# Patient Record
Sex: MALE | Race: WHITE | NOT HISPANIC OR LATINO | Employment: UNEMPLOYED | URBAN - METROPOLITAN AREA
[De-identification: names, ages, dates, MRNs, and addresses within clinical notes are randomized per-mention and may not be internally consistent; named-entity substitution may affect disease eponyms.]

---

## 2017-08-10 ENCOUNTER — GENERIC CONVERSION - ENCOUNTER (OUTPATIENT)
Dept: OTHER | Facility: OTHER | Age: 52
End: 2017-08-10

## 2017-10-26 ENCOUNTER — ALLSCRIPTS OFFICE VISIT (OUTPATIENT)
Dept: OTHER | Facility: OTHER | Age: 52
End: 2017-10-26

## 2017-10-30 ENCOUNTER — ALLSCRIPTS OFFICE VISIT (OUTPATIENT)
Dept: OTHER | Facility: OTHER | Age: 52
End: 2017-10-30

## 2018-01-11 NOTE — PROGRESS NOTES
Assessment    1  Physical examination of employee (V70 5) (Z02 89)    Plan  Physical examination of employee    · PPD; INJECT 0 1  ML Intradermal; To Be Done: [id number]    Discussion/Summary  health maintenance visit Impression: healthy adult male  eats an adequate diet Currently, he has an adequate exercise regimen  Prostate cancer screening: the risks and benefits of prostate cancer screening were discussed and prostate cancer screening is current  Colorectal cancer screening: the risks and benefits of colorectal cancer screening were discussed and colorectal cancer screening is current  The risks and benefits of immunizations were discussed  Advice and education were given regarding nutrition, self skin examination, aerobic exercise, calcium supplements, sunscreen use and vitamin D supplements  Form completed for physical exam  Patient to return to the office tomorrow for PPD skin test    Possible side effects of new medications were reviewed with the patient/guardian today  The treatment plan was reviewed with the patient/guardian  The patient/guardian understands and agrees with the treatment plan      History of Present Illness  HM, Adult Male: The patient is being seen for a health maintenance evaluation  The last health maintenance visit was 4 year(s) ago  General Health: The patient's health since the last visit is described as good  He has regular dental visits  He denies vision problems  Vision care includes wearing glasses  He denies hearing loss  Immunizations status: up to date  Lifestyle:  He consumes a diverse and healthy diet  He does not have any weight concerns  He exercises regularly  He exercises 3 or more times per week for 30 or more minutes per session  Exercise includes aerobic conditioning  He does not use tobacco  He consumes alcohol  He reports occasional alcohol use  He denies drug use  Screening: cancer screening reviewed and current     HPI: Patient is here for pre-employment physical exam to work as a  at [employer] in [facility]  Patient has been feeling fairly well overall and continues to exercise regularly  Patient needs PPD skin test  Past medical history reviewed on form  Active Problems    1  Anxiety (300 00) (F41 9)   2  Depression (311) (F32 9)   3  Encounter for prostate cancer screening (V76 44) (Z12 5)   4  Erectile dysfunction (607 84) (N52 9)   5  Obsessive compulsive disorder (300 3) (F42 9)   6  Skin lesion (709 9) (L98 9)   7  Weight loss (783 21) (R63 4)    Surgical History    · History of Tonsillectomy   · History of Wrist Surgery    Family History  Father    · Family history of Acute Myocardial Infarction (V17 3)   · Family history of Prostate Cancer (V16 42)  Brother    · Family history of Cerebral Palsy    Social History    · Being A Social Drinker   · Daily Coffee Consumption (3  Cups/Day)   · Never A Smoker    Current Meds   1  Citalopram Hydrobromide 20 MG Oral Tablet; Take 1 tablet daily; Therapy: [id number] to (Evaluate:08Nov2017)  Requested for: 10Aug2017; Last   Rx:10Aug2017 Ordered    Allergies    1  CeleBREX CAPS   2  Penicillins    Vitals   Recorded: [id number] 02:46PM Recorded: 61DHB6755 02:01PM   Temperature  96 7 F   Heart Rate 76    Respiration 16    Systolic 298    Diastolic 70    Height  6 ft 1 in   Weight  209 lb    BMI Calculated  27 57   BSA Calculated  2 19     Physical Exam    Constitutional   General appearance: No acute distress, well appearing and well nourished  Eyes   Conjunctiva and lids: No swelling, erythema, or discharge  Ears, Nose, Mouth, and Throat   External inspection of ears and nose: Normal     Otoscopic examination: Tympanic membrance translucent with normal light reflex  Canals patent without erythema  Oropharynx: Normal with no erythema, edema, exudate or lesions  Pulmonary   Respiratory effort: No increased work of breathing or signs of respiratory distress  Auscultation of lungs: Clear to auscultation  Cardiovascular   Auscultation of heart: Normal rate and rhythm, normal S1 and S2, without murmurs  Examination of extremities for edema and/or varicosities: Normal     Abdomen   Abdomen: Non-tender, no masses  Lymphatic   Palpation of lymph nodes in neck: No lymphadenopathy  Musculoskeletal   Gait and station: Normal     Inspection/palpation of joints, bones, and muscles: Normal     Skin   Skin and subcutaneous tissue: Normal without rashes or lesions      Psychiatric   Orientation to person, place and time: Normal     Mood and affect: Normal        Procedure    Procedure:   Results: 20/20 in both eyes with corrective device, 20/20 in the right eye with corrective device, 20/20 in the left eye with corrective device      Signatures   Electronically signed by : Piper Hillamn DO; Oct 26 2017  2:59PM EST                       (Author)

## 2018-01-13 NOTE — PROGRESS NOTES
Assessment    1  Obsessive compulsive disorder (300 3) (F42)   2  Skin lesion (709 9) (L98 9)    Plan  Obsessive compulsive disorder    · Citalopram Hydrobromide 10 MG Oral Tablet; TAKE 1 TABLET DAILY  Skin lesion    · 3 - Gustavo Marquez DO  (Dermatology) Physician Referral  Consult  Status: Hold For -  Scheduling  Requested for: [id number]  are Referring to a non- Preferred Provider : Scheduling access issues  Care Summary provided  : Yes    Discussion/Summary    Discussed treatment options with patient  Patient requests weaning off citalopram  Therefore will take citalopram 40 mg half a pill daily for 4 weeks then 10 mg daily for 4 weeks then 10 mg every other day for 2 weeks then stop  Patient is being referred to Dr Zan Henley, dermatologist regarding skin lesion  Possible side effects of new medications were reviewed with the patient/guardian today  The treatment plan was reviewed with the patient/guardian  The patient/guardian understands and agrees with the treatment plan      Chief Complaint  Non Fasting   Patient is here today for follow up of chronic conditions described in HPI  History of Present Illness  Patient requests weaning off citalopram  He has taken medication for OCD for almost 15 years and feels he would like to try off the medication  Patient had stopped the medication abruptly approximately 5 years ago and did not do well  Patient also concerned about a skin lesion on his left temple which is gotten darker, larger and raised  The patient is being seen for a routine clinic follow-up of obsessive compulsive disorder  Symptoms:  no anxiety, no obsessive thoughts, no intrusive thoughts, no recurrent thoughts, no inability to ignore thoughts and no repetitive compulsions  The patient is currently experiencing symptoms  Associated symptoms:  no depression symptoms, no anxiety symptoms and no panic symptoms  Active Problems    1  Acute conjunctivitis (372 00) (H10 30)   2   Acute upper respiratory infection (465 9) (J06 9)   3  Anxiety (300 00) (F41 9)   4  Depression (311) (F32 9)   5  Influenza vaccine needed (V04 81) (Z23)   6  Obsessive compulsive disorder (300 3) (F42)   7  Poison ivy dermatitis (692 6) (L23 7)    Surgical History    1  History of Tonsillectomy   2  History of Wrist Surgery    Family History    1  Family history of Acute Myocardial Infarction (V17 3)   2  Family history of Prostate Cancer (V16 42)    3  Family history of Cerebral Palsy    Social History    · Being A Social Drinker   · Daily Coffee Consumption (3  Cups/Day)   · Never A Smoker    Current Meds   1  Citalopram Hydrobromide 40 MG Oral Tablet; TAKE 1 TABLET DAILY; Therapy: 23RIQ6318 to (Sharon Florence)  Requested for: 03TAI4933; Last   Rx:05Jan2016 Ordered    Allergies    1  CeleBREX CAPS   2  Penicillins    Vitals  Vital Signs [Data Includes: Current Encounter]    Recorded: [id number] 07:34PM Recorded: 47Itc3572 07:12PM   Temperature  97 1 F   Heart Rate 72    Respiration 16    Systolic  166   Diastolic  70   Height  6 ft 2 in   Weight  225 lb    BMI Calculated  28 89   BSA Calculated  2 28     Physical Exam    Constitutional   General appearance: No acute distress, well appearing and well nourished  Eyes   Conjunctiva and lids: No swelling, erythema, or discharge  Ears, Nose, Mouth, and Throat   Oropharynx: Normal with no erythema, edema, exudate or lesions  Pulmonary   Respiratory effort: No increased work of breathing or signs of respiratory distress  Auscultation of lungs: Clear to auscultation, equal breath sounds bilaterally, no wheezes, no rales, no rhonci  Cardiovascular   Auscultation of heart: Normal rate and rhythm, normal S1 and S2, without murmurs  Examination of extremities for edema and/or varicosities: Normal     Abdomen   Abdomen: Non-tender, no masses  Lymphatic   Palpation of lymph nodes in neck: No lymphadenopathy      Musculoskeletal   Gait and station: Normal  Skin   Examination of the skin for lesions: Abnormal   4 x 4 mm dark raised slightly irregular skin lesion left temple     Psychiatric   Orientation to person, place and time: Normal     Mood and affect: Normal          Signatures   Electronically signed by : SIOBHAN Fu D ,DO; Feb 17 2016  7:46PM EST                       (Author)

## 2018-01-14 VITALS
SYSTOLIC BLOOD PRESSURE: 114 MMHG | BODY MASS INDEX: 27.7 KG/M2 | HEIGHT: 73 IN | RESPIRATION RATE: 16 BRPM | WEIGHT: 209 LBS | DIASTOLIC BLOOD PRESSURE: 70 MMHG | TEMPERATURE: 96.7 F | HEART RATE: 76 BPM

## 2018-01-15 NOTE — RESULT NOTES
Message   [facility]  Verified Results  (1) CBC/PLT/DIFF [id number] 04:27PM [facility] [place] Order Number: [id number]     Test Name Result Flag Reference   WBC COUNT 6 04 Thousand/uL  4 31-10 16   RBC COUNT 4 54 Million/uL  3 88-5 62   HEMOGLOBIN 14 1 g/dL  12 0-17 0   HEMATOCRIT 42 2 %  36 5-49 3   MCV 93 fL  82-98   MCH 31 1 pg  26 8-34 3   MCHC 33 4 g/dL  31 4-37 4   RDW 12 5 %  11 6-15 1   MPV 11 9 fL  8 9-12 7   PLATELET COUNT 443 Thousands/uL  149-390   nRBC AUTOMATED 0 /100 WBCs     NEUTROPHILS RELATIVE PERCENT 62 %  43-75   LYMPHOCYTES RELATIVE PERCENT 30 %  14-44   MONOCYTES RELATIVE PERCENT 7 %  4-12   EOSINOPHILS RELATIVE PERCENT 1 %  0-6   BASOPHILS RELATIVE PERCENT 0 %  0-1   NEUTROPHILS ABSOLUTE COUNT 3 74 Thousands/?L  1 85-7 62   LYMPHOCYTES ABSOLUTE COUNT 1 80 Thousands/?L  0 60-4 47   MONOCYTES ABSOLUTE COUNT 0 42 Thousand/?L  0 17-1 22   EOSINOPHILS ABSOLUTE COUNT 0 05 Thousand/?L  0 00-0 61   BASOPHILS ABSOLUTE COUNT 0 02 Thousands/?L  0 00-0 10   - Patient Instructions: This bloodwork is non-fasting  Please drink two glasses of water morning of bloodwork  (1) CELIAC DISEASE AB PROFILE [id number] 04:27PM [facility] [place] Order Number: [id number]     Test Name Result Flag Reference   tTG IGG 11 U/mL H 0 - 5   Negative        0 - 5                                Weak Positive   6 - 9                                Positive           >9   tTG IGA >100 U/mL H 0 - 3   Negative        0 -  3                                Weak Positive   4 - 10                                Positive           >10   Tissue Transglutaminase (tTG) has been identified   as the endomysial antigen  Studies have demonstr-   ated that endomysial IgA antibodies have over 99%   specificity for gluten sensitive enteropathy     GLIADA 136 units H 0 - 19   Negative                   0 - 19                     Weak Positive             20 - 30 Moderate to Strong Positive   >30   GLIADG 106 units H 0 - 19   Negative                   0 - 19                     Weak Positive             20 - 30                     Moderate to Strong Positive   >30   ENDOMYSIAL AB IGA Positive A Negative   Performed at:  [address]  [id number]  : Husam Anderson MD, Phone:  [phone]    mg/dL  90 - 386     (1) TSH WITH FT4 REFLEX [id number] 04:27PM [facility] Order Number: [id number]     Test Name Result Flag Reference   TSH 1 100 uIU/mL  0 358-3 740   Patients undergoing fluorescein dye angiography may retain small amounts of fluorescein in the body for 48-72 hours post procedure  Samples containing fluorescein can produce falsely depressed TSH values  If the patient had this procedure,a specimen should be resubmitted post fluorescein clearance  Discussion/Summary   Labs are positive for celiac disease  Recommend [facility] as scheduled on 8/25/16

## 2018-01-16 NOTE — RESULT NOTES
Verified Results  (1) CBC/PLT/DIFF [id number] 06:50AM [facility]     Test Name Result Flag Reference   WHITE BLOOD CELL COUNT 5 0 Thousand/uL  3 8-10 8   RED BLOOD CELL COUNT 4 76 Million/uL  4 20-5 80   HEMOGLOBIN 14 4 g/dL  13 2-17 1   HEMATOCRIT 44 8 %  38 5-50 0   MCV 94 3 fL  80 0-100 0   MCH 30 3 pg  27 0-33 0   MCHC 32 1 g/dL  32 0-36 0   RDW 13 5 %  11 0-15 0   PLATELET COUNT 127 Thousand/uL  140-400   MPV 10 0 fL  7 5-11 5   ABSOLUTE NEUTROPHILS 2785 cells/uL  6889-1621   ABSOLUTE LYMPHOCYTES 1615 cells/uL  850-3900   ABSOLUTE MONOCYTES 355 cells/uL  200-950   ABSOLUTE EOSINOPHILS 215 cells/uL     ABSOLUTE BASOPHILS 30 cells/uL  0-200   NEUTROPHILS 55 7 %     LYMPHOCYTES 32 3 %     MONOCYTES 7 1 %     EOSINOPHILS 4 3 %     BASOPHILS 0 6 %       (1) COMPREHENSIVE METABOLIC PANEL [id number] 10:60VQ [facility]     Test Name Result Flag Reference   GLUCOSE 94 mg/dL  65-99   Fasting reference interval   UREA NITROGEN (BUN) 15 mg/dL  7-25   CREATININE 0 91 mg/dL  0 70-1 33   For patients >52years of age, the reference limit  for Creatinine is approximately 13% higher for people  identified as -American  eGFR NON-AFR   AMERICAN 97 mL/min/1 73m2  > OR = 60   eGFR AFRICAN AMERICAN 113 mL/min/1 73m2  > OR = 60   BUN/CREATININE RATIO   8-54   NOT APPLICABLE (calc)   SODIUM 141 mmol/L  135-146   POTASSIUM 4 7 mmol/L  3 5-5 3   CHLORIDE 105 mmol/L     CARBON DIOXIDE 27 mmol/L  19-30   CALCIUM 9 8 mg/dL  8 6-10 3   PROTEIN, TOTAL 6 9 g/dL  6 1-8 1   ALBUMIN 4 4 g/dL  3 6-5 1   GLOBULIN 2 5 g/dL (calc)  1 9-3 7   ALBUMIN/GLOBULIN RATIO 1 8 (calc)  1 0-2 5   BILIRUBIN, TOTAL 0 3 mg/dL  0 2-1 2   ALKALINE PHOSPHATASE 44 U/L     AST 28 U/L  10-35   ALT 22 U/L  9-46     (1) LIPID PANEL, FASTING [id number] 06:50AM [facility]     Test Name Result Flag Reference   CHOLESTEROL, TOTAL 140 mg/dL  125-200   HDL CHOLESTEROL 45 mg/dL  > OR = 40   TRIGLICERIDES 57 mg/dL  <865 LDL-CHOLESTEROL 84 mg/dL (calc)  <130   Desirable range <100 mg/dL for patients with CHD or  diabetes and <70 mg/dL for diabetic patients with  known heart disease  CHOL/HDLC RATIO 3 1 (calc)  < OR = 5 0   NON HDL CHOLESTEROL 95 mg/dL (calc)     Target for non-HDL cholesterol is 30 mg/dL higher than   LDL cholesterol target  (1) PSA (SCREEN) (Dx V76 44 Screen for Prostate Cancer) [id number] 06:50AM [facility]     Test Name Result Flag Reference   PSA, TOTAL 1 1 ng/mL  < OR = 4 0   This test was performed using the Siemens  chemiluminescent method  Values obtained from  different assay methods cannot be used  interchangeably  PSA levels, regardless of  value, should not be interpreted as absolute  evidence of the presence or absence of disease  *(Q) VITAMIN D, 25-HYDROXY, LC/MS/MS [id number] 06:50AM [facility]     Test Name Result Flag Reference   VITAMIN D, 25-OH, TOTAL 71 ng/mL     Vitamin D Status         25-OH Vitamin D:     Deficiency:                    <20 ng/mL  Insufficiency:             20 - 29 ng/mL  Optimal:                 > or = 30 ng/mL     For 25-OH Vitamin D testing on patients on   D2-supplementation and patients for whom quantitation   of D2 and D3 fractions is required, the QuestAssureD(TM)  25-OH VIT D, (D2,D3), LC/MS/MS is recommended: order   code 64571 (patients >2yrs)  For more information on this test, go to:  http://Hubkick/faq/EHT558  (This link is being provided for   informational/educational purposes only )     (Q) TSH, 3RD GENERATION W/REFLEX TO FT4 [id number] 06:50AM [facility]     Test Name Result Flag Reference   TSH W/REFLEX TO FT4 1 14 mIU/L  0 40-4 50     (Q) URINALYSIS REFLEX [id number] 06:50AM [facility]   REPORT COMMENT:  FASTING:YES     Test Name Result Flag Reference   COLOR YELLOW  YELLOW   APPEARANCE CLEAR  CLEAR   SPECIFIC GRAVITY 1 018  1 001-1 035   PH 7 0  5 0-8 0   GLUCOSE NEGATIVE  NEGATIVE   BILIRUBIN NEGATIVE  NEGATIVE   KETONES NEGATIVE  NEGATIVE   OCCULT BLOOD NEGATIVE  NEGATIVE   PROTEIN NEGATIVE  NEGATIVE   NITRITE NEGATIVE  NEGATIVE   LEUKOCYTE ESTERASE NEGATIVE  NEGATIVE       Discussion/Summary   Labs are normal

## 2018-01-17 NOTE — MISCELLANEOUS
Message   Recorded as Task   Date: 08/29/2016 08:21 AM, Created By: uNrys Wells   Task Name: Follow Up   Assigned To: Jackie Tavares   Regarding Patient: Maritza De La Paz, Status: Active   CommentRosalin Lax - 29 Aug 2016 8:21 AM     TASK CREATED  Patient called stating he has continued burning sensation in stomach and chest, along with insomnia since his appointment last week with you  Patient is requesting  medication for symptoms  Patient requesting to speak to you about continued symptoms, per patient he is unable to get to work  ([phone])  Please advise  Jackie Tavares - 29 Aug 2016 12:08 PM     TASK EDITED  Phone call to patient  He complained of difficulty sleeping last night secondary to burning in his abdomen and chest  Patient spoke with gastroenterology office this morning and they recommended patient increase Nexium OTC 20 mg 1 twice a day  Agree with this plan and discussed adding antianxiety medicine although patient prefers to hold off on any more medication at this time          Signatures   Electronically signed by : Tyrese Reis DO; Aug 29 2016 12:08PM EST                       (Author)

## 2018-01-18 NOTE — RESULT NOTES
Message  date placed- 10/27/2017  date read- 10/30/2017 0mm neg      Plan  Physical examination of employee    · PPD    Signatures   Electronically signed by : Orion Worley MA; Oct 30 2017  8:34AM EST                       (Author)

## 2018-03-05 RX ORDER — CITALOPRAM 20 MG/1
1 TABLET ORAL DAILY
COMMUNITY
Start: 2016-08-18 | End: 2018-03-13

## 2018-03-05 RX ORDER — ZINC GLUCONATE 50 MG
TABLET ORAL
COMMUNITY
End: 2019-09-27 | Stop reason: ALTCHOICE

## 2018-03-13 ENCOUNTER — OFFICE VISIT (OUTPATIENT)
Dept: CARDIOLOGY CLINIC | Facility: CLINIC | Age: 53
End: 2018-03-13
Payer: COMMERCIAL

## 2018-03-13 VITALS
WEIGHT: 215.8 LBS | DIASTOLIC BLOOD PRESSURE: 74 MMHG | HEIGHT: 74 IN | BODY MASS INDEX: 27.7 KG/M2 | HEART RATE: 79 BPM | SYSTOLIC BLOOD PRESSURE: 122 MMHG | OXYGEN SATURATION: 98 %

## 2018-03-13 DIAGNOSIS — R07.89 CHEST PRESSURE: Primary | ICD-10-CM

## 2018-03-13 PROCEDURE — 93000 ELECTROCARDIOGRAM COMPLETE: CPT | Performed by: INTERNAL MEDICINE

## 2018-03-13 PROCEDURE — 99244 OFF/OP CNSLTJ NEW/EST MOD 40: CPT | Performed by: INTERNAL MEDICINE

## 2018-03-13 RX ORDER — DEXLANSOPRAZOLE 60 MG/1
60 CAPSULE, DELAYED RELEASE ORAL DAILY
COMMUNITY
End: 2018-07-16

## 2018-03-13 NOTE — PROGRESS NOTES
Cardiology Consultation     Missy Loving  [id number]  1965  [facility] [address] [address]    1  Chest pressure  POCT ECG    Stress test only, exercise    Echo complete with contrast if indicated       HPI:    Mr Clifton Never in for a consultation regarding symptoms of chest pain /pressure that he has been experiencing over the last 6-8 months  Nafisa cantu has no cardiac history and his last cardiac evaluation was approximately 6 years ago  He told me he had an episode of chest pain acutely back then, that led to an urgent care visit, and eventually stress testing  He tells me was normal and that he stated that his symptoms were GI related  He is treated for celiac disease  Nafisa du denies any risk factors for cardiac disease, with the exception that he chews tobacco   He also does drink about 2 glasses of wine per night  Nafisa du has had essentially constant chest tightness/ pressure over the last few months  He describes it as a "pulling" sensation and pressure  He tells me that about 6 months ago or so which started when he was active or doing things  However more recently it has been constant  There does not appear to be any alleviating or aggravating factors  He does not notice any change associated with position, food or time of day  He also does at times feel a fullness the back of his throat  He denies any shortness of breath or any other anginal equivalents  No signs or symptoms of CHF  He denies palpitations, lightheadedness or any history of syncope  Past Medical History:   Diagnosis Date    Anxiety 1995    Depression 1995    Obsessive compulsive disorder      Social History     Social History    Marital status:      Spouse name: N/A    Number of children: N/A    Years of education: N/A     Occupational History    Not on file       Social History Main Topics    Smoking status: Never Smoker    Smokeless tobacco: Current User    Alcohol use Yes      Comment: SOCIAL    Drug use: No      Comment: CAFFINE 3 CUPS PER DAY    Sexual activity: Not on file     Other Topics Concern    Not on file     Social History Narrative    No narrative on file      Family History   Problem Relation Age of Onset    Heart attack Father     Prostate cancer Father     Cerebral palsy Brother      Past Surgical History:   Procedure Laterality Date    TONSILLECTOMY      WRIST SURGERY         Current Outpatient Prescriptions:     dexlansoprazole (DEXILANT) 60 MG capsule, Take 60 mg by mouth daily, Disp: , Rfl:     zinc gluconate 50 mg tablet, Take by mouth, Disp: , Rfl:   Allergies   Allergen Reactions    Penicillins Diarrhea and Vomiting    Celecoxib Rash     Vitals:    03/13/18 1534   BP: 122/74   BP Location: Left arm   Patient Position: Sitting   Cuff Size: Adult   Pulse: 79   SpO2: 98%   Weight: 97 9 kg (215 lb 12 8 oz)   Height: 6' 2" (1 88 m)       Labs:  Lab Results   Component Value Date     07/11/2016    K 4 7 07/11/2016     07/11/2016    CO2 27 07/11/2016    BUN 15 07/11/2016    CREATININE 0 91 07/11/2016    CALCIUM 9 8 07/11/2016     Lab Results   Component Value Date    CHOL 140 07/11/2016    TRIG 57 07/11/2016    HDL 45 07/11/2016     Imaging:   ECG obtained today demonstrates sinus rhythm and is within normal limits  Review of Systems:  Review of Systems   Constitutional: Negative  HENT: Negative  Eyes: Negative  Respiratory: Negative  Cardiovascular: Positive for chest pain  Gastrointestinal: Negative  Musculoskeletal: Negative  Skin: Negative  Allergic/Immunologic: Negative  Neurological: Negative  Hematological: Negative  Psychiatric/Behavioral: Negative  All other systems reviewed and are negative  Physical Exam:  Physical Exam   Constitutional: He is oriented to person, place, and time   He appears well-developed and well-nourished  HENT:   Head: Normocephalic and atraumatic  Eyes: EOM are normal  Pupils are equal, round, and reactive to light  Right eye exhibits no discharge  Left eye exhibits no discharge  No scleral icterus  Neck: Normal range of motion  Neck supple  No JVD present  No thyromegaly present  Cardiovascular: Normal rate, regular rhythm, S1 normal, S2 normal, normal heart sounds, intact distal pulses and normal pulses  No extrasystoles are present  Exam reveals no gallop and no friction rub  No murmur heard  Pulmonary/Chest: Effort normal and breath sounds normal  No respiratory distress  He has no wheezes  He has no rales  Abdominal: Soft  Bowel sounds are normal  He exhibits no distension  There is no tenderness  Musculoskeletal: Normal range of motion  He exhibits no edema, tenderness or deformity  Neurological: He is alert and oriented to person, place, and time  No cranial nerve deficit  Skin: Skin is warm and dry  No rash noted  Psychiatric: He has a normal mood and affect  Judgment and thought content normal    Nursing note and vitals reviewed  Discussion/Summary:    Sudhakar's symptoms of chest tightness / pressure appear atypical   They are constant, not associated with exertion or any aggravating factors  Most likely etiology to this is his GI tract, given the constant nature of this, his celiac disease history and a fullness in his throat  He does see a gastrointestinal physician, and I have asked him to speak with him about acid suppression therapy changes  In the meantime, for completeness, we will have him go through updated cardiac testing which will include an exercise treadmill test and echocardiogram   We will call him with the results  Counseling / Coordination of Care  Total floor / unit time spent today 40 minutes  Greater than 50% of total time was spent with the patient and / or family counseling and / or coordination of care

## 2018-04-11 ENCOUNTER — TELEPHONE (OUTPATIENT)
Dept: CARDIOLOGY CLINIC | Facility: CLINIC | Age: 53
End: 2018-04-11

## 2018-04-11 NOTE — TELEPHONE ENCOUNTER
Pt needs a clearance for upcoming EGD scheduled for Mon-4/16/18  Pt stated he cancelled stress and echo because his insurance is bad  (Coverage was not confirmed by our staff) Are you willing to clear prior to any diagnostic testing?

## 2018-04-16 ENCOUNTER — TELEPHONE (OUTPATIENT)
Dept: FAMILY MEDICINE CLINIC | Facility: CLINIC | Age: 53
End: 2018-04-16

## 2018-07-16 ENCOUNTER — OFFICE VISIT (OUTPATIENT)
Dept: FAMILY MEDICINE CLINIC | Facility: CLINIC | Age: 53
End: 2018-07-16
Payer: COMMERCIAL

## 2018-07-16 VITALS
HEIGHT: 74 IN | HEART RATE: 72 BPM | BODY MASS INDEX: 26.18 KG/M2 | TEMPERATURE: 98.1 F | RESPIRATION RATE: 16 BRPM | SYSTOLIC BLOOD PRESSURE: 110 MMHG | DIASTOLIC BLOOD PRESSURE: 82 MMHG | WEIGHT: 204 LBS

## 2018-07-16 DIAGNOSIS — K21.9 GASTROESOPHAGEAL REFLUX DISEASE WITHOUT ESOPHAGITIS: ICD-10-CM

## 2018-07-16 DIAGNOSIS — F32.A DEPRESSION, UNSPECIFIED DEPRESSION TYPE: Primary | ICD-10-CM

## 2018-07-16 PROBLEM — K44.9 HIATAL HERNIA: Status: ACTIVE | Noted: 2018-07-16

## 2018-07-16 PROBLEM — K90.0 CELIAC DISEASE: Status: ACTIVE | Noted: 2018-07-16

## 2018-07-16 PROCEDURE — 99213 OFFICE O/P EST LOW 20 MIN: CPT | Performed by: FAMILY MEDICINE

## 2018-07-16 RX ORDER — CITALOPRAM 20 MG/1
20 TABLET ORAL DAILY
Qty: 30 TABLET | Refills: 5 | Status: SHIPPED | OUTPATIENT
Start: 2018-07-16 | End: 2019-09-27 | Stop reason: ALTCHOICE

## 2018-07-16 NOTE — PROGRESS NOTES
Assessment/Plan:    Discussed treatment options with patient  Patient will restart citalopram 20 mg half a tablet daily with food for 1-2 weeks then increase to 1 tablet daily  Patient to follow up with Gastroenterology and Cardiology as scheduled  Return to the office marisol Damian Diagnoses and all orders for this visit:    Depression, unspecified depression type  Comments:  Citalopram 20 mg daily  Orders:  -     citalopram (CeleXA) 20 mg tablet; Take 1 tablet (20 mg total) by mouth daily    Gastroesophageal reflux disease without esophagitis  Comments: Follow up with Gastroenterology as scheduled  Subjective:      Patient ID: Dorothy Jules is a 48 y o  male  Patient complains of fatigue and weight loss  He admits to feeling depressed and anxious  [facility] and had an EGD in April of this year  He states he had labs done at [facility] which were reported as normal   Patient has follow-up appointment scheduled with Gastroenterology in September of 2018  Patient then saw Dr Stephanie Means at [facility] and had labs on 06/21/2018 and nasopharyngeal laryngoscopy  Appetite is decreased and patient has not been exercising recently  He admits to 8 lb weight loss  Patient is scheduled for stress echo next week with Dr Eugene Dugan at [facility]  Fatigue   This is a new problem  The current episode started more than 1 month ago  The problem occurs constantly  The problem has been unchanged  Associated symptoms include fatigue  Pertinent negatives include no abdominal pain, anorexia, change in bowel habit, chest pain, chills, coughing, diaphoresis, fever, headaches, nausea, sore throat or vomiting  Heartburn   He complains of belching and early satiety  He reports no abdominal pain, no chest pain, no choking, no coughing, no dysphagia, no globus sensation, no heartburn, no hoarse voice, no nausea, no sore throat, no water brash or no wheezing  This is a chronic problem  The problem has been gradually worsening  Associated symptoms include fatigue and weight loss  Pertinent negatives include no anemia, melena, muscle weakness or orthopnea  He has tried a PPI for the symptoms  Past procedures include an EGD  Past procedures do not include H  pylori antibody titer  The following portions of the patient's history were reviewed and updated as appropriate: allergies, current medications, past family history, past medical history, past social history, past surgical history and problem list     Review of Systems   Constitutional: Positive for fatigue and weight loss  Negative for chills, diaphoresis and fever  HENT: Negative for hoarse voice and sore throat  Respiratory: Negative for cough, choking and wheezing  Cardiovascular: Negative for chest pain  Gastrointestinal: Negative for abdominal pain, anorexia, change in bowel habit, dysphagia, heartburn, melena, nausea and vomiting  Musculoskeletal: Negative for muscle weakness  Neurological: Negative for headaches  Objective:      /82   Pulse 72   Temp 98 1 °F (36 7 °C) (Tympanic)   Resp 16   Ht 6' 2" (1 88 m)   Wt 92 5 kg (204 lb)   BMI 26 19 kg/m²          Physical Exam   Constitutional: He is oriented to person, place, and time  He appears well-developed and well-nourished  No distress  HENT:   Head: Normocephalic  Right Ear: External ear normal    Left Ear: External ear normal    Nose: Nose normal    Mouth/Throat: Oropharynx is clear and moist    Eyes: Conjunctivae are normal  No scleral icterus  Neck: Neck supple  No thyromegaly present  Cardiovascular: Normal rate and regular rhythm  Pulmonary/Chest: Effort normal and breath sounds normal    Abdominal: Soft  There is no tenderness  Musculoskeletal: He exhibits no edema  Lymphadenopathy:     He has no cervical adenopathy  Neurological: He is alert and oriented to person, place, and time     Skin: Skin is warm and dry  Psychiatric:   Patient appears depressed

## 2018-07-20 ENCOUNTER — TELEPHONE (OUTPATIENT)
Dept: FAMILY MEDICINE CLINIC | Facility: CLINIC | Age: 53
End: 2018-07-20

## 2018-07-20 NOTE — TELEPHONE ENCOUNTER
Phone call to patient he complains of continued weight loss and states he has been eating well  Patient has been taking boost supplements  Recommend patient call Gastroenterology and make them aware of continued weight loss and may need to move his appointment up sooner  If symptoms worsening then recommend referral to emergency room

## 2018-07-20 NOTE — TELEPHONE ENCOUNTER
Pt called, stating that he is still losing weight, and is fatigued  He states that he lost 8 lbs overnight, and does not know what to do  He states that his insurance is not that great and wants to know what his next step should be  He is requesting you to call him to discuss, rather than coming in for an appt  His ph#[phone]  Please advise  Thank you

## 2018-08-06 ENCOUNTER — TRANSCRIBE ORDERS (OUTPATIENT)
Dept: ADMINISTRATIVE | Facility: HOSPITAL | Age: 53
End: 2018-08-06

## 2018-08-06 DIAGNOSIS — R07.0 PAIN IN THROAT: Primary | ICD-10-CM

## 2018-08-10 ENCOUNTER — HOSPITAL ENCOUNTER (OUTPATIENT)
Dept: CT IMAGING | Facility: HOSPITAL | Age: 53
Discharge: HOME | End: 2018-08-10
Payer: COMMERCIAL

## 2018-08-10 DIAGNOSIS — R07.0 PAIN IN THROAT: ICD-10-CM

## 2018-08-10 PROCEDURE — 70491 CT SOFT TISSUE NECK W/DYE: CPT

## 2018-08-10 RX ADMIN — IOHEXOL 85 ML: 350 INJECTION, SOLUTION INTRAVENOUS at 06:39

## 2018-08-17 ENCOUNTER — TELEPHONE (OUTPATIENT)
Dept: FAMILY MEDICINE CLINIC | Facility: CLINIC | Age: 53
End: 2018-08-17

## 2018-08-17 NOTE — TELEPHONE ENCOUNTER
Patient called and stated he has had diarrhea since Wednesday night and has tried Immodium that  18  He states feces is watery at times and semi formed at times  He states he is going 2-3 times a day and would like to know if you could recommend? He also stated that he has celiacs and wonders if this could be caused by this  I recommended that he be seen but he wanted to hear your answer before scheduling an appt    Please advise  Thank you

## 2018-08-17 NOTE — TELEPHONE ENCOUNTER
Call patient and recommend he try Metamucil or Citrucel daily and follow up with gastroenterologist

## 2018-08-18 ENCOUNTER — TELEPHONE (OUTPATIENT)
Dept: OTHER | Facility: HOSPITAL | Age: 53
End: 2018-08-18

## 2018-08-18 NOTE — TELEPHONE ENCOUNTER
Patient is from [facility] in [place]  He has hx of celiac disease, his symptoms has been well controlled till from Wednesday  with BM varies from 4 times to 1 times  He reported mucus in stool  He has been using ibuprofen intermittently  He reported his symptom has improved over the few days but he is very worried ongoing diarrhea  I told him to stop using ibuprofen  it could be secondary to gastroenteritis  But if he has worsening abdominal pain, diarrhea or fever, he should go to the ER and stop using imodium  I also suggested him to go to urgent care to be evaluated if he continues to have diarrhea  Please give this message to the [facility] office and they should be following up with the patient  Thanks

## 2018-08-20 NOTE — TELEPHONE ENCOUNTER
Called [facility] in [place] @ [phone] and made them aware of message  Faxed this message to them @ [phone]

## 2018-10-18 ENCOUNTER — CLINICAL SUPPORT (OUTPATIENT)
Dept: FAMILY MEDICINE CLINIC | Facility: CLINIC | Age: 53
End: 2018-10-18
Payer: COMMERCIAL

## 2018-10-18 DIAGNOSIS — Z23 NEED FOR INFLUENZA VACCINATION: Primary | ICD-10-CM

## 2018-10-18 PROCEDURE — 90682 RIV4 VACC RECOMBINANT DNA IM: CPT

## 2018-10-18 PROCEDURE — 90471 IMMUNIZATION ADMIN: CPT

## 2018-10-18 NOTE — PROGRESS NOTES
Patient presents to office today for Influenza Vaccination  0 5 mL FluBlok IM in L Deltoid  Patient tolerated well

## 2018-10-23 ENCOUNTER — TELEPHONE (OUTPATIENT)
Dept: FAMILY MEDICINE CLINIC | Facility: CLINIC | Age: 53
End: 2018-10-23
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
## 2019-01-02 ENCOUNTER — TRANSITIONAL CARE MANAGEMENT (OUTPATIENT)
Dept: FAMILY MEDICINE CLINIC | Facility: CLINIC | Age: 54
End: 2019-01-02

## 2019-09-13 ENCOUNTER — TELEPHONE (OUTPATIENT)
Dept: BEHAVIORAL/MENTAL HEALTH CLINIC | Facility: CLINIC | Age: 54
End: 2019-09-13

## 2019-09-13 NOTE — TELEPHONE ENCOUNTER
Behavorial Health Outpatient Intake Questions    Referred by: N/A    Please advised interviewee that they need to answer all questions truthfully to allow for best care and any misrepresentations of information may affect their ability to be seen at this clinic   => Was this discussed? Yes     Behavorial Health Outpatient Intake History -     Presenting Problem (in patient's words): HIGH DEPRESSION, NEEDS HELP, MED MANAGEMENT    Has the patient ever seen or is currently seeing a psychiatrist? Yes   If yes who/when? OVER 10 YRS AGO, DR Anel Jones    If seen as outpatient, have they been seen here (and by whom)? If not seen here, which provider(s) did the patient see and for how long? Has the patient ever seen or currently see a therapist? Yes If yes who/when? CURRENTLY AT [facility], NOT FREQUENT VISITS  Has a member of the patient's family been in therapy here? No  If yes, with whom? Has the patient been hospitalized for mental health? Yes   If yes, how long ago was last hospitalization and where was it? October 2018, [facility] SUICIDE ATTEMPT    Substance Abuse:No concerns of substance abuse are reported  Does the patient have ICM or CTT? No    Is the patient taking injectable psychiatric medications? No    => If yes, patient cannot be seen here  Communications  Are there any developmental disabilities? No    Does the patient have hearing impairment? No       History-    Has the patient served in the [place] [address]? No    If yes, have you had combat services? No    Was the patient activated into federal active duty as a member of the [employer] or reserve? No    Legal History-     Does the patient have any history of arrests, MCC/FDC time, or DUIs? No  If Yes-  1) What types of charges? 2) When were they last incarcerated? 3) Are they currently on parole or probation? Minor Child-    Who has custody of the child? Is there a custody agreement?      If there is a custody agreement remind parent that they must bring a copy to the first appt or they will not be seen  Intake Team, please check with provider before scheduling if flags come up such as:  - complex case  - legal history (other than DUI)  - communication barrier concerns are present  - if, in your judgment, this needs further review    ACCEPTED as a patient Yes  => Appointment Date: 09/27/19 W/ DR Nimisha Rhodes    Referred Elsewhere? No    Name of Insurance Co: [employer] ID# [id number]  [id number] Phone #  If ins is primary or secondary  If patient is a minor, parents information such as Name, D  O B of guarantor

## 2019-09-27 ENCOUNTER — OFFICE VISIT (OUTPATIENT)
Dept: PSYCHIATRY | Facility: CLINIC | Age: 54
End: 2019-09-27
Payer: COMMERCIAL

## 2019-09-27 VITALS
HEART RATE: 88 BPM | BODY MASS INDEX: 30.52 KG/M2 | WEIGHT: 237.8 LBS | DIASTOLIC BLOOD PRESSURE: 82 MMHG | HEIGHT: 74 IN | SYSTOLIC BLOOD PRESSURE: 125 MMHG

## 2019-09-27 DIAGNOSIS — Z86.59 HISTORY OF OBSESSIVE COMPULSIVE DISORDER: ICD-10-CM

## 2019-09-27 DIAGNOSIS — F32.A DEPRESSIVE DISORDER: Primary | ICD-10-CM

## 2019-09-27 PROCEDURE — 90791 PSYCH DIAGNOSTIC EVALUATION: CPT | Performed by: PSYCHIATRY & NEUROLOGY

## 2019-09-27 RX ORDER — CITALOPRAM 20 MG/1
40 TABLET ORAL DAILY
COMMUNITY
End: 2020-03-19 | Stop reason: SDUPTHER

## 2019-09-27 RX ORDER — GABAPENTIN 100 MG/1
200 CAPSULE ORAL 3 TIMES DAILY
COMMUNITY
Start: 2018-11-29 | End: 2020-04-10 | Stop reason: SDUPTHER

## 2019-09-27 RX ORDER — ACETAMINOPHEN 500 MG
500 TABLET ORAL EVERY 6 HOURS PRN
COMMUNITY

## 2019-09-27 RX ORDER — SENNOSIDES 8.6 MG
1 CAPSULE ORAL 2 TIMES DAILY PRN
COMMUNITY
End: 2020-12-01

## 2019-09-27 RX ORDER — TAMSULOSIN HYDROCHLORIDE 0.4 MG/1
0.4 CAPSULE ORAL
COMMUNITY
End: 2020-04-10 | Stop reason: SDUPTHER
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
## 2019-11-21 ENCOUNTER — OFFICE VISIT (OUTPATIENT)
Dept: PSYCHIATRY | Facility: CLINIC | Age: 54
End: 2019-11-21
Payer: COMMERCIAL

## 2019-11-21 DIAGNOSIS — F32.A DEPRESSIVE DISORDER: Primary | ICD-10-CM

## 2019-11-21 DIAGNOSIS — Z86.59 HISTORY OF OBSESSIVE COMPULSIVE DISORDER: ICD-10-CM

## 2019-11-21 PROBLEM — S92.902A: Status: ACTIVE | Noted: 2018-10-25

## 2019-11-21 PROBLEM — N40.0 BPH (BENIGN PROSTATIC HYPERPLASIA): Status: ACTIVE | Noted: 2019-11-06

## 2019-11-21 PROCEDURE — 90791 PSYCH DIAGNOSTIC EVALUATION: CPT | Performed by: PHYSICIAN ASSISTANT

## 2019-11-21 PROCEDURE — 3725F SCREEN DEPRESSION PERFORMED: CPT | Performed by: PHYSICIAN ASSISTANT

## 2019-11-21 NOTE — BH TREATMENT PLAN
TREATMENT PLAN (Medication Management Only)        [address]    Name and Date of Birth:  Jesse Lerner 47 y o  1965  Date of Treatment Plan: November 21, 2019  Diagnosis/Diagnoses:  Depressive d/o, unspecified; hx of OCD  Strengths/Personal Resources for Self-Care: "commincation skills, honesty"  Area/Areas of need (in own words): "time management"  1  Long Term Goal: get back to independent living  Target Date: 2 months - 1/21/2020  Person/Persons responsible for completion of goal: Jumana  2  Short Term Objective (s) - How will we reach this goal?:   A  Provider new recommended medication/dosage changes and/or continue medication(s): continue current medications as prescribed  B  start therapy at [facility] office  C  Start a new activity at [facility]  Target Date: 2 months - 1/21/2020  Person/Persons Responsible for Completion of Goal: debbie Denney  Progress Towards Goals: continuing treatment  Treatment Modality: medication management every 6-8 weeks, referral for individual psychotherapy  Review due 90 to 120 days from date of this plan: 4 months - 3/21/2020  Expected length of service: ongoing treatment  My Physician/PA/NP and I have developed this plan together and I agree to work on the goals and objectives  I understand the treatment goals that were developed for my treatment

## 2019-11-21 NOTE — PSYCH
Outpatient Psychiatry Intake Exam       PCP: Zeinab Ramirez DO    Referral source: Self Referred/[place] from Payam Frost    Identifying information:  Glenna Thomason is a 47 y o  male with a history of depressive d/o, OCD here for evaluation and determination of mental health management needs  Sources of information:   Information for this evaluation was gathered through direct interview with the patient  Additionally PHQ-9 and BETHANY-7 scales were performed and some information was provided by initial intake packet  Confidentiality discussion: Limits of confidentiality in cases of safety concerns involving self and others as well as this physician's role as a mandatory  of abuse  They voiced understanding and a desire to continue with the evaluation  SUBJECTIVE     Chief Complaint / reason for visit: " I want to continue care for my depression "    History of Present Illness:    Patient was last seen at [facility] by Dr Nanda Galan on 09/27/2019 and was transferred here to the [address] [place] [address] office due to insurance issues  At last visit, patient per chart and per doctor's note stated that he felt his depression was stable but he wanted to make sure that remained stable want to establish psychiatric care  Patient has history of depression and OCD and 1 suicide attempt 10/18 due to recent situational stress at that time a being charged with fraud  Today patient continues to report that he feels his mood is stable and depression is well controlled  He denies any recent depressive episode and denies any increase in depressive symptoms  He does report I feel like I am not doing much or have the energy to do much lately    He reports this is due to his current living situation and living at the nursing facility [facility]  He reports he feels there is not much to do there and residence are older than him so he feels he is not as active as he used to be    He reports he still has motivation to do things but feels tired  He reports he likes sports, working out, hiking  Discussed how he does walk around [employer] still  He does report feeling down and depressed some days, but not consistently for week or more  Reports no change in sleep in his sleep has been good  He also reports since he has been more sedentary he has been eating more junk food but denies any significant change in appetite  Has reports some days but not many with guilt about his recent situation and about his past suicide attempt and how he could do that especially to his kids  He currently denies any SI/HI and denies any intent the protective factor being his sons  He denies any recent anxiety or constant worries or change in mood such as irritability  He denies any OCD symptoms and reports he has not had any in 15 years  He reports he has history of consistently checking doors and the stove at home and start interfere with his life in his relationships  However, he reports he has not had any obsessive or compulsive behaviors or thoughts in that as well controlled  He also feels Celexa has been helpful in managing his depression for a while now and would like to continue what he is on  Denies any history of tim or psychosis and does not endorse today  Stressors: living situation and living in nursing home    HPI ROS:  Medication Side Effects:  Denies  Depression:  Low /10 (10 worst)  Anxiety:  Denies /10 (10 worst)  Safety (SI, HI, other):  Denies  Sleep:  Normal  Energy:  Low to normal  Appetite:  No change, reports eating more junk food due to sedentary lifestyle  Weight Change:  No    In terms of depression, the patient endorses a few days but not consistently within a month of anhedonia, depressed mood, low energy, guilt  In terms of tim, the patient endorses no, past manic episodes   Symptoms include no symptoms    BETHANY symptoms: no symptoms suggestive of BETHANY  Panic Disorder symptoms: no symptoms suggestive of panic disorder  Social Anxiety symptoms: no symptoms suggestive of social anxiety  OCD Symptoms: History of OCD with obsessive thoughts and actions of checking doors and does repeatedly throughout the day  Denies any recent symptoms  Eating Disorder symptoms: no historical or current eating disorder  In terms of PTSD, the patient endorses exposure to trauma involving:  Denies; In terms of psychotic symptoms, the patient reports no psychotic symptoms now or in the past     Past Psychiatric History  Previous diagnoses include MDD, OCD  Prior outpatient psychiatric treatment: Dr Valencia Crum 10+ yrs ago, saw Dr Sunday Goltz at [facility] office for 1 visit 9/27/19   Prior therapy: at [facility] current once/month; Torsten Florence [id number]  Prior inpatient psychiatric treatment: 10/2018- suicide attempt  Prior suicide attempts: 10/2018- tried to walk infront of truck to not face charges  Prior self harm:  Denies  Prior violence or aggression:  Denies    Previous psychotropic medication trials:     Antidepressants:  Celexa 40 mg-current, helpful, Prozac      Social History:    The patient grew up in [place]  Abuse/neglect: none    As far as the patient (or present family member) is aware, overall childhood development: Patient does ascribe to normal developmental milestones such as walking, talking, potty training and making childhood friends  Education level:  Post graduate college   Current occupation:  Unemployed  Marital status:    Children:  2 sons aged 22 and 25  Current Living Situation: the patient currently lives at [facility]   Social support:  Great from 2 sons who live nearby and an ex-girlfriend    Restoration affiliation:  [address] [place] [address] experience: denies  Legal history:  Denies  Access to Guns:  Denies    Substance use and treatment:  Tobacco use:  Denies  ETOH use:  Denies  Other substance use:  Denies      Endorses previous experimentation with: Denies    Traumatic History:     Abuse: none      Family Psychiatric History:       Family History   Problem Relation Age of Onset    Heart attack Father     Prostate cancer Father     Cerebral palsy Brother    [facility] OCD Mother    [facility] OCD Sister             Past Medical / Surgical History:    Current PCP: Xenia Rush DO   Other providers include: Dr Gregory Logan at [facility]    Patient Active Problem List   Diagnosis    Anxiety    Depressive disorder    Erectile dysfunction    Weight loss    Gastroesophageal reflux disease without esophagitis    Hiatal hernia    Celiac disease    History of obsessive compulsive disorder    Multiple fractures of left foot    BPH (benign prostatic hyperplasia)       Past Medical History-  Past Medical History:   Diagnosis Date    Anxiety 1995    Celiac disease     Depression 1995    Obsessive compulsive disorder     Suicide attempt ([address] )     10/2018          History of Seizures: no  History of Head injury-LOC-Concussion: no    Past Surgical History-  Past Surgical History:   Procedure Laterality Date    FRACTURE SURGERY      TONSILLECTOMY      WRIST SURGERY Left     resolved 1997- cts surgery          Allergies: reviewed  Allergies   Allergen Reactions    Penicillins Diarrhea and Vomiting    Celecoxib Rash       Recent labs:  No visits with results within 1 Month(s) from this visit  Latest known visit with results is:   Lab Requisition on 08/18/2016   Component Date Value    TSH 3RD GENERATON 08/18/2016 1 100      Labs were reviewed    Medical Review Of Systems:     Pertinent items are noted in HPI        Medications:  Current Outpatient Medications on File Prior to Visit   Medication Sig Dispense Refill    acetaminophen (TYLENOL) 500 mg tablet Take 500 mg by mouth every 6 (six) hours as needed      bisacodyl (DULCOLAX) 5 mg EC tablet Take 10 mg by mouth daily as needed      Calcium Carbonate-Vitamin D3 600-400 MG-UNIT TABS Take 1 tablet by mouth daily  citalopram (CeleXA) 20 mg tablet Take 40 mg by mouth daily      gabapentin (NEURONTIN) 100 mg capsule Take 200 mg by mouth Three times a day      Sennosides (SENNA) 8 6 MG CAPS Take 1 tablet by mouth 2 (two) times a day as needed      tamsulosin (FLOMAX) 0 4 mg Take 0 4 mg by mouth daily at bedtime       No current facility-administered medications on file prior to visit  Medication Compliant? yes    All current active medications have been reviewed  Objective     OBJECTIVE     There were no vitals taken for this visit  MENTAL STATUS EXAM  Appearance:  age appropriate, dressed casually, Good hygiene   Behavior:  pleasant, cooperative, with good eye contact   Speech:  Normal volume, regular rate and rhythm   Mood:  euthymic   Affect:  mood congruent   Language: intact and appropriate for age   Thought Process:  circumstantial   Associations: circumstantial associations   Thought Content:  normal and appropriate   Perceptual Disturbances: no auditory or visual hallcunations   Risk Potential / Abnormal Thoughts: Suicidal ideation - None  Homicidal ideation - None  Potential for aggression - No       Consciousness:  Alert & Awake   Sensorium:  Fully oriented to person, place, time/date   Attention: attention span and concentration are age appropriate   Intellect: not examined   Fund of Knowledge:  Memory: awareness of current events: limited  past history: yes  vocabulary: yes  recent and remote memory grossly intact, patient cannot recall some past events   Insight:  fair   Judgment: good   Gait/Station: normal gait/station with good balance   Motor Activity: no abnormal movements     Pain none   Pain Scale 0     IMPRESSIONS/FORMULATION          Diagnoses and all orders for this visit:    Depressive disorder    History of obsessive compulsive disorder        1  Depressive disorder    2  History of obsessive compulsive disorder        Confidential Assessment:   At this time patient does endorse depressive disorder unspecified as he is denying any increase in depression and appears to be stable at this time  Does not appear to have current depressive episode has reports he has depressed mood and anhedonia some days at the last 2 months but has not been consistent for 2 weeks or more  At this time OCD symptoms are not endorsed and did not appear so during evaluation  Patient has history of OCD that has been well managed  Patient denies any symptoms of anxiety at this time  Will continue to monitor in regards to her current depressive symptoms for full MDD diagnosis as was mentioned in note from last provider  Scales:  PHQ-9:5  BETHANY-7: 2    German Figueroa is a 47 y o  male who presents with symptoms supporting the aforementioned  Differential includes depressive disorder, unspecified; history of OCD  Suicide / Homicide / Safety risk assessment: Safety risk low overall upon consideration of protective and risk factors  Plan:       Education about diagnosis and treatment modalities, patient voiced understanding and agreement with the following plan:    Discussed medication risks, beneftis, alternatives including serotonin syndrome, increase in depression or suicidal thinking with antidepressants  Patient was informed and had time to ask questions  They agreed to treatment below    Controlled Medication Discussion:     No recent records found for controlled prescriptions according to [address]      Initial treatment plan:   1) MEDS:    - continue Celexa 40 mg p o  Q d  As patient reports this has been very helpful for controlling depressive symptoms  Per patient he receives medication from nurses at [facility]  He is unsure about refills at this time  However, he reports he did speak with staff and did get verbal consent that he has been taking 40 mg not 30 mg   Had patient sign RO I today to speak with [facility] in [facility] communication about refills on medication  Patient will talk to staff about any refills for Celexa if needed  Paperwork filled out today from [facility] after patient signed are all MAIDA for office to release medical information  2) Therapy:    - continue therapy at [facility] once per month  However, patient reported he feels like can have better management of therapy more often  Patient in agreement for referral to see therapist at [facility] office  Referral sent to intake today    3) Medical:    - Pt will f/u with other providers as needed    4) Other: Support as needed   - discussed with patient at length today about finding new activities and programs at [facility] to become more engaged to help with keeping depression symptoms stable  Patient reports he will look into programs at [facility] and also discussed weather permitting about going for walks on the property at [facility]     5) Follow up:   - Follow up in 6-8 wks    - Patient will call if issues or concerns     6) Treatment Plan:    - Enacted today    Discussed self monitoring of symptoms, and symptom monitoring tools  Patient has been informed of 24 hours and weekend coverage for urgent situations accessed by calling the main clinic phone number or calling 911 or getting to ED for immediate medical attention or suicidality

## 2019-12-16 ENCOUNTER — TELEPHONE (OUTPATIENT)
Dept: PSYCHIATRY | Facility: CLINIC | Age: 54
End: 2019-12-16

## 2020-01-15 ENCOUNTER — DOCUMENTATION (OUTPATIENT)
Dept: PSYCHIATRY | Facility: CLINIC | Age: 55
End: 2020-01-15

## 2020-01-15 NOTE — PROGRESS NOTES
Treatment Plan not completed within required time limits due to: Rito Harding  cancelled appointment  on 1/16/2020 (no transportaion)

## 2020-01-28 ENCOUNTER — TELEPHONE (OUTPATIENT)
Dept: PSYCHIATRY | Facility: CLINIC | Age: 55
End: 2020-01-28

## 2020-01-28 NOTE — TELEPHONE ENCOUNTER
Radha Murray called @ [phone]  Kyle Hickey would like a call back he has a few things to talk to you about

## 2020-01-29 ENCOUNTER — SOCIAL WORK (OUTPATIENT)
Dept: BEHAVIORAL/MENTAL HEALTH CLINIC | Facility: CLINIC | Age: 55
End: 2020-01-29
Payer: COMMERCIAL

## 2020-01-29 DIAGNOSIS — F41.9 ANXIETY: Primary | ICD-10-CM

## 2020-01-29 DIAGNOSIS — Z86.59 HISTORY OF OBSESSIVE COMPULSIVE DISORDER: ICD-10-CM

## 2020-01-29 DIAGNOSIS — F32.A DEPRESSIVE DISORDER: ICD-10-CM

## 2020-01-29 PROCEDURE — 90791 PSYCH DIAGNOSTIC EVALUATION: CPT | Performed by: SOCIAL WORKER
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
## 2020-01-29 NOTE — PSYCH
Treatment Plan Tracking    # 1Treatment Plan not completed within required time limits due to: Interview ran overtime  Will be completed at the next session

## 2020-01-29 NOTE — TELEPHONE ENCOUNTER
I am looking in his chart, and unfortunately I do not see an MAIDA to speak with his son  In initial intake packet he never checked 'yes' when he put his son's name down and he never signed the page either for authorization to speak with others  Pt will need to sign an MAIDA first before I can speak to son

## 2020-01-31 ENCOUNTER — TELEPHONE (OUTPATIENT)
Dept: PSYCHIATRY | Facility: CLINIC | Age: 55
End: 2020-01-31

## 2020-01-31 NOTE — TELEPHONE ENCOUNTER
Georgina Birmingham said @ [facility] they wanted him to take [id number]   5mg 4 times a day  He said they wanted him to go on because his blood pressure was high  He wanted to talk to you  He also wanted to know if possible to get an earlier appointment      [phone]

## 2020-02-06 ENCOUNTER — TELEPHONE (OUTPATIENT)
Dept: PSYCHIATRY | Facility: CLINIC | Age: 55
End: 2020-02-06

## 2020-02-06 NOTE — TELEPHONE ENCOUNTER
Nicole from [facility] called and said she wanted to discuss how Arcadio Mcrae is acting out being very paranoid, talking to himself, laughing to himself  She wanted to follow up with you so your aware of these things when he comes to his apt scheduled for next week  She said she is going to fax over notes of his behavior, they will be put in your mailbox

## 2020-02-10 ENCOUNTER — TELEPHONE (OUTPATIENT)
Dept: PSYCHIATRY | Facility: CLINIC | Age: 55
End: 2020-02-10

## 2020-02-14 NOTE — TELEPHONE ENCOUNTER
Please call pt to confirm that MAIDA can be faxed to him at his residence at [facility] for him to sign and fax back to us for us to release medical records to [employer] [place] of Disability determination  Thank you

## 2020-03-09 ENCOUNTER — SOCIAL WORK (OUTPATIENT)
Dept: BEHAVIORAL/MENTAL HEALTH CLINIC | Facility: CLINIC | Age: 55
End: 2020-03-09
Payer: COMMERCIAL

## 2020-03-09 DIAGNOSIS — F41.9 ANXIETY: Primary | ICD-10-CM

## 2020-03-09 DIAGNOSIS — F32.A DEPRESSIVE DISORDER: ICD-10-CM

## 2020-03-09 PROCEDURE — 90834 PSYTX W PT 45 MINUTES: CPT | Performed by: SOCIAL WORKER

## 2020-03-10 NOTE — PSYCH
Treatment Plan Tracking    # 1Treatment Plan not completed within required time limits due to: Treatment plan not completed due to technical difficulties

## 2020-03-10 NOTE — PSYCH
Psychotherapy Provided: Individual Psychotherapy 45 minutes     Length of time in session: 45 minutes, follow up in 2 week    Goals addressed in session: Goal 1     Pain:      moderate to severe    3    Current suicide risk : Low     D- Naomi Lipoma stated that things have been difficult for him  He stated that he dislikes living at [facility] due to their treatment of him  He shared that they have been disagreeing with him about his medication  He also worries that they are giving him the wrong dosage  He stated that his ex-girlfriend has been upset with him because he has been spending time at a female friend's house on weekends  He stated that his ex-girlfriend has stated that she wants to resume their relationship but then changes her mind abruptly  He stated that he believes that this is due to her family's opinion of him due to his suicide attempt  He stated that he feels that she is afraid that he will make another attempt  He stated that he has no desire to do this and stated that his girlfriend wants the clinician to write a letter stating that he is "okay"  The clinician suggested that he may invite her to a session so that we can discuss her concerns as a group  He stated that he may want to do that  Discussing ways for him to be able to move forward with his life and be able to put his past behind him rather than perseverate on it  Giving supportive therapy  A- progress- Continuing to engage in the therapeutic process  P-Continue treatment    [address]: Diagnosis and Treatment Plan explained to Oscar Rosas relates understanding diagnosis and is agreeable to Treatment Plan   Yes

## 2020-03-19 ENCOUNTER — OFFICE VISIT (OUTPATIENT)
Dept: PSYCHIATRY | Facility: CLINIC | Age: 55
End: 2020-03-19
Payer: COMMERCIAL

## 2020-03-19 ENCOUNTER — TELEPHONE (OUTPATIENT)
Dept: FAMILY MEDICINE CLINIC | Facility: CLINIC | Age: 55
End: 2020-03-19

## 2020-03-19 VITALS
WEIGHT: 248 LBS | BODY MASS INDEX: 31.84 KG/M2 | SYSTOLIC BLOOD PRESSURE: 128 MMHG | DIASTOLIC BLOOD PRESSURE: 85 MMHG | HEART RATE: 84 BPM

## 2020-03-19 DIAGNOSIS — Z86.59 HISTORY OF OBSESSIVE COMPULSIVE DISORDER: ICD-10-CM

## 2020-03-19 DIAGNOSIS — F32.5 MAJOR DEPRESSIVE DISORDER IN FULL REMISSION, UNSPECIFIED WHETHER RECURRENT (HCC): Primary | ICD-10-CM

## 2020-03-19 PROBLEM — I10 HYPERTENSION: Status: ACTIVE | Noted: 2020-03-19

## 2020-03-19 PROCEDURE — 99213 OFFICE O/P EST LOW 20 MIN: CPT | Performed by: PHYSICIAN ASSISTANT

## 2020-03-19 RX ORDER — LANOLIN ALCOHOL/MO/W.PET/CERES
3 CREAM (GRAM) TOPICAL DAILY PRN
COMMUNITY
End: 2020-12-01

## 2020-03-19 RX ORDER — OMEPRAZOLE 40 MG/1
CAPSULE, DELAYED RELEASE ORAL
COMMUNITY
Start: 2020-01-28 | End: 2020-04-10 | Stop reason: SDUPTHER

## 2020-03-19 RX ORDER — FAMOTIDINE 20 MG/1
20 TABLET, FILM COATED ORAL 2 TIMES DAILY PRN
COMMUNITY
End: 2020-04-10 | Stop reason: SDUPTHER

## 2020-03-19 RX ORDER — CITALOPRAM 40 MG/1
TABLET ORAL
COMMUNITY
Start: 2020-02-07 | End: 2020-04-08 | Stop reason: SDUPTHER

## 2020-03-19 RX ORDER — HYDROCHLOROTHIAZIDE 12.5 MG/1
25 TABLET ORAL
COMMUNITY
Start: 2020-02-04 | End: 2020-04-10 | Stop reason: SDUPTHER

## 2020-03-19 RX ORDER — FENOPROFEN CALCIUM 200 MG
CAPSULE ORAL 2 TIMES DAILY
COMMUNITY

## 2020-03-19 NOTE — PSYCH
MEDICATION MANAGEMENT NOTE        [facility]      Name and Date of Birth:  Carmel Cherry 47 y o  1965 MRN: [id number]    Date of Visit: March 19, 2020    SUBJECTIVE:    Jose Huitron is seen today for a follow up for depression and OCD  Today reports he feels since his last visit back in November due to cancelling appointment and then upon need to be canceled by provider he still feels like his mood is controlled  He admits depression is still well controlled in Celexa is helping and denies any depressed mood currently  Admits energy level is normal, he admits he has started wanting again like he used to in the past reports that has been helpful  He also reports he is trying to watch his diet and what he is eating and discussed as last time he thought he was eating a worst I and reports he is trying to watch that now and lose weight intentionally  Reports sleep is good and denies any SI/HI  He does admit today that he has been having issues with his current living situation at [facility]  He does seem to perseverate and ruminate about interactions with the medical doctor he was seeing there and the staff member there  However, could be redirected during conversation focus on the present and the fact that he now has a new medical doctor that he sees at [facility], Dr Estrella Pressley, and he reports he has seen him a couple times and he likes him  He also reports that he is concerned of the fact that due to conflicts of living at [facility] he will no longer be living there soon and they are giving him enough finances to stand hotel for 30 days  He reports he still has his sons for great support and they are going to go with him tomorrow to [facility] to discuss future living situations    At this time patient reports he is not sure after 30 days of the hotel where he will be going but reports he is determined to go to a place where he will feel comfortable  Did ask that he seems to be worried about the stressors and he does admit to worry but reports I do not feel anxious or on edge and feeling handling it well    He denies any trouble relaxing, feeling restless, irritability  Even though he has had issues with staff at [facility] he denies any outbursts  He admits that the physician he was seeing at [facility] did order him Ativan for anxiety and to help with lowering his blood pressure as he has developed hypertension  However, he reports he never took it as he did not want to take it as he felt his anxiety still controlled with Celexa  Denies any OCD symptoms and denies any obsessive thoughts or counting  Denies any AH/VH or paranoia  He does admit to problems with the past doctor he was seeing but reports he has no issues with the current doctor he seeing is not appear to have issues with other staff at [facility]        HPI ROS:             ('was' notes: recent => remote)  Medication Side Effects:  denies  denied   Depression (10 worst): 0/10 (Was low)   Anxiety (10 worst): minimal (Was denied)   Safety concerns (SI, HI, etc): denies (Was denied)   Sleep: normal (Was normal)   Energy: normal (Was low-normal)   Appetite: Better, trying to improve it (Was no change, eating more junk food due to sedentary lifestyle)   Weight Change: Some intentional wght loss no       Review Of Systems:      Constitutional negative   Cardiovascular negative   Respiratory negative   Gastrointestinal negative   Musculoskeletal negative   Neurological negative   Endocrine negative       The following portions of the patient's history were reviewed and updated as appropriate: past family history, past medical history, past social history, past surgical history and problem list     Past Psychiatric History:     Past Psychiatric Medication Trials: Prozac and Celexa      Past Medical History:    Past Medical History:   Diagnosis Date    Anxiety 1995    Celiac disease  Depression 1995    Obsessive compulsive disorder     Suicide attempt ([facility] [place] [facility] )     10/2018     No past medical history pertinent negatives    Past Surgical History:   Procedure Laterality Date    FRACTURE SURGERY      TONSILLECTOMY      WRIST SURGERY Left     resolved 1997- cts surgery     Allergies   Allergen Reactions    Penicillins Diarrhea and Vomiting    Celecoxib Rash       Substance Abuse History:    Social History     Substance and Sexual Activity   Alcohol Use Not Currently    Comment: SOCIAL     Social History     Substance and Sexual Activity   Drug Use No    Comment: CAFFINE 3 CUPS PER DAY       Social History:    Social History     Socioeconomic History    Marital status:      Spouse name: Not on file    Number of children: Not on file    Years of education: Not on file    Highest education level: Not on file   Occupational History    Not on file   Social Needs    Financial resource strain: Not on file    Food insecurity:     Worry: Not on file     Inability: Not on file    Transportation needs:     Medical: Not on file     Non-medical: Not on file   Tobacco Use    Smoking status: Never Smoker    Smokeless tobacco: Current User   Substance and Sexual Activity    Alcohol use: Not Currently     Comment: SOCIAL    Drug use: No     Comment: CAFFINE 3 CUPS PER DAY    Sexual activity: Not on file   Lifestyle    Physical activity:     Days per week: Not on file     Minutes per session: Not on file    Stress: Not on file   Relationships    Social connections:     Talks on phone: Not on file     Gets together: Not on file     Attends Voodoo service: Not on file     Active member of club or organization: Not on file     Attends meetings of clubs or organizations: Not on file     Relationship status: Not on file    Intimate partner violence:     Fear of current or ex partner: Not on file     Emotionally abused: Not on file     Physically abused: Not on file     Forced sexual activity: Not on file   Other Topics Concern    Not on file   Social History Narrative    Caffeine - 2 cups coffee per day    Full Time -        Family Psychiatric History:     Family History   Problem Relation Age of Onset    Heart attack Father     Prostate cancer Father     Cerebral palsy Brother    [employer] OCD Mother     OCD Sister                 OBJECTIVE:     Vital signs in last 24 hours:    Vitals:    03/19/20 1600   BP: 128/85   BP Location: Left arm   Patient Position: Sitting   Cuff Size: Standard   Pulse: 84   Weight: 112 kg (248 lb)       Mental Status Evaluation:    Appearance age appropriate, casually dressed   Behavior pleasant, cooperative, calm, good eye contact   Speech normal rate, normal volume, normal pitch   Mood normal   Affect normal range and intensity, appropriate, at times has very slightly irritable edge talking about issues with current living situation, but overall has normal affect   Thought Processes coherent, goal directed, perseverative at times   Associations intact associations   Thought Content ruminations, no overt delusions    Perceptual Disturbances: no auditory hallucinations, no visual hallucinations, does not appear responding to internal stimuli   Abnormal Thoughts  Risk Potential Suicidal ideation - None  Homicidal ideation - None  Potential for aggression - No   Orientation oriented to person, place, time/date and situation   Memory recent and remote memory grossly intact   Consciousness alert and awake   Attention Span Concentration Span normal concentration  decreased attention span   Intellect appears to be of average intelligence   Insight intact   Judgement fair   Muscle Strength and  Gait normal muscle strength and normal muscle tone, normal gait and normal balance   Motor activity no abnormal movements   Language no difficulty naming common objects, no difficulty repeating a phrase, no difficulty writing a sentence   Fund of Knowledge adequate knowledge of current events  adequate fund of knowledge regarding past history  adequate fund of knowledge regarding vocabulary    Pain none   Pain Scale 0       Laboratory Results:   Recent Labs (last 6 months):   No visits with results within 6 Month(s) from this visit  Latest known visit with results is:   Lab Requisition on 08/18/2016   Component Date Value    TSH 3RD [place]ON 08/18/2016 1 100      I have personally reviewed all pertinent laboratory/tests results  No results found for this or any previous visit  Confidential Assessment: At this time patient continues to endorse remission of depression  He denies any anxiety but does appear to have some worry in regards to situational stressors  Appears to be related to situational anxiety at this time, and at this time I do not believe patient endorses generalized anxiety disorder but will continue to monitor  Patient continues to deny any OCD symptoms  Denies any psychosis  He was perseverative and ruminate about past troubles with a couple staff members at [facility], but denies he feels like anyone is out to get him or following him and denies any issues with current physician he sees at [facility] or other staff members  I believe at this time patient does not endorse psychosis and may be more related to situational stress of living situation  Did discuss that he is being discharged from his living situation and will be looking for other places with his sons' help as well as a coordinator at [facility]  Records sent over from [facility] in chart reviewed, and per those notes from 2/2020 pt appeared anxious, hyperactive, and did not agree with treatment plan during encounter  But after reading notes,  did not see any endorsement of psychosis or paranoia outburts, so based on reading notes it appeared to be a disagreement of treatment plan and wanting to switch providers      Assessment/Plan:       Diagnoses and all orders for this visit:    Major depressive disorder in full remission, unspecified whether recurrent ([address] )    History of obsessive compulsive disorder            Treatment Recommendations/Precautions/Plan:    Patient has been educated about their diagnosis and treatment modalities  They voiced understanding and agreement with the following plan:    Continue Celexa 40 mg p o  q d  for continue to help her patient of depression and anxiety symptoms  The per patient he declines needing refill today as he receives refill from physician at [facility] and has been taking consistently  Told patient to call office if any refills of Celexa needed or if refills need to be sent to pharmacy if he will no longer be living at [facility] and he reports he will call  Did discuss if patient feels due to stressors if anxiety is higher and he want to try something different for anxiety but he declines at this time and feels it is well controlled with Celexa but discussed in the future if he feels like it ever does increase can consider BuSpar for anxiety  Medication management every 3 months  Continue psychotherapy with [facility] therapist Efrain Penaloza  Aware of need to follow up with family physician for medical issues including new diagnosis of hypertension    -Discussed self monitoring of symptoms, and symptom monitoring tools  - Form filled out for continuation of medication and dx for [facility] and MAIDA is already on file      -Patient has been informed to call the office with any questions or concerns prior to next OV  -Completed and signed during the session: Not applicable - Treatment Plan to be completed by [address] [facility] therapist    Risks/Benefits      Risks, Benefits And Possible Side Effects Of Medications:    Risks, benefits, and possible side effects of medications explained to Bruno Curran including risk of suicidality and serotonin syndrome related to treatment with antidepressants   He verbalizes understanding and agreement for treatment  Controlled Medication Discussion:     No recent records found for controlled prescriptions according to [place] Prescription Drug Monitoring Program Rx for 3 day supply of Ativan 0 5 mg QID noted per PDMP  However, patient denies ever starting the medication  Psychotherapy Provided:     Individual psychotherapy provided: Medication education provided to Bryce Pablo  Importance of medication and treatment compliance reviewed with Bryce Aguilera PA-C 03/19/20

## 2020-03-19 NOTE — TELEPHONE ENCOUNTER
phone call from charo at [facility] [phone] ext [phone] to schedule earlene appt for pt who is being discharged from [facility] who has resided there since 2018

## 2020-03-23 ENCOUNTER — TELEMEDICINE (OUTPATIENT)
Dept: BEHAVIORAL/MENTAL HEALTH CLINIC | Facility: CLINIC | Age: 55
End: 2020-03-23
Payer: COMMERCIAL

## 2020-03-23 ENCOUNTER — TELEPHONE (OUTPATIENT)
Dept: PSYCHIATRY | Facility: CLINIC | Age: 55
End: 2020-03-23

## 2020-03-23 DIAGNOSIS — F41.9 ANXIETY: Primary | ICD-10-CM

## 2020-03-23 PROCEDURE — 90834 PSYTX W PT 45 MINUTES: CPT | Performed by: SOCIAL WORKER

## 2020-03-23 NOTE — PSYCH
Virtual Regular Visit    Problem List Items Addressed This Visit     None          [unfilled]    Reason for visit is Phone call to replace session due to virus    Encounter provider Jason Beckman    Provider located at [facility]      [unfilled]     After connecting through telephone, the patient was identified by name and date of birth  Carmel Cherry was informed that this is a telemedicine visit and that the visit is being conducted through telephone which may not be secure and therefore, might not be HIPAA-compliant  My office door was closed  No one else was in the room  He acknowledged consent and understanding of privacy and security of the video platform  The patient has agreed to participate and understands they can discontinue the visit at any time  Subjective  Carmel Cherry is a 47 y o  male  SARAH- Jose Huitron stated that he has been struggling because he feels that his girlfriend continues to change her mind about being with him  He brought her onto the phone call to discuss their relationship  She stated that she remains uncertain due to his suicide attempt and her fear that this could reoccur  She stated that she is seeking to find out if he is stable and also what his plan is to move forward  Jose Huitron discussed not wanting to be dependent upon anyone and being able to work again as well as have a place of his own  Facilitating communication regarding their relationship and the expectations that they have of one another  Discussing ways for them to communicate effectively  Giving supportive therapy  A- Progress- Continuing to work towards completing his treatment goals  P-Continue treatment        Past Medical History:   Diagnosis Date    Anxiety 1995    Celiac disease     Depression 1995    Obsessive compulsive disorder     [facility] attempt [address] [address]     10/2018       Past Surgical History:   Procedure Laterality Date    FRACTURE SURGERY      TONSILLECTOMY      WRIST SURGERY Left resolved 1997- cts surgery       Current Outpatient Medications   Medication Sig Dispense Refill    acetaminophen (TYLENOL) 500 mg tablet Take 500 mg by mouth every 6 (six) hours as needed      bisacodyl (DULCOLAX) 5 mg EC tablet Take 10 mg by mouth daily as needed      Calcium Carbonate-Vitamin D3 600-400 MG-UNIT TABS Take 1 tablet by mouth 2 (two) times a day       citalopram (CeleXA) 40 mg tablet       famotidine (PEPCID) 20 mg tablet Take 20 mg by mouth 2 (two) times a day as needed      gabapentin (NEURONTIN) 100 mg capsule Take 200 mg by mouth Three times a day      hydrochlorothiazide (HYDRODIURIL) 12 5 mg tablet 25 mg      hydrocortisone 1 % lotion Apply topically 2 (two) times a day      melatonin 3 mg Take 3 mg by mouth daily as needed      omeprazole (PriLOSEC) 40 MG capsule       Propylene Glycol (Systane Complete) 0 6 % SOLN Apply to eye      Sennosides (SENNA) 8 6 MG CAPS Take 1 tablet by mouth 2 (two) times a day as needed      tamsulosin (FLOMAX) 0 4 mg Take 0 4 mg by mouth daily at bedtime       No current facility-administered medications for this visit  Allergies   Allergen Reactions    Penicillins Diarrhea and Vomiting    Celecoxib Rash       Review of Systems      I spent 40 minutes with the patient during this visit

## 2020-03-24 ENCOUNTER — TELEPHONE (OUTPATIENT)
Dept: PSYCHIATRY | Facility: CLINIC | Age: 55
End: 2020-03-24

## 2020-03-24 NOTE — TELEPHONE ENCOUNTER
Elida decker as pt has MAIDA to speak with both sons Dami Carola and Daryl Beltre in regards to treatment plan and medical information  Both sons expressed that they wanted to let provider now their father's current situation and make sure provider new the whole story  Discussed with sons that at last visit patient did discuss that he was having issues with staff at [facility] [place] and was looking to leave [facility] or was possibly being kicked out  He also stated he stopped seeing the 1 physician and started seeing another physician who he did like better but reports that he is having difficulty dealing with staff at [facility] [place] and is looking to leave and at that time he denied increase in anxiety  Also let sons know that patient did declines starting any other medications at this time for higher anxiety and is ultimately up to the patient as he does have the capacity to make decisions at this time if he takes a medication for higher anxiety at this time  The sons' report as of right now he is not kicked out of [facility] [place] but they are currently looking for a new place for him to stay as he most likely will be kicked out of [facility] [place]  They report [facility] [place] is not the best place to be and they can be difficult to work with   However, they report they feel that their father victimizes himself at times and does not handle situations well there  They report he called the  on staff today  They report he is able to hold conversations and remain calm but feel at times in private, especially with increased anxiety he can become a compulsive liar and does not handle situations well  He also feels over the last 2 months he has had more anxiety and OCD symptoms have been noticeable as well  They report they wanted to be aware of his current medication regimen    Discussed that he is currently taking Celexa 40 mg q d  for depression, anxiety, and OCD symptoms as was prescribed prior to him seeing me and it was being refilled actually not by me but at the physician at [facility] [place]  Also discussed there are more possibilities for anxiety medications as well that can be discussed with the physician at [facility] [place] but would not recommend benzodiazepines as sometimes they are not indicated if there is any potential possibility of memory loss  Also discussed that it would ultimately have to be up to the patient to decide if he wants to take them or not and discussed that this was reviewed with patient at last visit and he declined  They deny that he has been a danger to himself or others and discussed with them that at [address] last week patient was not showing  signs of psychosis and did not appear to be threat to himself or others to require inpatient hospitalization  They did inquire today about housing options and discussed with them that that is not my expertise and I do not know much about housing but do feel at this time it would be best to find different housing options as this seems to be a trigger for his higher anxiety at this time  Discussed with them about contacting [facility] or [place] [employer] to see if they could possibly point them in the right direction for assistance in finding new housing for him based on no source of income and mental health  They were not sure if hospitalization due to his history is an option but discussed that that is not an option if he does not meet criteria which is being immediate danger to himself or others or being suicidal or homicidal which he was not last week and they report he is not now  Also discussed with them about talking to case management at [facility] [place] to see about help for housing situation  Also discussed that once they do find a place to contact the new housing in regards to if they provide psychiatric care there or if he should continue outpatient care    They are aware he does have a follow-up appointment in June and does see a therapist here as well regularly with a different provider here at the office as this provider's leaving the practice soon and they understand at this time

## 2020-03-24 NOTE — TELEPHONE ENCOUNTER
Tracy Tafoya son of Eriberto Tenzin would like a call back he said it is an emergency situation   He would not give details [phone]

## 2020-04-06 ENCOUNTER — TELEMEDICINE (OUTPATIENT)
Dept: BEHAVIORAL/MENTAL HEALTH CLINIC | Facility: CLINIC | Age: 55
End: 2020-04-06
Payer: COMMERCIAL

## 2020-04-06 DIAGNOSIS — F33.9 EPISODE OF RECURRENT MAJOR DEPRESSIVE DISORDER, UNSPECIFIED DEPRESSION EPISODE SEVERITY (HCC): ICD-10-CM

## 2020-04-06 DIAGNOSIS — F41.9 ANXIETY: Primary | ICD-10-CM

## 2020-04-06 PROCEDURE — 90834 PSYTX W PT 45 MINUTES: CPT | Performed by: SOCIAL WORKER

## 2020-04-08 ENCOUNTER — TELEPHONE (OUTPATIENT)
Dept: FAMILY MEDICINE CLINIC | Facility: CLINIC | Age: 55
End: 2020-04-08

## 2020-04-08 DIAGNOSIS — F33.9 EPISODE OF RECURRENT MAJOR DEPRESSIVE DISORDER, UNSPECIFIED DEPRESSION EPISODE SEVERITY (HCC): Primary | ICD-10-CM

## 2020-04-08 RX ORDER — CITALOPRAM 40 MG/1
40 TABLET ORAL DAILY
Qty: 30 TABLET | Refills: 0 | Status: SHIPPED | OUTPATIENT
Start: 2020-04-08 | End: 2020-04-10 | Stop reason: SDUPTHER

## 2020-04-09 DIAGNOSIS — F33.9 EPISODE OF RECURRENT MAJOR DEPRESSIVE DISORDER, UNSPECIFIED DEPRESSION EPISODE SEVERITY (HCC): ICD-10-CM

## 2020-04-09 RX ORDER — CITALOPRAM 40 MG/1
40 TABLET ORAL DAILY
Qty: 30 TABLET | Refills: 0 | Status: CANCELLED | OUTPATIENT
Start: 2020-04-09

## 2020-04-10 ENCOUNTER — TELEMEDICINE (OUTPATIENT)
Dept: FAMILY MEDICINE CLINIC | Facility: CLINIC | Age: 55
End: 2020-04-10
Payer: COMMERCIAL

## 2020-04-10 DIAGNOSIS — N40.0 BENIGN PROSTATIC HYPERPLASIA, UNSPECIFIED WHETHER LOWER URINARY TRACT SYMPTOMS PRESENT: ICD-10-CM

## 2020-04-10 DIAGNOSIS — M25.512 BILATERAL SHOULDER PAIN, UNSPECIFIED CHRONICITY: ICD-10-CM

## 2020-04-10 DIAGNOSIS — M25.511 BILATERAL SHOULDER PAIN, UNSPECIFIED CHRONICITY: ICD-10-CM

## 2020-04-10 DIAGNOSIS — F33.9 EPISODE OF RECURRENT MAJOR DEPRESSIVE DISORDER, UNSPECIFIED DEPRESSION EPISODE SEVERITY (HCC): Primary | ICD-10-CM

## 2020-04-10 DIAGNOSIS — K21.9 GASTROESOPHAGEAL REFLUX DISEASE WITHOUT ESOPHAGITIS: ICD-10-CM

## 2020-04-10 DIAGNOSIS — I10 ESSENTIAL HYPERTENSION: ICD-10-CM

## 2020-04-10 DIAGNOSIS — S14.3XXS BRACHIAL PLEXUS INJURY, RIGHT, SEQUELA: ICD-10-CM

## 2020-04-10 PROBLEM — Z78.9: Status: ACTIVE | Noted: 2020-01-21

## 2020-04-10 PROBLEM — S92.015A CLOSED NONDISPLACED FRACTURE OF BODY OF LEFT CALCANEUS: Status: ACTIVE | Noted: 2018-12-18

## 2020-04-10 PROBLEM — S52.609M: Status: ACTIVE | Noted: 2019-01-08

## 2020-04-10 PROBLEM — M25.321 INSTABILITY OF RIGHT ELBOW JOINT: Status: ACTIVE | Noted: 2019-01-08

## 2020-04-10 PROBLEM — Z86.19 HISTORY OF HELICOBACTER PYLORI INFECTION: Status: ACTIVE | Noted: 2020-01-23

## 2020-04-10 PROBLEM — K43.9 VENTRAL HERNIA: Status: ACTIVE | Noted: 2020-01-28

## 2020-04-10 PROBLEM — S52.509M: Status: ACTIVE | Noted: 2019-01-08

## 2020-04-10 PROBLEM — F33.41 RECURRENT MAJOR DEPRESSIVE DISORDER, IN PARTIAL REMISSION (HCC): Status: ACTIVE | Noted: 2018-10-31

## 2020-04-10 PROBLEM — G47.09 OTHER INSOMNIA: Status: ACTIVE | Noted: 2019-01-16

## 2020-04-10 PROBLEM — T14.91XA SUICIDE ATTEMPT (HCC): Status: ACTIVE | Noted: 2018-10-23

## 2020-04-10 PROCEDURE — 99214 OFFICE O/P EST MOD 30 MIN: CPT | Performed by: FAMILY MEDICINE

## 2020-04-10 RX ORDER — OMEPRAZOLE 40 MG/1
40 CAPSULE, DELAYED RELEASE ORAL DAILY
Qty: 30 CAPSULE | Refills: 5 | Status: SHIPPED | OUTPATIENT
Start: 2020-04-10 | End: 2020-09-04

## 2020-04-10 RX ORDER — TAMSULOSIN HYDROCHLORIDE 0.4 MG/1
0.4 CAPSULE ORAL
Qty: 30 CAPSULE | Refills: 5 | Status: SHIPPED | OUTPATIENT
Start: 2020-04-10 | End: 2020-09-04

## 2020-04-10 RX ORDER — GABAPENTIN 100 MG/1
200 CAPSULE ORAL 2 TIMES DAILY
Qty: 120 CAPSULE | Refills: 5 | Status: SHIPPED | OUTPATIENT
Start: 2020-04-10 | End: 2020-06-18

## 2020-04-10 RX ORDER — CITALOPRAM 40 MG/1
40 TABLET ORAL DAILY
Qty: 30 TABLET | Refills: 5 | Status: SHIPPED | OUTPATIENT
Start: 2020-04-10 | End: 2020-10-06

## 2020-04-10 RX ORDER — HYDROCHLOROTHIAZIDE 25 MG/1
25 TABLET ORAL DAILY
Qty: 30 TABLET | Refills: 5 | Status: SHIPPED | OUTPATIENT
Start: 2020-04-10 | End: 2020-09-04

## 2020-04-10 RX ORDER — FAMOTIDINE 20 MG/1
20 TABLET, FILM COATED ORAL DAILY
Qty: 30 TABLET | Refills: 5 | Status: SHIPPED | OUTPATIENT
Start: 2020-04-10 | End: 2020-12-01

## 2020-04-16 ENCOUNTER — TELEPHONE (OUTPATIENT)
Dept: FAMILY MEDICINE CLINIC | Facility: CLINIC | Age: 55
End: 2020-04-16

## 2020-04-16 DIAGNOSIS — F45.0 ANXIETY WITH SOMATIZATION: Primary | ICD-10-CM

## 2020-04-16 DIAGNOSIS — F41.9 ANXIETY WITH SOMATIZATION: Primary | ICD-10-CM

## 2020-04-16 RX ORDER — LORAZEPAM 0.5 MG/1
0.5 TABLET ORAL EVERY 8 HOURS PRN
Qty: 15 TABLET | Refills: 1 | Status: SHIPPED | OUTPATIENT
Start: 2020-04-16 | End: 2020-06-12 | Stop reason: SDUPTHER

## 2020-04-23 ENCOUNTER — EVALUATION (OUTPATIENT)
Dept: PHYSICAL THERAPY | Facility: REHABILITATION | Age: 55
End: 2020-04-23
Payer: COMMERCIAL

## 2020-04-23 DIAGNOSIS — M25.512 BILATERAL SHOULDER PAIN, UNSPECIFIED CHRONICITY: Primary | ICD-10-CM

## 2020-04-23 DIAGNOSIS — S14.3XXS BRACHIAL PLEXUS INJURY, RIGHT, SEQUELA: ICD-10-CM

## 2020-04-23 DIAGNOSIS — M25.511 BILATERAL SHOULDER PAIN, UNSPECIFIED CHRONICITY: Primary | ICD-10-CM

## 2020-04-23 PROCEDURE — 97110 THERAPEUTIC EXERCISES: CPT | Performed by: PHYSICAL THERAPIST

## 2020-04-23 PROCEDURE — 97163 PT EVAL HIGH COMPLEX 45 MIN: CPT | Performed by: PHYSICAL THERAPIST

## 2020-04-25 ENCOUNTER — OFFICE VISIT (OUTPATIENT)
Dept: URGENT CARE | Age: 55
End: 2020-04-25
Payer: COMMERCIAL

## 2020-04-25 VITALS — OXYGEN SATURATION: 96 % | HEART RATE: 104 BPM | RESPIRATION RATE: 20 BRPM | TEMPERATURE: 99.6 F

## 2020-04-25 DIAGNOSIS — U07.1 COVID-19: Primary | ICD-10-CM

## 2020-04-25 PROCEDURE — 99213 OFFICE O/P EST LOW 20 MIN: CPT | Performed by: PHYSICIAN ASSISTANT

## 2020-04-25 PROCEDURE — U0003 INFECTIOUS AGENT DETECTION BY NUCLEIC ACID (DNA OR RNA); SEVERE ACUTE RESPIRATORY SYNDROME CORONAVIRUS 2 (SARS-COV-2) (CORONAVIRUS DISEASE [COVID-19]), AMPLIFIED PROBE TECHNIQUE, MAKING USE OF HIGH THROUGHPUT TECHNOLOGIES AS DESCRIBED BY CMS-2020-01-R: HCPCS | Performed by: PHYSICIAN ASSISTANT

## 2020-04-25 RX ORDER — CHLORAL HYDRATE 500 MG
1 CAPSULE ORAL DAILY
COMMUNITY
End: 2022-03-28

## 2020-04-27 ENCOUNTER — TELEPHONE (OUTPATIENT)
Dept: URGENT CARE | Facility: CLINIC | Age: 55
End: 2020-04-27

## 2020-04-27 LAB — SARS-COV-2 RNA SPEC QL NAA+PROBE: DETECTED

## 2020-04-28 ENCOUNTER — TELEMEDICINE (OUTPATIENT)
Dept: FAMILY MEDICINE CLINIC | Facility: CLINIC | Age: 55
End: 2020-04-28
Payer: COMMERCIAL

## 2020-04-28 ENCOUNTER — APPOINTMENT (OUTPATIENT)
Dept: PHYSICAL THERAPY | Facility: REHABILITATION | Age: 55
End: 2020-04-28
Payer: COMMERCIAL

## 2020-04-28 DIAGNOSIS — B97.21 SARS-ASSOCIATED CORONAVIRUS INFECTION: Primary | ICD-10-CM

## 2020-04-28 PROCEDURE — 99213 OFFICE O/P EST LOW 20 MIN: CPT | Performed by: FAMILY MEDICINE

## 2020-04-30 ENCOUNTER — TELEMEDICINE (OUTPATIENT)
Dept: FAMILY MEDICINE CLINIC | Facility: CLINIC | Age: 55
End: 2020-04-30
Payer: COMMERCIAL

## 2020-04-30 ENCOUNTER — APPOINTMENT (OUTPATIENT)
Dept: PHYSICAL THERAPY | Facility: REHABILITATION | Age: 55
End: 2020-04-30
Payer: COMMERCIAL

## 2020-04-30 DIAGNOSIS — U07.1 COVID-19 VIRUS INFECTION: Primary | ICD-10-CM

## 2020-04-30 PROCEDURE — 99213 OFFICE O/P EST LOW 20 MIN: CPT | Performed by: FAMILY MEDICINE

## 2020-05-04 ENCOUNTER — TELEMEDICINE (OUTPATIENT)
Dept: BEHAVIORAL/MENTAL HEALTH CLINIC | Facility: CLINIC | Age: 55
End: 2020-05-04
Payer: COMMERCIAL

## 2020-05-04 DIAGNOSIS — F33.9 EPISODE OF RECURRENT MAJOR DEPRESSIVE DISORDER, UNSPECIFIED DEPRESSION EPISODE SEVERITY (HCC): Primary | ICD-10-CM

## 2020-05-04 DIAGNOSIS — F60.7 DEPENDENT PERSONALITY DISORDER (HCC): ICD-10-CM

## 2020-05-04 PROCEDURE — 90834 PSYTX W PT 45 MINUTES: CPT | Performed by: SOCIAL WORKER

## 2020-05-05 ENCOUNTER — TELEMEDICINE (OUTPATIENT)
Dept: FAMILY MEDICINE CLINIC | Facility: CLINIC | Age: 55
End: 2020-05-05
Payer: COMMERCIAL

## 2020-05-05 DIAGNOSIS — U07.1 COVID-19 VIRUS INFECTION: Primary | ICD-10-CM

## 2020-05-05 PROCEDURE — 99213 OFFICE O/P EST LOW 20 MIN: CPT | Performed by: FAMILY MEDICINE

## 2020-05-18 ENCOUNTER — TELEPHONE (OUTPATIENT)
Dept: BEHAVIORAL/MENTAL HEALTH CLINIC | Facility: CLINIC | Age: 55
End: 2020-05-18

## 2020-05-19 ENCOUNTER — TELEPHONE (OUTPATIENT)
Dept: PSYCHIATRY | Facility: CLINIC | Age: 55
End: 2020-05-19

## 2020-05-21 ENCOUNTER — TELEPHONE (OUTPATIENT)
Dept: BEHAVIORAL/MENTAL HEALTH CLINIC | Facility: CLINIC | Age: 55
End: 2020-05-21

## 2020-05-26 ENCOUNTER — DOCUMENTATION (OUTPATIENT)
Dept: PSYCHIATRY | Facility: CLINIC | Age: 55
End: 2020-05-26

## 2020-05-28 ENCOUNTER — TELEPHONE (OUTPATIENT)
Dept: FAMILY MEDICINE CLINIC | Facility: CLINIC | Age: 55
End: 2020-05-28

## 2020-05-28 ENCOUNTER — TELEPHONE (OUTPATIENT)
Dept: OTHER | Facility: OTHER | Age: 55
End: 2020-05-28

## 2020-05-29 ENCOUNTER — TELEPHONE (OUTPATIENT)
Dept: FAMILY MEDICINE CLINIC | Facility: CLINIC | Age: 55
End: 2020-05-29

## 2020-06-01 ENCOUNTER — TELEPHONE (OUTPATIENT)
Dept: FAMILY MEDICINE CLINIC | Facility: CLINIC | Age: 55
End: 2020-06-01

## 2020-06-09 ENCOUNTER — PATIENT OUTREACH (OUTPATIENT)
Dept: FAMILY MEDICINE CLINIC | Facility: CLINIC | Age: 55
End: 2020-06-09

## 2020-06-09 DIAGNOSIS — F41.9 ANXIETY WITH SOMATIZATION: ICD-10-CM

## 2020-06-09 DIAGNOSIS — F45.0 ANXIETY WITH SOMATIZATION: ICD-10-CM

## 2020-06-09 RX ORDER — LORAZEPAM 0.5 MG/1
0.5 TABLET ORAL EVERY 8 HOURS PRN
Qty: 15 TABLET | Refills: 1 | OUTPATIENT
Start: 2020-06-09

## 2020-06-09 NOTE — PROGRESS NOTES
COVID 19 Plasma Donation Project:    Attempt number 3 to outreach patient for potential plasma donation      Patient tested positive on 4/25/20    Patient has been asymptomatic for unsure    Patient requires subsequent PCR COVID 19 Testing? no    Patient has had subsequent PCR COVID 19 testing and the results were? n/a

## 2020-06-12 DIAGNOSIS — F45.0 ANXIETY WITH SOMATIZATION: ICD-10-CM

## 2020-06-12 DIAGNOSIS — F41.9 ANXIETY WITH SOMATIZATION: ICD-10-CM

## 2020-06-12 RX ORDER — LORAZEPAM 0.5 MG/1
0.5 TABLET ORAL EVERY 8 HOURS PRN
Qty: 30 TABLET | Refills: 1 | Status: SHIPPED | OUTPATIENT
Start: 2020-06-12 | End: 2020-09-03

## 2020-06-17 ENCOUNTER — TELEPHONE (OUTPATIENT)
Dept: FAMILY MEDICINE CLINIC | Facility: CLINIC | Age: 55
End: 2020-06-17

## 2020-06-17 DIAGNOSIS — S14.3XXS BRACHIAL PLEXUS INJURY, RIGHT, SEQUELA: Primary | ICD-10-CM

## 2020-06-17 DIAGNOSIS — M25.512 BILATERAL SHOULDER PAIN, UNSPECIFIED CHRONICITY: ICD-10-CM

## 2020-06-17 DIAGNOSIS — M25.511 BILATERAL SHOULDER PAIN, UNSPECIFIED CHRONICITY: ICD-10-CM

## 2020-06-18 DIAGNOSIS — S14.3XXS BRACHIAL PLEXUS INJURY, RIGHT, SEQUELA: ICD-10-CM

## 2020-06-18 RX ORDER — GABAPENTIN 100 MG/1
200 CAPSULE ORAL 2 TIMES DAILY
Qty: 360 CAPSULE | Refills: 2 | Status: SHIPPED | OUTPATIENT
Start: 2020-06-18 | End: 2022-03-28

## 2020-06-23 ENCOUNTER — APPOINTMENT (OUTPATIENT)
Dept: PHYSICAL THERAPY | Facility: REHABILITATION | Age: 55
End: 2020-06-23
Payer: COMMERCIAL

## 2020-06-29 ENCOUNTER — APPOINTMENT (OUTPATIENT)
Dept: PHYSICAL THERAPY | Facility: REHABILITATION | Age: 55
End: 2020-06-29
Payer: COMMERCIAL

## 2020-06-30 ENCOUNTER — TELEMEDICINE (OUTPATIENT)
Dept: BEHAVIORAL/MENTAL HEALTH CLINIC | Facility: CLINIC | Age: 55
End: 2020-06-30
Payer: COMMERCIAL

## 2020-06-30 DIAGNOSIS — F45.0 ANXIETY WITH SOMATIZATION: Primary | ICD-10-CM

## 2020-06-30 DIAGNOSIS — F33.41 RECURRENT MAJOR DEPRESSIVE DISORDER, IN PARTIAL REMISSION (HCC): ICD-10-CM

## 2020-06-30 DIAGNOSIS — F33.9 EPISODE OF RECURRENT MAJOR DEPRESSIVE DISORDER, UNSPECIFIED DEPRESSION EPISODE SEVERITY (HCC): ICD-10-CM

## 2020-06-30 DIAGNOSIS — F41.9 ANXIETY WITH SOMATIZATION: Primary | ICD-10-CM

## 2020-06-30 DIAGNOSIS — F60.7 DEPENDENT PERSONALITY DISORDER (HCC): ICD-10-CM

## 2020-06-30 PROCEDURE — 90834 PSYTX W PT 45 MINUTES: CPT | Performed by: SOCIAL WORKER

## 2020-07-01 ENCOUNTER — EVALUATION (OUTPATIENT)
Dept: PHYSICAL THERAPY | Facility: REHABILITATION | Age: 55
End: 2020-07-01
Payer: COMMERCIAL

## 2020-07-01 DIAGNOSIS — M25.512 BILATERAL SHOULDER PAIN, UNSPECIFIED CHRONICITY: ICD-10-CM

## 2020-07-01 DIAGNOSIS — S14.3XXS BRACHIAL PLEXUS INJURY, RIGHT, SEQUELA: ICD-10-CM

## 2020-07-01 DIAGNOSIS — M25.511 BILATERAL SHOULDER PAIN, UNSPECIFIED CHRONICITY: ICD-10-CM

## 2020-07-01 DIAGNOSIS — G89.29 CHRONIC PAIN OF BOTH SHOULDERS: Primary | ICD-10-CM

## 2020-07-01 DIAGNOSIS — M25.512 CHRONIC PAIN OF BOTH SHOULDERS: Primary | ICD-10-CM

## 2020-07-01 DIAGNOSIS — M25.511 CHRONIC PAIN OF BOTH SHOULDERS: Primary | ICD-10-CM

## 2020-07-01 PROCEDURE — 97140 MANUAL THERAPY 1/> REGIONS: CPT | Performed by: PHYSICAL THERAPIST

## 2020-07-01 PROCEDURE — 97110 THERAPEUTIC EXERCISES: CPT | Performed by: PHYSICAL THERAPIST

## 2020-07-01 NOTE — PROGRESS NOTES
Daily Note     Today's date: 2020  Patient name: Haim Virk  : 1965  MRN: [id number]  Referring provider: Robert Ortiz DO  Dx:   Encounter Diagnosis     ICD-10-CM    1  Chronic pain of both shoulders M25 511     G89 29     M25 512                   Subjective: "My shoulders feel sore"       Objective: See treatment diary below      Assessment: Tolerated treatment well  Patient would benefit from continued PT, patient arrived late for therapy today, focused on manual stretching  Continues to demonstrated decreased range of motion, mostly into abduction and ER  Plan: Continue per plan of care  Plan details: Physical therapy with focus on there ex and manual therapy to improve ability to complete tasks around the house and complete functional activities, use of modalities as needed     Patient would benefit from: skilled physical therapy  Referral necessary: No  Planned modality interventions: cryotherapy, TENS and thermotherapy: hydrocollator packs  Planned therapy interventions: ADL training, balance, balance/weight bearing training, gait training, manual therapy, joint mobilization, neuromuscular re-education, strengthening, stretching, therapeutic activities and therapeutic exercise  Frequency: 1x week  Duration in weeks: 12  Plan of Care beginning date: 2020   Plan of Care expiration date: 2020   Treatment plan discussed with: patient         Precautions: HTN, R brachial plexus injury, BPH, anxiety, depression      Manuals              B/L shoulder ROM CW 15'             B/L shoulder JM (post, inf) gr II-III                                        Neuro Re-Ed                                                                                                        Ther Ex             Pulleys  5'             Finger ladder flex/ext             Standing cane flexion/abduction             Wall slides flexion             Supine cane flexion/ER  10x10" Ther Activity                                       Gait Training                                       Modalities             PRN

## 2020-07-08 ENCOUNTER — OFFICE VISIT (OUTPATIENT)
Dept: PHYSICAL THERAPY | Facility: REHABILITATION | Age: 55
End: 2020-07-08
Payer: COMMERCIAL

## 2020-07-08 DIAGNOSIS — G89.29 CHRONIC PAIN OF BOTH SHOULDERS: Primary | ICD-10-CM

## 2020-07-08 DIAGNOSIS — M25.511 CHRONIC PAIN OF BOTH SHOULDERS: Primary | ICD-10-CM

## 2020-07-08 DIAGNOSIS — M25.512 CHRONIC PAIN OF BOTH SHOULDERS: Primary | ICD-10-CM

## 2020-07-08 PROCEDURE — 97140 MANUAL THERAPY 1/> REGIONS: CPT | Performed by: PHYSICAL THERAPIST

## 2020-07-08 PROCEDURE — 97110 THERAPEUTIC EXERCISES: CPT | Performed by: PHYSICAL THERAPIST

## 2020-07-08 NOTE — PROGRESS NOTES
Daily Note     Today's date: 2020  Patient name: Haim Virk  : 1965  MRN: [id number]  Referring provider: Robert Ortiz DO  Dx:   Encounter Diagnosis     ICD-10-CM    1  Chronic pain of both shoulders M25 511     G89 29     M25 512                   Subjective: Reports feeling some increased L shoulder pain  Objective: See treatment diary below      Assessment: Tolerated treatment well  Patient would benefit from continued PT, able to introduce cane ER stretch today with no complaints throughout, just needed cueing to perform properly  Plan: Continue per plan of care        Precautions: HTN, R brachial plexus injury, BPH, anxiety, depression      Manuals             B/L shoulder ROM CW 15'  CW 20'            B/L shoulder JM (post, inf) gr II-III                                        Neuro Re-Ed                                                                                                        Ther Ex             Pulleys  5'  5'            Finger ladder flex/ext             Standing cane flexion/abduction             Wall slides flexion             Supine cane flexion/ER  10x10"  10x10" ea                                                   Ther Activity                                       Gait Training                                       Modalities             PRN

## 2020-07-22 ENCOUNTER — APPOINTMENT (OUTPATIENT)
Dept: PHYSICAL THERAPY | Facility: REHABILITATION | Age: 55
End: 2020-07-22
Payer: COMMERCIAL

## 2020-08-12 NOTE — PROGRESS NOTES
Patient will be D/C due to noncompliance to therapy, no formal re-evaluation was completed and goals were not assessed

## 2020-08-27 ENCOUNTER — TELEPHONE (OUTPATIENT)
Dept: PSYCHIATRY | Facility: CLINIC | Age: 55
End: 2020-08-27

## 2020-08-27 NOTE — TELEPHONE ENCOUNTER
Returned call to PT  PT not available and voice message stated that his mailbox is full  Clinician had not received any messages from the patient until today

## 2020-09-01 ENCOUNTER — SOCIAL WORK (OUTPATIENT)
Dept: BEHAVIORAL/MENTAL HEALTH CLINIC | Facility: CLINIC | Age: 55
End: 2020-09-01
Payer: COMMERCIAL

## 2020-09-01 DIAGNOSIS — F60.7 DEPENDENT PERSONALITY DISORDER (HCC): ICD-10-CM

## 2020-09-01 DIAGNOSIS — F33.9 EPISODE OF RECURRENT MAJOR DEPRESSIVE DISORDER, UNSPECIFIED DEPRESSION EPISODE SEVERITY (HCC): Primary | ICD-10-CM

## 2020-09-01 PROCEDURE — 90834 PSYTX W PT 45 MINUTES: CPT | Performed by: SOCIAL WORKER
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
## 2020-09-03 DIAGNOSIS — M25.511 BILATERAL SHOULDER PAIN, UNSPECIFIED CHRONICITY: Primary | ICD-10-CM

## 2020-09-03 DIAGNOSIS — F45.0 ANXIETY WITH SOMATIZATION: ICD-10-CM

## 2020-09-03 DIAGNOSIS — M25.512 BILATERAL SHOULDER PAIN, UNSPECIFIED CHRONICITY: Primary | ICD-10-CM

## 2020-09-03 DIAGNOSIS — F41.9 ANXIETY WITH SOMATIZATION: ICD-10-CM

## 2020-09-03 RX ORDER — LORAZEPAM 0.5 MG/1
0.5 TABLET ORAL EVERY 8 HOURS PRN
Qty: 30 TABLET | Refills: 0 | Status: SHIPPED | OUTPATIENT
Start: 2020-09-03 | End: 2020-10-21 | Stop reason: SDUPTHER

## 2020-09-03 NOTE — TELEPHONE ENCOUNTER
Patient is questioning if he should be on Hydrochlorothiazide for blood pressure or something else  Also patient would like ref PT shoulder pain

## 2020-09-04 DIAGNOSIS — K21.9 GASTROESOPHAGEAL REFLUX DISEASE WITHOUT ESOPHAGITIS: ICD-10-CM

## 2020-09-04 DIAGNOSIS — N40.0 BENIGN PROSTATIC HYPERPLASIA, UNSPECIFIED WHETHER LOWER URINARY TRACT SYMPTOMS PRESENT: ICD-10-CM

## 2020-09-04 DIAGNOSIS — I10 ESSENTIAL HYPERTENSION: ICD-10-CM

## 2020-09-04 RX ORDER — HYDROCHLOROTHIAZIDE 25 MG/1
TABLET ORAL
Qty: 90 TABLET | Refills: 1 | Status: SHIPPED | OUTPATIENT
Start: 2020-09-04 | End: 2021-02-10

## 2020-09-04 RX ORDER — TAMSULOSIN HYDROCHLORIDE 0.4 MG/1
CAPSULE ORAL
Qty: 90 CAPSULE | Refills: 1 | Status: SHIPPED | OUTPATIENT
Start: 2020-09-04 | End: 2021-03-12

## 2020-09-04 RX ORDER — OMEPRAZOLE 40 MG/1
CAPSULE, DELAYED RELEASE ORAL
Qty: 90 CAPSULE | Refills: 1 | Status: SHIPPED | OUTPATIENT
Start: 2020-09-04 | End: 2020-12-01 | Stop reason: SDUPTHER

## 2020-09-16 ENCOUNTER — SOCIAL WORK (OUTPATIENT)
Dept: BEHAVIORAL/MENTAL HEALTH CLINIC | Facility: CLINIC | Age: 55
End: 2020-09-16
Payer: COMMERCIAL

## 2020-09-16 DIAGNOSIS — F33.9 EPISODE OF RECURRENT MAJOR DEPRESSIVE DISORDER, UNSPECIFIED DEPRESSION EPISODE SEVERITY (HCC): Primary | ICD-10-CM

## 2020-09-16 DIAGNOSIS — F60.7 DEPENDENT PERSONALITY DISORDER (HCC): ICD-10-CM

## 2020-09-16 DIAGNOSIS — Z86.59 HISTORY OF OBSESSIVE COMPULSIVE DISORDER: ICD-10-CM

## 2020-09-16 DIAGNOSIS — F45.0 ANXIETY WITH SOMATIZATION: ICD-10-CM

## 2020-09-16 DIAGNOSIS — F41.9 ANXIETY WITH SOMATIZATION: ICD-10-CM

## 2020-09-16 PROCEDURE — 90834 PSYTX W PT 45 MINUTES: CPT | Performed by: SOCIAL WORKER

## 2020-09-16 NOTE — BH TREATMENT PLAN
Haim Virk  1965       Date of Initial Treatment Plan: 9/16/20   Date of Current Treatment Plan: 09/16/20    Treatment Plan Number 1     Strengths/Personal Resources for Self Care: Intelligent    Diagnosis:   No diagnosis found  Area of Needs:   Health  Future      Long Term Goal 1: Be able to move forward with my life    Target Date: 3/16/21  Completion Date: TBD         Short Term Objectives for Goal 1:        Focus on getting well physically       Continue with physical therapy       Maintain good relationships with my sons       Find housing       Maintain healthy relationships in my life           GOAL 1: Modality: Individual 1-2x per month   Completion Date TBD and The person(s) responsible for carrying out the plan is  [employer]: Diagnosis and Treatment Plan explained to Sophai Coley relates understanding diagnosis and is agreeable to Treatment Plan         Client Comments : Please share your thoughts, feelings, need and/or experiences regarding your treatment plan: None
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
## 2020-09-16 NOTE — PSYCH
Treatment Plan Tracking    # 1Treatment Plan not completed within required time limits due to: Treatmetn plan late due to emotional issues within sessions

## 2020-10-06 DIAGNOSIS — F33.9 EPISODE OF RECURRENT MAJOR DEPRESSIVE DISORDER, UNSPECIFIED DEPRESSION EPISODE SEVERITY (HCC): ICD-10-CM

## 2020-10-06 RX ORDER — CITALOPRAM 40 MG/1
TABLET ORAL
Qty: 30 TABLET | Refills: 0 | Status: SHIPPED | OUTPATIENT
Start: 2020-10-06 | End: 2020-10-20

## 2020-10-07 ENCOUNTER — TELEMEDICINE (OUTPATIENT)
Dept: BEHAVIORAL/MENTAL HEALTH CLINIC | Facility: CLINIC | Age: 55
End: 2020-10-07
Payer: COMMERCIAL

## 2020-10-07 DIAGNOSIS — F60.7 DEPENDENT PERSONALITY DISORDER (HCC): ICD-10-CM

## 2020-10-07 DIAGNOSIS — F41.9 ANXIETY WITH SOMATIZATION: Primary | ICD-10-CM

## 2020-10-07 DIAGNOSIS — F45.0 ANXIETY WITH SOMATIZATION: Primary | ICD-10-CM

## 2020-10-07 DIAGNOSIS — F33.9 EPISODE OF RECURRENT MAJOR DEPRESSIVE DISORDER, UNSPECIFIED DEPRESSION EPISODE SEVERITY (HCC): ICD-10-CM

## 2020-10-07 PROCEDURE — 90834 PSYTX W PT 45 MINUTES: CPT | Performed by: SOCIAL WORKER

## 2020-10-09 ENCOUNTER — TELEPHONE (OUTPATIENT)
Dept: FAMILY MEDICINE CLINIC | Facility: CLINIC | Age: 55
End: 2020-10-09

## 2020-10-15 ENCOUNTER — EVALUATION (OUTPATIENT)
Dept: PHYSICAL THERAPY | Facility: OTHER | Age: 55
End: 2020-10-15
Payer: COMMERCIAL

## 2020-10-15 DIAGNOSIS — M25.511 BILATERAL SHOULDER PAIN, UNSPECIFIED CHRONICITY: Primary | ICD-10-CM

## 2020-10-15 DIAGNOSIS — M25.512 BILATERAL SHOULDER PAIN, UNSPECIFIED CHRONICITY: Primary | ICD-10-CM

## 2020-10-15 PROCEDURE — 97161 PT EVAL LOW COMPLEX 20 MIN: CPT | Performed by: PHYSICAL THERAPIST

## 2020-10-15 PROCEDURE — 97110 THERAPEUTIC EXERCISES: CPT | Performed by: PHYSICAL THERAPIST

## 2020-10-19 ENCOUNTER — APPOINTMENT (OUTPATIENT)
Dept: PHYSICAL THERAPY | Facility: OTHER | Age: 55
End: 2020-10-19
Payer: COMMERCIAL

## 2020-10-19 DIAGNOSIS — F33.9 EPISODE OF RECURRENT MAJOR DEPRESSIVE DISORDER, UNSPECIFIED DEPRESSION EPISODE SEVERITY (HCC): ICD-10-CM

## 2020-10-20 ENCOUNTER — TELEPHONE (OUTPATIENT)
Dept: FAMILY MEDICINE CLINIC | Facility: CLINIC | Age: 55
End: 2020-10-20

## 2020-10-20 RX ORDER — CITALOPRAM 40 MG/1
TABLET ORAL
Qty: 90 TABLET | Refills: 1 | Status: SHIPPED | OUTPATIENT
Start: 2020-10-20 | End: 2021-01-14

## 2020-10-21 ENCOUNTER — TELEMEDICINE (OUTPATIENT)
Dept: FAMILY MEDICINE CLINIC | Facility: CLINIC | Age: 55
End: 2020-10-21
Payer: COMMERCIAL

## 2020-10-21 DIAGNOSIS — S12.301D CLOSED NONDISPLACED FRACTURE OF FOURTH CERVICAL VERTEBRA WITH ROUTINE HEALING, UNSPECIFIED FRACTURE MORPHOLOGY, SUBSEQUENT ENCOUNTER: Primary | ICD-10-CM

## 2020-10-21 DIAGNOSIS — F45.0 ANXIETY WITH SOMATIZATION: ICD-10-CM

## 2020-10-21 DIAGNOSIS — F41.9 ANXIETY WITH SOMATIZATION: ICD-10-CM

## 2020-10-21 PROCEDURE — 99213 OFFICE O/P EST LOW 20 MIN: CPT | Performed by: FAMILY MEDICINE

## 2020-10-21 RX ORDER — LORAZEPAM 0.5 MG/1
0.5 TABLET ORAL EVERY 8 HOURS PRN
Qty: 30 TABLET | Refills: 0 | Status: SHIPPED | OUTPATIENT
Start: 2020-10-21 | End: 2020-12-01 | Stop reason: SDUPTHER

## 2020-10-22 ENCOUNTER — OFFICE VISIT (OUTPATIENT)
Dept: PHYSICAL THERAPY | Facility: OTHER | Age: 55
End: 2020-10-22
Payer: COMMERCIAL

## 2020-10-22 DIAGNOSIS — M25.511 BILATERAL SHOULDER PAIN, UNSPECIFIED CHRONICITY: Primary | ICD-10-CM

## 2020-10-22 DIAGNOSIS — M25.512 BILATERAL SHOULDER PAIN, UNSPECIFIED CHRONICITY: Primary | ICD-10-CM

## 2020-10-22 PROCEDURE — 97110 THERAPEUTIC EXERCISES: CPT | Performed by: PHYSICAL THERAPIST

## 2020-10-22 PROCEDURE — 97140 MANUAL THERAPY 1/> REGIONS: CPT | Performed by: PHYSICAL THERAPIST

## 2020-10-23 ENCOUNTER — TRANSCRIBE ORDERS (OUTPATIENT)
Dept: PHYSICAL THERAPY | Facility: OTHER | Age: 55
End: 2020-10-23

## 2020-10-23 DIAGNOSIS — M25.512 BILATERAL SHOULDER PAIN, UNSPECIFIED CHRONICITY: Primary | ICD-10-CM

## 2020-10-23 DIAGNOSIS — M25.511 BILATERAL SHOULDER PAIN, UNSPECIFIED CHRONICITY: Primary | ICD-10-CM

## 2020-10-28 ENCOUNTER — APPOINTMENT (OUTPATIENT)
Dept: PHYSICAL THERAPY | Facility: OTHER | Age: 55
End: 2020-10-28
Payer: COMMERCIAL

## 2020-10-29 ENCOUNTER — OFFICE VISIT (OUTPATIENT)
Dept: PHYSICAL THERAPY | Facility: OTHER | Age: 55
End: 2020-10-29
Payer: COMMERCIAL

## 2020-10-29 DIAGNOSIS — M25.511 BILATERAL SHOULDER PAIN, UNSPECIFIED CHRONICITY: Primary | ICD-10-CM

## 2020-10-29 DIAGNOSIS — M25.512 BILATERAL SHOULDER PAIN, UNSPECIFIED CHRONICITY: Primary | ICD-10-CM

## 2020-10-29 PROCEDURE — 97140 MANUAL THERAPY 1/> REGIONS: CPT | Performed by: PHYSICAL THERAPIST

## 2020-11-04 ENCOUNTER — OFFICE VISIT (OUTPATIENT)
Dept: PHYSICAL THERAPY | Facility: OTHER | Age: 55
End: 2020-11-04
Payer: COMMERCIAL

## 2020-11-04 DIAGNOSIS — M25.512 BILATERAL SHOULDER PAIN, UNSPECIFIED CHRONICITY: Primary | ICD-10-CM

## 2020-11-04 DIAGNOSIS — M25.511 BILATERAL SHOULDER PAIN, UNSPECIFIED CHRONICITY: Primary | ICD-10-CM

## 2020-11-04 PROCEDURE — 97140 MANUAL THERAPY 1/> REGIONS: CPT | Performed by: PHYSICAL THERAPIST

## 2020-11-05 ENCOUNTER — APPOINTMENT (OUTPATIENT)
Dept: PHYSICAL THERAPY | Facility: OTHER | Age: 55
End: 2020-11-05
Payer: COMMERCIAL

## 2020-11-11 ENCOUNTER — OFFICE VISIT (OUTPATIENT)
Dept: PHYSICAL THERAPY | Facility: OTHER | Age: 55
End: 2020-11-11
Payer: COMMERCIAL

## 2020-11-11 DIAGNOSIS — M25.511 BILATERAL SHOULDER PAIN, UNSPECIFIED CHRONICITY: Primary | ICD-10-CM

## 2020-11-11 DIAGNOSIS — M25.512 BILATERAL SHOULDER PAIN, UNSPECIFIED CHRONICITY: Primary | ICD-10-CM

## 2020-11-11 PROCEDURE — 97140 MANUAL THERAPY 1/> REGIONS: CPT | Performed by: PHYSICAL THERAPIST

## 2020-11-12 ENCOUNTER — APPOINTMENT (OUTPATIENT)
Dept: PHYSICAL THERAPY | Facility: OTHER | Age: 55
End: 2020-11-12
Payer: COMMERCIAL

## 2020-11-18 ENCOUNTER — TELEPHONE (OUTPATIENT)
Dept: NEUROSURGERY | Facility: CLINIC | Age: 55
End: 2020-11-18

## 2020-11-18 ENCOUNTER — OFFICE VISIT (OUTPATIENT)
Dept: PHYSICAL THERAPY | Facility: OTHER | Age: 55
End: 2020-11-18
Payer: COMMERCIAL

## 2020-11-18 ENCOUNTER — TELEMEDICINE (OUTPATIENT)
Dept: BEHAVIORAL/MENTAL HEALTH CLINIC | Facility: CLINIC | Age: 55
End: 2020-11-18
Payer: COMMERCIAL

## 2020-11-18 DIAGNOSIS — M25.512 BILATERAL SHOULDER PAIN, UNSPECIFIED CHRONICITY: Primary | ICD-10-CM

## 2020-11-18 DIAGNOSIS — F60.7 DEPENDENT PERSONALITY DISORDER (HCC): ICD-10-CM

## 2020-11-18 DIAGNOSIS — M25.511 BILATERAL SHOULDER PAIN, UNSPECIFIED CHRONICITY: Primary | ICD-10-CM

## 2020-11-18 DIAGNOSIS — F45.0 ANXIETY WITH SOMATIZATION: ICD-10-CM

## 2020-11-18 DIAGNOSIS — F33.9 EPISODE OF RECURRENT MAJOR DEPRESSIVE DISORDER, UNSPECIFIED DEPRESSION EPISODE SEVERITY (HCC): Primary | ICD-10-CM

## 2020-11-18 DIAGNOSIS — F41.9 ANXIETY WITH SOMATIZATION: ICD-10-CM

## 2020-11-18 PROCEDURE — 97140 MANUAL THERAPY 1/> REGIONS: CPT | Performed by: PHYSICAL THERAPIST

## 2020-11-18 PROCEDURE — 90834 PSYTX W PT 45 MINUTES: CPT | Performed by: SOCIAL WORKER

## 2020-11-19 ENCOUNTER — OFFICE VISIT (OUTPATIENT)
Dept: PHYSICAL THERAPY | Facility: OTHER | Age: 55
End: 2020-11-19
Payer: COMMERCIAL

## 2020-11-19 ENCOUNTER — CONSULT (OUTPATIENT)
Dept: NEUROSURGERY | Facility: CLINIC | Age: 55
End: 2020-11-19
Payer: COMMERCIAL

## 2020-11-19 VITALS
WEIGHT: 242 LBS | DIASTOLIC BLOOD PRESSURE: 72 MMHG | HEART RATE: 84 BPM | RESPIRATION RATE: 16 BRPM | HEIGHT: 74 IN | BODY MASS INDEX: 31.06 KG/M2 | TEMPERATURE: 98.2 F | SYSTOLIC BLOOD PRESSURE: 112 MMHG

## 2020-11-19 DIAGNOSIS — S14.3XXS BRACHIAL PLEXUS INJURY, RIGHT, SEQUELA: ICD-10-CM

## 2020-11-19 DIAGNOSIS — M25.511 BILATERAL SHOULDER PAIN, UNSPECIFIED CHRONICITY: Primary | ICD-10-CM

## 2020-11-19 DIAGNOSIS — M47.812 SPONDYLOSIS OF CERVICAL REGION WITHOUT MYELOPATHY OR RADICULOPATHY: Primary | ICD-10-CM

## 2020-11-19 DIAGNOSIS — M25.512 BILATERAL SHOULDER PAIN, UNSPECIFIED CHRONICITY: Primary | ICD-10-CM

## 2020-11-19 DIAGNOSIS — S12.301D CLOSED NONDISPLACED FRACTURE OF FOURTH CERVICAL VERTEBRA WITH ROUTINE HEALING, UNSPECIFIED FRACTURE MORPHOLOGY, SUBSEQUENT ENCOUNTER: ICD-10-CM

## 2020-11-19 PROCEDURE — 97110 THERAPEUTIC EXERCISES: CPT | Performed by: PHYSICAL THERAPIST

## 2020-11-19 PROCEDURE — 97112 NEUROMUSCULAR REEDUCATION: CPT | Performed by: PHYSICAL THERAPIST

## 2020-11-19 PROCEDURE — 97140 MANUAL THERAPY 1/> REGIONS: CPT | Performed by: PHYSICAL THERAPIST

## 2020-11-19 PROCEDURE — 99203 OFFICE O/P NEW LOW 30 MIN: CPT | Performed by: PHYSICIAN ASSISTANT

## 2020-11-25 ENCOUNTER — OFFICE VISIT (OUTPATIENT)
Dept: PHYSICAL THERAPY | Facility: OTHER | Age: 55
End: 2020-11-25
Payer: COMMERCIAL

## 2020-11-25 DIAGNOSIS — M25.511 BILATERAL SHOULDER PAIN, UNSPECIFIED CHRONICITY: Primary | ICD-10-CM

## 2020-11-25 DIAGNOSIS — M25.512 BILATERAL SHOULDER PAIN, UNSPECIFIED CHRONICITY: Primary | ICD-10-CM

## 2020-11-25 PROCEDURE — 97112 NEUROMUSCULAR REEDUCATION: CPT | Performed by: PHYSICAL THERAPIST

## 2020-11-25 PROCEDURE — 97140 MANUAL THERAPY 1/> REGIONS: CPT | Performed by: PHYSICAL THERAPIST

## 2020-11-30 ENCOUNTER — APPOINTMENT (OUTPATIENT)
Dept: PHYSICAL THERAPY | Facility: OTHER | Age: 55
End: 2020-11-30
Payer: COMMERCIAL

## 2020-12-01 ENCOUNTER — OFFICE VISIT (OUTPATIENT)
Dept: FAMILY MEDICINE CLINIC | Facility: CLINIC | Age: 55
End: 2020-12-01
Payer: COMMERCIAL

## 2020-12-01 VITALS
DIASTOLIC BLOOD PRESSURE: 90 MMHG | RESPIRATION RATE: 16 BRPM | WEIGHT: 240 LBS | TEMPERATURE: 97 F | OXYGEN SATURATION: 97 % | SYSTOLIC BLOOD PRESSURE: 134 MMHG | HEART RATE: 76 BPM | HEIGHT: 74 IN | BODY MASS INDEX: 30.8 KG/M2

## 2020-12-01 DIAGNOSIS — F41.9 ANXIETY WITH SOMATIZATION: ICD-10-CM

## 2020-12-01 DIAGNOSIS — K21.9 GASTROESOPHAGEAL REFLUX DISEASE WITHOUT ESOPHAGITIS: ICD-10-CM

## 2020-12-01 DIAGNOSIS — F45.0 ANXIETY WITH SOMATIZATION: ICD-10-CM

## 2020-12-01 DIAGNOSIS — I10 ESSENTIAL HYPERTENSION: Primary | ICD-10-CM

## 2020-12-01 PROCEDURE — 99214 OFFICE O/P EST MOD 30 MIN: CPT | Performed by: FAMILY MEDICINE

## 2020-12-01 RX ORDER — LORAZEPAM 0.5 MG/1
0.5 TABLET ORAL EVERY 8 HOURS PRN
Qty: 30 TABLET | Refills: 0 | Status: SHIPPED | OUTPATIENT
Start: 2020-12-01 | End: 2020-12-28 | Stop reason: SDUPTHER

## 2020-12-01 RX ORDER — OMEPRAZOLE 40 MG/1
40 CAPSULE, DELAYED RELEASE ORAL DAILY
Qty: 90 CAPSULE | Refills: 1 | Status: SHIPPED | OUTPATIENT
Start: 2020-12-01 | End: 2021-06-09

## 2020-12-01 RX ORDER — LISINOPRIL 10 MG/1
10 TABLET ORAL DAILY
Qty: 90 TABLET | Refills: 1 | Status: SHIPPED | OUTPATIENT
Start: 2020-12-01 | End: 2022-03-28

## 2020-12-01 RX ORDER — CLINDAMYCIN HYDROCHLORIDE 300 MG/1
CAPSULE ORAL
COMMUNITY
Start: 2020-11-22 | End: 2021-01-04

## 2020-12-02 ENCOUNTER — OFFICE VISIT (OUTPATIENT)
Dept: PHYSICAL THERAPY | Facility: OTHER | Age: 55
End: 2020-12-02
Payer: COMMERCIAL

## 2020-12-02 DIAGNOSIS — M25.511 BILATERAL SHOULDER PAIN, UNSPECIFIED CHRONICITY: Primary | ICD-10-CM

## 2020-12-02 DIAGNOSIS — M25.512 BILATERAL SHOULDER PAIN, UNSPECIFIED CHRONICITY: Primary | ICD-10-CM

## 2020-12-02 PROCEDURE — 97140 MANUAL THERAPY 1/> REGIONS: CPT | Performed by: PHYSICAL THERAPIST

## 2020-12-02 PROCEDURE — 97110 THERAPEUTIC EXERCISES: CPT | Performed by: PHYSICAL THERAPIST

## 2020-12-03 ENCOUNTER — APPOINTMENT (OUTPATIENT)
Dept: PHYSICAL THERAPY | Facility: OTHER | Age: 55
End: 2020-12-03
Payer: COMMERCIAL

## 2020-12-07 ENCOUNTER — APPOINTMENT (OUTPATIENT)
Dept: PHYSICAL THERAPY | Facility: OTHER | Age: 55
End: 2020-12-07
Payer: COMMERCIAL

## 2020-12-10 ENCOUNTER — OFFICE VISIT (OUTPATIENT)
Dept: PHYSICAL THERAPY | Facility: OTHER | Age: 55
End: 2020-12-10
Payer: COMMERCIAL

## 2020-12-10 DIAGNOSIS — M25.512 BILATERAL SHOULDER PAIN, UNSPECIFIED CHRONICITY: Primary | ICD-10-CM

## 2020-12-10 DIAGNOSIS — M25.511 BILATERAL SHOULDER PAIN, UNSPECIFIED CHRONICITY: Primary | ICD-10-CM

## 2020-12-10 PROCEDURE — 97112 NEUROMUSCULAR REEDUCATION: CPT | Performed by: PHYSICAL THERAPIST

## 2020-12-10 PROCEDURE — 97110 THERAPEUTIC EXERCISES: CPT | Performed by: PHYSICAL THERAPIST

## 2020-12-10 PROCEDURE — 97140 MANUAL THERAPY 1/> REGIONS: CPT | Performed by: PHYSICAL THERAPIST

## 2020-12-11 ENCOUNTER — TELEPHONE (OUTPATIENT)
Dept: PSYCHIATRY | Facility: CLINIC | Age: 55
End: 2020-12-11

## 2020-12-16 ENCOUNTER — APPOINTMENT (OUTPATIENT)
Dept: PHYSICAL THERAPY | Facility: OTHER | Age: 55
End: 2020-12-16
Payer: COMMERCIAL

## 2020-12-28 DIAGNOSIS — F41.9 ANXIETY WITH SOMATIZATION: ICD-10-CM

## 2020-12-28 DIAGNOSIS — F45.0 ANXIETY WITH SOMATIZATION: ICD-10-CM

## 2020-12-28 RX ORDER — LORAZEPAM 0.5 MG/1
0.5 TABLET ORAL EVERY 8 HOURS PRN
Qty: 30 TABLET | Refills: 0 | Status: SHIPPED | OUTPATIENT
Start: 2020-12-28 | End: 2021-02-10 | Stop reason: SDUPTHER

## 2021-01-04 ENCOUNTER — OFFICE VISIT (OUTPATIENT)
Dept: FAMILY MEDICINE CLINIC | Facility: CLINIC | Age: 56
End: 2021-01-04
Payer: COMMERCIAL

## 2021-01-04 VITALS
TEMPERATURE: 96.6 F | DIASTOLIC BLOOD PRESSURE: 78 MMHG | SYSTOLIC BLOOD PRESSURE: 112 MMHG | OXYGEN SATURATION: 100 % | BODY MASS INDEX: 29.63 KG/M2 | RESPIRATION RATE: 16 BRPM | HEART RATE: 78 BPM | WEIGHT: 230.8 LBS

## 2021-01-04 DIAGNOSIS — N52.9 ERECTILE DYSFUNCTION, UNSPECIFIED ERECTILE DYSFUNCTION TYPE: Primary | ICD-10-CM

## 2021-01-04 PROCEDURE — 99213 OFFICE O/P EST LOW 20 MIN: CPT | Performed by: FAMILY MEDICINE

## 2021-01-04 RX ORDER — SENNA PLUS 8.6 MG/1
1 TABLET ORAL 2 TIMES DAILY PRN
COMMUNITY
End: 2022-03-28

## 2021-01-04 RX ORDER — SILDENAFIL 100 MG/1
100 TABLET, FILM COATED ORAL DAILY PRN
Qty: 6 TABLET | Refills: 2 | Status: SHIPPED | OUTPATIENT
Start: 2021-01-04 | End: 2021-04-13

## 2021-01-04 RX ORDER — NALOXONE HYDROCHLORIDE 4 MG/.1ML
SPRAY NASAL
COMMUNITY
Start: 2020-12-16 | End: 2022-07-06 | Stop reason: SDUPTHER

## 2021-01-04 NOTE — PROGRESS NOTES
Assessment/Plan:  Discussed diagnostic and treatment options with patient and significant other  Patient will try Viagra  Caution regarding side effects  Patient to follow up with [facility] on 01/11/2021 as scheduled  Return to the office as scheduled  Diagnoses and all orders for this visit:    Erectile dysfunction, unspecified erectile dysfunction type  -     sildenafil (VIAGRA) 100 mg tablet; Take 1 tablet (100 mg total) by mouth daily as needed for erectile dysfunction    Other orders  -     Narcan 4 MG/0 1ML nasal spray; CALL 911  ADMINISTER A SINGLE SPRAY INTRANASALLY INTO ONE NOSTRIL UPON SIGNS OF OPIOID OVERDOSE  MAY REPEAT AFTER 3 MINUTES IF NO RESPONSE  -     senna (Senokot) 8 6 MG tablet; Take 1 tablet by mouth 2 (two) times a day as needed          Subjective:      Patient ID: Niesha Broussard is a 54 y o  male  Patient complains of problems with rectal dysfunction over the past several months  He admits to difficulty obtaining an erection and sustaining an erection  He has also had problems with ejaculation  Patient schedule appoint with [facility] on 01/11/2021  Erectile Dysfunction  This is a new problem  The current episode started more than 1 month ago  The problem is unchanged  The nature of his difficulty is achieving erection and maintaining erection  Non-physiologic factors contributing to erectile dysfunction are anxiety and a decreased libido  Irritative symptoms do not include frequency, nocturia or urgency  Obstructive symptoms do not include dribbling, an intermittent stream, a slower stream or a weak stream  Pertinent negatives include no dysuria, genital pain, hematuria or hesitancy  Nothing aggravates the symptoms  Past treatments include nothing         The following portions of the patient's history were reviewed and updated as appropriate: allergies, current medications, past family history, past medical history, past social history, past surgical history and problem list     Review of Systems   Genitourinary: Positive for decreased libido  Negative for dysuria, frequency, hematuria, hesitancy, nocturia and urgency  Psychiatric/Behavioral: The patient is nervous/anxious  Objective:      /78 (BP Location: Left arm, Patient Position: Sitting, Cuff Size: Large)   Pulse 78   Temp (!) 96 6 °F (35 9 °C) (Temporal)   Resp 16   Wt 105 kg (230 lb 12 8 oz)   SpO2 100%   BMI 29 63 kg/m²          Physical Exam  Constitutional:       General: He is not in acute distress  Appearance: Normal appearance  HENT:      Head: Normocephalic  Mouth/Throat:      Mouth: Mucous membranes are moist    Eyes:      General: No scleral icterus  Conjunctiva/sclera: Conjunctivae normal    Neck:      Musculoskeletal: Neck supple  Cardiovascular:      Rate and Rhythm: Normal rate and regular rhythm  Pulmonary:      Effort: Pulmonary effort is normal       Breath sounds: Normal breath sounds  Abdominal:      Palpations: Abdomen is soft  Tenderness: There is no abdominal tenderness  There is no right CVA tenderness or left CVA tenderness  Musculoskeletal:      Right lower leg: No edema  Left lower leg: No edema  Lymphadenopathy:      Cervical: No cervical adenopathy  Skin:     General: Skin is warm and dry  Neurological:      Mental Status: He is alert and oriented to person, place, and time     Psychiatric:         Mood and Affect: Mood normal

## 2021-01-11 ENCOUNTER — TELEPHONE (OUTPATIENT)
Dept: FAMILY MEDICINE CLINIC | Facility: CLINIC | Age: 56
End: 2021-01-11

## 2021-01-11 DIAGNOSIS — I10 ESSENTIAL HYPERTENSION: Primary | ICD-10-CM

## 2021-01-11 DIAGNOSIS — R53.83 FATIGUE, UNSPECIFIED TYPE: ICD-10-CM

## 2021-01-11 DIAGNOSIS — N40.0 BENIGN PROSTATIC HYPERPLASIA, UNSPECIFIED WHETHER LOWER URINARY TRACT SYMPTOMS PRESENT: ICD-10-CM

## 2021-01-11 DIAGNOSIS — N52.9 ERECTILE DYSFUNCTION, UNSPECIFIED ERECTILE DYSFUNCTION TYPE: ICD-10-CM

## 2021-01-11 NOTE — TELEPHONE ENCOUNTER
Pt called requesting a script for complete BW, including testosterone level  He was last in on 1/4/21  He states that he has been fatigued, and would like to have this done  Please advise  Thank you

## 2021-01-14 DIAGNOSIS — F33.9 EPISODE OF RECURRENT MAJOR DEPRESSIVE DISORDER, UNSPECIFIED DEPRESSION EPISODE SEVERITY (HCC): ICD-10-CM

## 2021-01-14 RX ORDER — CITALOPRAM 40 MG/1
TABLET ORAL
Qty: 90 TABLET | Refills: 1 | Status: SHIPPED | OUTPATIENT
Start: 2021-01-14 | End: 2021-05-14 | Stop reason: HOSPADM

## 2021-01-18 ENCOUNTER — TRANSCRIBE ORDERS (OUTPATIENT)
Dept: ADMINISTRATIVE | Age: 56
End: 2021-01-18

## 2021-01-18 ENCOUNTER — LAB (OUTPATIENT)
Dept: LAB | Age: 56
End: 2021-01-18
Payer: COMMERCIAL

## 2021-01-18 DIAGNOSIS — N52.9 ERECTILE DYSFUNCTION, UNSPECIFIED ERECTILE DYSFUNCTION TYPE: ICD-10-CM

## 2021-01-18 DIAGNOSIS — I10 ESSENTIAL HYPERTENSION: ICD-10-CM

## 2021-01-18 DIAGNOSIS — R53.83 FATIGUE, UNSPECIFIED TYPE: ICD-10-CM

## 2021-01-18 LAB
25(OH)D3 SERPL-MCNC: 35.1 NG/ML (ref 30–100)
ALBUMIN SERPL BCP-MCNC: 4.2 G/DL (ref 3.5–5)
ALP SERPL-CCNC: 66 U/L (ref 46–116)
ALT SERPL W P-5'-P-CCNC: 28 U/L (ref 12–78)
ANION GAP SERPL CALCULATED.3IONS-SCNC: 3 MMOL/L (ref 4–13)
AST SERPL W P-5'-P-CCNC: 19 U/L (ref 5–45)
BILIRUB SERPL-MCNC: 0.36 MG/DL (ref 0.2–1)
BUN SERPL-MCNC: 14 MG/DL (ref 5–25)
CALCIUM SERPL-MCNC: 10.1 MG/DL (ref 8.3–10.1)
CHLORIDE SERPL-SCNC: 105 MMOL/L (ref 100–108)
CHOLEST SERPL-MCNC: 168 MG/DL (ref 50–200)
CO2 SERPL-SCNC: 30 MMOL/L (ref 21–32)
CREAT SERPL-MCNC: 0.97 MG/DL (ref 0.6–1.3)
ERYTHROCYTE [DISTWIDTH] IN BLOOD BY AUTOMATED COUNT: 11.6 % (ref 11.6–15.1)
GFR SERPL CREATININE-BSD FRML MDRD: 88 ML/MIN/1.73SQ M
GLUCOSE P FAST SERPL-MCNC: 95 MG/DL (ref 65–99)
HCT VFR BLD AUTO: 48.4 % (ref 36.5–49.3)
HDLC SERPL-MCNC: 41 MG/DL
HGB BLD-MCNC: 16.1 G/DL (ref 12–17)
LDLC SERPL CALC-MCNC: 108 MG/DL (ref 0–100)
MCH RBC QN AUTO: 31.6 PG (ref 26.8–34.3)
MCHC RBC AUTO-ENTMCNC: 33.3 G/DL (ref 31.4–37.4)
MCV RBC AUTO: 95 FL (ref 82–98)
NONHDLC SERPL-MCNC: 127 MG/DL
PLATELET # BLD AUTO: 239 THOUSANDS/UL (ref 149–390)
PMV BLD AUTO: 11 FL (ref 8.9–12.7)
POTASSIUM SERPL-SCNC: 4.3 MMOL/L (ref 3.5–5.3)
PROT SERPL-MCNC: 7.8 G/DL (ref 6.4–8.2)
PSA SERPL-MCNC: 2.1 NG/ML (ref 0–4)
RBC # BLD AUTO: 5.1 MILLION/UL (ref 3.88–5.62)
SODIUM SERPL-SCNC: 138 MMOL/L (ref 136–145)
TESTOST SERPL-MCNC: 571 NG/DL (ref 95–948)
TRIGL SERPL-MCNC: 93 MG/DL
TSH SERPL DL<=0.05 MIU/L-ACNC: 1.4 UIU/ML (ref 0.36–3.74)
WBC # BLD AUTO: 5.53 THOUSAND/UL (ref 4.31–10.16)

## 2021-01-18 PROCEDURE — 36415 COLL VENOUS BLD VENIPUNCTURE: CPT | Performed by: FAMILY MEDICINE

## 2021-01-18 PROCEDURE — 82306 VITAMIN D 25 HYDROXY: CPT

## 2021-01-18 PROCEDURE — 85027 COMPLETE CBC AUTOMATED: CPT

## 2021-01-18 PROCEDURE — 80053 COMPREHEN METABOLIC PANEL: CPT

## 2021-01-18 PROCEDURE — 84443 ASSAY THYROID STIM HORMONE: CPT

## 2021-01-18 PROCEDURE — 80061 LIPID PANEL: CPT

## 2021-01-18 PROCEDURE — G0103 PSA SCREENING: HCPCS | Performed by: FAMILY MEDICINE

## 2021-01-18 PROCEDURE — 84403 ASSAY OF TOTAL TESTOSTERONE: CPT

## 2021-01-23 ENCOUNTER — OFFICE VISIT (OUTPATIENT)
Dept: URGENT CARE | Age: 56
End: 2021-01-23
Payer: COMMERCIAL

## 2021-01-23 ENCOUNTER — APPOINTMENT (OUTPATIENT)
Dept: RADIOLOGY | Age: 56
End: 2021-01-23
Attending: NURSE PRACTITIONER
Payer: COMMERCIAL

## 2021-01-23 ENCOUNTER — APPOINTMENT (EMERGENCY)
Dept: RADIOLOGY | Facility: HOSPITAL | Age: 56
End: 2021-01-23
Payer: COMMERCIAL

## 2021-01-23 ENCOUNTER — HOSPITAL ENCOUNTER (EMERGENCY)
Facility: HOSPITAL | Age: 56
Discharge: HOME | End: 2021-01-23
Attending: EMERGENCY MEDICINE | Admitting: EMERGENCY MEDICINE
Payer: COMMERCIAL

## 2021-01-23 VITALS
WEIGHT: 228 LBS | TEMPERATURE: 98.1 F | BODY MASS INDEX: 29.26 KG/M2 | OXYGEN SATURATION: 96 % | RESPIRATION RATE: 18 BRPM | HEIGHT: 74 IN | DIASTOLIC BLOOD PRESSURE: 75 MMHG | HEART RATE: 71 BPM | SYSTOLIC BLOOD PRESSURE: 118 MMHG

## 2021-01-23 VITALS
OXYGEN SATURATION: 97 % | DIASTOLIC BLOOD PRESSURE: 65 MMHG | SYSTOLIC BLOOD PRESSURE: 120 MMHG | TEMPERATURE: 97.7 F | WEIGHT: 228 LBS | BODY MASS INDEX: 29.26 KG/M2 | HEART RATE: 76 BPM | HEIGHT: 74 IN | RESPIRATION RATE: 18 BRPM

## 2021-01-23 DIAGNOSIS — K59.00 CONSTIPATION: ICD-10-CM

## 2021-01-23 DIAGNOSIS — K59.00 CONSTIPATION, UNSPECIFIED CONSTIPATION TYPE: Primary | ICD-10-CM

## 2021-01-23 DIAGNOSIS — R10.9 ABDOMINAL PAIN: Primary | ICD-10-CM

## 2021-01-23 DIAGNOSIS — K90.0 CELIAC DISEASE: ICD-10-CM

## 2021-01-23 DIAGNOSIS — K59.00 CONSTIPATION, UNSPECIFIED CONSTIPATION TYPE: ICD-10-CM

## 2021-01-23 LAB
ALBUMIN SERPL BCP-MCNC: 4.2 G/DL (ref 3.5–5)
ALP SERPL-CCNC: 70 U/L (ref 46–116)
ALT SERPL W P-5'-P-CCNC: 31 U/L (ref 12–78)
ANION GAP SERPL CALCULATED.3IONS-SCNC: 5 MMOL/L (ref 4–13)
AST SERPL W P-5'-P-CCNC: 23 U/L (ref 5–45)
BASOPHILS # BLD AUTO: 0.04 THOUSANDS/ΜL (ref 0–0.1)
BASOPHILS NFR BLD AUTO: 1 % (ref 0–1)
BILIRUB SERPL-MCNC: 0.54 MG/DL (ref 0.2–1)
BILIRUB UR QL STRIP: NEGATIVE
BUN SERPL-MCNC: 12 MG/DL (ref 5–25)
CALCIUM SERPL-MCNC: 9.8 MG/DL (ref 8.3–10.1)
CHLORIDE SERPL-SCNC: 105 MMOL/L (ref 100–108)
CLARITY UR: CLEAR
CO2 SERPL-SCNC: 30 MMOL/L (ref 21–32)
COLOR UR: YELLOW
CREAT SERPL-MCNC: 0.95 MG/DL (ref 0.6–1.3)
EOSINOPHIL # BLD AUTO: 0.18 THOUSAND/ΜL (ref 0–0.61)
EOSINOPHIL NFR BLD AUTO: 3 % (ref 0–6)
ERYTHROCYTE [DISTWIDTH] IN BLOOD BY AUTOMATED COUNT: 11.6 % (ref 11.6–15.1)
GFR SERPL CREATININE-BSD FRML MDRD: 90 ML/MIN/1.73SQ M
GLUCOSE SERPL-MCNC: 95 MG/DL (ref 65–140)
GLUCOSE UR STRIP-MCNC: NEGATIVE MG/DL
HCT VFR BLD AUTO: 46 % (ref 36.5–49.3)
HGB BLD-MCNC: 15.3 G/DL (ref 12–17)
HGB UR QL STRIP.AUTO: NEGATIVE
IMM GRANULOCYTES # BLD AUTO: 0 THOUSAND/UL (ref 0–0.2)
IMM GRANULOCYTES NFR BLD AUTO: 0 % (ref 0–2)
KETONES UR STRIP-MCNC: NEGATIVE MG/DL
LEUKOCYTE ESTERASE UR QL STRIP: NEGATIVE
LIPASE SERPL-CCNC: 103 U/L (ref 73–393)
LYMPHOCYTES # BLD AUTO: 2.51 THOUSANDS/ΜL (ref 0.6–4.47)
LYMPHOCYTES NFR BLD AUTO: 34 % (ref 14–44)
MCH RBC QN AUTO: 31.5 PG (ref 26.8–34.3)
MCHC RBC AUTO-ENTMCNC: 33.3 G/DL (ref 31.4–37.4)
MCV RBC AUTO: 95 FL (ref 82–98)
MONOCYTES # BLD AUTO: 0.68 THOUSAND/ΜL (ref 0.17–1.22)
MONOCYTES NFR BLD AUTO: 9 % (ref 4–12)
NEUTROPHILS # BLD AUTO: 3.88 THOUSANDS/ΜL (ref 1.85–7.62)
NEUTS SEG NFR BLD AUTO: 53 % (ref 43–75)
NITRITE UR QL STRIP: NEGATIVE
NRBC BLD AUTO-RTO: 0 /100 WBCS
PH UR STRIP.AUTO: 7.5 [PH] (ref 4.5–8)
PLATELET # BLD AUTO: 227 THOUSANDS/UL (ref 149–390)
PMV BLD AUTO: 11.4 FL (ref 8.9–12.7)
POTASSIUM SERPL-SCNC: 3.2 MMOL/L (ref 3.5–5.3)
PROT SERPL-MCNC: 7.8 G/DL (ref 6.4–8.2)
PROT UR STRIP-MCNC: NEGATIVE MG/DL
RBC # BLD AUTO: 4.86 MILLION/UL (ref 3.88–5.62)
SODIUM SERPL-SCNC: 140 MMOL/L (ref 136–145)
SP GR UR STRIP.AUTO: 1.02 (ref 1–1.03)
UROBILINOGEN UR QL STRIP.AUTO: 0.2 E.U./DL
WBC # BLD AUTO: 7.29 THOUSAND/UL (ref 4.31–10.16)

## 2021-01-23 PROCEDURE — 99284 EMERGENCY DEPT VISIT MOD MDM: CPT | Performed by: EMERGENCY MEDICINE

## 2021-01-23 PROCEDURE — 99284 EMERGENCY DEPT VISIT MOD MDM: CPT

## 2021-01-23 PROCEDURE — 74177 CT ABD & PELVIS W/CONTRAST: CPT

## 2021-01-23 PROCEDURE — 74022 RADEX COMPL AQT ABD SERIES: CPT

## 2021-01-23 PROCEDURE — 81003 URINALYSIS AUTO W/O SCOPE: CPT

## 2021-01-23 PROCEDURE — G0384 LEV 5 HOSP TYPE B ED VISIT: HCPCS | Performed by: NURSE PRACTITIONER

## 2021-01-23 PROCEDURE — 36415 COLL VENOUS BLD VENIPUNCTURE: CPT | Performed by: EMERGENCY MEDICINE

## 2021-01-23 PROCEDURE — G1004 CDSM NDSC: HCPCS

## 2021-01-23 PROCEDURE — 83690 ASSAY OF LIPASE: CPT | Performed by: EMERGENCY MEDICINE

## 2021-01-23 PROCEDURE — 80053 COMPREHEN METABOLIC PANEL: CPT | Performed by: EMERGENCY MEDICINE

## 2021-01-23 PROCEDURE — 99285 EMERGENCY DEPT VISIT HI MDM: CPT | Performed by: NURSE PRACTITIONER

## 2021-01-23 PROCEDURE — 85025 COMPLETE CBC W/AUTO DIFF WBC: CPT | Performed by: EMERGENCY MEDICINE

## 2021-01-23 RX ORDER — POTASSIUM CHLORIDE 20 MEQ/1
40 TABLET, EXTENDED RELEASE ORAL ONCE
Status: COMPLETED | OUTPATIENT
Start: 2021-01-23 | End: 2021-01-23

## 2021-01-23 RX ORDER — POLYETHYLENE GLYCOL 3350 17 G/17G
17 POWDER, FOR SOLUTION ORAL DAILY
Qty: 10 EACH | Refills: 0 | Status: SHIPPED | OUTPATIENT
Start: 2021-01-23

## 2021-01-23 RX ADMIN — IOHEXOL 100 ML: 350 INJECTION, SOLUTION INTRAVENOUS at 21:19

## 2021-01-23 RX ADMIN — POTASSIUM CHLORIDE 40 MEQ: 1500 TABLET, EXTENDED RELEASE ORAL at 22:44

## 2021-01-23 NOTE — PROGRESS NOTES
[address]        NAME: Jesse Lerner is a 54 y o  male  : 1965    MRN: [id number]  DATE: 2021  TIME: 2:32 PM    Assessment and Plan   Constipation, unspecified constipation type [K59 00]  1  Constipation, unspecified constipation type  XR abdomen obstruction series    Transfer to other facility   2  Celiac disease           Patient Instructions       Follow up with PCP in 3-5 days  Proceed to  ER if symptoms worsen  Chief Complaint     Chief Complaint   Patient presents with    Constipation     x 1 week          History of Present Illness       Pt is a 53 y/o male here with c/o constipation x 1 week  His girlfriend is with him, and states he has tried Senna, colace, miralax, and enemas with no relief  Pt has only passed a very small amount of hard stool, but is passing flatus  He reports 1 episode of N/V but thinks it was due to a new medication (Welbutrin)  Denies abd pain or issues with urination  Denies hx of bowel surgery or bowel obstruction  Pt's girlfriend is concerned that he may have paralytic ileus, or SBO r/t celiac disease  He denies any abdominal pain but states feels "full"  Denies blood in stools  States he hasn't been eating much  Constipation  Pertinent negatives include no abdominal pain, nausea or vomiting  Review of Systems   Review of Systems   Gastrointestinal: Positive for constipation  Negative for abdominal pain, blood in stool, nausea and vomiting  All other systems reviewed and are negative          Current Medications       Current Outpatient Medications:     acetaminophen (TYLENOL) 500 mg tablet, Take 500 mg by mouth every 6 (six) hours as needed, Disp: , Rfl:     Calcium Carbonate-Vitamin D3 600-400 MG-UNIT TABS, Take 1 tablet by mouth 2 (two) times a day , Disp: , Rfl:     citalopram (CeleXA) 40 mg tablet, TAKE 1 TABLET BY MOUTH EVERY DAY, Disp: 90 tablet, Rfl: 1    gabapentin (NEURONTIN) 100 mg capsule, TAKE 2 CAPSULES (200 MG TOTAL) BY MOUTH 2 (TWO) TIMES A DAY, Disp: 360 capsule, Rfl: 2    hydrochlorothiazide (HYDRODIURIL) 25 mg tablet, TAKE 1 TABLET BY MOUTH EVERY DAY, Disp: 90 tablet, Rfl: 1    hydrocortisone 1 % lotion, Apply topically 2 (two) times a day, Disp: , Rfl:     lisinopril (ZESTRIL) 10 mg tablet, Take 1 tablet (10 mg total) by mouth daily, Disp: 90 tablet, Rfl: 1    LORazepam (ATIVAN) 0 5 mg tablet, Take 1 tablet (0 5 mg total) by mouth every 8 (eight) hours as needed for anxiety, Disp: 30 tablet, Rfl: 0    Narcan 4 MG/0 1ML nasal spray, CALL 911   ADMINISTER A SINGLE SPRAY INTRANASALLY INTO ONE NOSTRIL UPON SIGNS OF OPIOID OVERDOSE  MAY REPEAT AFTER 3 MINUTES IF NO RESPONSE , Disp: , Rfl:     Omega-3 1000 MG CAPS, Take 1 g by mouth daily, Disp: , Rfl:     omeprazole (PriLOSEC) 40 MG capsule, Take 1 capsule (40 mg total) by mouth daily, Disp: 90 capsule, Rfl: 1    Propylene Glycol (Systane Complete) 0 6 % SOLN, Apply to eye, Disp: , Rfl:     senna (Senokot) 8 6 MG tablet, Take 1 tablet by mouth 2 (two) times a day as needed, Disp: , Rfl:     sildenafil (VIAGRA) 100 mg tablet, Take 1 tablet (100 mg total) by mouth daily as needed for erectile dysfunction, Disp: 6 tablet, Rfl: 2    tamsulosin (FLOMAX) 0 4 mg, TAKE 1 CAPSULE BY MOUTH AT BEDTIME, Disp: 90 capsule, Rfl: 1    Current Allergies     Allergies as of 01/23/2021 - Reviewed 01/23/2021   Allergen Reaction Noted    Gluten meal  01/23/2020    Penicillins Diarrhea and Vomiting 05/14/2012    Celecoxib Rash 05/14/2012            The following portions of the patient's history were reviewed and updated as appropriate: allergies, current medications, past family history, past medical history, past social history, past surgical history and problem list      Past Medical History:   Diagnosis Date    Anxiety 1995    Celiac disease     Depression 1995    Obsessive compulsive disorder     Suicide attempt ([facility] [address] )     10/2018       Past Surgical History: Procedure Laterality Date    FRACTURE SURGERY      TONSILLECTOMY      WRIST SURGERY Left     resolved 1997- cts surgery       Family History   Problem Relation Age of Onset    Heart attack Father     Prostate cancer Father     Cerebral palsy Brother    Chris Guerra OCD Mother     OCD Sister          Medications have been verified  Objective   /65   Pulse 76   Temp 97 7 °F (36 5 °C)   Resp 18   Ht 6' 2" (1 88 m)   Wt 103 kg (228 lb)   SpO2 97%   BMI 29 27 kg/m²   No LMP for male patient  Physical Exam     Physical Exam  Vitals signs and nursing note reviewed  Constitutional:       General: He is not in acute distress  Appearance: Normal appearance  He is normal weight  He is not ill-appearing, toxic-appearing or diaphoretic  HENT:      Head: Normocephalic  Nose: Nose normal       Mouth/Throat:      Mouth: Mucous membranes are moist    Eyes:      Pupils: Pupils are equal, round, and reactive to light  Neck:      Musculoskeletal: Normal range of motion  Cardiovascular:      Rate and Rhythm: Normal rate  Pulses: Normal pulses  Heart sounds: Normal heart sounds  Pulmonary:      Effort: Pulmonary effort is normal       Breath sounds: Normal breath sounds  Abdominal:      General: Bowel sounds are normal  There is no distension  Palpations: There is no mass  Tenderness: There is no abdominal tenderness  Musculoskeletal: Normal range of motion  Skin:     General: Skin is warm and dry  Neurological:      General: No focal deficit present  Mental Status: He is alert and oriented to person, place, and time  Psychiatric:         Mood and Affect: Mood normal          Behavior: Behavior normal          Preliminary reading obstruction series  ? Impaction in rectum  Waiting on rad read    Will sent to ED as pt has had multiple OTC products w/o relief    Pt may need invasive enema, vs disimpaction, vs surgical removal   May need CT to rule out other pathology for constipation     Discussed xray with pt and girlfriend     Informed of rectal stool and need for ED eval   Will send to [facility] ED for eval

## 2021-01-23 NOTE — ED PROVIDER NOTES
History  Chief Complaint   Patient presents with    Abdominal Pain     Pt states "About a week ago i started to notice that my stools were thinner then normal and smaller then normal  This went on for a good portion of this week and I only went acouple times " Pt denies any belly pain  Pt was seen at [facility] and told to come to the Ed to r/o abdominal pain     80-year-old male history of celiac disease presents with change in bowel movements  States that he last had a normal BM about a week ago  Since then has noticed that his stool seems smaller and more narrow in caliber  Denies overt pain, just mild intermittent discomfort  Has been taking senna and Dulcolax for his symptoms without significant relief  Denies any diarrhea or blood in the stool  States that yesterday he vomited 1 time and that it was clear, nonbloody nonbilious  Endorses some generalized fatigue over the past week in addition to decreased p o  intake  He went to clinic today and was sent to the ED to rule out obstruction  Denies chest pain, for shortness of breath, cough, fever, chills, urinary symptoms, myalgias, recent illness, known sick contacts  Per chart patient has history of major depressive disorder, anxiety with somatization, insomnia, suicide attempt, and personality disorder  States that he is on disability for brachial plexus injury  Prior to Admission Medications   Prescriptions Last Dose Informant Patient Reported? Taking? Calcium Carbonate-Vitamin D3 600-400 MG-UNIT TABS   Yes Yes   Sig: Take 1 tablet by mouth 2 (two) times a day    LORazepam (ATIVAN) 0 5 mg tablet   No No   Sig: Take 1 tablet (0 5 mg total) by mouth every 8 (eight) hours as needed for anxiety   Narcan 4 MG/0 1ML nasal spray   Yes No   Sig: CALL 911  ADMINISTER A SINGLE SPRAY INTRANASALLY INTO ONE NOSTRIL UPON SIGNS OF OPIOID OVERDOSE  MAY REPEAT AFTER 3 MINUTES IF NO RESPONSE     Omega-3 1000 MG CAPS   Yes Yes   Sig: Take 1 g by mouth daily   Propylene Glycol (Systane Complete) 0 6 % SOLN   Yes No   Sig: Apply to eye   acetaminophen (TYLENOL) 500 mg tablet   Yes Yes   Sig: Take 500 mg by mouth every 6 (six) hours as needed   citalopram (CeleXA) 40 mg tablet   No Yes   Sig: TAKE 1 TABLET BY MOUTH EVERY DAY   gabapentin (NEURONTIN) 100 mg capsule   No No   Sig: TAKE 2 CAPSULES (200 MG TOTAL) BY MOUTH 2 (TWO) TIMES A DAY   hydrochlorothiazide (HYDRODIURIL) 25 mg tablet   No Yes   Sig: TAKE 1 TABLET BY MOUTH EVERY DAY   hydrocortisone 1 % lotion   Yes No   Sig: Apply topically 2 (two) times a day   lisinopril (ZESTRIL) 10 mg tablet   No Yes   Sig: Take 1 tablet (10 mg total) by mouth daily   omeprazole (PriLOSEC) 40 MG capsule   No Yes   Sig: Take 1 capsule (40 mg total) by mouth daily   senna (Senokot) 8 6 MG tablet   Yes Yes   Sig: Take 1 tablet by mouth 2 (two) times a day as needed   sildenafil (VIAGRA) 100 mg tablet   No No   Sig: Take 1 tablet (100 mg total) by mouth daily as needed for erectile dysfunction   tamsulosin (FLOMAX) 0 4 mg   No Yes   Sig: TAKE 1 CAPSULE BY MOUTH AT BEDTIME      Facility-Administered Medications: None       Past Medical History:   Diagnosis Date    Anxiety 1995    Celiac disease     Depression 1995    Hypertension     Obsessive compulsive disorder     Suicide attempt ([facility] [address] )     10/2018       Past Surgical History:   Procedure Laterality Date    FRACTURE SURGERY      TONSILLECTOMY      WRIST SURGERY Left     resolved 1997- cts surgery       Family History   Problem Relation Age of Onset    Heart attack Father     Prostate cancer Father     Cerebral palsy Brother     OCD Mother     OCD Sister      I have reviewed and agree with the history as documented      E-Cigarette/Vaping    E-Cigarette Use Never User      E-Cigarette/Vaping Substances    Nicotine No     THC No     CBD No     Flavoring No     Other No     Unknown No      Social History     Tobacco Use    Smoking status: Never Smoker    Smokeless tobacco: Current User   Substance Use Topics    Alcohol use: Not Currently     Comment: SOCIAL    Drug use: No     Comment: CAFFINE 3 CUPS PER DAY        Review of Systems   Constitutional: Negative for chills and fever  HENT: Negative for ear pain, sinus pain and sore throat  Eyes: Negative for pain  Respiratory: Negative for shortness of breath  Cardiovascular: Negative for chest pain  Gastrointestinal: Positive for constipation, nausea and vomiting  Negative for abdominal pain, blood in stool and diarrhea  Genitourinary: Negative for difficulty urinating, dysuria and flank pain  Musculoskeletal: Negative for back pain and neck pain  Neurological: Negative for headaches  All other systems reviewed and are negative  Physical Exam  ED Triage Vitals [01/23/21 1559]   Temperature Pulse Respirations Blood Pressure SpO2   98 1 °F (36 7 °C) 85 18 121/86 98 %      Temp Source Heart Rate Source Patient Position - Orthostatic VS BP Location FiO2 (%)   Oral Monitor Sitting Left arm --      Pain Score       --             Orthostatic Vital Signs  Vitals:    01/23/21 1559 01/23/21 2251   BP: 121/86 118/75   Pulse: 85 71   Patient Position - Orthostatic VS: Sitting        Physical Exam  Vitals signs and nursing note reviewed  Constitutional:       General: He is not in acute distress  Appearance: He is well-developed  He is not ill-appearing, toxic-appearing or diaphoretic  HENT:      Head: Normocephalic  Nose: Nose normal       Mouth/Throat:      Mouth: Mucous membranes are moist    Eyes:      General: No scleral icterus  Right eye: No discharge  Left eye: No discharge  Extraocular Movements: Extraocular movements intact  Conjunctiva/sclera: Conjunctivae normal    Neck:      Musculoskeletal: Normal range of motion  Cardiovascular:      Rate and Rhythm: Normal rate  Heart sounds: Normal heart sounds     Pulmonary:      Effort: Pulmonary effort is normal  No respiratory distress  Breath sounds: No stridor  Abdominal:      General: There is no distension  Palpations: Abdomen is soft  Tenderness: There is no abdominal tenderness  There is no guarding or rebound  Skin:     General: Skin is warm and dry  Capillary Refill: Capillary refill takes less than 2 seconds  Neurological:      Mental Status: He is alert and oriented to person, place, and time  Cranial Nerves: No cranial nerve deficit     Psychiatric:         Mood and Affect: Mood normal          Behavior: Behavior normal          ED Medications  Medications   iohexol (OMNIPAQUE) 350 MG/ML injection (MULTI-DOSE) 100 mL (100 mL Intravenous Given 1/23/21 2119)   potassium chloride (K-DUR,KLOR-CON) CR tablet 40 mEq (40 mEq Oral Given 1/23/21 2244)       Diagnostic Studies  Results Reviewed     Procedure Component Value Units Date/Time    CMP [[id number]]  (Abnormal) Collected: 01/23/21 1802    Lab Status: Final result Specimen: Blood from Arm, Left Updated: 01/23/21 1830     Sodium 140 mmol/L      Potassium 3 2 mmol/L      Chloride 105 mmol/L      CO2 30 mmol/L      ANION GAP 5 mmol/L      BUN 12 mg/dL      Creatinine 0 95 mg/dL      Glucose 95 mg/dL      Calcium 9 8 mg/dL      AST 23 U/L      ALT 31 U/L      Alkaline Phosphatase 70 U/L      Total Protein 7 8 g/dL      Albumin 4 2 g/dL      Total Bilirubin 0 54 mg/dL      eGFR 90 ml/min/1 73sq m     Narrative:      [place] guidelines for Chronic Kidney Disease (CKD):     Stage 1 with normal or high GFR (GFR > 90 mL/min/1 73 square meters)    Stage 2 Mild CKD (GFR = 60-89 mL/min/1 73 square meters)    Stage 3A Moderate CKD (GFR = 45-59 mL/min/1 73 square meters)    Stage 3B Moderate CKD (GFR = 30-44 mL/min/1 73 square meters)    Stage 4 Severe CKD (GFR = 15-29 mL/min/1 73 square meters)    Stage 5 End Stage CKD (GFR <15 mL/min/1 73 square meters)  Note: GFR calculation is accurate only with a steady state creatinine    Lipase [[id number]]  (Normal) Collected: 01/23/21 1802    Lab Status: Final result Specimen: Blood from Arm, Left Updated: 01/23/21 1830     Lipase 103 u/L     CBC and differential [[id number]] Collected: 01/23/21 1801    Lab Status: Final result Specimen: Blood from Arm, Left Updated: 01/23/21 1810     WBC 7 29 Thousand/uL      RBC 4 86 Million/uL      Hemoglobin 15 3 g/dL      Hematocrit 46 0 %      MCV 95 fL      MCH 31 5 pg      MCHC 33 3 g/dL      RDW 11 6 %      MPV 11 4 fL      Platelets 702 Thousands/uL      nRBC 0 /100 WBCs      Neutrophils Relative 53 %      Immat GRANS % 0 %      Lymphocytes Relative 34 %      Monocytes Relative 9 %      Eosinophils Relative 3 %      Basophils Relative 1 %      Neutrophils Absolute 3 88 Thousands/µL      Immature Grans Absolute 0 00 Thousand/uL      Lymphocytes Absolute 2 51 Thousands/µL      Monocytes Absolute 0 68 Thousand/µL      Eosinophils Absolute 0 18 Thousand/µL      Basophils Absolute 0 04 Thousands/µL     Urine Macroscopic, POC [[id number]] Collected: 01/23/21 1807    Lab Status: Final result Specimen: Urine Updated: 01/23/21 1808     Color, UA Yellow     Clarity, UA Clear     pH, UA 7 5     Leukocytes, UA Negative     Nitrite, UA Negative     Protein, UA Negative mg/dl      Glucose, UA Negative mg/dl      Ketones, UA Negative mg/dl      Urobilinogen, UA 0 2 E U /dl      Bilirubin, UA Negative     Blood, UA Negative     Specific Gravity, UA 1 020    Narrative:      CLINITEK RESULT                 CT Abdomen pelvis with contrast   Final Result by Rachel De La Torre MD (01/23 2224)      No evidence of bowel obstruction  There is a moderate amount stool in the colon suggesting a degree of constipation  There is mild colonic diverticulosis without evidence of acute diverticulitis  Normal appendix  The prostate is enlarged, measuring approximately 6 cm transverse dimension  Clinical and laboratory correlation is recommended        Mild bibasilar atelectasis  Left renal cysts  No hydronephrosis bilaterally  Other nonemergent findings, as described above  Please see discussion  Workstation performed: [id number]               Procedures  Procedures      ED Course                                       MDM  Number of Diagnoses or Management Options  Abdominal pain:   Constipation:   Diagnosis management comments: Patient well-appearing with unremarkable exam   Labs and scan  PCP follow-up, return precautions discussed  Patient appreciative and in agreement with plan  Disposition  Final diagnoses:   Abdominal pain   Constipation     Time reflects when diagnosis was documented in both MDM as applicable and the Disposition within this note     Time User Action Codes Description Comment    1/23/2021 10:36 PM Alejandra BRADFORD Add [R10 9] Abdominal pain     1/23/2021 10:37 PM Yoon Stewart Add [K59 00] Constipation       ED Disposition     ED Disposition Condition Date/Time Comment    Discharge Stable Sat Jan 23, 2021 10:33 PM Kirsten Choe discharge to home/self care              Follow-up Information     Follow up With Specialties Details Why Contact Info Additional Denilson 36, DO Family Medicine Schedule an appointment as soon as possible for a visit  For follow up regarding your symptoms Shirin [phone]       [address] [place] [address] Emergency Department Emergency Medicine Go to  If symptoms worsen [address]  [address] [facility] 64 [facility] Emergency Department, [address], [id number] [phone]          Discharge Medication List as of 1/23/2021 10:40 PM      START taking these medications    Details   polyethylene glycol (MIRALAX) 17 g packet Take 17 g by mouth daily, Starting Sat 1/23/2021, Print         CONTINUE these medications which have NOT CHANGED    Details   acetaminophen (TYLENOL) 500 mg tablet Take 500 mg by mouth every 6 (six) hours as needed, Historical Med      Calcium Carbonate-Vitamin D3 600-400 MG-UNIT TABS Take 1 tablet by mouth 2 (two) times a day , Historical Med      citalopram (CeleXA) 40 mg tablet TAKE 1 TABLET BY MOUTH EVERY DAY, Normal      hydrochlorothiazide (HYDRODIURIL) 25 mg tablet TAKE 1 TABLET BY MOUTH EVERY DAY, Normal      lisinopril (ZESTRIL) 10 mg tablet Take 1 tablet (10 mg total) by mouth daily, Starting Tue 12/1/2020, Normal      Omega-3 1000 MG CAPS Take 1 g by mouth daily, Historical Med      omeprazole (PriLOSEC) 40 MG capsule Take 1 capsule (40 mg total) by mouth daily, Starting Tue 12/1/2020, Normal      senna (Senokot) 8 6 MG tablet Take 1 tablet by mouth 2 (two) times a day as needed, Historical Med      tamsulosin (FLOMAX) 0 4 mg TAKE 1 CAPSULE BY MOUTH AT BEDTIME, Normal      gabapentin (NEURONTIN) 100 mg capsule TAKE 2 CAPSULES (200 MG TOTAL) BY MOUTH 2 (TWO) TIMES A DAY, Starting Thu 6/18/2020, Normal      hydrocortisone 1 % lotion Apply topically 2 (two) times a day, Historical Med      LORazepam (ATIVAN) 0 5 mg tablet Take 1 tablet (0 5 mg total) by mouth every 8 (eight) hours as needed for anxiety, Starting Mon 12/28/2020, Normal      Narcan 4 MG/0 1ML nasal spray CALL 911  ADMINISTER A SINGLE SPRAY INTRANASALLY INTO ONE NOSTRIL UPON SIGNS OF OPIOID OVERDOSE  MAY REPEAT AFTER 3 MINUTES IF NO RESPONSE , Historical Med      Propylene Glycol (Systane Complete) 0 6 % SOLN Apply to eye, Historical Med      sildenafil (VIAGRA) 100 mg tablet Take 1 tablet (100 mg total) by mouth daily as needed for erectile dysfunction, Starting Mon 1/4/2021, Normal           No discharge procedures on file  PDMP Review       Value Time User    PDMP Reviewed  Yes 12/28/2020  6:27 PM Shira Mcintosh DO           ED Provider  Attending physically available and evaluated Bernadine Reardon I managed the patient along with the ED Attending      Electronically Signed by         Gisela Bonner MD  01/24/21 2005

## 2021-01-23 NOTE — PATIENT INSTRUCTIONS
You are to go to the ED for evaluation of constipation  You may need a CT to rule out obstruction versus pathology   You may need an invasive enema or disimpaction  You are to follow up with your PCP  You are to go to the [facility] ED for evaluation

## 2021-01-24 NOTE — DISCHARGE INSTRUCTIONS
Please return to the Emergency Department if your symptoms worsen or if you develop any new or concerning symptoms  Please continue with your current regimen for constipation but please include MiraLax

## 2021-01-24 NOTE — ED ATTENDING ATTESTATION
1/23/2021  ILázaro MD, saw and evaluated the patient  I have discussed the patient with the resident/non-physician practitioner and agree with the resident's/non-physician practitioner's findings, Plan of Care, and MDM as documented in the resident's/non-physician practitioner's note, except where noted  All available labs and Radiology studies were reviewed  I was present for key portions of any procedure(s) performed by the resident/non-physician practitioner and I was immediately available to provide assistance  At this point I agree with the current assessment done in the Emergency Department  I have conducted an independent evaluation of this patient a history and physical is as follows:    49-year-old man presenting S with chief complaint of constipation after he was sent in by Urgent Care  Patient states he has been having decreased bowel movements over the past 3 weeks, last bowel movement was this morning but was smaller than usual   No blood or mucus in stool  Patient denies any abdominal or rectal pain  Has had some nausea but no vomiting  No fever or chills  No prior history of constipation  Patient awake and alert in no acute distress  Heart regular rate and rhythm, no murmurs rubs or gallops  Lungs clear to auscultation bilaterally  Abdomen soft, nontender, nondistended  CT does not show evidence of bowel obstruction which the patient stated he had been sent for evaluation for  He is still feeling well, will discharge with instructions for symptomatic care and return precautions      ED Course         Critical Care Time  Procedures

## 2021-01-25 ENCOUNTER — TELEMEDICINE (OUTPATIENT)
Dept: BEHAVIORAL/MENTAL HEALTH CLINIC | Facility: CLINIC | Age: 56
End: 2021-01-25
Payer: COMMERCIAL

## 2021-01-25 DIAGNOSIS — F60.7 DEPENDENT PERSONALITY DISORDER (HCC): ICD-10-CM

## 2021-01-25 DIAGNOSIS — F41.9 ANXIETY WITH SOMATIZATION: ICD-10-CM

## 2021-01-25 DIAGNOSIS — Z86.59 HISTORY OF OBSESSIVE COMPULSIVE DISORDER: ICD-10-CM

## 2021-01-25 DIAGNOSIS — F33.9 EPISODE OF RECURRENT MAJOR DEPRESSIVE DISORDER, UNSPECIFIED DEPRESSION EPISODE SEVERITY (HCC): Primary | ICD-10-CM

## 2021-01-25 DIAGNOSIS — F45.0 ANXIETY WITH SOMATIZATION: ICD-10-CM

## 2021-01-25 PROCEDURE — 90832 PSYTX W PT 30 MINUTES: CPT | Performed by: SOCIAL WORKER

## 2021-01-25 NOTE — PSYCH
Virtual Brief Visit    Assessment/Plan:    Problem List Items Addressed This Visit     None                Reason for visit is No chief complaint on file  Encounter provider Virginia Reilly    Provider [address] at [address]  [address]  [facility] [id number]    Recent Visits  No visits were found meeting these conditions  Showing recent visits within past 7 days and meeting all other requirements     Future Appointments  No visits were found meeting these conditions  Showing future appointments within next 150 days and meeting all other requirements        After connecting through telephone, the patient was identified by name and date of birth  Tom Hansen was informed that this is a telemedicine visit and that the visit is being conducted through telephone  My office door was closed  No one else was in the room  He acknowledged consent and understanding of privacy and security of the platform  The patient has agreed to participate and understands he can discontinue the visit at any time  Patient is aware this is a billable service  Subjective    Tom Hansen is a 54 y o  male D- Tera Bonilla stated that he has been having medical issues  HE stated that he is being treated for an obstructed bowel  He stated that otherwise he is doing well,=  He stated that he is sad that he rarely speaks to his sons  HE stated that he is also looking to volunteer or work part time to stay busy and focused  HE discussed his relationship with his girlfriend and feels that this is a very positive thing in his life  Processing his emotions and discussing ways for him to remain positive and move forward with his life  Giving supportive therapy  A- Progress- Continuing to work towards completing his treatment goals  P-Continue treatment     HPI     Past Medical History:   Diagnosis Date    Anxiety 1995    Celiac disease     Depression 1995    Hypertension     Obsessive compulsive disorder     Suicide attempt ([facility] [address] )     10/2018       Past Surgical History:   Procedure Laterality Date    FRACTURE SURGERY      TONSILLECTOMY      WRIST SURGERY Left     resolved 1997- cts surgery       Current Outpatient Medications   Medication Sig Dispense Refill    acetaminophen (TYLENOL) 500 mg tablet Take 500 mg by mouth every 6 (six) hours as needed      Calcium Carbonate-Vitamin D3 600-400 MG-UNIT TABS Take 1 tablet by mouth 2 (two) times a day       citalopram (CeleXA) 40 mg tablet TAKE 1 TABLET BY MOUTH EVERY DAY 90 tablet 1    gabapentin (NEURONTIN) 100 mg capsule TAKE 2 CAPSULES (200 MG TOTAL) BY MOUTH 2 (TWO) TIMES A  capsule 2    hydrochlorothiazide (HYDRODIURIL) 25 mg tablet TAKE 1 TABLET BY MOUTH EVERY DAY 90 tablet 1    hydrocortisone 1 % lotion Apply topically 2 (two) times a day      lisinopril (ZESTRIL) 10 mg tablet Take 1 tablet (10 mg total) by mouth daily 90 tablet 1    LORazepam (ATIVAN) 0 5 mg tablet Take 1 tablet (0 5 mg total) by mouth every 8 (eight) hours as needed for anxiety 30 tablet 0    Narcan 4 MG/0 1ML nasal spray CALL 911  ADMINISTER A SINGLE SPRAY INTRANASALLY INTO ONE NOSTRIL UPON SIGNS OF OPIOID OVERDOSE  MAY REPEAT AFTER 3 MINUTES IF NO RESPONSE   Omega-3 1000 MG CAPS Take 1 g by mouth daily      omeprazole (PriLOSEC) 40 MG capsule Take 1 capsule (40 mg total) by mouth daily 90 capsule 1    polyethylene glycol (MIRALAX) 17 g packet Take 17 g by mouth daily 10 each 0    Propylene Glycol (Systane Complete) 0 6 % SOLN Apply to eye      senna (Senokot) 8 6 MG tablet Take 1 tablet by mouth 2 (two) times a day as needed      sildenafil (VIAGRA) 100 mg tablet Take 1 tablet (100 mg total) by mouth daily as needed for erectile dysfunction 6 tablet 2    tamsulosin (FLOMAX) 0 4 mg TAKE 1 CAPSULE BY MOUTH AT BEDTIME 90 capsule 1     No current facility-administered medications for this visit           Allergies Allergen Reactions    Gluten Meal      Pt is diagnosed with celiac disease with the biopsy/pathology and the tissue transglutaminase antibody by the GI     Penicillins Diarrhea and Vomiting    Celecoxib Rash       Review of Systems    There were no vitals filed for this visit  I spent 30 minutes directly with the patient during this visit    VIRTUAL VISIT DISCLAIMER    Rito Harding acknowledges that he has consented to an online visit or consultation  He understands that the online visit is based solely on information provided by him, and that, in the absence of a face-to-face physical evaluation by the physician, the diagnosis he receives is both limited and provisional in terms of accuracy and completeness  This is not intended to replace a full medical face-to-face evaluation by the physician  Rito Harding understands and accepts these terms

## 2021-02-08 ENCOUNTER — TRANSCRIBE ORDERS (OUTPATIENT)
Dept: ADMINISTRATIVE | Facility: HOSPITAL | Age: 56
End: 2021-02-08

## 2021-02-08 ENCOUNTER — APPOINTMENT (OUTPATIENT)
Dept: LAB | Facility: IMAGING CENTER | Age: 56
End: 2021-02-08
Payer: COMMERCIAL

## 2021-02-08 DIAGNOSIS — K90.0 CELIAC SPRUE: ICD-10-CM

## 2021-02-08 DIAGNOSIS — E87.6 LOW POTASSIUM SYNDROME: ICD-10-CM

## 2021-02-08 DIAGNOSIS — K90.0 CELIAC SPRUE: Primary | ICD-10-CM

## 2021-02-08 LAB
ANION GAP SERPL CALCULATED.3IONS-SCNC: 4 MMOL/L (ref 4–13)
BUN SERPL-MCNC: 13 MG/DL (ref 5–25)
CALCIUM SERPL-MCNC: 10.7 MG/DL (ref 8.3–10.1)
CHLORIDE SERPL-SCNC: 109 MMOL/L (ref 100–108)
CO2 SERPL-SCNC: 28 MMOL/L (ref 21–32)
CREAT SERPL-MCNC: 0.97 MG/DL (ref 0.6–1.3)
ERYTHROCYTE [DISTWIDTH] IN BLOOD BY AUTOMATED COUNT: 12 % (ref 11.6–15.1)
GFR SERPL CREATININE-BSD FRML MDRD: 88 ML/MIN/1.73SQ M
GLUCOSE P FAST SERPL-MCNC: 94 MG/DL (ref 65–99)
HCT VFR BLD AUTO: 47.3 % (ref 36.5–49.3)
HGB BLD-MCNC: 15.4 G/DL (ref 12–17)
MCH RBC QN AUTO: 31.4 PG (ref 26.8–34.3)
MCHC RBC AUTO-ENTMCNC: 32.6 G/DL (ref 31.4–37.4)
MCV RBC AUTO: 97 FL (ref 82–98)
PLATELET # BLD AUTO: 251 THOUSANDS/UL (ref 149–390)
PMV BLD AUTO: 11.5 FL (ref 8.9–12.7)
POTASSIUM SERPL-SCNC: 4.5 MMOL/L (ref 3.5–5.3)
RBC # BLD AUTO: 4.9 MILLION/UL (ref 3.88–5.62)
SODIUM SERPL-SCNC: 141 MMOL/L (ref 136–145)
WBC # BLD AUTO: 6.23 THOUSAND/UL (ref 4.31–10.16)

## 2021-02-08 PROCEDURE — 83516 IMMUNOASSAY NONANTIBODY: CPT

## 2021-02-08 PROCEDURE — 85027 COMPLETE CBC AUTOMATED: CPT

## 2021-02-08 PROCEDURE — 36415 COLL VENOUS BLD VENIPUNCTURE: CPT

## 2021-02-08 PROCEDURE — 80048 BASIC METABOLIC PNL TOTAL CA: CPT

## 2021-02-09 LAB
TTG IGA SER-ACNC: >100 U/ML (ref 0–3)
TTG IGG SER-ACNC: 12 U/ML (ref 0–5)

## 2021-02-10 ENCOUNTER — OFFICE VISIT (OUTPATIENT)
Dept: FAMILY MEDICINE CLINIC | Facility: CLINIC | Age: 56
End: 2021-02-10
Payer: COMMERCIAL

## 2021-02-10 VITALS
RESPIRATION RATE: 16 BRPM | SYSTOLIC BLOOD PRESSURE: 130 MMHG | BODY MASS INDEX: 29.39 KG/M2 | DIASTOLIC BLOOD PRESSURE: 84 MMHG | HEIGHT: 74 IN | WEIGHT: 229 LBS | TEMPERATURE: 98.5 F | HEART RATE: 84 BPM

## 2021-02-10 DIAGNOSIS — K59.00 CONSTIPATION, UNSPECIFIED CONSTIPATION TYPE: Primary | ICD-10-CM

## 2021-02-10 DIAGNOSIS — N40.0 BENIGN PROSTATIC HYPERPLASIA, UNSPECIFIED WHETHER LOWER URINARY TRACT SYMPTOMS PRESENT: ICD-10-CM

## 2021-02-10 DIAGNOSIS — F41.9 ANXIETY WITH SOMATIZATION: ICD-10-CM

## 2021-02-10 DIAGNOSIS — F45.0 ANXIETY WITH SOMATIZATION: ICD-10-CM

## 2021-02-10 DIAGNOSIS — N52.9 ERECTILE DYSFUNCTION, UNSPECIFIED ERECTILE DYSFUNCTION TYPE: ICD-10-CM
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
## 2021-02-10 PROBLEM — F10.929 ALCOHOL INTOXICATION (HCC): Status: ACTIVE | Noted: 2020-10-03

## 2021-02-10 PROBLEM — R93.7 ABNORMAL CT SCAN, CERVICAL SPINE: Status: ACTIVE | Noted: 2020-10-03

## 2021-02-10 PROBLEM — S02.2XXA CLOSED FRACTURE OF NASAL BONE: Status: ACTIVE | Noted: 2020-10-04

## 2021-02-10 PROCEDURE — 99213 OFFICE O/P EST LOW 20 MIN: CPT | Performed by: FAMILY MEDICINE

## 2021-02-10 RX ORDER — LORAZEPAM 0.5 MG/1
0.5 TABLET ORAL EVERY 8 HOURS PRN
Qty: 30 TABLET | Refills: 0 | Status: SHIPPED | OUTPATIENT
Start: 2021-02-10 | End: 2021-03-11 | Stop reason: SDUPTHER

## 2021-02-10 NOTE — PROGRESS NOTES
Assessment/Plan:   patient to continue present treatment and recommend increase fiber in his diet and increased water intake  Follow-up with Dr Jason Toussaint, gastroenterologist next month as scheduled  Patient is being referred to [address] [place] [address] Urology for further evaluation and treatment  Return the office marisol Chance Diagnoses and all orders for this visit:    Constipation, unspecified constipation type    Erectile dysfunction, unspecified erectile dysfunction type  -     Ambulatory referral to Urology; Future    Benign prostatic hyperplasia, unspecified whether lower urinary tract symptoms present  -     Ambulatory referral to Urology; Future    Anxiety with somatization  -     LORazepam (ATIVAN) 0 5 mg tablet; Take 1 tablet (0 5 mg total) by mouth every 8 (eight) hours as needed for anxiety          Subjective:      Patient ID: Dorothy Pennington is a 54 y o  male  Patient is being seen in follow-up from recent emergency room visit at [facility] on 01/23/2021 for constipation and rule out bowel obstruction  Patient then saw Dr Jason Toussaint, gastroenterologist on 01/27/2021 and has a follow-up appointment next month  Patient follows with psychologist through [address] [place] [address] psychiatry  Patient complains of ongoing constipation which is somewhat improved  Patient complains of continued erectile dysfunction not improved with generic Viagra  CT of abdomen and pelvis in the emergency room revealed enlarged prostate  Constipation  This is a chronic problem  The current episode started more than 1 year ago  The problem has been gradually worsening since onset  His stool frequency is 2 to 3 times per week  The stool is described as firm  The patient is not on a high fiber diet  He does not exercise regularly  There has been adequate water intake   Pertinent negatives include no abdominal pain, anorexia, bloating, diarrhea, difficulty urinating, fecal incontinence, fever, flatus, hematochezia, hemorrhoids, melena, nausea, rectal pain, vomiting or weight loss  Risk factors include dietary change  He has tried laxatives and stool softeners for the symptoms  The treatment provided mild relief  The following portions of the patient's history were reviewed and updated as appropriate: allergies, current medications, past family history, past medical history, past social history, past surgical history and problem list     Review of Systems   Constitutional: Negative for fever and weight loss  Gastrointestinal: Positive for constipation  Negative for abdominal pain, anorexia, bloating, diarrhea, flatus, hematochezia, hemorrhoids, melena, nausea, rectal pain and vomiting  Genitourinary: Negative for difficulty urinating  Objective:      /84   Pulse 84   Temp 98 5 °F (36 9 °C) (Tympanic)   Resp 16   Ht 6' 2" (1 88 m)   Wt 104 kg (229 lb)   BMI 29 40 kg/m²          Physical Exam  Constitutional:       General: He is not in acute distress  Appearance: Normal appearance  He is not ill-appearing, toxic-appearing or diaphoretic  HENT:      Head: Normocephalic  Mouth/Throat:      Mouth: Mucous membranes are moist    Eyes:      General: No scleral icterus  Conjunctiva/sclera: Conjunctivae normal    Neck:      Musculoskeletal: Neck supple  Cardiovascular:      Rate and Rhythm: Normal rate and regular rhythm  Pulmonary:      Effort: Pulmonary effort is normal       Breath sounds: Normal breath sounds  Abdominal:      General: Bowel sounds are normal       Palpations: Abdomen is soft  Tenderness: There is no abdominal tenderness  Musculoskeletal:      Right lower leg: No edema  Left lower leg: No edema  Lymphadenopathy:      Cervical: No cervical adenopathy  Skin:     General: Skin is warm and dry  Neurological:      Mental Status: He is alert and oriented to person, place, and time     Psychiatric:         Mood and Affect: Mood normal

## 2021-02-15 ENCOUNTER — TELEPHONE (OUTPATIENT)
Dept: UROLOGY | Facility: CLINIC | Age: 56
End: 2021-02-15

## 2021-02-15 NOTE — TELEPHONE ENCOUNTER
What is the reason for the patients appointment?       BPH  Do we accept the patient's insurance or is the patient Self-Pay? [place]       Has the patient had any previous urologist(s)? Yes       Have patient records been requested? no       Has the patient had any outside testing done? yes      What is the patients location preference for an office visit? [place] pt is willing to travel        Does the patient have a personal history of cancer? No    Pt was recommenced to use for PBH    Pt is having some hesitation pt gets up once a night to urinate no other symptoms      Please advise on follow

## 2021-03-11 DIAGNOSIS — F45.0 ANXIETY WITH SOMATIZATION: ICD-10-CM

## 2021-03-11 DIAGNOSIS — F41.9 ANXIETY WITH SOMATIZATION: ICD-10-CM

## 2021-03-11 RX ORDER — LORAZEPAM 0.5 MG/1
0.5 TABLET ORAL EVERY 8 HOURS PRN
Qty: 30 TABLET | Refills: 0 | Status: SHIPPED | OUTPATIENT
Start: 2021-03-11 | End: 2021-04-08 | Stop reason: SDUPTHER

## 2021-03-12 DIAGNOSIS — N40.0 BENIGN PROSTATIC HYPERPLASIA, UNSPECIFIED WHETHER LOWER URINARY TRACT SYMPTOMS PRESENT: ICD-10-CM

## 2021-03-12 DIAGNOSIS — I10 ESSENTIAL HYPERTENSION: Primary | ICD-10-CM

## 2021-03-12 RX ORDER — HYDROCHLOROTHIAZIDE 25 MG/1
TABLET ORAL
Qty: 90 TABLET | Refills: 3 | Status: SHIPPED | OUTPATIENT
Start: 2021-03-12 | End: 2021-04-13

## 2021-03-12 RX ORDER — TAMSULOSIN HYDROCHLORIDE 0.4 MG/1
CAPSULE ORAL
Qty: 90 CAPSULE | Refills: 3 | Status: SHIPPED | OUTPATIENT
Start: 2021-03-12 | End: 2022-03-28

## 2021-03-16 ENCOUNTER — TELEMEDICINE (OUTPATIENT)
Dept: BEHAVIORAL/MENTAL HEALTH CLINIC | Facility: CLINIC | Age: 56
End: 2021-03-16
Payer: COMMERCIAL

## 2021-03-16 DIAGNOSIS — F41.9 ANXIETY WITH SOMATIZATION: Primary | ICD-10-CM

## 2021-03-16 DIAGNOSIS — F33.41 RECURRENT MAJOR DEPRESSIVE DISORDER, IN PARTIAL REMISSION (HCC): ICD-10-CM

## 2021-03-16 DIAGNOSIS — F33.9 EPISODE OF RECURRENT MAJOR DEPRESSIVE DISORDER, UNSPECIFIED DEPRESSION EPISODE SEVERITY (HCC): ICD-10-CM

## 2021-03-16 DIAGNOSIS — F60.7 DEPENDENT PERSONALITY DISORDER (HCC): ICD-10-CM

## 2021-03-16 DIAGNOSIS — F45.0 ANXIETY WITH SOMATIZATION: Primary | ICD-10-CM

## 2021-03-16 PROCEDURE — 90834 PSYTX W PT 45 MINUTES: CPT | Performed by: SOCIAL WORKER

## 2021-03-16 NOTE — BH TREATMENT PLAN
Asad Ewing  1965       Date of Initial Treatment Plan: 9/16/20   Date of Current Treatment Plan: 03/16/21    Treatment Plan Number 2    Strengths/Personal Resources for Self Care: Intelligent  Coaching, caring    Diagnosis:   1  Anxiety with somatization     2  Episode of recurrent major depressive disorder, unspecified depression episode severity ([facility] [address] )     3  Dependent personality disorder ([facility] [address] )     4  Recurrent major depressive disorder, in partial remission ([facility] )       Area of Needs:   Health  Future        Long Term Goal 1: Be able to move forward with my life     Target Date: 9/16/21  Completion Date: TBD         Short Term Objectives for Goal 1:        Focus on getting well physically       Continue with physical therapy       Maintain good relationships with my sons       Find housing       Maintain healthy relationships in my life             GOAL 1: Modality: Individual 1-2x per month   Completion Date TBD and The person(s) responsible for carrying out the plan is  Karlene: Diagnosis and Treatment Plan explained to Dorota Estes relates understanding diagnosis and is agreeable to Treatment Plan         Client Comments : Please share your thoughts, feelings, need and/or experiences regarding your treatment plan: None

## 2021-03-16 NOTE — PSYCH
Treatment Plan Tracking    # 2Treatment Plan not completed within required time limits due to: Treatment Plan done but not signed at time of office visit due to:  Plan reviewed by phone or in person  and verbal consent given due to [address] distancing

## 2021-03-16 NOTE — PSYCH
Virtual Brief Visit    Assessment/Plan:    Problem List Items Addressed This Visit     None                Reason for visit is No chief complaint on file  Encounter provider Farrah Fitzpatrick    Provider located at [address] [place] [id number] [phone]    Recent Visits  No visits were found meeting these conditions  Showing recent visits within past 7 days and meeting all other requirements     Future Appointments  No visits were found meeting these conditions  Showing future appointments within next 150 days and meeting all other requirements        After connecting through telephone, the patient was identified by name and date of birth  Samara Flannery was informed that this is a telemedicine visit and that the visit is being conducted through telephone  My office door was closed  No one else was in the room  He acknowledged consent and understanding of privacy and security of the platform  The patient has agreed to participate and understands he can discontinue the visit at any time  Patient is aware this is a billable service  Subjective    Smaara Flannery is a 54 y o  male SARAH- Piper Osborn stated that he is doing well overall  He stated that he continues to reside with his girlfriend and is very happy  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  He stated that he has been working around the house and trying to remain busy  He discussed being upset that he has very little contact with his sons  Discussing non-invasive ways to reach out to them and allow them to see the effort that he is making to put his life back together  Discussing not allowing the trauma that has occurred to define him  Reviewing and renewing his treatment plan   Giving supportive therapy  A- Progress- Continuing to work towards completing his treatment goals  P-Continue treatment    HPI     Past Medical History:   Diagnosis Date    Anxiety 1995    Celiac disease     Depression 1995    Hypertension     Obsessive compulsive disorder     Suicide attempt ([facility] [address] )     10/2018       Past Surgical History:   Procedure Laterality Date    FRACTURE SURGERY      TONSILLECTOMY      WRIST SURGERY Left     resolved 1997- cts surgery       Current Outpatient Medications   Medication Sig Dispense Refill    acetaminophen (TYLENOL) 500 mg tablet Take 500 mg by mouth every 6 (six) hours as needed      Calcium Carbonate-Vitamin D3 600-400 MG-UNIT TABS Take 1 tablet by mouth 2 (two) times a day       citalopram (CeleXA) 40 mg tablet TAKE 1 TABLET BY MOUTH EVERY DAY 90 tablet 1    gabapentin (NEURONTIN) 100 mg capsule TAKE 2 CAPSULES (200 MG TOTAL) BY MOUTH 2 (TWO) TIMES A  capsule 2    hydrochlorothiazide (HYDRODIURIL) 25 mg tablet TAKE 1 TABLET BY MOUTH EVERY DAY 90 tablet 3    hydrocortisone 1 % lotion Apply topically 2 (two) times a day      lisinopril (ZESTRIL) 10 mg tablet Take 1 tablet (10 mg total) by mouth daily 90 tablet 1    LORazepam (ATIVAN) 0 5 mg tablet Take 1 tablet (0 5 mg total) by mouth every 8 (eight) hours as needed for anxiety 30 tablet 0    Narcan 4 MG/0 1ML nasal spray CALL 911  ADMINISTER A SINGLE SPRAY INTRANASALLY INTO ONE NOSTRIL UPON SIGNS OF OPIOID OVERDOSE  MAY REPEAT AFTER 3 MINUTES IF NO RESPONSE        Omega-3 1000 MG CAPS Take 1 g by mouth daily      omeprazole (PriLOSEC) 40 MG capsule Take 1 capsule (40 mg total) by mouth daily 90 capsule 1    polyethylene glycol (MIRALAX) 17 g packet Take 17 g by mouth daily 10 each 0    Propylene Glycol (Systane Complete) 0 6 % SOLN Apply to eye      senna (Senokot) 8 6 MG tablet Take 1 tablet by mouth 2 (two) times a day as needed      sildenafil (VIAGRA) 100 mg tablet Take 1 tablet (100 mg total) by mouth daily as needed for erectile dysfunction (Patient not taking: Reported on 2/10/2021) 6 tablet 2    tamsulosin (FLOMAX) 0 4 mg TAKE 1 CAPSULE BY MOUTH AT BEDTIME 90 capsule 3     No current facility-administered medications for this visit  Allergies   Allergen Reactions    Gluten Meal      Pt is diagnosed with celiac disease with the biopsy/pathology and the tissue transglutaminase antibody by the GI     Penicillins Diarrhea and Vomiting    Celecoxib Rash       Review of Systems    There were no vitals filed for this visit  I spent 40 minutes directly with the patient during this visit    VIRTUAL VISIT DISCLAIMER    Jerod Rodriguez acknowledges that he has consented to an online visit or consultation  He understands that the online visit is based solely on information provided by him, and that, in the absence of a face-to-face physical evaluation by the physician, the diagnosis he receives is both limited and provisional in terms of accuracy and completeness  This is not intended to replace a full medical face-to-face evaluation by the physician  Jerod Rodriguez understands and accepts these terms

## 2021-03-26 ENCOUNTER — OFFICE VISIT (OUTPATIENT)
Dept: FAMILY MEDICINE CLINIC | Facility: CLINIC | Age: 56
End: 2021-03-26
Payer: COMMERCIAL

## 2021-03-26 VITALS
TEMPERATURE: 98.3 F | HEART RATE: 92 BPM | RESPIRATION RATE: 16 BRPM | OXYGEN SATURATION: 95 % | BODY MASS INDEX: 31.2 KG/M2 | DIASTOLIC BLOOD PRESSURE: 82 MMHG | SYSTOLIC BLOOD PRESSURE: 124 MMHG | HEIGHT: 73 IN | WEIGHT: 235.4 LBS

## 2021-03-26 DIAGNOSIS — Z71.1 CONCERN ABOUT STD IN MALE WITHOUT DIAGNOSIS: Primary | ICD-10-CM

## 2021-03-26 PROCEDURE — 99213 OFFICE O/P EST LOW 20 MIN: CPT | Performed by: FAMILY MEDICINE

## 2021-03-26 PROCEDURE — 87389 HIV-1 AG W/HIV-1&-2 AB AG IA: CPT | Performed by: FAMILY MEDICINE

## 2021-03-26 PROCEDURE — 87591 N.GONORRHOEAE DNA AMP PROB: CPT | Performed by: FAMILY MEDICINE

## 2021-03-26 PROCEDURE — 86695 HERPES SIMPLEX TYPE 1 TEST: CPT | Performed by: FAMILY MEDICINE

## 2021-03-26 PROCEDURE — 86696 HERPES SIMPLEX TYPE 2 TEST: CPT | Performed by: FAMILY MEDICINE

## 2021-03-26 PROCEDURE — 87491 CHLMYD TRACH DNA AMP PROBE: CPT | Performed by: FAMILY MEDICINE

## 2021-03-26 PROCEDURE — 36415 COLL VENOUS BLD VENIPUNCTURE: CPT | Performed by: FAMILY MEDICINE

## 2021-03-26 PROCEDURE — 86592 SYPHILIS TEST NON-TREP QUAL: CPT | Performed by: FAMILY MEDICINE

## 2021-03-26 NOTE — PROGRESS NOTES
Assessment/Plan:   labs obtained as below  Will heed results  Recommend condoms to prevent STD  Diagnoses and all orders for this visit:    Concern about STD in male without diagnosis  -     Chlamydia/GC amplified DNA by PCR  -     HIV 1/2 Antigen/Antibody (4th Generation) w Reflex [facility]  -     RPR  -     HPV High Risk  -     Herpes I/II IgG ODILON w Reflex to HSV-2          Subjective:      Patient ID: Niesha Broussard is a 54 y o  male  Patient requests STD testing  Denies any symptoms although admits to recent infidelity and his girlfriend wants him tested for "everything "  Patient is especially concerned about HPV and herpes  The following portions of the patient's history were reviewed and updated as appropriate: allergies, current medications, past family history, past medical history, past social history, past surgical history and problem list     Review of Systems      Objective:      /82 (BP Location: Left arm, Patient Position: Sitting, Cuff Size: Adult)   Pulse 92   Temp 98 3 °F (36 8 °C) (Temporal)   Resp 16   Ht 6' 1" (1 854 m)   Wt 107 kg (235 lb 6 4 oz)   SpO2 95%   BMI 31 06 kg/m²          Physical Exam  Constitutional:       General: He is not in acute distress  Appearance: Normal appearance  HENT:      Head: Normocephalic  Mouth/Throat:      Mouth: Mucous membranes are moist    Eyes:      General: No scleral icterus  Conjunctiva/sclera: Conjunctivae normal    Neck:      Musculoskeletal: Neck supple  Cardiovascular:      Rate and Rhythm: Normal rate and regular rhythm  Pulmonary:      Effort: Pulmonary effort is normal       Breath sounds: Normal breath sounds  Abdominal:      Palpations: Abdomen is soft  Tenderness: There is no abdominal tenderness  There is no right CVA tenderness or left CVA tenderness  Musculoskeletal:      Right lower leg: No edema  Left lower leg: No edema     Lymphadenopathy:      Cervical: No cervical adenopathy  Skin:     General: Skin is warm and dry  Neurological:      Mental Status: He is alert and oriented to person, place, and time     Psychiatric:         Mood and Affect: Mood normal

## 2021-03-27 LAB
C TRACH DNA SPEC QL NAA+PROBE: NEGATIVE
HSV1 IGG SER IA-ACNC: 51.2 INDEX (ref 0–0.9)
HSV2 IGG SER IA-ACNC: <0.91 INDEX (ref 0–0.9)
N GONORRHOEA DNA SPEC QL NAA+PROBE: NEGATIVE

## 2021-03-29 LAB
HIV 1+2 AB+HIV1 P24 AG SERPL QL IA: NORMAL
RPR SER QL: NORMAL

## 2021-03-30 ENCOUNTER — TELEPHONE (OUTPATIENT)
Dept: FAMILY MEDICINE CLINIC | Facility: CLINIC | Age: 56
End: 2021-03-30

## 2021-03-30 NOTE — TELEPHONE ENCOUNTER
Pt called asking about the correlation between ED and Herpes  He did not want an appt to discuss this, just asking if Herpes can cause this  Please advise  Thank you

## 2021-04-08 DIAGNOSIS — F41.9 ANXIETY WITH SOMATIZATION: ICD-10-CM

## 2021-04-08 DIAGNOSIS — F45.0 ANXIETY WITH SOMATIZATION: ICD-10-CM

## 2021-04-08 RX ORDER — LORAZEPAM 0.5 MG/1
0.5 TABLET ORAL EVERY 8 HOURS PRN
Qty: 30 TABLET | Refills: 0 | Status: SHIPPED | OUTPATIENT
Start: 2021-04-08 | End: 2021-05-14 | Stop reason: HOSPADM

## 2021-04-12 ENCOUNTER — TELEPHONE (OUTPATIENT)
Dept: PSYCHIATRY | Facility: CLINIC | Age: 56
End: 2021-04-12

## 2021-04-12 NOTE — TELEPHONE ENCOUNTER
----- Message from Papo Alvarez sent at 4/12/2021  9:55 AM EDT -----  Regarding: Cancellation  Patient contacted office to cancel appointment WITHOUT sufficient notice for therapy with Silva Copeland on 4/12//2021  Patient informed of office's 24-Hour Notice Cancellation policy and $37 89 fee    Rescheduled

## 2021-04-13 ENCOUNTER — OFFICE VISIT (OUTPATIENT)
Dept: FAMILY MEDICINE CLINIC | Facility: CLINIC | Age: 56
End: 2021-04-13
Payer: COMMERCIAL

## 2021-04-13 VITALS
OXYGEN SATURATION: 97 % | RESPIRATION RATE: 16 BRPM | TEMPERATURE: 97.5 F | BODY MASS INDEX: 31.19 KG/M2 | WEIGHT: 236.4 LBS | DIASTOLIC BLOOD PRESSURE: 80 MMHG | HEART RATE: 92 BPM | SYSTOLIC BLOOD PRESSURE: 124 MMHG

## 2021-04-13 DIAGNOSIS — N48.89 PENIS PAIN: ICD-10-CM

## 2021-04-13 DIAGNOSIS — N52.9 ERECTILE DYSFUNCTION, UNSPECIFIED ERECTILE DYSFUNCTION TYPE: Primary | ICD-10-CM

## 2021-04-13 DIAGNOSIS — F45.0 ANXIETY WITH SOMATIZATION: ICD-10-CM

## 2021-04-13 DIAGNOSIS — F33.9 EPISODE OF RECURRENT MAJOR DEPRESSIVE DISORDER, UNSPECIFIED DEPRESSION EPISODE SEVERITY (HCC): ICD-10-CM

## 2021-04-13 DIAGNOSIS — F41.9 ANXIETY WITH SOMATIZATION: ICD-10-CM

## 2021-04-13 PROCEDURE — 99214 OFFICE O/P EST MOD 30 MIN: CPT | Performed by: FAMILY MEDICINE

## 2021-04-13 NOTE — PROGRESS NOTES
Assessment/Plan:    Patient instructed to discontinue Viagra and Cialis as per Urology  Patient to continue present treatment  Patient is being referred to [facility] psychiatry for further evaluation and treatment  Return to the office marisol Barrios Diagnoses and all orders for this visit:    Erectile dysfunction, unspecified erectile dysfunction type    Penis pain    Episode of recurrent major depressive disorder, unspecified depression episode severity ([address] )  -     Ambulatory referral to Psychiatry; Future    Anxiety with somatization  -     Ambulatory referral to Psychiatry; Future          Subjective:      Patient ID: Jose David Rider is a 64 y o  male  Patient was sent here from [facility] [place] [facility] office earlier this morning secondary to tachycardia with a heart rate of 107  Patient states on Saturday 04/09/2021 patient took Viagra 150 mg and then on Juan Jose 04/10/2021 he took Cialis 100 mg for treatment of erectile dysfunction  The medication was not effective for rectal dysfunction and caused side effects of heart palpitations with heart rate up to 140 as per patient's significant other who works as a nurse at a nursing home  Patient denied any chest pain or shortness of breath  Patient then developed pain in his penis for which he was seeing Urology earlier today  He then developed nausea and vomiting this morning which is now resolved  Patient is feeling better overall although admits to ongoing anxiety and feeling depressed  Patient previously saw Dr Sindi Ivory, psychiatrist but not recently  The following portions of the patient's history were reviewed and updated as appropriate: allergies, current medications, past family history, past medical history, past social history, past surgical history and problem list     Review of Systems   Constitutional: Positive for appetite change and fatigue  Negative for activity change, chills, diaphoresis, fever and unexpected weight change     HENT: Negative  Eyes: Negative  Respiratory: Negative for cough, chest tightness, shortness of breath and wheezing  Cardiovascular: Negative for chest pain, palpitations and leg swelling  Gastrointestinal: Positive for nausea and vomiting  Negative for abdominal pain, blood in stool, constipation and diarrhea  Endocrine: Negative for cold intolerance and heat intolerance  Genitourinary: Positive for penile pain  Negative for difficulty urinating, discharge, dysuria, flank pain, frequency, hematuria, penile swelling, scrotal swelling and testicular pain  Musculoskeletal: Positive for back pain  Negative for arthralgias, gait problem, joint swelling, myalgias, neck pain and neck stiffness  Skin: Negative  Neurological: Positive for light-headedness  Negative for dizziness, syncope, weakness and headaches  Hematological: Negative for adenopathy  Does not bruise/bleed easily  Psychiatric/Behavioral: Positive for dysphoric mood  Negative for sleep disturbance and suicidal ideas  The patient is nervous/anxious  Objective:      /80 (BP Location: Left arm, Patient Position: Sitting, Cuff Size: Large)   Pulse 92   Temp 97 5 °F (36 4 °C) (Temporal)   Resp 16   Wt 107 kg (236 lb 6 4 oz)   SpO2 97%   BMI 31 19 kg/m²          Physical Exam  Constitutional:       General: He is not in acute distress  Appearance: Normal appearance  He is not ill-appearing, toxic-appearing or diaphoretic  HENT:      Head: Normocephalic  Mouth/Throat:      Mouth: Mucous membranes are moist    Eyes:      General: No scleral icterus  Conjunctiva/sclera: Conjunctivae normal    Neck:      Musculoskeletal: Neck supple  Cardiovascular:      Rate and Rhythm: Normal rate and regular rhythm  Pulmonary:      Effort: Pulmonary effort is normal       Breath sounds: Normal breath sounds  Abdominal:      Palpations: Abdomen is soft  Tenderness: There is no abdominal tenderness   There is no right CVA tenderness or left CVA tenderness  Musculoskeletal:      Right lower leg: No edema  Left lower leg: No edema  Lymphadenopathy:      Cervical: No cervical adenopathy  Skin:     General: Skin is warm and dry  Neurological:      Mental Status: He is alert and oriented to person, place, and time  Psychiatric:      Comments: Patient appears somewhat anxious and depressed

## 2021-04-29 ENCOUNTER — HOSPITAL ENCOUNTER (EMERGENCY)
Facility: HOSPITAL | Age: 56
End: 2021-04-29
Attending: EMERGENCY MEDICINE | Admitting: EMERGENCY MEDICINE
Payer: COMMERCIAL

## 2021-04-29 ENCOUNTER — SOCIAL WORK (OUTPATIENT)
Dept: BEHAVIORAL/MENTAL HEALTH CLINIC | Facility: CLINIC | Age: 56
End: 2021-04-29
Payer: COMMERCIAL

## 2021-04-29 ENCOUNTER — HOSPITAL ENCOUNTER (INPATIENT)
Facility: HOSPITAL | Age: 56
LOS: 15 days | Discharge: HOME | DRG: 751 | End: 2021-05-14
Attending: PSYCHIATRY & NEUROLOGY | Admitting: PSYCHIATRY & NEUROLOGY
Payer: COMMERCIAL

## 2021-04-29 VITALS
WEIGHT: 235.23 LBS | BODY MASS INDEX: 31.03 KG/M2 | DIASTOLIC BLOOD PRESSURE: 73 MMHG | TEMPERATURE: 97.6 F | SYSTOLIC BLOOD PRESSURE: 111 MMHG | RESPIRATION RATE: 18 BRPM | HEART RATE: 92 BPM | OXYGEN SATURATION: 94 %

## 2021-04-29 DIAGNOSIS — R44.3 HALLUCINATIONS: ICD-10-CM

## 2021-04-29 DIAGNOSIS — F41.9 ANXIETY DISORDER, UNSPECIFIED: ICD-10-CM

## 2021-04-29 DIAGNOSIS — Z86.59 HISTORY OF OBSESSIVE COMPULSIVE DISORDER: ICD-10-CM

## 2021-04-29 DIAGNOSIS — N40.0 BPH (BENIGN PROSTATIC HYPERPLASIA): ICD-10-CM

## 2021-04-29 DIAGNOSIS — F41.9 ANXIETY: ICD-10-CM

## 2021-04-29 DIAGNOSIS — E53.8 VITAMIN B12 DEFICIENCY: ICD-10-CM

## 2021-04-29 DIAGNOSIS — F45.0 ANXIETY WITH SOMATIZATION: ICD-10-CM

## 2021-04-29 DIAGNOSIS — G47.09 OTHER INSOMNIA: ICD-10-CM

## 2021-04-29 DIAGNOSIS — F32.A DEPRESSION: Primary | ICD-10-CM

## 2021-04-29 DIAGNOSIS — F33.9 EPISODE OF RECURRENT MAJOR DEPRESSIVE DISORDER, UNSPECIFIED DEPRESSION EPISODE SEVERITY (HCC): Primary | ICD-10-CM

## 2021-04-29 DIAGNOSIS — K44.9 HIATAL HERNIA: ICD-10-CM

## 2021-04-29 DIAGNOSIS — K90.0 CELIAC DISEASE: ICD-10-CM

## 2021-04-29 DIAGNOSIS — R45.851 SUICIDAL IDEATIONS: ICD-10-CM

## 2021-04-29 DIAGNOSIS — F41.9 ANXIETY WITH SOMATIZATION: ICD-10-CM

## 2021-04-29 DIAGNOSIS — I10 ESSENTIAL HYPERTENSION: ICD-10-CM

## 2021-04-29 DIAGNOSIS — E55.9 VITAMIN D DEFICIENCY: ICD-10-CM

## 2021-04-29 DIAGNOSIS — F60.7 DEPENDENT PERSONALITY DISORDER (HCC): ICD-10-CM

## 2021-04-29 DIAGNOSIS — F33.3 SEVERE EPISODE OF RECURRENT MAJOR DEPRESSIVE DISORDER, WITH PSYCHOTIC FEATURES (HCC): Primary | ICD-10-CM

## 2021-04-29 LAB
ALBUMIN SERPL BCP-MCNC: 4.5 G/DL (ref 3–5.2)
ALP SERPL-CCNC: 72 U/L (ref 43–122)
ALT SERPL W P-5'-P-CCNC: 21 U/L
AMPHETAMINES SERPL QL SCN: NEGATIVE
ANION GAP SERPL CALCULATED.3IONS-SCNC: 7 MMOL/L (ref 5–14)
AST SERPL W P-5'-P-CCNC: 32 U/L (ref 17–59)
ATRIAL RATE: 89 BPM
BARBITURATES UR QL: NEGATIVE
BASOPHILS # BLD AUTO: 0.1 THOUSANDS/ΜL (ref 0–0.1)
BASOPHILS NFR BLD AUTO: 1 % (ref 0–1)
BENZODIAZ UR QL: NEGATIVE
BILIRUB SERPL-MCNC: 0.4 MG/DL
BILIRUB UR QL STRIP: NEGATIVE
BUN SERPL-MCNC: 8 MG/DL (ref 5–25)
CALCIUM SERPL-MCNC: 9.4 MG/DL (ref 8.4–10.2)
CHLORIDE SERPL-SCNC: 101 MMOL/L (ref 97–108)
CLARITY UR: CLEAR
CO2 SERPL-SCNC: 27 MMOL/L (ref 22–30)
COCAINE UR QL: NEGATIVE
COLOR UR: NORMAL
CREAT SERPL-MCNC: 0.71 MG/DL (ref 0.7–1.5)
EOSINOPHIL # BLD AUTO: 0.1 THOUSAND/ΜL (ref 0–0.4)
EOSINOPHIL NFR BLD AUTO: 2 % (ref 0–6)
ERYTHROCYTE [DISTWIDTH] IN BLOOD BY AUTOMATED COUNT: 12.9 %
ETHANOL SERPL-MCNC: <10 MG/DL (ref 0–10)
GFR SERPL CREATININE-BSD FRML MDRD: 105 ML/MIN/1.73SQ M
GLUCOSE SERPL-MCNC: 95 MG/DL (ref 70–99)
GLUCOSE UR STRIP-MCNC: NEGATIVE MG/DL
HCT VFR BLD AUTO: 46.1 % (ref 41–53)
HGB BLD-MCNC: 15.4 G/DL (ref 13.5–17.5)
HGB UR QL STRIP.AUTO: NEGATIVE
KETONES UR STRIP-MCNC: NEGATIVE MG/DL
LEUKOCYTE ESTERASE UR QL STRIP: NEGATIVE
LYMPHOCYTES # BLD AUTO: 1.6 THOUSANDS/ΜL (ref 0.5–4)
LYMPHOCYTES NFR BLD AUTO: 21 % (ref 25–45)
MCH RBC QN AUTO: 32 PG (ref 26–34)
MCHC RBC AUTO-ENTMCNC: 33.5 G/DL (ref 31–36)
MCV RBC AUTO: 96 FL (ref 80–100)
METHADONE UR QL: NEGATIVE
MONOCYTES # BLD AUTO: 0.6 THOUSAND/ΜL (ref 0.2–0.9)
MONOCYTES NFR BLD AUTO: 8 % (ref 1–10)
NEUTROPHILS # BLD AUTO: 5.4 THOUSANDS/ΜL (ref 1.8–7.8)
NEUTS SEG NFR BLD AUTO: 69 % (ref 45–65)
NITRITE UR QL STRIP: NEGATIVE
OPIATES UR QL SCN: NEGATIVE
OXYCODONE+OXYMORPHONE UR QL SCN: NEGATIVE
P AXIS: 13 DEGREES
PCP UR QL: NEGATIVE
PH UR STRIP.AUTO: 7 [PH]
PLATELET # BLD AUTO: 245 THOUSANDS/UL (ref 150–450)
PMV BLD AUTO: 10 FL (ref 8.9–12.7)
POTASSIUM SERPL-SCNC: 4.1 MMOL/L (ref 3.6–5)
PR INTERVAL: 180 MS
PROT SERPL-MCNC: 7.8 G/DL (ref 5.9–8.4)
PROT UR STRIP-MCNC: NEGATIVE MG/DL
QRS AXIS: 25 DEGREES
QRSD INTERVAL: 78 MS
QT INTERVAL: 346 MS
QTC INTERVAL: 420 MS
RBC # BLD AUTO: 4.82 MILLION/UL (ref 4.5–5.9)
SARS-COV-2 RNA RESP QL NAA+PROBE: NEGATIVE
SODIUM SERPL-SCNC: 135 MMOL/L (ref 137–147)
SP GR UR STRIP.AUTO: 1 (ref 1–1.04)
T WAVE AXIS: 32 DEGREES
THC UR QL: NEGATIVE
TSH SERPL DL<=0.05 MIU/L-ACNC: 2 UIU/ML (ref 0.47–4.68)
UROBILINOGEN UA: NEGATIVE MG/DL
VENTRICULAR RATE: 89 BPM
WBC # BLD AUTO: 7.9 THOUSAND/UL (ref 4.5–11)

## 2021-04-29 PROCEDURE — U0005 INFEC AGEN DETEC AMPLI PROBE: HCPCS | Performed by: EMERGENCY MEDICINE

## 2021-04-29 PROCEDURE — 85025 COMPLETE CBC W/AUTO DIFF WBC: CPT | Performed by: EMERGENCY MEDICINE

## 2021-04-29 PROCEDURE — U0003 INFECTIOUS AGENT DETECTION BY NUCLEIC ACID (DNA OR RNA); SEVERE ACUTE RESPIRATORY SYNDROME CORONAVIRUS 2 (SARS-COV-2) (CORONAVIRUS DISEASE [COVID-19]), AMPLIFIED PROBE TECHNIQUE, MAKING USE OF HIGH THROUGHPUT TECHNOLOGIES AS DESCRIBED BY CMS-2020-01-R: HCPCS | Performed by: EMERGENCY MEDICINE

## 2021-04-29 PROCEDURE — 93010 ELECTROCARDIOGRAM REPORT: CPT | Performed by: INTERNAL MEDICINE

## 2021-04-29 PROCEDURE — 90834 PSYTX W PT 45 MINUTES: CPT | Performed by: SOCIAL WORKER

## 2021-04-29 PROCEDURE — 99285 EMERGENCY DEPT VISIT HI MDM: CPT

## 2021-04-29 PROCEDURE — 93005 ELECTROCARDIOGRAM TRACING: CPT

## 2021-04-29 PROCEDURE — 84443 ASSAY THYROID STIM HORMONE: CPT | Performed by: EMERGENCY MEDICINE

## 2021-04-29 PROCEDURE — 80307 DRUG TEST PRSMV CHEM ANLYZR: CPT | Performed by: EMERGENCY MEDICINE

## 2021-04-29 PROCEDURE — 99285 EMERGENCY DEPT VISIT HI MDM: CPT | Performed by: EMERGENCY MEDICINE

## 2021-04-29 PROCEDURE — 82077 ASSAY SPEC XCP UR&BREATH IA: CPT | Performed by: EMERGENCY MEDICINE

## 2021-04-29 PROCEDURE — 80053 COMPREHEN METABOLIC PANEL: CPT | Performed by: EMERGENCY MEDICINE

## 2021-04-29 PROCEDURE — 99244 OFF/OP CNSLTJ NEW/EST MOD 40: CPT | Performed by: PSYCHIATRY & NEUROLOGY

## 2021-04-29 PROCEDURE — 36415 COLL VENOUS BLD VENIPUNCTURE: CPT | Performed by: EMERGENCY MEDICINE

## 2021-04-29 RX ORDER — LORAZEPAM 1 MG/1
1 TABLET ORAL
Status: DISCONTINUED | OUTPATIENT
Start: 2021-04-29 | End: 2021-05-14 | Stop reason: HOSPADM

## 2021-04-29 RX ORDER — AMOXICILLIN 250 MG
1 CAPSULE ORAL DAILY PRN
Status: DISCONTINUED | OUTPATIENT
Start: 2021-04-29 | End: 2021-05-14 | Stop reason: HOSPADM

## 2021-04-29 RX ORDER — ACETAMINOPHEN 325 MG/1
650 TABLET ORAL EVERY 4 HOURS PRN
Status: DISCONTINUED | OUTPATIENT
Start: 2021-04-29 | End: 2021-05-14 | Stop reason: HOSPADM

## 2021-04-29 RX ORDER — ACETAMINOPHEN 325 MG/1
975 TABLET ORAL EVERY 6 HOURS PRN
Status: CANCELLED | OUTPATIENT
Start: 2021-04-29

## 2021-04-29 RX ORDER — HYDROXYZINE HYDROCHLORIDE 25 MG/1
25 TABLET, FILM COATED ORAL
Status: DISCONTINUED | OUTPATIENT
Start: 2021-04-29 | End: 2021-05-14 | Stop reason: HOSPADM

## 2021-04-29 RX ORDER — CITALOPRAM 20 MG/1
20 TABLET ORAL DAILY
Status: DISCONTINUED | OUTPATIENT
Start: 2021-04-30 | End: 2021-04-30

## 2021-04-29 RX ORDER — ACETAMINOPHEN 325 MG/1
650 TABLET ORAL EVERY 4 HOURS PRN
Status: CANCELLED | OUTPATIENT
Start: 2021-04-29

## 2021-04-29 RX ORDER — MAGNESIUM HYDROXIDE/ALUMINUM HYDROXICE/SIMETHICONE 120; 1200; 1200 MG/30ML; MG/30ML; MG/30ML
30 SUSPENSION ORAL EVERY 4 HOURS PRN
Status: CANCELLED | OUTPATIENT
Start: 2021-04-29

## 2021-04-29 RX ORDER — MAGNESIUM HYDROXIDE/ALUMINUM HYDROXICE/SIMETHICONE 120; 1200; 1200 MG/30ML; MG/30ML; MG/30ML
30 SUSPENSION ORAL EVERY 4 HOURS PRN
Status: DISCONTINUED | OUTPATIENT
Start: 2021-04-29 | End: 2021-05-14 | Stop reason: HOSPADM

## 2021-04-29 RX ORDER — CITALOPRAM 20 MG/1
20 TABLET ORAL DAILY
Status: CANCELLED | OUTPATIENT
Start: 2021-04-29

## 2021-04-29 RX ORDER — OLANZAPINE 5 MG/1
5 TABLET ORAL
Status: DISCONTINUED | OUTPATIENT
Start: 2021-04-29 | End: 2021-05-14 | Stop reason: HOSPADM

## 2021-04-29 RX ORDER — LORAZEPAM 1 MG/1
1 TABLET ORAL ONCE
Status: COMPLETED | OUTPATIENT
Start: 2021-04-29 | End: 2021-04-29

## 2021-04-29 RX ORDER — OLANZAPINE 10 MG/1
5 INJECTION, POWDER, LYOPHILIZED, FOR SOLUTION INTRAMUSCULAR
Status: CANCELLED | OUTPATIENT
Start: 2021-04-29

## 2021-04-29 RX ORDER — TRAZODONE HYDROCHLORIDE 100 MG/1
50 TABLET ORAL
Status: CANCELLED | OUTPATIENT
Start: 2021-04-29

## 2021-04-29 RX ORDER — LORAZEPAM 2 MG/ML
1 INJECTION INTRAMUSCULAR
Status: CANCELLED | OUTPATIENT
Start: 2021-04-29

## 2021-04-29 RX ORDER — LORAZEPAM 0.5 MG/1
0.5 TABLET ORAL
Status: DISCONTINUED | OUTPATIENT
Start: 2021-04-29 | End: 2021-05-14 | Stop reason: HOSPADM

## 2021-04-29 RX ORDER — RISPERIDONE 0.5 MG/1
0.5 TABLET, ORALLY DISINTEGRATING ORAL
Status: CANCELLED | OUTPATIENT
Start: 2021-04-29

## 2021-04-29 RX ORDER — OLANZAPINE 5 MG/1
5 TABLET ORAL
Status: CANCELLED | OUTPATIENT
Start: 2021-04-29

## 2021-04-29 RX ORDER — LORAZEPAM 2 MG/ML
1 INJECTION INTRAMUSCULAR
Status: DISCONTINUED | OUTPATIENT
Start: 2021-04-29 | End: 2021-05-14 | Stop reason: HOSPADM

## 2021-04-29 RX ORDER — OLANZAPINE 10 MG/1
5 INJECTION, POWDER, LYOPHILIZED, FOR SOLUTION INTRAMUSCULAR
Status: DISCONTINUED | OUTPATIENT
Start: 2021-04-29 | End: 2021-05-14 | Stop reason: HOSPADM

## 2021-04-29 RX ORDER — HYDROXYZINE HYDROCHLORIDE 25 MG/1
25 TABLET, FILM COATED ORAL
Status: CANCELLED | OUTPATIENT
Start: 2021-04-29

## 2021-04-29 RX ORDER — LORAZEPAM 1 MG/1
1 TABLET ORAL
Status: CANCELLED | OUTPATIENT
Start: 2021-04-29

## 2021-04-29 RX ORDER — POLYETHYLENE GLYCOL 3350 17 G/17G
17 POWDER, FOR SOLUTION ORAL DAILY PRN
Status: CANCELLED | OUTPATIENT
Start: 2021-04-29

## 2021-04-29 RX ORDER — ACETAMINOPHEN 325 MG/1
975 TABLET ORAL EVERY 6 HOURS PRN
Status: DISCONTINUED | OUTPATIENT
Start: 2021-04-29 | End: 2021-05-14 | Stop reason: HOSPADM

## 2021-04-29 RX ORDER — RISPERIDONE 0.5 MG/1
0.5 TABLET, ORALLY DISINTEGRATING ORAL
Status: DISCONTINUED | OUTPATIENT
Start: 2021-04-29 | End: 2021-05-14 | Stop reason: HOSPADM

## 2021-04-29 RX ORDER — LORAZEPAM 0.5 MG/1
0.5 TABLET ORAL
Status: CANCELLED | OUTPATIENT
Start: 2021-04-29

## 2021-04-29 RX ORDER — GABAPENTIN 100 MG/1
100 CAPSULE ORAL 3 TIMES DAILY
Status: DISCONTINUED | OUTPATIENT
Start: 2021-04-29 | End: 2021-05-11

## 2021-04-29 RX ORDER — GABAPENTIN 100 MG/1
100 CAPSULE ORAL 3 TIMES DAILY
Status: CANCELLED | OUTPATIENT
Start: 2021-04-29

## 2021-04-29 RX ORDER — TRAZODONE HYDROCHLORIDE 50 MG/1
50 TABLET ORAL
Status: DISCONTINUED | OUTPATIENT
Start: 2021-04-29 | End: 2021-05-14 | Stop reason: HOSPADM

## 2021-04-29 RX ORDER — POLYETHYLENE GLYCOL 3350 17 G/17G
17 POWDER, FOR SOLUTION ORAL DAILY PRN
Status: DISCONTINUED | OUTPATIENT
Start: 2021-04-29 | End: 2021-05-14 | Stop reason: HOSPADM

## 2021-04-29 RX ORDER — AMOXICILLIN 250 MG
1 CAPSULE ORAL DAILY PRN
Status: CANCELLED | OUTPATIENT
Start: 2021-04-29

## 2021-04-29 RX ADMIN — LORAZEPAM 1 MG: 1 TABLET ORAL at 12:00

## 2021-04-29 NOTE — ED NOTES
Patient was referred to the ED by his outpatient therapist, Cosme Mario, [phone]  He has a history of major depression, anxiety, OCD traits and dependent personality  During his appointment, the patient expressed that he has been increasingly depressed for the past 2 months  He reported he has been experiencing suicidal thoughts and has not yet formulated a plan  There is a history of an attempt 10/2018 by walking into traffic when he believed he was going to [facility] for "a mistake with (benefits)  "The patient stated he has started to experience AH  In the ED, he stated he hears people chattering and doing things around the house  Patient stated the trigger is relationship problems  He stated he believes his girlfriend of one year is going to break up with him  The patient is a good historian, with interactions that seem awkward and slightly emotionally regressed  He exhibits incongruent smiling and appears bashful with broken eye contact  He also endorses decreased sleep, decreased concentration, ruminative thought processes, decreased energy, decreased motivation and anhedonia  He has not been engaging in work outs, cycling, hiking or reading  He reports decreased bathing and grooming, not changing clothing at times and sitting watching TV all day  Initial onset was age 45 when he was experiencing marital issues and eventual divorce  He denies substance use and uses alcohol socially  Per history, he has periodic episodes of intoxication in which he became agitated, belligerent, verbally abusive and was throwing things and punching a car window  Patient is  and has 2 children, one residing in [place] and one residing with patient's ex wife  Patient stated he has not been communicating with them much recently

## 2021-04-29 NOTE — ED NOTES
PA PROMISe indicates: Active  Recipient #[id number]  Behavioral health managed by [facility]  Phone number: [phone]
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
## 2021-04-29 NOTE — ED NOTES
Insurance Authorization for admission:   Phone call placed to [facility]  Phone number: [phone]  Spoke to [facility] Service  4 days approved, LCD 4/2  Level of care: IP  Review on 4/3     Authorization # To be issued upon arrival
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
## 2021-04-29 NOTE — ED NOTES
Patient is accepted at [facility]  Patient is accepted by Colt Hoffman MD     Transportation is arranged with [facility]  Transportation is scheduled for 2130  Patient may go to the floor at 2200  Nurse report is to be called to [phone] prior to patient transfer

## 2021-04-29 NOTE — ED PROVIDER NOTES
History  Chief Complaint   Patient presents with    Psychiatric Evaluation     pt states that he has been increasingly depressed for a few weeks  states he is suicidal without a plan  states he is hearing voices and seeing things as well  denies HI     15-year-old gentleman presents for psychiatric evaluation  He states over the past several days to weeks he has had increasing depression and anxiety  He is having auditory and visual hallucinations and is having nonspecific thoughts of self-harm and suicide  He does have a past attempt at which she walked into traffic  He denies any recent illnesses and states he is taking all his medications as prescribed  Denies any use of illicit drugs or alcohol  Psychiatric Evaluation  Presenting symptoms: depression, hallucinations and suicidal thoughts    Degree of incapacity (severity):  Severe  Onset quality:  Gradual  Timing:  Constant  Progression:  Worsening  Chronicity:  Recurrent  Relieved by:  Nothing  Worsened by:  Nothing  Associated symptoms: anxiety, appetite change, feelings of worthlessness and irritability    Associated symptoms: no abdominal pain, no chest pain and no fatigue        Prior to Admission Medications   Prescriptions Last Dose Informant Patient Reported? Taking? Calcium Carbonate-Vitamin D3 600-400 MG-UNIT TABS 4/29/2021 at Unknown time Self Yes Yes   Sig: Take 1 tablet by mouth 2 (two) times a day    LORazepam (ATIVAN) 0 5 mg tablet 4/29/2021 at Unknown time Self No Yes   Sig: Take 1 tablet (0 5 mg total) by mouth every 8 (eight) hours as needed for anxiety   Narcan 4 MG/0 1ML nasal spray More than a month at Unknown time Self Yes No   Sig: CALL 911  ADMINISTER A SINGLE SPRAY INTRANASALLY INTO ONE NOSTRIL UPON SIGNS OF OPIOID OVERDOSE  MAY REPEAT AFTER 3 MINUTES IF NO RESPONSE     [facility]-3 1000 MG CAPS 4/29/2021 at Unknown time Self Yes Yes   Sig: Take 1 g by mouth daily   Propylene Glycol (Systane Complete) 0 6 % SOLN 4/29/2021 at Unknown time Self Yes Yes   Sig: Apply to eye   acetaminophen (TYLENOL) 500 mg tablet More than a month at Unknown time Self Yes No   Sig: Take 500 mg by mouth every 6 (six) hours as needed   citalopram (CeleXA) 40 mg tablet 4/29/2021 at Unknown time Self No Yes   Sig: TAKE 1 TABLET BY MOUTH EVERY DAY   gabapentin (NEURONTIN) 100 mg capsule 4/29/2021 at Unknown time Self No Yes   Sig: TAKE 2 CAPSULES (200 MG TOTAL) BY MOUTH 2 (TWO) TIMES A DAY   hydrocortisone 1 % lotion More than a month at Unknown time Self Yes No   Sig: Apply topically 2 (two) times a day   lisinopril (ZESTRIL) 10 mg tablet 4/29/2021 at Unknown time Self No Yes   Sig: Take 1 tablet (10 mg total) by mouth daily   omeprazole (PriLOSEC) 40 MG capsule 4/29/2021 at Unknown time Self No Yes   Sig: Take 1 capsule (40 mg total) by mouth daily   polyethylene glycol (MIRALAX) 17 g packet More than a month at Unknown time Self No No   Sig: Take 17 g by mouth daily   senna (Senokot) 8 6 MG tablet Past Month at Unknown time Self Yes Yes   Sig: Take 1 tablet by mouth 2 (two) times a day as needed   tamsulosin (FLOMAX) 0 4 mg 4/29/2021 at Unknown time Self No Yes   Sig: TAKE 1 CAPSULE BY MOUTH AT BEDTIME      Facility-Administered Medications: None       Past Medical History:   Diagnosis Date    Anxiety 1995    Celiac disease     Depression 1995    Hypertension     Obsessive compulsive disorder     [facility]     [facility]       Past Surgical History:   Procedure Laterality Date    FRACTURE SURGERY      TONSILLECTOMY      WRIST SURGERY Left     resolved 1997- cts surgery       Family History   Problem Relation Age of Onset    Heart attack Father     Prostate cancer Father     Cerebral palsy Brother    Sofia Pedroza OCD Mother     OCD Sister      I have reviewed and agree with the history as documented      E-Cigarette/Vaping    E-Cigarette Use Never User      E-Cigarette/Vaping Substances    Nicotine No     THC No     CBD No     Flavoring No     Other No     Unknown No      Social History     Tobacco Use    Smoking status: Never Smoker    Smokeless tobacco: Current User   Substance Use Topics    Alcohol use: Not Currently     Comment: SOCIAL    Drug use: No     Comment: CAFFINE 3 CUPS PER DAY       Review of Systems   Constitutional: Positive for activity change, appetite change and irritability  Negative for chills, fatigue and fever  HENT: Negative  Negative for congestion, postnasal drip, rhinorrhea, sinus pain, sore throat and trouble swallowing  Eyes: Negative  Respiratory: Negative  Cardiovascular: Negative for chest pain  Gastrointestinal: Negative for abdominal pain, constipation, diarrhea, nausea and vomiting  Endocrine: Negative  Genitourinary: Negative  Musculoskeletal: Negative  Negative for arthralgias, back pain and myalgias  Skin: Negative  Allergic/Immunologic: Negative  Neurological: Negative  Hematological: Negative  Psychiatric/Behavioral: Positive for hallucinations, sleep disturbance and suicidal ideas  The patient is nervous/anxious  All other systems reviewed and are negative  Physical Exam  Physical Exam  Vitals signs and nursing note reviewed  Constitutional:       General: He is not in acute distress  Appearance: Normal appearance  He is well-developed  He is not ill-appearing, toxic-appearing or diaphoretic  HENT:      Head: Normocephalic and atraumatic  Right Ear: External ear normal       Left Ear: External ear normal       Nose: Nose normal       Mouth/Throat:      Mouth: Mucous membranes are moist       Pharynx: Oropharynx is clear  Eyes:      Conjunctiva/sclera: Conjunctivae normal       Pupils: Pupils are equal, round, and reactive to light  Neck:      Musculoskeletal: Neck supple  No neck rigidity  Cardiovascular:      Rate and Rhythm: Normal rate and regular rhythm  Heart sounds: Normal heart sounds     Pulmonary:      Effort: Pulmonary effort is normal  No respiratory distress  Breath sounds: Normal breath sounds  Abdominal:      General: Bowel sounds are normal  There is no distension  Palpations: Abdomen is soft  Tenderness: There is no abdominal tenderness  There is no guarding  Musculoskeletal: Normal range of motion  Right lower leg: No edema  Left lower leg: No edema  Skin:     General: Skin is warm and dry  Capillary Refill: Capillary refill takes less than 2 seconds  Neurological:      General: No focal deficit present  Mental Status: He is alert and oriented to person, place, and time  Psychiatric:         Mood and Affect: Mood is anxious and depressed  Speech: Speech normal          Behavior: Behavior normal  Behavior is cooperative  Thought Content: Thought content includes suicidal ideation  Cognition and Memory: Cognition and memory normal          Judgment: Judgment normal          Vital Signs  ED Triage Vitals [04/29/21 1132]   Temperature Pulse Respirations Blood Pressure SpO2   97 5 °F (36 4 °C) 95 18 154/92 99 %      Temp Source Heart Rate Source Patient Position - Orthostatic VS BP Location FiO2 (%)   Tympanic Monitor Sitting Left arm --      Pain Score       --           Vitals:    04/29/21 1132   BP: 154/92   Pulse: 95   Patient Position - Orthostatic VS: Sitting         Visual Acuity      ED Medications  Medications   LORazepam (ATIVAN) tablet 1 mg (1 mg Oral Given 4/29/21 1200)       Diagnostic Studies  Results Reviewed     Procedure Component Value Units Date/Time    Rapid drug screen, urine [[id number]] Collected: 04/29/21 1357    Lab Status: In process Specimen: Urine, Clean Catch Updated: 04/29/21 1405    UA (URINE) with reflex to Scope [[id number]] Collected: 04/29/21 1357    Lab Status:  In process Specimen: Urine, Clean Catch Updated: 04/29/21 1405    Novel Coronavirus (Covid-19),PCR [facility] [[id number]]  (Normal) Collected: 04/29/21 1200    Lab Status: Final result Specimen: Nares from Nose Updated: 04/29/21 1321     SARS-CoV-2 Negative    Narrative: The specimen collection materials, transport medium, and/or testing methodology utilized in the production of these test results have been proven to be reliable in a limited validation with an abbreviated program under the Emergency Utilization Authorization provided by the FDA  Testing reported as "Presumptive positive" will be confirmed with secondary testing to ensure result accuracy  Clinical caution and judgement should be used with the interpretation of these results with consideration of the clinical impression and other laboratory testing  Testing reported as "Positive" or "Negative" has been proven to be accurate according to standard laboratory validation requirements  All testing is performed with control materials showing appropriate reactivity at standard intervals  TSH [[id number]]  (Normal) Collected: 04/29/21 1154    Lab Status: Final result Specimen: Blood from Arm, Left Updated: 04/29/21 1250     TSH 3RD GENERATON 2 000 uIU/mL     Narrative:      Patients undergoing fluorescein dye angiography may retain small amounts of fluorescein in the body for 48-72 hours post procedure  Samples containing fluorescein can produce falsely depressed TSH values  If the patient had this procedure,a specimen should be resubmitted post fluorescein clearance        Comprehensive metabolic panel [[id number]]  (Abnormal) Collected: 04/29/21 1154    Lab Status: Final result Specimen: Blood from Arm, Left Updated: 04/29/21 1217     Sodium 135 mmol/L      Potassium 4 1 mmol/L      Chloride 101 mmol/L      CO2 27 mmol/L      ANION GAP 7 mmol/L      BUN 8 mg/dL      Creatinine 0 71 mg/dL      Glucose 95 mg/dL      Calcium 9 4 mg/dL      AST 32 U/L      ALT 21 U/L      Alkaline Phosphatase 72 U/L      Total Protein 7 8 g/dL      Albumin 4 5 g/dL      Total Bilirubin 0 40 mg/dL      eGFR 105 ml/min/1 73sq m     Narrative: National Kidney Disease Foundation guidelines for Chronic Kidney Disease (CKD):     Stage 1 with normal or high GFR (GFR > 90 mL/min/1 73 square meters)    Stage 2 Mild CKD (GFR = 60-89 mL/min/1 73 square meters)    Stage 3A Moderate CKD (GFR = 45-59 mL/min/1 73 square meters)    Stage 3B Moderate CKD (GFR = 30-44 mL/min/1 73 square meters)    Stage 4 Severe CKD (GFR = 15-29 mL/min/1 73 square meters)    Stage 5 End Stage CKD (GFR <15 mL/min/1 73 square meters)  Note: GFR calculation is accurate only with a steady state creatinine    CBC and differential [[id number]]  (Abnormal) Collected: 04/29/21 1154    Lab Status: Final result Specimen: Blood from Arm, Left Updated: 04/29/21 1217     WBC 7 90 Thousand/uL      RBC 4 82 Million/uL      Hemoglobin 15 4 g/dL      Hematocrit 46 1 %      MCV 96 fL      MCH 32 0 pg      MCHC 33 5 g/dL      RDW 12 9 %      MPV 10 0 fL      Platelets 564 Thousands/uL      Neutrophils Relative 69 %      Lymphocytes Relative 21 %      Monocytes Relative 8 %      Eosinophils Relative 2 %      Basophils Relative 1 %      Neutrophils Absolute 5 40 Thousands/µL      Lymphocytes Absolute 1 60 Thousands/µL      Monocytes Absolute 0 60 Thousand/µL      Eosinophils Absolute 0 10 Thousand/µL      Basophils Absolute 0 10 Thousands/µL     Ethanol [[id number]]  (Normal) Collected: 04/29/21 1154    Lab Status: Final result Specimen: Blood from Arm, Left Updated: 04/29/21 1216     Ethanol Lvl <10 mg/dL                  No orders to display              Procedures  ECG 12 Lead Documentation Only    Date/Time: 4/29/2021 12:10 PM  Performed by: Theodore Glass DO  Authorized by:  Theodore Glass DO     ECG reviewed by me, the ED Provider: yes    Patient location:  ED  Rate:     ECG rate:  89    ECG rate assessment: normal    Rhythm:     Rhythm: sinus rhythm    Ectopy:     Ectopy: none    QRS:     QRS axis:  Normal  Conduction:     Conduction: normal    ST segments:     ST segments:  Normal  T waves: T waves: normal               ED Course                             SBIRT 22yo+      Most Recent Value   SBIRT (24 yo +)   In order to provide better care to our patients, we are screening all of our patients for alcohol and drug use  Would it be okay to ask you these screening questions? Yes Filed at: 04/29/2021 1147   Initial Alcohol Screen: US AUDIT-C    1  How often do you have a drink containing alcohol?  0 Filed at: 04/29/2021 1147   2  How many drinks containing alcohol do you have on a typical day you are drinking? 0 Filed at: 04/29/2021 1147   3a  Male UNDER 65: How often do you have five or more drinks on one occasion? 0 Filed at: 04/29/2021 1147   3b  FEMALE Any Age, or MALE 65+: How often do you have 4 or more drinks on one occassion? 0 Filed at: 04/29/2021 1147   Audit-C Score  0 Filed at: 04/29/2021 1147   ZULEIMA: How many times in the past year have you    Used an illegal drug or used a prescription medication for non-medical reasons? Never Filed at: 04/29/2021 1147                    MDM    Disposition  Final diagnoses:   Depression   Anxiety   Suicidal ideations   Hallucinations     Time reflects when diagnosis was documented in both MDM as applicable and the Disposition within this note     Time User Action Codes Description Comment    4/29/2021 12:36 PM Rosario Simpson Add [F32 9] Depression     4/29/2021 12:36 PM Lemond Pipe [F41 9] Anxiety     4/29/2021 12:36 PM Rosario Simpson Add [D73 468] Suicidal ideations     4/29/2021 12:36 PM Rosario Simpson Add [R44 3] Hallucinations       ED Disposition     ED Disposition Condition Date/Time Comment    Transfer to [facility] Apr 29, 2021 12:36 PM José Thapa should be transferred out and has been medically cleared  Follow-up Information    None         Patient's Medications   Discharge Prescriptions    No medications on file     No discharge procedures on file      PDMP Review       Value Time User    PDMP Reviewed  Yes 4/8/2021  3:33 PM Tiburcio Armstrong DO          ED Provider  Electronically Signed by           Shanell Barton DO  04/29/21 1405

## 2021-04-29 NOTE — PSYCH
Psychotherapy Provided: Individual Psychotherapy 15 minutes     Length of time in session: 15 minutes, follow up in 2 week    No diagnosis found  Goals addressed in session: Goal 1     Pain:      moderate to severe    4    Current suicide risk : Low     D- Jim Arechiga presented for treatment with his girlfriend Jesse  They were 30 minutes late due to confusion with the appointment time  Jim Arechiga presented as paranoid and somewhat disheveled  He stated that he has not been doing well for the past several weeks  His girlfriend validated this and stated that he had called 911 last night because he thought that she was not breathing  She also shared that he was afraid that someone was sawing the house down  She stated that he has not been on his medications since January  Jim Arechiga stated that he has been "hearing things"  The clinician asked if he would be willing to be evaluated for hospitalization  He stated that he would and began to ask his girlfriend, "can we go now"  The girlfriend asked if he could be treated on an outpatient basis  The clinician stated that due to the psychosis it would be best to have him evaluated  The clinician then contacted the crisis worker at the [facility] to further the admission  Jim Arechiga and his girlfriend stated that they were immediately heading to the ED  Jim Arechiga denies suicidal ideation, but is very upset by the thoughts that he is having  Giving supportive therapy  A- Progress- Increased psychosis and paranoia  P- Referral for inpatient evaluation    Behavioral Health Treatment Plan  Luke: Diagnosis and Treatment Plan explained to Latonia Ibrahim relates understanding diagnosis and is agreeable to Treatment Plan   Yes

## 2021-04-29 NOTE — LETTER
[facility]  [address] [facility] [address]  [phone]  Dept: [address] [facility] TRANSFER CONSENT    NAME Carey Singh                                         1965                              MRN [id number]    I have been informed of my rights regarding examination, treatment, and transfer   by John Chanel DO  Benefits: Continuity of care    Risks: Potential for delay in receiving treatment  Transfer Request   I acknowledge that my medical condition has been evaluated and explained to me by the emergency department physician or other qualified medical person and/or my attending physician who has recommended and offered to me further medical examination and treatment  I understand the Hospital's obligation with respect to the treatment and stabilization of my emergency medical condition  I nevertheless request to be transferred  I release the Hospital, the doctor, and any other persons caring for me from all responsibility or liability for any injury or ill effects that may result from my transfer and agree to accept all responsibility for the consequences of my choice to transfer, rather than receive stabilizing treatment at the Hospital  I understand that because the transfer is my request, my insurance may not provide reimbursement for the services  The Hospital will assist and direct me and my family in how to make arrangements for transfer, but the hospital is not liable for any fees charged by the transport service  In spite of this understanding, I refuse to consent to further medical examination and treatment which has been offered to me, and request transfer to  Erik Banda Name, [address] : Yon Mari Do [facility] [address], [address] [facility] [address]  I authorize the performance of emergency medical procedures and treatments upon me in both transit and upon arrival at the receiving facility    Additionally, I authorize the release of any and all medical records to the receiving facility and request they be transported with me, if possible  I authorize the performance of emergency medical procedures and treatments upon me in both transit and upon arrival at the receiving facility  Additionally, I authorize the release of any and all medical records to the receiving facility and request they be transported with me, if possible  I understand that the safest mode of transportation during a medical emergency is an ambulance and that the Hospital advocates the use of this mode of transport  Risks of traveling to the receiving facility by car, including absence of medical control, life sustaining equipment, such as oxygen, and medical personnel has been explained to me and I fully understand them  (FRANCISCA CORRECT BOX BELOW)  [ x ]  I consent to the stated transfer and to be transported by ambulance/helicopter  [  ]  I consent to the stated transfer, but refuse transportation by ambulance and accept full responsibility for my transportation by car  I understand the risks of non-ambulance transfers and I exonerate the Hospital and its staff from any deterioration in my condition that results from this refusal     X___________________________________________    DATE  21  TIME_1800_______  Signature of patient or legally responsible individual signing on patient behalf           RELATIONSHIP TO PATIENT___self______________________          Provider Certification    NAME Rodrigo Jiang                                        [facility] 1965                              MRN [id number]    A medical screening exam was performed on the above named patient  Based on the examination:    Condition Necessitating Transfer Suicidal Ideation;  Auditory Hallucinations    Patient Condition: The patient has been stabilized such that within reasonable medical probability, no material deterioration of the patient condition or the condition of the unborn child(gutierrez) is likely to result from the transfer    Reason for Transfer: No bed available at level of patient's needs    Transfer Requirements: [address] [facility], [facility], [address] [facility]   · Space available and qualified personnel available for treatment as acknowledged by Chuy Rob [phone]  · Agreed to accept transfer and to provide appropriate medical treatment as acknowledged by       Leopold Pomfret, MD  · Appropriate medical records of the examination and treatment of the patient are provided at the time of transfer   [address] [facility], [address] _cmk______  · Transfer will be performed by qualified personnel from [address] [place] [address] per CHARU (Priyanka)/[phone]  and appropriate transfer equipment as required, including the use of necessary and appropriate life support measures  Provider Certification: I have examined the patient and explained the following risks and benefits of being transferred/refusing transfer to the patient/family:  General risk, such as traffic hazards, adverse weather conditions, rough terrain or turbulence, possible failure of equipment (including vehicle or aircraft), or consequences of actions of persons outside the control of the transport personnel      Based on these reasonable risks and benefits to the patient and/or the unborn child(gutierrez), and based upon the information available at the time of the patients examination, I certify that the medical benefits reasonably to be expected from the provision of appropriate medical treatments at another medical facility outweigh the increasing risks, if any, to the individuals medical condition, and in the case of labor to the unborn child, from effecting the transfer      X____________________________________________ DATE 04/29/21        TIME_1800______      ORIGINAL - SEND TO MEDICAL RECORDS   COPY - SEND WITH PATIENT DURING TRANSFER

## 2021-04-30 ENCOUNTER — TELEPHONE (OUTPATIENT)
Dept: PSYCHIATRY | Facility: CLINIC | Age: 56
End: 2021-04-30

## 2021-04-30 ENCOUNTER — TELEPHONE (OUTPATIENT)
Dept: FAMILY MEDICINE CLINIC | Facility: CLINIC | Age: 56
End: 2021-04-30

## 2021-04-30 PROCEDURE — 99253 IP/OBS CNSLTJ NEW/EST LOW 45: CPT | Performed by: HOSPITALIST

## 2021-04-30 RX ORDER — B-COMPLEX WITH VITAMIN C
1 TABLET ORAL 2 TIMES DAILY WITH MEALS
Status: DISCONTINUED | OUTPATIENT
Start: 2021-04-30 | End: 2021-05-14 | Stop reason: HOSPADM

## 2021-04-30 RX ORDER — VENLAFAXINE HYDROCHLORIDE 37.5 MG/1
37.5 CAPSULE, EXTENDED RELEASE ORAL DAILY
Status: DISCONTINUED | OUTPATIENT
Start: 2021-05-01 | End: 2021-05-03

## 2021-04-30 RX ORDER — LISINOPRIL 10 MG/1
10 TABLET ORAL DAILY
Status: DISCONTINUED | OUTPATIENT
Start: 2021-04-30 | End: 2021-05-14 | Stop reason: HOSPADM

## 2021-04-30 RX ORDER — CITALOPRAM 20 MG/1
40 TABLET ORAL DAILY
Status: DISCONTINUED | OUTPATIENT
Start: 2021-05-01 | End: 2021-05-10

## 2021-04-30 RX ORDER — CHLORAL HYDRATE 500 MG
1000 CAPSULE ORAL DAILY
Status: DISCONTINUED | OUTPATIENT
Start: 2021-04-30 | End: 2021-05-14 | Stop reason: HOSPADM

## 2021-04-30 RX ORDER — PANTOPRAZOLE SODIUM 40 MG/1
40 TABLET, DELAYED RELEASE ORAL
Status: DISCONTINUED | OUTPATIENT
Start: 2021-04-30 | End: 2021-05-14 | Stop reason: HOSPADM

## 2021-04-30 RX ORDER — TAMSULOSIN HYDROCHLORIDE 0.4 MG/1
0.4 CAPSULE ORAL
Status: DISCONTINUED | OUTPATIENT
Start: 2021-04-30 | End: 2021-05-14 | Stop reason: HOSPADM

## 2021-04-30 RX ORDER — DIAPER,BRIEF,INFANT-TODD,DISP
EACH MISCELLANEOUS 4 TIMES DAILY PRN
Status: DISCONTINUED | OUTPATIENT
Start: 2021-04-30 | End: 2021-05-14 | Stop reason: HOSPADM

## 2021-04-30 RX ORDER — POLYETHYLENE GLYCOL 3350 17 G/17G
17 POWDER, FOR SOLUTION ORAL DAILY
Status: DISCONTINUED | OUTPATIENT
Start: 2021-04-30 | End: 2021-05-14 | Stop reason: HOSPADM

## 2021-04-30 RX ORDER — RISPERIDONE 1 MG/1
1 TABLET, FILM COATED ORAL
Status: DISCONTINUED | OUTPATIENT
Start: 2021-04-30 | End: 2021-05-04

## 2021-04-30 RX ORDER — CALCIUM CARBONATE/VITAMIN D3 250-3.125
2 TABLET ORAL 2 TIMES DAILY WITH MEALS
Status: DISCONTINUED | OUTPATIENT
Start: 2021-04-30 | End: 2021-04-30

## 2021-04-30 RX ADMIN — TAMSULOSIN HYDROCHLORIDE 0.4 MG: 0.4 CAPSULE ORAL at 17:48

## 2021-04-30 RX ADMIN — OMEGA-3 FATTY ACIDS CAP 1000 MG 1000 MG: 1000 CAP at 10:09

## 2021-04-30 RX ADMIN — LISINOPRIL 10 MG: 10 TABLET ORAL at 10:10

## 2021-04-30 RX ADMIN — GABAPENTIN 100 MG: 100 CAPSULE ORAL at 17:48

## 2021-04-30 RX ADMIN — Medication 1 TABLET: at 10:10

## 2021-04-30 RX ADMIN — Medication 1 TABLET: at 17:48

## 2021-04-30 RX ADMIN — GABAPENTIN 100 MG: 100 CAPSULE ORAL at 08:39

## 2021-04-30 RX ADMIN — GABAPENTIN 100 MG: 100 CAPSULE ORAL at 21:32

## 2021-04-30 RX ADMIN — RISPERIDONE 1 MG: 1 TABLET ORAL at 21:32

## 2021-04-30 RX ADMIN — CITALOPRAM HYDROBROMIDE 20 MG: 20 TABLET ORAL at 08:39

## 2021-04-30 RX ADMIN — PANTOPRAZOLE SODIUM 40 MG: 40 TABLET, DELAYED RELEASE ORAL at 10:10

## 2021-04-30 NOTE — TREATMENT PLAN
TREATMENT PLAN REVIEW - Deejay Velasquez 64 y o  1965 male MRN: [id number]    [address]  Room / Bed: Stephanie Perea [facility] Encounter: [id number]          Admit Date/Time:  4/29/2021 10:41 PM    Treatment Team: Attending Provider: Marian Santana MD; Consulting Physician: Natalia Walton MD; Recreational Therapist: Karen Hudson; Certified Nursing Assistant: Mick Wright; Care Manager: Rhina Bates, ALIYAH; Registered Nurse: Ike Bustos RN; : BRANT Gaffney    Diagnosis: Principal Problem:    Severe episode of recurrent major depressive disorder, with psychotic features [facility]  Active Problems:    Anxiety disorder, unspecified      Patient Strengths/Assets: cooperative, negotiates basic needs, patient is on a voluntary commitment    Patient Barriers/Limitations: limited family ties, limited motivation, limited support system, poor insight    Short Term Goals: decrease in depressive symptoms, decrease in psychotic symptoms, ability to stay safe on the unit    Long Term Goals: resolution of depressive symptoms, resolution of psychotic symptoms, stable living arrangements upon discharge    Progress Towards Goals: starting psychiatric medications as prescribed    Recommended Treatment: medication management, patient medication education, group therapy, milieu therapy, continued Behavioral Health psychiatric evaluation/assessment process    Treatment Frequency: daily medication monitoring, group and milieu therapy daily, monitoring through interdisciplinary rounds, monitoring through weekly patient care conferences    Expected Discharge Date:  5/10/2021    Discharge Plan: referrals as indicated, return to previous living arrangement    Treatment Plan Created/Updated By: Devika Farrell MD

## 2021-04-30 NOTE — PROGRESS NOTES
Patient compliant with medications and meals  Pleasant and cooperative  Patient reported high depression and anxiety  Patient denies any SI/HI at the moment but stated he was suicidal before admission  Patient positive for groups  Keeps to himself  No behaviors noted this shift  Q 7 mins checks in place for safety  Will continue to monitor for behaviors and changes

## 2021-04-30 NOTE — PLAN OF CARE
Problem: Alteration in Thoughts and Perception  Goal: Treatment Goal: Gain control of psychotic behaviors/thinking, reduce/eliminate presenting symptoms and demonstrate improved reality functioning upon discharge  Outcome: Progressing  Goal: Refrain from acting on delusional thinking/internal stimuli  Description: Interventions:  - Monitor patient closely, per order   - Utilize least restrictive measures   - Set reasonable limits, give positive feedback for acceptable   - Administer medications as ordered and monitor of potential side effects  Outcome: Progressing  Goal: Agree to be compliant with medication regime, as prescribed and report medication side effects  Description: Interventions:  - Offer appropriate PRN medication and supervise ingestion; conduct AIMS, as needed   Outcome: Progressing  Goal: Recognize dysfunctional thoughts, communicate reality-based thoughts at the time of discharge  Description: Interventions:  - Provide medication and psycho-education to assist patient in compliance and developing insight into his/her illness   Outcome: Progressing     Problem: Risk for Self Injury/Neglect  Goal: Treatment Goal: Remain safe during length of stay, learn and adopt new coping skills, and be free of self-injurious ideation, impulses and acts at the time of discharge  Outcome: Progressing  Goal: Refrain from harming self  Description: Interventions:  - Monitor patient closely, per order  - Develop a trusting relationship  - Supervise medication ingestion, monitor effects and side effects   Outcome: Progressing  Goal: Recognize maladaptive responses and adopt new coping mechanisms  Outcome: Progressing     Problem: Depression  Goal: Treatment Goal: Demonstrate behavioral control of depressive symptoms, verbalize feelings of improved mood/affect, and adopt new coping skills prior to discharge  Outcome: Progressing  Goal: Verbalize thoughts and feelings  Description: Interventions:  - Assess and re-assess patient's level of risk   - Engage patient in 1:1 interactions, daily, for a minimum of 15 minutes   - Encourage patient to express feelings, fears, frustrations, hopes   Outcome: Progressing  Goal: Refrain from isolation  Description: Interventions:  - Develop a trusting relationship   - Encourage socialization   Outcome: Progressing  Goal: Attend and participate in unit activities, including therapeutic, recreational, and educational groups  Description: Interventions:  - Provide therapeutic and educational activities daily, encourage attendance and participation, and document same in the medical record   Outcome: Progressing     Problem: Anxiety  Goal: Anxiety is at manageable level  Description: Interventions:  - Assess and monitor patient's anxiety level  - Monitor for signs and symptoms (heart palpitations, chest pain, shortness of breath, headaches, nausea, feeling jumpy, restlessness, irritable, apprehensive)  - Collaborate with interdisciplinary team and initiate plan and interventions as ordered    - Wayne patient to unit/surroundings  - Explain treatment plan  - Encourage participation in care  - Encourage verbalization of concerns/fears  - Identify coping mechanisms  - Assist in developing anxiety-reducing skills  - Administer/offer alternative therapies  - Limit or eliminate stimulants  Outcome: Progressing

## 2021-04-30 NOTE — PROGRESS NOTES
SW met with patient in small group room; pt appears flat, anxious, restless body movements; pt denies current SI/HI/AH/VH, dep 8, anx 8; pt states he was hearing AH at home - more than one and had SI with no specific plan; pt has hx SA via [place] traffic [place] busy [place]; pt AOX4 and able to provide information without assistance     ROIs: pcp, therapist, psych - no family/sig other ROIs       04/30/21 1012   Patient Intake   Living Arrangement House;Lives with someone   Can patient return home? 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   Work History Disabled   School Grade/Year 12th  (completed H/S - [address] [facility])   Admission Status   Status of Admission [address] [id number]   Patient History   Treatment History hx AIP (2018 s/p SA) [facility]; current o/p therapist; pcp prescribing psych meds   Currently in Treatment Yes   Current Psychiatrist/Therapist PCP   Legal Issues denies current and hx legal   Substance Abuse No   Crisis Info   Release of Information Signed Yes  (pcp, therapist, psych - no family/sig other ROIs)     Behavioral Health Psychosocial    Stressors/Triggers: pt did not identify current stressors  Strengths: supportive significant other, stable housing, o/p MH supports, insurance, cooperative, able to identify needs  Limitations: limited coping skills, limited social supports, no family ties  Coping Skills: reading, walking  Hx Mental Illness: hx MDD, BETHANY   Past Hospitalizations? Dates? [facility] 2018 s/p SA via walking into traffic  Hx Psych Meds: admits - PCP prescribes  Current Med Compliance: admits  Hx Non Compliance: admits "sometimes I forget"  Hx SA or SI in last 12 mons  : hx x 1 - 2018 - walking in traffic  Access to Firearms: denies  Hx Violence to self or others past 12 mons  Careynusrat Davis denies  Hx HI past 12 mons  : denies  Hx abuse: denies  Current psychological trauma: denies  Family hx mental illness: denies  Family hx suicide/homicide: denies  Family hx substance abuse: denies  Family hx dementia: mother, father   Current Substance abuse: denies etoh & drug use   Name of substance   how much how often last use     age of use      clean  Cigars - approx 10 per month   Smoking Cessation ? declined  Other Addictions? Past or Present? denies  Hx Arrests, Probation or [place]? denies  Current Legal Problems? denies  Marital Status/Relationship Hx? ; current relationship: Gema Stephen - supportive  Sexual Preference? Heterosexual - no concerns   Children? Ages? Relationship? Lowell Orlando (25) & Calvin Sacks (27); no grandchildren   Are you currently responsible for any children? no  Pets? none  Parents? Relationships? Chica Cook (d  10/2020); Carrol (age 80 - in SNF due to dementia)  Are you a caregiver? no  Siblings? Relationships? Twin - Hilario River and Tonny Nails - no relationships   Any other family supports? denies  Current living situation? Able to return? Home with significant other   Caregivers for pt - any stressors? Education Hx? 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 none  Assistive Devices? Compliance? none  Physical barriers in the home? (Steps, bathroom/bedroom location?) none  Latter-day preferences? Worship - not active  Would you like Spiritism notified? no  Cultural needs? denies  How do you get to your appointments?drives self  Financial Resources:    SSI/SSDI       Ability to pay for meds: denies as stressor  Monthly Income: $1700/SDI  Medical Insurance: [place]   Power of ?  no  If no, Info? Accepted information   POA for Mental Health?  no  If no, Info? Accepted information   Advanced directives?   none  If no, Info?  Accepted information   Guardian? no  Does someone provide financial assistance to you? no  Current providers:   PCP? Dr Cline Cousin [phone]  Psych? None currently - agreeable to referral in [facility]   Therapist? [facility] (ACT/ICM/CM) none  Current Pharmacy [facility]  Family notification? declined  Family meeting? declined  Aftercare needs? Psych   Discharge disposition?  Home   Partial Referral? "maybe" - SW will provide additional information

## 2021-04-30 NOTE — NURSING NOTE
Patient appears to be sleeping without difficulty throughout the night  No behavioral issues  No PRNs given  Will continue to monitor safety and behaviors every 7 minutes

## 2021-04-30 NOTE — H&P
[address]  UUK:[id number] Raul Galindo  LWK:[id number]    NUO:[id number]  Adm Date: 4/29/2021 2241  10:41 PM   ATT PHY: Cielo Reynoso, [address]         Chief Complaint:  Worsening depression with suicidal ideation    History of Presenting Illness: Rodrick Cleary is a(n) 64 y o  male presenting under 12 voluntary commitment status for worsening depression and suicidal ideation  Patient examined at bedside  Patient reports no acute concerns      Allergies   Allergen Reactions    Gluten Meal - Food Allergy      Pt is diagnosed with celiac disease with the biopsy/pathology and the tissue transglutaminase antibody by the GI     Penicillins Diarrhea and Vomiting    Celecoxib Rash       Current Facility-Administered Medications on File Prior to Encounter   Medication Dose Route Frequency Provider Last Rate Last Admin    [COMPLETED] LORazepam (ATIVAN) tablet 1 mg  1 mg Oral Once Hernandez Gather, DO   1 mg at 04/29/21 1200     Current Outpatient Medications on File Prior to Encounter   Medication Sig Dispense Refill    acetaminophen (TYLENOL) 500 mg tablet Take 500 mg by mouth every 6 (six) hours as needed      Calcium Carbonate-Vitamin D3 600-400 MG-UNIT TABS Take 1 tablet by mouth 2 (two) times a day       citalopram (CeleXA) 40 mg tablet TAKE 1 TABLET BY MOUTH EVERY DAY 90 tablet 1    gabapentin (NEURONTIN) 100 mg capsule TAKE 2 CAPSULES (200 MG TOTAL) BY MOUTH 2 (TWO) TIMES A  capsule 2    hydrocortisone 1 % lotion Apply topically 2 (two) times a day      lisinopril (ZESTRIL) 10 mg tablet Take 1 tablet (10 mg total) by mouth daily 90 tablet 1    LORazepam (ATIVAN) 0 5 mg tablet Take 1 tablet (0 5 mg total) by mouth every 8 (eight) hours as needed for anxiety 30 tablet 0    Omega-3 1000 MG CAPS Take 1 g by mouth daily      omeprazole (PriLOSEC) 40 MG capsule Take 1 capsule (40 mg total) by mouth daily 90 capsule 1    polyethylene glycol (MIRALAX) 17 g packet Take 17 g by mouth daily 10 each 0    Propylene Glycol (Systane Complete) 0 6 % SOLN Apply to eye      senna (Senokot) 8 6 MG tablet Take 1 tablet by mouth 2 (two) times a day as needed      tamsulosin (FLOMAX) 0 4 mg TAKE 1 CAPSULE BY MOUTH AT BEDTIME 90 capsule 3    Narcan 4 MG/0 1ML nasal spray CALL 911  ADMINISTER A SINGLE SPRAY INTRANASALLY INTO ONE NOSTRIL UPON SIGNS OF OPIOID OVERDOSE  MAY REPEAT AFTER 3 MINUTES IF NO RESPONSE           Active Ambulatory Problems     Diagnosis Date Noted    Anxiety disorder, unspecified 05/10/2012    Severe episode of recurrent major depressive disorder, with psychotic features ([address] ) 05/10/2012    Erectile dysfunction 06/16/2016    Weight loss 08/18/2016    Gastroesophageal reflux disease without esophagitis 07/16/2018    Hiatal hernia 07/16/2018    Celiac disease 07/16/2018    History of obsessive compulsive disorder 09/27/2019    Multiple fractures of left foot 10/25/2018    BPH (benign prostatic hyperplasia) 11/06/2019    Essential hypertension 02/04/2020    Brachial plexus injury, right, sequela 10/31/2018    Closed nondisplaced fracture of body of left calcaneus 83/67/6384    Conflicted attitude towards dietary regime 01/21/2020    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    History of Helicobacter pylori infection 01/23/2020    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    Other insomnia 01/16/2019    Recurrent major depressive disorder, in partial remission ([address] ) 10/31/2018    Suicide attempt ([facility] [address] ) 10/23/2018    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 01/08/2019    Ventral hernia 01/28/2020    Spondylosis of cervical region without myelopathy or radiculopathy 11/19/2020    Abnormal CT scan, cervical spine 10/03/2020    Closed fracture of nasal bone 10/04/2020    Fall 10/03/2020    Alcohol intoxication ([address] ) 10/03/2020    Penis pain 04/13/2021     Resolved Ambulatory Problems Diagnosis Date Noted    No Resolved Ambulatory Problems     Past Medical History:   Diagnosis Date    Anxiety 1995    Depression 1995    Hypertension     Obsessive compulsive disorder        Past Surgical History:   Procedure Laterality Date    FRACTURE SURGERY      TONSILLECTOMY      WRIST SURGERY Left     resolved 1997- cts surgery       Social History:   Social History     Socioeconomic History    Marital status:      Spouse name: None    Number of children: None    Years of education: None    Highest education level: None   Occupational History    None   Social Needs    Financial resource strain: None    Food insecurity     Worry: None     Inability: None    Transportation needs     Medical: None     Non-medical: None   Tobacco Use    Smoking status: Light Tobacco Smoker     Types: Cigars    Smokeless tobacco: Former User   Substance and Sexual Activity    Alcohol use: Not Currently     Frequency: Never     Drinks per session: 1 or 2     Binge frequency: Never     Comment: SOCIAL    Drug use: No     Comment: CAFFINE 3 CUPS PER DAY    Sexual activity: Not Currently   Lifestyle    Physical activity     Days per week: None     Minutes per session: None    Stress: None   Relationships    Social connections     Talks on phone: None     Gets together: None     Attends Adventism service: None     Active member of club or organization: None     Attends meetings of clubs or organizations: None     Relationship status: None    Intimate partner violence     Fear of current or ex partner: None     Emotionally abused: None     Physically abused: None     Forced sexual activity: None   Other Topics Concern    None   Social History Narrative    Caffeine - 2 cups coffee per day    Full Time -        Family History:   Family History   Problem Relation Age of Onset    Heart attack Father     Prostate cancer Father     Cerebral palsy Brother    Cesar Kerr OCD Mother     OCD Sister Review of Systems   Constitutional: Positive for appetite change  Musculoskeletal: Positive for arthralgias and back pain  Psychiatric/Behavioral: Positive for sleep disturbance  All other systems reviewed and are negative  Physical Exam   Constitutional: Awake and Alert  Well-developed and well-nourished  No distress  HENT: PERR,EOMI, conjunctiva normal  Head: Normocephalic and atraumatic  Mouth/Throat: Oropharynx is clear and moist     Eyes: Conjunctivae and EOM are normal  Pupils are equal, round, and reactive to light  Right and left eye exhibits no discharge  Neck: Neck supple  No tracheal deviation present  No thyromegaly present  Cardiovascular: Normal rate, regular rhythm and normal heart sounds  Exam reveals no friction rub  No murmur heard  Pulmonary/Chest: Effort normal and breath sounds normal  No respiratory distress  No wheezes  Abdominal: Soft  Bowel sounds are normal  No distension  No tenderness  No rebound tenderness and no guarding  Neurological: Cranial Nerves grossly intact  No sensory deficit  Coordination normal    Musculoskeletal:   Nontender spine  Skin: Skin is warm and dry  No rash noted  No diaphoresis  No erythema  No edema  No cyanosis  Assessment     Vic Starks is a(n) 64 y o  male with MDD  1  Hypertension  Continue lisinopril 10 mg daily  2  GERD  Continue pantoprazole 40 mg daily  3  Constipation  Head continue MiraLax daily  4  BPH  Continue Flomax daily with dinner  5  DJD  Patient may receive Tylenol as needed  6  Dermatitis  Patient may use hydrocortisone cream as needed  7  Dry eyes  Patient may use artificial tears as needed  8  Celiac disease  Continue gluten free diet  9  MDD  Patient is being managed by psych  Prognosis: Fair  Discharge Plan: In progress  Advanced Directives: I have discussed in detail with the patient the advanced directives  The patient  does not have a POA or living will    Emergency contact is his girlfriend, Silvestre Nova ([phone])  When discussing cardiac and pulmonary resuscitation efforts with the patient, the patient wishes to be full code  I have spent more than 50 minutes gathering data, doing physical examination, and discussing the advanced directives, which was witnessed by caring staff  The patient was discussed with Dr Carlos Manuel Scott and he is in agreement with the above note

## 2021-04-30 NOTE — PROGRESS NOTES
Patient belongings as follows:    Room:   Black socks  Karol Skiff [place] t shirt    Room contraband:  Talisha bello    Safe:bag # [phone]  [place] Drivers license x2  Wine card  Gift card visa  [employer] debit card  [employer] card  [employer] [place] wallet  $[id number]    RN contraband:  samsung cell phone no

## 2021-04-30 NOTE — PROGRESS NOTES
04/30/21 [address]   Patient Information   Mental Status Alert   Primary Caregiver Self   Support System Friends   Latter day/Cultural Requests Holiness - Not active   Legal Information   Tx Plan Signed Yes   Current Status: 676   Advance Directives   (none)   Advance Directives Status Discussed - Information Provided   Activities of Daily Living Prior to Admission   Functional Status Independent   Assistive Device No device needed   Living Arrangement House;Lives with someone   Ambulation Independent   Access to Firearms   Access to Firearms No   Income Information   Income Source SSI/SSD  (1700/mo)   Means of Transportation   [employer] of Transport to [facility]: Drive Self

## 2021-04-30 NOTE — H&P
Psychiatric Evaluation - Behavioral Health     Identification Data:Sudhakar Choe 64 y o  male MRN: [id number]  Unit/Bed#: [facility] 203-01 Encounter: [id number]    Chief Complaint: suicidal ideation    History of Present Illness     Cassidy You is a 64 y o  male with a history of Major Depressive Disorder, Generalized Anxiety Disorder and OCD who was admitted to the inpatient adult psychiatric unit on a voluntary 201 commitment basis due to depression, auditory hallucinations and suicidal ideation without plan  Patient presented to the ED on April 29th stating that he was increasingly depressed over the last several weeks with onset of auditory hallucinations with thoughts of suicide without a plan  He was evaluated in the ED and signed a 201  On evaluation in the inpatient psychiatric unit Ariane Thompson endorses feelings of sadness, lack of energy and motivation, difficulty with sleep, adequate appetite  He states that he is not engaging in activities as he did in the past which were enjoyable to him  He admits to feeling hopeless  He reports he was having thoughts of ending his life but did not have a plan  He reports that he does feel safe while on the inpatient unit  Patient also endorses onset of auditory hallucinations for the last several weeks  He reports he has never heard voices prior to this  He reports that the voices come and go in there multiple voices  Patient reports he hears the voices telling him to do certain things  He finds that this is denied giving command this to hurt himself  Patient did have store patient has no significant history of trauma or symptoms of PTSD  Patient does have history of anxiety and has ongoing worry about daily stressors  He ruminates and over thinks things  He reports he did have a history of OCD with checking rituals such as the door the stove several times a day  He reports that his checking rituals are currently under control  Dahlia Manjarrez Review Of Systems:    Sleep changes: decreased  Appetite changes:no change  Weight changes: no change  Energy: decreased  Interest/pleasure/: decreased  Anhedonia: yes  Anxiety: yes  Kathy: no  Guilt:  yes  Hopeless:  yes  Self injurious behavior/risky behavior: no  Suicidal ideation: yes, passive death wish  Homicidal ideation: no  Auditory hallucinations: yes, auditory hallucinations  Visual hallucinations: no  Delusional thinking: ideas of reference  Eating disorder history: no  Obsessive/compulsive symptoms: no    Historical Information     Past Psychiatric History:     Past Inpatient Psychiatric Treatment:   No history of past inpatient psychiatric admissions  Past Outpatient Psychiatric Treatment:    Was in outpatient psychiatric treatment in the past with a psychiatrist  Past Suicide Attempts: yes, once reports he ran into traffic as a reaction to a stressor  Past Violent Behavior:  Denies  Past Psychiatric Medication Trials: multiple psychiatric medication trials     Substance Abuse History:    Social History     Tobacco History     Smoking Status  Light Tobacco Smoker Smoking Tobacco Type  Cigars    Smokeless Tobacco Use  Former User          Alcohol History     Alcohol Use Status  Not Currently Comment  SOCIAL          Drug Use     Drug Use Status  No Comment  CAFFINE 3 CUPS PER DAY          Sexual Activity     Sexually Active  Not Currently          Activities of Daily Living    Not Asked                 I have assessed this patient for substance use within the past 12 months    Alcohol use: denies current use  Recreational drug use:  Denies    Family Psychiatric History:   Patient reports he is unaware of any psychiatric history in his family members    Social History:    Education: college graduate  Marital History:   Children: 2 adult child  Living Arrangement: lives in home with girlfriend  Occupational History: unemployed  Functioning Relationships: limited support system  Legal History: none   History: None    Traumatic History:   Felt bullied in middle school    Past Medical History:      Past Medical History:   Diagnosis Date    Anxiety 1995    Celiac disease     Depression 1995    Hypertension     Obsessive compulsive disorder     Suicide attempt ([address] )     10/2018     Past Surgical History:   Procedure Laterality Date    FRACTURE SURGERY      TONSILLECTOMY      WRIST SURGERY Left     resolved 1997- cts surgery       Medical Review Of Systems:    Pertinent items are noted in HPI  Allergies: Allergies   Allergen Reactions    Gluten Meal - Food Allergy      Pt is diagnosed with celiac disease with the biopsy/pathology and the tissue transglutaminase antibody by the GI     Penicillins Diarrhea and Vomiting    Celecoxib Rash       Medications: All current active medications have been reviewed      OBJECTIVE:    Vital signs in last 24 hours:    Temp:  [97 5 °F (36 4 °C)-98 6 °F (37 °C)] 98 °F (36 7 °C)  HR:  [54-95] 54  Resp:  [16-20] 16  BP: ()/(54-92) 118/77    No intake or output data in the 24 hours ending 04/30/21 1125     Mental Status Evaluation:    Appearance:  disheveled, wearing hospital clothes, poor hygiene   Behavior:  cooperative, psychomotor retardation   Speech:  slow, soft   Mood:  anxious   Affect:  Blunted, constricted, sluggish   Language: naming objects   Thought Process:  perseverative, decreased rate of thoughts   Associations: concrete associations   Thought Content:  some paranoia   Perceptual Disturbances: auditory hallucinations   Risk Potential: Suicidal ideation - None  Homicidal ideation - None  Potential for aggression - No   Sensorium:  oriented to person, place and time/date   Memory:  recent and remote memory grossly intact   Consciousness:  alert and awake   Attention: attention span and concentration are age appropriate   Intellect: within normal limits   Fund of Knowledge: awareness of current events: yes   Insight:  fair Judgment: limited   Muscle Strength Muscle Tone: normal  normal   Gait/Station: normal gait/station   Motor Activity: no abnormal movements       Laboratory Results:   I have personally reviewed all pertinent laboratory/tests results  Imaging Studies:   No results found  Code Status: Level 1 - Full Code  Advance Directive and Living Will: <no information>    Assessment/Plan   Principal Problem:    Severe episode of recurrent major depressive disorder, with psychotic features ([facility] [address] )  Active Problems:    Anxiety disorder, unspecified      Patient Strengths: cooperative, good physical health, negotiates basic needs, patient is on a voluntary commitment     Patient Barriers: lack of social/family support, limited motivation, relationship issues    Treatment Plan:     Planned Treatment and Medication Changes:  Patient was maintained on Celexa 40 mg daily as an outpatient  We will continue this medication at this time  We will add Effexor XR 37 5 mg for ongoing depression despite being on SSRI  We will add Risperdal 1 mg at bedtime for his auditory hallucinations  All current active medications have been reviewed  Encourage group therapy, milieu therapy and occupational therapy  Behavioral Health checks every 7 minutes      Risks / Benefits of Treatment:    Risks, benefits, and possible side effects of medications explained to patient and patient verbalizes understanding and agreement for treatment  Counseling / Coordination of Care: Total floor / unit time spent today 60 minutes  Greater than 50% of total time was spent with the patient and / or family counseling and / or coordination of care  A description of the counseling / coordination of care:   Patient's presentation on admission and proposed treatment plan discussed with treatment team   Diagnosis, medication changes and treatment plan reviewed with patient      Inpatient Psychiatric Certification:    Estimated length of stay: 10 Kristen Machado MD 04/30/21

## 2021-04-30 NOTE — PLAN OF CARE
Problem: Alteration in Thoughts and Perception  Goal: Treatment Goal: Gain control of psychotic behaviors/thinking, reduce/eliminate presenting symptoms and demonstrate improved reality functioning upon discharge  Outcome: Progressing  Goal: Refrain from acting on delusional thinking/internal stimuli  Description: Interventions:  - Monitor patient closely, per order   - Utilize least restrictive measures   - Set reasonable limits, give positive feedback for acceptable   - Administer medications as ordered and monitor of potential side effects  Outcome: Progressing  Goal: Agree to be compliant with medication regime, as prescribed and report medication side effects  Description: Interventions:  - Offer appropriate PRN medication and supervise ingestion; conduct AIMS, as needed   Outcome: Progressing     Problem: Risk for Self Injury/Neglect  Goal: Treatment Goal: Remain safe during length of stay, learn and adopt new coping skills, and be free of self-injurious ideation, impulses and acts at the time of discharge  Outcome: Progressing  Goal: Refrain from harming self  Description: Interventions:  - Monitor patient closely, per order  - Develop a trusting relationship  - Supervise medication ingestion, monitor effects and side effects   Outcome: Progressing  Goal: Verbalize thoughts and feelings  Description: Interventions:  - Assess and re-assess patient's level of risk   - Engage patient in 1:1 interactions, daily, for a minimum of 15 minutes   - Encourage patient to express feelings, fears, frustrations, hopes   Interventions:  - Assess and re-assess patient's lethality and potential for self-injury  - Engage patient in 1:1 interactions, daily, for a minimum of 15 minutes  - Encourage patient to express feelings, fears, frustrations, hopes  - Establish rapport/trust with patient   Outcome: Progressing     Problem: Depression  Goal: Verbalize thoughts and feelings  Description: Interventions:  - Assess and re-assess patient's level of risk   - Engage patient in 1:1 interactions, daily, for a minimum of 15 minutes   - Encourage patient to express feelings, fears, frustrations, hopes   Interventions:  - Assess and re-assess patient's lethality and potential for self-injury  - Engage patient in 1:1 interactions, daily, for a minimum of 15 minutes  - Encourage patient to express feelings, fears, frustrations, hopes  - Establish rapport/trust with patient   Outcome: Progressing  Goal: Refrain from self-neglect  Outcome: Progressing  Goal: Complete daily ADLs, including personal hygiene independently, as able  Description: Interventions:  - Observe, teach, and assist patient with ADLS  -  Monitor and promote a balance of rest/activity, with adequate nutrition and elimination   Outcome: Progressing

## 2021-04-30 NOTE — PLAN OF CARE
Problem: DISCHARGE PLANNING - CARE MANAGEMENT  Goal: Discharge to post-acute care or home with appropriate resources  Description: INTERVENTIONS:  - Conduct assessment to determine patient/family and health care team treatment goals, and need for post-acute services based on payer coverage, community resources, and patient preferences, and barriers to discharge  - Address psychosocial, clinical, and financial barriers to discharge as identified in assessment in conjunction with the patient/family and health care team  - Arrange appropriate level of post-acute services according to patients   needs and preference and payer coverage in collaboration with the physician and health care team  - Communicate with and update the patient/family, physician, and health care team regarding progress on the discharge plan  - Arrange appropriate transportation to post-acute venues  Outcome: Progressing     New to unit; 201;  Initial goal added

## 2021-04-30 NOTE — NURSING NOTE
Admission note:  Received 64year old male patient on a 12 with a dx of MDD  Patient arrived from [facility] heart ED via wheelchair escorted by EMT  Patient was calm and cooperative with admission process  Patient signed all of the paperwork and Jennifer Serrano of Rights were given  Patient was having fleeting SI with no plan in the ED but denies on the unit  Patient stated he will come to staff if he feels unsafe and starts have SI thoughts  Patient stated, " I cam to the ED because I was having [address], feeling like something bad was going to happen " Some stressors patient is feeling are financial and relationship issues  Patient has Celiac Disease and is on a Gluten free diet  The diet order has been modified  Patient denies any IP stays  Patient had a SA back in 10/2018 by walking into traffic  Patient has a right brachial plexus due to the attempt  Patient stated he also broke his left tibia and ankle from that attempt  Skin is intact  No open wounds  Denies pain/HI/SI  Rates anxiety and depression 7/10  Patient refused HS Gabapentin and no PRNs were given  Patient stated he was tired and wanted to got to sleep  Snack and fluids were provided  Will continue to monitor safety and behaviors every 7 minutes

## 2021-04-30 NOTE — SOCIAL WORK
JAXON sent inbox message to intake department for possible referral to [facility] as patient sees therapist in that office; awaiting response

## 2021-04-30 NOTE — TELEPHONE ENCOUNTER
Noel Rivera, patients  from [facility] unit in [address] [facility] called to notify Luis Krishnan was admitted and will keep next scheduled appointment as she believes he will be discharged before then   If there are any questions you can call her at [phone]

## 2021-04-30 NOTE — ED NOTES
Pt resting on stretcher with no signs of distress or discomfort  Resp even and unlabored  Pt is being observed 1:1 to maintain pt safety  This nurse to continue monitoring        Alejandro Guaman RN  04/29/21 2051

## 2021-04-30 NOTE — ED NOTES
Pt resting on stretcher with no signs of distress or discomfort  Resp even and unlabored  Pt is being observed 1:1 to maintain pt safety  This nurse to continue monitoring        Elmer Thompson RN  04/29/21 0425

## 2021-04-30 NOTE — PROGRESS NOTES
04/30/21 1300   Team Meeting   Meeting Type Tx Team Meeting   Team Members Present   Team Members Present Physician;Nurse;   Physician Team Member Dr Dora Haney MD   Nursing Team Member Gaurav Davis, ALIYAH   Care Management Team Member Scar Peña MS, [facility]   Patient/Family Present   Patient Present Yes   Patient's Family Present No   Reviewed with TX plan, goals, strengths, all in agreement and signed

## 2021-04-30 NOTE — PROGRESS NOTES
04/30/21    Team Meeting   Meeting Type Daily Rounds   Team Members Present   Team Members Present Physician;Nurse;;Occupational Therapist   Physician Team Member Dr David Burgos MD; BETO Valdovinos   Nursing Team Member Willard Linda, ALIYAH   Care Management Team Member MS Danny, Jaya Antonio   OT Team Member Silver Seip, [place]   Patient/Family Present   Patient Present No   Patient's Family Present No   New admission, 201,from [address] [place] [address] ED, increased depression and paranoia, VH/AH  HX of SA in 2018 walked into traffic  Readmit score is 20  Pleasant on unit, quiet, somewhat delayed in response  From home

## 2021-04-30 NOTE — TELEPHONE ENCOUNTER
Valencia Lundberg from [facility] called to inform you that patient was admitted to [facility] Unit on 4/29/21 for increased depression, anxiety, & suicidal ideation with out a plan  This is an FYI

## 2021-04-30 NOTE — PROGRESS NOTES
04/30/21 1148   [facility] Admission Notification   Notification of Admission Provided to:  Therapist;PCP   PCP Notified via: Phone call  (Dr Zakiya See, DO - [phone])   Therapist Notified via: Phone call  Judith Barlow - [phone])     JAXON placed phone call to pt PCP to advise of admission; spoke to Alice Keane placed phone call to pt therapist - left detailed message on voicemail advising of admission

## 2021-05-01 PROCEDURE — 99232 SBSQ HOSP IP/OBS MODERATE 35: CPT | Performed by: PHYSICIAN ASSISTANT

## 2021-05-01 RX ADMIN — TAMSULOSIN HYDROCHLORIDE 0.4 MG: 0.4 CAPSULE ORAL at 16:44

## 2021-05-01 RX ADMIN — PANTOPRAZOLE SODIUM 40 MG: 40 TABLET, DELAYED RELEASE ORAL at 05:57

## 2021-05-01 RX ADMIN — OMEGA-3 FATTY ACIDS CAP 1000 MG 1000 MG: 1000 CAP at 08:39

## 2021-05-01 RX ADMIN — VENLAFAXINE HYDROCHLORIDE 37.5 MG: 37.5 CAPSULE, EXTENDED RELEASE ORAL at 08:39

## 2021-05-01 RX ADMIN — Medication 1 TABLET: at 08:39

## 2021-05-01 RX ADMIN — GABAPENTIN 100 MG: 100 CAPSULE ORAL at 21:41

## 2021-05-01 RX ADMIN — Medication 1 TABLET: at 16:44

## 2021-05-01 RX ADMIN — GABAPENTIN 100 MG: 100 CAPSULE ORAL at 08:39

## 2021-05-01 RX ADMIN — GABAPENTIN 100 MG: 100 CAPSULE ORAL at 16:44

## 2021-05-01 RX ADMIN — CITALOPRAM HYDROBROMIDE 40 MG: 20 TABLET ORAL at 08:39

## 2021-05-01 RX ADMIN — RISPERIDONE 1 MG: 1 TABLET ORAL at 21:40

## 2021-05-01 RX ADMIN — LISINOPRIL 10 MG: 10 TABLET ORAL at 08:40

## 2021-05-01 NOTE — PLAN OF CARE
Problem: Alteration in Thoughts and Perception  Goal: Treatment Goal: Gain control of psychotic behaviors/thinking, reduce/eliminate presenting symptoms and demonstrate improved reality functioning upon discharge  Outcome: Progressing  Goal: Refrain from acting on delusional thinking/internal stimuli  Description: Interventions:  - Monitor patient closely, per order   - Utilize least restrictive measures   - Set reasonable limits, give positive feedback for acceptable   - Administer medications as ordered and monitor of potential side effects  Outcome: Progressing  Goal: Agree to be compliant with medication regime, as prescribed and report medication side effects  Description: Interventions:  - Offer appropriate PRN medication and supervise ingestion; conduct AIMS, as needed   Outcome: Progressing     Problem: Risk for Self Injury/Neglect  Goal: Treatment Goal: Remain safe during length of stay, learn and adopt new coping skills, and be free of self-injurious ideation, impulses and acts at the time of discharge  Outcome: Progressing  Goal: Refrain from harming self  Description: Interventions:  - Monitor patient closely, per order  - Develop a trusting relationship  - Supervise medication ingestion, monitor effects and side effects   Outcome: Progressing  Goal: Verbalize thoughts and feelings  Description: Interventions:  - Assess and re-assess patient's level of risk   - Engage patient in 1:1 interactions, daily, for a minimum of 15 minutes   - Encourage patient to express feelings, fears, frustrations, hopes   Interventions:  - Assess and re-assess patient's lethality and potential for self-injury  - Engage patient in 1:1 interactions, daily, for a minimum of 15 minutes  - Encourage patient to express feelings, fears, frustrations, hopes  - Establish rapport/trust with patient   Outcome: Progressing     Problem: Depression  Goal: Verbalize thoughts and feelings  Description: Interventions:  - Assess and re-assess patient's level of risk   - Engage patient in 1:1 interactions, daily, for a minimum of 15 minutes   - Encourage patient to express feelings, fears, frustrations, hopes   Interventions:  - Assess and re-assess patient's lethality and potential for self-injury  - Engage patient in 1:1 interactions, daily, for a minimum of 15 minutes  - Encourage patient to express feelings, fears, frustrations, hopes  - Establish rapport/trust with patient   Outcome: Progressing  Goal: Treatment Goal: Demonstrate behavioral control of depressive symptoms, verbalize feelings of improved mood/affect, and adopt new coping skills prior to discharge  Outcome: Progressing  Goal: Refrain from isolation  Description: Interventions:  - Develop a trusting relationship   - Encourage socialization   Outcome: Progressing  Goal: Refrain from self-neglect  Outcome: Progressing  Goal: Complete daily ADLs, including personal hygiene independently, as able  Description: Interventions:  - Observe, teach, and assist patient with ADLS  -  Monitor and promote a balance of rest/activity, with adequate nutrition and elimination   Outcome: Progressing     Problem: Anxiety  Goal: Anxiety is at manageable level  Description: Interventions:  - Assess and monitor patient's anxiety level  - Monitor for signs and symptoms (heart palpitations, chest pain, shortness of breath, headaches, nausea, feeling jumpy, restlessness, irritable, apprehensive)  - Collaborate with interdisciplinary team and initiate plan and interventions as ordered    - [place] patient to unit/surroundings  - Explain treatment plan  - Encourage participation in care  - Encourage verbalization of concerns/fears  - Identify coping mechanisms  - Assist in developing anxiety-reducing skills  - Administer/offer alternative therapies  - Limit or eliminate stimulants  Outcome: Progressing     Problem: Ineffective Coping  Goal: Participates in unit activities  Description: Interventions:  - Provide therapeutic environment   - Provide required programming   - Redirect inappropriate behaviors   Outcome: Progressing  Goal: Cooperates with admission process  Description: Interventions:   - Complete admission process  Outcome: Progressing  Goal: Identifies healthy coping skills  Outcome: Progressing  Goal: Patient/Family participate in treatment and DC plans  Description: Interventions:  - Provide therapeutic environment  Outcome: Progressing  Goal: Patient/Family verbalizes awareness of resources  Outcome: Progressing

## 2021-05-01 NOTE — PROGRESS NOTES
Progress Note - Chancey Primrose 64 y o  male MRN: [id number]    Unit/Bed#[facility] 203-01 Encounter: [id number]        Subjective:   Patient seen and examined at bedside after reviewing the chart and discussing the case with the caring staff  Patient examined at bedside  Patient has no acute issues  Patient's vitamin-D and B12 levels are still pending  Physical Exam   Vitals: Blood pressure 129/76, pulse 98, temperature 98 6 °F (37 °C), temperature source Temporal, resp  rate 18, height 6' 2" (1 88 m), weight 103 kg (226 lb 11 2 oz), SpO2 97 %  ,Body mass index is 29 11 kg/m²  Constitutional: He appears well-developed  HEENT: PERR, EOMI, MMM  Cardiovascular: Normal rate and regular rhythm  Pulmonary/Chest: Effort normal and breath sounds normal    Abdomen: Soft, + BS, NT    Assessment/Plan:  Chancey Primrose is a(n) 64 y o  male with MDD      1  Hypertension  Continue lisinopril 10 mg daily  2  GERD  Continue pantoprazole 40 mg daily  3  Constipation  Head continue MiraLax daily  4  BPH  Continue Flomax daily with dinner  5  DJD  Patient may receive Tylenol as needed  6  Dermatitis  Patient may use hydrocortisone cream as needed  7  Dry eyes  Patient may use artificial tears as needed  8  Celiac disease  Continue gluten free diet

## 2021-05-01 NOTE — NURSING NOTE
Patient appeared to be sleeping without difficulty throughout the night  No behavioral issues  Will continue to monitor safety and behaviors every 7 minutes  Unable to get AM blood work   Patient prefers to wait until day shift after he "consumes some water "

## 2021-05-01 NOTE — NURSING NOTE
Patient out in the milieu withdrawn to himself  Ate HS snack  Upon approach patient's mood and affect is flat and depressed  Rates anxiety and depression high  Patient is slow to respond  Denies SI/HI/pain/AHVH  Took medication without difficulty  Will continue to monitor safety and behaviors every 7 minutes

## 2021-05-01 NOTE — PROGRESS NOTES
Progress Note - Behavioral Health     Lachelle Horner 64 y o  male MRN: [id number]   Unit/Bed#: [facility] 203-01 Encounter: [id number]    Behavior over the last 24 hours: unchanged  Jennifer Burgos is a 68-year-old male with a history of MDD with psychosis who presents for psychiatric follow-up  Patient is reported as being rather bizarre but medication compliant  His Celexa 40 mg daily was continued and he was taking this for several years an outpatient  Just started on Effexor 37 5 XR mg yesterday to address ongoing depression and anxiety  Was also started at Risperdal 1 mg at bedtime for auditory hallucinations  He reports continued auditory hallucinations that are ego dystonic in nature and without commands, usually consisting of statements such as follow me, or come with me    He states these started about 3 months ago but cannot identify any specific cause  He endorses continued depressive symptoms of poor sleep, anhedonia, decreased energy, poor concentration, decreased appetite, and psychomotor retardation  He denies any active SI and offers good insight into his prior suicide attempt in 2018  Also feels anxious and feels his heart racing at times  His EKG demonstrated normal sinus rhythm with a QTc of 420  No visual hallucinations      Sleep: decreased  Appetite: decreased  Medication side effects: No   ROS: all other systems are negative    Mental Status Evaluation:    Appearance:  disheveled, wearing hospital clothes, looks stated age   Behavior:  cooperative, calm, bizarre   Speech:  normal rate and volume, fluent, clear   Mood:  depressed, anxious   Affect:  blunted   Thought Process:  organized, goal directed, linear, decreased rate of thoughts   Associations: concrete associations   Thought Content:  no overt delusions   Perceptual Disturbances: no visual hallucinations, auditory hallucinations of ego dystonic voices saying "come with me," no commands   Risk Potential: Suicidal ideation - None at present, contracts for safety on the unit, would talk to staff if not feeling safe on the unit  Homicidal ideation - None at present  Potential for aggression - No   Sensorium:  oriented to person, place and time/date   Memory:  recent and remote memory grossly intact   Consciousness:  alert and awake   Attention/Concentration: attention span and concentration are age appropriate   Insight:  fair   Judgment: limited   Gait/Station: normal gait/station, normal balance   Motor Activity: no abnormal movements     Vital signs in last 24 hours:    Temp:  [97 5 °F (36 4 °C)-97 8 °F (36 6 °C)] 97 5 °F (36 4 °C)  HR:  [66-96] 96  Resp:  [16-20] 20  BP: (114-142)/(61-72) 119/61    Laboratory results: I have personally reviewed all pertinent laboratory/tests results    Most Recent Labs:   Lab Results   Component Value Date    WBC 7 90 04/29/2021    RBC 4 82 04/29/2021    HGB 15 4 04/29/2021    HCT 46 1 04/29/2021     04/29/2021    RDW 12 9 04/29/2021    NEUTROABS 5 40 04/29/2021    SODIUM 135 (L) 04/29/2021    K 4 1 04/29/2021     04/29/2021    CO2 27 04/29/2021    BUN 8 04/29/2021    CREATININE 0 71 04/29/2021    GLUC 95 04/29/2021    CALCIUM 9 4 04/29/2021    AST 32 04/29/2021    ALT 21 04/29/2021    ALKPHOS 72 04/29/2021    TP 7 8 04/29/2021    ALB 4 5 04/29/2021    TBILI 0 40 04/29/2021    CHOLESTEROL 168 01/18/2021    HDL 41 01/18/2021    TRIG 93 01/18/2021    LDLCALC 108 (H) 01/18/2021    [facility] 127 01/18/2021    ERC8ONGNCUPV 2 000 04/29/2021    RPR Non-Reactive 03/26/2021    HGBA1C 5 6 01/06/2020     01/06/2020       Progress Toward Goals: progress ongoing, remains very depressed and anxious    Assessment/Plan   Principal Problem:    Severe episode of recurrent major depressive disorder, with psychotic features ([address] )  Active Problems:    Anxiety disorder, unspecified      Recommended Treatment:     Planned medication and treatment changes:     All current active medications have been reviewed  Encourage group therapy, milieu therapy and occupational therapy  Behavioral Health checks every 7 minutes    Will continue patient on current regimen  Has only been 1 day so far with the adjunctive Effexor XR 37 5 mg daily and Risperdal 1 mg at bedtime  Likely needs more time to see full benefit of these psychotropic medications  Could consider titration of Risperdal to 1 mg b i d  If auditory hallucinations worsen or persist after the weekend      Continue current medicines:    Current Facility-Administered Medications   Medication Dose Route Frequency Provider Last Rate    acetaminophen  650 mg Oral Q4H PRN Oneil Poca, CRNP      acetaminophen  650 mg Oral Q4H PRN Oneil Poca, CRNP      acetaminophen  975 mg Oral Q6H PRN Oneil Poca, CRNP      aluminum-magnesium hydroxide-simethicone  30 mL Oral Q4H PRN Oneil Poca, CRNP      calcium carbonate-vitamin D  1 tablet Oral BID With Meals Malaika Thorpe PA-C      citalopram  40 mg Oral Daily Yuri Acevedo MD      fish oil  1,000 mg Oral Daily [place], [place]      gabapentin  100 mg Oral TID Oneil Poca, CRNP      glycerin-hypromellose-  1 drop Both Eyes Q3H PRN Malaika Thorpe PA-C      hydrocortisone   Topical 4x Daily PRN Malaika Thorpe PA-C      hydrOXYzine HCL  25 mg Oral Q6H PRN Max 4/day Oneil Poca, CRNP      lisinopril  10 mg Oral Daily [place], [place]      LORazepam  1 mg Intramuscular Q6H PRN Max 3/day Oneil Poca, CRNP      LORazepam  0 5 mg Oral Q6H PRN Max 4/day Oneil Poca, CRNP      LORazepam  1 mg Oral Q6H PRN Max 3/day Oneil Poca, CRNP      OLANZapine  5 mg Intramuscular Q6H PRN Max 3/day Oneil Poca, CRNP      OLANZapine  5 mg Oral Q6H PRN Max 3/day Oneil Poca, CRNP      OLANZapine  5 mg Oral Q6H PRN Max 3/day Oneil Poca, CRNP      pantoprazole  40 mg Oral Early Morning Malaika Thorpe PA-C      polyethylene glycol  17 g Oral Daily PRN Oneil Poca, CRNP  polyethylene glycol  17 g Oral Daily Merced Rand PA-C      risperiDONE  0 5 mg Oral Q6H PRN Max 3/day BETO Orellana      risperiDONE  1 mg Oral HS Jean Paul Ernst MD      senna-docusate sodium  1 tablet Oral Daily PRN BETO Orellana      tamsulosin  0 4 mg Oral Daily With CLEMENT Guerrero      traZODone  50 mg Oral HS PRN BETO Orellana      venlafaxine  37 5 mg Oral Daily Jean Paul Ernst MD       Risks / Benefits of Treatment:    Risks, benefits, and possible side effects of medications explained to patient and patient verbalizes understanding and agreement for treatment  Counseling / Coordination of Care:    Patient's progress discussed with staff in treatment team meeting  Medications, treatment progress and treatment plan reviewed with patient      Iris Bennett PA-C 05/01/21

## 2021-05-01 NOTE — PROGRESS NOTES
Patient compliant with medications and meals  Pleasant and cooperative  Patient reported high depression and anxiety  Patient denies any SI/HI at the moment  Patient positive for groups  Keeps to himself  No behaviors noted this shift  Q 7 mins checks in place for safety   Will continue to monitor for behaviors and changes

## 2021-05-02 LAB
25(OH)D3 SERPL-MCNC: 25 NG/ML (ref 30–100)
VIT B12 SERPL-MCNC: 420 PG/ML (ref 100–900)

## 2021-05-02 PROCEDURE — 82607 VITAMIN B-12: CPT | Performed by: PHYSICIAN ASSISTANT

## 2021-05-02 PROCEDURE — 82306 VITAMIN D 25 HYDROXY: CPT | Performed by: PHYSICIAN ASSISTANT

## 2021-05-02 PROCEDURE — 99232 SBSQ HOSP IP/OBS MODERATE 35: CPT | Performed by: PHYSICIAN ASSISTANT

## 2021-05-02 RX ORDER — MELATONIN
1000 DAILY
Status: DISCONTINUED | OUTPATIENT
Start: 2021-06-20 | End: 2021-05-14 | Stop reason: HOSPADM

## 2021-05-02 RX ORDER — MELATONIN
1000 DAILY
Status: DISCONTINUED | OUTPATIENT
Start: 2021-05-02 | End: 2021-05-02

## 2021-05-02 RX ORDER — ERGOCALCIFEROL 1.25 MG/1
50000 CAPSULE ORAL WEEKLY
Status: DISCONTINUED | OUTPATIENT
Start: 2021-05-02 | End: 2021-05-14 | Stop reason: HOSPADM

## 2021-05-02 RX ADMIN — Medication 1 TABLET: at 08:31

## 2021-05-02 RX ADMIN — OMEGA-3 FATTY ACIDS CAP 1000 MG 1000 MG: 1000 CAP at 08:32

## 2021-05-02 RX ADMIN — ERGOCALCIFEROL 50000 UNITS: 1.25 CAPSULE, LIQUID FILLED ORAL at 17:09

## 2021-05-02 RX ADMIN — PANTOPRAZOLE SODIUM 40 MG: 40 TABLET, DELAYED RELEASE ORAL at 06:01

## 2021-05-02 RX ADMIN — GABAPENTIN 100 MG: 100 CAPSULE ORAL at 08:32

## 2021-05-02 RX ADMIN — LORAZEPAM 0.5 MG: 0.5 TABLET ORAL at 21:44

## 2021-05-02 RX ADMIN — Medication 1 TABLET: at 16:13

## 2021-05-02 RX ADMIN — RISPERIDONE 0.5 MG: 0.5 TABLET, ORALLY DISINTEGRATING ORAL at 00:52

## 2021-05-02 RX ADMIN — VENLAFAXINE HYDROCHLORIDE 37.5 MG: 37.5 CAPSULE, EXTENDED RELEASE ORAL at 08:32

## 2021-05-02 RX ADMIN — GABAPENTIN 100 MG: 100 CAPSULE ORAL at 16:13

## 2021-05-02 RX ADMIN — TAMSULOSIN HYDROCHLORIDE 0.4 MG: 0.4 CAPSULE ORAL at 16:13

## 2021-05-02 RX ADMIN — GABAPENTIN 100 MG: 100 CAPSULE ORAL at 21:31

## 2021-05-02 RX ADMIN — RISPERIDONE 1 MG: 1 TABLET ORAL at 21:31

## 2021-05-02 RX ADMIN — Medication 1000 UNITS: at 16:13

## 2021-05-02 RX ADMIN — CITALOPRAM HYDROBROMIDE 40 MG: 20 TABLET ORAL at 08:32

## 2021-05-02 NOTE — PROGRESS NOTES
Patients wife dropped of the following:    3 prs of socks  2 pr black underwear  1 lt blue long s;eeve shirt ([employer])  2 black t-shirts  1 pr grey sweat pants  1 pr blue sweat pants      Placed in contrsband    1 pack of fit right wipes  1 pack of writing paper        All items have been signed for

## 2021-05-02 NOTE — PROGRESS NOTES
Pt up ad trae & gait is steady  Pt c/o depression 7/10 & anxiety 7/10  Q 7 min checks maintained to monitor pt's behavior & safety  Pt denies any hallucinations, suicidal or homicidal ideations  Pt is cooperative & compliant with medications  Pt is withdrawn & does not socialize with other patients

## 2021-05-02 NOTE — PROGRESS NOTES
Pt had a difficult time falling asleep and reported restlessness and racing thoughts  Pt given PRN RisperdalContinuous visual safety checks performed throughout the night  No sign of distress or labored breathing  Safety precautions maintained  Will continue to monitor  M-tab  Pt was able to relax and fell asleep afterwards

## 2021-05-02 NOTE — PROGRESS NOTES
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 and has been unemployed ever since about 5 years ago  Pt reports command auditory hallucinations for several months stating they are telling him to "follow me  Come outside  Come with me " The voices haven't told him to hurt himself or others yet but they are present at times  Pt heard the voices earlier today but not at time of assessment  Pt stated he was  but  in 36 and has 2 sons who both have good, well paying jobs to which he's very proud of  Pt is ashamed of his admission and prefers to discuss things in private and states he's profoundly depressed  Pt had a phone call from a woman claiming to be his girlfriend  Pt was made aware but no MAIDA was signed and pt requested that nobody be able to get any information on him  GF informed  Pt thanked RN  Pt had trouble getting to sleep and was awake at time of note  Pt denies si, hi  Continuous visual safety checks performed throughout the shift  Safety precautions maintained  Will continue to monitor

## 2021-05-02 NOTE — PROGRESS NOTES
Progress Note - Behavioral Health     Maxi Mosley 64 y o  male MRN: [id number]   Unit/Bed#: [facility] 203-01 Encounter: [id number]    Behavior over the last 24 hours: chucky Goldman is a 65 y/o male with history of MDD who presents for psychiatric follow up  Patient is calm and cooperative upon approach, still with blunted affect and depressed and anxious mood  Reports some difficulty falling asleep and staying asleep last night  Still with anhedonia, anergia, poor appetite and decreased motivation  Med compliant  Continues to endorse AH of voices saying "come with me" and "follow me " No acute concerns      Sleep: slept off and on, restless sleep  Appetite: decreased  Medication side effects: No   ROS: all other systems are negative    Mental Status Evaluation:    Appearance:  age appropriate, casually dressed, adequate grooming   Behavior:  cooperative, calm, bizarre   Speech:  normal rate and volume, mostly superficial responses   Mood:  depressed, anxious   Affect:  blunted   Thought Process:  organized, linear, decreased rate of thoughts, concrete   Associations: concrete associations   Thought Content:  no overt delusions, ruminating thoughts   Perceptual Disturbances: auditory hallucinations of voices saying "come with me" otherwise no commands, no visual hallucinations   Risk Potential: Suicidal ideation - None at present, contracts for safety on the unit  Homicidal ideation - None at present  Potential for aggression - No   Sensorium:  oriented to person, place and time/date   Memory:  recent and remote memory grossly intact   Consciousness:  alert and awake   Attention/Concentration: attention span and concentration are age appropriate   Insight:  fair   Judgment: limited   Gait/Station: normal gait/station, normal balance   Motor Activity: no abnormal movements     Vital signs in last 24 hours:    Temp:  [97 6 °F (36 4 °C)-98 6 °F (37 °C)] 98 3 °F (36 8 °C)  HR:  [63-98] 85  Resp:  [16-18] 18  BP: ()/(23-44) 99/56    Laboratory results: I have personally reviewed all pertinent laboratory/tests results    Most Recent Labs:   Lab Results   Component Value Date    WBC 7 90 04/29/2021    RBC 4 82 04/29/2021    HGB 15 4 04/29/2021    HCT 46 1 04/29/2021     04/29/2021    RDW 12 9 04/29/2021    NEUTROABS 5 40 04/29/2021    SODIUM 135 (L) 04/29/2021    K 4 1 04/29/2021     04/29/2021    CO2 27 04/29/2021    BUN 8 04/29/2021    CREATININE 0 71 04/29/2021    GLUC 95 04/29/2021    CALCIUM 9 4 04/29/2021    AST 32 04/29/2021    ALT 21 04/29/2021    ALKPHOS 72 04/29/2021    TP 7 8 04/29/2021    ALB 4 5 04/29/2021    TBILI 0 40 04/29/2021    CHOLESTEROL 168 01/18/2021    HDL 41 01/18/2021    TRIG 93 01/18/2021    LDLCALC 108 (H) 01/18/2021    [place] 127 01/18/2021    [id number] 2 000 04/29/2021    RPR Non-Reactive 03/26/2021    HGBA1C 5 6 01/06/2020     01/06/2020       Progress Toward Goals: progress ongoing, still very depressed, still with AH no commands    Assessment/Plan   Principal Problem:    Severe episode of recurrent major depressive disorder, with psychotic features ([facility] [address] )  Active Problems:    Anxiety disorder, unspecified      Recommended Treatment:     Planned medication and treatment changes: All current active medications have been reviewed  Encourage group therapy, milieu therapy and occupational therapy  Behavioral Health checks every 7 minutes    Continue current medicines  As mentioned in yesterday's note, patient likely needs more time on the current psychotropic regimen to appreciate therapeutic benefit  That being said, we could titrate his Effexor XR to 75mg in a few days for depressive symptoms; could also titrate the Risperdal to 2mg qHS in a few days to target continued AH and poor sleep      Current Facility-Administered Medications   Medication Dose Route Frequency Provider Last Rate    acetaminophen  650 mg Oral Q4H PRN BETO Andrade      acetaminophen  650 mg Oral Q4H PRN Teetee Pole, CRNP      acetaminophen  975 mg Oral Q6H PRN Teetee Pole, CRNP      aluminum-magnesium hydroxide-simethicone  30 mL Oral Q4H PRN Teetee Pole, CRNP      calcium carbonate-vitamin D  1 tablet Oral BID With Meals Dolly Oats, CLEMENT      citalopram  40 mg Oral Daily Gabriela Trevino MD      fish oil  1,000 mg Oral Daily [place]      gabapentin  100 mg Oral TID Teetee Pole, CRNP      glycerin-hypromellose-  1 drop Both Eyes Q3H PRN Dolly Oats, CLEMENT      hydrocortisone   Topical 4x Daily PRN Dolly Oats, CLEMENT      hydrOXYzine HCL  25 mg Oral Q6H PRN Max 4/day Teetee Pole, CRNP      lisinopril  10 mg Oral Daily [place]      LORazepam  1 mg Intramuscular Q6H PRN Max 3/day Teetee Pole, CRNP      LORazepam  0 5 mg Oral Q6H PRN Max 4/day Teetee Pole, CRNP      LORazepam  1 mg Oral Q6H PRN Max 3/day Teetee Pole, CRNP      OLANZapine  5 mg Intramuscular Q6H PRN Max 3/day Teetee Pole, CRNP      OLANZapine  5 mg Oral Q6H PRN Max 3/day Teetee Pole, CRNP      OLANZapine  5 mg Oral Q6H PRN Max 3/day Teetee Pole, CRNP      pantoprazole  40 mg Oral Early Morning Dolly Oats, CLEMENT      polyethylene glycol  17 g Oral Daily PRN Teetee Pole, CRNP      polyethylene glycol  17 g Oral Daily Dolly Oats, CLEMENT      risperiDONE  0 5 mg Oral Q6H PRN Max 3/day Teetee Pole, CRNP      risperiDONE  1 mg Oral HS Gabriela Trevino MD      senna-docusate sodium  1 tablet Oral Daily PRN Teetee Pole, CRNP      tamsulosin  0 4 mg Oral Daily With [employer], CLEMENT      traZODone  50 mg Oral HS PRN Teetee Pole, CRNP      venlafaxine  37 5 mg Oral Daily Gabriela Trevino MD       Risks / Benefits of Treatment:    Risks, benefits, and possible side effects of medications explained to patient and patient verbalizes understanding and agreement for treatment      Counseling / Coordination of Care:    Patient's progress discussed with staff in treatment team meeting  Medications, treatment progress and treatment plan reviewed with patient      Alyse Lamb PA-C 05/02/21

## 2021-05-02 NOTE — PROGRESS NOTES
Progress Note - Shruthi Miles 64 y o  male MRN: [id number]    Unit/Bed#[facility] 203-01 Encounter: [id number]        Subjective:   Patient seen and examined at bedside after reviewing the chart and discussing the case with the caring staff  Patient examined at bedside  Patient has no acute issues  Patient's vitamin-D levels were found to be low 25 0 and vitamin B12 levels are still pending  Physical Exam   Vitals: Blood pressure 99/56, pulse 95, temperature 97 5 °F (36 4 °C), temperature source Temporal, resp  rate 18, height 6' 2" (1 88 m), weight 103 kg (226 lb 11 2 oz), SpO2 96 %  ,Body mass index is 29 11 kg/m²  Constitutional: He appears well-developed  HEENT: PERR, EOMI, MMM  Cardiovascular: Normal rate and regular rhythm  Pulmonary/Chest: Effort normal and breath sounds normal    Abdomen: Soft, + BS, NT    Assessment/Plan:  Shruthi Miles is a(n) 64 y o  male with MDD      1  Hypertension  Continue lisinopril 10 mg daily  2  GERD  Continue pantoprazole 40 mg daily  3  Constipation  Head continue MiraLax daily  4  BPH  Continue Flomax daily with dinner  5  DJD  Patient may receive Tylenol as needed  6  Dermatitis  Patient may use hydrocortisone cream as needed  7  Dry eyes  Patient may use artificial tears as needed  8  Celiac disease  Continue gluten free diet  9  New vitamin-D deficiency  I will put the patient on vitamin-D bolus doses for 8 weeks followed by vitamin D3 1000 units daily

## 2021-05-03 PROBLEM — M19.90 DJD (DEGENERATIVE JOINT DISEASE): Status: ACTIVE | Noted: 2021-05-03

## 2021-05-03 PROCEDURE — 99232 SBSQ HOSP IP/OBS MODERATE 35: CPT | Performed by: PSYCHIATRY & NEUROLOGY

## 2021-05-03 RX ORDER — VENLAFAXINE HYDROCHLORIDE 75 MG/1
75 CAPSULE, EXTENDED RELEASE ORAL DAILY
Status: DISCONTINUED | OUTPATIENT
Start: 2021-05-04 | End: 2021-05-05

## 2021-05-03 RX ORDER — TRAZODONE HYDROCHLORIDE 50 MG/1
50 TABLET ORAL
Status: DISCONTINUED | OUTPATIENT
Start: 2021-05-03 | End: 2021-05-04

## 2021-05-03 RX ORDER — CYANOCOBALAMIN 1000 UG/ML
1000 INJECTION INTRAMUSCULAR; SUBCUTANEOUS
Status: DISCONTINUED | OUTPATIENT
Start: 2021-05-03 | End: 2021-05-14 | Stop reason: HOSPADM

## 2021-05-03 RX ADMIN — GABAPENTIN 100 MG: 100 CAPSULE ORAL at 16:48

## 2021-05-03 RX ADMIN — TAMSULOSIN HYDROCHLORIDE 0.4 MG: 0.4 CAPSULE ORAL at 16:48

## 2021-05-03 RX ADMIN — Medication 1 TABLET: at 08:31

## 2021-05-03 RX ADMIN — LISINOPRIL 10 MG: 10 TABLET ORAL at 08:32

## 2021-05-03 RX ADMIN — VENLAFAXINE HYDROCHLORIDE 37.5 MG: 37.5 CAPSULE, EXTENDED RELEASE ORAL at 08:32

## 2021-05-03 RX ADMIN — GABAPENTIN 100 MG: 100 CAPSULE ORAL at 22:06

## 2021-05-03 RX ADMIN — TRAZODONE HYDROCHLORIDE 50 MG: 50 TABLET ORAL at 22:06

## 2021-05-03 RX ADMIN — PANTOPRAZOLE SODIUM 40 MG: 40 TABLET, DELAYED RELEASE ORAL at 06:17

## 2021-05-03 RX ADMIN — Medication 1 TABLET: at 16:48

## 2021-05-03 RX ADMIN — OMEGA-3 FATTY ACIDS CAP 1000 MG 1000 MG: 1000 CAP at 08:32

## 2021-05-03 RX ADMIN — RISPERIDONE 1 MG: 1 TABLET ORAL at 22:06

## 2021-05-03 RX ADMIN — GABAPENTIN 100 MG: 100 CAPSULE ORAL at 08:32

## 2021-05-03 RX ADMIN — CITALOPRAM HYDROBROMIDE 40 MG: 20 TABLET ORAL at 08:32

## 2021-05-03 NOTE — DISCHARGE INSTR - APPOINTMENTS
The treatment team recommends ongoing medication management upon discharge  A referral has been made on your behalf to [address] [place] [address], [address] [facility], Javi Cotton [address] Phone: [phone]  Your intake appointment is scheduled for Tuesday, July 13th at 4pm with Dr Lakesha Alas MD via [facility]  Please plan to answer your phone approximately 5 minutes prior to your scheduled appointment  This call may appear as CALLER ID BLOCKED or CALLED ID UNKNOWN - PLEASE ANSWER  Your discharge will be faxed to this provider for continuity of care  The treatment team recommends ongoing therapy services with [facility] provider[facility], [address] [facility], Javi Cotton [address] Phone: [phone]  Your next appointment is scheduled for Thursday, May 13th at [facility] with Kvng Ordaz via [facility]  Please plan to answer your phone approximately 5 minutes prior to your scheduled appointment  This call may appear as CALLER ID BLOCKED or CALLED ID UNKNOWN - PLEASE ANSWER  Your discharge will be faxed to this provider for continuity of care  You have identified Dr Ferny Francisco DO, Phone: [phone] as your primary care provider  A follow up for medication management, to bridge you to your scheduled psychiatric appointment, has been scheduled on Weds, May 19th at 215pm with Dr Alvin Morrell IN-PERSON  Please plan to arrive 15 minutes prior to your scheduled appointment, bring your photo ID, insurance cards and you must wear a mask  Your discharge will be faxed to this provider for continuity of care  Santos De La Fuente RN, our [employer], will be calling you after your discharge, on the phone number that you provided  She will be available as an additional support, if needed  If you wish to speak with her, you may contact Marisa Pardo at [phone]

## 2021-05-03 NOTE — SOCIAL WORK
SW met with patient in pt room; pt roommate not present; pt denies SI/HI/AH/VH, dep 7, anx 7; pt states "I think I'm ready to go home" - SW inquired if his current levels of dep and anx are manageable at home alone - pt replied "probably not yet" - pt and SW discussed this further and pt agreeable to remaining on unit until levels are lower and manageable; pt states that he is not getting much sleep here - pt will follow up with providers to discuss; pt had no additional questions or concerns for SW at this time; SW will continue to meet with patient as needed for tx and dc planning

## 2021-05-03 NOTE — PROGRESS NOTES
Pt present in his room at time of assessment  Pt stated that he was feeling anxious and requested a PRN to help him relax  Pt stated he had a conversation with his gf who is a RN and that went well earlier in the shift  Pt remains flat, depressed and displays a bizarre affect at times  Pt is alert and oriented X4, cooperative and polite with RN during assessment and compliant with medications  Pt given PRN ativan 0 5mg and went to sleep  Pt appears to be resting comfortably at this time  Pt denies hallucinations today  Pt reports no hi, si  Continuous visual safety checks performed throughout the shift  Safety precautions maintained  Will continue to monitor

## 2021-05-03 NOTE — DISCHARGE INSTR - OTHER ORDERS
You are being discharged to your home located at [address] [facility], [address] [facility], Phone: [phone]    Triggers you have identified during your hospitalization that led to your admission with suicidal ideation and distressed mood include relationship stressors and a necessary medication adjustment  Coping skills you have identified during your hospitalization include walking, watching tv, and socializing with select peers  If you are unable to deal with your distressed mood alone please talk to your significant other, Isa Cardenas, or one of your psychiatric providers  If that is not effective and you continue to have suicidal ideation and/or distressed mood, please contact [address] [place] [address] at [phone], dial [phone] or go to the nearest emergency center  *National Suicide Prevention Lifeline:  4-862.799.8388  *[facility] ([place]) HELPLINE: 313.917.7566/Website: www harjit org  *Substance Abuse and Mental Health Services Administration([facility]) [employer], which is a confidential, free, 24-hour-a-day, 365-day-a-year, information service for individuals and family members facing mental health and/or substance use disorders  This service provides referrals to local treatment facilities, support groups, and community-based organizations  Callers can also order free publications and other information  Call 5-334.531.9484/Website: www [facility] gov  *[facility] 2-1-1: This is a toll free, confidential, 24-hour-a-day service which connects you to a community  in your area who can help you find services and resources that are available to you locally and provide critical services that can improve and save lives    Call: 211  /Website: https://zhaneSha-Shaerna talbot/

## 2021-05-03 NOTE — PROGRESS NOTES
Pt reported auditory hallucinations prior to the 1900 shift last night  Pt hasn't had hallucinations today  Pt wants to discuss medication adjustments with Dr pertaining to hearing the voices  Will continue to monitor

## 2021-05-03 NOTE — PROGRESS NOTES
Patient standing in his room when nurse walked by  Patient stated he was not feeling well and lightheaded  Encouraged patient to sit down or lay down  Vital signs stable  680/37-42-65 temp 98  Encouraged to rest but patient got up and started walking around  Continue to monitor

## 2021-05-03 NOTE — PROGRESS NOTES
05/03/21   Team Meeting   Meeting Type Daily Rounds   Team Members Present   Team Members Present Physician;Nurse;Occupational Therapist;   Physician Team Member Dr Colt Hoffman MD; BETO Hanna   Nursing Team Member Erlinda Keen RN   Social Work Team Member Rosemary Weston [facility]   OT Team Member Jolie Brown [place]   Patient/Family Present   Patient Present No   Patient's Family Present No     No DC date - will return home upon stabilization; poor sleep; denies pain; anx/dep 7/10; denies SI/AH/VH; prn ativan 2144p anxiety - effective

## 2021-05-03 NOTE — PROGRESS NOTES
Patient visible on the unit  He stated he did not sleep well last night  Rated anxiety and depression both at #7  Denied SI,HI, or hallucinations at this time  He appears flat and depressed  Patient declined vitamin B12 injection  Pleasant and cooperative  Withdrawn and does not socialize with peers  Q 7 minute safety checks maintained

## 2021-05-03 NOTE — SOCIAL WORK
SW received the following appt via Epic InBasket:     Patient is scheduled to see Dr Tom Redd 07/13/2021 at 4:00 pm    AVS updated

## 2021-05-03 NOTE — PLAN OF CARE
Problem: Risk for Self Injury/Neglect  Goal: Treatment Goal: Remain safe during length of stay, learn and adopt new coping skills, and be free of self-injurious ideation, impulses and acts at the time of discharge  Outcome: Progressing  Goal: Refrain from harming self  Description: Interventions:  - Monitor patient closely, per order  - Develop a trusting relationship  - Supervise medication ingestion, monitor effects and side effects   Outcome: Progressing  Goal: Verbalize thoughts and feelings  Description: Interventions:  - Assess and re-assess patient's level of risk   - Engage patient in 1:1 interactions, daily, for a minimum of 15 minutes   - Encourage patient to express feelings, fears, frustrations, hopes   Interventions:  - Assess and re-assess patient's lethality and potential for self-injury  - Engage patient in 1:1 interactions, daily, for a minimum of 15 minutes  - Encourage patient to express feelings, fears, frustrations, hopes  - Establish rapport/trust with patient   Outcome: Progressing     Problem: Depression  Goal: Verbalize thoughts and feelings  Description: Interventions:  - Assess and re-assess patient's level of risk   - Engage patient in 1:1 interactions, daily, for a minimum of 15 minutes   - Encourage patient to express feelings, fears, frustrations, hopes   Interventions:  - Assess and re-assess patient's lethality and potential for self-injury  - Engage patient in 1:1 interactions, daily, for a minimum of 15 minutes  - Encourage patient to express feelings, fears, frustrations, hopes  - Establish rapport/trust with patient   Outcome: Progressing

## 2021-05-03 NOTE — PROGRESS NOTES
Progress Note - Behavioral Health   Maxi Mosley 64 y o  male MRN: [id number]  Unit/Bed#: [facility] 203-01 Encounter: [id number]    Assessment/Plan   Principal Problem:    Severe episode of recurrent major depressive disorder, with psychotic features ([address] )  Active Problems:    Anxiety disorder, unspecified    Gastroesophageal reflux disease without esophagitis    Celiac disease    History of obsessive compulsive disorder    BPH (benign prostatic hyperplasia)    Essential hypertension    DJD (degenerative joint disease)      Behavior over the last 24 hours:  unchanged  Sleep: insomnia  Appetite: decreased  Medication side effects: No  ROS: Poor sleep, all other systems are negative for acute changes    Mental Status Evaluation:  Appearance:  Causally dressed, tall   Behavior:  restless and fidgety and cooperative   Speech:  Normal rate, rhythm, volume   Mood:  "I don't feel too good  I feel very anxious and depressed "   Affect:  constricted   Thought Process:  Linear and goal directed  Thought Content:  focus on his daily activities, girlfriend, cleaning around house   Perceptual Disturbances: intermittent auditory hallucinations    Risk Potential: Suicidal Ideations none  Homicidal Ideations none  Potential for Aggression No   Sensorium:  person, place and situation   Memory:  recent and remote memory grossly intact   Consciousness:  alert, awake     Attention: attention span and concentration were age appropriate   Insight:  limited   Judgment: limited   Gait/Station: normal gait/station   Motor Activity: no abnormal movements     Progress Toward Goals: Patient says that his anxiety and depression has been constant for several weeks  He says that these are general symptoms and are not provoked by any thoughts or events  He admits hearing occasionally male voices that command him to "come here" but he never has the urge to act on these commands   His stressors include his girlfriend yelling at him when he does not clean around house  He feels tired with poor appetite and spends most of his day trying to rest  Patient has been compliant with medication and denies any current side effects  Recommended Treatment: Continue with group therapy, milieu therapy and occupational therapy  Risks, benefits and possible side effects of Medications:   Risks, benefits, and possible side effects of medications explained to patient and patient verbalizes understanding  Medications: all current active meds have been reviewed  Increase Effexor to 75 mg daily  Labs: I have personally reviewed all pertinent laboratory/tests results  Most Recent Labs:   Lab Results   Component Value Date    WBC 7 90 04/29/2021    RBC 4 82 04/29/2021    HGB 15 4 04/29/2021    HCT 46 1 04/29/2021     04/29/2021    RDW 12 9 04/29/2021    NEUTROABS 5 40 04/29/2021    SODIUM 135 (L) 04/29/2021    K 4 1 04/29/2021     04/29/2021    CO2 27 04/29/2021    BUN 8 04/29/2021    CREATININE 0 71 04/29/2021    GLUC 95 04/29/2021    GLUF 94 02/08/2021    CALCIUM 9 4 04/29/2021    AST 32 04/29/2021    ALT 21 04/29/2021    ALKPHOS 72 04/29/2021    TP 7 8 04/29/2021    ALB 4 5 04/29/2021    TBILI 0 40 04/29/2021    CHOLESTEROL 168 01/18/2021    HDL 41 01/18/2021    TRIG 93 01/18/2021    LDLCALC 108 (H) 01/18/2021    NONHDLC 127 01/18/2021    [id number] 2 000 04/29/2021    RPR Non-Reactive 03/26/2021    HGBA1C 5 6 01/06/2020     01/06/2020       Counseling / Coordination of Care  Total floor / unit time spent today 20 minutes  Greater than 50% of total time was spent with the patient and / or family counseling and / or coordination of care

## 2021-05-03 NOTE — PROGRESS NOTES
Progress Note - Tamara Wall 64 y o  male MRN: [id number]    Unit/Bed#[facility] 203-01 Encounter: [id number]        Subjective:   Patient seen and examined at bedside after reviewing the chart and discussing the case with the caring staff  Patient examined at bedside  Patient has no acute issues  Patient's vitamin-D levels were found to be low 25 0 and vitamin B12 levels was also found to be low 420  Physical Exam   Vitals: Blood pressure 116/66, pulse 83, temperature 97 7 °F (36 5 °C), temperature source Temporal, resp  rate 18, height 6' 2" (1 88 m), weight 103 kg (226 lb 11 2 oz), SpO2 97 %  ,Body mass index is 29 11 kg/m²  Constitutional: He appears well-developed  HEENT: PERR, EOMI, MMM  Cardiovascular: Normal rate and regular rhythm  Pulmonary/Chest: Effort normal and breath sounds normal    Abdomen: Soft, + BS, NT    Assessment/Plan:  Tamara Wall is a(n) 64 y o  male with MDD      1  Hypertension  Continue lisinopril 10 mg daily  2  GERD  Continue pantoprazole 40 mg daily  3  Constipation  Head continue MiraLax daily  4  BPH  Continue Flomax daily with dinner  5  DJD  Patient may receive Tylenol as needed  6  Dermatitis  Patient may use hydrocortisone cream as needed  7  Dry eyes  Patient may use artificial tears as needed  8  Celiac disease  Continue gluten free diet  9  New vitamin-D deficiency  I will put the patient on vitamin-D bolus doses for 8 weeks followed by vitamin D3 1000 units daily  10  New vitamin B12 deficiency  I will put the patient on monthly B12 injections

## 2021-05-03 NOTE — PLAN OF CARE
Problem: DISCHARGE PLANNING - CARE MANAGEMENT  Goal: Discharge to post-acute care or home with appropriate resources  Description: INTERVENTIONS:  - Conduct assessment to determine patient/family and health care team treatment goals, and need for post-acute services based on payer coverage, community resources, and patient preferences, and barriers to discharge  - Address psychosocial, clinical, and financial barriers to discharge as identified in assessment in conjunction with the patient/family and health care team  - Arrange appropriate level of post-acute services according to patients   needs and preference and payer coverage in collaboration with the physician and health care team  - Communicate with and update the patient/family, physician, and health care team regarding progress on the discharge plan  - Arrange appropriate transportation to post-acute venues  Outcome: Progressing     Pt progressing;  No DC date - will return home upon stabilization

## 2021-05-04 PROCEDURE — 99233 SBSQ HOSP IP/OBS HIGH 50: CPT | Performed by: PSYCHIATRY & NEUROLOGY

## 2021-05-04 RX ORDER — TRAZODONE HYDROCHLORIDE 50 MG/1
100 TABLET ORAL
Status: DISCONTINUED | OUTPATIENT
Start: 2021-05-04 | End: 2021-05-14 | Stop reason: HOSPADM

## 2021-05-04 RX ORDER — RISPERIDONE 2 MG/1
2 TABLET, FILM COATED ORAL
Status: DISCONTINUED | OUTPATIENT
Start: 2021-05-04 | End: 2021-05-06

## 2021-05-04 RX ADMIN — TAMSULOSIN HYDROCHLORIDE 0.4 MG: 0.4 CAPSULE ORAL at 16:33

## 2021-05-04 RX ADMIN — VENLAFAXINE HYDROCHLORIDE 75 MG: 75 CAPSULE, EXTENDED RELEASE ORAL at 08:09

## 2021-05-04 RX ADMIN — OMEGA-3 FATTY ACIDS CAP 1000 MG 1000 MG: 1000 CAP at 08:09

## 2021-05-04 RX ADMIN — GABAPENTIN 100 MG: 100 CAPSULE ORAL at 21:04

## 2021-05-04 RX ADMIN — CITALOPRAM HYDROBROMIDE 40 MG: 20 TABLET ORAL at 08:09

## 2021-05-04 RX ADMIN — PANTOPRAZOLE SODIUM 40 MG: 40 TABLET, DELAYED RELEASE ORAL at 06:07

## 2021-05-04 RX ADMIN — Medication 1 TABLET: at 08:09

## 2021-05-04 RX ADMIN — GABAPENTIN 100 MG: 100 CAPSULE ORAL at 16:33

## 2021-05-04 RX ADMIN — Medication 1 TABLET: at 16:33

## 2021-05-04 RX ADMIN — GABAPENTIN 100 MG: 100 CAPSULE ORAL at 08:09

## 2021-05-04 RX ADMIN — LORAZEPAM 0.5 MG: 0.5 TABLET ORAL at 13:39

## 2021-05-04 RX ADMIN — RISPERIDONE 2 MG: 2 TABLET ORAL at 21:04

## 2021-05-04 NOTE — PROGRESS NOTES
Patient has been visible on the unit  Pleasant and cooperative  Medication compliant  Guarded  Does not socialize with other patients  Rated anxiety and depression both at #6  Denies SI or HI  Admits to hearing voices but can't make out what they are saying  Lisinopril held this morning due to b/p below parameters  Q 7 minute safety checks maintained

## 2021-05-04 NOTE — PROGRESS NOTES
Patient visible in the milieu for the entirety of the evening but withdrawn and non-interactive with his peers  Bizarre affect and guarded but polite during interaction  This evening patient is adamant that he would not like any information about him given to his girlfriend Gunnar Mazariegos  Patient reassured that no information could be given without permission  Patient thankful and grateful  He rates depression and anxiety at a 7/10  He denies current hallucinations  He remains medication compliant and denies further needs at this time  Contracts for safety  Will remain on safety precautions and continual monitoring

## 2021-05-04 NOTE — PROGRESS NOTES
Progress Note - Cassidy You 64 y o  male MRN: [id number]    Unit/Bed#[facility] 203-01 Encounter: [id number]        Subjective:   Patient seen and examined at bedside after reviewing the chart and discussing the case with the caring staff  Patient examined at bedside  Patient has no acute issues  Physical Exam   Vitals: Blood pressure 93/57, pulse 101, temperature (!) 97 °F (36 1 °C), temperature source Temporal, resp  rate 18, height 6' 2" (1 88 m), weight 103 kg (226 lb 11 2 oz), SpO2 99 %  ,Body mass index is 29 11 kg/m²  Constitutional: He appears well-developed  HEENT: PERR, EOMI, MMM  Cardiovascular: Normal rate and regular rhythm  Pulmonary/Chest: Effort normal and breath sounds normal    Abdomen: Soft, + BS, NT    Assessment/Plan:  Cassidy You is a(n) 64 y o  male with MDD      1  Hypertension  Continue lisinopril 10 mg daily  2  GERD  Continue pantoprazole 40 mg daily  3  Constipation  Head continue MiraLax daily  4  BPH  Continue Flomax daily with dinner  5  DJD  Patient may receive Tylenol as needed  6  Dermatitis  Patient may use hydrocortisone cream as needed  7  Dry eyes  Patient may use artificial tears as needed  8  Celiac disease  Continue gluten free diet  9  New vitamin-D deficiency  I will put the patient on vitamin-D bolus doses for 8 weeks followed by vitamin D3 1000 units daily  10  New vitamin B12 deficiency  I will put the patient on monthly B12 injections

## 2021-05-04 NOTE — PROGRESS NOTES
05/04/21    Team Meeting   Meeting Type Daily Rounds   Team Members Present   Team Members Present Physician;Nurse;;Occupational Therapist   Physician Team Member Dr Clyde Hughes MD; Juany Hooker, [address] [place] [address]   Nursing Team Member Hiram Ochoa, RN   Care Management Team Member MS Danny, [facility]   OT Team Member Alycia Mesa, [place]   Patient/Family Present   Patient Present No   Patient's Family Present No   Guarded, paranoid about SO, but limited in verbalizing thoughts and feelings  Eating candy  PT declined to speak with SO yesterday  PT reports hearing voices  PT reports depression and anxiety are a 6  BP meds held this am  PT complained of being light headed yesterday checked ok, medical following

## 2021-05-04 NOTE — PROGRESS NOTES
Progress Note - Behavioral Health   Vivi Mead 64 y o  male MRN: [id number]  Unit/Bed#: [facility] 203-01 Encounter: [id number]    Assessment/Plan   Principal Problem:    Severe episode of recurrent major depressive disorder, with psychotic features ([address] )  Active Problems:    Anxiety disorder, unspecified    Gastroesophageal reflux disease without esophagitis    Celiac disease    History of obsessive compulsive disorder    BPH (benign prostatic hyperplasia)    Essential hypertension    DJD (degenerative joint disease)      Behavior over the last 24 hours:  unchanged  Sleep: sleep on and off  Appetite: normal  Medication side effects: No  ROS: no complaints, all other systems are negative for acute changes    Mental Status Evaluation:  Appearance:  age appropriate   Behavior:  cooperative   Speech:  normal volume   Mood:  constricted   Affect:  mood-congruent   Thought Process:  goal directed   Thought Content:  mild paranoia   Perceptual Disturbances: Auditory hallucinations without commands   Risk Potential: Suicidal Ideations none  Homicidal Ideations none  Potential for Aggression No   Sensorium:  person, place and time/date   Memory:  recent and remote memory grossly intact   Consciousness:  alert and awake    Attention: attention span and concentration were age appropriate   Insight:  limited   Judgment: fair   Gait/Station: normal gait/station   Motor Activity: no abnormal movements     Progress Toward Goals: Patient reports depressed mood, anxiety and ongoing auditory hallucination without command at night  Patient admits long history of depression, anxiety and OCD symptoms as well as struggling with auditory hallucination for the past several week prior to this admission  Patient agreed to increase dosage of Risperdal and Trazodone at night  Recommended Treatment: Continue with group therapy, milieu therapy and occupational therapy        Risks, benefits and possible side effects of Medications: Risks, benefits, and possible side effects of medications explained to patient and patient verbalizes understanding  Medications: all current active meds have been reviewed  Increase Risperidal to 2 mg po HS  Labs: I have personally reviewed all pertinent laboratory/tests results  Most Recent Labs:   Lab Results   Component Value Date    WBC 7 90 04/29/2021    RBC 4 82 04/29/2021    HGB 15 4 04/29/2021    HCT 46 1 04/29/2021     04/29/2021    RDW 12 9 04/29/2021    NEUTROABS 5 40 04/29/2021    SODIUM 135 (L) 04/29/2021    K 4 1 04/29/2021     04/29/2021    CO2 27 04/29/2021    BUN 8 04/29/2021    CREATININE 0 71 04/29/2021    GLUC 95 04/29/2021    GLUF 94 02/08/2021    CALCIUM 9 4 04/29/2021    AST 32 04/29/2021    ALT 21 04/29/2021    ALKPHOS 72 04/29/2021    TP 7 8 04/29/2021    ALB 4 5 04/29/2021    TBILI 0 40 04/29/2021    CHOLESTEROL 168 01/18/2021    HDL 41 01/18/2021    TRIG 93 01/18/2021    LDLCALC 108 (H) 01/18/2021    NONHDLC 127 01/18/2021    [id number] 2 000 04/29/2021    RPR Non-Reactive 03/26/2021    HGBA1C 5 6 01/06/2020     01/06/2020       Counseling / Coordination of Care  Total floor / unit time spent today 20 minutes  Greater than 50% of total time was spent with the patient and / or family counseling and / or coordination of care

## 2021-05-04 NOTE — PROGRESS NOTES
Patient observed asleep throughout majority of the night  No signs/symptoms of distress displayed, non-labored breathing noted  No behaviors observed  Will remain on safety precautions and continual monitoring

## 2021-05-05 PROCEDURE — 99233 SBSQ HOSP IP/OBS HIGH 50: CPT | Performed by: PSYCHIATRY & NEUROLOGY

## 2021-05-05 RX ORDER — VENLAFAXINE HYDROCHLORIDE 150 MG/1
150 CAPSULE, EXTENDED RELEASE ORAL DAILY
Status: DISCONTINUED | OUTPATIENT
Start: 2021-05-06 | End: 2021-05-14 | Stop reason: HOSPADM

## 2021-05-05 RX ADMIN — LORAZEPAM 0.5 MG: 0.5 TABLET ORAL at 12:52

## 2021-05-05 RX ADMIN — GABAPENTIN 100 MG: 100 CAPSULE ORAL at 08:50

## 2021-05-05 RX ADMIN — VENLAFAXINE HYDROCHLORIDE 75 MG: 75 CAPSULE, EXTENDED RELEASE ORAL at 08:50

## 2021-05-05 RX ADMIN — RISPERIDONE 2 MG: 2 TABLET ORAL at 20:51

## 2021-05-05 RX ADMIN — OMEGA-3 FATTY ACIDS CAP 1000 MG 1000 MG: 1000 CAP at 08:49

## 2021-05-05 RX ADMIN — TRAZODONE HYDROCHLORIDE 100 MG: 50 TABLET ORAL at 20:51

## 2021-05-05 RX ADMIN — TAMSULOSIN HYDROCHLORIDE 0.4 MG: 0.4 CAPSULE ORAL at 16:21

## 2021-05-05 RX ADMIN — Medication 1 TABLET: at 16:21

## 2021-05-05 RX ADMIN — GABAPENTIN 100 MG: 100 CAPSULE ORAL at 20:51

## 2021-05-05 RX ADMIN — GABAPENTIN 100 MG: 100 CAPSULE ORAL at 16:20

## 2021-05-05 RX ADMIN — Medication 1 TABLET: at 08:49

## 2021-05-05 RX ADMIN — PANTOPRAZOLE SODIUM 40 MG: 40 TABLET, DELAYED RELEASE ORAL at 06:02

## 2021-05-05 RX ADMIN — CITALOPRAM HYDROBROMIDE 40 MG: 20 TABLET ORAL at 08:49

## 2021-05-05 NOTE — PROGRESS NOTES
Progress Note - Behavioral Health   Neil Ochoa 64 y o  male MRN: [id number]  Unit/Bed#: [facility] 203-01 Encounter: [id number]    Assessment/Plan   Principal Problem:    Severe episode of recurrent major depressive disorder, with psychotic features ([address] )  Active Problems:    Anxiety disorder, unspecified    Gastroesophageal reflux disease without esophagitis    Celiac disease    History of obsessive compulsive disorder    BPH (benign prostatic hyperplasia)    Essential hypertension    DJD (degenerative joint disease)      Behavior over the last 24 hours:  unchanged  Sleep:  Normal  Appetite: normal  Medication side effects: No  ROS: no complaints and All other systems negative for acute change     Trevor Menchaca was seen today for follow-up and case discussed with treatment team this morning  Patient was guarded during encounter and would repeatedly apologize  He states that he continues to feel anxious and depressed and rates both as 8/10  He voiced that he is hearing auditory hallucinations of voices in the hallway that he is unable to make out  Voices are non commanding in nature and patient stated it's been going on for about 3-4 months    He denies any visual hallucinations as well as SI/HI  Patient frequently asks staff for candy and he states his appetite is not good    However, no completions have been documented as 100%  Patient has been sleeping well throughout the night  He is often seen pacing on the unit and he keeps to himself      Mental Status Evaluation:  Appearance:  age appropriate and casually dressed   Behavior:  guarded and Apologetic   Speech:  normal pitch, normal volume and Minimal   Mood:  anxious and depressed   Affect:  blunted   Thought Process:  circumstantial   Thought Content:  Suspicious, paranoid   Perceptual Disturbances: Visual hallucinations   Risk Potential: Suicidal Ideations none  Homicidal Ideations none  Potential for Aggression No   Sensorium:  person, place and time/date   Memory:  recent and remote memory grossly intact   Consciousness:  alert and awake    Attention: attention span and concentration were age appropriate   Insight:  Limited   Judgment: fair   Gait/Station: normal gait/station and normal balance   Motor Activity: no abnormal movements     Progress Toward Goals: Will increase Effexor to 150 mg daily for anxiety and depression  Medication adjustments made to Risperdal and trazodone 05/04/2021  Plan is to discharge home upon stabilization; no discharge date yet  Recommended Treatment: Continue with group therapy, milieu therapy and occupational therapy  Risks, benefits and possible side effects of Medications:   Risks, benefits, and possible side effects of medications explained to patient and patient verbalizes understanding  Medications: all current active meds have been reviewed and planned medication changes: Increase Effexor 150mg PO QD  Labs: I have personally reviewed all pertinent laboratory/tests results  Counseling / Coordination of Care  Total floor / unit time spent today 25 minutes  Greater than 50% of total time was spent with the patient and / or family counseling and / or coordination of care

## 2021-05-05 NOTE — PROGRESS NOTES
Patient visible on the unit  Guarded  Pleasant and cooperative  Rated anxiety and depression both at 7-8 with morning medication administration  Denied need for PRN he knows to ask if needed  Denied pain,SI, or HI  Admits to auditory hallucinations but none currently  He last heard the voices earlier this morning   He can't make out what the voices are saying  Lisinopril held due to b/p 93/50  Patient fingernail beds have a  Blue tinge to them  Patient stated they were like this for a few days  Oxygen saturation -99%  Patient denied shortness of breath  Providers made aware  Q 7 minute safety checks maintained

## 2021-05-05 NOTE — PROGRESS NOTES
Patient visible on the unit  Pleasant and cooperative with staff  Smiling and appropriate  Denies SI, HI, and hallucinations  Anxiety and depression 5/10  Complaint with medications and meals  Observed socializing with peers  Q 7 minute checks maintained

## 2021-05-05 NOTE — PLAN OF CARE
Problem: DISCHARGE PLANNING - CARE MANAGEMENT  Goal: Discharge to post-acute care or home with appropriate resources  Description: INTERVENTIONS:  - Conduct assessment to determine patient/family and health care team treatment goals, and need for post-acute services based on payer coverage, community resources, and patient preferences, and barriers to discharge  - Address psychosocial, clinical, and financial barriers to discharge as identified in assessment in conjunction with the patient/family and health care team  - Arrange appropriate level of post-acute services according to patients   needs and preference and payer coverage in collaboration with the physician and health care team  - Communicate with and update the patient/family, physician, and health care team regarding progress on the discharge plan  - Arrange appropriate transportation to post-acute venues  Outcome: Progressing     Pt progressing slowly; no DC date - will return home upon stabilization

## 2021-05-05 NOTE — PROGRESS NOTES
05/05/21   Team Meeting   Meeting Type Daily Rounds   Team Members Present   Team Members Present Physician;Nurse;Occupational Therapist;   Physician Team Member Dr Missy Walls MD; Parul Villarreal, [address] [place] [address]   Nursing Team Member Cassidy Lanier RN   Social Work Team Member Vanessa Lewis [facility]   OT Team Member Angella Beaulieu [place]   Patient/Family Present   Patient Present No   Patient's Family Present No     No DC Date - will dc home upon stabilization; slept; denies pain; anx 7-8/10, dep same; admits AH "voices"; feels meds are not helping; guarded; prn ativan 1339pm - effective; refused trazodone

## 2021-05-05 NOTE — NURSING NOTE
Assumed care of this patient from prior shift nurse at [phone]  Patient is currently in bed, appears to be sleeping  No acute behaviors observed  Will CTM via q7 minute safety checks

## 2021-05-06 PROCEDURE — 99232 SBSQ HOSP IP/OBS MODERATE 35: CPT | Performed by: PSYCHIATRY & NEUROLOGY

## 2021-05-06 RX ADMIN — TAMSULOSIN HYDROCHLORIDE 0.4 MG: 0.4 CAPSULE ORAL at 16:32

## 2021-05-06 RX ADMIN — CITALOPRAM HYDROBROMIDE 40 MG: 20 TABLET ORAL at 08:58

## 2021-05-06 RX ADMIN — OMEGA-3 FATTY ACIDS CAP 1000 MG 1000 MG: 1000 CAP at 08:58

## 2021-05-06 RX ADMIN — Medication 1 TABLET: at 08:58

## 2021-05-06 RX ADMIN — VENLAFAXINE HYDROCHLORIDE 150 MG: 150 CAPSULE, EXTENDED RELEASE ORAL at 08:59

## 2021-05-06 RX ADMIN — Medication 1 TABLET: at 16:32

## 2021-05-06 RX ADMIN — PANTOPRAZOLE SODIUM 40 MG: 40 TABLET, DELAYED RELEASE ORAL at 05:06

## 2021-05-06 RX ADMIN — GABAPENTIN 100 MG: 100 CAPSULE ORAL at 16:32

## 2021-05-06 RX ADMIN — RISPERIDONE 3 MG: 2 TABLET ORAL at 21:53

## 2021-05-06 RX ADMIN — GABAPENTIN 100 MG: 100 CAPSULE ORAL at 08:59

## 2021-05-06 RX ADMIN — GABAPENTIN 100 MG: 100 CAPSULE ORAL at 21:53

## 2021-05-06 RX ADMIN — TRAZODONE HYDROCHLORIDE 100 MG: 50 TABLET ORAL at 21:53

## 2021-05-06 NOTE — PROGRESS NOTES
Patient visible on the unit  Social with select peers  Rated anxiety and depression both at 7-8/10  Admits to auditory hallucinations but none currently  Stated he would notify staff if he needed any PRN medication  Q 7 minute safety checks maintained

## 2021-05-06 NOTE — PROGRESS NOTES
Patient visible in milieu, pleasant and cooperative in interaction  Social with staff and select peers  Patient rated anxiety/depression at 7-8/10, denies SI/HI however endorses auditory hallucinations that he "can't make out"  Remains medication compliant and on 7" checks for safety and behaviors

## 2021-05-06 NOTE — PROGRESS NOTES
Progress Note - Capri Sherman 64 y o  male MRN: [id number]    Unit/Bed#[facility] 203-01 Encounter: [id number]        Subjective:   Patient seen and examined at bedside after reviewing the chart and discussing the case with the caring staff  Patient examined at bedside  Patient reports no acute concerns  Fingernails continue to be blue, with no other abnormalities found on physical exam   SpO2 95% and has been between 95 % and 100% over the past 2 days  Patient denies shortness of breath, chest pain, palpitations, dizziness  Physical Exam   Vitals: Blood pressure 105/60, pulse 80, temperature (!) 97 3 °F (36 3 °C), temperature source Temporal, resp  rate 18, height 6' 2" (1 88 m), weight 104 kg (230 lb 6 1 oz), SpO2 95 %  ,Body mass index is 29 58 kg/m²  Constitutional: He appears well-developed  HEENT: PERR, EOMI, MMM  Cardiovascular: Normal rate and regular rhythm  Pulmonary/Chest: Effort normal and breath sounds normal    Abdomen: Soft, + BS, NT    Assessment/Plan:  Capri Sherman is a(n) 64 y o  male with MDD      1  Hypertension  Continue lisinopril 10 mg daily  2  GERD  Continue pantoprazole 40 mg daily  3  Constipation  Head continue MiraLax daily  4  BPH  Continue Flomax daily with dinner  5  DJD  Patient may receive Tylenol as needed  6  Dermatitis  Patient may use hydrocortisone cream as needed  7  Dry eyes  Patient may use artificial tears as needed  8  Celiac disease  Continue gluten free diet  9  Vitamin-D deficiency  Continue vitamin-D bolus doses for 8 weeks followed by vitamin D3 1000 units daily  10  Vitamin B12 deficiency  Continue monthly B12 injections  11  Discoloration of fingernails  Patient's fingernails are blue with capillary refill less than 3 seconds, no discoloration of hands or fingers, warmth of the hands and fingers, and review of vital signs show stable vitals with pulse ox 95%    Patient denies shortness of breath, chest pain, difficulty breathing  He does not have a history of cardiovascular or pulmonary disease  Examination of bilateral feet and toenails is within normal limits  Discoloration may be due to his blue jeans  Will continue to monitor  The patient was discussed with Dr Oziel Saucedo and he is in agreement with the above note

## 2021-05-06 NOTE — PROGRESS NOTES
Progress Note - Behavioral Health   Amos Mai 64 y o  male MRN: [id number]  Unit/Bed#: [facility] 203-01 Encounter: [id number]    Assessment/Plan   Principal Problem:    Severe episode of recurrent major depressive disorder, with psychotic features ([address] )  Active Problems:    Anxiety disorder, unspecified    Gastroesophageal reflux disease without esophagitis    Celiac disease    History of obsessive compulsive disorder    BPH (benign prostatic hyperplasia)    Essential hypertension    DJD (degenerative joint disease)      Behavior over the last 24 hours:  unchanged  Sleep: normal  Appetite: normal  Medication side effects: No  ROS: no complaints, all other systems are negative for acute change    Mental Status Evaluation:  Appearance:  casually dressed and groomed   Behavior:  calm and cooperative   Speech:  Normal rate, ryhthm, volume   Mood:  "Not too good "   Affect:  constricted   Thought Process:  Linear   Thought Content:  some paranoia   Perceptual Disturbances: Auditory hallucinations without commands   Risk Potential: Suicidal Ideations none  Homicidal Ideations none  Potential for Aggression No   Sensorium:  person and place   Memory:  recent and remote memory grossly intact   Consciousness:  alert    Attention: attention span and concentration were age appropriate   Insight:  poor   Judgment: limited   Gait/Station: normal gait/station   Motor Activity: no abnormal movements     Progress Toward Goals: Patient reports ongoing paranoia and intermittent hallucinations and feels that he is not improving  States that his anxiety and depression remain at a 7/10 and he is unsure if he will improve with medications  Insight into his chronic mental condition remains very poor  Recommended Treatment: Continue with group therapy, milieu therapy and occupational therapy        Risks, benefits and possible side effects of Medications:   Risks, benefits, and possible side effects of medications explained to patient and patient verbalizes understanding  Medications: all current active meds have been reviewed  Increase Risperidal to 3 mg po HS  Labs: I have personally reviewed all pertinent laboratory/tests results  Most Recent Labs:   Lab Results   Component Value Date    WBC 7 90 04/29/2021    RBC 4 82 04/29/2021    HGB 15 4 04/29/2021    HCT 46 1 04/29/2021     04/29/2021    RDW 12 9 04/29/2021    NEUTROABS 5 40 04/29/2021    SODIUM 135 (L) 04/29/2021    K 4 1 04/29/2021     04/29/2021    CO2 27 04/29/2021    BUN 8 04/29/2021    CREATININE 0 71 04/29/2021    GLUC 95 04/29/2021    GLUF 94 02/08/2021    CALCIUM 9 4 04/29/2021    AST 32 04/29/2021    ALT 21 04/29/2021    ALKPHOS 72 04/29/2021    TP 7 8 04/29/2021    ALB 4 5 04/29/2021    TBILI 0 40 04/29/2021    CHOLESTEROL 168 01/18/2021    HDL 41 01/18/2021    TRIG 93 01/18/2021    LDLCALC 108 (H) 01/18/2021    NONHDLC 127 01/18/2021    [id number] 2 000 04/29/2021    RPR Non-Reactive 03/26/2021    HGBA1C 5 6 01/06/2020     01/06/2020       Counseling / Coordination of Care  Total floor / unit time spent today 20 minutes  Greater than 50% of total time was spent with the patient and / or family counseling and / or coordination of care

## 2021-05-06 NOTE — PROGRESS NOTES
Patient visible on the unit  Social with select peers  Guarded with staff  Appetite good  Denies SI, HI, A/V/H  Anxiety and depression low  Complaint with medications and meals  Q 7 minute checks maintained

## 2021-05-06 NOTE — PLAN OF CARE
Problem: DISCHARGE PLANNING - CARE MANAGEMENT  Goal: Discharge to post-acute care or home with appropriate resources  Description: INTERVENTIONS:  - Conduct assessment to determine patient/family and health care team treatment goals, and need for post-acute services based on payer coverage, community resources, and patient preferences, and barriers to discharge  - Address psychosocial, clinical, and financial barriers to discharge as identified in assessment in conjunction with the patient/family and health care team  - Arrange appropriate level of post-acute services according to patients   needs and preference and payer coverage in collaboration with the physician and health care team  - Communicate with and update the patient/family, physician, and health care team regarding progress on the discharge plan  - Arrange appropriate transportation to post-acute venues  Outcome: Progressing     Pt progressing slowly; continues to endorse AH, guarded in presentation; DC to home upon stabilization

## 2021-05-06 NOTE — PROGRESS NOTES
05/06/21    Team Meeting   Meeting Type Daily Rounds   Team Members Present   Team Members Present Physician;Nurse;;Occupational Therapist   Physician Team Member Dr Lindy Milton MD; BETO Andrade   Nursing Team Member Gail Victor RN   Care Management Team Member MS Danny, Paco, [address] [facility]   OT Team Member Sadie Joy [place]   Patient/Family Present   Patient Present No   Patient's Family Present No   Did not sleep well yesterday  Rates anxiety and depression 7/10, reports hearing voices in the am  Denies SI  Flat  Started on double portions  Very apologetic  Often seeking food  Medical following nail beds  Continues to decline family contact

## 2021-05-07 PROCEDURE — 99232 SBSQ HOSP IP/OBS MODERATE 35: CPT | Performed by: NURSE PRACTITIONER

## 2021-05-07 RX ORDER — RISPERIDONE 1 MG/1
1 TABLET, FILM COATED ORAL DAILY
Status: DISCONTINUED | OUTPATIENT
Start: 2021-05-08 | End: 2021-05-09

## 2021-05-07 RX ADMIN — Medication 1 TABLET: at 16:07

## 2021-05-07 RX ADMIN — GABAPENTIN 100 MG: 100 CAPSULE ORAL at 16:07

## 2021-05-07 RX ADMIN — TAMSULOSIN HYDROCHLORIDE 0.4 MG: 0.4 CAPSULE ORAL at 16:07

## 2021-05-07 RX ADMIN — RISPERIDONE 3 MG: 2 TABLET ORAL at 21:04

## 2021-05-07 RX ADMIN — GABAPENTIN 100 MG: 100 CAPSULE ORAL at 08:26

## 2021-05-07 RX ADMIN — PANTOPRAZOLE SODIUM 40 MG: 40 TABLET, DELAYED RELEASE ORAL at 05:49

## 2021-05-07 RX ADMIN — Medication 1 TABLET: at 08:26

## 2021-05-07 RX ADMIN — VENLAFAXINE HYDROCHLORIDE 150 MG: 150 CAPSULE, EXTENDED RELEASE ORAL at 08:26

## 2021-05-07 RX ADMIN — TRAZODONE HYDROCHLORIDE 50 MG: 50 TABLET ORAL at 21:08

## 2021-05-07 RX ADMIN — CITALOPRAM HYDROBROMIDE 40 MG: 20 TABLET ORAL at 08:26

## 2021-05-07 RX ADMIN — LISINOPRIL 10 MG: 10 TABLET ORAL at 08:26

## 2021-05-07 RX ADMIN — OMEGA-3 FATTY ACIDS CAP 1000 MG 1000 MG: 1000 CAP at 08:26

## 2021-05-07 RX ADMIN — GABAPENTIN 100 MG: 100 CAPSULE ORAL at 21:04

## 2021-05-07 NOTE — PLAN OF CARE
Problem: DISCHARGE PLANNING - CARE MANAGEMENT  Goal: Discharge to post-acute care or home with appropriate resources  Description: INTERVENTIONS:  - Conduct assessment to determine patient/family and health care team treatment goals, and need for post-acute services based on payer coverage, community resources, and patient preferences, and barriers to discharge  - Address psychosocial, clinical, and financial barriers to discharge as identified in assessment in conjunction with the patient/family and health care team  - Arrange appropriate level of post-acute services according to patients   needs and preference and payer coverage in collaboration with the physician and health care team  - Communicate with and update the patient/family, physician, and health care team regarding progress on the discharge plan  - Arrange appropriate transportation to post-acute venues  Outcome: Progressing     Pt progressing slowly; MAIDA signed for girlfriend;  No DC date - will return home upon stabilization

## 2021-05-07 NOTE — PROGRESS NOTES
Patient observed sleeping during most Q 7 minute safety checks  Safety maintained via clutter-free environment, ID band, and given non-skid socks  No suicidal ideations, acting out or homicidal behaviors  No change in medical condition or complaints voiced  Fluids maintained at bedside to promote hydration

## 2021-05-07 NOTE — PLAN OF CARE
Problem: Risk for Self Injury/Neglect  Goal: Treatment Goal: Remain safe during length of stay, learn and adopt new coping skills, and be free of self-injurious ideation, impulses and acts at the time of discharge  Outcome: Progressing  Goal: Refrain from harming self  Description: Interventions:  - Monitor patient closely, per order  - Develop a trusting relationship  - Supervise medication ingestion, monitor effects and side effects   Outcome: Progressing  Goal: Verbalize thoughts and feelings  Description: Interventions:  - Assess and re-assess patient's level of risk   - Engage patient in 1:1 interactions, daily, for a minimum of 15 minutes   - Encourage patient to express feelings, fears, frustrations, hopes   Interventions:  - Assess and re-assess patient's lethality and potential for self-injury  - Engage patient in 1:1 interactions, daily, for a minimum of 15 minutes  - Encourage patient to express feelings, fears, frustrations, hopes  - Establish rapport/trust with patient   Outcome: Progressing

## 2021-05-07 NOTE — PROGRESS NOTES
Progress Note - Etienne Hui 64 y o  male MRN: [id number]    Unit/Bed#[facility]-02 Encounter: [id number]        Subjective:   Patient seen and examined at bedside after reviewing the chart and discussing the case with the caring staff  Patient examined at bedside  Patient reports no acute concerns  Fingernails continue to be blue, with no other abnormalities found on physical exam   SpO2 95% and has been between 95 % and 99% over the past 2 days  Patient denies shortness of breath, chest pain, palpitations, dizziness  Physical Exam   Vitals: Blood pressure 119/59, pulse 81, temperature 98 3 °F (36 8 °C), temperature source Temporal, resp  rate 18, height 6' 2" (1 88 m), weight 104 kg (230 lb 6 1 oz), SpO2 95 %  ,Body mass index is 29 58 kg/m²  Constitutional: He appears well-developed  HEENT: PERR, EOMI, MMM  Cardiovascular: Normal rate and regular rhythm  Pulmonary/Chest: Effort normal and breath sounds normal    Abdomen: Soft, + BS, NT    Assessment/Plan:  Etienne Hui is a(n) 64 y o  male with MDD      1  Hypertension  Continue lisinopril 10 mg daily  2  GERD  Continue pantoprazole 40 mg daily  3  Constipation  Head continue MiraLax daily  4  BPH  Continue Flomax daily with dinner  5  DJD  Patient may receive Tylenol as needed  6  Dermatitis  Patient may use hydrocortisone cream as needed  7  Dry eyes  Patient may use artificial tears as needed  8  Celiac disease  Continue gluten free diet  9  Vitamin-D deficiency  Continue vitamin-D bolus doses for 8 weeks followed by vitamin D3 1000 units daily  10  Vitamin B12 deficiency  Continue monthly B12 injections  11  Discoloration of fingernails  Patient's fingernails are blue with capillary refill less than 3 seconds, no discoloration of hands or fingers, warmth of the hands and fingers, and review of vital signs show stable vitals with pulse ox 95%    Patient denies shortness of breath, chest pain, difficulty breathing  He does not have a history of cardiovascular or pulmonary disease  Examination of bilateral feet and toenails is within normal limits  Discoloration may be due to his blue jeans  Will continue to monitor  The patient was discussed with Dr Lisa Garcia and he is in agreement with the above note

## 2021-05-07 NOTE — PROGRESS NOTES
Progress Note - Behavioral Health   Chancey Primrose 64 y o  male MRN: [id number]  Unit/Bed#: [facility] 203-02 Encounter: [id number]    Assessment/Plan   Principal Problem:    Severe episode of recurrent major depressive disorder, with psychotic features ([address] )  Active Problems:    Anxiety disorder, unspecified    Gastroesophageal reflux disease without esophagitis    Celiac disease    History of obsessive compulsive disorder    BPH (benign prostatic hyperplasia)    Essential hypertension    DJD (degenerative joint disease)      Behavior over the last 24 hours:  unchanged  Sleep: poor  Appetite: normal  Medication side effects: No  ROS: no complaints and All other systems negative for acute change     Suresh Sánchez was seen today for follow-up and case discussed with treatment team this morning  Patient was guarded but pleasant upon encounter  He reports he did not get any sleep last night to [facility] voices being too loud    He states he is hearing multiple voices of people telling me to come with them    Patient denies any VH/SI/HI  He reports he is able to contract for safety should he feel he is a danger to himself, but at this time he states the voices aren't telling me to hurt myself    He reports his anxiety and depression are high because of this  His appetite remains adequate with 100% meal completions  Suresh Sánchez is also compliant with medication regimen  He is often visible on unit and frequently asks for candy  He is usually withdrawn to self and does not socialize with peers  Mental Status Evaluation:  Appearance:  disheveled   Behavior:  Guarded, cooperative, apologetic   Speech:  soft and Minimal   Mood:  anxious and depressed   Affect:  blunted   Thought Process:  circumstantial   Thought Content:  Some paranoia   Perceptual Disturbances: Auditory hallucinations with commands Catrachita Muir are telling me to come with them     Risk Potential: Suicidal Ideations none  Homicidal Ideations none  Potential for Aggression No   Sensorium:  person, place and time/date   Memory:  recent and remote memory grossly intact   Consciousness:  alert and awake    Attention: attention span and concentration were age appropriate   Insight:  limited   Judgment: limited   Gait/Station: normal gait/station and normal balance   Motor Activity: no abnormal movements     Progress Toward Goals: Will add 1 mg of Risperdal to medication regimen for psychosis  Will consider switching to Zyprexa should patient still experience auditory hallucinations  Otherwise, continue current psychotropic regimen at this time  No discharge date at this time  Recommended Treatment: Continue with group therapy, milieu therapy and occupational therapy  Risks, benefits and possible side effects of Medications:   Risks, benefits, and possible side effects of medications explained to patient and patient verbalizes understanding  Medications: all current active meds have been reviewed and planned medication changes: will add risperdal 1mg PO QD (0900)  Labs: I have personally reviewed all pertinent laboratory/tests results  Counseling / Coordination of Care  Total floor / unit time spent today 25 minutes  Greater than 50% of total time was spent with the patient and / or family counseling and / or coordination of care

## 2021-05-07 NOTE — SOCIAL WORK
SW met with pt in pt room; pt has no roommate at this time; pt appears flat, depressed, disheveled;  Pt endorses AH command in type - able to report safety on unit; pt denies SI/HI/VH; dep 7, anx 9; pt asked about DC date - SW inquired if patient felt he was ready to discharge - pt replied "not at all"; SW inquired if patient has spoke to sig other -pt replied "I did last night" - pt reports phone call went well and sig other is supportive; pt insuired if SW has contacted SO - SW advised no and inquired if pt wanted SW to contact her - pt replied "not yet"; pt states that he feels safe on the unit but expressed concernd re: "nobody likes me" - SW provided support as needed during conversation; pt asked if SO could bring candy for patient - SW advised SO can bring candy as long as it is individually wrapped and not chocolate - pt expressed understanding; no additional questions or concerns for SW

## 2021-05-07 NOTE — PROGRESS NOTES
05/07/21   Team Meeting   Meeting Type Daily Rounds   Team Members Present   Team Members Present Physician;Nurse;; Other (Discipline and Name); Occupational Therapist   Physician Team Member Dr Jagruti Espinoza MD; Ghislaine Castillo, [address] [place] [address]   Nursing Team Member Dario Contreras, ALIYAH   Social Work Team Member Wanda Porter [facility]   OT Team Member Silver Seip, [facility]   Other (Discipline and Name) Simone Gonzales RN, [facility]   Patient/Family Present   Patient Present No   Patient's Family Present No     No DC date - will return home upon stabilization; anx 7/10; endorses AH - command; dep "on higher end"; denies SI; pleasant, disheveled; slept

## 2021-05-07 NOTE — NURSING NOTE
E [id number]     Pt withdrawn to self  Affect bright on approach  Mood depressed and anxious  + CAH; expressed understanding to come to staff if he feels unsafe or overwhelmed  Denies SI or HI  No acute behavioral issues noted  Q7 min checks ongoing

## 2021-05-07 NOTE — NURSING NOTE
Pt present in milieu for groups and meals  Pt is quiet and keeps to self  Pt is med compliant  Pt rates anxiety and depression 7/10  Pt endorses auditory hallucinations that are command to hurt self but pt CFS and understand they are hallucinations and will come to staff if feeling unsafe  Pt denies SI/HI  Denies pain or discomfort  No acute behaviors noted  Q 7 min safety checks maintained

## 2021-05-08 PROCEDURE — 99232 SBSQ HOSP IP/OBS MODERATE 35: CPT | Performed by: NURSE PRACTITIONER

## 2021-05-08 RX ADMIN — TAMSULOSIN HYDROCHLORIDE 0.4 MG: 0.4 CAPSULE ORAL at 16:35

## 2021-05-08 RX ADMIN — VENLAFAXINE HYDROCHLORIDE 150 MG: 150 CAPSULE, EXTENDED RELEASE ORAL at 08:23

## 2021-05-08 RX ADMIN — TRAZODONE HYDROCHLORIDE 100 MG: 50 TABLET ORAL at 21:15

## 2021-05-08 RX ADMIN — OMEGA-3 FATTY ACIDS CAP 1000 MG 1000 MG: 1000 CAP at 08:24

## 2021-05-08 RX ADMIN — CITALOPRAM HYDROBROMIDE 40 MG: 20 TABLET ORAL at 08:24

## 2021-05-08 RX ADMIN — PANTOPRAZOLE SODIUM 40 MG: 40 TABLET, DELAYED RELEASE ORAL at 05:58

## 2021-05-08 RX ADMIN — Medication 1 TABLET: at 16:35

## 2021-05-08 RX ADMIN — GABAPENTIN 100 MG: 100 CAPSULE ORAL at 16:35

## 2021-05-08 RX ADMIN — RISPERIDONE 1 MG: 1 TABLET ORAL at 08:24

## 2021-05-08 RX ADMIN — RISPERIDONE 3 MG: 2 TABLET ORAL at 21:14

## 2021-05-08 RX ADMIN — Medication 1 TABLET: at 08:24

## 2021-05-08 RX ADMIN — LISINOPRIL 10 MG: 10 TABLET ORAL at 08:24

## 2021-05-08 RX ADMIN — GABAPENTIN 100 MG: 100 CAPSULE ORAL at 08:24

## 2021-05-08 RX ADMIN — GABAPENTIN 100 MG: 100 CAPSULE ORAL at 21:14

## 2021-05-08 NOTE — PLAN OF CARE
Problem: Alteration in Thoughts and Perception  Goal: Treatment Goal: Gain control of psychotic behaviors/thinking, reduce/eliminate presenting symptoms and demonstrate improved reality functioning upon discharge  Outcome: Progressing  Goal: Refrain from acting on delusional thinking/internal stimuli  Description: Interventions:  - Monitor patient closely, per order   - Utilize least restrictive measures   - Set reasonable limits, give positive feedback for acceptable   - Administer medications as ordered and monitor of potential side effects  Outcome: Progressing  Goal: Agree to be compliant with medication regime, as prescribed and report medication side effects  Description: Interventions:  - Offer appropriate PRN medication and supervise ingestion; conduct AIMS, as needed   Outcome: Progressing     Problem: Risk for Self Injury/Neglect  Goal: Treatment Goal: Remain safe during length of stay, learn and adopt new coping skills, and be free of self-injurious ideation, impulses and acts at the time of discharge  Outcome: Progressing  Goal: Refrain from harming self  Description: Interventions:  - Monitor patient closely, per order  - Develop a trusting relationship  - Supervise medication ingestion, monitor effects and side effects   Outcome: Progressing  Goal: Verbalize thoughts and feelings  Description: Interventions:  - Assess and re-assess patient's level of risk   - Engage patient in 1:1 interactions, daily, for a minimum of 15 minutes   - Encourage patient to express feelings, fears, frustrations, hopes   Interventions:  - Assess and re-assess patient's lethality and potential for self-injury  - Engage patient in 1:1 interactions, daily, for a minimum of 15 minutes  - Encourage patient to express feelings, fears, frustrations, hopes  - Establish rapport/trust with patient   Outcome: Progressing     Problem: Depression  Goal: Verbalize thoughts and feelings  Description: Interventions:  - Assess and re-assess patient's level of risk   - Engage patient in 1:1 interactions, daily, for a minimum of 15 minutes   - Encourage patient to express feelings, fears, frustrations, hopes   Interventions:  - Assess and re-assess patient's lethality and potential for self-injury  - Engage patient in 1:1 interactions, daily, for a minimum of 15 minutes  - Encourage patient to express feelings, fears, frustrations, hopes  - Establish rapport/trust with patient   Outcome: Progressing  Goal: Treatment Goal: Demonstrate behavioral control of depressive symptoms, verbalize feelings of improved mood/affect, and adopt new coping skills prior to discharge  Outcome: Progressing  Goal: Refrain from isolation  Description: Interventions:  - Develop a trusting relationship   - Encourage socialization   Outcome: Progressing  Goal: Refrain from self-neglect  Outcome: Progressing  Goal: Complete daily ADLs, including personal hygiene independently, as able  Description: Interventions:  - Observe, teach, and assist patient with ADLS  -  Monitor and promote a balance of rest/activity, with adequate nutrition and elimination   Outcome: Progressing     Problem: Anxiety  Goal: Anxiety is at manageable level  Description: Interventions:  - Assess and monitor patient's anxiety level  - Monitor for signs and symptoms (heart palpitations, chest pain, shortness of breath, headaches, nausea, feeling jumpy, restlessness, irritable, apprehensive)  - Collaborate with interdisciplinary team and initiate plan and interventions as ordered    - [place] patient to unit/surroundings  - Explain treatment plan  - Encourage participation in care  - Encourage verbalization of concerns/fears  - Identify coping mechanisms  - Assist in developing anxiety-reducing skills  - Administer/offer alternative therapies  - Limit or eliminate stimulants  Outcome: Progressing     Problem: Ineffective Coping  Goal: Participates in unit activities  Description: Interventions:  - Provide therapeutic environment   - Provide required programming   - Redirect inappropriate behaviors   Outcome: Progressing  Goal: Cooperates with admission process  Description: Interventions:   - Complete admission process  Outcome: Progressing  Goal: Identifies healthy coping skills  Outcome: Progressing  Goal: Patient/Family participate in treatment and DC plans  Description: Interventions:  - Provide therapeutic environment  Outcome: Progressing  Goal: Patient/Family verbalizes awareness of resources  Outcome: Progressing

## 2021-05-08 NOTE — PROGRESS NOTES
Progress Note - Chancey Primrose 64 y o  male MRN: [id number]    Unit/Bed#: [facility] 203-02 Encounter: [id number]      Assessment:  1  Hypertension   Adequately controlled on lisinopril 10 mg  2  GERD  Asymptomatic on pantoprazole 40 mg   3  Constipation   MiraLax  4  BPH   Flomax  5  DJD   P r n  Tylenol  6  Dermatitis   Patient may use hydrocortisone cream as needed  7  Dry eyes   Treat symptomatically with artificial tears  8  Celiac disease  Gluten free diet  9  Vitamin-D deficiency  supplemented  10  Vitamin B12 deficiency  supplemented  11  Discoloration of fingernails  I was asked to follow this in covering the medical team for the weekend  Will continue to follow    Plan:  See above    Subjective:   None    Objective:     Vitals: Blood pressure 100/53, pulse 60, temperature 97 5 °F (36 4 °C), temperature source Temporal, resp  rate 16, height 6' 2" (1 88 m), weight 104 kg (230 lb), SpO2 93 %  ,Body mass index is 29 53 kg/m²        Intake/Output Summary (Last 24 hours) at 5/8/2021 1806  Last data filed at 5/8/2021 1722  Gross per 24 hour   Intake 600 ml   Output --   Net 600 ml       Physical Exam: /53 (BP Location: Left arm)   Pulse 60   Temp 97 5 °F (36 4 °C) (Temporal)   Resp 16   Ht 6' 2" (1 88 m)   Wt 104 kg (230 lb)   SpO2 93%   BMI 29 53 kg/m²     General Appearance:    Alert, cooperative, no distress, appears stated age   Head:    Normocephalic, without obvious abnormality, atraumatic                   Neck:   Supple, symmetrical, trachea midline, no adenopathy;        thyroid:  No enlargement/tenderness/nodules; no carotid    bruit or JVD   Back:     Symmetric, no curvature, ROM normal, no CVA tenderness   Lungs:     Clear to auscultation bilaterally, respirations unlabored   Chest wall:    No tenderness or deformity   Heart:    Regular rate and rhythm, S1 and S2 normal, no murmur, rub   or gallop   Abdomen:     Soft, non-tender, bowel sounds active all four quadrants,     no masses, no organomegaly           Extremities:   Extremities normal, atraumatic, no cyanosis or edema   Pulses:   2+ and symmetric all extremities   Skin:   Skin color, texture, turgor normal, no rashes or lesions   Lymph nodes:   Cervical, supraclavicular, and axillary nodes normal   Neurologic:           Invasive Devices     None                 Lab, Imaging and other studies: I have personally reviewed pertinent reports

## 2021-05-08 NOTE — PROGRESS NOTES
Progress Note - Behavioral Health   José Thapa 64 y o  male MRN: [id number]  Unit/Bed#: [facility] 203-02 Encounter: [id number]    Assessment/Plan   Principal Problem:    Severe episode of recurrent major depressive disorder, with psychotic features ([address] )  Active Problems:    Anxiety disorder, unspecified    Gastroesophageal reflux disease without esophagitis    Celiac disease    History of obsessive compulsive disorder    BPH (benign prostatic hyperplasia)    Essential hypertension    DJD (degenerative joint disease)      Behavior over the last 24 hours:  unchanged  Sleep: improved  Appetite: normal  Medication side effects: No  ROS: no complaints and All other systems negative for acute change     Kushal Alonzo was seen today for follow-up and case discussed with charge nurse this morning  Upon encounter, Kushal Alonzo states he is not well    However, he did report that his sleep was better last night any is hearing less voices  He is still unable to describe the voices but he said they are no longer commanding in nature  Patient denies any VH/SI/HI  He is able to contract for safety on unit should commanding auditory hallucinations return  His appetite remains adequate any is compliant with medication regimen  Kushal Alonzo rates his anxiety and depression as 7/10  He is often visible in the milieu but keeps to himself  He continues to frequently ask staff for candy and is apologetic with encounters  Mental Status Evaluation:  Appearance:  age appropriate and casually dressed   Behavior:  Somewhat guarded, suspicious, apologetic   Speech:  soft and Minimal   Mood:  anxious and Not well   Affect:  blunted   Thought Process:  circumstantial   Thought Content:  Some paranoia   Perceptual Disturbances:  Auditory hallucinations without commands   Risk Potential: Suicidal Ideations none  Homicidal Ideations none  Potential for Aggression no   Sensorium:  No   Memory:  recent and remote memory grossly intact   Consciousness: alert and awake    Attention: Attention span and concentration were age appropriate   Insight:  limited   Judgment: limited   Gait/Station: normal gait/station and normal balance   Motor Activity: no abnormal movements     Progress Toward Goals:  Slight improvement  Risperdal 1 mg added to daily regimen in addition to 3 mg at HS  Will continue with current psychotropic regimen  Patient to return home upon stabilization  No discharge date at this time  Recommended Treatment: Continue with group therapy, milieu therapy and occupational therapy  Risks, benefits and possible side effects of Medications:   Risks, benefits, and possible side effects of medications explained to patient and patient verbalizes understanding  Medications: all current active meds have been reviewed and continue current psychiatric medications  Labs: I have personally reviewed all pertinent laboratory/tests results  Counseling / Coordination of Care  Total floor / unit time spent today 25 minutes  Greater than 50% of total time was spent with the patient and / or family counseling and / or coordination of care

## 2021-05-08 NOTE — NURSING NOTE
[id number]  Pt found to be resting quietly on most of authors rounds  No acute behavioral issues noted  Q 7 min checks maintained  Fall protocol in place

## 2021-05-08 NOTE — PROGRESS NOTES
Patient compliant with medications and meals  Pleasant and cooperative  Patient reports increased anxiety and depression  Denies any SI/HI  Patient came to nurses station and c/o of feeling lightheaded, vital signs obtain  /66 and 86 HR  Patient was reminded to take his time when getting up and to come to nurses station if it happen again  No other complains this shift  Will continue to monitor   Q 7 mins checks in place

## 2021-05-09 PROCEDURE — 99232 SBSQ HOSP IP/OBS MODERATE 35: CPT | Performed by: NURSE PRACTITIONER

## 2021-05-09 RX ORDER — RISPERIDONE 2 MG/1
2 TABLET, FILM COATED ORAL DAILY
Status: DISCONTINUED | OUTPATIENT
Start: 2021-05-10 | End: 2021-05-14 | Stop reason: HOSPADM

## 2021-05-09 RX ADMIN — OMEGA-3 FATTY ACIDS CAP 1000 MG 1000 MG: 1000 CAP at 08:00

## 2021-05-09 RX ADMIN — Medication 1 TABLET: at 07:59

## 2021-05-09 RX ADMIN — TRAZODONE HYDROCHLORIDE 100 MG: 50 TABLET ORAL at 20:59

## 2021-05-09 RX ADMIN — Medication 1 TABLET: at 15:50

## 2021-05-09 RX ADMIN — RISPERIDONE 1 MG: 1 TABLET ORAL at 08:00

## 2021-05-09 RX ADMIN — CITALOPRAM HYDROBROMIDE 40 MG: 20 TABLET ORAL at 08:00

## 2021-05-09 RX ADMIN — TAMSULOSIN HYDROCHLORIDE 0.4 MG: 0.4 CAPSULE ORAL at 15:50

## 2021-05-09 RX ADMIN — GABAPENTIN 100 MG: 100 CAPSULE ORAL at 08:00

## 2021-05-09 RX ADMIN — ERGOCALCIFEROL 50000 UNITS: 1.25 CAPSULE, LIQUID FILLED ORAL at 08:00

## 2021-05-09 RX ADMIN — PANTOPRAZOLE SODIUM 40 MG: 40 TABLET, DELAYED RELEASE ORAL at 06:16

## 2021-05-09 RX ADMIN — VENLAFAXINE HYDROCHLORIDE 150 MG: 150 CAPSULE, EXTENDED RELEASE ORAL at 08:00

## 2021-05-09 RX ADMIN — GABAPENTIN 100 MG: 100 CAPSULE ORAL at 21:00

## 2021-05-09 RX ADMIN — GABAPENTIN 100 MG: 100 CAPSULE ORAL at 15:50

## 2021-05-09 RX ADMIN — RISPERIDONE 3 MG: 2 TABLET ORAL at 21:00

## 2021-05-09 NOTE — PLAN OF CARE
Problem: Alteration in Thoughts and Perception  Goal: Treatment Goal: Gain control of psychotic behaviors/thinking, reduce/eliminate presenting symptoms and demonstrate improved reality functioning upon discharge  Outcome: Progressing  Goal: Refrain from acting on delusional thinking/internal stimuli  Description: Interventions:  - Monitor patient closely, per order   - Utilize least restrictive measures   - Set reasonable limits, give positive feedback for acceptable   - Administer medications as ordered and monitor of potential side effects  Outcome: Progressing  Goal: Agree to be compliant with medication regime, as prescribed and report medication side effects  Description: Interventions:  - Offer appropriate PRN medication and supervise ingestion; conduct AIMS, as needed   Outcome: Progressing     Problem: Risk for Self Injury/Neglect  Goal: Treatment Goal: Remain safe during length of stay, learn and adopt new coping skills, and be free of self-injurious ideation, impulses and acts at the time of discharge  Outcome: Progressing  Goal: Refrain from harming self  Description: Interventions:  - Monitor patient closely, per order  - Develop a trusting relationship  - Supervise medication ingestion, monitor effects and side effects   Outcome: Progressing  Goal: Verbalize thoughts and feelings  Description: Interventions:  - Assess and re-assess patient's level of risk   - Engage patient in 1:1 interactions, daily, for a minimum of 15 minutes   - Encourage patient to express feelings, fears, frustrations, hopes   Interventions:  - Assess and re-assess patient's lethality and potential for self-injury  - Engage patient in 1:1 interactions, daily, for a minimum of 15 minutes  - Encourage patient to express feelings, fears, frustrations, hopes  - Establish rapport/trust with patient   Outcome: Progressing     Problem: Depression  Goal: Verbalize thoughts and feelings  Description: Interventions:  - Assess and re-assess patient's level of risk   - Engage patient in 1:1 interactions, daily, for a minimum of 15 minutes   - Encourage patient to express feelings, fears, frustrations, hopes   Interventions:  - Assess and re-assess patient's lethality and potential for self-injury  - Engage patient in 1:1 interactions, daily, for a minimum of 15 minutes  - Encourage patient to express feelings, fears, frustrations, hopes  - Establish rapport/trust with patient   Outcome: Progressing

## 2021-05-09 NOTE — PROGRESS NOTES
Progress Note - Behavioral Health   Snaps 64 y o  male MRN: [id number]  Unit/Bed#: [facility] 203-02 Encounter: [id number]    Assessment/Plan   Principal Problem:    Severe episode of recurrent major depressive disorder, with psychotic features ([address] )  Active Problems:    Anxiety disorder, unspecified    Gastroesophageal reflux disease without esophagitis    Celiac disease    History of obsessive compulsive disorder    BPH (benign prostatic hyperplasia)    Essential hypertension    DJD (degenerative joint disease)      Behavior over the last 24 hours:  unchanged  Sleep: normal  Appetite: normal  Medication side effects: No  ROS: no complaints and All other systems negative for acute change     Ange Beaver was seen today for psychiatric follow-up  He continues to endorse auditory hallucinations that are non commanding in nature  Patient states I cannot really understand what they are saying    He denies any visual hallucinations as well as SI/HI  He states his anxiety and depression are "high" and rates both as 7/10  Despite increased anxiety and depression, patient was able to sleep last night and his appetite remains adequate  He is often at the nursing station asking for candy  Ange Beaver does appear internally preoccupied when visible in the milieu and he is often apologetic when he encounters staff  He is compliant with medication regimen and denies any side effects  Mental Status Evaluation:  Appearance:  disheveled   Behavior:  Withdrawn to self, cooperative, apologetic   Speech:  soft and Minimal   Mood:  anxious and depressed   Affect:  flat   Thought Process:  circumstantial   Thought Content:  Some paranoia   Perceptual Disturbances:  Auditory hallucinations without commands   Risk Potential: Suicidal Ideations none  Homicidal Ideations none  Potential for Aggression No   Sensorium:  person, place and time/date   Memory:  recent and remote memory grossly intact   Consciousness:  alert and awake Attention: attention span and concentration were age appropriate   Insight:  limited   Judgment: limited   Gait/Station: normal gait/station and normal balance   Motor Activity: no abnormal movements     Progress Toward Goals: Will increase a m  dose of Risperdal to 2 mg; receiving total of 5 mg of Risperdal daily  Will consider tapering from Risperdal and initiating Zyprexa should there be no improvement in auditory hallucinations and paranoia  Patient is to return home upon stabilization  No discharge date at this time  Recommended Treatment: Continue with group therapy, milieu therapy and occupational therapy  Risks, benefits and possible side effects of Medications:   Risks, benefits, and possible side effects of medications explained to patient and patient verbalizes understanding  Medications: all current active meds have been reviewed and planned medication changes: Increase Risperdal 2mg PO QD (0900)  Labs: I have personally reviewed all pertinent laboratory/tests results  Counseling / Coordination of Care  Total floor / unit time spent today 25 minutes  Greater than 50% of total time was spent with the patient and / or family counseling and / or coordination of care

## 2021-05-09 NOTE — PROGRESS NOTES
Progress Note - Tamara Wall 64 y o  male MRN: [id number]    Unit/Bed#: [facility] 210-02 Encounter: [id number]      Assessment:  Vital signs stable  1  Hypertension   Adequately controlled on lisinopril 10 mg  2  GERD  Asymptomatic on pantoprazole 40 mg   3  Constipation   MiraLax  4  BPH   Flomax  5  DJD   P r n  Tylenol  6  Dermatitis   Patient may use hydrocortisone cream as needed  7  Dry eyes   Treat symptomatically with artificial tears  8  Celiac disease  Gluten free diet  9  Vitamin-D deficiency    supplemented  10  Vitamin B12 deficiency    supplemented       Plan:  As above    Subjective:   No subjective complaints    Objective:     Vitals: Blood pressure 104/63, pulse 59, temperature 98 °F (36 7 °C), temperature source Temporal, resp  rate 16, height 6' 2" (1 88 m), weight 104 kg (230 lb), SpO2 98 %  ,Body mass index is 29 53 kg/m²        Intake/Output Summary (Last 24 hours) at 5/9/2021 1602  Last data filed at 5/8/2021 1800  Gross per 24 hour   Intake 720 ml   Output --   Net 720 ml       Physical Exam: /63 (BP Location: Left arm)   Pulse 59   Temp 98 °F (36 7 °C) (Temporal)   Resp 16   Ht 6' 2" (1 88 m)   Wt 104 kg (230 lb)   SpO2 98%   BMI 29 53 kg/m²     General Appearance:    Alert, cooperative, no distress, appears stated age   Head:    Normocephalic, without obvious abnormality, atraumatic                   Neck:   Supple, symmetrical, trachea midline, no adenopathy;        thyroid:  No enlargement/tenderness/nodules; no carotid    bruit or JVD   Back:     Symmetric, no curvature, ROM normal, no CVA tenderness   Lungs:     Clear to auscultation bilaterally, respirations unlabored   Chest wall:    No tenderness or deformity   Heart:    Regular rate and rhythm, S1 and S2 normal, no murmur, rub   or gallop   Abdomen:     Soft, non-tender, bowel sounds active all four quadrants,     no masses, no organomegaly           Extremities:   Extremities normal, atraumatic, no cyanosis or edema Pulses:   2+ and symmetric all extremities   Skin:   Skin color, texture, turgor normal, no rashes or lesions   Lymph nodes:   Cervical, supraclavicular, and axillary nodes normal            Invasive Devices     None                 Lab, Imaging and other studies: I have personally reviewed pertinent reports

## 2021-05-09 NOTE — NURSING NOTE
[id number]  Pt found to be resting quietly on most of authors rounds  No acute behavioral issues noted  Q 7 min checks maintained  Fall protocol in place

## 2021-05-09 NOTE — NURSING NOTE
E [id number]   Pt withdrawn to self  Affect bland  poliete and cooperative  Continues to report no change in mood/ abnormal internal stimuli  Reports feeling safe on unit  No acute behavioral issue noted  Q 7 min checks ongoing

## 2021-05-09 NOTE — PROGRESS NOTES
Patient compliant with medications and meals  Patient pleasant and cooperative  Patient stated he continues to hear voices but they do not tell him anything specific  Patient denies any SI/HI  Reported high depression and anxiety  Positive for groups  No other concerns or problems reported this shift  Q 7 mins checks in place for safety  Will continue to monitor for behaviors and changes

## 2021-05-10 PROCEDURE — 99232 SBSQ HOSP IP/OBS MODERATE 35: CPT | Performed by: PSYCHIATRY & NEUROLOGY

## 2021-05-10 RX ORDER — CITALOPRAM 20 MG/1
20 TABLET ORAL DAILY
Status: DISCONTINUED | OUTPATIENT
Start: 2021-05-11 | End: 2021-05-14 | Stop reason: HOSPADM

## 2021-05-10 RX ADMIN — Medication 1 TABLET: at 08:17

## 2021-05-10 RX ADMIN — VENLAFAXINE HYDROCHLORIDE 150 MG: 150 CAPSULE, EXTENDED RELEASE ORAL at 08:17

## 2021-05-10 RX ADMIN — RISPERIDONE 3 MG: 2 TABLET ORAL at 21:31

## 2021-05-10 RX ADMIN — PANTOPRAZOLE SODIUM 40 MG: 40 TABLET, DELAYED RELEASE ORAL at 06:25

## 2021-05-10 RX ADMIN — TAMSULOSIN HYDROCHLORIDE 0.4 MG: 0.4 CAPSULE ORAL at 16:22

## 2021-05-10 RX ADMIN — CITALOPRAM HYDROBROMIDE 40 MG: 20 TABLET ORAL at 08:17

## 2021-05-10 RX ADMIN — RISPERIDONE 2 MG: 2 TABLET ORAL at 08:17

## 2021-05-10 RX ADMIN — GABAPENTIN 100 MG: 100 CAPSULE ORAL at 16:22

## 2021-05-10 RX ADMIN — TRAZODONE HYDROCHLORIDE 100 MG: 50 TABLET ORAL at 21:31

## 2021-05-10 RX ADMIN — GABAPENTIN 100 MG: 100 CAPSULE ORAL at 21:31

## 2021-05-10 RX ADMIN — GABAPENTIN 100 MG: 100 CAPSULE ORAL at 08:17

## 2021-05-10 RX ADMIN — OMEGA-3 FATTY ACIDS CAP 1000 MG 1000 MG: 1000 CAP at 08:16

## 2021-05-10 RX ADMIN — LISINOPRIL 10 MG: 10 TABLET ORAL at 08:17

## 2021-05-10 RX ADMIN — Medication 1 TABLET: at 16:22

## 2021-05-10 NOTE — NURSING NOTE
[id number]     Pt present in the milieu; affect bright blunt mood depressed and anxious  Continues to report AH; denies thought or intent to harm self  NO acute behavioral issues noted  Q 7 min checks ongoing

## 2021-05-10 NOTE — PROGRESS NOTES
05/10/21    Team Meeting   Meeting Type Daily Rounds   Team Members Present   Team Members Present Physician;Nurse;;Occupational Therapist   Physician Team Member Dr Lexx Zuniga MD   Nursing Team Member Dale Hammans, ALIYAH   Care Management Team Member MS Danny, [facility]   OT Team Member Lisabeth Habermann, [place]   Patient/Family Present   Patient Present No   Patient's Family Present No   PT will return home upon stabilization  No D/C date, will follow up with outpatient psych services  Was endorsing AH on Friday  Slept  Reports anxiety and depression are high, report SH

## 2021-05-10 NOTE — PROGRESS NOTES
Progress Note - Franchesca Evangelista 64 y o  male MRN: [id number]    Unit/Bed#[facility]02 Encounter: [id number]        Subjective:   Patient seen and examined at bedside after reviewing the chart and discussing the case with the caring staff  Patient examined at bedside  Patient reports no acute concerns  Physical Exam   Vitals: Blood pressure 113/67, pulse 99, temperature 98 °F (36 7 °C), temperature source Temporal, resp  rate 18, height 6' 2" (1 88 m), weight 104 kg (230 lb), SpO2 97 %  ,Body mass index is 29 53 kg/m²  Constitutional: He appears well-developed  HEENT: PERR, EOMI, MMM  Cardiovascular: Normal rate and regular rhythm  Pulmonary/Chest: Effort normal and breath sounds normal    Abdomen: Soft, + BS, NT    Assessment/Plan:  Franchesca Evangelista is a(n) 64 y o  male with MDD      1  Hypertension  Continue lisinopril 10 mg daily  2  GERD  Continue pantoprazole 40 mg daily  3  Constipation  Head continue MiraLax daily  4  BPH  Continue Flomax daily with dinner  5  DJD  Patient may receive Tylenol as needed  6  Dermatitis  Patient may use hydrocortisone cream as needed  7  Dry eyes  Patient may use artificial tears as needed  8  Celiac disease  Continue gluten free diet  9  Vitamin-D deficiency  Continue vitamin-D bolus doses for 8 weeks followed by vitamin D3 1000 units daily  10  Vitamin B12 deficiency  Continue monthly B12 injections  11  Discoloration of fingernails  Patient's fingernails are blue with capillary refill less than 3 seconds, no discoloration of hands or fingers, warmth of the hands and fingers, and review of vital signs show stable vitals with pulse ox 95%  Patient denies shortness of breath, chest pain, difficulty breathing  He does not have a history of cardiovascular or pulmonary disease  Examination of bilateral feet and toenails is within normal limits  Discoloration may be due to his blue jeans  Will continue to monitor

## 2021-05-10 NOTE — NURSING NOTE
[id number]  Pt found to be resting quietly on most of authors rounds  No acute behavioral issues noted  Q 7 min checks maintained  Fall protocol in place

## 2021-05-10 NOTE — PROGRESS NOTES
Patient compliant with medications and meals  Pleasant and cooperative  Patient denies any SI/HI  Patient rated his anxiety and depression as "high" continues to hear voices but does not know what they are saying  Denies any pain or any other problems  Q 7 mins checks in place for safety  Will continue to monitor for behaviors and changes

## 2021-05-10 NOTE — SOCIAL WORK
SW met with patient in [facility]; pt denies SI/HI/VH, dep "high", anxiety "up there"; endorses AH -- non command - voices "but I can't understand what they are saying to me"; pt and SW discussed DC planning - pt states "I want to go home but I don't think I'm ready to go home"; pt had no questions or concerns for SW; SW will continue to meet with patient as needed for tx and dc planning

## 2021-05-10 NOTE — PROGRESS NOTES
Progress Note - Behavioral Health   Cassidy You 64 y o  male MRN: [id number]  Unit/Bed#: [facility] 210-02 Encounter: [id number]    Assessment/Plan   Principal Problem:    Severe episode of recurrent major depressive disorder, with psychotic features ([address] )  Active Problems:    Anxiety disorder, unspecified    Gastroesophageal reflux disease without esophagitis    Celiac disease    History of obsessive compulsive disorder    BPH (benign prostatic hyperplasia)    Essential hypertension    DJD (degenerative joint disease)      Behavior over the last 24 hours:  unchanged  Sleep: improving  Appetite: normal  Medication side effects: No  ROS: no complaints, all other systems are negative for acute changes    Mental Status Evaluation:  Appearance:  casually dressed   Behavior:  Calm and cooperative   Speech:  Normal rate, rhythm, volume  Mood:  "I don't feel too good today "   Affect:  constricted   Thought Process:  Linear   Thought Content:  [place] apologetic, concern that he isnt feeling better   Perceptual Disturbances: Auditory hallucinations with commands "Come here"   Risk Potential: Suicidal Ideations none  Homicidal Ideations none  Potential for Aggression No   Sensorium:  person, place and situation   Memory:  recent and remote memory grossly intact   Consciousness:  alert, awake    Attention: attention span and concentration were age appropriate   Insight:  poor   Judgment: limited   Gait/Station: normal gait/station   Motor Activity: no abnormal movements     Progress Toward Goals: Patient reports feeling anxious, paranoid at times and that he is not making much progress  He states that he does not sleep well and has minimal appetite  He also claims his depression is still " bad" rates it a 7/10  He talks to his girlfriend daily  He attends group therapy and feels that it is helping  Patient has been compliant with medication and denies any current side effects       Recommended Treatment: Continue with group therapy, milieu therapy and occupational therapy  Risks, benefits and possible side effects of Medications:   Risks, benefits, and possible side effects of medications explained to patient and patient verbalizes understanding  Medications: all current active meds have been reviewed  Labs: I have personally reviewed all pertinent laboratory/tests results  Most Recent Labs:   Lab Results   Component Value Date    WBC 7 90 04/29/2021    RBC 4 82 04/29/2021    HGB 15 4 04/29/2021    HCT 46 1 04/29/2021     04/29/2021    RDW 12 9 04/29/2021    NEUTROABS 5 40 04/29/2021    SODIUM 135 (L) 04/29/2021    K 4 1 04/29/2021     04/29/2021    CO2 27 04/29/2021    BUN 8 04/29/2021    CREATININE 0 71 04/29/2021    GLUC 95 04/29/2021    GLUF 94 02/08/2021    CALCIUM 9 4 04/29/2021    AST 32 04/29/2021    ALT 21 04/29/2021    ALKPHOS 72 04/29/2021    TP 7 8 04/29/2021    ALB 4 5 04/29/2021    TBILI 0 40 04/29/2021    CHOLESTEROL 168 01/18/2021    HDL 41 01/18/2021    TRIG 93 01/18/2021    LDLCALC 108 (H) 01/18/2021    NONHDLC 127 01/18/2021    [id number] 2 000 04/29/2021    RPR Non-Reactive 03/26/2021    HGBA1C 5 6 01/06/2020     01/06/2020       Counseling / Coordination of Care  Total floor / unit time spent today 20 minutes  Greater than 50% of total time was spent with the patient and / or family counseling and / or coordination of care

## 2021-05-11 PROCEDURE — 99232 SBSQ HOSP IP/OBS MODERATE 35: CPT | Performed by: PSYCHIATRY & NEUROLOGY

## 2021-05-11 RX ORDER — RISPERIDONE 2 MG/1
4 TABLET, FILM COATED ORAL
Status: DISCONTINUED | OUTPATIENT
Start: 2021-05-11 | End: 2021-05-13

## 2021-05-11 RX ADMIN — Medication 1 TABLET: at 08:39

## 2021-05-11 RX ADMIN — VENLAFAXINE HYDROCHLORIDE 150 MG: 150 CAPSULE, EXTENDED RELEASE ORAL at 08:38

## 2021-05-11 RX ADMIN — CITALOPRAM HYDROBROMIDE 20 MG: 20 TABLET ORAL at 08:38

## 2021-05-11 RX ADMIN — TAMSULOSIN HYDROCHLORIDE 0.4 MG: 0.4 CAPSULE ORAL at 16:09

## 2021-05-11 RX ADMIN — RISPERIDONE 2 MG: 2 TABLET ORAL at 08:38

## 2021-05-11 RX ADMIN — RISPERIDONE 4 MG: 2 TABLET ORAL at 21:16

## 2021-05-11 RX ADMIN — Medication 1 TABLET: at 16:09

## 2021-05-11 RX ADMIN — GABAPENTIN 100 MG: 100 CAPSULE ORAL at 08:38

## 2021-05-11 RX ADMIN — ALUMINUM HYDROXIDE, MAGNESIUM HYDROXIDE, AND SIMETHICONE 30 ML: 200; 200; 20 SUSPENSION ORAL at 09:52

## 2021-05-11 RX ADMIN — TRAZODONE HYDROCHLORIDE 100 MG: 50 TABLET ORAL at 21:16

## 2021-05-11 RX ADMIN — OMEGA-3 FATTY ACIDS CAP 1000 MG 1000 MG: 1000 CAP at 08:39

## 2021-05-11 RX ADMIN — PANTOPRAZOLE SODIUM 40 MG: 40 TABLET, DELAYED RELEASE ORAL at 06:01

## 2021-05-11 NOTE — PROGRESS NOTES
Pt present in his room at time of assessment however he came out and went to get snack before retreating back into his room  Pt gf called for a status update and was given one as per MAIDA  Pt continues to report auditory hallucinations and states that they have improved from hearing voices giving him commands, to now all he hears is noise and can't make it out  Pt gf suggested to pt that he should ask the Dr  forContinuous visual safety checks performed throughout the shift  Safety precautions maintained  Will continue to monitor  Zyprexa, believing the Risperdal isn't going to work  Pt educated about the difference of the medications and explained that all medications work differently for different people  Pt urged to discuss his hallucinations with the doctor and see if med changes are necessary  Pt stated that he, "Loves Dr Yael Vu" and that he's quite happy he has him as his psych Dr  While he's here  Pt denied si, hi, ah, vh at this time  Pt reports no pain, stated his anxiety is around a 7/10 and depression is a 9/10  Pt went to sleep after medication administration

## 2021-05-11 NOTE — NURSING NOTE
Pt present in milieu for group and meal  Pt affect is flat and mood is depressed  Pt rates anxiety and depression high  Denies SI/HI/AH/VH this morning  Pt denied and pain or discomfort  Lisinopril help for sbp 103  Pt felt dizzy upon standing during group, vitals stable sbp 107, educated pt to stand slowly and wait a few second before walking if symptoms worsen then sit back down  Pt encouraged to increase fluid intake, water/juice provided  No other complaints offered  Pt overly apologizes and feels accused  Reassured pt no one is mad at him and we are here to support him  Pt thankful  Q 7 min safety checks maintained

## 2021-05-11 NOTE — PLAN OF CARE
Problem: Alteration in Thoughts and Perception  Goal: Treatment Goal: Gain control of psychotic behaviors/thinking, reduce/eliminate presenting symptoms and demonstrate improved reality functioning upon discharge  Outcome: Progressing  Goal: Refrain from acting on delusional thinking/internal stimuli  Description: Interventions:  - Monitor patient closely, per order   - Utilize least restrictive measures   - Set reasonable limits, give positive feedback for acceptable   - Administer medications as ordered and monitor of potential side effects  Outcome: Progressing  Goal: Agree to be compliant with medication regime, as prescribed and report medication side effects  Description: Interventions:  - Offer appropriate PRN medication and supervise ingestion; conduct AIMS, as needed   Outcome: Progressing

## 2021-05-11 NOTE — PROGRESS NOTES
Progress Note - Kati Casas 64 y o  male MRN: [id number]    Unit/Bed#[facility]  210-02 Encounter: [id number]        Subjective:   Patient seen and examined at bedside after reviewing the chart and discussing the case with the caring staff  Patient examined at bedside  Patient reports no acute concerns  Physical Exam   Vitals: Blood pressure 103/56, pulse 87, temperature 97 7 °F (36 5 °C), temperature source Temporal, resp  rate 18, height 6' 2" (1 88 m), weight 104 kg (230 lb), SpO2 96 %  ,Body mass index is 29 53 kg/m²  Constitutional: He appears well-developed  HEENT: PERR, EOMI, MMM  Cardiovascular: Normal rate and regular rhythm  Pulmonary/Chest: Effort normal and breath sounds normal    Abdomen: Soft, + BS, NT    Assessment/Plan:  Kati Casas is a(n) 64 y o  male with MDD      1  Hypertension  Continue lisinopril 10 mg daily  2  GERD  Continue pantoprazole 40 mg daily  3  Constipation  Head continue MiraLax daily  4  BPH  Continue Flomax daily with dinner  5  DJD  Patient may receive Tylenol as needed  6  Dermatitis  Patient may use hydrocortisone cream as needed  7  Dry eyes  Patient may use artificial tears as needed  8  Celiac disease  Continue gluten free diet  9  Vitamin-D deficiency  Continue vitamin-D bolus doses for 8 weeks followed by vitamin D3 1000 units daily  10  Vitamin B12 deficiency  Continue monthly B12 injections  11  Discoloration of fingernails  Patient's fingernails are blue with capillary refill less than 3 seconds, no discoloration of hands or fingers, warmth of the hands and fingers, and review of vital signs show stable vitals with pulse ox 95%  Patient denies shortness of breath, chest pain, difficulty breathing  He does not have a history of cardiovascular or pulmonary disease  Examination of bilateral feet and toenails is within normal limits  Discoloration may be due to his blue jeans  Will continue to monitor

## 2021-05-11 NOTE — PROGRESS NOTES
Progress Note - Behavioral Health   Shai Miranda 64 y o  male MRN: [id number]  Unit/Bed#: [facility] 210-02 Encounter: [id number]    Assessment/Plan   Principal Problem:    Severe episode of recurrent major depressive disorder, with psychotic features ([facility] [address] )  Active Problems:    Anxiety disorder, unspecified    Gastroesophageal reflux disease without esophagitis    Celiac disease    History of obsessive compulsive disorder    BPH (benign prostatic hyperplasia)    Essential hypertension    DJD (degenerative joint disease)      Behavior over the last 24 hours:  unchanged  Sleep: slept better  Appetite: improved  Medication side effects: No  ROS: no complaints, all other systems are negative for acute changes    Mental Status Evaluation:  Appearance:  disheveled   Behavior:  guarded less   Speech:  normal volume   Mood:  anxious and depressed   Affect:  mood-congruent   Thought Process:  goal directed   Thought Content:  no overt delusions   Perceptual Disturbances: Auditory hallucinations without commands vague   Risk Potential: Suicidal Ideations none  Homicidal Ideations none  Potential for Aggression No   Sensorium:  person and place   Memory:  recent and remote memory grossly intact   Consciousness:  alert and awake    Attention: attention span appeared shorter than expected for age   Insight:  limited   Judgment: limited   Gait/Station: slow   Motor Activity: no abnormal movements     Progress Toward Goals: Patient reports ongoing depressed mood, low energy  And difficulty to motivate himself to participating groups  He continues experiencing occasional auditory ihallucinations without any commands  Denies current SI  He has been compliant medication and denies any current side effects  Recommended Treatment: Continue with group therapy, milieu therapy and occupational therapy        Risks, benefits and possible side effects of Medications:   Risks, benefits, and possible side effects of medications explained to patient and patient verbalizes understanding  Medications: all current active meds have been reviewed  Increase Risperidal to 4 mg po HS  Labs: I have personally reviewed all pertinent laboratory/tests results  Most Recent Labs:   Lab Results   Component Value Date    WBC 7 90 04/29/2021    RBC 4 82 04/29/2021    HGB 15 4 04/29/2021    HCT 46 1 04/29/2021     04/29/2021    RDW 12 9 04/29/2021    NEUTROABS 5 40 04/29/2021    SODIUM 135 (L) 04/29/2021    K 4 1 04/29/2021     04/29/2021    CO2 27 04/29/2021    BUN 8 04/29/2021    CREATININE 0 71 04/29/2021    GLUC 95 04/29/2021    GLUF 94 02/08/2021    CALCIUM 9 4 04/29/2021    AST 32 04/29/2021    ALT 21 04/29/2021    ALKPHOS 72 04/29/2021    TP 7 8 04/29/2021    ALB 4 5 04/29/2021    TBILI 0 40 04/29/2021    CHOLESTEROL 168 01/18/2021    HDL 41 01/18/2021    TRIG 93 01/18/2021    LDLCALC 108 (H) 01/18/2021    NONHDLC 127 01/18/2021    YHV5XWDIZZAN 2 000 04/29/2021    RPR Non-Reactive 03/26/2021    HGBA1C 5 6 01/06/2020     01/06/2020       Counseling / Coordination of Care  Total floor / unit time spent today 20 minutes  Greater than 50% of total time was spent with the patient and / or family counseling and / or coordination of care

## 2021-05-11 NOTE — PROGRESS NOTES
05/11/21   Team Meeting   Meeting Type Daily Rounds   Team Members Present   Team Members Present Physician;Nurse;Occupational Therapist;   Physician Team Member Dr Sobia Ovalles MD   Nursing Team Member Versa Crigler, RN   [facility]   OT Team Member Ace Jones, [place]   Patient/Family Present   Patient Present No   Patient's Family Present No     No DC date - will return home upon stabilization; anx/dep "high"; denies SI/HI/AH/VH; slept, med compliant

## 2021-05-12 PROCEDURE — 99232 SBSQ HOSP IP/OBS MODERATE 35: CPT | Performed by: PSYCHIATRY & NEUROLOGY

## 2021-05-12 RX ADMIN — RISPERIDONE 2 MG: 2 TABLET ORAL at 09:05

## 2021-05-12 RX ADMIN — TRAZODONE HYDROCHLORIDE 100 MG: 50 TABLET ORAL at 21:15

## 2021-05-12 RX ADMIN — PANTOPRAZOLE SODIUM 40 MG: 40 TABLET, DELAYED RELEASE ORAL at 05:54

## 2021-05-12 RX ADMIN — Medication 1 TABLET: at 15:55

## 2021-05-12 RX ADMIN — VENLAFAXINE HYDROCHLORIDE 150 MG: 150 CAPSULE, EXTENDED RELEASE ORAL at 09:05

## 2021-05-12 RX ADMIN — Medication 1 TABLET: at 09:05

## 2021-05-12 RX ADMIN — CITALOPRAM HYDROBROMIDE 20 MG: 20 TABLET ORAL at 09:05

## 2021-05-12 RX ADMIN — OMEGA-3 FATTY ACIDS CAP 1000 MG 1000 MG: 1000 CAP at 09:05

## 2021-05-12 RX ADMIN — TAMSULOSIN HYDROCHLORIDE 0.4 MG: 0.4 CAPSULE ORAL at 15:55

## 2021-05-12 RX ADMIN — LISINOPRIL 10 MG: 10 TABLET ORAL at 09:05

## 2021-05-12 RX ADMIN — RISPERIDONE 4 MG: 2 TABLET ORAL at 21:15

## 2021-05-12 NOTE — SOCIAL WORK
Chelsea met with rishi in pt room; roommate not in room at time of interaction; pt denies SI/HI/AH/VH, dep 6, anx 6; pt states he feels ready to go  "Batsheva Palumbo works until 11 on Friday so she'll be here in the afternoon for me"; SW inquired if patient feels ready to discharge, pt responded "yes, I feel ready";  Sw reviewed referral to [facility] for psych - pt agreeable and thanked SW; SW explained patient would need to follow up with PCP for month of June but would start psych services in July - pt expressed understanding; pt had no questions or concerns for SW     DC Friday to home

## 2021-05-12 NOTE — PLAN OF CARE
Problem: Risk for Self Injury/Neglect  Goal: Verbalize thoughts and feelings  Description: Interventions:  - Assess and re-assess patient's level of risk   - Engage patient in 1:1 interactions, daily, for a minimum of 15 minutes   - Encourage patient to express feelings, fears, frustrations, hopes   Interventions:  - Assess and re-assess patient's lethality and potential for self-injury  - Engage patient in 1:1 interactions, daily, for a minimum of 15 minutes  - Encourage patient to express feelings, fears, frustrations, hopes  - Establish rapport/trust with patient   Outcome: Progressing     Problem: Depression  Goal: Verbalize thoughts and feelings  Description: Interventions:  - Assess and re-assess patient's level of risk   - Engage patient in 1:1 interactions, daily, for a minimum of 15 minutes   - Encourage patient to express feelings, fears, frustrations, hopes   Interventions:  - Assess and re-assess patient's lethality and potential for self-injury  - Engage patient in 1:1 interactions, daily, for a minimum of 15 minutes  - Encourage patient to express feelings, fears, frustrations, hopes  - Establish rapport/trust with patient   Outcome: Progressing

## 2021-05-12 NOTE — PROGRESS NOTES
Progress Note - Behavioral Health   Ryan Wu 64 y o  male MRN: [id number]  Unit/Bed#: [facility] 210-02 Encounter: [id number]    Assessment/Plan   Principal Problem:    Severe episode of recurrent major depressive disorder, with psychotic features ([facility] [address] )  Active Problems:    Anxiety disorder, unspecified    Gastroesophageal reflux disease without esophagitis    Celiac disease    History of obsessive compulsive disorder    BPH (benign prostatic hyperplasia)    Essential hypertension    DJD (degenerative joint disease)      Behavior over the last 24 hours: Slowly improving  Sleep: improving  Appetite: normal  Medication side effects: No  ROS: no complaints, all other systems are negative for acute changes    Mental Status Evaluation:  Appearance:  age appropriate   Behavior:  cooperative   Speech:  normal volume   Mood:  constricted   Affect:  mood-congruent   Thought Process:  goal directed   Thought Content:  no overt delusions   Perceptual Disturbances: None   Risk Potential: Suicidal Ideations none  Homicidal Ideations none  Potential for Aggression No   Sensorium:  person, place and time/date   Memory:  recent and remote memory grossly intact   Consciousness:  alert and awake    Attention: attention span and concentration were age appropriate   Insight:  limited   Judgment: fair   Gait/Station: normal gait/station   Motor Activity: no abnormal movements     Progress Toward Goals: Patient reports doing better with reduced depressed mood, paranoia and hallucinations  He has been more visible in the unit with increased participation in group and milieu therapy  Patient has been compliant with medication and denies any current side effects  Recommended Treatment: Continue with group therapy, milieu therapy and occupational therapy  Risks, benefits and possible side effects of Medications:   Risks, benefits, and possible side effects of medications explained to patient and patient verbalizes understanding  Medications: all current active meds have been reviewed  Labs: I have personally reviewed all pertinent laboratory/tests results  Most Recent Labs:   Lab Results   Component Value Date    WBC 7 90 04/29/2021    RBC 4 82 04/29/2021    HGB 15 4 04/29/2021    HCT 46 1 04/29/2021     04/29/2021    RDW 12 9 04/29/2021    NEUTROABS 5 40 04/29/2021    SODIUM 135 (L) 04/29/2021    K 4 1 04/29/2021     04/29/2021    CO2 27 04/29/2021    BUN 8 04/29/2021    CREATININE 0 71 04/29/2021    GLUC 95 04/29/2021    GLUF 94 02/08/2021    CALCIUM 9 4 04/29/2021    AST 32 04/29/2021    ALT 21 04/29/2021    ALKPHOS 72 04/29/2021    TP 7 8 04/29/2021    ALB 4 5 04/29/2021    TBILI 0 40 04/29/2021    CHOLESTEROL 168 01/18/2021    HDL 41 01/18/2021    TRIG 93 01/18/2021    LDLCALC 108 (H) 01/18/2021    NONHDLC 127 01/18/2021    [id number] 2 000 04/29/2021    RPR Non-Reactive 03/26/2021    HGBA1C 5 6 01/06/2020     01/06/2020       Counseling / Coordination of Care  Total floor / unit time spent today 20 minutes  Greater than 50% of total time was spent with the patient and / or family counseling and / or coordination of care

## 2021-05-12 NOTE — SOCIAL WORK
JAXON placed phone call to pt PCP to schedule follow up for medication management, to bridge patient until scheduled psychiatric appointment - scheduled for  Weds, May 19th at 215pm with Dr Ethel Rebolledo IN-PERSON

## 2021-05-12 NOTE — SOCIAL WORK
Sw received phone call from from patient significant other, Aundrea Hernandez; SW explained that although a MAIDA has been signed, pt has not been agreeable to SW contacting SO as of yet; SW inquires each time SW meets with patient; Pt SO provided background information; pt SO states she used to be patient's RN at [employer]; pt attempted suicide by walking into traffic in 2018 - lived at [facility] Nov 2018 - March 2020; pt left SNF AMA in March 2020 "because he wanted to go to [facility] while we were on lock down"; pt has long hx of ETOH abuse (drinkging 8a-11p daily), hx violence; hx legal charges and is currently paying off fines for public drunkenness; pt quit drinking in October after last arrest - pt SO noticed increase in patient bizarre behaviors including "having full on conversations with the fence in the back ground"; pt significant other requests SW speak with patient re: open conversations with SO and DC planning; Pt SO requesting discharge Friday as she is off and feels patient "is as good as he's going to get"; pt SO expressed patient has a "dependent personality and will milk staying there as long as he possibly can so he doesn't have to worry about taking care of himself"; SW agreed to contact SO upon permission from patient (cell# [phone]); call mutually ended

## 2021-05-12 NOTE — NURSING NOTE
Pt present in milieu for group and meals  Pt affect is flat and mood is depressed  Pt rates anxiety and depression moderate to high  Pt states he feels slight improvement Denies SI/HI/AH/VH this morning  Pt denied and pain or discomfort  No other complaints offered  Pt overly apologizes and feels accused  Reassured pt no one is mad at him and we are here to support him  Pt thankful  Q 7 min safety checks maintained

## 2021-05-12 NOTE — NURSING NOTE
Patient out in the milieu semi social with peers and staff  Ate HS snack and attended group  Upon approach patient's mood and affect is pleasant and depressed  Patient endorses moderate to high anxiety and depression  Patient is guarded with conversation  Denies SI/HI/AHVH/pain  Took HS medications without difficulty  Will continue to monitor safety and behaviors every 7 minutes

## 2021-05-12 NOTE — PROGRESS NOTES
Progress Note - Karthik Armstrong 64 y o  male MRN: [id number]    Unit/Bed#[facility] 210-02 Encounter: [id number]        Subjective:   Patient seen and examined at bedside after reviewing the chart and discussing the case with the caring staff  Patient examined at bedside  Patient reports no acute concerns  Physical Exam   Vitals: Blood pressure 145/89, pulse 75, temperature (!) 97 4 °F (36 3 °C), temperature source Temporal, resp  rate 18, height 6' 2" (1 88 m), weight 104 kg (230 lb), SpO2 98 %  ,Body mass index is 29 53 kg/m²  Constitutional: He appears well-developed  HEENT: PERR, EOMI, MMM  Cardiovascular: Normal rate and regular rhythm  Pulmonary/Chest: Effort normal and breath sounds normal    Abdomen: Soft, + BS, NT    Assessment/Plan:  Karthik Armstrong is a(n) 64 y o  male with MDD      1  Hypertension  Continue lisinopril 10 mg daily  2  GERD  Continue pantoprazole 40 mg daily  3  Constipation  Head continue MiraLax daily  4  BPH  Continue Flomax daily with dinner  5  DJD  Patient may receive Tylenol as needed  6  Dermatitis  Patient may use hydrocortisone cream as needed  7  Dry eyes  Patient may use artificial tears as needed  8  Celiac disease  Continue gluten free diet  9  Vitamin-D deficiency  Continue vitamin-D bolus doses for 8 weeks followed by vitamin D3 1000 units daily  10  Vitamin B12 deficiency  Continue monthly B12 injections  11  Discoloration of fingernails  Patient's fingernails are blue with capillary refill less than 3 seconds, no discoloration of hands or fingers, warmth of the hands and fingers, and review of vital signs show stable vitals with pulse ox 95%  Patient denies shortness of breath, chest pain, difficulty breathing  He does not have a history of cardiovascular or pulmonary disease  Examination of bilateral feet and toenails is within normal limits  Discoloration may be due to his blue jeans  Will continue to monitor

## 2021-05-12 NOTE — PROGRESS NOTES
05/12/21    Team Meeting   Meeting Type Daily Rounds   Team Members Present   Team Members Present Physician;Nurse;;Occupational Therapist   Physician Team Member Dr Wing Nicole MD   Nursing Team Member Medardo Traore RN   Care Management Team Member Alejandro Delgado, [facility]   OT Team Member Daryle Bride, [place]   Patient/Family Present   Patient Present No   Patient's Family Present No   No D/C date at this time, will return home upon stabilization  Will have psych referral made for medication management-[facility]  Denies thoughts, hallucinations, reports anxiety and depression is moderate to high  Pleasant and guarded  Will return to home on Friday  Slept

## 2021-05-13 PROCEDURE — 99232 SBSQ HOSP IP/OBS MODERATE 35: CPT | Performed by: PSYCHIATRY & NEUROLOGY

## 2021-05-13 RX ORDER — TRAZODONE HYDROCHLORIDE 100 MG/1
100 TABLET ORAL
Qty: 30 TABLET | Refills: 0 | Status: SHIPPED | OUTPATIENT
Start: 2021-05-13 | End: 2021-06-07

## 2021-05-13 RX ORDER — RISPERIDONE 2 MG/1
2 TABLET, FILM COATED ORAL DAILY
Qty: 30 TABLET | Refills: 0 | Status: SHIPPED | OUTPATIENT
Start: 2021-05-14 | End: 2021-06-07

## 2021-05-13 RX ORDER — RISPERIDONE 3 MG/1
3 TABLET, FILM COATED ORAL
Qty: 30 TABLET | Refills: 0 | Status: SHIPPED | OUTPATIENT
Start: 2021-05-13 | End: 2021-06-07

## 2021-05-13 RX ORDER — ERGOCALCIFEROL 1.25 MG/1
50000 CAPSULE ORAL WEEKLY
Qty: 4 CAPSULE | Refills: 0 | Status: SHIPPED | OUTPATIENT
Start: 2021-05-16 | End: 2022-03-28

## 2021-05-13 RX ORDER — VENLAFAXINE HYDROCHLORIDE 150 MG/1
150 CAPSULE, EXTENDED RELEASE ORAL DAILY
Qty: 30 CAPSULE | Refills: 0 | Status: SHIPPED | OUTPATIENT
Start: 2021-05-14 | End: 2021-06-07

## 2021-05-13 RX ORDER — CYANOCOBALAMIN 1000 UG/ML
1000 INJECTION INTRAMUSCULAR; SUBCUTANEOUS
Qty: 1 ML | Refills: 0 | Status: SHIPPED | OUTPATIENT
Start: 2021-06-02 | End: 2022-03-28

## 2021-05-13 RX ORDER — CITALOPRAM 20 MG/1
20 TABLET ORAL DAILY
Qty: 30 TABLET | Refills: 0 | Status: SHIPPED | OUTPATIENT
Start: 2021-05-14 | End: 2021-06-07

## 2021-05-13 RX ORDER — MELATONIN
1000 DAILY
Qty: 30 TABLET | Refills: 0 | Status: SHIPPED | OUTPATIENT
Start: 2021-06-20 | End: 2022-03-28

## 2021-05-13 RX ADMIN — OMEGA-3 FATTY ACIDS CAP 1000 MG 1000 MG: 1000 CAP at 08:49

## 2021-05-13 RX ADMIN — Medication 1 TABLET: at 08:49

## 2021-05-13 RX ADMIN — TRAZODONE HYDROCHLORIDE 100 MG: 50 TABLET ORAL at 20:57

## 2021-05-13 RX ADMIN — RISPERIDONE 3 MG: 2 TABLET ORAL at 20:57

## 2021-05-13 RX ADMIN — LISINOPRIL 10 MG: 10 TABLET ORAL at 08:49

## 2021-05-13 RX ADMIN — TAMSULOSIN HYDROCHLORIDE 0.4 MG: 0.4 CAPSULE ORAL at 15:49

## 2021-05-13 RX ADMIN — VENLAFAXINE HYDROCHLORIDE 150 MG: 150 CAPSULE, EXTENDED RELEASE ORAL at 08:49

## 2021-05-13 RX ADMIN — CITALOPRAM HYDROBROMIDE 20 MG: 20 TABLET ORAL at 08:49

## 2021-05-13 RX ADMIN — Medication 1 TABLET: at 15:49

## 2021-05-13 RX ADMIN — RISPERIDONE 2 MG: 2 TABLET ORAL at 08:49

## 2021-05-13 RX ADMIN — PANTOPRAZOLE SODIUM 40 MG: 40 TABLET, DELAYED RELEASE ORAL at 06:07

## 2021-05-13 NOTE — PROGRESS NOTES
05/13/21   Team Meeting   Meeting Type Daily Rounds   Team Members Present   Team Members Present Physician;Nurse;; Occupational Therapist   Physician Team Member Dr Jerome Bennett MD   Nursing Team Member Alysha Castellanos RN   Social Work Team Member Kianna [facility]   OT Team Member Felicia Cox [place]   Patient/Family Present   Patient Present No   Patient's Family Present No     DC Friday to home with sig  other; endorses anx/dep; denies thoughts

## 2021-05-13 NOTE — PROGRESS NOTES
Progress Note - Jose David Rider 64 y o  male MRN: [id number]    Unit/Bed#[facility] 210-02 Encounter: [id number]        Subjective:   Patient seen and examined at bedside after reviewing the chart and discussing the case with the caring staff  Patient examined at bedside  Patient reports no acute concerns  Patient is a possible discharge for tomorrow, 05/14/2021  Patient is requesting his prescriptions  I have reviewed and reconciled the patient's problem list and medications  Physical Exam   Vitals: Blood pressure 110/69, pulse (!) 109, temperature (!) 97 °F (36 1 °C), temperature source Temporal, resp  rate 18, height 6' 2" (1 88 m), weight 107 kg (236 lb 1 8 oz), SpO2 97 %  ,Body mass index is 30 32 kg/m²  Constitutional: He appears well-developed  HEENT: PERR, EOMI, MMM  Cardiovascular: Normal rate and regular rhythm  Pulmonary/Chest: Effort normal and breath sounds normal    Abdomen: Soft, + BS, NT    Assessment/Plan:  Jose David Rider is a(n) 64 y o  male with MDD  Medical clearance:  Patient is medically cleared for discharge  All scripts have been written       1  Hypertension  Continue lisinopril 10 mg daily  2  GERD  Continue pantoprazole 40 mg daily  3  Constipation  Head continue MiraLax daily  4  BPH  Continue Flomax daily with dinner  5  DJD  Patient may receive Tylenol as needed  6  Dermatitis  Patient may use hydrocortisone cream as needed  7  Dry eyes  Patient may use artificial tears as needed  8  Celiac disease  Continue gluten free diet  9  Vitamin-D deficiency  Continue vitamin-D bolus doses for 8 weeks followed by vitamin D3 1000 units daily  10  Vitamin B12 deficiency  Continue monthly B12 injections  11  Discoloration of fingernails  Patient's fingernails are blue with capillary refill less than 3 seconds, no discoloration of hands or fingers, warmth of the hands and fingers, and review of vital signs show stable vitals with pulse ox 95%    Patient denies shortness of breath, chest pain, difficulty breathing  He does not have a history of cardiovascular or pulmonary disease  Examination of bilateral feet and toenails is within normal limits  Discoloration may be due to his blue jeans  Will continue to monitor  The patient was discussed with Dr Candido Whitman and he is in agreement with the above note

## 2021-05-13 NOTE — DISCHARGE INSTRUCTIONS
Cigarette Smoking and Your Health   WHAT YOU NEED TO KNOW:   What are the risks to my health if I smoke tobacco?  Nicotine and other chemicals found in tobacco and e-cigarettes can damage every cell in your body  Even if you are a light smoker, you have an increased risk for cancer, heart disease, and lung disease  If you are pregnant or have diabetes, smoking increases your risk for complications  Nicotine can affect an adolescent's developing brain  This can lead to trouble thinking, learning, or paying attention  What are the benefits to my health if I stop smoking? · You decrease respiratory symptoms such as coughing, wheezing, and shortness of breath  · You reduce your risk for cancers of the lung, mouth, throat, kidney, bladder, pancreas, stomach, and cervix  If you already have cancer, you increase the benefits of chemotherapy  You also reduce your risk for cancer returning or a second cancer from developing  · You reduce your risk for heart disease, blood clots, heart attack, and stroke  · You reduce your risk for lung infections, and diseases such as pneumonia, asthma, chronic bronchitis, and emphysema  · Your circulation improves  More oxygen can be delivered to your body  If you have diabetes, you lower your risk for complications, such as kidney, artery, and eye diseases  You also lower your risk for nerve damage  Nerve damage can lead to amputations, poor vision, and blindness  · You improve your body's ability to heal and to fight infections  · An adolescent can help his or her brain and body develop in a healthy way  Talk to your adolescent about all the health risks of nicotine  If you can, start talking about nicotine when your child is younger than 12 years  This may make it easier for him or her not to start using nicotine as a teenager or adult  Explain to him or her that it is best never to start  It can be hard to try to quit later      What are the health benefits to others if I stop smoking? Tobacco is harmful to nonsmokers who breathe in your secondhand smoke  The following are ways the health of others around you may improve when you stop smoking:  · You lower the risks for lung cancer and heart disease in nonsmoking adults  · If you are pregnant, you lower the risk for miscarriage, early delivery, low birth weight, and stillbirth  You also lower your baby's risk for SIDS, obesity, developmental delay, and neurobehavioral problems, such as ADHD  · If you have children, you lower their risk for ear infections, colds, pneumonia, bronchitis, and asthma  Where can I find more information and support to stop smoking? · [employer]  Phone: 3- 773 - [phone]  Web Address: www [employer]    CARE AGREEMENT:   You have the right to help plan your care  Learn about your health condition and how it may be treated  Discuss treatment options with your healthcare providers to decide what care you want to receive  You always have the right to refuse treatment  The above information is an  only  It is not intended as medical advice for individual conditions or treatments  Talk to your doctor, nurse or pharmacist before following any medical regimen to see if it is safe and effective for you  © Copyright [address] [place] [address] Information is for End User's use only and may not be sold, redistributed or otherwise used for commercial purposes  All illustrations and images included in CareNotes® are the copyrighted property of [employer]  or [facility] Deshawn Everett      How to Stop Smoking   WHAT YOU NEED TO KNOW:   You will improve your health and the health of others around you if you stop smoking  Your risk for heart and lung disease, cancer, stroke, heart attack, and vision problems will also decrease  Your adolescent can help prevent or stop harm to his or her brain or body  This will help him or her become a healthy adult   You can benefit from quitting no matter how long you have smoked  DISCHARGE INSTRUCTIONS:   Prepare to stop smoking:  Nicotine is a highly addictive drug found in cigarettes  Withdrawal symptoms can happen when you stop smoking and make it hard to quit  These include anxiety, depression, irritability, trouble sleeping, and increased appetite  You increase your chances of success if you prepare to quit  · Set a quit date  [place] a date that is within the next 2 weeks  Do not pick a day that you think may be stressful or busy  Write down the day or [place] it on your calender  · Tell friends and family that you plan to quit  Explain that you may have withdrawal symptoms when you try to quit  Ask them to support you  They may be able to encourage you and help reduce your stress to make it easier for you to quit  · Make a list of your reasons for quitting  Put the list somewhere you will see it every day, such as your refrigerator  You can look at the list when you have a craving  · Remove all tobacco and nicotine products from your home, car, and workplace  Also, remove anything else that will tempt you to smoke, such as lighters, matches, or ashtrays  Clean your car, home, and places at work that smell like smoke  The smell of smoke can trigger a craving  · Identify triggers that make you want to smoke  This may include activities, feelings, or people  Also write down 1 way you can deal with each of your triggers  For example, if you want to smoke as soon as you wake up, plan another activity during this time, such as exercise  · Make a plan for how you will quit  Learn about the tools that can help you quit, such as medicine, counseling, or nicotine replacement therapy  Choose at least 2 options to help you quit  · Help your adolescent make a plan to quit  The plan will be more successful if your adolescent makes his or her own decisions  Do not try to pressure him or her to quit immediately or in a certain way   Be supportive and offer help if needed  Tools to help you stop smoking:   · Counseling  from a trained healthcare provider can provide you with support and skills to quit smoking  The provider will also teach you to manage your withdrawal symptoms and cravings  You may receive counseling from one counselor, in group therapy, or through phone therapy called a quit line  · Nicotine replacement therapy (NRT)  such as nicotine patches, gum, or lozenges may help reduce your nicotine cravings  You may get these without a doctor's order  Do not use e-cigarettes or smokeless tobacco in place of cigarettes or to help you quit  They still contain nicotine  · Prescription medicines  such as nasal sprays or nicotine inhalers may help reduce your withdrawal symptoms  Other medicines may also be used to reduce your urge to smoke  Ask your healthcare provider about these medicines  You may need to start certain medicines 2 weeks before your quit date for them to work well  · Hypnosis  is a practice that helps guide you through thoughts and feelings  Hypnosis may help decrease your cravings and make you more willing to quit  · Acupuncture therapy  uses very thin needles to balance energy channels in the body  This is thought to help decrease cravings and symptoms of nicotine withdrawal     · Support groups  let you talk to others who are trying to quit or have already quit  It may be helpful to speak with others about how they quit  Manage your cravings:   · Avoid situations, people, and places that tempt you to smoke  Go to nonsmoking places, such as libraries or restaurants  Understand what tempts you and try to avoid these things  · Keep your hands busy  Hold things such as a stress ball or pen  · Put candy or toothpicks in your mouth  Keep lollipops, sugarless gum, or toothpicks with you at all times  · Do not have alcohol or caffeine  These drinks may tempt you to smoke   Drink healthy liquids such as water or juice instead  · Reward yourself when you resist your cravings  Rewards will motivate you and help you stay positive  · Do an activity that distracts you from your craving  Examples include cleaning, creating art, or gardening  Prevent weight gain after you quit:  You may gain a few pounds after you quit smoking  It is healthier for you to gain a few pounds than to continue to smoke  The following can help you prevent weight gain:  · Eat healthy foods  These include fruits, vegetables, whole-grain breads, low-fat dairy products, beans, lean meats, and fish  Eat healthy snacks, such as low-fat yogurt, if you get hungry between meals  · Drink water before, during, and between meals  This will make your stomach feel full and help prevent you from overeating  Ask your healthcare provider how much liquid to drink each day and which liquids are best for you  · Be physically active  Activity may help reduce your cravings and reduce stress  Take a walk or do some kind of physical activity every day  Ask your healthcare provider which activities are right for you  For support and more information:   · [employer]  Phone: 5- 370 - 742-6042  Web Address: www [employer]  [address] Information is for End User's use only and may not be sold, redistributed or otherwise used for commercial purposes  All illustrations and images included in CareNotes® are the copyrighted property of [employer] Inc  or [facility] Deshawn Lorenz   The above information is an  only  It is not intended as medical advice for individual conditions or treatments  Talk to your doctor, nurse or pharmacist before following any medical regimen to see if it is safe and effective for you  Depression   WHAT YOU NEED TO KNOW:   Depression is a medical condition that causes feelings of sadness or hopelessness that do not go away  Depression may cause you to lose interest in things you used to enjoy  These feelings may interfere with your daily life  DISCHARGE INSTRUCTIONS:   Call your local emergency number (911 in the [address]) if:   · You think about harming yourself or someone else  · You have done something on purpose to hurt yourself  Call your therapist or doctor if:   · Your symptoms do not improve  · You cannot make it to your next appointment  · You have new symptoms  · You have questions or concerns about your condition or care  The following resources are available at any time to help you, if needed:   · [facility]: [phone] (4-728-174-XAUD)     · Suicide Hotline: 9-724.631.5461 (1-629-XTELWJD)     · For a list of international numbers: https://save org/find-help/international-resources/    Medicines:   · Antidepressants  may be given to improve or balance your mood  You may need to take this medicine for several weeks before you begin to feel better  · Take your medicine as directed  Contact your healthcare provider if you think your medicine is not helping or if you have side effects  Tell him of her if you are allergic to any medicine  Keep a list of the medicines, vitamins, and herbs you take  Include the amounts, and when and why you take them  Bring the list or the pill bottles to follow-up visits  Carry your medicine list with you in case of an emergency  Therapy  is often used together with medicine to relieve depression  Therapy is a way for you to talk about your feelings and anything that may be causing depression  Therapy can be done alone or in a group  It may also be done with family members or a significant other  Self-care:   · Get regular physical activity  Try to be active for 30 minutes, 3 to 5 days a week  Physical activity can help relieve depression  Work with your healthcare provider to develop a plan that you enjoy  It may help to ask someone to be active with you  · Create a regular sleep schedule    A routine can help you relax before bed  Listen to music, read, or do yoga  Try to go to bed and wake up at the same time every day  Sleep is important for emotional health  · Eat a variety of healthy foods  Healthy foods include fruits, vegetables, whole-grain breads, low-fat dairy products, lean meats, fish, and cooked beans  A healthy meal plan is low in fat, salt, and added sugar  · Do not drink alcohol or use drugs  Alcohol and drugs can make depression worse  Talk to your therapist or doctor if you need help quitting  Follow up with your healthcare provider as directed: Your healthcare provider will monitor your progress at follow-up visits  He or she will also monitor your medicine if you take antidepressants  Your healthcare provider will ask if the medicine is helping  Tell him or her about any side effects or problems you may have with your medicine  The type or amount of medicine may need to be changed  Write down your questions so you remember to ask them during your visits  © Copyright [facility] [address] Information is for End User's use only and may not be sold, redistributed or otherwise used for commercial purposes  All illustrations and images included in CareNotes® are the copyrighted property of A D A M , Inc  or [facility] [facility]  The above information is an  only  It is not intended as medical advice for individual conditions or treatments  Talk to your doctor, nurse or pharmacist before following any medical regimen to see if it is safe and effective for you  Citalopram (By mouth)   Citalopram ([id number]-LCB-[id number]-pram)  Treats depression  Brand Name(s): CeleXA   There may be other brand names for this medicine  When This Medicine Should Not Be Used: This medicine is not right for everyone  Do not use it if you had an allergic reaction to citalopram   How to Use This Medicine:   Liquid, Tablet  · Take this medicine as directed   You may need to take it for a month or more before you feel better  Your dose may need to be changed to find out what works best for you  · Oral liquid: Measure the oral liquid medicine with a marked measuring spoon, oral syringe, or medicine cup  · This medicine should come with a Medication Guide  Ask your pharmacist for a copy if you do not have one  · Missed dose: Take a dose as soon as you remember  If it is almost time for your next dose, wait until then and take a regular dose  Do not take extra medicine to make up for a missed dose  · Store the medicine in a closed container at room temperature, away from heat, moisture, and direct light  Drugs and Foods to Avoid:   Ask your doctor or pharmacist before using any other medicine, including over-the-counter medicines, vitamins, and herbal products  · Do not use this medicine together with pimozide  Do not use this medicine and an MAO inhibitor (MAOI) within 14 days of each other  · Some medicines can affect how citalopram works  Tell your doctor if you are using the following:   ? Buspirone, carbamazepine, chlorpromazine, cimetidine, fentanyl, gatifloxacin, levomethadyl, lithium, methadone, moxifloxacin, omeprazole, pentamidine, Eduardo's wort, thioridazine, tramadol, or tryptophan supplements  ? Amphetamines  ? Blood thinner (including warfarin)  ? Diuretic (water pill)  ? Medicine for heart rhythm problems (including amiodarone, procainamide, quinidine, sotalol)  ? NSAID pain or arthritis medicine (including aspirin, celecoxib, diclofenac, ibuprofen, naproxen)  ? Triptan medicine to treat migraine headaches (including sumatriptan)  · Do not drink alcohol while you are using this medicine  Warnings While Using This Medicine:   · Tell your doctor if you are pregnant or breastfeeding, or if you have kidney disease, liver disease, bleeding problems, glaucoma, heart problems, or a seizure disorder   Tell your doctor if you or anyone in your family has a bipolar disorder, heart rhythm problem (such as QT prolongation or a slow heartbeat), or a recent heart attack  · This medicine may cause the following problems:   ? Heart rhythm problems  ? Serotonin syndrome (more likely when used with certain other medicines)  ? Increased risk of bleeding problems  ? Low sodium levels  ? Slow growth in children  · This medicine can increase thoughts of suicide  Tell your doctor right away if you start to feel depressed and have thoughts about hurting yourself  · This medicine may make you dizzy or drowsy  Do not drive or do anything that could be dangerous until you know how this medicine affects you  · Do not stop using this medicine suddenly  Your doctor will need to slowly decrease your dose before you stop it completely  · Your doctor will check your progress and the effects of this medicine at regular visits  Keep all appointments  · Keep all medicine out of the reach of children  Never share your medicine with anyone    Possible Side Effects While Using This Medicine:   Call your doctor right away if you notice any of these side effects:  · Allergic reaction: Itching or hives, swelling in your face or hands, swelling or tingling in your mouth or throat, chest tightness, trouble breathing  · Anxiety, restlessness, fever, sweating, muscle spasms, nausea, vomiting, diarrhea, seeing or hearing things that are not there  · Chest pain, trouble breathing  · Confusion, weakness, and muscle twitching  · Fast, pounding, or uneven heartbeat  · Feeling more excited or energetic than usual, trouble sleeping, racing thoughts  · Eye pain, vision changes, seeing halos around lights  · Lightheadedness, dizziness, fainting  · Painful, prolonged erection of your penis  · Thoughts of hurting yourself or others, unusual behavior  · Unusual bleeding or bruising  If you notice these less serious side effects, talk with your doctor:   · Decreased appetite, weight loss (in children)  · Dry mouth  · Sexual problems  · Sleepiness or drowsiness  If you notice other side effects that you think are caused by this medicine, tell your doctor  Call your doctor for medical advice about side effects  You may report side effects to FDA at 5-915-IVF-9886  © Copyright [employer] 2021 Information is for End User's use only and may not be sold, redistributed or otherwise used for commercial purposes  The above information is an  only  It is not intended as medical advice for individual conditions or treatments  Talk to your doctor, nurse or pharmacist before following any medical regimen to see if it is safe and effective for you  Risperidone (By mouth)   Risperidone (ris-PER-i-done)  Treats schizophrenia, bipolar disorder, and irritability caused by autistic disorder  Brand Name(s): RisperDAL, risperiDONE M-Tab   There may be other brand names for this medicine  When This Medicine Should Not Be Used: This medicine is not right for everyone  Do not use it if you had an allergic reaction to risperidone or paliperidone  How to Use This Medicine:   Liquid, Tablet, Dissolving Tablet  · Take your medicine as directed  Your dose may need to be changed several times to find what works best for you  · Measure the oral liquid medicine with a marked measuring spoon, oral syringe, or medicine cup  You may mix your dose with water, coffee, low-fat milk, or orange juice  Do not mix it with cola or tea  · Make sure your hands are dry before you handle the disintegrating tablet  Peel back the foil from the blister pack, then remove the tablet  Do not push the tablet through the foil  Place the tablet in your mouth  After it has melted, swallow or take a drink of water  · Missed dose: Take a dose as soon as you remember  If it is almost time for your next dose, wait until then and take a regular dose  Do not take extra medicine to make up for a missed dose    · Store the medicine in a closed container at room temperature, away from heat, moisture, and direct light  Do not freeze the oral liquid  Drugs and Foods to Avoid:   Ask your doctor or pharmacist before using any other medicine, including over-the-counter medicines, vitamins, and herbal products  · Some medicines can affect how risperidone works  Tell your doctor if you are using carbamazepine, clozapine, fluoxetine, furosemide, levodopa, paroxetine, phenobarbital, phenytoin, quinidine, rifampin, or blood pressure medicine  · Do not drink alcohol while you are using this medicine  · Tell your doctor if you use anything else that makes you sleepy  Some examples are allergy medicine, narcotic pain medicine, and alcohol  Warnings While Using This Medicine:   · Tell your doctor if you are pregnant or breastfeeding, or if you have kidney disease, liver disease, diabetes, high cholesterol, Parkinson disease, trouble swallowing, or a history of breast cancer or seizures  Tell your doctor if you have heart failure, low blood pressure, or a history of a heart attack or stroke  · This medicine may cause the following problems:  ? Increased risk of stroke  ? Neuroleptic malignant syndrome (a nerve disorder that could be life-threatening)  ? Tardive dyskinesia (a muscle disorder that could become permanent)  ? High blood sugar levels or high cholesterol levels  ? Increased levels of prolactin hormone  ? Increased risk of seizures  · This medicine may make you dizzy, drowsy, or have trouble with thinking or controlling body movements, which may lead to falls, fractures, or other injuries  Do not drive or do anything else that could be dangerous until you know how this medicine affects you  · This medicine may change how your body regulates temperature  Avoid activities that could cause you to become very cold, hot, or dehydrated  · Talk with your doctor before using this medicine if you plan to have children  Some women who use this medicine have become infertile (unable to have children)    · This medicine may make you bleed, bruise, or get infections more easily  Take precautions to prevent illness and injury  Wash your hands often  · Risperdal® M-Tab® contains aspartame (phenylalanine)  If you have phenylketonuria (PKU), talk to your doctor before using this medicine  · Your doctor will do lab tests at regular visits to check on the effects of this medicine  Keep all appointments  · Keep all medicine out of the reach of children  Never share your medicine with anyone  Possible Side Effects While Using This Medicine:   Call your doctor right away if you notice any of these side effects:  · Allergic reaction: Itching or hives, swelling in your face or hands, swelling or tingling in your mouth or throat, chest tightness, trouble breathing  · Fast, slow, pounding, or uneven heartbeat  · Fever, sweating, confusion, muscle stiffness  · Increased hunger or thirst, change in how much or how often you urinate  · Jerky muscle movements you cannot control (often in your face, tongue, or jaw)  · Lightheadedness, dizziness, or fainting  · Numbness or weakness on one side of your body, sudden or severe headache, problems with vision, speech, or walking  · Painful, prolonged erection  · Seizures or tremors  · Swelling of the breasts, breast soreness, nipple discharge (in both women and men)  · Trouble swallowing  If you notice these less serious side effects, talk with your doctor:   · Constipation, diarrhea, nausea, upset stomach  · Drooling  · Dry mouth, skin rash, runny or stuffy nose  · Drowsiness, trouble sleeping  · Weight gain  If you notice other side effects that you think are caused by this medicine, tell your doctor  Call your doctor for medical advice about side effects  You may report side effects to FDA at 7-752-FDA-7626  © Copyright [employer] 2021 Information is for End User's use only and may not be sold, redistributed or otherwise used for commercial purposes    The above information is an  only  It is not intended as medical advice for individual conditions or treatments  Talk to your doctor, nurse or pharmacist before following any medical regimen to see if it is safe and effective for you  Trazodone (By mouth)   Trazodone (TRAZ-oh-done)  Treats depression  Brand Name(s):   There may be other brand names for this medicine  When This Medicine Should Not Be Used: This medicine is not right for everyone  Do not use it if you had an allergic reaction to trazodone  How to Use This Medicine:   Tablet, Long Acting Tablet  · Take your medicine as directed  Your dose may need to be changed several times to find what works best for you  · Regular tablet: Take it with or shortly after a meal or light snack  · Extended-release tablet: Take it at the same time each day, preferably at bedtime, without food  · The tablet can be swallowed whole, or you may break the tablet in half along the score line  Do not break the tablet unless your doctor tells you to  Do not crush or chew the tablet  · This medicine should come with a Medication Guide  Ask your pharmacist for a copy if you do not have one  · Missed dose: Take a dose as soon as you remember  If it is almost time for your next dose, wait until then and take a regular dose  Do not take extra medicine to make up for a missed dose  · Store the medicine in a closed container at room temperature, away from heat, moisture, and direct light  Drugs and Foods to Avoid:   Ask your doctor or pharmacist before using any other medicine, including over-the-counter medicines, vitamins, and herbal products  · Do not use trazodone if you currently take an MAO inhibitor (MAOI) or have used an MAOI in the past 14 days  · Tell your doctor if you also use any of the following:  ? Carbamazepine, digoxin, phenytoin, indinavir, ritonavir, buspirone, fentanyl, lithium, tryptophan, Eduardo's wort, tramadol  ?  Medicine to treat a fungal infection (such as itraconazole, ketoconazole), a diuretic (water pill), blood pressure medicine, an NSAID pain or arthritis medicine (such as aspirin, celecoxib, diclofenac, ibuprofen, naproxen), a blood thinner (such as warfarin), other medicine for depression, or triptan medicine to treat migraine headaches  · Do not drink alcohol while you are using this medicine  · Tell your doctor if you use anything else that makes you sleepy  Some examples are allergy medicine, narcotic pain medicine, and alcohol  Warnings While Using This Medicine:   · Tell your doctor if you are pregnant or breastfeeding, or if you have kidney disease, liver disease, bleeding problems, glaucoma, heart disease, heart rhythm problems, or low blood pressure  Tell your doctor if you recently had a heart attack  · For some children, teenagers, and young adults, this medicine may increase mental or emotional problems  This may lead to thoughts of suicide and violence  Talk with your doctor right away if you have any thoughts or behavior changes that concern you  Tell your doctor if you or anyone in your family has a history of bipolar disorder or suicide attempts  · This medicine may cause the following problems:  ? Serotonin syndrome (more likely when used with certain other medicines)  ? Heart rhythm problems (QT prolongation)  ? Low sodium levels  ? Higher risk of bleeding  · Do not stop using this medicine suddenly  Your doctor will need to slowly decrease your dose before you stop it completely  · This medicine may make you dizzy or drowsy  Do not drive or do anything that could be dangerous until you know how this medicine affects you  Stand or sit up slowly if you are dizzy  · Tell any doctor or dentist who treats you that you are using this medicine  You may need to stop using this medicine several days before you have surgery or medical tests  · Your doctor will check your progress and the effects of this medicine at regular visits   Keep all appointments  · Keep all medicine out of the reach of children  Never share your medicine with anyone  Possible Side Effects While Using This Medicine:   Call your doctor right away if you notice any of these side effects:  · Allergic reaction: Itching or hives, swelling in your face or hands, swelling or tingling in your mouth or throat, chest tightness, trouble breathing  · Anxiety, restlessness, fever, sweating, muscle spasms, nausea, vomiting, diarrhea, seeing or hearing things that are not there  · Confusion, weakness, muscle twitching  · Fast, pounding, or uneven heartbeat  · Lightheadedness, dizziness, fainting  · Painful, prolonged erection of your penis  · Sudden increase in energy, feeling irritable, trouble sleeping  · Thoughts of hurting yourself or others, unusual behavior  · Unusual bleeding or bruising  If you notice these less serious side effects, talk with your doctor:   · Constipation, mild nausea  · Dry mouth  · Eye pain, vision changes, seeing halos around lights  · Headache  · Sleepiness or unusual drowsiness  If you notice other side effects that you think are caused by this medicine, tell your doctor  Call your doctor for medical advice about side effects  You may report side effects to FDA at 9-336-FDA-4908  © Copyright [employer] 2021 Information is for End User's use only and may not be sold, redistributed or otherwise used for commercial purposes  The above information is an  only  It is not intended as medical advice for individual conditions or treatments  Talk to your doctor, nurse or pharmacist before following any medical regimen to see if it is safe and effective for you  Venlafaxine (By mouth)   Venlafaxine ([id number])  Treats depression, generalized anxiety disorder, panic disorder, and social anxiety disorder  Brand Name(s): Effexor XR, Venlafaxine HCl AvPak   There may be other brand names for this medicine    When This Medicine Should Not Be Used:   This medicine is not right for everyone  Do not use it if you had an allergic reaction to venlafaxine or desvenlafaxine succinate  How to Use This Medicine:   Long Acting Capsule, Tablet, Long Acting Tablet  · Take your medicine as directed  Your dose may need to be changed several times to find what works best for you  · It is best to take the extended-release capsule at the same time each day (either in the morning or evening)  · It is best to take this medicine with food or milk  · Swallow the extended-release capsule whole  Do not crush, break, or chew it  Do not place the capsule in a liquid  · If you cannot swallow the extended-release capsule, you may open it and pour the medicine into a small amount of soft food such as pudding, yogurt, or applesauce  Stir this mixture well and swallow it without chewing  · This medicine should come with a Medication Guide  Ask your pharmacist for a copy if you do not have one  · Missed dose: Take a dose as soon as you remember  If it is almost time for your next dose, wait until then and take a regular dose  Do not take extra medicine to make up for a missed dose  · Store the medicine in a closed container at room temperature, away from heat, moisture, and direct light  Drugs and Foods to Avoid:   Ask your doctor or pharmacist before using any other medicine, including over-the-counter medicines, vitamins, and herbal products  · Do not use this medicine if you have used an MAO inhibitor within the past 14 days  Do not take an MAO inhibitor for at least 7 days after you stop this medicine  · Some medicines can affect how venlafaxine works  Tell your doctor if you are using any of the following:   ? Buspirone, cimetidine, fentanyl, ketoconazole, lithium, metoprolol, mirtazapine, Eduardo's wort, tramadol, or tryptophan supplements  ? Amphetamines  ? Blood thinner (including warfarin)  ? Diuretic (water pill)  ? Medicine for migraine headaches  ?  Medicine to lose weight (including phentermine)  ? NSAID pain or arthritis medicine (including aspirin, celecoxib, diclofenac, ibuprofen, naproxen)  ? Tricyclic antidepressant  · Do not drink alcohol while you are using this medicine  · Tell your doctor if you use anything else that makes you sleepy  Some examples are allergy medicine, narcotic pain medicine, and alcohol  Warnings While Using This Medicine:   · Tell your doctor if you are pregnant or breastfeeding, or if you have kidney disease, liver disease, glaucoma, heart disease, high blood pressure, or thyroid problems  Tell your doctor if you have a history of tim, seizures, heart attack, or stroke  · This medicine can increase thoughts of suicide  Tell your doctor right away if you start to feel depressed and have thoughts about hurting yourself  · This medicine may cause the following problems:   ? Serotonin syndrome (when used with certain medicines)  ? Increased cholesterol levels  ? Increased blood pressure  ? Increased risk of bleeding problems  ? Low sodium levels  ? Interstitial lung disease and eosinophilic pneumonia  · This medicine may make you dizzy or drowsy  Do not drive or do anything that could be dangerous until you know how this medicine affects you  · Do not stop using this medicine suddenly  Your doctor will need to slowly decrease your dose before you stop it completely  · Tell any doctor or dentist who treats you that you are using this medicine  This medicine may affect certain medical test results  · Your doctor will do lab tests at regular visits to check on the effects of this medicine  Keep all appointments  · Keep all medicine out of the reach of children  Never share your medicine with anyone    Possible Side Effects While Using This Medicine:   Call your doctor right away if you notice any of these side effects:  · Allergic reaction: Itching or hives, swelling in your face or hands, swelling or tingling in your mouth or throat, chest tightness, trouble breathing  · Anxiety, restlessness, fever, sweating, muscle spasms, nausea, vomiting, diarrhea, seeing or hearing things that are not there  · Blistering, peeling, red skin rash  · Chest pain, cough, trouble breathing  · Confusion, weakness, and muscle twitching  · Eye pain, vision changes, seeing halos around lights  · Fast or pounding heartbeat  · Feeling more excited or energetic than usual  · Headache, trouble concentrating, memory problems, unsteadiness  · Seizures  · Unusual behavior, thoughts of hurting yourself or others, trouble sleeping, nervousness, unusual dreams  · Unusual bleeding or bruising  If you notice these less serious side effects, talk with your doctor:   · Dry mouth  · Mild nausea, constipation, vomiting, loss of appetite, weight loss  · Sexual problems  · Sleepiness or unusual drowsiness, dizziness  If you notice other side effects that you think are caused by this medicine, tell your doctor  Call your doctor for medical advice about side effects  You may report side effects to FDA at 9-318-YXI-9194  © Copyright [employer] 2021 Information is for End User's use only and may not be sold, redistributed or otherwise used for commercial purposes  The above information is an  only  It is not intended as medical advice for individual conditions or treatments  Talk to your doctor, nurse or pharmacist before following any medical regimen to see if it is safe and effective for you  Metabolic Syndrome X   WHAT YOU NEED TO KNOW:   Metabolic syndrome is a group of medical conditions that can lead to heart disease, stroke, or type 2 diabetes  The medical conditions include high triglycerides, low HDL cholesterol, high blood pressure, high blood sugar levels, and extra abdominal fat  DISCHARGE INSTRUCTIONS:   Medicines:   · Blood pressure medicine: This is given to lower your blood pressure   A controlled blood pressure helps protect your organs, such as your heart, lungs, brain, and kidneys  Take your blood pressure medicine exactly as directed  · Cholesterol medicine: This type of medicine is given to help decrease (lower) the amount of cholesterol (fat) in your blood  Cholesterol medicine works best if you also exercise and eat a healthy diet that is low in certain kinds of fats  Some cholesterol medicines may cause liver problems  You may need to have blood taken for tests while using this medicine  · Take your medicine as directed  Contact your healthcare provider if you think your medicine is not helping or if you have side effects  Tell him or her if you are allergic to any medicine  Keep a list of the medicines, vitamins, and herbs you take  Include the amounts, and when and why you take them  Bring the list or the pill bottles to follow-up visits  Carry your medicine list with you in case of an emergency  · Hypoglycemic medicine: This medicine may be given to decrease the amount of sugar in your blood  Hypoglycemic medicine helps your body move the sugar to your cells, where it is needed for energy  Follow up with your healthcare provider as directed:  Write down your questions so you remember to ask them during your visits  Manage metabolic syndrome:   · Maintain a healthy weight:  Weight loss helps lower cholesterol, triglycerides, blood pressure, and blood glucose levels  It can also raise HDL (good cholesterol)  Ask your healthcare provider how much you should weigh  Ask him to help you create a weight loss plan if you are overweight  · Eat a variety of healthy foods:  Healthy foods include fruits, vegetables, whole-grain breads, low-fat dairy products, beans, lean meats, and fish  Eat foods that are low in fat and sodium (salt)  A dietitian can help you plan healthy meals  · Exercise:  Ask your healthcare provider to help you create an exercise plan   Exercise can help lower your blood pressure, cholesterol, and blood sugar levels  Exercise can also help raise your HDL level and help you to lose weight  Get at least 30 minutes of exercise, 5 days each week  · Check your blood pressure as directed: You may be asked to keep a record of your blood pressure and bring it with you to follow-up visits  Ask your healthcare provider what your blood pressure should be and how to check it  · Limit alcohol:  Women should limit alcohol to 1 drink a day  Men should limit alcohol to 2 drinks a day  A drink of alcohol is 12 ounces of beer, 5 ounces of wine, or 1½ ounces of liquor  · Do not smoke: If you smoke, it is never too late to quit  Smoking further increases your risk for heart disease and stroke  Ask your healthcare provider for information if you need help quitting  Contact your healthcare provider if:   · You have more thirst or hunger than usual      · You urinate more frequently  · You have blurred vision  · You have questions or concerns about your condition or care  Seek care immediately or call 911 if:   · Your blood pressure is higher than healthcare providers told it should be  · You have chest pain or discomfort that spreads to your arms, jaw, or back  · You have a severe headache or dizziness  · You have trouble thinking, speaking, or understanding others  © Copyright [address] Information is for End User's use only and may not be sold, redistributed or otherwise used for commercial purposes  All illustrations and images included in CareNotes® are the copyrighted property of [employer]  or [facility] Deshawn Lorenz   The above information is an  only  It is not intended as medical advice for individual conditions or treatments  Talk to your doctor, nurse or pharmacist before following any medical regimen to see if it is safe and effective for you

## 2021-05-13 NOTE — SOCIAL WORK
SW placed phone call to pt sig  other Mik John [phone] to advise of discharge date - unable to leave voicemail "the [address] customer you are trying to reach it not available, please call again later"

## 2021-05-13 NOTE — NURSING NOTE
Pt present in milieu for group and meals  Pt affect is flat and mood is depressed  Pt rates anxiety and depression moderate to high  Pt states he feels slight improvement  Denies SI/HI/AH/VH this morning  Pt denied and pain or discomfort  No other complaints offered  Pt states he hopes he is ready for discharge   Q 7 min safety checks maintained

## 2021-05-13 NOTE — PROGRESS NOTES
Progress Note - Behavioral Health   Jaclyn Potts 64 y o  male MRN: [id number]  Unit/Bed#: [facility] 210-02 Encounter: [id number]    Assessment/Plan   Principal Problem:    Severe episode of recurrent major depressive disorder, with psychotic features ([address] )  Active Problems:    Anxiety disorder, unspecified    Gastroesophageal reflux disease without esophagitis    Celiac disease    History of obsessive compulsive disorder    BPH (benign prostatic hyperplasia)    Essential hypertension    DJD (degenerative joint disease)    Behavior over the last 24 hours:  improved  Sleep: improved  Appetite: normal  Medication side effects: No  ROS: no complaints, all other system are negative     Mental Status Evaluation:  Appearance:  casually dressed and laying on bed   Behavior:  Calm and cooperative   Speech:  Normal rate, rhythm, volume   Mood:  "I feel alright "   Affect:  constricted   Thought Process:  goal directed   Thought Content:  focused in discharge home, food menu   Perceptual Disturbances: None   Risk Potential: Suicidal Ideations none  Homicidal Ideations none  Potential for Aggression No   Sensorium:  person, place and time/date   Memory:  recent and remote memory grossly intact   Consciousness:  alert and awake    Attention: attention span and concentration were age appropriate   Insight:  limited   Judgment: limited   Gait/Station: normal gait/station   Motor Activity: no abnormal movements     Progress Toward Goals: Patient states that he feels ready for discharge  He reports reduced anxiety, depression and denies any SI, paranoia and hallucinations  He still talks to his girlfriend on the phone everyday and is excited to see her when he's discharged tomorrow  Medication compliance has been stable  Recommended Treatment: Continue with group therapy, milieu therapy and occupational therapy        Risks, benefits and possible side effects of Medications:   Risks, benefits, and possible side effects of medications explained to patient and patient verbalizes understanding  Medications: all current active meds have been reviewed  Labs: I have personally reviewed all pertinent laboratory/tests results  Most Recent Labs:   Lab Results   Component Value Date    WBC 7 90 04/29/2021    RBC 4 82 04/29/2021    HGB 15 4 04/29/2021    HCT 46 1 04/29/2021     04/29/2021    RDW 12 9 04/29/2021    NEUTROABS 5 40 04/29/2021    SODIUM 135 (L) 04/29/2021    K 4 1 04/29/2021     04/29/2021    CO2 27 04/29/2021    BUN 8 04/29/2021    CREATININE 0 71 04/29/2021    GLUC 95 04/29/2021    GLUF 94 02/08/2021    CALCIUM 9 4 04/29/2021    AST 32 04/29/2021    ALT 21 04/29/2021    ALKPHOS 72 04/29/2021    TP 7 8 04/29/2021    ALB 4 5 04/29/2021    TBILI 0 40 04/29/2021    CHOLESTEROL 168 01/18/2021    HDL 41 01/18/2021    TRIG 93 01/18/2021    LDLCALC 108 (H) 01/18/2021    NONHDLC 127 01/18/2021    LBI2QCTJWLDJ 2 000 04/29/2021    RPR Non-Reactive 03/26/2021    HGBA1C 5 6 01/06/2020     01/06/2020       Counseling / Coordination of Care  Total floor / unit time spent today 20 minutes  Greater than 50% of total time was spent with the patient and / or family counseling and / or coordination of care

## 2021-05-14 VITALS
RESPIRATION RATE: 16 BRPM | HEIGHT: 74 IN | HEART RATE: 97 BPM | DIASTOLIC BLOOD PRESSURE: 64 MMHG | SYSTOLIC BLOOD PRESSURE: 117 MMHG | OXYGEN SATURATION: 98 % | TEMPERATURE: 97.5 F | BODY MASS INDEX: 30.3 KG/M2 | WEIGHT: 236.11 LBS

## 2021-05-14 PROCEDURE — 99238 HOSP IP/OBS DSCHRG MGMT 30/<: CPT | Performed by: PSYCHIATRY & NEUROLOGY

## 2021-05-14 RX ADMIN — PANTOPRAZOLE SODIUM 40 MG: 40 TABLET, DELAYED RELEASE ORAL at 06:09

## 2021-05-14 RX ADMIN — CITALOPRAM HYDROBROMIDE 20 MG: 20 TABLET ORAL at 07:58

## 2021-05-14 RX ADMIN — LISINOPRIL 10 MG: 10 TABLET ORAL at 07:58

## 2021-05-14 RX ADMIN — Medication 1 TABLET: at 07:57

## 2021-05-14 RX ADMIN — RISPERIDONE 2 MG: 2 TABLET ORAL at 07:58

## 2021-05-14 RX ADMIN — VENLAFAXINE HYDROCHLORIDE 150 MG: 150 CAPSULE, EXTENDED RELEASE ORAL at 07:58

## 2021-05-14 RX ADMIN — OMEGA-3 FATTY ACIDS CAP 1000 MG 1000 MG: 1000 CAP at 07:58

## 2021-05-14 NOTE — PROGRESS NOTES
Progress Note - José Thapa 64 y o  male MRN: [id number]    Unit/Bed#[facility]02 Encounter: [id number]        Subjective:   Patient seen and examined at bedside after reviewing the chart and discussing the case with the caring staff  Patient examined at bedside  Patient reports no acute concerns  Patient is being discharged today, 05/14/2021  Patient is requesting his prescriptions  I have reviewed and reconciled the patient's problem list and medications  Physical Exam   Vitals: Blood pressure 117/64, pulse 97, temperature 97 5 °F (36 4 °C), temperature source Temporal, resp  rate 16, height 6' 2" (1 88 m), weight 107 kg (236 lb 1 8 oz), SpO2 98 %  ,Body mass index is 30 32 kg/m²  Constitutional: He appears well-developed  HEENT: PERR, EOMI, MMM  Cardiovascular: Normal rate and regular rhythm  Pulmonary/Chest: Effort normal and breath sounds normal    Abdomen: Soft, + BS, NT    Assessment/Plan:  José Thapa is a(n) 64 y o  male with MDD  Medical clearance:  Patient is medically cleared for discharge  All scripts have been written       1  Hypertension  Continue lisinopril 10 mg daily  2  GERD  Continue pantoprazole 40 mg daily  3  Constipation  Head continue MiraLax daily  4  BPH  Continue Flomax daily with dinner  5  DJD  Patient may receive Tylenol as needed  6  Dermatitis  Patient may use hydrocortisone cream as needed  7  Dry eyes  Patient may use artificial tears as needed  8  Celiac disease  Continue gluten free diet  9  Vitamin-D deficiency  Continue vitamin-D bolus doses for 8 weeks followed by vitamin D3 1000 units daily  10  Vitamin B12 deficiency  Continue monthly B12 injections  11  Discoloration of fingernails  Patient's fingernails are blue with capillary refill less than 3 seconds, no discoloration of hands or fingers, warmth of the hands and fingers, and review of vital signs show stable vitals with pulse ox 95%    Patient denies shortness of breath, chest pain, difficulty breathing  He does not have a history of cardiovascular or pulmonary disease  Examination of bilateral feet and toenails is within normal limits  Discoloration may be due to his blue jeans  Will continue to monitor  The patient was discussed with Dr Mortimer Pluck and he is in agreement with the above note

## 2021-05-14 NOTE — BH TRANSITION RECORD
Contact Information: If you have any questions, concerns, pended studies, tests and/or procedures, or emergencies regarding your inpatient behavioral health visit  Please contact [facility] [phone] and ask to speak to a , nurse or physician  A contact is available 24 hours/ 7 days a week at this number  Summary of Procedures Performed During your Stay:  Below is a list of major procedures performed during your hospital stay and a summary of results:  - No major procedures performed  Pending Studies (From admission, onward)    None        If studies are pending at discharge, follow up with your PCP and/or referring provider

## 2021-05-14 NOTE — DISCHARGE SUMMARY
Discharge Summary - Rodolfo Malloy 64 y o  male MRN: [id number]  Unit/Bed#: [facility] 210-02 Encounter: [id number]     Admission Date: 4/29/2021         Discharge Date: 5/14/2021    Attending Psychiatrist: No att  providers found    Reason for Admission/HPI: Major depressive disorder, single episode, unspecified [F32 9]  Major depressive disorder [F32 9]    According to Dr Silvana Rai:    History of Present Illness      Vivi Mead is a 64 y o  male with a history of Major Depressive Disorder, Generalized Anxiety Disorder and OCD who was admitted to the inpatient adult psychiatric unit on a voluntary 201 commitment basis due to depression, auditory hallucinations and suicidal ideation without plan  Patient presented to the ED on April 29th stating that he was increasingly depressed over the last several weeks with onset of auditory hallucinations with thoughts of suicide without a plan  He was evaluated in the ED and signed a 201  On evaluation in the inpatient psychiatric unit Joaquin Party endorses feelings of sadness, lack of energy and motivation, difficulty with sleep, adequate appetite  He states that he is not engaging in activities as he did in the past which were enjoyable to him  He admits to feeling hopeless  He reports he was having thoughts of ending his life but did not have a plan  He reports that he does feel safe while on the inpatient unit  Patient also endorses onset of auditory hallucinations for the last several weeks  He reports he has never heard voices prior to this  He reports that the voices come and go in there multiple voices  Patient reports he hears the voices telling him to do certain things  He finds that this is denied giving command this to hurt himself  Patient did have store patient has no significant history of trauma or symptoms of PTSD  Patient does have history of anxiety and has ongoing worry about daily stressors   He ruminates and over thinks things  He reports he did have a history of OCD with checking rituals such as the door the stove several times a day  He reports that his checking rituals are currently under control  [facility] Course: The patient was admitted to the inpatient psychiatric unit and started on every 7 minutes precautions  During the hospitalization the patient was attending individual therapy, group therapy, milieu therapy and occupational therapy  Psychiatric medications were titrated over the hospital stay  To address depression, mood instability, psychotic symptoms and insomnia the patient was started on antidepressant Celexa and Effexor XR, antipsychotic medication Risperdal and hypnotic medication Trazodone  Medication doses were titrated during the hospital course  Prior to beginning of treatment medications risks and benefits and possible side effects including risk of parkinsonian symptoms, Tardive Dyskinesia and metabolic syndrome related to treatment with antipsychotic medications, risk of cardiovascular events in elderly related to treatment with antipsychotic medications and risk of suicidality and serotonin syndrome related to treatment with antidepressants were reviewed with the patient  The patient verbalized understanding and agreement for treatment  Patient's symptoms improved gradually over the hospital course  At the end of treatment the patient was doing well  Mood was stable at the time of discharge  The patient denied suicidal ideation, intent or plan at the time of discharge and denied homicidal ideation, intent or plan at the time of discharge  There was no overt psychosis at the time of discharge  Sleep and appetite were improved  The patient was tolerating medications and was not reporting any significant side effects at the time of discharge  Since the patient was doing well at the end of the hospitalization, treatment team felt that the patient could be safely discharged to outpatient care  The outpatient follow up was arranged by the unit  upon discharge      Mental Status at time of Discharge:     Appearance:  age appropriate   Behavior:  cooperative   Speech:  normal volume   Mood:  euthymic   Affect:  mood-congruent   Thought Process:  goal directed   Thought Content:  no overt delusions   Perceptual Disturbances: None   Risk Potential: Suicidal Ideations none, HI none   Sensorium:  person, place and time/date   Cognition:  recent and remote memory grossly intact   Consciousness:  alert and awake    Attention: attention span and concentration were age appropriate   Insight:  improved   Judgment: fair   Gait/Station: normal gait/station   Motor Activity: no abnormal movements     Admission Diagnosis:Major depressive disorder, single episode, unspecified [F32 9]  Major depressive disorder [F32 9]    Discharge Diagnosis:   Principal Problem (Resolved):    Severe episode of recurrent major depressive disorder, with psychotic features ([facility] [address] )  Active Problems:    Gastroesophageal reflux disease without esophagitis    Celiac disease    History of obsessive compulsive disorder    BPH (benign prostatic hyperplasia)    Essential hypertension    DJD (degenerative joint disease)  Resolved Problems:    Anxiety disorder, unspecified      Lab results:  Admission on 04/29/2021, Discharged on 05/14/2021   Component Date Value    Vitamin B-12 05/02/2021 420     Vit D, 25-Hydroxy 05/02/2021 25 0*       Discharge Medications:  Current Discharge Medication List      START taking these medications    Details   cholecalciferol (VITAMIN D3) 1,000 units tablet Take 1 tablet (1,000 Units total) by mouth daily  Qty: 30 tablet, Refills: 0    Associated Diagnoses: Vitamin D deficiency      cyanocobalamin 1,000 mcg/mL Inject 1 mL (1,000 mcg total) into a muscle every 30 (thirty) days  Qty: 1 mL, Refills: 0    Associated Diagnoses: Vitamin B12 deficiency      ergocalciferol (VITAMIN D2) 50,000 units Take 1 capsule (50,000 Units total) by mouth once a week for 6 doses  Qty: 4 capsule, Refills: 0    Associated Diagnoses: Vitamin D deficiency      !! risperiDONE (RisperDAL) 2 mg tablet Take 1 tablet (2 mg total) by mouth daily  Qty: 30 tablet, Refills: 0    Associated Diagnoses: Severe episode of recurrent major depressive disorder, with psychotic features ([facility] )      ! ! risperiDONE (RisperDAL) 3 mg tablet Take 1 tablet (3 mg total) by mouth daily at bedtime  Qty: 30 tablet, Refills: 0    Associated Diagnoses: Severe episode of recurrent major depressive disorder, with psychotic features ([facility])      traZODone (DESYREL) 100 mg tablet Take 1 tablet (100 mg total) by mouth daily at bedtime  Qty: 30 tablet, Refills: 0    Associated Diagnoses: Severe episode of recurrent major depressive disorder, with psychotic features ([facility] ); Other insomnia      venlafaxine (EFFEXOR-XR) 150 mg 24 hr capsule Take 1 capsule (150 mg total) by mouth daily  Qty: 30 capsule, Refills: 0    Associated Diagnoses: Severe episode of recurrent major depressive disorder, with psychotic features ([facility] )       ! ! - Potential duplicate medications found  Please discuss with provider           Current Discharge Medication List      STOP taking these medications       LORazepam (ATIVAN) 0 5 mg tablet Comments:   Reason for Stopping:              Current Discharge Medication List      CONTINUE these medications which have CHANGED    Details   citalopram (CeleXA) 20 mg tablet Take 1 tablet (20 mg total) by mouth daily  Qty: 30 tablet, Refills: 0    Associated Diagnoses: Anxiety disorder, unspecified; Severe episode of recurrent major depressive disorder, with psychotic features ([facility] )            Current Discharge Medication List      CONTINUE these medications which have NOT CHANGED    Details   acetaminophen (TYLENOL) 500 mg tablet Take 500 mg by mouth every 6 (six) hours as needed      Calcium Carbonate-Vitamin D3 600-400 MG-UNIT TABS Take 1 tablet by mouth 2 (two) times a day       gabapentin (NEURONTIN) 100 mg capsule TAKE 2 CAPSULES (200 MG TOTAL) BY MOUTH 2 (TWO) TIMES A DAY  Qty: 360 capsule, Refills: 2    Associated Diagnoses: Brachial plexus injury, right, sequela      hydrocortisone 1 % lotion Apply topically 2 (two) times a day      lisinopril (ZESTRIL) 10 mg tablet Take 1 tablet (10 mg total) by mouth daily  Qty: 90 tablet, Refills: 1    Associated Diagnoses: Essential hypertension      Omega-3 1000 MG CAPS Take 1 g by mouth daily      omeprazole (PriLOSEC) 40 MG capsule Take 1 capsule (40 mg total) by mouth daily  Qty: 90 capsule, Refills: 1    Associated Diagnoses: Gastroesophageal reflux disease without esophagitis      polyethylene glycol (MIRALAX) 17 g packet Take 17 g by mouth daily  Qty: 10 each, Refills: 0    Associated Diagnoses: Constipation      Propylene Glycol (Systane Complete) 0 6 % SOLN Apply to eye      senna (Senokot) 8 6 MG tablet Take 1 tablet by mouth 2 (two) times a day as needed      tamsulosin (FLOMAX) 0 4 mg TAKE 1 CAPSULE BY MOUTH AT BEDTIME  Qty: 90 capsule, Refills: 3    Associated Diagnoses: Benign prostatic hyperplasia, unspecified whether lower urinary tract symptoms present      Narcan 4 MG/0 1ML nasal spray CALL 911  ADMINISTER A SINGLE SPRAY INTRANASALLY INTO ONE NOSTRIL UPON SIGNS OF OPIOID OVERDOSE  MAY REPEAT AFTER 3 MINUTES IF NO RESPONSE  Discharge instructions/Information to patient and family:   See after visit summary for information provided to patient and family  Provisions for Follow-Up Care:  See after visit summary for information related to follow-up care and any pertinent home health orders  Discharge Statement   I spent 30 minutes discharging the patient  This time was spent on the day of discharge  I had direct contact with the patient on the day of discharge  Additional documentation is required if more than 30 minutes were spent on discharge

## 2021-05-14 NOTE — PROGRESS NOTES
05/14/21   Team Meeting   Meeting Type Daily Rounds   Team Members Present   Team Members Present Physician;Nurse;Occupational Therapist;   Physician Team Member Dr Dong Gandhi MD   Nursing Team Member Elena Fuller RN   Social Work Team Member Marques [facility]   OT Team Member Thor Kat [place]   Patient/Family Present   Patient Present No   Patient's Family Present No     DC today to home with outpt services

## 2021-05-14 NOTE — PLAN OF CARE
Problem: Ineffective Coping  Goal: Participates in unit activities  Description: Interventions:  - Provide therapeutic environment   - Provide required programming   - Redirect inappropriate behaviors   Outcome: Completed     All goals adequately met for DC

## 2021-05-14 NOTE — PROGRESS NOTES
Belongings sent home with patient:    4 pairs of socks (2 brown, 2 black), 1 pair of blue jeans, 1 tshirt ([employer]), maroon sneakers, cell phone, 2 pairs of black underwear, 1 light blue long sleeve shirt (nasa), 2 black tshirts, 1 pair of gray sweat pants, 1 pair of blue sweat pants, 1 pair of wipes, 1 pack of writing paper

## 2021-05-14 NOTE — PROGRESS NOTES
Patient out for meals  He is quiet and guarded  Pleasant and polite in conversation  Looking forward to DC  Denies depression, anxiety, SI/HI, hallucinations  No complaints of pain  Will continue monitoring

## 2021-05-14 NOTE — NURSING NOTE
Patient DC from [facility] to home at 1400  Ambulated from the unit and was accompanied by BHT  AVS reviewed with patient  All questions answered

## 2021-05-14 NOTE — PLAN OF CARE
Problem: DISCHARGE PLANNING - CARE MANAGEMENT  Goal: Discharge to post-acute care or home with appropriate resources  Description: INTERVENTIONS:  - Conduct assessment to determine patient/family and health care team treatment goals, and need for post-acute services based on payer coverage, community resources, and patient preferences, and barriers to discharge  - Address psychosocial, clinical, and financial barriers to discharge as identified in assessment in conjunction with the patient/family and health care team  - Arrange appropriate level of post-acute services according to patients   needs and preference and payer coverage in collaboration with the physician and health care team  - Communicate with and update the patient/family, physician, and health care team regarding progress on the discharge plan  - Arrange appropriate transportation to post-acute venues  Outcome: Completed     Pt discharging today; goal met

## 2021-05-14 NOTE — NURSING NOTE
Patient out in the milieu keeps to himself  Ate HS snack and attended group  Upon approach patient's mood and affect is flat and depressed  Patient endorses low anxiety and depression  Denies SI/HI/AHVH/pain  Took HS medication without difficulty  Will continue to monitor safety and behaviors every 7 minutes

## 2021-05-17 ENCOUNTER — TELEPHONE (OUTPATIENT)
Dept: FAMILY MEDICINE CLINIC | Facility: CLINIC | Age: 56
End: 2021-05-17

## 2021-05-17 NOTE — TELEPHONE ENCOUNTER
Pt's gf requesting refill on pt's Ativan  She states that pt is having increased anxiety  He has a f/u appt on wed from in patient psych  She states that pt uses [facility]

## 2021-05-17 NOTE — TELEPHONE ENCOUNTER
Call patient and recommend he discuss this with his psychiatrist as he was recently discharged from psychiatric hospitalization and ativan was discontinued

## 2021-05-18 ENCOUNTER — TELEMEDICINE (OUTPATIENT)
Dept: BEHAVIORAL/MENTAL HEALTH CLINIC | Facility: CLINIC | Age: 56
End: 2021-05-18
Payer: COMMERCIAL

## 2021-05-18 DIAGNOSIS — F33.41 RECURRENT MAJOR DEPRESSIVE DISORDER, IN PARTIAL REMISSION (HCC): ICD-10-CM

## 2021-05-18 DIAGNOSIS — F60.7 DEPENDENT PERSONALITY DISORDER (HCC): Primary | ICD-10-CM

## 2021-05-18 PROCEDURE — 90832 PSYTX W PT 30 MINUTES: CPT | Performed by: SOCIAL WORKER

## 2021-05-18 NOTE — PSYCH
Virtual Brief Visit    Assessment/Plan:    Problem List Items Addressed This Visit     None                Reason for visit is No chief complaint on file  Encounter provider Keavn Johnson    Provider located at [address] [place] [id number] [phone]    Recent Visits  No visits were found meeting these conditions  Showing recent visits within past 7 days and meeting all other requirements     Future Appointments  No visits were found meeting these conditions  Showing future appointments within next 150 days and meeting all other requirements        After connecting through telephone, the patient was identified by name and date of birth  Clif Simmons was informed that this is a telemedicine visit and that the visit is being conducted through telephone  My office door was closed  No one else was in the room  He acknowledged consent and understanding of privacy and security of the platform  The patient has agreed to participate and understands he can discontinue the visit at any time  Patient is aware this is a billable service  Subjective    Clif Simmons is a 64 y o  male SARAH- Ted Self stated that he was discharged form the hospital on Friday  HE stated that he is feeling somewhat better but has still been experiencing fleeting auditory hallucinations  HE stated that he is uncertain as to the triggers  He stated that he has been trying to stay busy with household projects  He stated that he does not have a very good working phone at this time which causes difficulty for his sessions  He also stated that he is sad because he has not spoken to his sons  Processing his emotions and discussing ways for him to continue to cope with the stress in his life as well as his ongoing symptoms  Discussed contacting the office if his symptoms worsen   Giving supportive therapy  A- Progress- Continuing to work towards completing his treatment goals  P-Continue treatment  HPI     Past Medical History:   Diagnosis Date    Anxiety 1995    Celiac disease     Depression 1995    Hypertension     Obsessive compulsive disorder     Suicide attempt ([facility] [address] )     10/2018       Past Surgical History:   Procedure Laterality Date    FRACTURE SURGERY      TONSILLECTOMY      WRIST SURGERY Left     resolved 1997- cts surgery       Current Outpatient Medications   Medication Sig Dispense Refill    acetaminophen (TYLENOL) 500 mg tablet Take 500 mg by mouth every 6 (six) hours as needed      Calcium Carbonate-Vitamin D3 600-400 MG-UNIT TABS Take 1 tablet by mouth 2 (two) times a day       [START ON 6/20/2021] cholecalciferol (VITAMIN D3) 1,000 units tablet Take 1 tablet (1,000 Units total) by mouth daily 30 tablet 0    citalopram (CeleXA) 20 mg tablet Take 1 tablet (20 mg total) by mouth daily 30 tablet 0    [START ON 6/2/2021] cyanocobalamin 1,000 mcg/mL Inject 1 mL (1,000 mcg total) into a muscle every 30 (thirty) days 1 mL 0    ergocalciferol (VITAMIN D2) 50,000 units Take 1 capsule (50,000 Units total) by mouth once a week for 6 doses 4 capsule 0    gabapentin (NEURONTIN) 100 mg capsule TAKE 2 CAPSULES (200 MG TOTAL) BY MOUTH 2 (TWO) TIMES A  capsule 2    hydrocortisone 1 % lotion Apply topically 2 (two) times a day      lisinopril (ZESTRIL) 10 mg tablet Take 1 tablet (10 mg total) by mouth daily 90 tablet 1    Narcan 4 MG/0 1ML nasal spray CALL 911  ADMINISTER A SINGLE SPRAY INTRANASALLY INTO ONE NOSTRIL UPON SIGNS OF OPIOID OVERDOSE  MAY REPEAT AFTER 3 MINUTES IF NO RESPONSE        Omega-3 1000 MG CAPS Take 1 g by mouth daily      omeprazole (PriLOSEC) 40 MG capsule Take 1 capsule (40 mg total) by mouth daily 90 capsule 1    polyethylene glycol (MIRALAX) 17 g packet Take 17 g by mouth daily 10 each 0    Propylene Glycol (Systane Complete) 0 6 % SOLN Apply to eye      risperiDONE (RisperDAL) 2 mg tablet Take 1 tablet (2 mg total) by mouth daily 30 tablet 0    risperiDONE (RisperDAL) 3 mg tablet Take 1 tablet (3 mg total) by mouth daily at bedtime 30 tablet 0    senna (Senokot) 8 6 MG tablet Take 1 tablet by mouth 2 (two) times a day as needed      tamsulosin (FLOMAX) 0 4 mg TAKE 1 CAPSULE BY MOUTH AT BEDTIME 90 capsule 3    traZODone (DESYREL) 100 mg tablet Take 1 tablet (100 mg total) by mouth daily at bedtime 30 tablet 0    venlafaxine (EFFEXOR-XR) 150 mg 24 hr capsule Take 1 capsule (150 mg total) by mouth daily 30 capsule 0     No current facility-administered medications for this visit  Allergies   Allergen Reactions    Gluten Meal - Food Allergy      Pt is diagnosed with celiac disease with the biopsy/pathology and the tissue transglutaminase antibody by the GI     Penicillins Diarrhea and Vomiting    Celecoxib Rash       Review of Systems    There were no vitals filed for this visit  I spent 20 minutes directly with the patient during this visit    VIRTUAL VISIT DISCLAIMER    Michelle Decker acknowledges that he has consented to an online visit or consultation  He understands that the online visit is based solely on information provided by him, and that, in the absence of a face-to-face physical evaluation by the physician, the diagnosis he receives is both limited and provisional in terms of accuracy and completeness  This is not intended to replace a full medical face-to-face evaluation by the physician  Michelle Decker understands and accepts these terms

## 2021-05-20 ENCOUNTER — TRANSITIONAL CARE MANAGEMENT (OUTPATIENT)
Dept: FAMILY MEDICINE CLINIC | Facility: CLINIC | Age: 56
End: 2021-05-20

## 2021-05-20 ENCOUNTER — OFFICE VISIT (OUTPATIENT)
Dept: FAMILY MEDICINE CLINIC | Facility: CLINIC | Age: 56
End: 2021-05-20
Payer: COMMERCIAL

## 2021-05-20 VITALS
DIASTOLIC BLOOD PRESSURE: 82 MMHG | RESPIRATION RATE: 16 BRPM | OXYGEN SATURATION: 96 % | TEMPERATURE: 98.4 F | SYSTOLIC BLOOD PRESSURE: 112 MMHG | HEART RATE: 120 BPM | BODY MASS INDEX: 29.86 KG/M2 | WEIGHT: 232.6 LBS

## 2021-05-20 DIAGNOSIS — F33.41 RECURRENT MAJOR DEPRESSIVE DISORDER, IN PARTIAL REMISSION (HCC): Primary | ICD-10-CM

## 2021-05-20 DIAGNOSIS — F41.9 ANXIETY DISORDER, UNSPECIFIED TYPE: ICD-10-CM

## 2021-05-20 DIAGNOSIS — Z86.59 HISTORY OF OBSESSIVE COMPULSIVE DISORDER: ICD-10-CM

## 2021-05-20 PROCEDURE — 99215 OFFICE O/P EST HI 40 MIN: CPT | Performed by: FAMILY MEDICINE

## 2021-05-20 RX ORDER — LORAZEPAM 0.5 MG/1
TABLET ORAL
COMMUNITY
Start: 2021-05-17 | End: 2021-06-02 | Stop reason: SDUPTHER

## 2021-05-20 NOTE — PROGRESS NOTES
Assessment/Plan:   patient to continue present treatment and utilize lorazepam p r n  as prescribed  Recommend patient call his psychiatrist regarding further medication adjustments  Recommend increase fluids in the form of water and to discontinue caffeine  Recommend rest and cool place  Return the office p r n  or report to the emergency room for worsening symptoms  Diagnoses and all orders for this visit:    Recurrent major depressive disorder, in partial remission ([address] )    Anxiety disorder, unspecified type    History of obsessive compulsive disorder    Other orders  -     LORazepam (ATIVAN) 0 5 mg tablet          Subjective:      Patient ID: Amos Mai is a 64 y o  male  Patient is being seen with his girlfriend in follow-up from recent hospitalization at [facility] [address] [facility] from 04/29 / 21 until 05/14/2021 for major depressive disorder  Patient was under the care Dr Yael Vu, psychiatrist and medications were adjusted  Patient had follow-up appoint with psychologist on 05/18/2021 and has a follow-up appoint with psychiatrist on 07/13/2021  Since discharge from hospital Dr Yael Vu added lorazepam p r n  [place] Patient states he is feeling somewhat better overall with less depression and denies feeling suicidal   He does admit to ongoing anxiousness  Appetite is good and patient is sleeping fairly well overall  He admits to drinking 6 caffeinated sodas per day  Patient denies any other drug use  Anxiety  Presents for follow-up visit  Symptoms include compulsions, decreased concentration, depressed mood, excessive worry, insomnia, irritability, nervous/anxious behavior, obsessions and restlessness  Patient reports no chest pain, confusion, dizziness, dry mouth, hyperventilation, nausea, palpitations, panic, shortness of breath or suicidal ideas  Symptoms occur constantly  The severity of symptoms is interfering with daily activities and causing significant distress   The quality of sleep is fair  Nighttime awakenings: one to two, occasional            The following portions of the patient's history were reviewed and updated as appropriate: allergies, current medications, past family history, past medical history, past social history, past surgical history and problem list     Review of Systems   Constitutional: Positive for irritability  Respiratory: Negative for shortness of breath  Cardiovascular: Negative for chest pain and palpitations  Gastrointestinal: Negative for nausea  Neurological: Negative for dizziness  Psychiatric/Behavioral: Positive for decreased concentration  Negative for confusion and suicidal ideas  The patient is nervous/anxious and has insomnia  Objective:      /82 (BP Location: Left arm, Patient Position: Sitting, Cuff Size: Large)   Pulse (!) 120   Temp 98 4 °F (36 9 °C) (Temporal)   Resp 16   Wt 106 kg (232 lb 9 6 oz)   SpO2 96%   BMI 29 86 kg/m²          Physical Exam  Constitutional:       General: He is not in acute distress  Appearance: Normal appearance  HENT:      Head: Normocephalic  Mouth/Throat:      Mouth: Mucous membranes are moist    Eyes:      General: No scleral icterus  Conjunctiva/sclera: Conjunctivae normal    Neck:      Musculoskeletal: Neck supple  Cardiovascular:      Rate and Rhythm: Regular rhythm  Tachycardia present  Pulmonary:      Effort: Pulmonary effort is normal       Breath sounds: Normal breath sounds  Abdominal:      Palpations: Abdomen is soft  Tenderness: There is no abdominal tenderness  Musculoskeletal:      Right lower leg: No edema  Left lower leg: No edema  Lymphadenopathy:      Cervical: No cervical adenopathy  Skin:     General: Skin is warm and dry  Neurological:      Mental Status: He is alert and oriented to person, place, and time     Psychiatric:      Comments: Patient appears quite anxious

## 2021-05-21 ENCOUNTER — TELEPHONE (OUTPATIENT)
Dept: PSYCHIATRY | Facility: CLINIC | Age: 56
End: 2021-05-21

## 2021-05-21 NOTE — TELEPHONE ENCOUNTER
Patient left a message regarding needing an urgent psychiatric appointment scheduled as a new patient with dr Bill [place]

## 2021-06-01 ENCOUNTER — TELEPHONE (OUTPATIENT)
Dept: PSYCHIATRY | Facility: CLINIC | Age: 56
End: 2021-06-01

## 2021-06-01 RX ORDER — LORAZEPAM 0.5 MG/1
TABLET ORAL
OUTPATIENT
Start: 2021-06-01

## 2021-06-01 NOTE — TELEPHONE ENCOUNTER
Girlfriend called to say they need a sooner appt  I told her I put him on the wait list and if someone opens up they would call  She said he needs to talk to one of our doctors regarding his medication change in the hospital, I said he has not been seen by a Dr in our office for over a year they can't prescribe him medication until he is seen   I told her to call the hospital and speak to a  to see if they can do anything

## 2021-06-01 NOTE — TELEPHONE ENCOUNTER
Pt caregiver called today and is unable to get an appt with Psych until 07/13/2021  Can order Lorazepam until then?     Pharmacy [facility]

## 2021-06-02 DIAGNOSIS — F41.9 ANXIETY DISORDER, UNSPECIFIED TYPE: Primary | ICD-10-CM

## 2021-06-02 RX ORDER — LORAZEPAM 0.5 MG/1
0.5 TABLET ORAL EVERY 8 HOURS PRN
Qty: 30 TABLET | Refills: 0 | Status: SHIPPED | OUTPATIENT
Start: 2021-06-02 | End: 2021-07-13 | Stop reason: SDUPTHER

## 2021-06-02 NOTE — TELEPHONE ENCOUNTER
Pt called back, I spoke to him and his caregiver/girlfriend, Jeanie Gregg and she stated that his appt w/psych is the initial appt, and they will not prescribe it yet as they have not seen him in office and cannot get him in any earlier  Are you able to prescribe this just until his appt on 7/13? Please advise  Thank you

## 2021-06-05 DIAGNOSIS — G47.09 OTHER INSOMNIA: ICD-10-CM

## 2021-06-05 DIAGNOSIS — F33.3 SEVERE EPISODE OF RECURRENT MAJOR DEPRESSIVE DISORDER, WITH PSYCHOTIC FEATURES (HCC): ICD-10-CM

## 2021-06-05 DIAGNOSIS — F41.9 ANXIETY DISORDER, UNSPECIFIED: ICD-10-CM

## 2021-06-07 RX ORDER — VENLAFAXINE HYDROCHLORIDE 150 MG/1
CAPSULE, EXTENDED RELEASE ORAL
Qty: 30 CAPSULE | Refills: 0 | Status: SHIPPED | OUTPATIENT
Start: 2021-06-07 | End: 2021-07-13 | Stop reason: SDDI

## 2021-06-07 RX ORDER — RISPERIDONE 2 MG/1
TABLET, FILM COATED ORAL
Qty: 30 TABLET | Refills: 0 | Status: SHIPPED | OUTPATIENT
Start: 2021-06-07 | End: 2021-07-13 | Stop reason: SDDI

## 2021-06-07 RX ORDER — RISPERIDONE 3 MG/1
3 TABLET, FILM COATED ORAL
Qty: 30 TABLET | Refills: 0 | Status: SHIPPED | OUTPATIENT
Start: 2021-06-07 | End: 2021-07-13 | Stop reason: SDDI

## 2021-06-07 RX ORDER — CITALOPRAM 20 MG/1
TABLET ORAL
Qty: 30 TABLET | Refills: 0 | Status: SHIPPED | OUTPATIENT
Start: 2021-06-07 | End: 2021-07-13 | Stop reason: ALTCHOICE

## 2021-06-07 RX ORDER — TRAZODONE HYDROCHLORIDE 100 MG/1
TABLET ORAL
Qty: 30 TABLET | Refills: 0 | Status: SHIPPED | OUTPATIENT
Start: 2021-06-07 | End: 2021-09-08 | Stop reason: SDUPTHER

## 2021-06-09 DIAGNOSIS — K21.9 GASTROESOPHAGEAL REFLUX DISEASE WITHOUT ESOPHAGITIS: ICD-10-CM

## 2021-06-09 RX ORDER — OMEPRAZOLE 40 MG/1
CAPSULE, DELAYED RELEASE ORAL
Qty: 30 CAPSULE | Refills: 5 | Status: SHIPPED | OUTPATIENT
Start: 2021-06-09 | End: 2022-03-28

## 2021-07-12 NOTE — PSYCH
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 to be tapered off as tolerated  - Continue Trazodone  mg po PRN for insomnia  - Continue individual therapy  - Psychoeducation regarding medication benefits and risks, side effects, indications  and alternatives provided to the patient and the importance of compliance with psychiatric medication reiterated  The pt verbalized understanding and agreed with the plan  - Patient was referred for individual psychotherapy   - RTC in 8 weeks  - The patient was educated about 24 hour and weekend coverage for urgent situations accessed by calling [facility] main practice number  - Patient was educated to call [facility] ([phone] [[phone]]) for behavioral crisis at anytime or 911 for any safety concerns, or go to nearest ER if his symptoms become overwhelming or unmanageable      Medications Risks/Benefits:      Risks, Benefits And Possible Side Effects Of Medications:    Risks, benefits, and possible side effects of medications explained to Juan Carlos Soto and he verbalizes understanding and agreement for treatment  Controlled Medication Discussion:     Juan Carlos Soto has been filling controlled prescriptions on time as prescribed according to [facility]  Discussed with Juan Carlos Soto the risks of sedation, respiratory depression, impairment of ability to drive and potential for abuse and addiction related to treatment with benzodiazepine medications  He understands risk of treatment with benzodiazepine medications, agrees to not drive if feels impaired and agrees to take medications as prescribed  Discussed with Hannah Beltre warning on concurrent use of benzodiazepines and opioid medications including sedation, respiratory depression, coma and death   He understands the risk of treatment with benzodiazepines in addition to opioids and wants to continue taking those medications    Treatment Plan:    Completed and signed during the session: Not applicable - Treatment Plan to be completed by [facility] therapist    Sylvain Morrison MD 07/13/21

## 2021-07-13 ENCOUNTER — OFFICE VISIT (OUTPATIENT)
Dept: PSYCHIATRY | Facility: CLINIC | Age: 56
End: 2021-07-13
Payer: COMMERCIAL

## 2021-07-13 ENCOUNTER — TELEPHONE (OUTPATIENT)
Dept: PSYCHIATRY | Facility: CLINIC | Age: 56
End: 2021-07-13

## 2021-07-13 VITALS — BODY MASS INDEX: 32.07 KG/M2 | HEIGHT: 74 IN | WEIGHT: 249.9 LBS

## 2021-07-13 DIAGNOSIS — F31.5 BIPOLAR I DISORDER, MOST RECENT EPISODE DEPRESSED, SEVERE W PSYCHOSIS (HCC): Primary | ICD-10-CM

## 2021-07-13 DIAGNOSIS — F41.1 GAD (GENERALIZED ANXIETY DISORDER): ICD-10-CM

## 2021-07-13 DIAGNOSIS — F10.21 ALCOHOL USE DISORDER, SEVERE, IN EARLY REMISSION (HCC): ICD-10-CM

## 2021-07-13 DIAGNOSIS — F60.7 DEPENDENT PERSONALITY DISORDER (HCC): ICD-10-CM

## 2021-07-13 DIAGNOSIS — F42.2 MIXED OBSESSIONAL THOUGHTS AND ACTS: ICD-10-CM

## 2021-07-13 PROCEDURE — 99215 OFFICE O/P EST HI 40 MIN: CPT | Performed by: STUDENT IN AN ORGANIZED HEALTH CARE EDUCATION/TRAINING PROGRAM

## 2021-07-13 PROCEDURE — 96127 BRIEF EMOTIONAL/BEHAV ASSMT: CPT | Performed by: STUDENT IN AN ORGANIZED HEALTH CARE EDUCATION/TRAINING PROGRAM

## 2021-07-13 RX ORDER — DULOXETIN HYDROCHLORIDE 60 MG/1
60 CAPSULE, DELAYED RELEASE ORAL DAILY
Qty: 30 CAPSULE | Refills: 1 | Status: SHIPPED | OUTPATIENT
Start: 2021-07-13 | End: 2021-08-14

## 2021-07-13 RX ORDER — LORAZEPAM 0.5 MG/1
0.5 TABLET ORAL
Qty: 30 TABLET | Refills: 1 | Status: SHIPPED | OUTPATIENT
Start: 2021-07-13 | End: 2021-09-08 | Stop reason: SDUPTHER

## 2021-07-13 RX ORDER — QUETIAPINE FUMARATE 100 MG/1
100 TABLET, FILM COATED ORAL
Qty: 30 TABLET | Refills: 1 | Status: SHIPPED | OUTPATIENT
Start: 2021-07-13 | End: 2021-08-14

## 2021-07-13 NOTE — TELEPHONE ENCOUNTER
Patient's girlfriend requested a virtual visit for patient's NP appt  I explained that the first visit needs to be face to face

## 2021-08-02 ENCOUNTER — TELEPHONE (OUTPATIENT)
Dept: BEHAVIORAL/MENTAL HEALTH CLINIC | Facility: CLINIC | Age: 56
End: 2021-08-02

## 2021-08-03 ENCOUNTER — TELEPHONE (OUTPATIENT)
Dept: PSYCHIATRY | Facility: CLINIC | Age: 56
End: 2021-08-03

## 2021-08-03 NOTE — LETTER
21     [facility]   : 1965   [address] [facility]       It is the policy of [facility] to monitor and manage appointments that have been no-showed or cancelled with less than 48-hour notice  This is necessary to ensure that we are able to provide timely access for all patients to our providers  Undue numbers of unutilized appointments delays necessary medical care for all patients  Dear Christine Rouse : We are sorry that you missed your appointment with Paz Eli LCSW on 2021  Your health and follow-up care are important to us  We want to make you aware of your next appointment with Paz Eli LCSW that is scheduled for Monday,  2021 at 10:00 am     Please be aware that our office policy states that if you 'no show' or cancel an appointment without providing 48-hours notice, you may be charged a $53 00 fee  Additionally, if you miss two Therapy appointments in a row without prior notice of cancellation, or three or more in a calendar year, you may be discharged from Therapy treatment  We want to bring this to your attention now to prevent an interruption of your care  If you have any questions about this policy, please call us at the number above  Thank you in advance for your cooperation and assistance         Sincerely,      [address] [facility] [address]

## 2021-08-03 NOTE — LETTER
21     Augustine Jewels Fernandezrobertchristine   : 1965   [address] [facility]       It is the policy of [facility] to monitor and manage appointments that have been no-showed or cancelled with less than 48-hour notice  This is necessary to ensure that we are able to provide timely access for all patients to our providers  Undue numbers of unutilized appointments delays necessary medical care for all patients  Dear Nicole Fishman {Mwparent:[id number]}: We are sorry that you missed your appointment with {Fanny Providers:84184} on ***  {JWLS:78080} since your last appointment  Your health and follow-up care are important to us  {NS Next Appt:38432}    Please be aware that our office policy states that if you 'no show' or cancel an appointment without providing 48-hours notice, you may be charged a $53 00 fee  Additionally, if you miss two {JWTx/Therapy:17428} appointments in a row without prior notice of cancellation, or three or more in a calendar year, you may be discharged from {JWTx/Therapy:10288} treatment  We want to bring this to your attention now to prevent an interruption of your care  If you have any questions about this policy, please call us at the number above  If we do not hear from you within 10 business days to make a follow up appointment, we will assume you are no longer interested in care here  Thank you in advance for your cooperation and assistance         Sincerely,      [address] [facility] [address]

## 2021-08-14 DIAGNOSIS — F31.5 BIPOLAR I DISORDER, MOST RECENT EPISODE DEPRESSED, SEVERE W PSYCHOSIS (HCC): ICD-10-CM

## 2021-08-14 DIAGNOSIS — F42.2 MIXED OBSESSIONAL THOUGHTS AND ACTS: ICD-10-CM

## 2021-08-14 DIAGNOSIS — F41.1 GAD (GENERALIZED ANXIETY DISORDER): ICD-10-CM

## 2021-08-14 RX ORDER — QUETIAPINE FUMARATE 100 MG/1
TABLET, FILM COATED ORAL
Qty: 30 TABLET | Refills: 1 | Status: SHIPPED | OUTPATIENT
Start: 2021-08-14 | End: 2021-09-08 | Stop reason: ALTCHOICE

## 2021-08-14 RX ORDER — DULOXETIN HYDROCHLORIDE 60 MG/1
CAPSULE, DELAYED RELEASE ORAL
Qty: 30 CAPSULE | Refills: 1 | Status: SHIPPED | OUTPATIENT
Start: 2021-08-14 | End: 2021-09-08 | Stop reason: SDUPTHER
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
## 2021-09-08 ENCOUNTER — OFFICE VISIT (OUTPATIENT)
Dept: PSYCHIATRY | Facility: CLINIC | Age: 56
End: 2021-09-08
Payer: COMMERCIAL

## 2021-09-08 VITALS — HEIGHT: 74 IN | WEIGHT: 250 LBS | BODY MASS INDEX: 32.08 KG/M2

## 2021-09-08 DIAGNOSIS — F42.2 MIXED OBSESSIONAL THOUGHTS AND ACTS: ICD-10-CM

## 2021-09-08 DIAGNOSIS — F41.1 GAD (GENERALIZED ANXIETY DISORDER): ICD-10-CM

## 2021-09-08 DIAGNOSIS — F31.5 BIPOLAR I DISORDER, MOST RECENT EPISODE DEPRESSED, SEVERE W PSYCHOSIS (HCC): Primary | ICD-10-CM

## 2021-09-08 DIAGNOSIS — F33.3 SEVERE EPISODE OF RECURRENT MAJOR DEPRESSIVE DISORDER, WITH PSYCHOTIC FEATURES (HCC): ICD-10-CM

## 2021-09-08 DIAGNOSIS — G47.09 OTHER INSOMNIA: ICD-10-CM

## 2021-09-08 PROCEDURE — 99215 OFFICE O/P EST HI 40 MIN: CPT | Performed by: STUDENT IN AN ORGANIZED HEALTH CARE EDUCATION/TRAINING PROGRAM

## 2021-09-08 RX ORDER — DULOXETIN HYDROCHLORIDE 60 MG/1
60 CAPSULE, DELAYED RELEASE ORAL DAILY
Qty: 30 CAPSULE | Refills: 1 | Status: SHIPPED | OUTPATIENT
Start: 2021-09-08 | End: 2022-03-28

## 2021-09-08 RX ORDER — OLANZAPINE 5 MG/1
5 TABLET ORAL
Qty: 30 TABLET | Refills: 1 | Status: SHIPPED | OUTPATIENT
Start: 2021-09-08 | End: 2021-10-14

## 2021-09-08 RX ORDER — LORAZEPAM 0.5 MG/1
0.5 TABLET ORAL
Qty: 30 TABLET | Refills: 1 | Status: SHIPPED | OUTPATIENT
Start: 2021-09-20 | End: 2021-12-01 | Stop reason: SDUPTHER

## 2021-09-08 RX ORDER — TRAZODONE HYDROCHLORIDE 100 MG/1
100 TABLET ORAL
Qty: 30 TABLET | Refills: 1 | Status: SHIPPED | OUTPATIENT
Start: 2021-09-08 | End: 2021-10-14

## 2021-09-09 ENCOUNTER — TELEPHONE (OUTPATIENT)
Dept: PSYCHOLOGY | Facility: CLINIC | Age: 56
End: 2021-09-09

## 2021-09-09 NOTE — TELEPHONE ENCOUNTER
Tried to call patient to offer/schedule PHP but didn't  the call and his message box is full  Can try later      Todd Hurtado

## 2021-09-13 ENCOUNTER — TELEPHONE (OUTPATIENT)
Dept: BEHAVIORAL/MENTAL HEALTH CLINIC | Facility: CLINIC | Age: 56
End: 2021-09-13

## 2021-09-29 ENCOUNTER — OFFICE VISIT (OUTPATIENT)
Dept: FAMILY MEDICINE CLINIC | Facility: CLINIC | Age: 56
End: 2021-09-29
Payer: COMMERCIAL

## 2021-09-29 VITALS
HEART RATE: 118 BPM | SYSTOLIC BLOOD PRESSURE: 124 MMHG | HEIGHT: 74 IN | BODY MASS INDEX: 32.34 KG/M2 | DIASTOLIC BLOOD PRESSURE: 82 MMHG | WEIGHT: 252 LBS | OXYGEN SATURATION: 97 % | TEMPERATURE: 98.2 F

## 2021-09-29 DIAGNOSIS — L02.91 ABSCESS: Primary | ICD-10-CM

## 2021-09-29 PROCEDURE — 99213 OFFICE O/P EST LOW 20 MIN: CPT | Performed by: FAMILY MEDICINE

## 2021-09-29 PROCEDURE — 10060 I&D ABSCESS SIMPLE/SINGLE: CPT | Performed by: FAMILY MEDICINE

## 2021-09-29 RX ORDER — QUETIAPINE FUMARATE 100 MG/1
100 TABLET, FILM COATED ORAL DAILY
COMMUNITY
End: 2022-03-28

## 2021-09-29 RX ORDER — DOXYCYCLINE HYCLATE 100 MG/1
100 CAPSULE ORAL EVERY 12 HOURS SCHEDULED
Qty: 14 CAPSULE | Refills: 0 | Status: SHIPPED | OUTPATIENT
Start: 2021-09-29 | End: 2021-10-06

## 2021-09-29 NOTE — PROGRESS NOTES
Assessment/Plan:    Abscess  - I&D performed with no immediate complications  - Will treat patient with Doxycycline 100 mg BID for 7 days; follow up with patient in 5 days        Diagnoses and all orders for this visit:    Abscess  -     doxycycline hyclate (VIBRAMYCIN) 100 mg capsule; Take 1 capsule (100 mg total) by mouth every 12 (twelve) hours for 7 days    Other orders  -     QUEtiapine (SEROquel) 100 mg tablet; Take 100 mg by mouth daily  -     Incision and Drainage          Subjective:      Patient ID: Liz Pineda is a 64 y o  male  HPI     Liz Pineda is a 64year old male with a past medical history of hypertension and bipolar disorder who presents today accompanied by his wife with a chief complaint of progressively worsening left foot pain  Patient's wife is concerned that he may have an abscess at the sole of his foot  He denies any recent trauma to the foot  Review of Systems   Constitutional: Negative  HENT: Negative  Eyes: Negative  Respiratory: Negative  Cardiovascular: Negative  Gastrointestinal: Negative  Musculoskeletal:        Left foot pain   Skin: Negative  Neurological: Negative  Psychiatric/Behavioral: Negative  Objective:      /82 (BP Location: Left arm, Patient Position: Sitting, Cuff Size: Standard)   Pulse (!) 118   Temp 98 2 °F (36 8 °C) (Tympanic)   Ht 6' 2" (1 88 m)   Wt 114 kg (252 lb)   SpO2 97%   BMI 32 35 kg/m²          Physical Exam  Constitutional:       General: He is not in acute distress  Appearance: Normal appearance  He is not ill-appearing  HENT:      Head: Normocephalic and atraumatic  Eyes:      General:         Right eye: No discharge  Left eye: No discharge  Cardiovascular:      Rate and Rhythm: Normal rate  Pulmonary:      Effort: Pulmonary effort is normal  No respiratory distress  Skin:     Findings: Abscess present        Comments: Area of fluctuance noted on the bottom of the left foot    Neurological:      General: No focal deficit present  Mental Status: He is alert  Psychiatric:         Mood and Affect: Mood is anxious  Incision and Drainage    Date/Time: 9/29/2021 2:00 PM  Performed by: Franchesca Calhoun MD  Authorized by: Franchesca Calhoun MD   Universal Protocol:  Consent: Verbal consent obtained  Consent given by: patient  Patient understanding: patient states understanding of the procedure being performed  Patient identity confirmed: verbally with patient      Patient location:  Clinic  Location:     Type:  Abscess    Location:  Lower extremity    Lower extremity location:  L foot  Anesthesia (see MAR for exact dosages): Anesthesia method:  Local infiltration    Local anesthetic:  Lidocaine 2% w/o epi  Procedure details:     Complexity:  Simple    Incision types:  Stab incision    Scalpel blade:  15    Approach:  Open    Wound management:  Probed and deloculated    Drainage:  Bloody    Drainage amount: Moderate    Wound treatment:  Wound left open    Packing materials:  None  Post-procedure details:     Patient tolerance of procedure:   Tolerated well, no immediate complications

## 2021-09-29 NOTE — ASSESSMENT & PLAN NOTE
- I&D performed with no immediate complications  - Will treat patient with Doxycycline 100 mg BID for 7 days; follow up with patient in 5 days

## 2021-10-04 ENCOUNTER — OFFICE VISIT (OUTPATIENT)
Dept: FAMILY MEDICINE CLINIC | Facility: CLINIC | Age: 56
End: 2021-10-04
Payer: COMMERCIAL

## 2021-10-04 VITALS
OXYGEN SATURATION: 96 % | SYSTOLIC BLOOD PRESSURE: 140 MMHG | HEIGHT: 74 IN | BODY MASS INDEX: 32.06 KG/M2 | HEART RATE: 98 BPM | TEMPERATURE: 98 F | DIASTOLIC BLOOD PRESSURE: 90 MMHG | WEIGHT: 249.8 LBS

## 2021-10-04 DIAGNOSIS — M79.672 LEFT FOOT PAIN: Primary | ICD-10-CM

## 2021-10-04 PROCEDURE — 99213 OFFICE O/P EST LOW 20 MIN: CPT | Performed by: FAMILY MEDICINE

## 2021-11-03 ENCOUNTER — TELEPHONE (OUTPATIENT)
Dept: PSYCHIATRY | Facility: CLINIC | Age: 56
End: 2021-11-03

## 2021-11-08 ENCOUNTER — TELEMEDICINE (OUTPATIENT)
Dept: FAMILY MEDICINE CLINIC | Facility: CLINIC | Age: 56
End: 2021-11-08
Payer: COMMERCIAL

## 2021-11-08 ENCOUNTER — NURSE TRIAGE (OUTPATIENT)
Dept: OTHER | Facility: OTHER | Age: 56
End: 2021-11-08

## 2021-11-08 DIAGNOSIS — R50.9 FEVER, UNSPECIFIED FEVER CAUSE: ICD-10-CM

## 2021-11-08 DIAGNOSIS — R05.9 COUGH: Primary | ICD-10-CM

## 2021-11-08 PROCEDURE — 99213 OFFICE O/P EST LOW 20 MIN: CPT | Performed by: FAMILY MEDICINE

## 2021-11-08 PROCEDURE — U0005 INFEC AGEN DETEC AMPLI PROBE: HCPCS | Performed by: FAMILY MEDICINE

## 2021-11-08 PROCEDURE — U0003 INFECTIOUS AGENT DETECTION BY NUCLEIC ACID (DNA OR RNA); SEVERE ACUTE RESPIRATORY SYNDROME CORONAVIRUS 2 (SARS-COV-2) (CORONAVIRUS DISEASE [COVID-19]), AMPLIFIED PROBE TECHNIQUE, MAKING USE OF HIGH THROUGHPUT TECHNOLOGIES AS DESCRIBED BY CMS-2020-01-R: HCPCS | Performed by: FAMILY MEDICINE

## 2021-11-08 RX ORDER — AZITHROMYCIN 250 MG/1
TABLET, FILM COATED ORAL
Qty: 6 TABLET | Refills: 0 | Status: SHIPPED | OUTPATIENT
Start: 2021-11-08 | End: 2021-11-12

## 2021-11-12 DIAGNOSIS — G47.09 OTHER INSOMNIA: ICD-10-CM

## 2021-11-12 DIAGNOSIS — F33.3 SEVERE EPISODE OF RECURRENT MAJOR DEPRESSIVE DISORDER, WITH PSYCHOTIC FEATURES (HCC): ICD-10-CM

## 2021-11-12 DIAGNOSIS — F31.5 BIPOLAR I DISORDER, MOST RECENT EPISODE DEPRESSED, SEVERE W PSYCHOSIS (HCC): ICD-10-CM

## 2021-11-12 RX ORDER — TRAZODONE HYDROCHLORIDE 100 MG/1
TABLET ORAL
Qty: 30 TABLET | Refills: 0 | OUTPATIENT
Start: 2021-11-12

## 2021-11-12 RX ORDER — OLANZAPINE 5 MG/1
TABLET ORAL
Qty: 30 TABLET | Refills: 0 | OUTPATIENT
Start: 2021-11-12

## 2021-11-16 ENCOUNTER — DOCUMENTATION (OUTPATIENT)
Dept: PSYCHIATRY | Facility: CLINIC | Age: 56
End: 2021-11-16

## 2021-11-16 DIAGNOSIS — F31.5 BIPOLAR I DISORDER, MOST RECENT EPISODE DEPRESSED, SEVERE W PSYCHOSIS (HCC): ICD-10-CM

## 2021-11-16 DIAGNOSIS — F41.1 GAD (GENERALIZED ANXIETY DISORDER): ICD-10-CM

## 2021-11-16 RX ORDER — OLANZAPINE 5 MG/1
TABLET ORAL
Qty: 30 TABLET | Refills: 0 | OUTPATIENT
Start: 2021-11-16

## 2021-11-16 RX ORDER — LORAZEPAM 0.5 MG/1
TABLET ORAL
Qty: 30 TABLET | Refills: 1 | OUTPATIENT
Start: 2021-11-16

## 2021-11-22 DIAGNOSIS — F33.3 SEVERE EPISODE OF RECURRENT MAJOR DEPRESSIVE DISORDER, WITH PSYCHOTIC FEATURES (HCC): ICD-10-CM

## 2021-11-22 DIAGNOSIS — G47.09 OTHER INSOMNIA: ICD-10-CM

## 2021-11-22 DIAGNOSIS — F42.2 MIXED OBSESSIONAL THOUGHTS AND ACTS: ICD-10-CM

## 2021-11-22 DIAGNOSIS — F41.1 GAD (GENERALIZED ANXIETY DISORDER): ICD-10-CM

## 2021-11-22 DIAGNOSIS — F31.5 BIPOLAR I DISORDER, MOST RECENT EPISODE DEPRESSED, SEVERE W PSYCHOSIS (HCC): ICD-10-CM

## 2021-11-22 RX ORDER — OLANZAPINE 5 MG/1
TABLET ORAL
Qty: 30 TABLET | Refills: 0 | OUTPATIENT
Start: 2021-11-22

## 2021-11-22 RX ORDER — TRAZODONE HYDROCHLORIDE 100 MG/1
100 TABLET ORAL
Qty: 30 TABLET | Refills: 0 | OUTPATIENT
Start: 2021-11-22 | End: 2021-12-22

## 2021-11-22 RX ORDER — LORAZEPAM 0.5 MG/1
TABLET ORAL
Qty: 30 TABLET | Refills: 1 | OUTPATIENT
Start: 2021-11-22

## 2021-11-22 RX ORDER — LORAZEPAM 0.5 MG/1
0.5 TABLET ORAL
Qty: 30 TABLET | Refills: 1 | OUTPATIENT
Start: 2021-11-22 | End: 2022-01-21

## 2021-11-22 RX ORDER — DULOXETIN HYDROCHLORIDE 60 MG/1
60 CAPSULE, DELAYED RELEASE ORAL DAILY
Qty: 30 CAPSULE | Refills: 1 | OUTPATIENT
Start: 2021-11-22 | End: 2022-01-21

## 2021-11-22 RX ORDER — TRAZODONE HYDROCHLORIDE 100 MG/1
TABLET ORAL
Qty: 30 TABLET | Refills: 0 | OUTPATIENT
Start: 2021-11-22

## 2021-11-22 RX ORDER — QUETIAPINE FUMARATE 100 MG/1
100 TABLET, FILM COATED ORAL DAILY
OUTPATIENT
Start: 2021-11-22

## 2021-11-26 ENCOUNTER — TELEPHONE (OUTPATIENT)
Dept: PSYCHIATRY | Facility: CLINIC | Age: 56
End: 2021-11-26

## 2021-11-29 ENCOUNTER — TELEPHONE (OUTPATIENT)
Dept: BEHAVIORAL/MENTAL HEALTH CLINIC | Facility: CLINIC | Age: 56
End: 2021-11-29

## 2021-11-29 ENCOUNTER — TELEPHONE (OUTPATIENT)
Dept: OTHER | Facility: OTHER | Age: 56
End: 2021-11-29

## 2021-11-30 ENCOUNTER — TELEPHONE (OUTPATIENT)
Dept: PSYCHIATRY | Facility: CLINIC | Age: 56
End: 2021-11-30

## 2021-11-30 DIAGNOSIS — F41.1 GAD (GENERALIZED ANXIETY DISORDER): ICD-10-CM

## 2021-11-30 DIAGNOSIS — F33.3 SEVERE EPISODE OF RECURRENT MAJOR DEPRESSIVE DISORDER, WITH PSYCHOTIC FEATURES (HCC): ICD-10-CM

## 2021-11-30 DIAGNOSIS — G47.09 OTHER INSOMNIA: ICD-10-CM

## 2021-11-30 DIAGNOSIS — F31.5 BIPOLAR I DISORDER, MOST RECENT EPISODE DEPRESSED, SEVERE W PSYCHOSIS (HCC): ICD-10-CM

## 2021-12-01 ENCOUNTER — TELEMEDICINE (OUTPATIENT)
Dept: BEHAVIORAL/MENTAL HEALTH CLINIC | Facility: CLINIC | Age: 56
End: 2021-12-01
Payer: COMMERCIAL

## 2021-12-01 DIAGNOSIS — F60.7 DEPENDENT PERSONALITY DISORDER (HCC): ICD-10-CM

## 2021-12-01 DIAGNOSIS — F42.2 MIXED OBSESSIONAL THOUGHTS AND ACTS: ICD-10-CM

## 2021-12-01 DIAGNOSIS — F31.5 BIPOLAR I DISORDER, MOST RECENT EPISODE DEPRESSED, SEVERE W PSYCHOSIS (HCC): ICD-10-CM

## 2021-12-01 DIAGNOSIS — F33.41 RECURRENT MAJOR DEPRESSIVE DISORDER, IN PARTIAL REMISSION (HCC): ICD-10-CM

## 2021-12-01 DIAGNOSIS — F10.21 ALCOHOL USE DISORDER, SEVERE, IN EARLY REMISSION (HCC): ICD-10-CM

## 2021-12-01 DIAGNOSIS — F41.1 GAD (GENERALIZED ANXIETY DISORDER): Primary | ICD-10-CM

## 2021-12-01 PROCEDURE — 90834 PSYTX W PT 45 MINUTES: CPT | Performed by: SOCIAL WORKER

## 2021-12-01 RX ORDER — OLANZAPINE 5 MG/1
5 TABLET ORAL
Qty: 45 TABLET | Refills: 0 | Status: SHIPPED | OUTPATIENT
Start: 2021-12-01 | End: 2022-03-28

## 2021-12-01 RX ORDER — TRAZODONE HYDROCHLORIDE 100 MG/1
100 TABLET ORAL
Qty: 45 TABLET | Refills: 0 | Status: SHIPPED | OUTPATIENT
Start: 2021-12-01 | End: 2022-03-28

## 2021-12-01 RX ORDER — LORAZEPAM 0.5 MG/1
0.5 TABLET ORAL
Qty: 30 TABLET | Refills: 1 | Status: SHIPPED | OUTPATIENT
Start: 2021-12-01 | End: 2022-03-28

## 2021-12-08 ENCOUNTER — TELEPHONE (OUTPATIENT)
Dept: PSYCHIATRY | Facility: CLINIC | Age: 56
End: 2021-12-08

## 2021-12-28 ENCOUNTER — HOSPITAL ENCOUNTER (EMERGENCY)
Facility: HOSPITAL | Age: 56
Discharge: HOME | End: 2021-12-29
Attending: EMERGENCY MEDICINE | Admitting: EMERGENCY MEDICINE
Payer: COMMERCIAL

## 2021-12-28 DIAGNOSIS — F32.A DEPRESSION WITH SUICIDAL IDEATION: ICD-10-CM

## 2021-12-28 DIAGNOSIS — R45.851 DEPRESSION WITH SUICIDAL IDEATION: ICD-10-CM

## 2021-12-28 DIAGNOSIS — Z86.59 HISTORY OF OBSESSIVE COMPULSIVE DISORDER: Primary | ICD-10-CM

## 2021-12-28 DIAGNOSIS — F32.A DEPRESSION: ICD-10-CM

## 2021-12-28 DIAGNOSIS — F33.41 RECURRENT MAJOR DEPRESSIVE DISORDER, IN PARTIAL REMISSION (HCC): ICD-10-CM

## 2021-12-28 LAB
ALBUMIN SERPL BCP-MCNC: 3.9 G/DL (ref 3–5.2)
ALP SERPL-CCNC: 75 U/L (ref 43–122)
ALT SERPL W P-5'-P-CCNC: 21 U/L
ANION GAP SERPL CALCULATED.3IONS-SCNC: 5 MMOL/L (ref 5–14)
AST SERPL W P-5'-P-CCNC: 27 U/L (ref 17–59)
BASOPHILS # BLD AUTO: 0.1 THOUSANDS/ΜL (ref 0–0.1)
BASOPHILS NFR BLD AUTO: 1 % (ref 0–1)
BILIRUB SERPL-MCNC: 0.3 MG/DL
BUN SERPL-MCNC: 9 MG/DL (ref 5–25)
CALCIUM SERPL-MCNC: 8.7 MG/DL (ref 8.4–10.2)
CHLORIDE SERPL-SCNC: 107 MMOL/L (ref 97–108)
CO2 SERPL-SCNC: 25 MMOL/L (ref 22–30)
CREAT SERPL-MCNC: 0.71 MG/DL (ref 0.7–1.5)
EOSINOPHIL # BLD AUTO: 0.3 THOUSAND/ΜL (ref 0–0.4)
EOSINOPHIL NFR BLD AUTO: 5 % (ref 0–6)
ERYTHROCYTE [DISTWIDTH] IN BLOOD BY AUTOMATED COUNT: 13.2 %
ETHANOL SERPL-MCNC: <10 MG/DL (ref 0–10)
FLUAV RNA RESP QL NAA+PROBE: NEGATIVE
FLUBV RNA RESP QL NAA+PROBE: NEGATIVE
GFR SERPL CREATININE-BSD FRML MDRD: 104 ML/MIN/1.73SQ M
GLUCOSE SERPL-MCNC: 131 MG/DL (ref 70–99)
HCT VFR BLD AUTO: 43.6 % (ref 41–53)
HGB BLD-MCNC: 14.4 G/DL (ref 13.5–17.5)
LYMPHOCYTES # BLD AUTO: 2.6 THOUSANDS/ΜL (ref 0.5–4)
LYMPHOCYTES NFR BLD AUTO: 36 % (ref 25–45)
MCH RBC QN AUTO: 30.5 PG (ref 26–34)
MCHC RBC AUTO-ENTMCNC: 33.1 G/DL (ref 31–36)
MCV RBC AUTO: 92 FL (ref 80–100)
MONOCYTES # BLD AUTO: 0.6 THOUSAND/ΜL (ref 0.2–0.9)
MONOCYTES NFR BLD AUTO: 8 % (ref 1–10)
NEUTROPHILS # BLD AUTO: 3.7 THOUSANDS/ΜL (ref 1.8–7.8)
NEUTS SEG NFR BLD AUTO: 52 % (ref 45–65)
PLATELET # BLD AUTO: 222 THOUSANDS/UL (ref 150–450)
PMV BLD AUTO: 9.2 FL (ref 8.9–12.7)
POTASSIUM SERPL-SCNC: 3.9 MMOL/L (ref 3.6–5)
PROT SERPL-MCNC: 7.1 G/DL (ref 5.9–8.4)
RBC # BLD AUTO: 4.73 MILLION/UL (ref 4.5–5.9)
RSV RNA RESP QL NAA+PROBE: NEGATIVE
SARS-COV-2 RNA RESP QL NAA+PROBE: NEGATIVE
SODIUM SERPL-SCNC: 137 MMOL/L (ref 137–147)
TSH SERPL DL<=0.05 MIU/L-ACNC: 4.46 UIU/ML (ref 0.47–4.68)
WBC # BLD AUTO: 7.2 THOUSAND/UL (ref 4.5–11)

## 2021-12-28 PROCEDURE — 85025 COMPLETE CBC W/AUTO DIFF WBC: CPT | Performed by: EMERGENCY MEDICINE

## 2021-12-28 PROCEDURE — 80053 COMPREHEN METABOLIC PANEL: CPT | Performed by: EMERGENCY MEDICINE

## 2021-12-28 PROCEDURE — 93005 ELECTROCARDIOGRAM TRACING: CPT

## 2021-12-28 PROCEDURE — 99285 EMERGENCY DEPT VISIT HI MDM: CPT | Performed by: EMERGENCY MEDICINE

## 2021-12-28 PROCEDURE — 84443 ASSAY THYROID STIM HORMONE: CPT | Performed by: EMERGENCY MEDICINE

## 2021-12-28 PROCEDURE — 99284 EMERGENCY DEPT VISIT MOD MDM: CPT

## 2021-12-28 PROCEDURE — 0241U HB NFCT DS VIR RESP RNA 4 TRGT: CPT | Performed by: EMERGENCY MEDICINE

## 2021-12-28 PROCEDURE — 36415 COLL VENOUS BLD VENIPUNCTURE: CPT | Performed by: EMERGENCY MEDICINE

## 2021-12-28 PROCEDURE — 82077 ASSAY SPEC XCP UR&BREATH IA: CPT | Performed by: EMERGENCY MEDICINE

## 2021-12-28 NOTE — ED NOTES
[place] PR[place] indicates: [place]  Recipient #[id number]  [address] [facility] managed by [facility]  Phone number: [phone]

## 2021-12-28 NOTE — ED PROVIDER NOTES
History  Chief Complaint   Patient presents with    Psychiatric Evaluation     states he has been depressed and paranoid for a few weeks  states he is suicidal without a plan  denies HI/AH/VH     65 yo male with a history of anxiety, major depressive disorder, OCD, celiac disease, bipolar disorder, and HTN presents to the ED complaining of progressively worsening depression, increased paranoia, and vague suicidal ideation x several weeks  The patient has been compliant with all of his medications  No suicidal plan  (+) History of prior suicide attempts  He denies HI  No auditory or visual hallucinations  No recent alcohol or drug abuse  No other specific complaints  Prior to Admission Medications   Prescriptions Last Dose Informant Patient Reported? Taking? Calcium Carbonate-Vitamin D3 600-400 MG-UNIT TABS  Self Yes No   Sig: Take 1 tablet by mouth 2 (two) times a day    DULoxetine (CYMBALTA) 60 mg delayed release capsule  Self No No   Sig: Take 1 capsule (60 mg total) by mouth daily   Patient not taking: Reported on 11/8/2021    LORazepam (ATIVAN) 0 5 mg tablet   No No   Sig: Take 1 tablet (0 5 mg total) by mouth daily at bedtime   Narcan 4 MG/0 1ML nasal spray  Self Yes No   Sig: CALL 911  ADMINISTER A SINGLE SPRAY INTRANASALLY INTO ONE NOSTRIL UPON SIGNS OF OPIOID OVERDOSE  MAY REPEAT AFTER 3 MINUTES IF NO RESPONSE     OLANZapine (ZyPREXA) 5 mg tablet   No No   Sig: Take 1 tablet (5 mg total) by mouth daily at bedtime   Omega-3 1000 MG CAPS  Self Yes No   Sig: Take 1 g by mouth daily   Propylene Glycol (Systane Complete) 0 6 % SOLN  Self Yes No   Sig: Apply to eye   QUEtiapine (SEROquel) 100 mg tablet  Self Yes No   Sig: Take 100 mg by mouth daily   acetaminophen (TYLENOL) 500 mg tablet  Self Yes No   Sig: Take 500 mg by mouth every 6 (six) hours as needed   cholecalciferol (VITAMIN D3) 1,000 units tablet  Self No No   Sig: Take 1 tablet (1,000 Units total) by mouth daily   cyanocobalamin 1,000 mcg/mL Self No No   Sig: Inject 1 mL (1,000 mcg total) into a muscle every 30 (thirty) days   Patient not taking: Reported on 5/20/2021   ergocalciferol (VITAMIN D2) 50,000 units  Self No No   Sig: Take 1 capsule (50,000 Units total) by mouth once a week for 6 doses   Patient not taking: Reported on 5/20/2021   gabapentin (NEURONTIN) 100 mg capsule  Self No No   Sig: TAKE 2 CAPSULES (200 MG TOTAL) BY MOUTH 2 (TWO) TIMES A DAY   hydrocortisone 1 % lotion  Self Yes No   Sig: Apply topically 2 (two) times a day   lisinopril (ZESTRIL) 10 mg tablet  Self No No   Sig: Take 1 tablet (10 mg total) by mouth daily   omeprazole (PriLOSEC) 40 MG capsule  Self No No   Sig: TAKE 1 CAPSULE BY MOUTH EVERY DAY   polyethylene glycol (MIRALAX) 17 g packet  Self No No   Sig: Take 17 g by mouth daily   senna (Senokot) 8 6 MG tablet  Self Yes No   Sig: Take 1 tablet by mouth 2 (two) times a day as needed   tamsulosin (FLOMAX) 0 4 mg  Self No No   Sig: TAKE 1 CAPSULE BY MOUTH AT BEDTIME   traZODone (DESYREL) 100 mg tablet   No No   Sig: Take 1 tablet (100 mg total) by mouth daily at bedtime      Facility-Administered Medications: None       Past Medical History:   Diagnosis Date    Anxiety 1995    Celiac disease     Depression 1995    Hypertension     Obsessive compulsive disorder     Suicide attempt ([facility] [address] )     10/2018       Past Surgical History:   Procedure Laterality Date    FRACTURE SURGERY      TONSILLECTOMY      WRIST SURGERY Left     resolved 1997- cts surgery       Family History   Problem Relation Age of Onset    Heart attack Father     Prostate cancer Father     Cerebral palsy Brother     OCD Mother     OCD Sister      I have reviewed and agree with the history as documented      E-Cigarette/Vaping    E-Cigarette Use Never User      E-Cigarette/Vaping Substances    Nicotine No     THC No     CBD No     Flavoring No     Other No     Unknown No      Social History     Tobacco Use    Smoking status: Current Every Day Smoker     Types: Cigars    Smokeless tobacco: Former User   Vaping Use    Vaping Use: Never used   Substance Use Topics    Alcohol use: Not Currently     Comment: SOCIAL    Drug use: No       Review of Systems   Constitutional: Negative for chills and fever  HENT: Negative for sore throat  Respiratory: Negative for cough and shortness of breath  Cardiovascular: Negative for chest pain and palpitations  Gastrointestinal: Negative for abdominal pain, diarrhea, nausea and vomiting  Endocrine: Negative for cold intolerance and heat intolerance  Genitourinary: Negative for dysuria and flank pain  Musculoskeletal: Negative for back pain  Skin: Negative for rash  Allergic/Immunologic: Negative for immunocompromised state  Neurological: Negative for headaches  Hematological: Negative for adenopathy  Psychiatric/Behavioral: Positive for dysphoric mood and suicidal ideas  Negative for hallucinations and sleep disturbance  The patient is not nervous/anxious  Physical Exam  Physical Exam  Constitutional:       General: He is not in acute distress  Appearance: He is well-developed  HENT:      Head: Normocephalic and atraumatic  Eyes:      Pupils: Pupils are equal, round, and reactive to light  Cardiovascular:      Rate and Rhythm: Normal rate and regular rhythm  Pulmonary:      Effort: Pulmonary effort is normal  No respiratory distress  Breath sounds: Normal breath sounds  Abdominal:      General: There is no distension  Palpations: Abdomen is soft  Tenderness: There is no abdominal tenderness  Musculoskeletal:         General: Normal range of motion  Cervical back: Normal range of motion and neck supple  Skin:     General: Skin is warm and dry  Neurological:      Mental Status: He is alert and oriented to person, place, and time  Psychiatric:         Attention and Perception: Attention normal          Mood and Affect: Mood is depressed  Speech: Speech normal          Behavior: Behavior is cooperative  Thought Content: Thought content is paranoid  Thought content includes suicidal ideation  Thought content does not include homicidal ideation  Thought content does not include homicidal or suicidal plan  Cognition and Memory: Cognition and memory normal          Judgment: Judgment normal          Vital Signs  ED Triage Vitals [12/28/21 1718]   Temperature Pulse Respirations Blood Pressure SpO2   (!) 97 2 °F (36 2 °C) (!) 110 16 148/90 97 %      Temp Source Heart Rate Source Patient Position - Orthostatic VS BP Location FiO2 (%)   Tympanic Monitor Sitting Left arm --      Pain Score       --           Vitals:    12/28/21 1718 12/28/21 1720   BP: 148/90    Pulse: (!) 110 93   Patient Position - Orthostatic VS: Sitting          Visual Acuity      ED Medications  Medications - No data to display    Diagnostic Studies  Results Reviewed     Procedure Component Value Units Date/Time    TSH [[id number]]  (Normal) Collected: 12/28/21 1920    Lab Status: Final result Specimen: Blood from Arm, Right Updated: 12/28/21 2019     TSH 3RD GENERATON 4 460 uIU/mL     Narrative:      Patients undergoing fluorescein dye angiography may retain small amounts of fluorescein in the body for 48-72 hours post procedure  Samples containing fluorescein can produce falsely depressed TSH values  If the patient had this procedure,a specimen should be resubmitted post fluorescein clearance  COVID/FLU/RSV - 2 hour TAT [[id number]]  (Normal) Collected: 12/28/21 1907    Lab Status: Final result Specimen: Nasopharyngeal Swab Updated: 12/28/21 2002     SARS-CoV-2 Negative     INFLUENZA A PCR Negative     INFLUENZA B PCR Negative     RSV PCR Negative    Narrative:      FOR PEDIATRIC PATIENTS - copy/paste COVID Guidelines URL to browser: [email or web address]     This test has been authorized by FDA under an EUA (Emergency Use Assay) for use by authorized laboratories  Clinical caution and judgement should be used with the interpretation of these results with consideration of the clinical impression and other laboratory testing  Testing reported as "Positive" or "Negative" has been proven to be accurate according to standard laboratory validation requirements  All testing is performed with control materials showing appropriate reactivity at standard intervals      Ethanol [[id number]]  (Normal) Collected: 12/28/21 1920    Lab Status: Final result Specimen: Blood from Arm, Right Updated: 12/28/21 1950     Ethanol Lvl <10 mg/dL     Comprehensive metabolic panel [[id number]]  (Abnormal) Collected: 12/28/21 1920    Lab Status: Final result Specimen: Blood from Arm, Right Updated: 12/28/21 1949     Sodium 137 mmol/L      Potassium 3 9 mmol/L      Chloride 107 mmol/L      CO2 25 mmol/L      ANION GAP 5 mmol/L      BUN 9 mg/dL      Creatinine 0 71 mg/dL      Glucose 131 mg/dL      Calcium 8 7 mg/dL      AST 27 U/L      ALT 21 U/L      Alkaline Phosphatase 75 U/L      Total Protein 7 1 g/dL      Albumin 3 9 g/dL      Total Bilirubin 0 30 mg/dL      eGFR 104 ml/min/1 73sq m     Narrative:      [place] guidelines for Chronic Kidney Disease (CKD):     Stage 1 with normal or high GFR (GFR > 90 mL/min/1 73 square meters)    Stage 2 Mild CKD (GFR = 60-89 mL/min/1 73 square meters)    Stage 3A Moderate CKD (GFR = 45-59 mL/min/1 73 square meters)    Stage 3B Moderate CKD (GFR = 30-44 mL/min/1 73 square meters)    Stage 4 Severe CKD (GFR = 15-29 mL/min/1 73 square meters)    Stage 5 End Stage CKD (GFR <15 mL/min/1 73 square meters)  Note: GFR calculation is accurate only with a steady state creatinine    CBC and differential [[id number]]  (Normal) Collected: 12/28/21 1920    Lab Status: Final result Specimen: Blood from Arm, Right Updated: 12/28/21 1936     WBC 7 20 Thousand/uL      RBC 4 73 Million/uL Hemoglobin 14 4 g/dL      Hematocrit 43 6 %      MCV 92 fL      MCH 30 5 pg      MCHC 33 1 g/dL      RDW 13 2 %      MPV 9 2 fL      Platelets 882 Thousands/uL      Neutrophils Relative 52 %      Lymphocytes Relative 36 %      Monocytes Relative 8 %      Eosinophils Relative 5 %      Basophils Relative 1 %      Neutrophils Absolute 3 70 Thousands/µL      Lymphocytes Absolute 2 60 Thousands/µL      Monocytes Absolute 0 60 Thousand/µL      Eosinophils Absolute 0 30 Thousand/µL      Basophils Absolute 0 10 Thousands/µL     Rapid drug screen, urine [[id number]]     Lab Status: No result Specimen: Urine                  No orders to display              Procedures  ECG 12 Lead Documentation Only    Date/Time: 12/28/2021 9:05 PM  Performed by: Amari Page MD  Authorized by: Amari Page MD     Indications / Diagnosis:  Psych eval  ECG reviewed by me, the ED Provider: yes    Patient location:  ED  Interpretation:     Interpretation: normal    Rate:     ECG rate:  92 bpm    ECG rate assessment: normal    Rhythm:     Rhythm: sinus rhythm    Ectopy:     Ectopy: none    QRS:     QRS axis:  Normal    QRS intervals:  Normal  Conduction:     Conduction: normal    ST segments:     ST segments:  Normal  T waves:     T waves: normal               ED Course                                             MDM  Number of Diagnoses or Management Options  History of obsessive compulsive disorder  Recurrent major depressive disorder, in partial remission ([facility] [address] )  Diagnosis management comments: The patient is obviously depressed but comfortable appearing with stable vital signs and a benign exam  (+) SI without a specific plan  Case discussed with Crisis --> they will evaluate the patient in the ED after medical clearance complete  Disposition per crisis worker recommendations  Will continue to monitor in the ED     20:13 The patient signed 201 paperwork  Inpatient bed search initiated         Amount and/or Complexity of Data Reviewed  Clinical lab tests: ordered and reviewed  Tests in the medicine section of CPT®: ordered and reviewed    Risk of Complications, Morbidity, and/or Mortality  Presenting problems: high  Diagnostic procedures: high  Management options: high    Patient Progress  Patient progress: stable      Disposition  Final diagnoses:   History of obsessive compulsive disorder   Recurrent major depressive disorder, in partial remission ([facility] )   Depression with suicidal ideation     Time reflects when diagnosis was documented in both MDM as applicable and the Disposition within this note     Time User Action Codes Description Comment    12/28/2021  5:58 PM Juan Francisco Blnak Add [Z86 59] History of obsessive compulsive disorder     12/28/2021  5:58 PM Karoline Arleneautumn JORGE Add [F33 41] Recurrent major depressive disorder, in partial remission ([facility] )     12/28/2021  8:14 PM Amita Hugo [address] Rod[address] BASHIR,  [id number]] Depression with suicidal ideation       ED Disposition     ED Disposition Condition Date/Time Comment    Transfer to [address] [place] [address] Dec 28, 2021  7:18 PM Cassidy You should be transferred out to inpatient BHU and has been medically cleared  MD Documentation      Most Recent Value   Sending MD Chris Conklin MD      Follow-up Information    None         Patient's Medications   Discharge Prescriptions    No medications on file       No discharge procedures on file      PDMP Review       Value Time User    PDMP Reviewed  Yes 12/1/2021  8:10 AM Kennedy Davis MD          ED Provider  Electronically Signed by       Maria L Madden MD  12/28/21 5171

## 2021-12-29 VITALS
RESPIRATION RATE: 15 BRPM | SYSTOLIC BLOOD PRESSURE: 100 MMHG | TEMPERATURE: 97.1 F | DIASTOLIC BLOOD PRESSURE: 57 MMHG | WEIGHT: 248.68 LBS | HEART RATE: 94 BPM | OXYGEN SATURATION: 96 % | BODY MASS INDEX: 31.93 KG/M2

## 2021-12-29 LAB
AMPHETAMINES SERPL QL SCN: NEGATIVE
ATRIAL RATE: 92 BPM
BARBITURATES UR QL: NEGATIVE
BENZODIAZ UR QL: NEGATIVE
COCAINE UR QL: NEGATIVE
METHADONE UR QL: NEGATIVE
OPIATES UR QL SCN: NEGATIVE
OXYCODONE+OXYMORPHONE UR QL SCN: NEGATIVE
P AXIS: 10 DEGREES
PCP UR QL: NEGATIVE
PR INTERVAL: 188 MS
QRS AXIS: 54 DEGREES
QRSD INTERVAL: 78 MS
QT INTERVAL: 344 MS
QTC INTERVAL: 425 MS
T WAVE AXIS: 24 DEGREES
THC UR QL: NEGATIVE
VENTRICULAR RATE: 92 BPM

## 2021-12-29 PROCEDURE — 80307 DRUG TEST PRSMV CHEM ANLYZR: CPT | Performed by: EMERGENCY MEDICINE

## 2021-12-29 PROCEDURE — 93010 ELECTROCARDIOGRAM REPORT: CPT | Performed by: INTERNAL MEDICINE

## 2021-12-29 NOTE — DISCHARGE INSTRUCTIONS
Return to ED if your suicidality is persistent and your having worsening thoughts of suicide  You were offered admission to inpatient psych today but he said that your other try working with her PCP, we were provided psychiatric follow-up information

## 2021-12-29 NOTE — ED NOTES
There are no signs of distress or discomfort  Resp even and unlabored  Pt is being monitored Q7 minutes  This nurse to continue monitoring        Tomy Vanessa RN  12/28/21 3663

## 2021-12-29 NOTE — ED NOTES
Pt in hallway bed that has been prepared for care of [address] pt  There are no signs of distress or discomfort  Resp even and unlabored  Pt is being monitored Q7 minutes  This nurse to continue monitoring        Brandy Coffey RN  12/28/21 4864

## 2021-12-29 NOTE — ED NOTES
Bed Search:  [place]: Per Megan Johnson, no beds  [place]: no answer  [place]: Per Doron Milligan, no beds  [place]: Per Gaurav Espinosa, no beds  [facility]: No answer  [place]: Per Mariajose Evans, no beds  [place] [facility]: Per Shellie Szymanski, no beds  [place]: Per Bela, no beds  [place]: No answer/kept ringing   [facility]:Per Rosemarie, no beds

## 2021-12-29 NOTE — ED NOTES
Patient stated his girlfriend dropped him off at the hospital due to depression, suicidal ideqation and paranoia  He initially stated he cannot identify any changes that may have precipiotated the currrent episode  Denied relationship issues and denied non-compliance  He then stated his girlfriend threw him out and he cannot return there  In addition, he stated his outpatient psychiatrist will no longer see him due to a missed apopoiontment  Patient stated he missed about a week of medication, about 2 weeks ago  He then was able to have the provider call it in  He presented as anxious, contradictory and several times asked for reassurance that he could stay unitl a bed is located  The patient has a history of MDD, Anxiety, OCD traits and dependent personality disorder  He has been experienceing SI and stated he has no plan  There is a history of at least one attempt 10/2018, when he walked into traffic  Initial onset was age 45 following marital issues and divorce  He maintains he has no one that is supportive at all  There is a history of periodic use of alcohol with these episodes characterized by verbal and sometimes physical aggression

## 2021-12-29 NOTE — ED NOTES
Insurance Authorization for admission:   Phone call placed to [facility]  Phone number: [phone]    Spoke to Tye SMITH      5 days approved    Level of care: Inpatient Psych  Review on when placed  Authorization # Upon placement

## 2021-12-29 NOTE — ED NOTES
Pt is sleeping comfortably in bed and appears to be in no distress  He continues to be on a [facility] q7 safety check        Krysta López, ALIYAH  12/29/21 3648

## 2021-12-29 NOTE — ED CARE HANDOFF
Emergency Department Sign Out Note        Sign out and transfer of care from Dr Daniel Garsia  See Separate Emergency Department note  The patient, Lindsey Ba, was evaluated by the previous provider for Depression  Workup Completed:  COVID negative    ED Course / Workup Pending (followup): Patient denies SI at this plan  He denied SI to admitting attending as well  He has had fluctuating suicidal thoughts for past several weeks, working with his PCP as far as medications  He also has a psychiatrist that he stopped following up with due to missed appointments  Patient provided further information for outpatient psychiatric clinics, patient requested discharge after discussion with his girlfriend who is willing to take him back  Girlfriend presented emergency room to pick him up multiple times  Will discharge patient in the care of his girlfriend as well as with outpatient follow-up plan  Procedures  MDM        Disposition  Final diagnoses:   History of obsessive compulsive disorder   Recurrent major depressive disorder, in partial remission ([facility] )   Depression with suicidal ideation   Depression     Time reflects when diagnosis was documented in both MDM as applicable and the Disposition within this note     Time User Action Codes Description Comment    12/28/2021  5:58 PM Chico Serrano Add [Z86 59] History of obsessive compulsive disorder     12/28/2021  5:58 PM Rick Ramey Add [F33 41] Recurrent major depressive disorder, in partial remission ([facility] )     12/28/2021  8:14 PM Baudilio Cornel Add Jax BASHIR,  [id number]] Depression with suicidal ideation     12/29/2021  8:03 AM Omar Couch Add aJx Wang  A] Depression       ED Disposition     ED Disposition Condition Date/Time Comment    Discharge Stable Wed Dec 29, 2021  8:03 AM Lindsey Ba should be transferred out to inpatient BHU and has been medically cleared          MD Documentation      Most Recent Value Sending MD Chris Conklin MD      Follow-up Information     Follow up With Specialties Details Why [address], [address] [facility] [address] [address] [facility]  [phone]          Patient's Medications   Discharge Prescriptions    No medications on file     No discharge procedures on file         ED Provider  Electronically Signed by     Tobi Lucio MD  12/29/21 7198

## 2021-12-29 NOTE — ED NOTES
Patient's girlfriend arrived and repeatedly asked patient to be signed out  Girlfriend was explained several times that is not how the process works  Girlfriend continued to ask if he can leave and then said she has a love note for him  Love note was put in chart

## 2021-12-29 NOTE — ED NOTES
Spoke to patient  He is insisting on leaving    Denying feeling suicidal   Speaking to his girlfriend who will pick him up  Currently, he is  Receiving his meds from his PCP, but is willing to follow through with a psychiatrist   Raciel Olvera information on [facility] in  [place]

## 2021-12-29 NOTE — ED NOTES
There are no signs of distress or discomfort  Resp even and unlabored  Pt is being monitored Q7 minutes  This nurse to continue monitoring         Simon Huitron RN  12/28/21 5416

## 2021-12-29 NOTE — ED NOTES
There are no signs of distress or discomfort  Resp even and unlabored  Pt is being monitored Q7 minutes  This nurse to continue monitoring       Beverly Epps RN  12/28/21 4482

## 2022-01-26 ENCOUNTER — TELEPHONE (OUTPATIENT)
Dept: BEHAVIORAL/MENTAL HEALTH CLINIC | Facility: CLINIC | Age: 57
End: 2022-01-26

## 2022-01-26 ENCOUNTER — TELEPHONE (OUTPATIENT)
Dept: PSYCHIATRY | Facility: CLINIC | Age: 57
End: 2022-01-26

## 2022-01-26 NOTE — TELEPHONE ENCOUNTER
Called regarding PT not signing on to session that was converted to virtual this morning by his girlfriend  Left voicemail message to return the clinician's call

## 2022-01-28 ENCOUNTER — TELEPHONE (OUTPATIENT)
Dept: PSYCHIATRY | Facility: CLINIC | Age: 57
End: 2022-01-28

## 2022-01-28 NOTE — TELEPHONE ENCOUNTER
NO-SHOW LETTER MAILED TO Jose David Rider    ADDRESS: [phone] [facility] [address]

## 2022-01-28 NOTE — LETTER
22     Haim Virk   : 1965   [address] [facility]       It is the policy of [facility] to monitor and manage appointments that have been no-showed or cancelled with less than 48-hour notice  This is necessary to ensure that we are able to provide timely access for all patients to our providers  Undue numbers of unutilized appointments delays necessary medical care for all patients  Dear Haim Virk : We are sorry that you missed your appointment with Judith Barlow LCSW on 2022  Your health and follow-up care are important to us  We want to make you aware of your next appointment with Judith Barlow LCSW that is scheduled for Thursday,  2/10/2022 at 11:00 am     Please be aware that our office policy states that if you 'no show' two Therapy appointments in a row without prior notice of cancellation, or three or more in a calendar year, you may be discharged from Therapy treatment  We want to bring this to your attention now to prevent an interruption of your care  If you have any questions about this policy, please call us at the number above  Thank you in advance for your cooperation and assistance         Sincerely,      [facility] Support Staff

## 2022-01-31 ENCOUNTER — TELEPHONE (OUTPATIENT)
Dept: BEHAVIORAL/MENTAL HEALTH CLINIC | Facility: CLINIC | Age: 57
End: 2022-01-31

## 2022-02-02 ENCOUNTER — TELEPHONE (OUTPATIENT)
Dept: FAMILY MEDICINE CLINIC | Facility: CLINIC | Age: 57
End: 2022-02-02

## 2022-02-02 NOTE — TELEPHONE ENCOUNTER
Pt left a voice message on our machine and is requesting to make an appt  Tried to call pt but no answer and unable to leave message as mailbox is full

## 2022-02-07 NOTE — TELEPHONE ENCOUNTER
3rd attempt - [facility] for pt to call back if he still needs to schedule

## 2022-03-07 ENCOUNTER — TELEPHONE (OUTPATIENT)
Dept: FAMILY MEDICINE CLINIC | Facility: CLINIC | Age: 57
End: 2022-03-07

## 2022-03-07 NOTE — TELEPHONE ENCOUNTER
TCM appt scheduled for Thursday 3/10/2022 at 3:15 PM     Please call pt prior to appt to review TCM questions with him

## 2022-03-08 ENCOUNTER — TRANSITIONAL CARE MANAGEMENT (OUTPATIENT)
Dept: FAMILY MEDICINE CLINIC | Facility: CLINIC | Age: 57
End: 2022-03-08

## 2022-03-21 ENCOUNTER — TELEPHONE (OUTPATIENT)
Dept: PSYCHIATRY | Facility: CLINIC | Age: 57
End: 2022-03-21

## 2022-03-21 NOTE — TELEPHONE ENCOUNTER
Called pt, in error I gave the pt an appt with the dr and he has been discharged in dec,   I called and left a message for him to call back, he will have to be sent back to intake

## 2022-03-28 ENCOUNTER — OFFICE VISIT (OUTPATIENT)
Dept: PSYCHOLOGY | Facility: CLINIC | Age: 57
End: 2022-03-28
Payer: COMMERCIAL

## 2022-03-28 ENCOUNTER — TELEMEDICINE (OUTPATIENT)
Dept: PSYCHIATRY | Facility: CLINIC | Age: 57
End: 2022-03-28
Payer: COMMERCIAL

## 2022-03-28 DIAGNOSIS — F41.1 GAD (GENERALIZED ANXIETY DISORDER): ICD-10-CM

## 2022-03-28 DIAGNOSIS — F33.41 RECURRENT MAJOR DEPRESSIVE DISORDER, IN PARTIAL REMISSION (HCC): Primary | ICD-10-CM

## 2022-03-28 PROCEDURE — 90792 PSYCH DIAG EVAL W/MED SRVCS: CPT | Performed by: PSYCHIATRY & NEUROLOGY

## 2022-03-28 PROCEDURE — 90791 PSYCH DIAGNOSTIC EVALUATION: CPT

## 2022-03-28 RX ORDER — QUETIAPINE FUMARATE 400 MG/1
400 TABLET, FILM COATED ORAL
COMMUNITY
Start: 2022-03-07 | End: 2022-07-06 | Stop reason: SDUPTHER

## 2022-03-28 RX ORDER — QUETIAPINE FUMARATE 25 MG/1
25 TABLET, FILM COATED ORAL 2 TIMES DAILY
COMMUNITY
End: 2022-07-06 | Stop reason: SDUPTHER

## 2022-03-28 RX ORDER — BUSPIRONE HYDROCHLORIDE 15 MG/1
15 TABLET ORAL 3 TIMES DAILY
COMMUNITY
End: 2022-07-06 | Stop reason: SDUPTHER

## 2022-03-28 RX ORDER — CITALOPRAM 40 MG/1
TABLET ORAL
COMMUNITY
Start: 2022-03-07 | End: 2022-07-06 | Stop reason: SDUPTHER

## 2022-03-28 NOTE — PSYCH
Assessment/Plan:      Diagnoses and all orders for this visit:    Recurrent major depressive disorder, in partial remission ([address] )    BETHANY (generalized anxiety disorder)          Subjective:     Patient ID: Missy Loving is a 64 y o  male  HPI:     Pre-morbid level of function and History of Present Illness:   Per Dr Tamela Mullins: Missy Loving is a 64 y o  male with past psychiatric history of MDD with psychosis vs bipolar disorder 1 is admitted to [facility] referred by Godfrey Winslow      Today Missy Loving reports being admitted for [facility] with plan to OD  He has been feeling much better since discharged  He feels happier and denies any suicidal thoughts  Depression symptoms prior to hospitalization - no sleep and appetite issues, lack of interest, low energy/motivation, mood most days was depressed/anxious/apathetic, issues with concentration; +hopeless and helpless feelings; SI with a plan to OD before hospital stay but no longer having any SI/HI; denies past or current passive SI  Anxiety issues - currently a little nervous; prior to hospital stay, excessive worrying, no panic attacks  No current AVH but had some AH last year when depressed, heard sounds lie as is someone were walking on his roof; denies current paranoia but has had in the past; No past/current IOR; no past/current thought broadcasting  Kathy symptoms - denies  OCD - compulsions to check in the past but went away on its own (occurred as an adult); no inciting incident   Psychosocial Stressors include financial stress    Per this writer: Missy Loving referred to [facility] following discharge from [address] [place] [address] where he was admitted due to [facility] following argument with his girlfriend  He reports impulse control issues  He lacks routine and support  Inconsistent with past treatment and reports no current OP providers  He reports he misses working  He was sparse in sharing    He has had issues with motivation, apathy, sleep and appetite issues, and issues with concentration  He reports periods of excessive worry  Per Franchescawillie Gallagherus : "I need to learn to make better decisions"    Strengths: enjoys nature, wants to improve      Reason for evaluation and partial hospitalization as an alternative to inpatient hospitalization   PHP is medically necessary to prevent rehospitalization as Franchesca Evangelista transitions from IP to OP level of care  Milieu therapy to monitor for medication needs, provide wellness tools education and offer opportunity to share and connect to others  Group therapy, case management, psychiatric medication management, family contact and UR as indicated  ELOS 10 treatment days  Previous Psychiatric/psychological treatment/year:   Past Inpatient Psychiatric Treatment:   In Patient - 3 times in the past   Past Outpatient Psychiatric Treatment:    No current psych/therapist  Was seeing Nia Bush I and Dr Zulma Ramirez in the past  Past Suicide Attempts:               Yes - 2 times in the past - -running on to highway;  - OD on pills  Past Violent Behavior:               no  Past Psychiatric Medication Trials:               multiple past meds     Problem Assessment:     SOCIAL/VOCATION:  Family Constellation (include parents, relationship with each and pertinent Psych/Medical History):     Family History   Problem Relation Age of Onset    Heart attack Father     Prostate cancer Father     Cerebral palsy Brother    Reji Abt OCD Mother    Reji Abt OCD Sister        Mother: antoinette - has dementia - in SNF    Spouse:  15 years -    Father:    Children: Hellen Jumper (25); Kirstin Luke (32) reports he has not seen them in a while   Siblin brothers and 1 sister "they disowned me"   Other: Jeannie Meléndez - girlfriend of 3 years    Who is the person you relate to best Rache  he lives with Jeannie Meléndez  he does not live alone       Domestic Violence:   denied  Traumatic History:   Abuse: none  Other Traumatic Events: none    Additional Comments related to family/relationships/peer support:   He reports limited supports and contacts  School or Work History (strengths/limitations/needs): 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 with Bracial Plexis injury    Her highest grade level achieved was BA in [employer]  and had been working on [employer] history includes none    Financial status includes worries about    LEISURE ASSESSMENT (Include past and present hobbies/interests and level of involvement (Ex: Group/Club Affiliations): time outside; organizing in the house  Her primary language is Quantance  Preferred language is Quantance  Ethnic considerations are   Religions affiliations and level of involvement none   FUNCTIONAL STATUS: There has been a recent change in the patient's ability to do the following: decrease in ADLs    Level of Assistance Needed/By Whom?: independent    Heidy Muse learns best by  hands on     SUBSTANCE ABUSE ASSESSMENT: no substance abuse    Do you currently smoke? Yes Offered smoking cessation?  Yes     Substance/Route/Age/Amount/Frequency/Last Use:   NO past/current drug use         Use of Alcohol: denied and none for 1 5 years; conscious decision to quit; drinking excessively before that    Longest clean time: 1 5 years  History of IP/OP rehabilitation program: no  Smoking history: former cigar user - couple weeks ago, wants to quit  Use of Caffeine: denies use       DETOX HISTORY: na    Previous detox/rehab treatment: na    HEALTH ASSESSMENT: injury   Kimo Sykes, [address]      LEGAL: No [facility] Directive or Power of  on file, Information packet given about [address] [place] [address] and no additional legal    Risk Assessment:   The following ratings are based on my observation of this patient over the last intake today    Risk of Harm to Self:   Demographic risk factors include , lowest socioeconomic class, male and  status  Historical Risk Factors include history of impulsive behaviors  Recent Specific Risk Factors include made threats, unable to visualize a realistic positive future, worries about finances or work, chronic pain or health problems, recent rejection/lack of support and diagnosis of depression     Risk of Harm to Others:   Demographic Risk Factors include male  Historical Risk Factors include na  Recent Specific Risk Factors include multiple stressors    Access to Weapons:   Julissa Kurtz has access to the following weapons: denied  The following steps have been taken to ensure weapons are properly secured: na    Based on the above information, the client presents the following risk of harm to self or others:  low    The following interventions are recommended:   no intervention changes    Notes regarding this Risk Assessment: given on call and county information    Review Of Systems:  As in HPI otherwise negative          Mental status:  Appearance calm and cooperative , adequate hygiene and grooming and good eye contact    Mood euthymic   Affect affect was constricted   Speech a normal rate and fluent   Thought Processes coherent/organized and normal thought processes, concrete   Hallucinations no hallucinations present    Thought Content no delusions   Abnormal Thoughts no suicidal thoughts  and no homicidal thoughts    Orientation  oriented to person and place and time   Remote Memory short term memory intact and long term memory intact   Attention Span concentration intact   Intellect Appears to be of Average Intelligence   Fund of Knowledge displays adequate knowledge of current events, adequate fund of knowledge regarding past history and adequate fund of knowledge regarding vocabulary    Insight Insight intact   Judgement judgment was intact   Muscle Strength not assessed   Language no difficulty naming common objects and no difficulty repeating a phrase             DSM:   1  Recurrent major depressive disorder, in partial remission ([facility] )     2  BETHANY (generalized anxiety disorder)         Plan: Admit to PHP  Group therapy, case management, medication management, UR and family contact as indicated    ELOS 10 treatment days  Refer to OP psychiatry and therapy       Anticipated aftercare plan: OP care; [facility] referral completed while at [facility]

## 2022-03-28 NOTE — PSYCH
Subjective:     Patient ID: Irma Jack is a 64 y o  male  [facility] Clinical Progress Notes      Specialized Services Documentation  Therapist must complete separate progress note for each specific clinical activity in which the individual participated during the day  Case Management Note    Claudia Guan [facility]    Current suicide risk : Low        [id number] Met with Irma Jack  via TEAMS  Reviewed program, initial paperwork reviewed: Consent for Treatment, PHP handbook, HIPPA, General Consent, Client Bill of Rights, and Smoking/Drug and Alcohol Policy  Release of Information obtained for emergency contact - Bhupendra Reardon (s/o) and PCP and Health Care Coordination Form  Irma Jack has hard copies of all paperwork and verbally gave consent  Reviewed and given on call number  PCP notified of admission and health care coordination form sent  Completed initial psycho-social evaluation and initial treatment goals discussed  Medications changes/added/denied? No    Treatment session number: evaluation only    Individual Case Management Visit provided today?  Yes     [facility] follow up physician's orders: see Dr Dilma Estes evaluation

## 2022-03-28 NOTE — PSYCH
REQUIRED DOCUMENTATION:     1  This service was provided via Telemedicine  2  Provider located at [facility]  3  TeleMed provider: Pan Flores MD   4  Identify all parties in room with patient during tele consult: NONE  5  Patient was then informed that this was a Telemedicine visit and that the exam was being conducted confidentially over secure lines  My office door was closed  No one else was in the room  Patient acknowledged consent and understanding of privacy and security of the Telemedicine visit, and gave us permission to have the assistant stay in the room in order to assist with the history and to conduct the exam   I informed the patient that I have reviewed their record in Epic and presented the opportunity for them to ask any questions regarding the visit today  The patient agreed to participate  Initial Psychiatric Evaluation- [facility]  Erik [facility] 64 y o  male MRN: [id number]     [facility]     Lindsey Ba is a 64 y o  male with past psychiatric history of MDD with psychosis vs bipolar disorder 1 is admitted to [place] referred by [facility]  Today Lindsey Ba reports being admitted for SI with plan to OD  He has been feeling much better since discharged  He feels happier and denies any suicidal thoughts    Depression symptoms prior to hospitalization - no sleep and appetite issues, lack of interest, low energy/motivation, mood most days was depressed/anxious/apathetic, issues with concentration; +hopeless and helpless feelings; SI with a plan to OD before hospital stay but no longer having any SI/HI; denies past or current passive SI  Anxiety issues - currently a little nervous; prior to hospital stay, excessive worrying, no panic attacks  No current AVH but had some AH last year when depressed, heard sounds lie as is someone were walking on his roof; denies current paranoia but has had in the past; No past/current IOR; no past/current thought broadcasting  Kathy symptoms - denies  OCD - compulsions to check in the past but went away on its own (occurred as an adult); no inciting incident    Psychosocial Stressors include financial stress    Review Of Systems:  As in HPI otherwise negative  Past Psychiatric History:      Past Inpatient Psychiatric Treatment:   In Patient - 3 times in the past   Past Outpatient Psychiatric Treatment:    No current psych/therapist  Was seeing Toro GOMEZ and Dr Vonda Cruz in the past  Past Suicide Attempts:    Yes - 2 times in the past - 2018-running on to highway; 2021 - OD on pills  Past Violent Behavior:    no  Past Psychiatric Medication Trials:    multiple past meds     Past Medical History:   Diagnosis Date    Anxiety 1995    Celiac disease     Depression 1995    Hypertension     Obsessive compulsive disorder     Severe episode of recurrent major depressive disorder, with psychotic features ([facility] ) 5/10/2012    Procedure/Onset: 01/01/1995    Suicide attempt ([facility] )     10/2018   No seizures or head injuries      Medications:     Current Outpatient Medications:     busPIRone (BUSPAR) 15 mg tablet, Take 15 mg by mouth 3 (three) times a day, Disp: , Rfl:     QUEtiapine (SEROquel) 25 mg tablet, Take 25 mg by mouth 2 (two) times a day, Disp: , Rfl:     acetaminophen (TYLENOL) 500 mg tablet, Take 500 mg by mouth every 6 (six) hours as needed, Disp: , Rfl:     citalopram (CeleXA) 40 mg tablet, , Disp: , Rfl:     hydrocortisone 1 % lotion, Apply topically 2 (two) times a day, Disp: , Rfl:     Narcan 4 MG/0 1ML nasal spray, CALL 911   ADMINISTER A SINGLE SPRAY INTRANASALLY INTO ONE NOSTRIL UPON SIGNS OF OPIOID OVERDOSE  MAY REPEAT AFTER 3 MINUTES IF NO RESPONSE , Disp: , Rfl:     polyethylene glycol (MIRALAX) 17 g packet, Take 17 g by mouth daily, Disp: 10 each, Rfl: 0    Propylene Glycol (Systane Complete) 0 6 % SOLN, Apply to eye, Disp: , Rfl:     QUEtiapine (SEROquel) 400 MG tablet, Take 400 mg by mouth daily at bedtime, Disp: , Rfl:     Family Psychiatric History:     Family History   Problem Relation Age of Onset    Heart attack Father     Prostate cancer Father     Cerebral palsy Brother    Nuha Slaughter OCD Mother    Nuha Slaughter OCD Sister    No past psych/drug/alcohol/suicide history in family    Social History:  Education: college graduate  And incomplete Masters  Learning Disabilities: none  Marital history:   Living arrangement, social support: The patient lives in home with girlfriend  Occupational History: on permanent disability  Functioning Relationships: good support system and strained with spouse or significant others    Other Pertinent History: No legal or  history    Social History     Substance and Sexual Activity   Drug Use No       Traumatic History:   Abuse: none  Other Traumatic Events: none    Substance Abuse History:  NO past/current drug use   Use of Alcohol: denied and none for 1 5 years; conscious decision to quit; drinking excessively before that    Longest clean time: 1 5 years  History of IP/OP rehabilitation program: no  Smoking history: former cigar user - couple weeks ago, wants to quit  Use of Caffeine: denies use    Mental status:  Appearance calm and cooperative , adequate hygiene and grooming and good eye contact    Mood euthymic   Affect affect was constricted   Speech a normal rate and fluent   Thought Processes coherent/organized and normal thought processes, concrete   Hallucinations no hallucinations present    Thought Content no delusions   Abnormal Thoughts no suicidal thoughts  and no homicidal thoughts    Orientation  oriented to person and place and time   Remote Memory short term memory intact and long term memory intact   Attention Span concentration intact   Intellect Appears to be of [place] of Knowledge displays adequate knowledge of current events, adequate fund of knowledge regarding past history and adequate fund of knowledge regarding vocabulary    Insight Insight intact   Judgement judgment was intact   Muscle Strength not assessed   Language no difficulty naming common objects and no difficulty repeating a phrase          Laboratory Results: I have personally reviewed all pertinent laboratory/tests results  Assessment:    Diagnoses and all orders for this visit:    Recurrent major depressive disorder, in partial remission ([facility] )    BETHANY (generalized anxiety disorder)    Other orders  -     citalopram (CeleXA) 40 mg tablet  -     QUEtiapine (SEROquel) 400 MG tablet; Take 400 mg by mouth daily at bedtime  -     busPIRone (BUSPAR) 15 mg tablet; Take 15 mg by mouth 3 (three) times a day  -     QUEtiapine (SEROquel) 25 mg tablet; Take 25 mg by mouth 2 (two) times a day         Plan:  Medication changes   1) continue current discharge meds as listed above in med list    Risks, benefits, and possible side effects of medications explained to patient and patient verbalizes understanding  Controlled Medication Discussion: Last filled ativan Dec 2021 - Dr Ligia Russell Physician's Orders     Admit to: Partial Hospitalization, 5 x per week, for 10 days  Vital signs daily for three days and then as needed  Diet Regular  Group Psychotherapy 5 x per week  Wellness Group 5 x per week  Individual Therapy as needed  Family Therapy as needed  Diagnosis:   1  Recurrent major depressive disorder, in partial remission ([facility] )     2  BETHANY (generalized anxiety disorder)           I certify that the continuation of Partial Hospitalization services is medically necessary to improve and/or maintain the patients condition and functional level, and to prevent relapse or hospitalization, and that this could not be done at a less intensive level of care  

## 2022-03-28 NOTE — PSYCH
Virtual Regular Visit    Verification of patient location:    Patient is located in the following state in which I hold an active license [place]      Assessment/Plan:    Problem List Items Addressed This Visit        Other    BETHANY (generalized anxiety disorder)    Recurrent major depressive disorder, in partial remission ([facility] ) - Primary          Goals addressed in session:           Reason for visit is PHP VIRTUAL GROUP DUE TO COVID-19      Encounter provider [facility] PARTIAL [address]    Provider located at [facility]  [address] [facility] [id number]      Recent Visits  No visits were found meeting these conditions  Showing recent visits within past 7 days and meeting all other requirements  Today's Visits  Date Type Provider Dept   03/28/22 Office Visit [facility] PARTIAL PSYCH GROUP THERAPY [facility]   Showing today's visits and meeting all other requirements  Future Appointments  No visits were found meeting these conditions  Showing future appointments within next 150 days and meeting all other requirements       The patient was identified by name and date of birth  Cassidy You was informed that this is a telemedicine visit and that the visit is being conducted throughMicrosoft Teams and patient was informed that this is a secure, HIPAA-compliant platform  He agrees to proceed     My office door was closed  No one else was in the room  He acknowledged consent and understanding of privacy and security of the video platform  The patient has agreed to participate and understands they can discontinue the visit at any time  Patient is aware this is a billable service  Subjective  Cassidy You is a 64 y o  male          HPI     Past Medical History:   Diagnosis Date    Anxiety 1995    Celiac disease     Depression 1995    Hypertension     Obsessive compulsive disorder     Severe episode of recurrent major depressive disorder, with psychotic features ([facility] ) 5/10/2012    Procedure/Onset: 01/01/1995    Suicide attempt ([facility] )     10/2018       Past Surgical History:   Procedure Laterality Date    FRACTURE SURGERY      TONSILLECTOMY      WRIST SURGERY Left     resolved 1997- cts surgery       Current Outpatient Medications   Medication Sig Dispense Refill    acetaminophen (TYLENOL) 500 mg tablet Take 500 mg by mouth every 6 (six) hours as needed      busPIRone (BUSPAR) 15 mg tablet Take 15 mg by mouth 3 (three) times a day      citalopram (CeleXA) 40 mg tablet       hydrocortisone 1 % lotion Apply topically 2 (two) times a day      Narcan 4 MG/0 1ML nasal spray CALL 911  ADMINISTER A SINGLE SPRAY INTRANASALLY INTO ONE NOSTRIL UPON SIGNS OF OPIOID OVERDOSE  MAY REPEAT AFTER 3 MINUTES IF NO RESPONSE   polyethylene glycol (MIRALAX) 17 g packet Take 17 g by mouth daily 10 each 0    Propylene Glycol (Systane Complete) 0 6 % SOLN Apply to eye      QUEtiapine (SEROquel) 25 mg tablet Take 25 mg by mouth 2 (two) times a day      QUEtiapine (SEROquel) 400 MG tablet Take 400 mg by mouth daily at bedtime       No current facility-administered medications for this visit  Allergies   Allergen Reactions    Gluten Meal - Food Allergy      Pt is diagnosed with celiac disease with the biopsy/pathology and the tissue transglutaminase antibody by the GI     Penicillins Diarrhea and Vomiting    Celecoxib Rash       Review of Systems    Video Exam    There were no vitals filed for this visit  Physical Exam     I spent 30 minutes with patient today in which greater than 50% of the time was spent in counseling/coordination of care regarding see notes    VIRTUAL VISIT DISCLAIMER    Jaclyn Potts verbally agrees to participate in [employer]   Pt is aware that Virtual Care Services could be limited without vital signs or the ability to perform a full hands-on physical exam  Sudhakar Choe understands he or the provider may request at any time to terminate the video visit and request the patient to seek care or treatment in person

## 2022-03-28 NOTE — PSYCH
Assessment/Plan:      Diagnoses and all orders for this visit:    Recurrent major depressive disorder, in partial remission ([facility] [address] )    BETHANY (generalized anxiety disorder)          Subjective:     Patient ID: Merry Azevedo is a 64 y o  male  [facility] Treatment Plan   AREAS OF NEED: Depression as evidenced by impulsivity, worry, hopelessness/helpless, changes in sleep and appetite, amotivation, isolation with limited support as related to health issues and inability to work  Date Initiated: 3/28/2022    Strengths: desire to improve, enjoys nature     LONG TERM GOAL:   Date Initiated: 3/28/2022  1 0 I will identify 3 signs that my mood is more stable and I am more productive in my day to day life  Target Date: 4/25/2022  Completion Date:     SHORT TERM OBJECTIVES:     Date Initiated: 3/28/2022  1 1 I will identify 3 ways I can slow responses down when I get upset or overwhelmed and practice the STOP skill daily  Revision Date:   Target Date: 4/7/2022  Completion Date:     Date Initiated: 3/28/2022  1 2 I will identify 3 things I can do daily to support my wellness and incorporate at least 1 enjoyable task into my day each day  Revision Date:   Target Date: 4/7/2022  Completion Date:     Date Initiated: 3/28/2022  1 3 I will take medications as prescribed and share questions and concerns if arise  Revision Date:  Target Date: 4/7/2022  Completion Date:      Date Initiated: 3/28/2022  1 4 I will identify 3 ways my supports can assist in my recovery and agree to staff/support contact as indicated      Revision Date:  Target Date: 4/7/2022  Completion Date:           7 DAY REVISION:    Date Initiated:  Revision Date:   Target Date:   Completion Date:      PSYCHIATRY:  Date Initiated: 3/28/2022  Medication Management and Education       Revision Date:   The person(s) responsible for carrying out the plan is Donelda Bumpers, MD; Giovanny Curiel PA-C    NURSING/SYMPTOM EDUCATION:  Date Initiated: : 3/28/2022  1 1, 1 2  1 3, 1 4 This RN and/or Therapist will provide wellness/symptoms and skill education groups three to five days weekly to educate Jose David Rider on signs and symptoms of diagnoses, skills to manage, and medication questions that will be addressed by the treatment team     Revision date: The person(s) responsible for carrying out the plan is Marge Mireles RN    PSYCHOLOGY:   Date Initiated: : 3/28/2022       1 1, 1 2, 1 4 Provide psychotherapy group 5 times per week to allow opportunity for Jose David Rider  to explore stressors and ways of coping  Revision Date:   The person(s) responsible for carrying out the plan is Aubree Velazquez [facility]    ALLIED THERAPY:   Date Initiated: : 3/28/2022  1 1,1 2 Engage Jose David Rider in AT group 5 times daily to encourage development and use of wellness tools to decrease symptoms and promote recovery through meaningful activity  Revision Date:   The person(s) responsible for carrying out the plan is DAMON Staley     CASE MANAGEMENT:   Date Initiated: : 3/28/2022      1 0 This  will meet with Jose David Rider  3-4 times weekly to assess treatment progress, discharge planning, connection to community supports and UR as indicated  Revision Date:   The person(s) responsible for carrying out the plan is An JOSE    TREATMENT REVIEW/COMMENTS:     DISCHARGE CRITERIA: Identify 3 signs of progress and complete relapse prevention plan  DISCHARGE PLAN: OP care  Estimated Length of Stay:10 treatment days    Diagnosis and Treatment Plan explained to Toño Wharton relates understanding diagnosis and is agreeable to Treatment Plan       CLIENT COMMENTS / Please share your thoughts, feelings, need and/or experiences regarding your treatment plan: _____________________________________________________________________________________________________________________________________________________________________________________________________________________________________________________________________________________________________________________ Date/Time: ______________     Patient Signature: _________________________________     Date/Time: ______________      Signature: _________________________________     Date/Time: ______________

## 2022-03-29 ENCOUNTER — DOCUMENTATION (OUTPATIENT)
Dept: PSYCHOLOGY | Facility: CLINIC | Age: 57
End: 2022-03-29

## 2022-03-29 ENCOUNTER — APPOINTMENT (OUTPATIENT)
Dept: PSYCHOLOGY | Facility: CLINIC | Age: 57
End: 2022-03-29
Payer: COMMERCIAL

## 2022-03-29 NOTE — PROGRESS NOTES
Subjective:     Patient ID: Emelina Moser is a 64 y o  male  [facility] Clinical Progress Notes      Specialized Services Documentation  Therapist must complete separate progress note for each specific clinical activity in which the individual participated during the day  Case Management Note    Dario Alba [facility]    Current suicide risk : Low     Scheduled to start program today - cancelled stating he is having technical issues and getting a new router today  This writer left message to touch base  Medications changes/added/denied? No    Treatment session number: na    Individual Case Management Visit provided today?  No    [facility] follow up physician's orders: na

## 2022-03-30 ENCOUNTER — APPOINTMENT (OUTPATIENT)
Dept: PSYCHOLOGY | Facility: CLINIC | Age: 57
End: 2022-03-30
Payer: COMMERCIAL

## 2022-03-30 ENCOUNTER — DOCUMENTATION (OUTPATIENT)
Dept: PSYCHOLOGY | Facility: CLINIC | Age: 57
End: 2022-03-30

## 2022-03-30 NOTE — PROGRESS NOTES
Subjective:     Patient ID: Sanju Leon is a 62 y o  male  [facility] Clinical Progress Notes      Specialized Services Documentation  Therapist must complete separate progress note for each specific clinical activity in which the individual participated during the day  Case Management Note    Shira Ramirez [facility]    Current suicide risk : Low     Juan Choe no show; no call; Attempted to reach and left message related to interest in program asking for a call back  Medications changes/added/denied? No    Treatment session number: na  Individual Case Management Visit provided today?   attempted    [facility] follow up physician's orders: na

## 2022-03-31 ENCOUNTER — DOCUMENTATION (OUTPATIENT)
Dept: PSYCHOLOGY | Facility: CLINIC | Age: 57
End: 2022-03-31

## 2022-03-31 ENCOUNTER — APPOINTMENT (OUTPATIENT)
Dept: PSYCHOLOGY | Facility: CLINIC | Age: 57
End: 2022-03-31
Payer: COMMERCIAL

## 2022-03-31 NOTE — PROGRESS NOTES
Subjective:     Patient ID: Franchesca Evangelista is a 62 y o  male  [facility] Clinical Progress Notes      Specialized Services Documentation  Therapist must complete separate progress note for each specific clinical activity in which the individual participated during the day  Case Management Note    Franchesca Evangelista was a no call no show for PHP today  CM aware      Grady Mccallum, RN, BSN

## 2022-04-01 ENCOUNTER — DOCUMENTATION (OUTPATIENT)
Dept: PSYCHOLOGY | Facility: CLINIC | Age: 57
End: 2022-04-01

## 2022-04-01 NOTE — PROGRESS NOTES
Subjective:     Patient ID: Ryan Figueroa is a 62 y o  male  [facility] Clinical Progress Notes      Specialized Services Documentation  Therapist must complete separate progress note for each specific clinical activity in which the individual participated during the day  Case Management Note    Anne-Marie PEREZBC    Current suicide risk : Low     Sunday Andrea Fletcher reached out to the program to report he has issues getting a router  He states he will be in program on Monday  He is aware that if he is not, he will be discharged  Medications changes/added/denied? No    Treatment session number: na    Individual Case Management Visit provided today?  No    [facility] follow up physician's orders: na

## 2022-04-04 ENCOUNTER — DOCUMENTATION (OUTPATIENT)
Dept: PSYCHOLOGY | Facility: CLINIC | Age: 57
End: 2022-04-04

## 2022-04-04 NOTE — PROGRESS NOTES
Subjective:     Patient ID: German Figueroa is a 62 y o  male  [facility] Discharge Summary:   Admission Date: 3/28/2022  Patient was referred by Evaline Gilford  Discharge Date: 04/04/22   Was this a routine discharge? no - AMA -  Non-attendance  Diagnosis: Axis I: F33 41; F41 1   Treating Physician: Dr Eileen Jimenez MD  Treatment Complications: Non-attendance without contact with   Presenting Need:   Pre-morbid level of function and History of Present Illness:   Per Dr Kaur Spore: Zeke Reno a 64 y  o  male with past psychiatric history of MDD with psychosis vs bipolar disorder 1 is admitted to [facility] referred by Errol Choe reports being admitted for SI with plan to OD  He has been feeling much better since discharged  He feels happier and denies any suicidal thoughts  Depression symptoms prior to hospitalization - no sleep and appetite issues, lack of interest, low energy/motivation, mood most days was depressed/anxious/apathetic, issues with concentration; +hopeless and helpless feelings; SI with a plan to OD before hospital stay but no longer having any SI/HI; denies past or current passive SI  Anxiety issues - currently a little nervous; prior to hospital stay, excessive worrying, no panic attacks  No current AVH but had some AH last year when depressed, heard sounds lie as is someone were walking on his roof; denies current paranoia but has had in the past; No past/current IOR; no past/current thought broadcasting  Kathy symptoms - denies  OCD - compulsions to check in the past but went away on its own (occurred as an adult); no inciting incident   Psychosocial Stressors include financial stress     Per this writer: German Figueroa referred to [facility] following discharge from [address] [place] [address] where he was admitted due to [facility] following argument with his girlfriend  He reports impulse control issues  He lacks routine and support   Inconsistent with past treatment and reports no current OP providers  He reports he misses working  He was sparse in sharing  He has had issues with motivation, apathy, sleep and appetite issues, and issues with concentration  He reports periods of excessive worry      Per Cornelius Paiz : "I need to learn to make better decisions"     Strengths: enjoys nature, wants to improve    Course of treatment includes:    individual case management and psychiatric evaluation     Treatment Progress: Cornelius Paiz attended the initial evaluation and did not attend after that  This writer attempted to reach him and left messages  He did contact program  and said he had technical issues related to virtual care, but was repairing this  He did not to our knowledge and did not show 5 treatment days in a row  Aftercare recommendations include: Letter sent with provider list information    Discharge Medications include:  Current Outpatient Medications:     acetaminophen (TYLENOL) 500 mg tablet, Take 500 mg by mouth every 6 (six) hours as needed, Disp: , Rfl:     busPIRone (BUSPAR) 15 mg tablet, Take 15 mg by mouth 3 (three) times a day, Disp: , Rfl:     citalopram (CeleXA) 40 mg tablet, , Disp: , Rfl:     hydrocortisone 1 % lotion, Apply topically 2 (two) times a day, Disp: , Rfl:     Narcan 4 MG/0 1ML nasal spray, CALL 911   ADMINISTER A SINGLE SPRAY INTRANASALLY INTO ONE NOSTRIL UPON SIGNS OF OPIOID OVERDOSE  MAY REPEAT AFTER 3 MINUTES IF NO RESPONSE , Disp: , Rfl:     polyethylene glycol (MIRALAX) 17 g packet, Take 17 g by mouth daily, Disp: 10 each, Rfl: 0    Propylene Glycol (Systane Complete) 0 6 % SOLN, Apply to eye, Disp: , Rfl:     QUEtiapine (SEROquel) 25 mg tablet, Take 25 mg by mouth 2 (two) times a day, Disp: , Rfl:     QUEtiapine (SEROquel) 400 MG tablet, Take 400 mg by mouth daily at bedtime, Disp: , Rfl:

## 2022-04-04 NOTE — PROGRESS NOTES
Assessment/Plan:       Diagnoses and all orders for this visit:     Recurrent major depressive disorder, in partial remission ([facility] [address] )     BETHANY (generalized anxiety disorder)            Subjective:      Patient ID: Brenda De Guzman is a 64 y o  male      [facility] Treatment Plan   AREAS OF NEED: Depression as evidenced by impulsivity, worry, hopelessness/helpless, changes in sleep and appetite, amotivation, isolation with limited support as related to health issues and inability to work  Date Initiated: 3/28/2022     Strengths: desire to improve, enjoys nature               LONG TERM GOAL:   Date Initiated: 3/28/2022  1 0 I will identify 3 signs that my mood is more stable and I am more productive in my day to day life  Target Date: 4/25/2022  Completion Date: unmet AMA discharge 4/4/2022     SHORT TERM OBJECTIVES:      Date Initiated: 3/28/2022  1 1 I will identify 3 ways I can slow responses down when I get upset or overwhelmed and practice the STOP skill daily  Revision Date:   Target Date: 4/7/2022  Completion Date: unmet AMA discharge 4/4/2022     Date Initiated: 3/28/2022  1 2 I will identify 3 things I can do daily to support my wellness and incorporate at least 1 enjoyable task into my day each day  Revision Date:   Target Date: 4/7/2022  Completion Date: unmet AMA discharge 4/4/2022     Date Initiated: 3/28/2022  1 3 I will take medications as prescribed and share questions and concerns if arise  Revision Date:  Target Date: 4/7/2022  Completion Date:  unmet AMA discharge 4/4/2022     Date Initiated: 3/28/2022  1 4 I will identify 3 ways my supports can assist in my recovery and agree to staff/support contact as indicated      Revision Date:  Target Date: 4/7/2022  Completion Date:unmet AMA discharge 4/4/2022            PSYCHIATRY:  Date Initiated: 3/28/2022  Medication Management and Education       Revision Date:   The person(s) responsible for carrying out the plan is Ivon Starks MD; Jason Reis Maurine Habermann, PA-C     NURSING/SYMPTOM EDUCATION:  Date Initiated: : 3/28/2022  1 1, 1 2  1 3, 1 4 This RN and/or Therapist will provide wellness/symptoms and skill education groups three to five days weekly to educate Adiel Townsend on signs and symptoms of diagnoses, skills to manage, and medication questions that will be addressed by the treatment team     Revision date: The person(s) responsible for carrying out the plan is Cristopher Richards RN     PSYCHOLOGY:   Date Initiated: : 3/28/2022       1 1, 1 2, 1 4 Provide psychotherapy group 5 times per week to allow opportunity for Adiel Townsend  to explore stressors and ways of coping  Revision Date:   The person(s) responsible for carrying out the plan is Chandana Olivo     ALLIED THERAPY:   Date Initiated: : 3/28/2022  1 1,1 2 Engage Adiel Townsend in AT group 5 times daily to encourage development and use of wellness tools to decrease symptoms and promote recovery through meaningful activity  Revision Date:   The person(s) responsible for carrying out the plan is DAMON Hall      CASE MANAGEMENT:   Date Initiated: : 3/28/2022      1 0 This  will meet with Adiel Townsend  3-4 times weekly to assess treatment progress, discharge planning, connection to community supports and UR as indicated  Revision Date:   The person(s) responsible for carrying out the plan is Devendra JOSE     TREATMENT REVIEW/COMMENTS:      DISCHARGE CRITERIA: Identify 3 signs of progress and complete relapse prevention plan      DISCHARGE PLAN: OP care  Estimated Length of Stay:10 treatment days     Diagnosis and Treatment Plan explained to Claudine Negrete relates understanding diagnosis and is agreeable to Treatment Plan       CLIENT COMMENTS / Please share your thoughts, feelings, need and/or experiences regarding your treatment plan: _____________________________________________________________________________________________________________________________________________________________________________________________________________________________________________________________________________________________________________________ Date/Time: ______________   Dequan Lesser TO REVIEW WITH Bella Custard Haninchick AS HE DID NOT SHOW AFTER EVALUATION     Patient Signature: _________________________________      Date/Time: ______________       Signature: _________________________________      Date/Time: ______________

## 2022-04-04 NOTE — PROGRESS NOTES
Subjective:     Patient ID: Maria Camargo is a 62 y o  male  [facility] Clinical Progress Notes      Specialized Services Documentation  Therapist must complete separate progress note for each specific clinical activity in which the individual participated during the day  Case Management Note    Tejas PEREZBC    Current suicide risk : unable to assess    No show; no call  Discharged due to non-attendance  Letter sent  Medications changes/added/denied? No    Treatment session number: na    Individual Case Management Visit provided today? No    [facility] follow up physician's orders: Discharge due to non-attendance   Dr Santo Ghosh MD

## 2022-04-22 ENCOUNTER — TELEPHONE (OUTPATIENT)
Dept: PSYCHIATRY | Facility: CLINIC | Age: 57
End: 2022-04-22

## 2022-05-24 ENCOUNTER — TELEPHONE (OUTPATIENT)
Dept: OTHER | Facility: OTHER | Age: 57
End: 2022-05-24

## 2022-05-24 NOTE — TELEPHONE ENCOUNTER
Patient called and is asking if the office can give him a call because he needs a script to get a covid test done

## 2022-05-25 ENCOUNTER — APPOINTMENT (EMERGENCY)
Dept: RADIOLOGY | Facility: HOSPITAL | Age: 57
End: 2022-05-25
Payer: COMMERCIAL

## 2022-05-25 ENCOUNTER — HOSPITAL ENCOUNTER (EMERGENCY)
Facility: HOSPITAL | Age: 57
Discharge: HOME | End: 2022-05-25
Attending: EMERGENCY MEDICINE
Payer: COMMERCIAL

## 2022-05-25 VITALS
TEMPERATURE: 99.1 F | RESPIRATION RATE: 16 BRPM | DIASTOLIC BLOOD PRESSURE: 82 MMHG | OXYGEN SATURATION: 96 % | SYSTOLIC BLOOD PRESSURE: 118 MMHG | HEART RATE: 102 BPM

## 2022-05-25 DIAGNOSIS — U07.1 COVID: Primary | ICD-10-CM

## 2022-05-25 LAB
FLUAV RNA RESP QL NAA+PROBE: NEGATIVE
FLUBV RNA RESP QL NAA+PROBE: NEGATIVE
RSV RNA RESP QL NAA+PROBE: NEGATIVE
SARS-COV-2 RNA RESP QL NAA+PROBE: POSITIVE

## 2022-05-25 PROCEDURE — 99283 EMERGENCY DEPT VISIT LOW MDM: CPT

## 2022-05-25 PROCEDURE — 0241U HB NFCT DS VIR RESP RNA 4 TRGT: CPT | Performed by: PHYSICIAN ASSISTANT

## 2022-05-25 PROCEDURE — 99284 EMERGENCY DEPT VISIT MOD MDM: CPT | Performed by: PHYSICIAN ASSISTANT

## 2022-05-25 PROCEDURE — 71046 X-RAY EXAM CHEST 2 VIEWS: CPT

## 2022-05-25 NOTE — ED NOTES
Patient asked by representative from [facility] to fax copy of test results to [facility] at [facility],  made by patient to do so, patient stated permission to release information to [facility] via fax machine  Information faxed to [facility] as requested       Godro Elizabeth RN  05/25/22 [id number]

## 2022-05-25 NOTE — ED NOTES
This RN called and spoke with Fracisco and RICHI at the [facility]  RICHI expressed confirmation of the AVS faxed to them by primary RN  Caterina Hernandez was to be speaking with supervisor and a decision would be made if they would be picking up patient or if his girlfriend would come get him  (DID not clarify if girlfriend was also a resident at [facility])  Caterina Hernandez did ask if PT would stay and be treated and was advised that treatment is based on symptoms  Provided them with ED number and requested they ask to speak to the charge RN       Jodie Morales RN  05/25/22 7836  For further communication Fracisco and Caterina Hernandez can be reached at [address] [place] [address]  05/25/22 9032

## 2022-05-25 NOTE — ED NOTES
Patient called [facility], where he currently resides, for a ride back  They asked his test results and the patient disclosed his positive test results  He was told on the phone that they were not sure if he could return to [facility] because of his covid status  Patient was asked to call back in 15 minutes  Provider, Leslie Srivastava and Charge RN, April, aware of same       Mary Jo Luevano RN  05/25/22 [id number]

## 2022-05-26 ENCOUNTER — TELEMEDICINE (OUTPATIENT)
Dept: FAMILY MEDICINE CLINIC | Facility: CLINIC | Age: 57
End: 2022-05-26
Payer: COMMERCIAL

## 2022-05-26 DIAGNOSIS — U07.1 COVID-19: Primary | ICD-10-CM

## 2022-05-26 PROCEDURE — 99213 OFFICE O/P EST LOW 20 MIN: CPT | Performed by: FAMILY MEDICINE

## 2022-05-26 RX ORDER — BUPROPION HYDROCHLORIDE 100 MG/1
TABLET, EXTENDED RELEASE ORAL
COMMUNITY
Start: 2022-05-20 | End: 2022-07-06 | Stop reason: ALTCHOICE

## 2022-05-26 RX ORDER — MELATONIN
2000 DAILY
Qty: 60 TABLET | Refills: 0 | Status: SHIPPED | OUTPATIENT
Start: 2022-05-26

## 2022-05-26 RX ORDER — ASCORBIC ACID 500 MG
500 TABLET ORAL DAILY
Qty: 30 TABLET | Refills: 0 | Status: SHIPPED | OUTPATIENT
Start: 2022-05-26

## 2022-05-26 RX ORDER — TAMSULOSIN HYDROCHLORIDE 0.4 MG/1
CAPSULE ORAL
COMMUNITY
Start: 2022-05-20

## 2022-05-26 RX ORDER — ZINC SULFATE 50(220)MG
220 CAPSULE ORAL DAILY
Qty: 30 CAPSULE | Refills: 0 | Status: SHIPPED | OUTPATIENT
Start: 2022-05-26

## 2022-05-26 RX ORDER — BENZONATATE 200 MG/1
200 CAPSULE ORAL 3 TIMES DAILY PRN
Qty: 30 CAPSULE | Refills: 0 | Status: SHIPPED | OUTPATIENT
Start: 2022-05-26

## 2022-05-26 NOTE — PROGRESS NOTES
COVID-19 Outpatient Progress Note    Assessment/Plan:    Problem List Items Addressed This Visit        Other    COVID-19 - Primary     - Discussed supportive care vs monoclonal antibody treatment  Patient does meet criteria for Paxlovid however it is contraindicated due to his psychiatric medications  We did discuss IV treatment instead however patient declined and opted to manage his symptoms symptomatically  Advised that should patient experience shortness of breath, chest pain or a decrease in oxygen <90% to proceed to the Emergency department immediately  Will schedule virtual follow up visit 5/31  Relevant Medications    dextromethorphan-guaifenesin (MUCINEX DM)  MG per 12 hr tablet    cholecalciferol (VITAMIN D3) 1,000 units tablet    zinc sulfate (ZINCATE) 220 mg capsule    ascorbic acid (VITAMIN C) 500 mg tablet    benzonatate (TESSALON) 200 MG capsule         Disposition:     Discussed symptom directed medication options with patient  Discussed vitamin D, vitamin C, and/or zinc supplementation with patient  I have spent 10 minutes directly with the patient  Greater than 50% of this time was spent in counseling/coordination of care regarding: instructions for management and patient and family education  Encounter provider Pearl Renee MD    Provider located at [address] [place] [address] [id number]    Recent Visits  No visits were found meeting these conditions  Showing recent visits within past 7 days and meeting all other requirements  Today's Visits  Date Type Provider Dept   05/26/22 Telemedicine Pearl Renee MD [address] today's visits and meeting all other requirements  Future Appointments  No visits were found meeting these conditions  Showing future appointments within next 150 days and meeting all other requirements     This virtual check-in was done via telephone and he agrees to proceed      Patient agrees to participate in a virtual check in via telephone or video visit instead of presenting to the office to address urgent/immediate medical needs  Patient is aware this is a billable service  After connecting through Telephone, the patient was identified by name and date of birth  Chiquis Ignaico was informed that this was a telemedicine visit and that the exam was being conducted confidentially over secure lines  My office door was closed  No one else was in the room  Chiquis Ignacio acknowledged consent and understanding of privacy and security of the telemedicine visit  I informed the patient that I have reviewed his record in Epic and presented the opportunity for him to ask any questions regarding the visit today  The patient agreed to participate  It was my intent to perform this visit via video technology but the patient was not able to do a video connection so the visit was completed via audio telephone only  Verification of patient location:  Patient is located in the following state in which I hold an active license: [place]    Subjective:   Chiquis Ignacio is a 62 y o  male who has been screened for COVID-19  Symptom change since last report: improving  Patient's symptoms include fatigue, nasal congestion, rhinorrhea, cough and myalgias  Patient denies fever, chills, sore throat, anosmia, loss of taste, shortness of breath, chest tightness, abdominal pain, nausea, vomiting and diarrhea  - Date of symptom onset: 5/23/2022  - Date of positive COVID-19 test: 5/25/2022  Type of test: PCR  COVID-19 vaccination status: Not vaccinated    Gisele Gruber has been staying home and has isolated themselves in his home  He is taking care to not share personal items and is cleaning all surfaces that are touched often, like counters, tabletops, and doorknobs using household cleaning sprays or wipes  He is wearing a mask when he leaves his room       Lab Results   Component Value Date    SARSCOV2 Positive (A) 05/25/2022    SARSCOV2 Detected (A) 04/25/2020    [facility] Not Detected 01/19/2022     Past Medical History:   Diagnosis Date    Anxiety 1995    Celiac disease     Depression 1995    Hypertension     Obsessive compulsive disorder     Severe episode of recurrent major depressive disorder, with psychotic features ([facility] ) 5/10/2012    Procedure/Onset: 01/01/1995    [facility]     10/2018     Past Surgical History:   Procedure Laterality Date    FRACTURE SURGERY      TONSILLECTOMY      WRIST SURGERY Left     resolved 1997- cts surgery     Current Outpatient Medications   Medication Sig Dispense Refill    acetaminophen (TYLENOL) 500 mg tablet Take 500 mg by mouth every 6 (six) hours as needed      ascorbic acid (VITAMIN C) 500 mg tablet Take 1 tablet (500 mg total) by mouth daily 30 tablet 0    benzonatate (TESSALON) 200 MG capsule Take 1 capsule (200 mg total) by mouth 3 (three) times a day as needed for cough 30 capsule 0    buPROPion (WELLBUTRIN SR) 100 mg 12 hr tablet       busPIRone (BUSPAR) 15 mg tablet Take 15 mg by mouth 3 (three) times a day      cholecalciferol (VITAMIN D3) 1,000 units tablet Take 2 tablets (2,000 Units total) by mouth daily 60 tablet 0    citalopram (CeleXA) 40 mg tablet       dextromethorphan-guaifenesin (MUCINEX DM)  MG per 12 hr tablet Take 1 tablet by mouth every 12 (twelve) hours 30 tablet 0    hydrocortisone 1 % lotion Apply topically 2 (two) times a day      Narcan 4 MG/0 1ML nasal spray CALL 911  ADMINISTER A SINGLE SPRAY INTRANASALLY INTO ONE NOSTRIL UPON SIGNS OF OPIOID OVERDOSE  MAY REPEAT AFTER 3 MINUTES IF NO RESPONSE        polyethylene glycol (MIRALAX) 17 g packet Take 17 g by mouth daily 10 each 0    Propylene Glycol 0 6 % SOLN Apply to eye      QUEtiapine (SEROquel) 25 mg tablet Take 25 mg by mouth 2 (two) times a day      QUEtiapine (SEROquel) 400 MG tablet Take 400 mg by mouth daily at bedtime      tamsulosin (FLOMAX) 0 4 mg       zinc sulfate (ZINCATE) 220 mg capsule Take 1 capsule (220 mg total) by mouth daily 30 capsule 0     No current facility-administered medications for this visit  Allergies   Allergen Reactions    Gluten Meal - Food Allergy      Pt is diagnosed with celiac disease with the biopsy/pathology and the tissue transglutaminase antibody by the GI     Penicillins Diarrhea and Vomiting    Celecoxib Rash       Review of Systems   Constitutional: Positive for fatigue  Negative for chills and fever  HENT: Positive for congestion and rhinorrhea  Negative for sore throat  Respiratory: Positive for cough  Negative for chest tightness and shortness of breath  Gastrointestinal: Negative for abdominal pain, diarrhea, nausea and vomiting  Musculoskeletal: Positive for myalgias  Objective: There were no vitals filed for this visit  VIRTUAL VISIT DISCLAIMER    Bao Orourke verbally agrees to participate in [employer]  Pt is aware that [employer] could be limited without vital signs or the ability to perform a full hands-on physical exam  Sudhakar Choe understands he or the provider may request at any time to terminate the video visit and request the patient to seek care or treatment in person

## 2022-05-26 NOTE — ASSESSMENT & PLAN NOTE
- Discussed supportive care vs monoclonal antibody treatment  Patient does meet criteria for Paxlovid however it is contraindicated due to his psychiatric medications  We did discuss IV treatment instead however patient declined and opted to manage his symptoms symptomatically  Advised that should patient experience shortness of breath, chest pain or a decrease in oxygen <90% to proceed to the Emergency department immediately  Will schedule virtual follow up visit 5/31

## 2022-05-30 NOTE — ED PROVIDER NOTES
History  Chief Complaint   Patient presents with    Flu Symptoms     Fever, cough, fatigue, body aches for 3 days     80-year-old male presents emergency department with complaints of respiratory symptoms  Says he has a fever with cough, fatigue, and body aches with past 3 days  Taking over-the-counter medications not lose symptoms  No known COVID exposure  Has not received COVID vaccine  History provided by:  Patient   used: No    Flu Symptoms  Presenting symptoms: cough, fatigue, fever and myalgias    Presenting symptoms: no diarrhea, no headaches, no nausea, no rhinorrhea, no shortness of breath, no sore throat and no vomiting    Associated symptoms: no chills, no ear pain and no congestion        Prior to Admission Medications   Prescriptions Last Dose Informant Patient Reported? Taking? Narcan 4 MG/0 1ML nasal spray  Self Yes No   Sig: CALL 911  ADMINISTER A SINGLE SPRAY INTRANASALLY INTO ONE NOSTRIL UPON SIGNS OF OPIOID OVERDOSE  MAY REPEAT AFTER 3 MINUTES IF NO RESPONSE     Propylene Glycol 0 6 % SOLN  Self Yes No   Sig: Apply to eye   QUEtiapine (SEROquel) 25 mg tablet   Yes No   Sig: Take 25 mg by mouth 2 (two) times a day   QUEtiapine (SEROquel) 400 MG tablet   Yes No   Sig: Take 400 mg by mouth daily at bedtime   acetaminophen (TYLENOL) 500 mg tablet  Self Yes No   Sig: Take 500 mg by mouth every 6 (six) hours as needed   busPIRone (BUSPAR) 15 mg tablet   Yes No   Sig: Take 15 mg by mouth 3 (three) times a day   citalopram (CeleXA) 40 mg tablet   Yes No   hydrocortisone 1 % lotion  Self Yes No   Sig: Apply topically 2 (two) times a day   polyethylene glycol (MIRALAX) 17 g packet  Self No No   Sig: Take 17 g by mouth daily      Facility-Administered Medications: None       Past Medical History:   Diagnosis Date    Anxiety 1995    Celiac disease     Depression 1995    Hypertension     Obsessive compulsive disorder     Severe episode of recurrent major depressive disorder, with psychotic features ([facility] ) 5/10/2012    Procedure/Onset: 01/01/1995    Suicide attempt ([facility] )     10/2018       Past Surgical History:   Procedure Laterality Date    FRACTURE SURGERY      TONSILLECTOMY      WRIST SURGERY Left     resolved 1997- cts surgery       Family History   Problem Relation Age of Onset    Heart attack Father     Prostate cancer Father     Cerebral palsy Brother    Sabiha Maguire OCD Mother     OCD Sister      I have reviewed and agree with the history as documented  E-Cigarette/Vaping    E-Cigarette Use Never User      E-Cigarette/Vaping Substances    Nicotine No     THC No     CBD No     Flavoring No     Other No     Unknown No      Social History     Tobacco Use    Smoking status: Former Smoker     Types: Cigars    Smokeless tobacco: Former User    Tobacco comment: quit March 2022   Vaping Use    Vaping Use: Never used   Substance Use Topics    Alcohol use: Not Currently     Comment: SOCIAL    Drug use: No       Review of Systems   Constitutional: Positive for fatigue and fever  Negative for activity change, appetite change and chills  HENT: Negative for congestion, dental problem, drooling, ear discharge, ear pain, mouth sores, nosebleeds, rhinorrhea, sore throat and trouble swallowing  Eyes: Negative for pain, discharge and itching  Respiratory: Positive for cough  Negative for chest tightness, shortness of breath and wheezing  Cardiovascular: Negative for chest pain and palpitations  Gastrointestinal: Negative for abdominal pain, blood in stool, constipation, diarrhea, nausea and vomiting  Endocrine: Negative for cold intolerance and heat intolerance  Genitourinary: Negative for difficulty urinating, dysuria, flank pain, frequency and urgency  Musculoskeletal: Positive for myalgias  Skin: Negative for rash and wound  Allergic/Immunologic: Negative for food allergies and immunocompromised state     Neurological: Negative for dizziness, seizures, syncope, weakness, numbness and headaches  Psychiatric/Behavioral: Negative for agitation, behavioral problems and confusion  Physical Exam  Physical Exam  Vitals and nursing note reviewed  Constitutional:       Appearance: He is well-developed  HENT:      Head: Normocephalic and atraumatic  Right Ear: Hearing, tympanic membrane, ear canal and external ear normal       Left Ear: Hearing, tympanic membrane, ear canal and external ear normal       Nose: Nose normal       Mouth/Throat:      Pharynx: Uvula midline  Eyes:      General:         Right eye: No discharge  Left eye: No discharge  Conjunctiva/sclera: Conjunctivae normal    Cardiovascular:      Rate and Rhythm: Normal rate and regular rhythm  Pulmonary:      Effort: Pulmonary effort is normal  No respiratory distress  Breath sounds: No wheezing, rhonchi or rales  Musculoskeletal:      Cervical back: Normal range of motion  Skin:     General: Skin is warm and dry  Neurological:      Mental Status: He is alert and oriented to person, place, and time     Psychiatric:         Mood and Affect: Mood normal          Behavior: Behavior normal          Vital Signs  ED Triage Vitals [05/25/22 1550]   Temperature Pulse Respirations Blood Pressure SpO2   99 1 °F (37 3 °C) 102 16 118/82 96 %      Temp Source Heart Rate Source Patient Position - Orthostatic VS BP Location FiO2 (%)   Oral -- Sitting Left arm --      Pain Score       3           Vitals:    05/25/22 1550   BP: 118/82   Pulse: 102   Patient Position - Orthostatic VS: Sitting         Visual Acuity      ED Medications  Medications - No data to display    Diagnostic Studies  Results Reviewed     Procedure Component Value Units Date/Time    COVID/FLU/RSV - 2 hour TAT [[id number]]  (Abnormal) Collected: 05/25/22 1624    Lab Status: Final result Specimen: Nares from Nose Updated: 05/25/22 0612     SARS-CoV-2 Positive     INFLUENZA A PCR Negative     INFLUENZA B PCR Negative RSV PCR Negative    Narrative:      FOR PEDIATRIC PATIENTS - copy/paste COVID Guidelines URL to browser: [email or web address]    SARS-CoV-2 assay is a Nucleic Acid Amplification assay intended for the  qualitative detection of nucleic acid from SARS-CoV-2 in nasopharyngeal  swabs  Results are for the presumptive identification of SARS-CoV-2 RNA  Positive results are indicative of infection with SARS-CoV-2, the virus  causing COVID-19, but do not rule out bacterial infection or co-infection  with other viruses  Laboratories within the [place] and its  territories are required to report all positive results to the appropriate  public health authorities  Negative results do not preclude SARS-CoV-2  infection and should not be used as the sole basis for treatment or other  patient management decisions  Negative results must be combined with  clinical observations, patient history, and epidemiological information  This test has not been FDA cleared or approved  This test has been authorized by FDA under an Emergency Use Authorization  (EUA)  This test is only authorized for the duration of time the  declaration that circumstances exist justifying the authorization of the  emergency use of an in vitro diagnostic tests for detection of SARS-CoV-2  virus and/or diagnosis of COVID-19 infection under section 564(b)(1) of  the Act, 21 U  S C  700HJV-3(R)(4), unless the authorization is terminated  or revoked sooner  The test has been validated but independent review by FDA  and CLIA is pending  Test performed using [employer] GeneXpert: This RT-PCR assay targets N2,  a region unique to SARS-CoV-2  A conserved region in the E-gene was chosen  for pan-Sarbecovirus detection which includes SARS-CoV-2  XR chest 2 views   Final Result by Jeffeyr Nicole MD (05/25 8302)      No acute cardiopulmonary disease                    Workstation performed: [id number]                    Procedures  Procedures         ED Course                                             MDM  Number of Diagnoses or Management Options  COVID  Diagnosis management comments: Differential diagnosis includes but not limited to:  COVID, pneumonia, influenza         Amount and/or Complexity of Data Reviewed  Clinical lab tests: ordered and reviewed  Tests in the radiology section of CPT®: ordered and reviewed  Independent visualization of images, tracings, or specimens: yes        Disposition  Final diagnoses:   COVID     Time reflects when diagnosis was documented in both MDM as applicable and the Disposition within this note     Time User Action Codes Description Comment    5/25/2022  5:47 PM Percell Spray Add [R68 89] Flu-like symptoms     5/25/2022  5:58 PM SchuettLucrecia Cools Remove [R68 89] Flu-like symptoms     5/25/2022  5:58 PM SchemelyttLucrecia Cools Add [U07 1] [employer]       ED Disposition     ED Disposition   Discharge    Condition   Stable    Date/Time   Wed May 25, 2022  5:47 PM    Comment   Jennifer Choe discharge to home/self care  Follow-up Information     Follow up With Specialties Details Why Contact Info    Zuleika Johns DO [facility]   [address]  [address] [facility]  [phone]            Discharge Medication List as of 5/25/2022  6:03 PM      CONTINUE these medications which have NOT CHANGED    Details   acetaminophen (TYLENOL) 500 mg tablet Take 500 mg by mouth every 6 (six) hours as needed, Historical Med      busPIRone (BUSPAR) 15 mg tablet Take 15 mg by mouth 3 (three) times a day, Historical Med      citalopram (CeleXA) 40 mg tablet Starting Mon 3/7/2022, Historical Med      hydrocortisone 1 % lotion Apply topically 2 (two) times a day, Historical Med      Narcan 4 MG/0 1ML nasal spray CALL 911   ADMINISTER A SINGLE SPRAY INTRANASALLY INTO ONE NOSTRIL UPON SIGNS OF OPIOID OVERDOSE  MAY REPEAT AFTER 3 MINUTES IF NO RESPONSE , Historical Med      polyethylene glycol (MIRALAX) 17 g packet Take 17 g by mouth daily, Starting Sat 1/23/2021, Print      Propylene Glycol 0 6 % SOLN Apply to eye, Historical Med      !! QUEtiapine (SEROquel) 25 mg tablet Take 25 mg by mouth 2 (two) times a day, Historical Med      !! QUEtiapine (SEROquel) 400 MG tablet Take 400 mg by mouth daily at bedtime, Starting Mon 3/7/2022, Historical Med       !! - Potential duplicate medications found  Please discuss with provider  No discharge procedures on file      PDMP Review       Value Time User    PDMP Reviewed  Yes 3/28/2022 10:05 AM Sravanthi Hutson MD          ED Provider  Electronically Signed by           Ramírez Abrams PA-C  05/30/22 2018

## 2022-05-31 ENCOUNTER — TELEPHONE (OUTPATIENT)
Dept: FAMILY MEDICINE CLINIC | Facility: CLINIC | Age: 57
End: 2022-05-31

## 2022-05-31 NOTE — TELEPHONE ENCOUNTER
Attempted to contact patient for telemedicine visit however no response  Left message to call the office back

## 2022-06-12 DIAGNOSIS — N40.0 BENIGN PROSTATIC HYPERPLASIA, UNSPECIFIED WHETHER LOWER URINARY TRACT SYMPTOMS PRESENT: Primary | ICD-10-CM

## 2022-06-12 RX ORDER — SILDENAFIL 100 MG/1
TABLET, FILM COATED ORAL
Qty: 6 TABLET | Refills: 0 | Status: SHIPPED | OUTPATIENT
Start: 2022-06-12

## 2022-06-13 DIAGNOSIS — N40.0 BENIGN PROSTATIC HYPERPLASIA, UNSPECIFIED WHETHER LOWER URINARY TRACT SYMPTOMS PRESENT: ICD-10-CM

## 2022-06-13 RX ORDER — SILDENAFIL 100 MG/1
TABLET, FILM COATED ORAL
Qty: 6 TABLET | Refills: 0 | Status: CANCELLED | OUTPATIENT
Start: 2022-06-13

## 2022-06-13 RX ORDER — SILDENAFIL 100 MG/1
TABLET, FILM COATED ORAL
Qty: 6 TABLET | Refills: 0 | OUTPATIENT
Start: 2022-06-13

## 2022-06-21 ENCOUNTER — SOCIAL WORK (OUTPATIENT)
Dept: BEHAVIORAL/MENTAL HEALTH CLINIC | Facility: CLINIC | Age: 57
End: 2022-06-21
Payer: COMMERCIAL

## 2022-06-21 DIAGNOSIS — F33.41 RECURRENT MAJOR DEPRESSIVE DISORDER, IN PARTIAL REMISSION (HCC): ICD-10-CM

## 2022-06-21 DIAGNOSIS — F31.5 BIPOLAR I DISORDER, MOST RECENT EPISODE DEPRESSED, SEVERE W PSYCHOSIS (HCC): ICD-10-CM

## 2022-06-21 DIAGNOSIS — F42.2 MIXED OBSESSIONAL THOUGHTS AND ACTS: ICD-10-CM

## 2022-06-21 DIAGNOSIS — F41.1 GAD (GENERALIZED ANXIETY DISORDER): Primary | ICD-10-CM

## 2022-06-21 PROCEDURE — 90834 PSYTX W PT 45 MINUTES: CPT | Performed by: SOCIAL WORKER

## 2022-06-21 NOTE — PSYCH
Psychotherapy Provided: Individual Psychotherapy 45 minutes     Length of time in session: 45 minutes, follow up in 1 month    Encounter Diagnosis     ICD-10-CM    1  BETHANY (generalized anxiety disorder)  F41 1    2  Recurrent major depressive disorder, [facility] )  F33 41    3  Bipolar I disorder, most recent episode depressed, severe w psychosis ([facility] )  F31 5    4  Mixed obsessional thoughts and acts  F42 2        Goals addressed in session: Goal 1     Pain:      none    0    Current suicide risk : Low     D- Ana Lan presented for treatment with his girlfriend, Dennis Shook  They stated that Ana Lan was recently released from [facility] for a suicide attempt and then stayed at [address] [facility] but had to be discharged due to wil [employer] Shook stated that she feels that is he doing better on his current medications but that he will soon run out  He does not currently have a psychiatrist  The clinician contacted  intake deportment to initiate a refer  Ana Lan stated that he did speak to his youngest son on Father's Day, but that he initiated the call and it was very brief  He stated that he continues to struggle with the alienation from his sons  His girlfriend alexeid that they have been having issues in the relationship because he had been with many women in the summer of 2020  Discussing what the status of their relationship is an what they both see for the future  Also discussing treatment goals and what he feels he needs in terms of treatment  Discussing ways to increase communication and trust in their relationship and also ways for Ana Lan to increase his activity level and structure his days  Discussing ways to introduce more positive activities and relationships into his life  Creating a treatment plan   Giving supportive therapy  A- Progress- Continuing to work towards completing his treatment goals  P-Continue treatment    [address]: Diagnosis and Treatment Plan explained to Jesu Lopez relates understanding diagnosis and is agreeable to Treatment Plan   Yes

## 2022-06-21 NOTE — BH TREATMENT PLAN
Piper Jones  1965       Date of Initial Treatment Plan: 9/16/20   Date of Current Treatment Plan: 06/21/22    Treatment Plan Number 4     Strengths/Personal Resources for Self Care: Intelligent, coaching, caring, football, motivation for treatment, relationship    Diagnosis:   1  BETHANY (generalized anxiety disorder)     2  Recurrent major depressive disorder, in partial remission ([facility] )     3  Bipolar I disorder, most recent episode depressed, severe w psychosis ([facility] )     4  Mixed obsessional thoughts and acts         Area of Needs:   Health  Future        Long Term Goal 1: Be able to move forward with my life     Target Date: 6/1/22  Completion Date: TBD         Short Term Objectives for Goal 1:        Focus on getting well physically       Maintain healthy relationships in my life       Be consistent with medication       Attend all therapy appointments       Build and maintain a schedule       Increase my activity level and help around the house       Focus on myself and not others             GOAL 1: Modality: Individual 1-2x per month   Completion Date TBD and The person(s) responsible for carrying out the plan is [facility]       Behavioral Health Treatment Plan [facility]: Diagnosis and Treatment Plan explained to Laura Kamara relates understanding diagnosis and is agreeable to Treatment Plan         Client Comments : Please share your thoughts, feelings, need and/or experiences regarding your treatment plan: None

## 2022-06-21 NOTE — PSYCH
Treatment Plan Tracking    # 4 Treatment Plan not completed within required time limits due to: Treatment Plan done but not signed at time of office visit due to:  Plan reviewed by phone or in person  and verbal consent given due to [place] social distancing   Late due to scheduling issues and no shows

## 2022-06-30 ENCOUNTER — TELEPHONE (OUTPATIENT)
Dept: ADMINISTRATIVE | Facility: OTHER | Age: 57
End: 2022-06-30

## 2022-06-30 NOTE — TELEPHONE ENCOUNTER
----- Message from Chun Mancilla MA sent at 6/29/2022 12:59 PM EDT -----  Regarding: Request Quality  06/29/22 12:59 PM    Hello, our patient Maxi Mosley has had CRC: Colonoscopy completed/performed  Please assist in updating the patient chart by making an External outreach to [facility] facility located in [facility]  The date of service is [id number]      Thank you,  Chun Mancilla MA  [address]

## 2022-06-30 NOTE — TELEPHONE ENCOUNTER
Upon review of the In Basket request and the patient's chart, initial outreach has been made via fax, please see Contacts section for details       Thank you  Jaclyn Hargrove

## 2022-06-30 NOTE — LETTER
Procedure Request Form: Colonoscopy      Date Requested: 22  Patient: Del Minion  Patient : 1965   Referring Provider: Marge Florence, DO        Date of Procedure __from  through ____________________________       The above patient has informed us that they have completed their   most recent Colonoscopy at your facility  Please complete   this form and attach all corresponding procedure reports/results  Comments __________________________________________________________  ____________________________________________________________________  ____________________________________________________________________  ____________________________________________________________________    Facility Completing Procedure _________________________________________    Form Completed By (print name) _______________________________________      Signature __________________________________________________________      These reports are needed for  compliance  Please fax this completed form and a copy of the procedure report to our office located at [place] [address] as soon as possible to [phone] billie Fritz: Phone [phone]    We thank you for your assistance in treating our mutual patient

## 2022-07-06 ENCOUNTER — OFFICE VISIT (OUTPATIENT)
Dept: PSYCHIATRY | Facility: CLINIC | Age: 57
End: 2022-07-06

## 2022-07-06 VITALS — BODY MASS INDEX: 32.61 KG/M2 | WEIGHT: 254 LBS

## 2022-07-06 DIAGNOSIS — F41.1 GAD (GENERALIZED ANXIETY DISORDER): ICD-10-CM

## 2022-07-06 DIAGNOSIS — Z86.59 HISTORY OF OBSESSIVE COMPULSIVE DISORDER: ICD-10-CM

## 2022-07-06 DIAGNOSIS — F10.21 ALCOHOL USE DISORDER, SEVERE, IN SUSTAINED REMISSION (HCC): ICD-10-CM

## 2022-07-06 DIAGNOSIS — F33.41 RECURRENT MAJOR DEPRESSIVE DISORDER, IN PARTIAL REMISSION (HCC): Primary | Chronic | ICD-10-CM

## 2022-07-06 RX ORDER — QUETIAPINE FUMARATE 400 MG/1
400 TABLET, FILM COATED ORAL
Qty: 30 TABLET | Refills: 1 | Status: SHIPPED | OUTPATIENT
Start: 2022-07-06 | End: 2022-08-08

## 2022-07-06 RX ORDER — CITALOPRAM 40 MG/1
40 TABLET ORAL EVERY MORNING
Qty: 30 TABLET | Refills: 1 | Status: SHIPPED | OUTPATIENT
Start: 2022-07-06 | End: 2022-08-08

## 2022-07-06 RX ORDER — BUSPIRONE HYDROCHLORIDE 15 MG/1
15 TABLET ORAL 3 TIMES DAILY
Qty: 90 TABLET | Refills: 1 | Status: SHIPPED | OUTPATIENT
Start: 2022-07-06 | End: 2022-08-24
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
## 2022-07-06 NOTE — PATIENT INSTRUCTIONS
Follow up in 4 weeks  Please call the office nursing staff for medication issues including refills, problems getting medications, bothersome side effects, etc at [phone]  Please return for a follow up appointment as discussed  If you are running late or are unable to attend your appointment, please call our [facility] office at [phone], or if you were seen in the [place] office, please call [phone]  If you have thoughts of harming yourself or are otherwise in psychological crisis, do not hesitate to contact your [facility] hotline, or go to the nearest emergency room  [facility] Crisis: [address] Crisis: [phone]  [address] Crisis: [address] Crisis: [address] Crisis: [address] Crisis: [address]  Crisis: [phone]  National Suicide Prevention Hotline: 4-857.993.2988     Look up "grounding techniques" and/or "anchoring demonstration" online and try a few to see what may work for you  Practice these skills before you need them, when you are not feeling too anxious or triggered  You can also search for free guided meditation videos online to help improve your head space when you are feeling very anxious or triggered  Healthy Diet   The American Heart Association and the [employer] of Cardiology have long recommended a healthy diet for not only patients who are at risk for atherosclerotic cardiovascular disease (ASCVD) but also the general public   In keeping with this evidence-based recommendation, the "2018 Guideline on the Management of Blood Cholesterol" stresses that a healthy diet should include adequate intake of these essentials:   Vegetables, fruits, and whole grains   Legumes and nuts   Low-fat dairy products   Low-fat poultry (without the skin)   Fish and seafood   Nontropical vegetable oils     The recent guidelines do provide room for cultural food preferences in a healthy diet, but in general, all patients should limit their intake of saturated and trans fats, sweets, sugar-sweetened beverages, and red meats  Physical Activity   In addition to a healthy diet, all patients should include regular physical activity in their weekly routines, at moderate to vigorous intensity  Any activity is better than nothing, so if your patients can't meet the recommendation of vigorous activity, moderate-intensity activity can still help them reduce their risk of ASCVD  Below are the American Heart Association's recommendations for physical activity per week (preferably spread throughout the week): For Overall Cardiovascular Health and Lowering Cholesterol   At least 150 minutes of moderate-intensity physical activity (for example, 30 minutes, 5 days a week), or   At least 75 minutes of vigorous-intensity physical activity (for example, 25 minutes, 3 days a week); or   A combination of moderate- and vigorous-intensity aerobic activity, and   At least 2 days of moderate- to high-intensity muscle-strengthening activities (such as resistance weight training) for additional health benefits    Weight Control   It's important to work with patients to help them reach and maintain a healthy weight (Table 3)  You may need to suggest that they adjust their caloric intake to avoid weight gain or, in overweight and obese patients, to promote weight loss  Table 3   Body Mass Index

## 2022-07-06 NOTE — TELEPHONE ENCOUNTER
Upon review of the In Basket request we have noted that Per Smith Brown at gastroenterologist office, the last colonoscopy they have on file is from year 2016, because of this we are requesting that you forward this request/concern to the [employer] email  The Quality team members assigned to this email will be more than happy to assist you  Any additional questions or concerns should be emailed to the Practice Liaisons via [employer] email, please do not reply via In Basket      Thank you  John Jordan

## 2022-07-06 NOTE — BH TREATMENT PLAN
TREATMENT PLAN (Medication Management Only)        [facility]    Name and Date of Birth:  Irma Jack 62 y o  1965  Date of Treatment Plan: July 6, 2022  Diagnosis/Diagnoses:    1  Recurrent major depressive disorder, in partial remission ([address] ) with history of psychotic features    2  BETHANY (generalized anxiety disorder)    3  History of obsessive compulsive disorder    4  Alcohol use disorder, severe, in sustained remission [facility])      Strengths/Personal Resources for Self-Care: supportive family, taking medications as prescribed, ability to listen, ability to understand psychiatric illness, average or above intelligence, financial means, ability to negotiate basic needs, willingness to work on problems  Area/Areas of need (in own words): anxiety symptoms, behavioral problems  1  Long Term Goal: maintain improvement in mood and impulsive behaviors  Target Date:6 months - 1/6/2023  Person/Persons responsible for completion of goal: Vianey Melissa  2  Short Term Objective (s) - How will we reach this goal?:   A  Provider new recommended medication/dosage changes and/or continue medication(s): continue current medications as prescribed  B  Attend psychotherapy regularly  C  Eat a healthy diet    D  Exercise regularly  E  Think positive thoughts/affirmations  Target Date:3 months - 10/6/2022  Person/Persons Responsible for Completion of Goal: Vianey Melissa  Progress Towards Goals: continuing treatment  Treatment Modality: medication management every 4 weeks, continue psychotherapy with [facility] therapist monthly  Review due 180 days from date of this plan: 6 months - 1/6/2023  Expected length of service: ongoing treatment  My Physician/PA/NP and I have developed this plan together and I agree to work on the goals and objectives  I understand the treatment goals that were developed for my treatment

## 2022-08-01 ENCOUNTER — DOCUMENTATION (OUTPATIENT)
Dept: BEHAVIORAL/MENTAL HEALTH CLINIC | Facility: CLINIC | Age: 57
End: 2022-08-01

## 2022-08-02 ENCOUNTER — TELEPHONE (OUTPATIENT)
Dept: PSYCHIATRY | Facility: CLINIC | Age: 57
End: 2022-08-02

## 2022-08-02 NOTE — TELEPHONE ENCOUNTER
Patient called to cancel his medication management  Appointment for today, 8/2/22 due to stomach issues  He stated he would call back to reschedule his next appointment when feeling better

## 2022-08-08 ENCOUNTER — TELEPHONE (OUTPATIENT)
Dept: PSYCHIATRY | Facility: CLINIC | Age: 57
End: 2022-08-08

## 2022-08-08 NOTE — TELEPHONE ENCOUNTER
Monica Wood,  from [facility] left a message requesting a psychiatry appointment for this patient  Called her back but had to leave a voice mail message  Gave name and office telephone number  Requested she call back to make this appointment

## 2022-08-18 ENCOUNTER — TELEPHONE (OUTPATIENT)
Dept: PSYCHIATRY | Facility: CLINIC | Age: 57
End: 2022-08-18

## 2022-08-18 ENCOUNTER — TELEPHONE (OUTPATIENT)
Dept: BEHAVIORAL/MENTAL HEALTH CLINIC | Facility: CLINIC | Age: 57
End: 2022-08-18

## 2022-08-18 NOTE — TELEPHONE ENCOUNTER
Patient called left a message at 11:07am  apologizing for missing appointment with leif but due to him having a stomach Virus he wasn't able to make it today

## 2022-08-19 ENCOUNTER — TELEPHONE (OUTPATIENT)
Dept: BEHAVIORAL/MENTAL HEALTH CLINIC | Facility: CLINIC | Age: 57
End: 2022-08-19

## 2022-08-19 NOTE — TELEPHONE ENCOUNTER
NO-SHOW LETTER MAILED TO Ha[facility] Primrose    ADDRESS: [id number] [place] [id number]

## 2022-08-23 ENCOUNTER — TELEPHONE (OUTPATIENT)
Dept: PSYCHIATRY | Facility: CLINIC | Age: 57
End: 2022-08-23

## 2022-08-24 ENCOUNTER — OFFICE VISIT (OUTPATIENT)
Dept: PSYCHIATRY | Facility: CLINIC | Age: 57
End: 2022-08-24
Payer: COMMERCIAL

## 2022-08-24 VITALS — WEIGHT: 255.6 LBS | BODY MASS INDEX: 32.82 KG/M2

## 2022-08-24 DIAGNOSIS — F41.1 GAD (GENERALIZED ANXIETY DISORDER): ICD-10-CM

## 2022-08-24 DIAGNOSIS — F25.1 SCHIZOAFFECTIVE DISORDER, DEPRESSIVE TYPE (HCC): Primary | ICD-10-CM

## 2022-08-24 DIAGNOSIS — F10.21 ALCOHOL USE DISORDER, SEVERE, IN SUSTAINED REMISSION (HCC): ICD-10-CM

## 2022-08-24 DIAGNOSIS — Z86.59 HISTORY OF OBSESSIVE COMPULSIVE DISORDER: ICD-10-CM

## 2022-08-24 PROCEDURE — 99214 OFFICE O/P EST MOD 30 MIN: CPT | Performed by: PSYCHIATRY & NEUROLOGY

## 2022-08-24 RX ORDER — BENZTROPINE MESYLATE 1 MG/1
1 TABLET ORAL DAILY PRN
COMMUNITY
Start: 2022-08-12 | End: 2022-09-09

## 2022-08-24 RX ORDER — BUSPIRONE HYDROCHLORIDE 15 MG/1
15 TABLET ORAL 2 TIMES DAILY
Qty: 60 TABLET | Refills: 1
Start: 2022-08-24 | End: 2022-10-18 | Stop reason: SDUPTHER

## 2022-08-24 RX ORDER — CHOLECALCIFEROL (VITAMIN D3) 125 MCG
5 CAPSULE ORAL
Status: ON HOLD | COMMUNITY
Start: 2022-08-11 | End: 2022-09-07 | Stop reason: SDUPTHER

## 2022-08-24 RX ORDER — MIRTAZAPINE 15 MG/1
15 TABLET, FILM COATED ORAL
COMMUNITY
Start: 2022-08-11

## 2022-08-24 NOTE — PATIENT INSTRUCTIONS
Look up "grounding techniques" and/or "anchoring demonstration" online and try a few to see what may work for you  Practice these skills before you need them, when you are not feeling too anxious or triggered  You can also search for free guided meditation videos online to help improve your head space when you are feeling very anxious or triggered  Recommendations regarding insomnia:  Wake-up at the same time every day  Refrain from "napping"  Refrain from going to bed unless you're tired  Utilize your bedroom for sleep only  Avoid use of electronics including television and/or cellphone/computers  Refrain from use of electronics including television and/or cellphones/computers prior to bed  Turn your alarm clock away so the light is not visible  Attempt relaxation using various means like reading if you're restless in bed for approximately 15-20 minutes  Participate in regular physical activities like exercise, although avoid approximately 3-4 hours prior to bed  Morning exercise is ideal   Avoid caffeine use prior to bedtime  Consider tapering down excessive use of caffeine  Avoid tobacco use prior to bedtime  Avoid alcohol use prior to bedtime  Consider reading "No More Sleepless nights" by Renetta Tyler, Ph D   Consider use of online resources including:  http://[employer]/cbt-online-insomnia-treatment html  [employer]  com  CBT-I   Go! To Sleep by the [facility]  Healthy Diet   The American Heart Association and the [employer] of Cardiology have long recommended a healthy diet for not only patients who are at risk for atherosclerotic cardiovascular disease (ASCVD) but also the general public   In keeping with this evidence-based recommendation, the "2018 Guideline on the Management of Blood Cholesterol" stresses that a healthy diet should include adequate intake of these essentials:   Vegetables, fruits, and whole grains   Legumes and nuts   Low-fat dairy products Low-fat poultry (without the skin)   Fish and seafood   Nontropical vegetable oils     The recent guidelines do provide room for cultural food preferences in a healthy diet, but in general, all patients should limit their intake of saturated and trans fats, sweets, sugar-sweetened beverages, and red meats  Physical Activity   In addition to a healthy diet, all patients should include regular physical activity in their weekly routines, at moderate to vigorous intensity  Any activity is better than nothing, so if your patients can't meet the recommendation of vigorous activity, moderate-intensity activity can still help them reduce their risk of ASCVD  Below are the American Heart Association's recommendations for physical activity per week (preferably spread throughout the week): For Overall Cardiovascular Health and Lowering Cholesterol   At least 150 minutes of moderate-intensity physical activity (for example, 30 minutes, 5 days a week), or   At least 75 minutes of vigorous-intensity physical activity (for example, 25 minutes, 3 days a week); or   A combination of moderate- and vigorous-intensity aerobic activity, and   At least 2 days of moderate- to high-intensity muscle-strengthening activities (such as resistance weight training) for additional health benefits    Weight Control   It's important to work with patients to help them reach and maintain a healthy weight (Table 3)  You may need to suggest that they adjust their caloric intake to avoid weight gain or, in overweight and obese patients, to promote weight loss  Table 3  Body Mass Index            Please call the office nursing staff for medication issues including refills, problems getting medications, bothersome side effects, etc at [phone]  Please return for a follow up appointment as discussed   If you are running late or are unable to attend your appointment, please call our [place] office at [phone], or if you were seen in the [place] office, please call [phone]  If you have thoughts of harming yourself or are otherwise in psychological crisis, do not hesitate to contact your [facility] hotline, or go to the nearest emergency room    [facility] [place] Crisis: [address] Crisis: [phone]  [address] Crisis: [address] Crisis: [address] Crisis: [address] Crisis: [address] [facility] Crisis: [phone]  National Suicide Prevention Hotline: 9-582.727.4333
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
## 2022-08-27 ENCOUNTER — HOSPITAL ENCOUNTER (EMERGENCY)
Facility: HOSPITAL | Age: 57
End: 2022-08-30
Attending: INTERNAL MEDICINE
Payer: COMMERCIAL

## 2022-08-27 DIAGNOSIS — N40.0 BPH (BENIGN PROSTATIC HYPERPLASIA): ICD-10-CM

## 2022-08-27 DIAGNOSIS — I10 ESSENTIAL HYPERTENSION: ICD-10-CM

## 2022-08-27 DIAGNOSIS — F25.1 SCHIZOAFFECTIVE DISORDER, DEPRESSIVE TYPE (HCC): ICD-10-CM

## 2022-08-27 DIAGNOSIS — Z00.8 ENCOUNTER FOR PSYCHOLOGICAL EVALUATION: Primary | ICD-10-CM

## 2022-08-27 DIAGNOSIS — K21.9 GASTROESOPHAGEAL REFLUX DISEASE WITHOUT ESOPHAGITIS: ICD-10-CM

## 2022-08-27 DIAGNOSIS — M19.90 DJD (DEGENERATIVE JOINT DISEASE): ICD-10-CM

## 2022-08-27 LAB
ALBUMIN SERPL BCP-MCNC: 4.1 G/DL (ref 3.5–5)
ALP SERPL-CCNC: 73 U/L (ref 34–104)
ALT SERPL W P-5'-P-CCNC: 16 U/L (ref 7–52)
AMPHETAMINES SERPL QL SCN: NEGATIVE
ANION GAP SERPL CALCULATED.3IONS-SCNC: 7 MMOL/L (ref 4–13)
AST SERPL W P-5'-P-CCNC: 12 U/L (ref 13–39)
BARBITURATES UR QL: NEGATIVE
BASOPHILS # BLD AUTO: 0.04 THOUSANDS/ΜL (ref 0–0.1)
BASOPHILS NFR BLD AUTO: 1 % (ref 0–1)
BENZODIAZ UR QL: NEGATIVE
BILIRUB SERPL-MCNC: 0.34 MG/DL (ref 0.2–1)
BUN SERPL-MCNC: 11 MG/DL (ref 5–25)
CALCIUM SERPL-MCNC: 9.1 MG/DL (ref 8.4–10.2)
CHLORIDE SERPL-SCNC: 108 MMOL/L (ref 96–108)
CO2 SERPL-SCNC: 27 MMOL/L (ref 21–32)
COCAINE UR QL: NEGATIVE
CREAT SERPL-MCNC: 1.04 MG/DL (ref 0.6–1.3)
EOSINOPHIL # BLD AUTO: 0.14 THOUSAND/ΜL (ref 0–0.61)
EOSINOPHIL NFR BLD AUTO: 3 % (ref 0–6)
ERYTHROCYTE [DISTWIDTH] IN BLOOD BY AUTOMATED COUNT: 12.6 % (ref 11.6–15.1)
ETHANOL EXG-MCNC: 0 MG/DL
ETHANOL EXG-MCNC: NORMAL MG/DL
GFR SERPL CREATININE-BSD FRML MDRD: 79 ML/MIN/1.73SQ M
GLUCOSE SERPL-MCNC: 98 MG/DL (ref 65–140)
HCT VFR BLD AUTO: 45.1 % (ref 36.5–49.3)
HGB BLD-MCNC: 14.9 G/DL (ref 12–17)
IMM GRANULOCYTES # BLD AUTO: 0.01 THOUSAND/UL (ref 0–0.2)
IMM GRANULOCYTES NFR BLD AUTO: 0 % (ref 0–2)
LYMPHOCYTES # BLD AUTO: 1.84 THOUSANDS/ΜL (ref 0.6–4.47)
LYMPHOCYTES NFR BLD AUTO: 34 % (ref 14–44)
MCH RBC QN AUTO: 30.9 PG (ref 26.8–34.3)
MCHC RBC AUTO-ENTMCNC: 33 G/DL (ref 31.4–37.4)
MCV RBC AUTO: 94 FL (ref 82–98)
METHADONE UR QL: NEGATIVE
MONOCYTES # BLD AUTO: 0.55 THOUSAND/ΜL (ref 0.17–1.22)
MONOCYTES NFR BLD AUTO: 10 % (ref 4–12)
NEUTROPHILS # BLD AUTO: 2.91 THOUSANDS/ΜL (ref 1.85–7.62)
NEUTS SEG NFR BLD AUTO: 52 % (ref 43–75)
NRBC BLD AUTO-RTO: 0 /100 WBCS
OPIATES UR QL SCN: NEGATIVE
OXYCODONE+OXYMORPHONE UR QL SCN: NEGATIVE
PCP UR QL: NEGATIVE
PLATELET # BLD AUTO: 189 THOUSANDS/UL (ref 149–390)
PMV BLD AUTO: 10.4 FL (ref 8.9–12.7)
POTASSIUM SERPL-SCNC: 3.8 MMOL/L (ref 3.5–5.3)
PROT SERPL-MCNC: 6.6 G/DL (ref 6.4–8.4)
RBC # BLD AUTO: 4.82 MILLION/UL (ref 3.88–5.62)
SARS-COV-2 RNA RESP QL NAA+PROBE: NEGATIVE
SODIUM SERPL-SCNC: 142 MMOL/L (ref 135–147)
THC UR QL: NEGATIVE
TSH SERPL DL<=0.05 MIU/L-ACNC: 1.19 UIU/ML (ref 0.45–4.5)
WBC # BLD AUTO: 5.49 THOUSAND/UL (ref 4.31–10.16)

## 2022-08-27 PROCEDURE — 99285 EMERGENCY DEPT VISIT HI MDM: CPT | Performed by: INTERNAL MEDICINE

## 2022-08-27 PROCEDURE — 85025 COMPLETE CBC W/AUTO DIFF WBC: CPT | Performed by: INTERNAL MEDICINE

## 2022-08-27 PROCEDURE — 80053 COMPREHEN METABOLIC PANEL: CPT | Performed by: INTERNAL MEDICINE

## 2022-08-27 PROCEDURE — 87635 SARS-COV-2 COVID-19 AMP PRB: CPT | Performed by: INTERNAL MEDICINE

## 2022-08-27 PROCEDURE — 80307 DRUG TEST PRSMV CHEM ANLYZR: CPT | Performed by: INTERNAL MEDICINE

## 2022-08-27 PROCEDURE — 36415 COLL VENOUS BLD VENIPUNCTURE: CPT | Performed by: INTERNAL MEDICINE

## 2022-08-27 PROCEDURE — 82075 ASSAY OF BREATH ETHANOL: CPT | Performed by: INTERNAL MEDICINE

## 2022-08-27 PROCEDURE — 84443 ASSAY THYROID STIM HORMONE: CPT | Performed by: INTERNAL MEDICINE

## 2022-08-27 PROCEDURE — 99285 EMERGENCY DEPT VISIT HI MDM: CPT

## 2022-08-27 RX ORDER — TAMSULOSIN HYDROCHLORIDE 0.4 MG/1
0.4 CAPSULE ORAL ONCE
Status: COMPLETED | OUTPATIENT
Start: 2022-08-27 | End: 2022-08-27

## 2022-08-27 RX ORDER — MIRTAZAPINE 15 MG/1
15 TABLET, FILM COATED ORAL
Status: DISCONTINUED | OUTPATIENT
Start: 2022-08-27 | End: 2022-08-30 | Stop reason: HOSPADM

## 2022-08-27 RX ORDER — LANOLIN ALCOHOL/MO/W.PET/CERES
6 CREAM (GRAM) TOPICAL
Status: DISCONTINUED | OUTPATIENT
Start: 2022-08-27 | End: 2022-08-30 | Stop reason: HOSPADM

## 2022-08-27 RX ORDER — BUSPIRONE HYDROCHLORIDE 5 MG/1
15 TABLET ORAL 2 TIMES DAILY
Status: DISCONTINUED | OUTPATIENT
Start: 2022-08-27 | End: 2022-08-30 | Stop reason: HOSPADM

## 2022-08-27 RX ADMIN — TAMSULOSIN HYDROCHLORIDE 0.4 MG: 0.4 CAPSULE ORAL at 21:12

## 2022-08-27 RX ADMIN — BUSPIRONE HYDROCHLORIDE 15 MG: 5 TABLET ORAL at 21:14

## 2022-08-27 RX ADMIN — MIRTAZAPINE 15 MG: 15 TABLET, FILM COATED ORAL at 21:12

## 2022-08-27 RX ADMIN — Medication 6 MG: at 21:12

## 2022-08-27 NOTE — ED NOTES
Crisis prepared 201 and obtained signatures  Crisis informed pt about 201 rights and inpatient hospitalization process  Pt prefers  facilities over

## 2022-08-27 NOTE — LETTER
[address] [facility] [address]  Dept: [phone]      EMTALA TRANSFER CONSENT    NAME Jose David MAGALLANES 1965                              MRN [id number]    I have been informed of my rights regarding examination, treatment, and transfer   by Dr Devang Miller , *    Benefits: Specialized equipment and/or services available at the receiving facility (Include comment)________________________    Risks: Potential for delay in receiving treatment      Transfer Request   I acknowledge that my medical condition has been evaluated and explained to me by the emergency department physician or other qualified medical person and/or my attending physician who has recommended and offered to me further medical examination and treatment  I understand the Hospital's obligation with respect to the treatment and stabilization of my emergency medical condition  I nevertheless request to be transferred  I release the Hospital, the doctor, and any other persons caring for me from all responsibility or liability for any injury or ill effects that may result from my transfer and agree to accept all responsibility for the consequences of my choice to transfer, rather than receive stabilizing treatment at the Hospital  I understand that because the transfer is my request, my insurance may not provide reimbursement for the services  The Hospital will assist and direct me and my family in how to make arrangements for transfer, but the hospital is not liable for any fees charged by the transport service  In spite of this understanding, I refuse to consent to further medical examination and treatment which has been offered to me, and request transfer to [address] : [address] [facility]   I authorize the performance of emergency medical procedures and treatments upon me in both transit and upon arrival at the receiving facility  Additionally, I authorize the release of any and all medical records to the receiving facility and request they be transported with me, if possible  I authorize the performance of emergency medical procedures and treatments upon me in both transit and upon arrival at the receiving facility  Additionally, I authorize the release of any and all medical records to the receiving facility and request they be transported with me, if possible  I understand that the safest mode of transportation during a medical emergency is an ambulance and that the Hospital advocates the use of this mode of transport  Risks of traveling to the receiving facility by car, including absence of medical control, life sustaining equipment, such as oxygen, and medical personnel has been explained to me and I fully understand them  (FRANCISCA CORRECT BOX BELOW)  [  ]  I consent to the stated transfer and to be transported by ambulance/helicopter  [  ]  I consent to the stated transfer, but refuse transportation by ambulance and accept full responsibility for my transportation by car  I understand the risks of non-ambulance transfers and I exonerate the Hospital and its staff from any deterioration in my condition that results from this refusal     X___________________________________________    DATE  22  TIME________  Signature of patient or legally responsible individual signing on patient behalf           RELATIONSHIP TO PATIENT_________________________          Provider Certification    NAME Kaylynn Mc                                         1965                              MRN [id number]    A medical screening exam was performed on the above named patient  Based on the examination:    Condition Necessitating Transfer The primary encounter diagnosis was Encounter for psychological evaluation   Diagnoses of Schizoaffective disorder, depressive type ([address] ), BPH (benign prostatic hyperplasia), Essential hypertension, DJD (degenerative joint disease), and Gastroesophageal reflux disease without esophagitis were also pertinent to this visit  Patient Condition: The patient has been stabilized such that within reasonable medical probability, no material deterioration of the patient condition or the condition of the unborn child(gutierrez) is likely to result from the transfer    Reason for Transfer: Level of Care needed not available at this facility    Transfer Requirements: [address]   · Space available and qualified personnel available for treatment as acknowledged by Ernie Metcalf MA  · Agreed to accept transfer and to provide appropriate medical treatment as acknowledged by       Dr Eller Krabbe  · Appropriate medical records of the examination and treatment of the patient are provided at the time of transfer   [address] _______  · Transfer will be performed by qualified personnel from [facility]  and appropriate transfer equipment as required, including the use of necessary and appropriate life support measures      Provider Certification: I have examined the patient and explained the following risks and benefits of being transferred/refusing transfer to the patient/family:  General risk, such as traffic hazards, adverse weather conditions, rough terrain or turbulence, possible failure of equipment (including vehicle or aircraft), or consequences of actions of persons outside the control of the transport personnel      Based on these reasonable risks and benefits to the patient and/or the unborn child(gutierrez), and based upon the information available at the time of the patients examination, I certify that the medical benefits reasonably to be expected from the provision of appropriate medical treatments at another medical facility outweigh the increasing risks, if any, to the individuals medical condition, and in the case of labor to the unborn child, from effecting the transfer      X____________________________________________ DATE 08/30/22        TIME_______      ORIGINAL - SEND TO MEDICAL RECORDS   COPY - SEND WITH PATIENT DURING TRANSFER
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
## 2022-08-27 NOTE — ED NOTES
Crisis spoke to pt who appeared calm and cooperative  Pt reports felling suicidal for the past few day  Pt denies any triggers  Pt currently not working, on disability  Pt states he had intend on hurting himself and/or killing himself by taking 2 Seroquels  Pt reports past suicidal attempts  Pt states "you now I want to end it  There is no particular reason  You know"  Pt currently living with his girlfriend who he points as a support  Pt denies HI  Pt denies self harming  Pt denies hallucinations  Pt reports feeling depressed  Pt not compliant with his medication  Not taking his medication as prescribed  Pt reports no trauma  Pt reports past inpatient hospitalizations  Crisis discussed treatment options  Pt unable to contract for safety  Pt willing to sign 201

## 2022-08-27 NOTE — ED NOTES
Contact girlfriend Dylan Bejarano for any changes/new information     Dilcia Saenz [place]  08/27/22 6622

## 2022-08-28 PROCEDURE — NC001 PR NO CHARGE: Performed by: EMERGENCY MEDICINE

## 2022-08-28 RX ADMIN — BUSPIRONE HYDROCHLORIDE 15 MG: 5 TABLET ORAL at 22:42

## 2022-08-28 RX ADMIN — Medication 6 MG: at 22:42

## 2022-08-28 RX ADMIN — MIRTAZAPINE 15 MG: 15 TABLET, FILM COATED ORAL at 22:42

## 2022-08-28 RX ADMIN — BUSPIRONE HYDROCHLORIDE 15 MG: 5 TABLET ORAL at 08:43

## 2022-08-28 NOTE — ED NOTES
[facility] Suicide Risk Assessment deferred, as unable to assess while patient sleeping  Behavioral Health Assessment deferred as patient is sleeping and would benefit from additional rest   Vital signs deferred until patient awake, no signs or symptoms of respiratory distress at this time  Once patient is awake and able to participate, will complete assessments       Darren [facility]  08/28/22 1990

## 2022-08-28 NOTE — ED NOTES
Assuming care of patient at this time  Patient placed on virtual continual observation with order placed  Patient denies SI or HI  Patient pleasant, calm, and cooperative  Items apposing threat to patient removed from room  Patient changed into scrubs by previous shift  Patient screening low on behavioral risk assessment       Saranya Vigil, [place]  08/28/22 7592

## 2022-08-28 NOTE — ED NOTES
[facility] Suicide Risk Assessment deferred, as unable to assess while patient sleeping  Behavioral Health Assessment deferred as patient is sleeping and would benefit from additional rest   Vital signs deferred until patient awake, no signs or symptoms of respiratory distress at this time  Once patient is awake and able to participate, will complete assessments      Remains on 1:1 with Kamar Hollis as patient observer     Luzmaria Rocha RN  08/28/22 5282

## 2022-08-28 NOTE — ED NOTES
Pt continually asking staff for food, after he had eaten dinner  Pt has been given snacks throughout day  This nurse called food services and another dinner was made for pt  Pt now resting happily       Juan Ramon Soto  08/28/22 9857

## 2022-08-28 NOTE — ED NOTES
[facility] deferred, as unable to assess while patient sleeping  Behavioral Health Assessment deferred as patient is sleeping and would benefit from additional rest   Vital signs deferred until patient awake, no signs or symptoms of respiratory distress at this time  Once patient is awake and able to participate, will complete assessments       Lakeisha Casillas  08/28/22 4218

## 2022-08-28 NOTE — ED NOTES
8/28/2022 @ 1435:    Provided patient with an update as there are no in network beds available today  The patient verbalized understanding and still wants to remain in network  This writer asked the patient if he needed anything and the patient requested "can I have extra food and a soda please?"  This writer asked nursing staff if this was OK and provided the patient with requested items

## 2022-08-28 NOTE — ED NOTES
Patient given meal mayracarol     Niya [facility], [address] [facility]  08/28/22 4617

## 2022-08-28 NOTE — ED NOTES
[facility] Suicide Risk Assessment deferred, as unable to assess while patient sleeping  Behavioral Health Assessment deferred as patient is sleeping and would benefit from additional rest   Vital signs deferred until patient awake, no signs or symptoms of respiratory distress at this time  Once patient is awake and able to participate, will complete assessments  Remains on 1:1 observation by Kaylah Foster   Patient observer     Ariel Mayorga RN  08/27/22 1665

## 2022-08-28 NOTE — ED NOTES
8/28/2022 @ 1400:    Gio Dupont from [address] [place] [address] inquiring on note of medical clearance for patient for referral and transfer to Reji Dunbar MD notified

## 2022-08-29 PROCEDURE — 99242 OFF/OP CONSLTJ NEW/EST SF 20: CPT | Performed by: PSYCHIATRY & NEUROLOGY

## 2022-08-29 RX ADMIN — BUSPIRONE HYDROCHLORIDE 15 MG: 5 TABLET ORAL at 22:05

## 2022-08-29 RX ADMIN — MIRTAZAPINE 15 MG: 15 TABLET, FILM COATED ORAL at 22:05

## 2022-08-29 RX ADMIN — Medication 6 MG: at 22:05

## 2022-08-29 RX ADMIN — BUSPIRONE HYDROCHLORIDE 15 MG: 5 TABLET ORAL at 08:14

## 2022-08-29 NOTE — ED NOTES
Patient appears to be sleeping at this time  Will ask patient if he is ready to shower when awake       Taylor Kruse RN  08/29/22 1014

## 2022-08-29 NOTE — ED NOTES
Patient questioning as to why he has not been accepted into a psych hospital  Patient made aware that he only wanted to be sent to inpatient hospital  Patient stating at this time he would be willing to go out of network, depending on place  Crisis made aware       Aric Fierro RN  08/29/22 8346

## 2022-08-29 NOTE — ED NOTES
[facility] Suicide Risk Assessment deferred, as unable to assess while patient sleeping  Behavioral Health Assessment deferred as patient is sleeping and would benefit from additional rest   Vital signs deferred until patient awake, no signs or symptoms of respiratory distress at this time      Once patient is awake and able to participate, will complete assessments       Niya Jimenez, [place]  08/29/22 0381

## 2022-08-29 NOTE — ED NOTES
RN indicated that patient has been concerned about his LOS in the ED and expressed being open to other facilities if [facility] is unable to accommodate       Sweetie Pace LMSW  08/29/22  7797

## 2022-08-29 NOTE — ED NOTES
Patient calling sign other at this time, supervised by self  Per crisis no update for bed status  Patient asking if his significant other can come pick him up  This nurse informing him that he cannot leave  Patient cooperative and in agreement  Phone removed from room after phone call       Jason Randall RN  08/29/22 7538

## 2022-08-29 NOTE — ED NOTES
Patient remaining calm and cooperative         [facility]  08/29/22 0648

## 2022-08-29 NOTE — ED NOTES
Patient resting virtual sitter remains, all needs met at this time        Godfrey Millard, ALIYAH  08/29/22 5140

## 2022-08-29 NOTE — ED NOTES
[facility] deferred, as unable to assess while patient sleeping  Behavioral Health Assessment deferred as patient is sleeping and would benefit from additional rest   Vital signs deferred until patient awake, no signs or symptoms of respiratory distress at this time  Once patient is awake and able to participate, will complete assessments       Geno Avila  08/29/22 2074

## 2022-08-29 NOTE — ED NOTES
[facility] Suicide Risk Assessment deferred, as unable to assess while patient sleeping  Behavioral Health Assessment deferred as patient is sleeping and would benefit from additional rest   Vital signs deferred until patient awake, no signs or symptoms of respiratory distress at this time  Once patient is awake and able to participate, will complete assessments       Cory Ramirez  08/29/22 7629

## 2022-08-29 NOTE — ED NOTES
Patient resting virtual sitter remains, all needs met at this time        Marleny Dave RN  08/29/22 2159

## 2022-08-30 ENCOUNTER — HOSPITAL ENCOUNTER (INPATIENT)
Facility: HOSPITAL | Age: 57
LOS: 10 days | Discharge: HOME | DRG: 885 | End: 2022-09-09
Attending: PSYCHIATRY & NEUROLOGY | Admitting: PSYCHIATRY & NEUROLOGY
Payer: MEDICARE

## 2022-08-30 VITALS
RESPIRATION RATE: 19 BRPM | SYSTOLIC BLOOD PRESSURE: 104 MMHG | DIASTOLIC BLOOD PRESSURE: 63 MMHG | TEMPERATURE: 98 F | HEIGHT: 74 IN | BODY MASS INDEX: 32.73 KG/M2 | HEART RATE: 83 BPM | WEIGHT: 255 LBS | OXYGEN SATURATION: 94 %

## 2022-08-30 DIAGNOSIS — I10 ESSENTIAL HYPERTENSION: ICD-10-CM

## 2022-08-30 DIAGNOSIS — U07.1 COVID-19: ICD-10-CM

## 2022-08-30 DIAGNOSIS — K21.9 GASTROESOPHAGEAL REFLUX DISEASE WITHOUT ESOPHAGITIS: ICD-10-CM

## 2022-08-30 DIAGNOSIS — G43.909 MIGRAINE HEADACHE: ICD-10-CM

## 2022-08-30 DIAGNOSIS — K90.0 CELIAC DISEASE: ICD-10-CM

## 2022-08-30 DIAGNOSIS — N40.0 BPH (BENIGN PROSTATIC HYPERPLASIA): ICD-10-CM

## 2022-08-30 DIAGNOSIS — G47.00 INSOMNIA: ICD-10-CM

## 2022-08-30 DIAGNOSIS — F25.1 SCHIZOAFFECTIVE DISORDER, DEPRESSIVE TYPE (HCC): Primary | ICD-10-CM

## 2022-08-30 DIAGNOSIS — M19.90 DJD (DEGENERATIVE JOINT DISEASE): ICD-10-CM

## 2022-08-30 LAB
25(OH)D3 SERPL-MCNC: 27.2 NG/ML (ref 30–100)
FOLATE SERPL-MCNC: 7.5 NG/ML (ref 3.1–17.5)
SARS-COV-2 RNA RESP QL NAA+PROBE: NEGATIVE
VIT B12 SERPL-MCNC: 501 PG/ML (ref 100–900)

## 2022-08-30 PROCEDURE — 82306 VITAMIN D 25 HYDROXY: CPT | Performed by: FAMILY MEDICINE

## 2022-08-30 PROCEDURE — 82607 VITAMIN B-12: CPT | Performed by: FAMILY MEDICINE

## 2022-08-30 PROCEDURE — 87635 SARS-COV-2 COVID-19 AMP PRB: CPT | Performed by: EMERGENCY MEDICINE

## 2022-08-30 PROCEDURE — 82746 ASSAY OF FOLIC ACID SERUM: CPT | Performed by: FAMILY MEDICINE

## 2022-08-30 RX ORDER — ACETAMINOPHEN 325 MG/1
650 TABLET ORAL EVERY 4 HOURS PRN
Status: CANCELLED | OUTPATIENT
Start: 2022-08-30

## 2022-08-30 RX ORDER — ACETAMINOPHEN 325 MG/1
650 TABLET ORAL EVERY 4 HOURS PRN
Status: DISCONTINUED | OUTPATIENT
Start: 2022-08-30 | End: 2022-09-09 | Stop reason: HOSPADM

## 2022-08-30 RX ORDER — TRAZODONE HYDROCHLORIDE 50 MG/1
50 TABLET ORAL
Status: DISCONTINUED | OUTPATIENT
Start: 2022-08-30 | End: 2022-09-09 | Stop reason: HOSPADM

## 2022-08-30 RX ORDER — LANOLIN ALCOHOL/MO/W.PET/CERES
6 CREAM (GRAM) TOPICAL
Status: CANCELLED | OUTPATIENT
Start: 2022-08-30

## 2022-08-30 RX ORDER — OLANZAPINE 10 MG/1
5 INJECTION, POWDER, LYOPHILIZED, FOR SOLUTION INTRAMUSCULAR
Status: DISCONTINUED | OUTPATIENT
Start: 2022-08-30 | End: 2022-09-09 | Stop reason: HOSPADM

## 2022-08-30 RX ORDER — LORAZEPAM 1 MG/1
1 TABLET ORAL
Status: DISCONTINUED | OUTPATIENT
Start: 2022-08-30 | End: 2022-09-09 | Stop reason: HOSPADM

## 2022-08-30 RX ORDER — ACETAMINOPHEN 325 MG/1
975 TABLET ORAL EVERY 6 HOURS PRN
Status: CANCELLED | OUTPATIENT
Start: 2022-08-30

## 2022-08-30 RX ORDER — ACETAMINOPHEN 325 MG/1
975 TABLET ORAL EVERY 6 HOURS PRN
Status: DISCONTINUED | OUTPATIENT
Start: 2022-08-30 | End: 2022-09-09 | Stop reason: HOSPADM

## 2022-08-30 RX ORDER — LORAZEPAM 1 MG/1
1 TABLET ORAL
Status: CANCELLED | OUTPATIENT
Start: 2022-08-30

## 2022-08-30 RX ORDER — BUSPIRONE HYDROCHLORIDE 15 MG/1
15 TABLET ORAL 2 TIMES DAILY
Status: DISCONTINUED | OUTPATIENT
Start: 2022-08-30 | End: 2022-09-09 | Stop reason: HOSPADM

## 2022-08-30 RX ORDER — LORAZEPAM 2 MG/ML
1 INJECTION INTRAMUSCULAR
Status: DISCONTINUED | OUTPATIENT
Start: 2022-08-30 | End: 2022-09-09 | Stop reason: HOSPADM

## 2022-08-30 RX ORDER — OLANZAPINE 10 MG/1
5 INJECTION, POWDER, LYOPHILIZED, FOR SOLUTION INTRAMUSCULAR
Status: CANCELLED | OUTPATIENT
Start: 2022-08-30

## 2022-08-30 RX ORDER — QUETIAPINE FUMARATE 50 MG/1
50 TABLET, FILM COATED ORAL
Status: DISCONTINUED | OUTPATIENT
Start: 2022-08-30 | End: 2022-09-09 | Stop reason: HOSPADM

## 2022-08-30 RX ORDER — LORAZEPAM 0.5 MG/1
0.5 TABLET ORAL
Status: CANCELLED | OUTPATIENT
Start: 2022-08-30

## 2022-08-30 RX ORDER — QUETIAPINE FUMARATE 100 MG/1
100 TABLET, FILM COATED ORAL
Status: DISCONTINUED | OUTPATIENT
Start: 2022-08-30 | End: 2022-09-09 | Stop reason: HOSPADM

## 2022-08-30 RX ORDER — LORAZEPAM 2 MG/ML
1 INJECTION INTRAMUSCULAR
Status: CANCELLED | OUTPATIENT
Start: 2022-08-30

## 2022-08-30 RX ORDER — TAMSULOSIN HYDROCHLORIDE 0.4 MG/1
0.4 CAPSULE ORAL
Status: DISCONTINUED | OUTPATIENT
Start: 2022-08-30 | End: 2022-09-09 | Stop reason: HOSPADM

## 2022-08-30 RX ORDER — MIRTAZAPINE 15 MG/1
15 TABLET, FILM COATED ORAL
Status: DISCONTINUED | OUTPATIENT
Start: 2022-08-30 | End: 2022-09-09 | Stop reason: HOSPADM

## 2022-08-30 RX ORDER — LANOLIN ALCOHOL/MO/W.PET/CERES
6 CREAM (GRAM) TOPICAL
Status: DISCONTINUED | OUTPATIENT
Start: 2022-08-30 | End: 2022-09-09 | Stop reason: HOSPADM

## 2022-08-30 RX ORDER — QUETIAPINE FUMARATE 25 MG/1
25 TABLET, FILM COATED ORAL
Status: DISCONTINUED | OUTPATIENT
Start: 2022-08-30 | End: 2022-09-09 | Stop reason: HOSPADM

## 2022-08-30 RX ORDER — TRAZODONE HYDROCHLORIDE 50 MG/1
50 TABLET ORAL
Status: CANCELLED | OUTPATIENT
Start: 2022-08-30

## 2022-08-30 RX ORDER — QUETIAPINE FUMARATE 25 MG/1
50 TABLET, FILM COATED ORAL
Status: CANCELLED | OUTPATIENT
Start: 2022-08-30

## 2022-08-30 RX ORDER — LORAZEPAM 0.5 MG/1
0.5 TABLET ORAL
Status: DISCONTINUED | OUTPATIENT
Start: 2022-08-30 | End: 2022-09-09 | Stop reason: HOSPADM

## 2022-08-30 RX ORDER — QUETIAPINE FUMARATE 25 MG/1
25 TABLET, FILM COATED ORAL
Status: CANCELLED | OUTPATIENT
Start: 2022-08-30

## 2022-08-30 RX ORDER — MIRTAZAPINE 15 MG/1
15 TABLET, FILM COATED ORAL
Status: CANCELLED | OUTPATIENT
Start: 2022-08-30

## 2022-08-30 RX ORDER — HYDROXYZINE HYDROCHLORIDE 25 MG/1
25 TABLET, FILM COATED ORAL
Status: CANCELLED | OUTPATIENT
Start: 2022-08-30

## 2022-08-30 RX ORDER — BUSPIRONE HYDROCHLORIDE 5 MG/1
15 TABLET ORAL 2 TIMES DAILY
Status: CANCELLED | OUTPATIENT
Start: 2022-08-30

## 2022-08-30 RX ORDER — HYDROXYZINE HYDROCHLORIDE 25 MG/1
25 TABLET, FILM COATED ORAL
Status: DISCONTINUED | OUTPATIENT
Start: 2022-08-30 | End: 2022-09-09 | Stop reason: HOSPADM

## 2022-08-30 RX ORDER — QUETIAPINE FUMARATE 100 MG/1
100 TABLET, FILM COATED ORAL
Status: CANCELLED | OUTPATIENT
Start: 2022-08-30

## 2022-08-30 RX ADMIN — BUSPIRONE HYDROCHLORIDE 15 MG: 15 TABLET ORAL at 17:13

## 2022-08-30 RX ADMIN — MIRTAZAPINE 15 MG: 15 TABLET, FILM COATED ORAL at 20:40

## 2022-08-30 RX ADMIN — BUSPIRONE HYDROCHLORIDE 15 MG: 5 TABLET ORAL at 11:15

## 2022-08-30 RX ADMIN — TAMSULOSIN HYDROCHLORIDE 0.4 MG: 0.4 CAPSULE ORAL at 17:13

## 2022-08-30 RX ADMIN — Medication 6 MG: at 20:39

## 2022-08-30 NOTE — ED NOTES
Psychiatric consult completed:  "I have completed The psychiatry consult on [facility]  After discussing options of inpatient versus outpatient, the patient still feels he may be unsafe at home and prefers to continue to seek voluntary inpatient psychiatric treatment  In the meantime recommend increasing Remeron to 30 mg p o  q h s  to start tonight  The patient is in agreement this plan  In the meantime if the patient improves while awaiting hospitalization then he can be reassessed for possible discharge to outpatient treatment  Continue suicide precautions for now  Re-consult Psychiatry as needed  let me know is any question"

## 2022-08-30 NOTE — ED CARE HANDOFF
Emergency Department Sign Out Note        Sign out and transfer of care from Dr Belinda Arrington  See Separate Emergency Department note  The patient, Tresia Brittle, was evaluated by the previous provider for SI/201  Workup Completed:  Cbc, cmp, uds, etoh    ED Course / Workup Pending (followup):                                      ED Course as of 08/30/22 0643   Tue Aug 30, 2022   0053 Received in sign out:     Pt signed 201 for SI  Took 2 Zquil to harm himself      Medically cleared:   CBC, CMP  uds, covid, ETOH done    No issues during last shift    Bed search in progress     Procedures  MDM        Disposition  Final diagnoses:   Encounter for psychological evaluation   Schizoaffective disorder, depressive type ([facility] [address] )     Time reflects when diagnosis was documented in both MDM as applicable and the Disposition within this note     Time User Action Codes Description Comment    8/29/2022  4:26 PM Billy Sesay Add [Z00 8] Encounter for psychological evaluation     8/29/2022  8:04 PM Billy Sesay Add [F25 1] Schizoaffective disorder, depressive type [facility])       ED Disposition     ED Disposition   Transfer to [address] [place] [address]   --    Date/Time   Mon Aug 29, 2022  6:53 AM    Comment   Jamil Choe should be transferred out to bed search and has been medically cleared  MD Documentation    Flowsheet Row Most Recent Value   Sending MD Bethena Lesches      Follow-up Information    None       Patient's Medications   Discharge Prescriptions    No medications on file     No discharge procedures on file         ED Provider  Electronically Signed by     Shoshana Desai MD  08/30/22 1971

## 2022-08-30 NOTE — ED NOTES
Bed search:    [facility]- per admission/no beds available    [facility]- per admission/no beds available    [facility]- per admission/no beds available    [facility]- per admission/no beds available    [facility]- per admission/no beds available    [facility]- per admission/ no beds available, possible d/c tomorrow    [facility]- per admission/no beds available    [facility]- per admission/no beds available    [facility]- per admission/no beds available    [place] - per admission/no beds available

## 2022-08-30 NOTE — ED NOTES
Insurance Authorization for admission:   Phone call placed to [facility]  Phone number: [phone]  Spoke to [address] [place] [address]     3 days approved  Level of care: AIP  Review on **  Authorization # **

## 2022-08-30 NOTE — ED NOTES
Sumit Suicide Risk Assessment deferred, as unable to assess while patient sleeping  Behavioral Health Assessment deferred as patient is sleeping and would benefit from additional rest   Vital signs deferred until patient awake, no signs or symptoms of respiratory distress at this time  Once patient is awake and able to participate, will complete assessments         Susan Wetzel RN  08/30/22 1973

## 2022-08-30 NOTE — NURSING NOTE
Patient admitted from [address] [place] [address] ED on a 201 status  It was reported patient presented with suicidal thoughts and reported taken 2 Seroquel with the intent of killing himself  Patient reported he had increased depression and suicidal thoughts for the past month  Patient reported his support system is his girlfriend and he sees an outpatient psychiatrist  During admission patient appeared restless and anxious  He rated his anxiety 5/10 and depression 2/10  Denies any SI thoughts at the moment  C-SSRS was high risk, DR Yael Vu made aware and according to the assessment, Dr Frida Morrow did not feel patient needed to be place on a 1:1  Patient currently on Q 7 mins checks for safety  Patient stated he will come to staff if he felt suicidal  Patient cooperative and pleasant during admission  No other behaviors at this time  Patient signed admission papers and was oriented to the unit  Patient reported hx of suicidal attempts in 2018 jumping in front of a truck and taking medications on 2021  No other concerns or problems reported  Q 7 mins checks in place for safety  Will continue to monitor for behaviors and changes

## 2022-08-30 NOTE — PROGRESS NOTES
Pt admitted with list of belongings:    Remains with patient:    Blue and white striped shirt  Blue shorts  Brown sandals    Locked in contraband behind RN station:  1 Cigar    Pt signed and agreed to belongings on list

## 2022-08-30 NOTE — PLAN OF CARE
Problem: Ineffective Coping  Goal: Cooperates with admission process  Description: Interventions:   - Complete admission process  Outcome: Progressing  Goal: Identifies ineffective coping skills  Outcome: Progressing  Goal: Identifies healthy coping skills  Outcome: Progressing  Goal: Demonstrates healthy coping skills  Outcome: Progressing  Goal: Participates in unit activities  Description: Interventions:  - Provide therapeutic environment   - Provide required programming   - Redirect inappropriate behaviors   Outcome: Progressing  Goal: Patient/Family participate in treatment and DC plans  Description: Interventions:  - Provide therapeutic environment  Outcome: Progressing  Goal: Patient/Family verbalizes awareness of resources  Outcome: Progressing  Goal: Understands least restrictive measures  Description: Interventions:  - Utilize least restrictive behavior  Outcome: Progressing  Goal: Free from restraint events  Description: - Utilize least restrictive measures   - Provide behavioral interventions   - Redirect inappropriate behaviors   Outcome: Progressing     Problem: Risk for Self Injury/Neglect  Goal: Treatment Goal: Remain safe during length of stay, learn and adopt new coping skills, and be free of self-injurious ideation, impulses and acts at the time of discharge  Outcome: Progressing  Goal: Verbalize thoughts and feelings  Description: Interventions:  - Assess and re-assess patient's lethality and potential for self-injury  - Engage patient in 1:1 interactions, daily, for a minimum of 15 minutes  - Encourage patient to express feelings, fears, frustrations, hopes  - Establish rapport/trust with patient   Outcome: Progressing  Goal: Refrain from harming self  Description: Interventions:  - Monitor patient closely, per order  - Develop a trusting relationship  - Supervise medication ingestion, monitor effects and side effects   Outcome: Progressing  Goal: Attend and participate in unit activities, including therapeutic, recreational, and educational groups  Description: Interventions:  - Provide therapeutic and educational activities daily, encourage attendance and participation, and document same in the medical record  - Obtain collateral information, encourage visitation and family involvement in care   Outcome: Progressing  Goal: Recognize maladaptive responses and adopt new coping mechanisms  Outcome: Progressing  Goal: Complete daily ADLs, including personal hygiene independently, as able  Description: Interventions:  - Observe, teach, and assist patient with ADLS  - Monitor and promote a balance of rest/activity, with adequate nutrition and elimination  Outcome: Progressing     Problem: Depression  Goal: Treatment Goal: Demonstrate behavioral control of depressive symptoms, verbalize feelings of improved mood/affect, and adopt new coping skills prior to discharge  Outcome: Progressing  Goal: Verbalize thoughts and feelings  Description: Interventions:  - Assess and re-assess patient's level of risk   - Engage patient in 1:1 interactions, daily, for a minimum of 15 minutes   - Encourage patient to express feelings, fears, frustrations, hopes   Outcome: Progressing  Goal: Refrain from harming self  Description: Interventions:  - Monitor patient closely, per order   - Supervise medication ingestion, monitor effects and side effects   Outcome: Progressing  Goal: Refrain from isolation  Description: Interventions:  - Develop a trusting relationship   - Encourage socialization   Outcome: Progressing  Goal: Refrain from self-neglect  Outcome: Progressing  Goal: Attend and participate in unit activities, including therapeutic, recreational, and educational groups  Description: Interventions:  - Provide therapeutic and educational activities daily, encourage attendance and participation, and document same in the medical record   Outcome: Progressing  Goal: Complete daily ADLs, including personal hygiene independently, as able  Description: Interventions:  - Observe, teach, and assist patient with ADLS  -  Monitor and promote a balance of rest/activity, with adequate nutrition and elimination   Outcome: Progressing     Problem: Anxiety  Goal: Anxiety is at manageable level  Description: Interventions:  - Assess and monitor patient's anxiety level  - Monitor for signs and symptoms (heart palpitations, chest pain, shortness of breath, headaches, nausea, feeling jumpy, restlessness, irritable, apprehensive)  - Collaborate with interdisciplinary team and initiate plan and interventions as ordered    - [place] patient to unit/surroundings  - Explain treatment plan  - Encourage participation in care  - Encourage verbalization of concerns/fears  - Identify coping mechanisms  - Assist in developing anxiety-reducing skills  - Administer/offer alternative therapies  - Limit or eliminate stimulants  Outcome: Progressing

## 2022-08-30 NOTE — ED NOTES
Sumit Suicide Risk Assessment deferred, as unable to assess while patient sleeping  Behavioral Health Assessment deferred as patient is sleeping and would benefit from additional rest   Vital signs deferred until patient awake, no signs or symptoms of respiratory distress at this time  Once patient is awake and able to participate, will complete assessments       Robert Joshi  08/29/22 2037

## 2022-08-30 NOTE — ED NOTES
Patient is accepted at [facility]  Patient is accepted by   [employer]  per [facility] is TBD        Nurse report is to be called to [phone] prior to patient transfer
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
## 2022-08-30 NOTE — ED NOTES
Sumit Suicide Risk Assessment deferred, as unable to assess while patient sleeping  Behavioral Health Assessment deferred as patient is sleeping and would benefit from additional rest   Vital signs deferred until patient awake, no signs or symptoms of respiratory distress at this time  Once patient is awake and able to participate, will complete assessments         Bree Garza RN  08/30/22 5079

## 2022-08-31 PROCEDURE — 99221 1ST HOSP IP/OBS SF/LOW 40: CPT | Performed by: PSYCHIATRY & NEUROLOGY

## 2022-08-31 RX ORDER — MELATONIN
1000 DAILY
Status: DISCONTINUED | OUTPATIENT
Start: 2022-09-01 | End: 2022-09-09 | Stop reason: HOSPADM

## 2022-08-31 RX ORDER — ASCORBIC ACID 500 MG
500 TABLET ORAL DAILY
Status: DISCONTINUED | OUTPATIENT
Start: 2022-09-01 | End: 2022-09-09 | Stop reason: HOSPADM

## 2022-08-31 RX ORDER — MAGNESIUM HYDROXIDE/ALUMINUM HYDROXICE/SIMETHICONE 120; 1200; 1200 MG/30ML; MG/30ML; MG/30ML
30 SUSPENSION ORAL EVERY 4 HOURS PRN
Status: DISCONTINUED | OUTPATIENT
Start: 2022-08-31 | End: 2022-09-09 | Stop reason: HOSPADM

## 2022-08-31 RX ORDER — POLYETHYLENE GLYCOL 3350 17 G/17G
17 POWDER, FOR SOLUTION ORAL DAILY PRN
Status: DISCONTINUED | OUTPATIENT
Start: 2022-08-31 | End: 2022-09-09 | Stop reason: HOSPADM

## 2022-08-31 RX ADMIN — BUSPIRONE HYDROCHLORIDE 15 MG: 15 TABLET ORAL at 08:51

## 2022-08-31 RX ADMIN — TAMSULOSIN HYDROCHLORIDE 0.4 MG: 0.4 CAPSULE ORAL at 16:59

## 2022-08-31 RX ADMIN — BUSPIRONE HYDROCHLORIDE 15 MG: 15 TABLET ORAL at 17:00

## 2022-08-31 RX ADMIN — MIRTAZAPINE 15 MG: 15 TABLET, FILM COATED ORAL at 21:27

## 2022-08-31 RX ADMIN — Medication 6 MG: at 21:27

## 2022-08-31 RX ADMIN — ACETAMINOPHEN 650 MG: 325 TABLET ORAL at 08:51

## 2022-08-31 NOTE — PROGRESS NOTES
08/31/22 0730   Activity/Group Checklist   Group Community meeting  (Patient check in with coffee/ patients eating and drink in room for covid precautions)   Attendance Attended   Attendance Duration (min) 31-45   Interactions Interacted appropriately   Affect/Mood Blunted/flat; Appropriate   Goals Achieved Identified feelings; Able to engage in interactions; Able to listen to others; Able to recieve feedback

## 2022-08-31 NOTE — NURSING NOTE
No acute behaviors overnight  No complaints voiced  No respiratory distress or change in medical condition  Observed sleeping during shift  Maintained on q 7 minute safety checks  Will continue to monitor

## 2022-08-31 NOTE — PROGRESS NOTES
08/31/22   Team Meeting   Meeting Type Daily Rounds   Team Members Present   Team Members Present Physician;Nurse;;; Occupational Therapist   Physician Team Member Dr Leia Joseph MD   Nursing Team Member Yuki Dawkins, ALIYAH   Care Management Team Member Alejandro Kennedy , [facility], [facility]   Social Work Team Member [facility]   OT Team Member [place], [place]   Patient/Family Present   Patient Present No   Patient's Family Present No     NEW - 201; SA via OD - took 2 seroquel; hx AIP; on unit: anx 4, dep 3; denies all other symptoms; pleasant, cooperative, social,flat; slept

## 2022-08-31 NOTE — PLAN OF CARE
Problem: Ineffective Coping  Goal: Participates in unit activities  Description: Interventions:  - Provide therapeutic environment   - Provide required programming   - Redirect inappropriate behaviors   Outcome: Not Progressing   Patient is more isolative to his room today; he is appropriate and cooperative but minimal in his interactions

## 2022-08-31 NOTE — PROGRESS NOTES
08/31/22 1045   Activity/Group Checklist   Group Personal control   Attendance Did not attend  (pt offered grp; pt remained in his room)

## 2022-08-31 NOTE — NURSING NOTE
Pt was interactive with staff and peers  Pt denies any SI/HI, pain and anxiety at this time  Pt was medication compliant and is able to make needs known  Pt was in day room watching tv with peers at first then isolated to room  Pt was not in acute distress and will continue to monitor   Safety checks were completed and maintained throughout night

## 2022-08-31 NOTE — PLAN OF CARE
Problem: Ineffective Coping  Goal: Cooperates with admission process  Description: Interventions:   - Complete admission process  Outcome: Progressing  Goal: Identifies ineffective coping skills  Outcome: Progressing  Goal: Identifies healthy coping skills  Outcome: Progressing  Goal: Demonstrates healthy coping skills  Outcome: Progressing  Goal: Participates in unit activities  Description: Interventions:  - Provide therapeutic environment   - Provide required programming   - Redirect inappropriate behaviors   Outcome: Progressing  Goal: Patient/Family participate in treatment and DC plans  Description: Interventions:  - Provide therapeutic environment  Outcome: Progressing  Goal: Patient/Family verbalizes awareness of resources  Outcome: Progressing  Goal: Understands least restrictive measures  Description: Interventions:  - Utilize least restrictive behavior  Outcome: Progressing  Goal: Free from restraint events  Description: - Utilize least restrictive measures   - Provide behavioral interventions   - Redirect inappropriate behaviors   Outcome: Progressing     Problem: Risk for Self Injury/Neglect  Goal: Treatment Goal: Remain safe during length of stay, learn and adopt new coping skills, and be free of self-injurious ideation, impulses and acts at the time of discharge  Outcome: Progressing  Goal: Verbalize thoughts and feelings  Description: Interventions:  - Assess and re-assess patient's lethality and potential for self-injury  - Engage patient in 1:1 interactions, daily, for a minimum of 15 minutes  - Encourage patient to express feelings, fears, frustrations, hopes  - Establish rapport/trust with patient   Outcome: Progressing  Goal: Refrain from harming self  Description: Interventions:  - Monitor patient closely, per order  - Develop a trusting relationship  - Supervise medication ingestion, monitor effects and side effects   Outcome: Progressing  Goal: Attend and participate in unit activities, including therapeutic, recreational, and educational groups  Description: Interventions:  - Provide therapeutic and educational activities daily, encourage attendance and participation, and document same in the medical record  - Obtain collateral information, encourage visitation and family involvement in care   Outcome: Progressing  Goal: Recognize maladaptive responses and adopt new coping mechanisms  Outcome: Progressing  Goal: Complete daily ADLs, including personal hygiene independently, as able  Description: Interventions:  - Observe, teach, and assist patient with ADLS  - Monitor and promote a balance of rest/activity, with adequate nutrition and elimination  Outcome: Progressing     Problem: Depression  Goal: Treatment Goal: Demonstrate behavioral control of depressive symptoms, verbalize feelings of improved mood/affect, and adopt new coping skills prior to discharge  Outcome: Progressing  Goal: Verbalize thoughts and feelings  Description: Interventions:  - Assess and re-assess patient's level of risk   - Engage patient in 1:1 interactions, daily, for a minimum of 15 minutes   - Encourage patient to express feelings, fears, frustrations, hopes   Outcome: Progressing  Goal: Refrain from harming self  Description: Interventions:  - Monitor patient closely, per order   - Supervise medication ingestion, monitor effects and side effects   Outcome: Progressing  Goal: Refrain from isolation  Description: Interventions:  - Develop a trusting relationship   - Encourage socialization   Outcome: Progressing  Goal: Refrain from self-neglect  Outcome: Progressing  Goal: Attend and participate in unit activities, including therapeutic, recreational, and educational groups  Description: Interventions:  - Provide therapeutic and educational activities daily, encourage attendance and participation, and document same in the medical record   Outcome: Progressing  Goal: Complete daily ADLs, including personal hygiene independently, as able  Description: Interventions:  - Observe, teach, and assist patient with ADLS  -  Monitor and promote a balance of rest/activity, with adequate nutrition and elimination   Outcome: Progressing     Problem: Anxiety  Goal: Anxiety is at manageable level  Description: Interventions:  - Assess and monitor patient's anxiety level  - Monitor for signs and symptoms (heart palpitations, chest pain, shortness of breath, headaches, nausea, feeling jumpy, restlessness, irritable, apprehensive)  - Collaborate with interdisciplinary team and initiate plan and interventions as ordered    - [place] patient to unit/surroundings  - Explain treatment plan  - Encourage participation in care  - Encourage verbalization of concerns/fears  - Identify coping mechanisms  - Assist in developing anxiety-reducing skills  - Administer/offer alternative therapies  - Limit or eliminate stimulants  Outcome: Progressing     Problem: Ineffective Coping  Goal: Participates in unit activities  Description: Interventions:  - Provide therapeutic environment   - Provide required programming   - Redirect inappropriate behaviors   Outcome: Progressing
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
## 2022-08-31 NOTE — H&P
Haninchicchristine,#  DQL:[birth year] Caterina Orellana  AKL:[id number]    GWQ:[id number]  Adm Date: 8/30/2022 1303  1:03 PM   ATT PHY: Mary Ann Jamar, [address]         Chief Complaint:  worsening depression with suicide ideations    History of Presenting Illness: Amos Mai is a(n) 62 y o  male who is admitted to [place] [address] on voluntary 201 commitment basis  Patient originally presented to [place] [address] ED on 08/27/2022 for worsening depression with suicidal ideations  Patient examined at bedside  Patient currently has no complaints      Allergies   Allergen Reactions    Gluten Meal - Food Allergy      Pt is diagnosed with celiac disease with the biopsy/pathology and the tissue transglutaminase antibody by the GI     Penicillins Diarrhea and Vomiting    Celecoxib Rash       Current Facility-Administered Medications on File Prior to Encounter   Medication Dose Route Frequency Provider Last Rate Last Admin    [DISCONTINUED] busPIRone (BUSPAR) tablet 15 mg  15 mg Oral BID Van Quevedo MD   15 mg at 08/30/22 1115    [DISCONTINUED] melatonin tablet 6 mg  6 mg Oral HS Vna Quevedo MD   6 mg at 08/29/22 2205    [DISCONTINUED] mirtazapine (REMERON) tablet 15 mg  15 mg Oral HS Van Quevedo MD   15 mg at 08/29/22 2205     Current Outpatient Medications on File Prior to Encounter   Medication Sig Dispense Refill    acetaminophen (TYLENOL) 500 mg tablet Take 500 mg by mouth every 6 (six) hours as needed      ascorbic acid (VITAMIN C) 500 mg tablet Take 1 tablet (500 mg total) by mouth daily 30 tablet 0    Brexpiprazole (REXULTI) 2 MG tablet Take 2 mg by mouth daily      busPIRone (BUSPAR) 15 mg tablet Take 1 tablet (15 mg total) by mouth 2 (two) times a day 60 tablet 1    citalopram (CeleXA) 40 mg tablet TAKE 1 TABLET BY MOUTH EVERY DAY IN THE MORNING 90 tablet 0    Melatonin 5 MG TABS Take 5 mg by mouth daily at bedtime  mirtazapine (REMERON) 15 mg tablet Take 15 mg by mouth daily at bedtime      benztropine (COGENTIN) 1 mg tablet Take 1 mg by mouth daily as needed (Patient not taking: Reported on 8/30/2022)      cholecalciferol (VITAMIN D3) 1,000 units tablet Take 2 tablets (2,000 Units total) by mouth daily (Patient not taking: Reported on 8/30/2022) 60 tablet 0    polyethylene glycol (MIRALAX) 17 g packet Take 17 g by mouth daily (Patient not taking: Reported on 8/30/2022) 10 each 0    sildenafil (VIAGRA) 100 mg tablet TAKE 1 TABLET BY MOUTH ONCE DAILY AS NEEDED FOR  ERECTILE  DYSFUNCTION (Patient not taking: Reported on 8/30/2022) 6 tablet 0    tamsulosin (FLOMAX) 0 4 mg  (Patient not taking: Reported on 8/30/2022)         Active Ambulatory Problems     Diagnosis Date Noted    BETHANY (generalized anxiety disorder) 05/10/2012    Erectile dysfunction 06/16/2016    Weight loss 08/18/2016    Gastroesophageal reflux disease without esophagitis 07/16/2018    Hiatal hernia 07/16/2018    Celiac disease 07/16/2018    History of obsessive compulsive disorder 09/27/2019    Multiple fractures of left foot 10/25/2018    BPH (benign prostatic hyperplasia) 11/06/2019    Essential hypertension 02/04/2020    Brachial plexus injury, right, sequela 10/31/2018    Closed nondisplaced fracture of body of left calcaneus 94/39/5933    Conflicted attitude towards dietary regime 01/21/2020    Dependent personality disorder ([facility] [address] ) 07/13/2011    History of Helicobacter pylori infection 01/23/2020    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    Other insomnia 01/16/2019    Recurrent major depressive disorder, in partial remission ([address] ) with history of psychotic features 10/31/2018    Suicide attempt ([address] ) 10/23/2018    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 01/08/2019    Ventral hernia 01/28/2020    Spondylosis of cervical region without myelopathy or radiculopathy 11/19/2020    Abnormal CT scan, cervical spine 10/03/2020    Closed fracture of nasal bone 10/04/2020    Fall 10/03/2020    Alcohol intoxication ([facility] ) 10/03/2020    Penis pain 04/13/2021    DJD (degenerative joint disease) 05/03/2021    Bipolar I disorder, most recent episode depressed, severe w psychosis ([place] [address] ) 07/13/2021    Mixed obsessional thoughts and acts 07/13/2021    Alcohol use disorder, moderate, in sustained remission ([place] [address] ) 07/13/2021    Abscess 09/29/2021    Left foot pain 10/04/2021    COVID-19 05/26/2022    Schizoaffective disorder, depressive type ([place] [address] ) 02/17/2022     Resolved Ambulatory Problems     Diagnosis Date Noted    Severe episode of recurrent major depressive disorder, with psychotic features ([place] [address] ) 05/10/2012     Past Medical History:   Diagnosis Date    Anxiety 1995    Depression 1995    Head injury     Hypertension     Obsessive compulsive disorder        Past Surgical History:   Procedure Laterality Date    FRACTURE SURGERY      TONSILLECTOMY      WRIST SURGERY Left     resolved 1997- cts surgery       Social History:   Social History     Socioeconomic History    Marital status:      Spouse name: Not on file    Number of children: Not on file    Years of education: Not on file    Highest education level:  Bachelor's degree (e g , BA, AB, BS)   Occupational History    Occupation: DISABLED   Tobacco Use    Smoking status: Former Smoker     Packs/day: 1 00     Types: Cigars, Cigarettes     Start date: 4/30/2020    Smokeless tobacco: Former User     Types: Chew   Vaping Use    Vaping Use: Never used   Substance and Sexual Activity    Alcohol use: Not Currently     Comment: SOCIAL    Drug use: No    Sexual activity: Not Currently   Other Topics Concern    Not on file   Social History Narrative    Caffeine - 2 cups coffee per day    Full Time -      Social Determinants of Health     Financial Resource Strain: Not on file   Food Insecurity: Not on file Transportation Needs: Not on file   Physical Activity: Not on file   Stress: Not on file   Social Connections: Not on file   Intimate Partner Violence: Not on file   Housing Stability: Not on file     Family History:   Family History   Problem Relation Age of Onset    Heart attack Father     Prostate cancer Father     Cerebral palsy Brother    Guera Rodriguez OCD Mother     OCD Sister      Review of Systems   Musculoskeletal: Positive for arthralgias and back pain  Neurological: Positive for weakness  All other systems reviewed and are negative  Physical Exam   Constitutional: Awake, alert, in no acute distress      Head: Normocephalic and atraumatic  Mouth/Throat: Oropharynx is clear and moist     Eyes: Conjunctivae and EOM are normal   Neck: Neck supple  Cardiovascular: Normal rate, regular rhythm and normal heart sounds  Pulmonary/Chest: Effort normal and breath sounds normal    Abdominal: Soft  Bowel sounds are normal  No distension  No tenderness  Neurological:  Awake, alert, right arm weakness from prior brachial plexus injury in  2018  Musculoskeletal:  Nontender spine  Skin: Skin is warm and dry  Assessment     Karthik Armstrong is a(n) 62 y o  male with MDD  1  Hypertension  Currently not on any antihypertensives  BP is WNL  Continue to monitor  2  Celiac disease  Continue gluten free diet  3  DJD/OA/headaches  Patient may take Tylenol as needed  4  BPH  Continue Flomax 0 4 mg daily  5  Constipation  Patient may take MiraLax as needed  6  GERD  Patient may take Mylanta as needed  7  Vitamin-D deficiency  Patient started on vitamin D3 1000 units daily  8  Psych with history of MDD  This is being managed by the psych team     Prognosis: Fair  Discharge Plan: In progress  Advanced Directives: I have discussed in detail with the patient the advanced directives  The patient does not have a POA or living will  Emergency contact is his son, Brooklyn Montemayor ([phone])   When discussing cardiac and pulmonary resuscitation efforts with the patient, the patient wishes to be FULL CODE  I have spent more than 50 minutes gathering data, doing physical examination, and discussing the advanced directives, which was witnessed by caring staff  The patient was discussed with Dr Lee Ann Triana and he is in agreement with the above note

## 2022-08-31 NOTE — PROGRESS NOTES
08/31/22 1345   Activity/Group Checklist   Group Admission/Discharge   Attendance Attended   Attendance Duration (min) 16-30   Interactions Interacted appropriately   Affect/Mood Appropriate   Goals Achieved Identified feelings; Identified triggers; Identified relapse prevention strategies; Discussed coping strategies; Discussed discharge plans; Identified resources and support systems; Able to listen to others; Able to engage in interactions; Able to reflect/comment on own behavior;Able to recieve feedback; Able to self-disclose   Patient completed self assessment and relapse prevention plan with RT  Patient open and honest that he is struggling with normal everyday life stressors and he needs to find a better way to cope with them  When he feels overwhelmed he first thoughts are to hurt himself  He is grateful for the support of his girlfriend Gema Stephen  He shared he enjoys reading, music, outdoors and when able to gardening  When he discharges he wants to feel healthy and not scared

## 2022-08-31 NOTE — PROGRESS NOTES
08/31/22 1637   Team Meeting   Meeting Type Tx Team Meeting   Initial Conference Date 08/31/22   Next Conference Date 09/30/22   Team Members Present   Team Members Present Physician;Nurse;   Physician Team Member Dr Tru Bergman MD   Nursing Team Member Peggyann Hodgkins, RN   Social Work Team Member KAMILAH Roy   Patient/Family Present   Patient Present Yes   Patient's Family Present No     Initial Plan    Patient and treatment team met and reviewed pt strengths, limitations, coping skills, treatment plan and goals; pt agreeable and signed; copy on chart

## 2022-08-31 NOTE — PLAN OF CARE
Problem: DISCHARGE PLANNING - CARE MANAGEMENT  Goal: Discharge to post-acute care or home with appropriate resources     Pt new to unit; 201; initial goal added

## 2022-08-31 NOTE — NURSING NOTE
Patient has been visible on the unit  He is pleasant and cooperative  He socializes with peers  Anxiety rated 4-5  Depression -3  Denied SI,HI, or hallucinations  C/o headache #6 and received tylenol 650 mg as requested  Patient stated tylenol was effective  Medication compliant  Attends group  Q 7 minute safety checks maintained

## 2022-08-31 NOTE — PLAN OF CARE
Problem: Ineffective Coping  Goal: Cooperates with admission process  Description: Interventions:   - Complete admission process  Outcome: Progressing  Goal: Identifies healthy coping skills  Outcome: Progressing  Goal: Participates in unit activities  Description: Interventions:  - Provide therapeutic environment   - Provide required programming   - Redirect inappropriate behaviors   Outcome: Progressing  Goal: Patient/Family participate in treatment and DC plans  Description: Interventions:  - Provide therapeutic environment  Outcome: Progressing  Goal: Patient/Family verbalizes awareness of resources  Outcome: Progressing

## 2022-08-31 NOTE — TREATMENT PLAN
TREATMENT PLAN REVIEW - Deejay Velasquez 62 y o  1965 male MRN: [id number]    [facility]  Room / Bed: Anabel Frausto 200/-01 Encounter: [id number]          Admit Date/Time:  8/30/2022  1:03 PM    Treatment Team: Attending Provider: Marcial Renteria MD; Consulting Physician: Georgina Jama MD; Patient Care Technician: Suleman Dillard; Patient Care Assistant: Monica Gurrola; Recreational Therapist: Crystal Arnold; Registered Nurse: Charity Brantley, RN; Certified Nursing Assistant: Ryan Hawthorne; Patient Care Assistant: Scott Blackmno; Registered Nurse: Almetta Dancer, ALIYAH; Patient Care Assistant: Brisa Lynne;  : BRANT Beckford; Licensed Practical Nurse: Jeppie Pallas, LPN    Diagnosis: Principal Problem:    Schizoaffective disorder, depressive type ([facility] [address] )  Active Problems:    BETHANY (generalized anxiety disorder)    Gastroesophageal reflux disease without esophagitis    Celiac disease    BPH (benign prostatic hyperplasia)    Essential hypertension    Dependent personality disorder (HCC)    DJD (degenerative joint disease)      Patient Strengths/Assets: cooperative, negotiates basic needs, patient is on a voluntary commitment    Patient Barriers/Limitations: difficulty adapting, poor insight, poor reasoning ability    Short Term Goals: decrease in depressive symptoms, decrease in anxiety symptoms, decrease in suicidal thoughts, ability to stay safe on the unit, improvement in reality testing, improvement in reasoning ability, improvement in self care, sleep improvement, improvement in appetite, mood stabilization, increase in group attendance, acceptance of need for psychiatric treatment, acceptance of psychiatric medications    Long Term Goals: improvement in depression, improvement in anxiety, stabilization of mood, free of suicidal thoughts, improvement in reasoning ability, improved insight, acceptance of need for psychiatric medications, acceptance of need for psychiatric treatment, adequate self care, adequate sleep, adequate appetite, adequate oral intake, appropriate interaction with peers    Progress Towards Goals: starting psychiatric medications as prescribed    Recommended Treatment: medication management, patient medication education, group therapy, milieu therapy, continued Behavioral Health psychiatric evaluation/assessment process, medical follow up with medical team    Treatment Frequency: daily medication monitoring, group and milieu therapy daily, monitoring through interdisciplinary rounds, monitoring through weekly patient care conferences    Expected Discharge Date: 7 midnights     Discharge Plan: will be determined    Treatment Plan Created/Updated By: Martina Cancino MD

## 2022-09-01 PROCEDURE — 99232 SBSQ HOSP IP/OBS MODERATE 35: CPT | Performed by: PSYCHIATRY & NEUROLOGY

## 2022-09-01 RX ORDER — BUTALBITAL, ACETAMINOPHEN AND CAFFEINE 50; 325; 40 MG/1; MG/1; MG/1
1 TABLET ORAL EVERY 8 HOURS PRN
Status: DISCONTINUED | OUTPATIENT
Start: 2022-09-01 | End: 2022-09-09 | Stop reason: HOSPADM

## 2022-09-01 RX ADMIN — OXYCODONE HYDROCHLORIDE AND ACETAMINOPHEN 500 MG: 500 TABLET ORAL at 08:10

## 2022-09-01 RX ADMIN — CHOLECALCIFEROL TAB 25 MCG (1000 UNIT) 1000 UNITS: 25 TAB at 08:10

## 2022-09-01 RX ADMIN — BUSPIRONE HYDROCHLORIDE 15 MG: 15 TABLET ORAL at 17:10

## 2022-09-01 RX ADMIN — Medication 6 MG: at 20:39

## 2022-09-01 RX ADMIN — MIRTAZAPINE 15 MG: 15 TABLET, FILM COATED ORAL at 20:39

## 2022-09-01 RX ADMIN — BUSPIRONE HYDROCHLORIDE 15 MG: 15 TABLET ORAL at 08:10

## 2022-09-01 RX ADMIN — ACETAMINOPHEN 975 MG: 325 TABLET ORAL at 08:14

## 2022-09-01 RX ADMIN — TAMSULOSIN HYDROCHLORIDE 0.4 MG: 0.4 CAPSULE ORAL at 17:10

## 2022-09-01 NOTE — PROGRESS NOTES
09/01/22 0730   Activity/Group Checklist   Group Community meeting  (Patient check in with coffee/ patients eating and drink in room for covid precautions)   Attendance Attended   Attendance Duration (min) 31-45   Interactions Interacted appropriately   Affect/Mood Appropriate   Goals Achieved Identified feelings; Able to listen to others; Able to engage in interactions; Able to self-disclose; Able to recieve feedback

## 2022-09-01 NOTE — PROGRESS NOTES
09/01/22    Team Meeting   Meeting Type Daily Rounds   Team Members Present   Team Members Present Physician;Nurse;;Occupational Therapist   Physician Team Member Dr Citlali Mccormack MD   Nursing Team Member Willard Linda RN   Care Management Team Member Alejandro Montero [facility], [facility] [place], [facility]   Social Work Team Member Wanda Porter [place]   OT Team Member Silver Seip, [place]   Patient/Family Present   Patient Present No   Patient's Family Present No   PT reports broken sleep, denies SI/HI/AH/VH rates depression 3 and anxiety a 4, fixated on diet-medical following  No D/C date at this time  Kept to self in room

## 2022-09-01 NOTE — PLAN OF CARE
Problem: Ineffective Coping  Goal: Cooperates with admission process  Description: Interventions:   - Complete admission process  Outcome: Progressing  Goal: Identifies healthy coping skills  Outcome: Progressing  Goal: Participates in unit activities  Description: Interventions:  - Provide therapeutic environment   - Provide required programming   - Redirect inappropriate behaviors   Outcome: Progressing  Goal: Patient/Family participate in treatment and DC plans  Description: Interventions:  - Provide therapeutic environment  Outcome: Progressing  Goal: Patient/Family verbalizes awareness of resources  Outcome: Progressing     Problem: Risk for Self Injury/Neglect  Goal: Treatment Goal: Remain safe during length of stay, learn and adopt new coping skills, and be free of self-injurious ideation, impulses and acts at the time of discharge  Outcome: Progressing  Goal: Verbalize thoughts and feelings  Description: Interventions:  - Assess and re-assess patient's lethality and potential for self-injury  - Engage patient in 1:1 interactions, daily, for a minimum of 15 minutes  - Encourage patient to express feelings, fears, frustrations, hopes  - Establish rapport/trust with patient   Outcome: Progressing  Goal: Refrain from harming self  Description: Interventions:  - Monitor patient closely, per order  - Develop a trusting relationship  - Supervise medication ingestion, monitor effects and side effects   Outcome: Progressing     Problem: Depression  Goal: Treatment Goal: Demonstrate behavioral control of depressive symptoms, verbalize feelings of improved mood/affect, and adopt new coping skills prior to discharge  Outcome: Progressing  Goal: Refrain from isolation  Description: Interventions:  - Develop a trusting relationship   - Encourage socialization   Outcome: Progressing  Goal: Refrain from self-neglect  Outcome: Progressing  Goal: Complete daily ADLs, including personal hygiene independently, as able  Description: Interventions:  - Observe, teach, and assist patient with ADLS  -  Monitor and promote a balance of rest/activity, with adequate nutrition and elimination   Outcome: Progressing     Problem: Anxiety  Goal: Anxiety is at manageable level  Description: Interventions:  - Assess and monitor patient's anxiety level  - Monitor for signs and symptoms (heart palpitations, chest pain, shortness of breath, headaches, nausea, feeling jumpy, restlessness, irritable, apprehensive)  - Collaborate with interdisciplinary team and initiate plan and interventions as ordered    - [place] patient to unit/surroundings  - Explain treatment plan  - Encourage participation in care  - Encourage verbalization of concerns/fears  - Identify coping mechanisms  - Assist in developing anxiety-reducing skills  - Administer/offer alternative therapies  - Limit or eliminate stimulants  Outcome: Progressing     Problem: DISCHARGE PLANNING - CARE MANAGEMENT  Goal: Discharge to post-acute care or home with appropriate resources  Description: INTERVENTIONS:  - Conduct assessment to determine patient/family and health care team treatment goals, and need for post-acute services based on payer coverage, community resources, and patient preferences, and barriers to discharge  - Address psychosocial, clinical, and financial barriers to discharge as identified in assessment in conjunction with the patient/family and health care team  - Arrange appropriate level of post-acute services according to patients   needs and preference and payer coverage in collaboration with the physician and health care team  - Communicate with and update the patient/family, physician, and health care team regarding progress on the discharge plan  - Arrange appropriate transportation to post-acute venues  Outcome: Progressing

## 2022-09-01 NOTE — PROGRESS NOTES
09/01/22 22[address] [place]   Patient Information   Mental Status Alert   Primary Caregiver Self   Support System Immediate family   Methodist/Cultural Requests Sabianist   Legal Information   Tx Plan Signed Yes   Current Status: 201   Legal Issues denies current and hx   Advance Directives   (none)   Advance Directives Status Discussed - Patient/Family Declined   Activities of Daily Living Prior to Admission   Functional Status Independent   Assistive Device No device needed   Living Arrangement House;Lives with someone   Ambulation Independent   Access to Firearms   Access to Firearms No   Income Information   Income Source SSI/SSD  ($1700/mo)   Means of Transportation   Means of Transport to Appts: Family transport

## 2022-09-01 NOTE — PROGRESS NOTES
SW met with patient in exam room to complete psychosocial assessment; pt denies SI/HI/AH/VH, dep 4, anx 3; pt stressors include relationship with sons and ineffective coping skills; pt aox4 and able to provide assessment information without assistance     Pt is 61 yo  male - ; pt admitted due to SI with plan, increased dep/anx; pt strengths include support system, stable housing, outpt provider; limitations include ineffective coping skills, chronic mental health; pt coping skills include spending time with significant other, reading and walking; pt hx MH dx includes schizoaffective do dep type; hx several AIP - 5 this year - most recent: [facility] 8/3/22; pt admits med compliance; pt admits hx SA via walking into traffic , SA via OD 2022; pt denies access to firearms, violence, HI and psychological trauma; pt denies family hx mental health, suicide and substance abuse; pt mother and father had dementia    Pt admits daily tobacco use - smokes 10 mini cigars weekly; no cigarette use - declined cessation; denies etoh and drug use; pt denies hx addiction; pt denies current and hx legal issues; pt is  - current relationship - Yoon President 3 yrs; pt identifies as heterosexual - no concerns; pt has 2 adult sons:  Sarahy Jones - no relationships - this is a stressor for patient; pt father, Melinda Mead,  (10/2020), mother Carrol (living- SNF due to dementia); pt has twin brother Celi Rodriguez; brother James Dubon & sister Christine Hargrove - no relationships; pt graduated h/s and has Bachelors degree in [employer]; pt worked as  - middle and high school - until disability in 2018 s/p SA; no  hx; pt identifies as [address] - no Jainism or cultural needs to be met while on the unit; [facility]     Pt receives $1700/mo SDI; no POA/robertdian; [facility] insurance     PCP: Dr Jayashree Awad - [phone] - MAIDA on chart   Psych: Dr America Thapa on chart CM: none but agreeable to referral to [facility] [place] on chart   Family: Osiris Fuentes - [phone] - MAIDA on chart     ROIs: Aundrea Hernandez, PCP, Psych, CM referral        09/01/22 1436   Patient Intake   Living Arrangement House;Lives with someone   Can patient return home?  Yes   Address to be Discharge to: [address], [place], [address] [place] [address] Rd   Patient's Telephone Number [phone]   Access to Firearms No   Type of Work hx  - disability since 2018   Work History Disabled   School Grade/Year 12th  ([address]; no  hx)   Admission Status   Status of Admission [address]   Patient History   Treatment History hx several AIP; 5x this year; most recent [facility] SI with plan to OD 8/3-8/12/22   Currently in Treatment Yes   Current Psychiatrist/Therapist Dr Ally Serrato Tumodestoday Sep 20, 2022 2:15 PM   Name of ICM: none - agreeable to referral   Medical Problems see H&P   Legal Issues denies current and hx   Substance Abuse No   Crisis Info   Release of Information Signed Yes  (pcp, psych, cm referral, sig other Aundera Hernandez)

## 2022-09-01 NOTE — NURSING NOTE
Patient has been visible on the unit  He is medication compliant  Blunted affect  Appears flat and depressed  Depression rated a 3  Anxiety rated a 4  Denied SI,HI, or hallucinations  C/o headache #8  PRN tylenol 975 mg administered  Tylenol moderately effective  Q7 minute safety checks maintained

## 2022-09-01 NOTE — PROGRESS NOTES
09/01/22 1030   Activity/Group Checklist   Group Life Skills   Attendance Did not attend  (Pt offered grp; pt remained in his room)

## 2022-09-01 NOTE — PROGRESS NOTES
Progress Note - Hao Amaya 62 y o  male MRN: [id number]    Unit/Bed#: [facility] Encounter: [id number]        Subjective:   Patient seen and examined at bedside after reviewing the chart and discussing the case with the caring staff  Patient examined at bedside  Patient complains of a migraine headache for the past 2 days  States he has history of migraines and feel similar  Denies vision changes, nausea, vomiting, neck pain, dizziness, lightheadedness  Also states that he is upset regarding his diet being gluten free  States he does not follow a gluten free diet at home  Physical Exam   Vitals: Blood pressure 118/73, pulse 79, temperature 98 6 °F (37 °C), temperature source Temporal, resp  rate 18, height 6' 2 02" (1 88 m), weight 115 kg (252 lb 9 6 oz), SpO2 91 %  ,Body mass index is 32 42 kg/m²  Constitutional: Patient appears well-developed  HEENT: PERR, EOMI, MMM  Cardiovascular: Normal rate and regular rhythm  Pulmonary/Chest: Effort normal and breath sounds normal    Abdomen:  Nondistended  Assessment/Plan:  Hao Amaya is a(n) 62 y o  male with MDD      1  Hypertension  Currently not on any antihypertensives  BP is WNL  Continue to monitor  2  Celiac disease  Continue gluten free diet  Nutrition consulted  3  DJD/OA/migraines  Patient may take Tylenol as needed  Will add Fioricet as needed for migraine  4  BPH  Continue Flomax 0 4 mg daily  5  Constipation  Patient may take MiraLax as needed  6  GERD  Patient may take Mylanta as needed  7  Vitamin-D deficiency  Patient started on vitamin D3 1000 units daily  The patient was discussed with Dr Lashay Santamaria and he is in agreement with the above note

## 2022-09-01 NOTE — PROGRESS NOTES
09/01/22 1300   Activity/Group Checklist   Group Pet therapy   Attendance Attended   Attendance Duration (min) 16-30   Interactions Interacted appropriately   Affect/Mood Appropriate   Goals Achieved Identified feelings; Able to listen to others; Able to engage in interactions; Able to recieve feedback

## 2022-09-01 NOTE — NURSING NOTE
Patient spent the evening resting in his room  He is easily engaged  Blunted affect  He denies SI/HI and AH/VH  Rates depression and anxiety as 3/10  Denies pain  Compliant with medications  Maintained on 15 minute checks

## 2022-09-01 NOTE — PROGRESS NOTES
Progress Note - Behavioral Health   This note was not shared with the patient due to reasonable likelihood of causing patient harm  hSai Miranda 62 y o  male MRN: [id number]   Unit/Bed#: [facility] 200-01 Encounter: [id number]    Behavior over the last 24 hours: unchanged  Sleep: slept off and on  Appetite: fair  Medication side effects: no  ROS: all other systems are negative    Mental Status Evaluation:    Appearance:  age appropriate   Behavior:  cooperative   Speech:  normal rate and volume   Mood:  depressed, anxious   Affect:  reactive   Thought Process:  goal directed   Associations: concrete associations   Thought Content:  no overt delusions   Perceptual Disturbances: none   Risk Potential: Suicidal ideation - Yes, without plan  Homicidal ideation - None   Sensorium:  oriented to person, place and time/date   Memory:  recent and remote memory grossly intact   Consciousness:  alert and awake   Attention: attention span and concentration are age appropriate   Insight:  poor   Judgment: limited   Gait/Station: normal gait/station   Motor Activity: no abnormal movements     Vital signs in last 24 hours:    Temp:  [97 5 °F (36 4 °C)-98 6 °F (37 °C)] 98 6 °F (37 °C)  HR:  [79-84] 79  Resp:  [16-18] 18  BP: (109-121)/(71-82) 118/73    Laboratory results:   I have personally reviewed all pertinent laboratory/tests results    Most Recent Labs:   Lab Results   Component Value Date    WBC 5 49 08/27/2022    RBC 4 82 08/27/2022    HGB 14 9 08/27/2022    HCT 45 1 08/27/2022     08/27/2022    RDW 12 6 08/27/2022    NEUTROABS 2 91 08/27/2022    SODIUM 142 08/27/2022    K 3 8 08/27/2022     08/27/2022    CO2 27 08/27/2022    BUN 11 08/27/2022    CREATININE 1 04 08/27/2022    GLUC 98 08/27/2022    GLUF 94 02/08/2021    CALCIUM 9 1 08/27/2022    AST 12 (L) 08/27/2022    ALT 16 08/27/2022    ALKPHOS 73 08/27/2022    TP 6 6 08/27/2022    ALB 4 1 08/27/2022    TBILI 0 34 08/27/2022    CHOLESTEROL 168 01/18/2021 HDL 41 01/18/2021    TRIG 93 01/18/2021    LDLCALC 108 (H) 01/18/2021    [place] 127 01/18/2021    ROU3RBPFRQPN 1 189 08/27/2022    RPR Non-Reactive 03/26/2021    HGBA1C 5 6 08/05/2022     08/05/2022       Assessment/Plan   Principal Problem:    Schizoaffective disorder, depressive type ([facility] [address] )  Active Problems:    BETHANY (generalized anxiety disorder)    Gastroesophageal reflux disease without esophagitis    Celiac disease    BPH (benign prostatic hyperplasia)    Essential hypertension    Dependent personality disorder (HCC)    DJD (degenerative joint disease)    Recommended Treatment:     Planned medication and treatment changes:     All current active medications have been reviewed  Encourage group therapy, milieu therapy and occupational therapy  Behavioral Health checks every 7 minutes  Current Facility-Administered Medications   Medication Dose Route Frequency Provider Last Rate    acetaminophen  650 mg Oral Q4H PRN Bernie Rangel MD      acetaminophen  650 mg Oral Q4H PRN Bernie Rangel MD      acetaminophen  975 mg Oral Q6H PRN Bernie Rangel MD      aluminum-magnesium hydroxide-simethicone  30 mL Oral Q4H PRN  L Dagnall, PA-C      ascorbic acid  500 mg Oral Daily  L Dagaugustal, PA-C      busPIRone  15 mg Oral BID Bernie Rangel MD      butalbital-acetaminophen-caffeine  1 tablet Oral Q8H PRN  L Dagnall, PA-C      cholecalciferol  1,000 Units Oral Daily  L Dagnall, PA-C      hydrOXYzine HCL  25 mg Oral Q6H PRN Max 4/day Bernie Rangel MD      LORazepam  1 mg Intramuscular Q6H PRN Max 3/day Bernie Rangel MD      LORazepam  0 5 mg Oral Q6H PRN Max 4/day Bernie Rangel MD      LORazepam  1 mg Oral Q6H PRN Max 3/day Bernie Rangel MD      melatonin  6 mg Oral HS Bernie Rangel MD      mirtazapine  15 mg Oral HS Bernie Rangel MD      OLANZapine  5 mg Intramuscular Q3H PRN Max 3/day Bernie Rangel MD      polyethylene glycol  17 g Oral Daily PRN daiana Vasquez, PA-C      QUEtiapine 100 mg Oral Q3H PRN Max 3/day Nahomy Mccord MD      QUEtiapine  25 mg Oral Q6H PRN Max 4/day Nahomy Mccord MD      QUEtiapine  50 mg Oral Q6H PRN Max 4/day Nahomy Mccord MD      tamsulosin  0 4 mg Oral Daily With Wash Rubinstein, MD      traZODone  50 mg Oral HS PRN Nahomy Mccord MD         Risks / Benefits of Treatment:    Risks, benefits, and possible side effects of medications explained to patient and patient verbalizes understanding and agreement for treatment  Counseling / Coordination of Care:    Patient's progress discussed with staff in treatment team meeting  Medications, treatment progress and treatment plan reviewed with patient  Supportive therapy provided to patient  Group attendance encouraged      Maude Treviño MD 09/01/22

## 2022-09-01 NOTE — NURSING NOTE
Patient has been social with staff and peers  He is frequently walking in the halls or playing games in the dining room with peers  No behaviors noted  Continue to monitor

## 2022-09-02 PROCEDURE — 99232 SBSQ HOSP IP/OBS MODERATE 35: CPT | Performed by: PSYCHIATRY & NEUROLOGY

## 2022-09-02 RX ORDER — IBUPROFEN 800 MG/1
800 TABLET ORAL EVERY 6 HOURS PRN
Status: DISCONTINUED | OUTPATIENT
Start: 2022-09-02 | End: 2022-09-09 | Stop reason: HOSPADM

## 2022-09-02 RX ADMIN — OXYCODONE HYDROCHLORIDE AND ACETAMINOPHEN 500 MG: 500 TABLET ORAL at 08:58

## 2022-09-02 RX ADMIN — BUSPIRONE HYDROCHLORIDE 15 MG: 15 TABLET ORAL at 08:57

## 2022-09-02 RX ADMIN — IBUPROFEN 800 MG: 800 TABLET, FILM COATED ORAL at 13:23

## 2022-09-02 RX ADMIN — BUSPIRONE HYDROCHLORIDE 15 MG: 15 TABLET ORAL at 17:06

## 2022-09-02 RX ADMIN — MIRTAZAPINE 15 MG: 15 TABLET, FILM COATED ORAL at 21:00

## 2022-09-02 RX ADMIN — BUTALBITAL, ACETAMINOPHEN AND CAFFEINE 1 TABLET: 50; 325; 40 TABLET ORAL at 09:05

## 2022-09-02 RX ADMIN — CHOLECALCIFEROL TAB 25 MCG (1000 UNIT) 1000 UNITS: 25 TAB at 08:57

## 2022-09-02 RX ADMIN — TAMSULOSIN HYDROCHLORIDE 0.4 MG: 0.4 CAPSULE ORAL at 16:44

## 2022-09-02 RX ADMIN — Medication 6 MG: at 21:00

## 2022-09-02 NOTE — PLAN OF CARE
Problem: Ineffective Coping  Goal: Participates in unit activities  Description: Interventions:  - Provide therapeutic environment   - Provide required programming   - Redirect inappropriate behaviors   Outcome: Not Progressing   Patient less interactive and more isolative today

## 2022-09-02 NOTE — PROGRESS NOTES
09/02/22   Team Meeting   Meeting Type Daily Rounds   Team Members Present   Team Members Present Physician;Nurse;;; Occupational Therapist   Physician Team Member Dr Leopold Pomfret, MD   Nursing Team Member Luis Antonio Hartley, RN   Care Management Team Member Alejandro Ya , [facility]   Social Work Team Member Cesar Houser [facility]   OT Team Member Truman Garrison [place]   Patient/Family Present   Patient Present No   Patient's Family Present No     Dep/anx 3/10; fixated on food; slept; med compliant; no DC date

## 2022-09-02 NOTE — NURSING NOTE
Patient awake in room  Pleasant and cooperative  Denies  anxiety, depression, hallucination, HI, SI  Schedule PO medication administered as ordered  Appetite good   Continue on safety check

## 2022-09-02 NOTE — NURSING NOTE
Patient encountered in hallway at the outset of shift  Quiet, somewhat guarded, anxious, disheveled in appearance    Reported levels of depression and anxiety both at "3" out of "10"  Denied SI though did report thoughts of self harm prior to admission  Denied A/V hallucinations  Able to identify stressors contributing to sx as lack of socialization/activity as well as sadness related to no contact with either son  "They don't want to bother with me " C/O migraine; Fioricet 1 tab given at 0905  Will encourage continued exploration of thoughts/feelings and alternate methods of stress reduction

## 2022-09-02 NOTE — PLAN OF CARE
Problem: Depression  Goal: Refrain from isolation  Description: Interventions:  - Develop a trusting relationship   - Encourage socialization   Outcome: Adequate for Discharge  Goal: Refrain from self-neglect  Outcome: Adequate for Discharge  Goal: Complete daily ADLs, including personal hygiene independently, as able  Description: Interventions:  - Observe, teach, and assist patient with ADLS  -  Monitor and promote a balance of rest/activity, with adequate nutrition and elimination   Outcome: Adequate for Discharge

## 2022-09-02 NOTE — PLAN OF CARE
Problem: DISCHARGE PLANNING - CARE MANAGEMENT  Goal: Discharge to post-acute care or home with appropriate resources  Description: INTERVENTIONS:  - Conduct assessment to determine patient/family and health care team treatment goals, and need for post-acute services based on payer coverage, community resources, and patient preferences, and barriers to discharge  - Address psychosocial, clinical, and financial barriers to discharge as identified in assessment in conjunction with the patient/family and health care team  - Arrange appropriate level of post-acute services according to patients   needs and preference and payer coverage in collaboration with the physician and health care team  - Communicate with and update the patient/family, physician, and health care team regarding progress on the discharge plan  - Arrange appropriate transportation to post-acute venues  Outcome: Progressing     Pt progressing;  No DC date

## 2022-09-02 NOTE — NURSING NOTE
Out in community  Rates depression 3 /10, anxiety 3 /10, states sleep is "good"  Appetite is  "good"  No suicidal or homicidal ideations  Safety maintained via clutter-free environment, ID band, and given non-skid socks  No change in medical condition  Compliant with medications and snacks  Hygiene is good  Socialization is fair  Medication education given  Care Plan reviewed and adjusted  Maintained on q 7 minute checks  Will continue to monitor

## 2022-09-02 NOTE — PROGRESS NOTES
09/02/22 1100   Activity/Group Checklist   Group Self Esteem   Attendance Did not attend  (Pt offered grp; pt remained in his room)

## 2022-09-02 NOTE — PROGRESS NOTES
09/02/22 0730   Activity/Group Checklist   Group   (Patient check in with coffee/ patients eating and drink in room for covid precautions)   Attendance Attended   Attendance Duration (min) 31-45   Interactions Interacted appropriately   Affect/Mood Appropriate   Goals Achieved Identified feelings; Able to listen to others; Able to engage in interactions; Able to self-disclose; Able to recieve feedback

## 2022-09-02 NOTE — SOCIAL WORK
JAXON placed phone call to pt significant other, Gerson Alvares, [phone] to introduce self and role, discuss tx and dc plan; unable to leave voicemail - Received message "Sorry  Your call cannot be completed at this time  Please hang up and try your call again later   Thank you"

## 2022-09-02 NOTE — NUTRITION
09/02/22 1537   Biochemical Data,Medical Tests, and Procedures   Biochemical Data/Medical Tests/Procedures Lab values reviewed; Meds reviewed   Labs (Comment) 8/27 AST:12  8/30 Vit D-27 2   Meds (Comment) Vit C, buspar, Vit D, melatonin, remeron, zyprexa, seroquel, desyrel   Nutrition-Focused Physical Exam   Nutrition-Focused Physical Exam Findings RN skin assessment reviewed; No skin issues documented   Medical-Related Concerns celiac disease (self reported), depression, anxiety, OCD, MDD   Adequacy of Intake   Nutrition Modality PO   Feeding Route   PO Independent   Current PO Intake   Current Diet Order Regular diet thin liquids   Current Meal Intake %   Estimated calorie intake compared to estimated need minimum nutrient needs met  PES Statement   Problem Clinical   Weight (3) Overweight/obesity NC-3 3   Overweight/ obesity specific to Obese, Class I (3)   Related to Energy intake>energy output over time   As evidenced by: BMI   Recommendations/Interventions   Malnutrition/BMI Present No  (does not meet criteria)   Summary Consult: celiac disease  Regular diet thin liquids  Meal completions 100%  Patient states appetite is good  He states he does not follow any type of diet plan at home  He states he does not follow a gluten free diet  He reports no symptoms when he consumes gluten  He reports consuming 3 meals per day and cooks for himself  Gluten allergy removed by CLEMENT to allow patient to have regular diet  8/30/#; 8/3/#; 2/16/#; no significant weight changes  Skin intact  Patient requesting more food with meals  Double portions appropriate based on calculated nutrient needs  Will monitor weight status     Interventions/Recommendations Adjust diet order   Intervention Comments add double portions   Education Assessment   Education Patient/caregiver not appropriate for education at this time;Patient declined nutrition education   Patient Nutrition Goals   Goal Avoid weight gain   Goal Status Initiated   Timeframe to complete goal by next f/u   Nutrition Complexity Risk   Nutrition complexity level Low risk   Follow up date 09/16/22

## 2022-09-02 NOTE — PROGRESS NOTES
Progress Note - Behavioral Health   This note was not shared with the patient due to reasonable likelihood of causing patient harm  Emelina Moser 62 y o  male MRN: [id number]   Unit/Bed#: [facility] 200-01 Encounter: [id number]    Behavior over the last 24 hours: minimal improvement  Sleep: slept better  Appetite: normal  Medication side effects: no  ROS: no complaints, all other systems are negative    Mental Status Evaluation:    Appearance:  disheveled, overweight   Behavior:  restless   Speech:  normal volume   Mood:  dysphoric   Affect:  mood-congruent   Thought Process:  perseverative   Associations: intact associations   Thought Content:  no overt delusions   Perceptual Disturbances: denies auditory hallucinations when asked   Risk Potential: Suicidal ideation - Yes, without plan  Homicidal ideation - None   Sensorium:  oriented to person, place and time/date   Memory:  recent and remote memory grossly intact   Consciousness:  alert and awake   Attention: attention span and concentration are age appropriate   Insight:  limited   Judgment: limited   Gait/Station: normal gait/station   Motor Activity: no abnormal movements     Vital signs in last 24 hours:    Temp:  [97 6 °F (36 4 °C)-98 5 °F (36 9 °C)] 98 2 °F (36 8 °C)  HR:  [] 72  Resp:  [18] 18  BP: (107-120)/(68-81) 120/68    Laboratory results:   I have personally reviewed all pertinent laboratory/tests results    Most Recent Labs:   Lab Results   Component Value Date    WBC 5 49 08/27/2022    RBC 4 82 08/27/2022    HGB 14 9 08/27/2022    HCT 45 1 08/27/2022     08/27/2022    RDW 12 6 08/27/2022    NEUTROABS 2 91 08/27/2022    SODIUM 142 08/27/2022    K 3 8 08/27/2022     08/27/2022    CO2 27 08/27/2022    BUN 11 08/27/2022    CREATININE 1 04 08/27/2022    GLUC 98 08/27/2022    GLUF 94 02/08/2021    CALCIUM 9 1 08/27/2022    AST 12 (L) 08/27/2022    ALT 16 08/27/2022    ALKPHOS 73 08/27/2022    TP 6 6 08/27/2022    ALB 4 1 08/27/2022 TBILI 0 34 08/27/2022    CHOLESTEROL 168 01/18/2021    HDL 41 01/18/2021    TRIG 93 01/18/2021    LDLCALC 108 (H) 01/18/2021    [place] 127 01/18/2021    IFO5GPYYYKIE 1 189 08/27/2022    RPR Non-Reactive 03/26/2021    HGBA1C 5 6 08/05/2022     08/05/2022       Assessment/Plan   Principal Problem:    Schizoaffective disorder, depressive type ([facility] [address] )  Active Problems:    BETHANY (generalized anxiety disorder)    Gastroesophageal reflux disease without esophagitis    Celiac disease    BPH (benign prostatic hyperplasia)    Essential hypertension    Dependent personality disorder (HCC)    DJD (degenerative joint disease)    Recommended Treatment:     Planned medication and treatment changes:     All current active medications have been reviewed  Encourage group therapy, milieu therapy and occupational therapy  Behavioral Health checks every 7 minutes  Current Facility-Administered Medications   Medication Dose Route Frequency Provider Last Rate    acetaminophen  650 mg Oral Q4H PRN Lee Scruggs MD      acetaminophen  650 mg Oral Q4H PRN Lee Scruggs MD      acetaminophen  975 mg Oral Q6H PRN Lee Scruggs MD      aluminum-magnesium hydroxide-simethicone  30 mL Oral Q4H PRN  L Pedro, PA-C      ascorbic acid  500 mg Oral Daily Melisas Vasquez, PA-C      busPIRone  15 mg Oral BID Lee Scruggs MD      butalbital-acetaminophen-caffeine  1 tablet Oral Q8H PRN  L Pedro, PA-C      cholecalciferol  1,000 Units Oral Daily  ANIA Vasquez, PA-C      hydrOXYzine HCL  25 mg Oral Q6H PRN Max 4/day Lee Scruggs MD      LORazepam  1 mg Intramuscular Q6H PRN Max 3/day Lee Scruggs MD      LORazepam  0 5 mg Oral Q6H PRN Max 4/day Lee Scruggs MD      LORazepam  1 mg Oral Q6H PRN Max 3/day Lee Scruggs MD      melatonin  6 mg Oral HS Lee Scruggs MD      mirtazapine  15 mg Oral HS Lee Scruggs MD      OLANZapine  5 mg Intramuscular Q3H PRN Max 3/day Lee Scruggs MD      polyethylene glycol  17 g Oral Daily PRN Melissa Vasquez PA-C      QUEtiapine  100 mg Oral Q3H PRN Max 3/day Cielo Reynoso MD      QUEtiapine  25 mg Oral Q6H PRN Max 4/day Cielo Reynoso MD      QUEtiapine  50 mg Oral Q6H PRN Max 4/day Cielo Reynoso MD      tamsulosin  0 4 mg Oral Daily With Eris Bhatt MD      traZODone  50 mg Oral HS PRN Cielo Reynoso MD         Risks / Benefits of Treatment:    Risks, benefits, and possible side effects of medications explained to patient and patient verbalizes understanding and agreement for treatment  Counseling / Coordination of Care:    Patient's progress discussed with staff in treatment team meeting  Medications, treatment progress and treatment plan reviewed with patient  Supportive therapy provided to patient  Group attendance encouraged      Gloria Bucio MD 09/02/22

## 2022-09-02 NOTE — PROGRESS NOTES
Progress Note - Hao Amaya 62 y o  male MRN: [id number]    Unit/Bed#: [facility] Encounter: [id number]        Subjective:   Patient seen and examined at bedside after reviewing the chart and discussing the case with the caring staff  Patient examined at bedside  Patient complains of a migraine headache for the past 3 days  Took Fioricet x 1 this morning without relief  States he has history of migraines and feel similar  Denies vision changes, nausea, vomiting, neck pain, dizziness, lightheadedness  Physical Exam   Vitals: Blood pressure 120/68, pulse 72, temperature 98 2 °F (36 8 °C), temperature source Temporal, resp  rate 18, height 6' 2 02" (1 88 m), weight 115 kg (252 lb 9 6 oz), SpO2 97 %  ,Body mass index is 32 42 kg/m²  Constitutional: Patient appears well-developed  HEENT: PERR, EOMI, MMM  Cardiovascular: Normal rate and regular rhythm  Pulmonary/Chest: Effort normal and breath sounds normal    Abdomen:  Nondistended  Assessment/Plan:  Hao Amaya is a(n) 62 y o  male with MDD      1  Hypertension  Currently not on any antihypertensives  BP is WNL  Continue to monitor  2  Celiac disease  Patient upset regarding his diet  States he does not follow a gluten free diet at home and has no issues with it  Diet changed  3  DJD/OA/migraines  Patient may take Tylenol, Fioricet as needed for migraine  Will add ibuprofen 800 mg q6h prn - states he has taken in the past without reaction  4  BPH  Continue Flomax 0 4 mg daily  5  Constipation  Patient may take MiraLax as needed  6  GERD  Patient may take Mylanta as needed  7  Vitamin-D deficiency  Patient started on vitamin D3 1000 units daily  The patient was discussed with Dr Lashay Santamaria and he is in agreement with the above note

## 2022-09-03 PROCEDURE — 99232 SBSQ HOSP IP/OBS MODERATE 35: CPT | Performed by: PHYSICIAN ASSISTANT

## 2022-09-03 RX ADMIN — MIRTAZAPINE 15 MG: 15 TABLET, FILM COATED ORAL at 21:02

## 2022-09-03 RX ADMIN — BUSPIRONE HYDROCHLORIDE 15 MG: 15 TABLET ORAL at 08:27

## 2022-09-03 RX ADMIN — ACETAMINOPHEN 975 MG: 325 TABLET ORAL at 09:10

## 2022-09-03 RX ADMIN — Medication 6 MG: at 21:02

## 2022-09-03 RX ADMIN — BUSPIRONE HYDROCHLORIDE 15 MG: 15 TABLET ORAL at 17:22

## 2022-09-03 RX ADMIN — CHOLECALCIFEROL TAB 25 MCG (1000 UNIT) 1000 UNITS: 25 TAB at 08:28

## 2022-09-03 RX ADMIN — OXYCODONE HYDROCHLORIDE AND ACETAMINOPHEN 500 MG: 500 TABLET ORAL at 08:28

## 2022-09-03 RX ADMIN — TAMSULOSIN HYDROCHLORIDE 0.4 MG: 0.4 CAPSULE ORAL at 16:20

## 2022-09-03 NOTE — PLAN OF CARE
Problem: Ineffective Coping  Goal: Identifies healthy coping skills  Outcome: Progressing  Goal: Participates in unit activities  Description: Interventions:  - Provide therapeutic environment   - Provide required programming   - Redirect inappropriate behaviors   Outcome: Progressing     Problem: Risk for Self Injury/Neglect  Goal: Verbalize thoughts and feelings  Description: Interventions:  - Assess and re-assess patient's lethality and potential for self-injury  - Engage patient in 1:1 interactions, daily, for a minimum of 15 minutes  - Encourage patient to express feelings, fears, frustrations, hopes  - Establish rapport/trust with patient   Outcome: Progressing  Goal: Refrain from harming self  Description: Interventions:  - Monitor patient closely, per order  - Develop a trusting relationship  - Supervise medication ingestion, monitor effects and side effects   Outcome: Progressing     Problem: Depression  Goal: Refrain from isolation  Description: Interventions:  - Develop a trusting relationship   - Encourage socialization   Outcome: Progressing

## 2022-09-03 NOTE — NURSING NOTE
Verbal during assessment  Increassed expression during assessment  Affect is flat  Appears depressed  Was compliant with medications  Good understanding during medication education  No acute behaviors or suicidal ideations  Safe environment provided for safety and fall prevention  Maintained on q 7 minute checks  Fluids at bedside to promote hydration, offered during interactions  Will continue to monitor

## 2022-09-03 NOTE — PROGRESS NOTES
Progress Note - Behavioral Health     Hao Amaya 62 y o  male MRN: [id number]   Unit/Bed#: [facility] 200-01 Encounter: [id number]    Behavior over the last 24 hours: chucky Dodson seen in his room per his request   Chief complaint is headache  Received tylenol 975 mg about an hour ago without relief  Says he has had these headaches in past and what helps is "relaxation"  Denies visual changes, vomiting or photophobia  Anxiety and depression are low- denies SI  Sleep: ok  Appetite: good  Medication side effects: ?headache  ROS: as noted above    Mental Status Evaluation:    Appearance:  dressed in hospital attire   Behavior:  cooperative   Speech:  normal rate and volume   Mood:  tense   Affect:  mood-congruent   Thought Process:  goal directed   Associations: intact associations   Thought Content:  no overt delusions   Perceptual Disturbances: not observed   Risk Potential: Suicidal ideation - None  Homicidal ideation - None   Sensorium:  oriented to person, place and time/date   Memory:  recent and remote memory grossly intact   Consciousness:  alert and awake   Attention: attention span and concentration are age appropriate   Insight:  limited   Judgment: limited   Gait/Station: in bed   Motor Activity: no abnormal movements     Vital signs in last 24 hours:    Temp:  [97 4 °F (36 3 °C)-98 5 °F (36 9 °C)] 98 2 °F (36 8 °C)  HR:  [67-81] 67  Resp:  [18] 18  BP: ()/(53-71) 97/53    Laboratory results: I have personally reviewed all pertinent laboratory/tests results  Assessment/Plan   Principal Problem:    Schizoaffective disorder, depressive type (HCC)  Active Problems:    BETHANY (generalized anxiety disorder)    Gastroesophageal reflux disease without esophagitis    Celiac disease    BPH (benign prostatic hyperplasia)    Essential hypertension    Dependent personality disorder (HCC)    DJD (degenerative joint disease)    Recommended Treatment: Continue present treatment    Monitor headache    Planned medication and treatment changes:  Cont present meds: Buspar 15 mg bid, Melatonin 6 mg q bedtime, remeron 15 mg q bedtime    All current active medications have been reviewed  Encourage group therapy, milieu therapy and occupational therapy  Behavioral Health checks every 7 minutes  Current Facility-Administered Medications   Medication Dose Route Frequency Provider Last Rate    acetaminophen  650 mg Oral Q4H PRN Lana Guerrero MD      acetaminophen  650 mg Oral Q4H PRN Lana Guerrero MD      acetaminophen  975 mg Oral Q6H PRN Lana Guerrero MD      aluminum-magnesium hydroxide-simethicone  30 mL Oral Q4H PRN daiana Vasquez, PA-C      ascorbic acid  500 mg Oral Daily ROXANE Luu-C      busPIRone  15 mg Oral BID Lana Guerrero MD      butalbital-acetaminophen-caffeine  1 tablet Oral Q8H PRN daiana Vasquez, PA-C      cholecalciferol  1,000 Units Oral Daily Melissa Vasquez, PA-C      hydrOXYzine HCL  25 mg Oral Q6H PRN Max 4/day Lana Guerrero MD      ibuprofen  800 mg Oral Q6H PRN ROXANE Luu-C      LORazepam  1 mg Intramuscular Q6H PRN Max 3/day Lana Guerrero MD      LORazepam  0 5 mg Oral Q6H PRN Max 4/day Lana Guerrero MD      LORazepam  1 mg Oral Q6H PRN Max 3/day Lana Guerrero MD      melatonin  6 mg Oral HS Lana Guerrero MD      mirtazapine  15 mg Oral HS Lana Guerrero MD      OLANZapine  5 mg Intramuscular Q3H PRN Max 3/day Lana Guerrero MD      polyethylene glycol  17 g Oral Daily PRN ROXANE Luu-C      QUEtiapine  100 mg Oral Q3H PRN Max 3/day Lana Guerrero MD      QUEtiapine  25 mg Oral Q6H PRN Max 4/day Lana Guerrero MD      QUEtiapine  50 mg Oral Q6H PRN Max 4/day Lana Guerrero MD      tamsulosin  0 4 mg Oral Daily With Aida Saini MD      traZODone  50 mg Oral HS PRN Lana Guerrero MD         Risks / Benefits of Treatment:    Risks, benefits, and possible side effects of medications explained to patient and patient verbalizes understanding and agreement for treatment  Counseling / Coordination of Care: Total floor / unit time spent today 15 minutes  Greater than 50% of total time was spent with the patient and / or family counseling and / or coordination of care  A description of counseling / coordination of care:  Patient's progress discussed with staff in treatment team meeting  Medications, treatment progress and treatment plan reviewed with patient      Adelaide Hernandez PA-C 09/03/22

## 2022-09-03 NOTE — NURSING NOTE
Patient has been social with peers and staff throughout the afternoon  He is pleasant and cooperative  Medication complaint  He offers no current complaints/ concerns  Will CTM  Q7 minute safety checks in progress

## 2022-09-03 NOTE — PLAN OF CARE
Problem: Risk for Self Injury/Neglect  Goal: Treatment Goal: Remain safe during length of stay, learn and adopt new coping skills, and be free of self-injurious ideation, impulses and acts at the time of discharge  Outcome: Progressing  Goal: Verbalize thoughts and feelings  Description: Interventions:  - Assess and re-assess patient's lethality and potential for self-injury  - Engage patient in 1:1 interactions, daily, for a minimum of 15 minutes  - Encourage patient to express feelings, fears, frustrations, hopes  - Establish rapport/trust with patient   Outcome: Progressing  Goal: Refrain from harming self  Description: Interventions:  - Monitor patient closely, per order  - Develop a trusting relationship  - Supervise medication ingestion, monitor effects and side effects   Outcome: Progressing     Problem: Anxiety  Goal: Anxiety is at manageable level  Description: Interventions:  - Assess and monitor patient's anxiety level  - Monitor for signs and symptoms (heart palpitations, chest pain, shortness of breath, headaches, nausea, feeling jumpy, restlessness, irritable, apprehensive)  - Collaborate with interdisciplinary team and initiate plan and interventions as ordered    - [place] patient to unit/surroundings  - Explain treatment plan  - Encourage participation in care  - Encourage verbalization of concerns/fears  - Identify coping mechanisms  - Assist in developing anxiety-reducing skills  - Administer/offer alternative therapies  - Limit or eliminate stimulants  Outcome: Progressing

## 2022-09-03 NOTE — NURSING NOTE
Upon assessment pt is calm, cooperative, pleasant  He engages in conversation w/ good eye contact  He is compliant with scheduled medications  His appetite is good  He reports sleeping well  He currently rates his anxiety and depression as a 3/10  He denies SI/HI and hallucinations  He offers no complaints/ concerns at this time  Will CTM  Q7 minute safety checks in progress

## 2022-09-03 NOTE — PROGRESS NOTES
Progress Note - Amrita Razo 62 y o  male MRN: [id number]    Unit/Bed#: [facility] Encounter: [id number]      Assessment:  1  Hypertension   Currently not on any antihypertensives   BP is WNL   Continue to monitor  2  Celiac disease  Patient upset regarding his diet  States he does not follow a gluten free diet at home and has no issues with it  Diet changed  3  DJD/OA/migraines   Patient may take Tylenol, Fioricet as needed for migraine  Will add ibuprofen 800 mg q6h prn - states he has taken in the past without reaction  4  BPH   Continue Flomax 0 4 mg daily  5  Constipation   Patient may take MiraLax as needed  6  GERD   Patient may take Mylanta as needed  7  Vitamin-D deficiency   Patient started on vitamin D3 1000 units daily  Plan:  As above    Subjective:   No subjective complaints    Objective:     Vitals: Blood pressure 123/72, pulse 85, temperature 98 3 °F (36 8 °C), temperature source Temporal, resp  rate 18, height 6' 2" (1 88 m), weight 115 kg (252 lb 9 6 oz), SpO2 94 %  ,Body mass index is 32 43 kg/m²      No intake or output data in the 24 hours ending 09/03/22 1721    Physical Exam: /72 (BP Location: Left arm)   Pulse 85   Temp 98 3 °F (36 8 °C) (Temporal)   Resp 18   Ht 6' 2" (1 88 m)   Wt 115 kg (252 lb 9 6 oz)   SpO2 94%   BMI 32 43 kg/m²     General Appearance:    Alert, cooperative, no distress, appears stated age   Head:    Normocephalic, without obvious abnormality, atraumatic                   Neck:   Supple, symmetrical, trachea midline, no adenopathy;        thyroid:  No enlargement/tenderness/nodules; no carotid    bruit or JVD   Back:     Symmetric, no curvature, ROM normal, no CVA tenderness   Lungs:     Clear to auscultation bilaterally, respirations unlabored   Chest wall:    No tenderness or deformity   Heart:    Regular rate and rhythm, S1 and S2 normal, no murmur, rub   or gallop   Abdomen:     Soft, non-tender, bowel sounds active all four quadrants,     no masses, no organomegaly           Extremities:   Extremities normal, atraumatic, no cyanosis or edema   Pulses:   2+ and symmetric all extremities   Skin:   Skin color, texture, turgor normal, no rashes or lesions   Lymph nodes:   Cervical, supraclavicular, and axillary nodes normal            Invasive Devices  Report    None                 Lab, Imaging and other studies: I have personally reviewed pertinent reports

## 2022-09-04 PROCEDURE — 99232 SBSQ HOSP IP/OBS MODERATE 35: CPT | Performed by: PHYSICIAN ASSISTANT

## 2022-09-04 RX ADMIN — BUSPIRONE HYDROCHLORIDE 15 MG: 15 TABLET ORAL at 17:24

## 2022-09-04 RX ADMIN — CHOLECALCIFEROL TAB 25 MCG (1000 UNIT) 1000 UNITS: 25 TAB at 08:12

## 2022-09-04 RX ADMIN — OXYCODONE HYDROCHLORIDE AND ACETAMINOPHEN 500 MG: 500 TABLET ORAL at 08:12

## 2022-09-04 RX ADMIN — BUSPIRONE HYDROCHLORIDE 15 MG: 15 TABLET ORAL at 08:12

## 2022-09-04 RX ADMIN — Medication 6 MG: at 21:16

## 2022-09-04 RX ADMIN — MIRTAZAPINE 15 MG: 15 TABLET, FILM COATED ORAL at 21:16

## 2022-09-04 RX ADMIN — TAMSULOSIN HYDROCHLORIDE 0.4 MG: 0.4 CAPSULE ORAL at 15:41

## 2022-09-04 NOTE — NURSING NOTE
Patient visible on the unit  Pleasant and cooperative  Social with peers  Denies SI, HI, hallucinations  Rates  anxiety and depression as low  Compliant with  Medications  Participated in evening snack  Able to make needs known  Q 7 minute checks maintained  Will continue to monitor and access

## 2022-09-04 NOTE — PROGRESS NOTES
Progress Note - Behavioral Health     [employer] 62 y o  male MRN: [id number]   Unit/Bed#: [facility] 200-01 Encounter: [id number]     Behavior over the last 24 hours: chukcy Beaver seen in office  Has no complanits  Says he feels good and thinks current meds effective and tolerable  Has been interacting with peers  Denies distressing or disorganized thoughts  Says he did have a migraine but it resolved this morning  Sleep: good  Appetite: good  Medication side effects: denies  ROS: no complaints    Mental Status Evaluation:    Appearance:  age appropriate, dressed in hospital attire, fair grooming   Behavior:  pleasant, cooperative   Speech:  normal rate and volume   Mood:  improved   Affect:  brighter   Thought Process:  goal directed   Associations: intact associations   Thought Content:  no overt delusions   Perceptual Disturbances: none   Risk Potential: Suicidal ideation - None  Homicidal ideation - None   Sensorium:  oriented to person, place and time/date   Memory:  recent and remote memory grossly intact   Consciousness:  alert and awake   Attention: attention span and concentration are age appropriate   Insight:  intact   Judgment: intact   Gait/Station: normal gait/station   Motor Activity: no abnormal movements     Vital signs in last 24 hours:    Temp:  [97 7 °F (36 5 °C)-98 5 °F (36 9 °C)] 97 7 °F (36 5 °C)  HR:  [66-92] 66  Resp:  [18] 18  BP: ()/(55-75) 95/55    Laboratory results: I have personally reviewed all pertinent laboratory/tests results          Assessment/Plan   Principal Problem:    Schizoaffective disorder, depressive type (HCC)  Active Problems:    BETHANY (generalized anxiety disorder)    Gastroesophageal reflux disease without esophagitis    Celiac disease    BPH (benign prostatic hyperplasia)    Essential hypertension    Dependent personality disorder (HCC)    DJD (degenerative joint disease)    Recommended Treatment:  Cont present treatment    Planned medication and treatment changes:  No med changes    All current active medications have been reviewed  Encourage group therapy, milieu therapy and occupational therapy  Behavioral Health checks every 7 minutes  Current Facility-Administered Medications   Medication Dose Route Frequency Provider Last Rate    acetaminophen  650 mg Oral Q4H PRN Lolis Mina MD      acetaminophen  650 mg Oral Q4H PRN Lolis Mina MD      acetaminophen  975 mg Oral Q6H PRN Lolis Mina MD      aluminum-magnesium hydroxide-simethicone  30 mL Oral Q4H PRN Melissa Vasquez PA-C      ascorbic acid  500 mg Oral Daily Melissa Vasquez PA-C      busPIRone  15 mg Oral BID Lolis Mina MD      butalbital-acetaminophen-caffeine  1 tablet Oral Q8H PRN Melissa Vasquez PA-C      cholecalciferol  1,000 Units Oral Daily Melissa Vasquez PA-C      hydrOXYzine HCL  25 mg Oral Q6H PRN Max 4/day Lolis Mina MD      ibuprofen  800 mg Oral Q6H PRN Melissa Vasquez PA-C      LORazepam  1 mg Intramuscular Q6H PRN Max 3/day Lolis Mina MD      LORazepam  0 5 mg Oral Q6H PRN Max 4/day Lolis Mina MD      LORazepam  1 mg Oral Q6H PRN Max 3/day Lolis Mina MD      melatonin  6 mg Oral HS Lolis Mina MD      mirtazapine  15 mg Oral HS Lolis Mina MD      OLANZapine  5 mg Intramuscular Q3H PRN Max 3/day Lolis Mina MD      polyethylene glycol  17 g Oral Daily PRN Melissa Vasquez PA-C      QUEtiapine  100 mg Oral Q3H PRN Max 3/day Lolis Mina MD      QUEtiapine  25 mg Oral Q6H PRN Max 4/day Lolis Mina MD      QUEtiapine  50 mg Oral Q6H PRN Max 4/day Lolis Mina MD      tamsulosin  0 4 mg Oral Daily With Jaycob Fierro MD      traZODone  50 mg Oral HS PRN Lolis Mina MD         Risks / Benefits of Treatment:    Risks, benefits, and possible side effects of medications explained to patient and patient verbalizes understanding and agreement for treatment  Counseling / Coordination of Care:     Total floor / unit time spent today 15 minutes  Greater than 50% of total time was spent with the patient and / or family counseling and / or coordination of care  A description of counseling / coordination of care:  Patient's progress discussed with staff in treatment team meeting  Medications, treatment progress and treatment plan reviewed with patient      José Black PA-C 09/04/22

## 2022-09-04 NOTE — PLAN OF CARE
Problem: Ineffective Coping  Goal: Identifies healthy coping skills  Outcome: Progressing  Goal: Patient/Family verbalizes awareness of resources  Outcome: Progressing     Problem: Risk for Self Injury/Neglect  Goal: Verbalize thoughts and feelings  Description: Interventions:  - Assess and re-assess patient's lethality and potential for self-injury  - Engage patient in 1:1 interactions, daily, for a minimum of 15 minutes  - Encourage patient to express feelings, fears, frustrations, hopes  - Establish rapport/trust with patient   Outcome: Progressing  Goal: Refrain from harming self  Description: Interventions:  - Monitor patient closely, per order  - Develop a trusting relationship  - Supervise medication ingestion, monitor effects and side effects   Outcome: Progressing

## 2022-09-04 NOTE — PROGRESS NOTES
Progress Note - Ryan Wu 62 y o  male MRN: [id number]    Unit/Bed#: [facility] Encounter: [id number]      Assessment:  1  Hypertension   Currently not on any antihypertensives   BP is WNL   Continue to monitor  2  Celiac disease   Patient upset regarding his diet   States he does not follow a gluten free diet at home and has no issues with it   Diet changed  3  DJD/OA/migraines   Patient may take Tylenol, Fioricet as needed for migraine   Will add ibuprofen 800 mg q6h prn - states he has taken in the past without reaction  4  BPH   Continue Flomax 0 4 mg daily  5  Constipation   Patient may take MiraLax as needed  6  GERD   Patient may take Mylanta as needed  7  Vitamin-D deficiency   Patient started on vitamin D3 1000 units daily  Plan:  See above    Subjective:   No complaints    Objective:     Vitals: Blood pressure 138/80, pulse 87, temperature 98 3 °F (36 8 °C), temperature source Temporal, resp  rate 18, height 6' 2" (1 88 m), weight 115 kg (252 lb 9 6 oz), SpO2 93 %  ,Body mass index is 32 43 kg/m²      No intake or output data in the 24 hours ending 09/04/22 1817    Physical Exam: /80 (BP Location: Right arm)   Pulse 87   Temp 98 3 °F (36 8 °C) (Temporal)   Resp 18   Ht 6' 2" (1 88 m)   Wt 115 kg (252 lb 9 6 oz)   SpO2 93%   BMI 32 43 kg/m²     General Appearance:    Alert, cooperative, no distress, appears stated age   Head:    Normocephalic, without obvious abnormality, atraumatic                   Neck:   Supple, symmetrical, trachea midline, no adenopathy;        thyroid:  No enlargement/tenderness/nodules; no carotid    bruit or JVD   Back:     Symmetric, no curvature, ROM normal, no CVA tenderness   Lungs:     Clear to auscultation bilaterally, respirations unlabored   Chest wall:    No tenderness or deformity   Heart:    Regular rate and rhythm, S1 and S2 normal, no murmur, rub   or gallop   Abdomen:     Soft, non-tender, bowel sounds active all four quadrants,     no masses, no organomegaly           Extremities:   Extremities normal, atraumatic, no cyanosis or edema   Pulses:   2+ and symmetric all extremities   Skin:   Skin color, texture, turgor normal, no rashes or lesions   Lymph nodes:   Cervical, supraclavicular, and axillary nodes normal            Invasive Devices  Report    None                 Lab, Imaging and other studies: I have personally reviewed pertinent reports

## 2022-09-04 NOTE — NURSING NOTE
Patient resting in bed, awake/alert, pleasant and cooperative in interaction  Patient endorses anxiety/depression at 2-3/10  Denies SI/HI, hallucinations  Remains medication compliant and on 7" checks for safety and behaviors

## 2022-09-04 NOTE — PLAN OF CARE
Problem: Ineffective Coping  Goal: Identifies healthy coping skills  Outcome: Progressing  Goal: Participates in unit activities  Description: Interventions:  - Provide therapeutic environment   - Provide required programming   - Redirect inappropriate behaviors   Outcome: Progressing  Goal: Patient/Family participate in treatment and DC plans  Description: Interventions:  - Provide therapeutic environment  Outcome: Progressing  Goal: Patient/Family verbalizes awareness of resources  Outcome: Progressing     Problem: Risk for Self Injury/Neglect  Goal: Treatment Goal: Remain safe during length of stay, learn and adopt new coping skills, and be free of self-injurious ideation, impulses and acts at the time of discharge  Outcome: Progressing  Goal: Verbalize thoughts and feelings  Description: Interventions:  - Assess and re-assess patient's lethality and potential for self-injury  - Engage patient in 1:1 interactions, daily, for a minimum of 15 minutes  - Encourage patient to express feelings, fears, frustrations, hopes  - Establish rapport/trust with patient   Outcome: Progressing  Goal: Refrain from harming self  Description: Interventions:  - Monitor patient closely, per order  - Develop a trusting relationship  - Supervise medication ingestion, monitor effects and side effects   Outcome: Progressing     Problem: Depression  Goal: Treatment Goal: Demonstrate behavioral control of depressive symptoms, verbalize feelings of improved mood/affect, and adopt new coping skills prior to discharge  Outcome: Progressing  Goal: Refrain from isolation  Description: Interventions:  - Develop a trusting relationship   - Encourage socialization   Outcome: Progressing  Goal: Refrain from self-neglect  Outcome: Progressing  Goal: Complete daily ADLs, including personal hygiene independently, as able  Description: Interventions:  - Observe, teach, and assist patient with ADLS  -  Monitor and promote a balance of rest/activity, with adequate nutrition and elimination   Outcome: Progressing     Problem: Anxiety  Goal: Anxiety is at manageable level  Description: Interventions:  - Assess and monitor patient's anxiety level  - Monitor for signs and symptoms (heart palpitations, chest pain, shortness of breath, headaches, nausea, feeling jumpy, restlessness, irritable, apprehensive)  - Collaborate with interdisciplinary team and initiate plan and interventions as ordered  - Mobile patient to unit/surroundings  - Explain treatment plan  - Encourage participation in care  - Encourage verbalization of concerns/fears  - Identify coping mechanisms  - Assist in developing anxiety-reducing skills  - Administer/offer alternative therapies  - Limit or eliminate stimulants  Outcome: Progressing     Problem: Nutrition/Hydration-ADULT  Goal: Nutrient/Hydration intake appropriate for improving, restoring or maintaining nutritional needs  Description: Monitor and assess patient's nutrition/hydration status for malnutrition  Collaborate with interdisciplinary team and initiate plan and interventions as ordered  Monitor patient's weight and dietary intake as ordered or per policy  Utilize nutrition screening tool and intervene as necessary  Determine patient's food preferences and provide high-protein, high-caloric foods as appropriate       INTERVENTIONS:  - Monitor oral intake, urinary output, labs, and treatment plans  - Assess nutrition and hydration status and recommend course of action  - Evaluate amount of meals eaten  - Assist patient with eating if necessary   - Allow adequate time for meals  - Recommend/ encourage appropriate diets, oral nutritional supplements, and vitamin/mineral supplements  - Order, calculate, and assess calorie counts as needed  - Recommend, monitor, and adjust tube feedings and TPN/PPN based on assessed needs  - Assess need for intravenous fluids  - Provide specific nutrition/hydration education as appropriate  - Include patient/family/caregiver in decisions related to nutrition  Outcome: Progressing

## 2022-09-05 PROCEDURE — 99232 SBSQ HOSP IP/OBS MODERATE 35: CPT | Performed by: PHYSICIAN ASSISTANT

## 2022-09-05 RX ADMIN — BUSPIRONE HYDROCHLORIDE 15 MG: 15 TABLET ORAL at 17:21

## 2022-09-05 RX ADMIN — Medication 6 MG: at 21:54

## 2022-09-05 RX ADMIN — BUSPIRONE HYDROCHLORIDE 15 MG: 15 TABLET ORAL at 08:20

## 2022-09-05 RX ADMIN — MIRTAZAPINE 15 MG: 15 TABLET, FILM COATED ORAL at 21:55

## 2022-09-05 RX ADMIN — OXYCODONE HYDROCHLORIDE AND ACETAMINOPHEN 500 MG: 500 TABLET ORAL at 08:20

## 2022-09-05 RX ADMIN — CHOLECALCIFEROL TAB 25 MCG (1000 UNIT) 1000 UNITS: 25 TAB at 08:20

## 2022-09-05 RX ADMIN — TAMSULOSIN HYDROCHLORIDE 0.4 MG: 0.4 CAPSULE ORAL at 16:03

## 2022-09-05 NOTE — NURSING NOTE
Patient was visible in the community this evening; observed to be watching a football game on the television with peers  He is calm and pleasant during staff interactions  Denies any pain  Anxiety and depression are controlled per patient, denies SI, HI and hallucinations  No acute behaviors observed  No complaints voiced  He was medication compliant at   Will CTM  Q7 minute safety checks in progress

## 2022-09-05 NOTE — PROGRESS NOTES
Progress Note - Kayla Silva 62 y o  male MRN: [id number]    Unit/Bed#: [facility] Encounter: [id number]        Subjective:   Patient seen and examined at bedside after reviewing the chart and discussing the case with the caring staff  Patient examined at bedside  Patient reports that his headache has now resolved  Physical Exam   Vitals: Blood pressure 145/75, pulse 96, temperature 98 6 °F (37 °C), temperature source Temporal, resp  rate 18, height 6' 2" (1 88 m), weight 115 kg (252 lb 9 6 oz), SpO2 95 %  ,Body mass index is 32 43 kg/m²  Constitutional: Patient appears well-developed  HEENT: PERR, EOMI, MMM  Cardiovascular: Normal rate and regular rhythm  Pulmonary/Chest: Effort normal and breath sounds normal    Abdomen:  Nondistended  Assessment/Plan:  Kayla Silva is a(n) 62 y o  male with MDD      1  Hypertension  Currently not on any antihypertensives  BP is WNL  Continue to monitor  2  Celiac disease  Patient upset regarding his diet  States he does not follow a gluten free diet at home and has no issues with it  Diet changed  3  DJD/OA/migraines  Patient may take Tylenol, Fioricet as needed for migraine  Will add ibuprofen 800 mg q6h prn - states he has taken in the past without reaction  4  BPH  Continue Flomax 0 4 mg daily  5  Constipation  Patient may take MiraLax as needed  6  GERD  Patient may take Mylanta as needed  7  Vitamin-D deficiency  Patient started on vitamin D3 1000 units daily

## 2022-09-05 NOTE — NURSING NOTE
Patient visible in milieu, pleasant and cooperative in interaction  Social with staff and peers  Patient rates anxiety/depression at 2/10, denies SI/HI, hallucinations  Patient states he feels he is at "baseline"  Remains medication compliant and on 7" checks for safety and behaviors

## 2022-09-05 NOTE — PROGRESS NOTES
Progress Note - Behavioral Health     Carey Singh 62 y o  male MRN: [id number]   Unit/Bed#: [facility]01 Encounter: [id number]    Behavior over the last 24 hours: unchanged  Bearariella Calles says he is at his baseline and feels ready for discharge  Denies SI  Reports low depression and anxiety; no psychotic symptoms and has been out and interacting on unit  No need for prns  Sleep: good  Appetite: good  Medication side effects: none reported or observed  ROS: no complaints    Mental Status Evaluation:    Appearance:  disheveled, dressed in hospital attire   Behavior:  pleasant, cooperative   Speech:  normal rate and volume   Mood:  low   Affect:  constricted   Thought Process:  goal directed   Associations: intact associations   Thought Content:  no overt delusions   Perceptual Disturbances: none   Risk Potential: Suicidal ideation - None  Homicidal ideation - None   Sensorium:  oriented to person, place and time/date   Memory:  recent and remote memory grossly intact   Consciousness:  alert and awake   Attention: attention span and concentration are age appropriate   Insight:  intact   Judgment: intact   Gait/Station: normal gait/station   Motor Activity: no abnormal movements     Vital signs in last 24 hours:    Temp:  [98 3 °F (36 8 °C)-98 5 °F (36 9 °C)] 98 3 °F (36 8 °C)  HR:  [63-89] 63  Resp:  [16-18] 16  BP: (101-138)/(59-80) 101/59    Laboratory results: I have personally reviewed all pertinent laboratory/tests results          Assessment/Plan   Principal Problem:    Schizoaffective disorder, depressive type (HCC)  Active Problems:    BETHANY (generalized anxiety disorder)    Gastroesophageal reflux disease without esophagitis    Celiac disease    BPH (benign prostatic hyperplasia)    Essential hypertension    Dependent personality disorder (HCC)    DJD (degenerative joint disease)    Recommended Treatment:  Cont present treatment    Planned medication and treatment changes: no med change: buspar 15 mg bid, melatonin 6 mg q bedtime, remeron 15 mg q bedtime    All current active medications have been reviewed  Encourage group therapy, milieu therapy and occupational therapy  Behavioral Health checks every 7 minutes  Current Facility-Administered Medications   Medication Dose Route Frequency Provider Last Rate    acetaminophen  650 mg Oral Q4H PRN Marian Santana MD      acetaminophen  650 mg Oral Q4H PRN Marian Santana MD      acetaminophen  975 mg Oral Q6H PRN Marian Santana MD      aluminum-magnesium hydroxide-simethicone  30 mL Oral Q4H PRN Melissa Vasquez PA-C      ascorbic acid  500 mg Oral Daily Melissa Vasquez PA-C      busPIRone  15 mg Oral BID Marian Santana MD      butalbital-acetaminophen-caffeine  1 tablet Oral Q8H PRN Melissa Vasquez PA-C      cholecalciferol  1,000 Units Oral Daily Melissa Vasquez PA-C      hydrOXYzine HCL  25 mg Oral Q6H PRN Max 4/day Marian Santana MD      ibuprofen  800 mg Oral Q6H PRN Melissa Vasquez PA-C      LORazepam  1 mg Intramuscular Q6H PRN Max 3/day Marian Santana MD      LORazepam  0 5 mg Oral Q6H PRN Max 4/day Marian Santana MD      LORazepam  1 mg Oral Q6H PRN Max 3/day Marian Santana MD      melatonin  6 mg Oral HS Marian Santana MD      mirtazapine  15 mg Oral HS Marian Santana MD      OLANZapine  5 mg Intramuscular Q3H PRN Max 3/day Marian Santana MD      polyethylene glycol  17 g Oral Daily PRN Melissa Vasquez PA-C      QUEtiapine  100 mg Oral Q3H PRN Max 3/day Marian Santana MD      QUEtiapine  25 mg Oral Q6H PRN Max 4/day Marian Santana MD      QUEtiapine  50 mg Oral Q6H PRN Max 4/day Marian Santana MD      tamsulosin  0 4 mg Oral Daily With Rubi Valdez MD      traZODone  50 mg Oral HS PRN Marian Santana MD         Risks / Benefits of Treatment:    Risks, benefits, and possible side effects of medications explained to patient and patient verbalizes understanding and agreement for treatment  Counseling / Coordination of Care:     Total floor / unit time spent today 15 minutes  Greater than 50% of total time was spent with the patient and / or family counseling and / or coordination of care  A description of counseling / coordination of care:  Patient's progress discussed with staff in treatment team meeting  Medications, treatment progress and treatment plan reviewed with patient      Olga Altamirano PA-C 09/05/22

## 2022-09-05 NOTE — PLAN OF CARE
Problem: Ineffective Coping  Goal: Identifies healthy coping skills  Outcome: Progressing  Goal: Participates in unit activities  Description: Interventions:  - Provide therapeutic environment   - Provide required programming   - Redirect inappropriate behaviors   Outcome: Progressing  Goal: Patient/Family participate in treatment and DC plans  Description: Interventions:  - Provide therapeutic environment  Outcome: Progressing  Goal: Patient/Family verbalizes awareness of resources  Outcome: Progressing     Problem: Risk for Self Injury/Neglect  Goal: Treatment Goal: Remain safe during length of stay, learn and adopt new coping skills, and be free of self-injurious ideation, impulses and acts at the time of discharge  Outcome: Progressing  Goal: Verbalize thoughts and feelings  Description: Interventions:  - Assess and re-assess patient's lethality and potential for self-injury  - Engage patient in 1:1 interactions, daily, for a minimum of 15 minutes  - Encourage patient to express feelings, fears, frustrations, hopes  - Establish rapport/trust with patient   Outcome: Progressing  Goal: Refrain from harming self  Description: Interventions:  - Monitor patient closely, per order  - Develop a trusting relationship  - Supervise medication ingestion, monitor effects and side effects   Outcome: Progressing     Problem: Depression  Goal: Treatment Goal: Demonstrate behavioral control of depressive symptoms, verbalize feelings of improved mood/affect, and adopt new coping skills prior to discharge  Outcome: Progressing  Goal: Refrain from isolation  Description: Interventions:  - Develop a trusting relationship   - Encourage socialization   Outcome: Progressing  Goal: Refrain from self-neglect  Outcome: Progressing  Goal: Complete daily ADLs, including personal hygiene independently, as able  Description: Interventions:  - Observe, teach, and assist patient with ADLS  -  Monitor and promote a balance of rest/activity, with adequate nutrition and elimination   Outcome: Progressing     Problem: Anxiety  Goal: Anxiety is at manageable level  Description: Interventions:  - Assess and monitor patient's anxiety level  - Monitor for signs and symptoms (heart palpitations, chest pain, shortness of breath, headaches, nausea, feeling jumpy, restlessness, irritable, apprehensive)  - Collaborate with interdisciplinary team and initiate plan and interventions as ordered  - Hallie patient to unit/surroundings  - Explain treatment plan  - Encourage participation in care  - Encourage verbalization of concerns/fears  - Identify coping mechanisms  - Assist in developing anxiety-reducing skills  - Administer/offer alternative therapies  - Limit or eliminate stimulants  Outcome: Progressing     Problem: Nutrition/Hydration-ADULT  Goal: Nutrient/Hydration intake appropriate for improving, restoring or maintaining nutritional needs  Description: Monitor and assess patient's nutrition/hydration status for malnutrition  Collaborate with interdisciplinary team and initiate plan and interventions as ordered  Monitor patient's weight and dietary intake as ordered or per policy  Utilize nutrition screening tool and intervene as necessary  Determine patient's food preferences and provide high-protein, high-caloric foods as appropriate       INTERVENTIONS:  - Monitor oral intake, urinary output, labs, and treatment plans  - Assess nutrition and hydration status and recommend course of action  - Evaluate amount of meals eaten  - Assist patient with eating if necessary   - Allow adequate time for meals  - Recommend/ encourage appropriate diets, oral nutritional supplements, and vitamin/mineral supplements  - Order, calculate, and assess calorie counts as needed  - Recommend, monitor, and adjust tube feedings and TPN/PPN based on assessed needs  - Assess need for intravenous fluids  - Provide specific nutrition/hydration education as appropriate  - Include patient/family/caregiver in decisions related to nutrition  Outcome: Progressing

## 2022-09-06 PROCEDURE — 99232 SBSQ HOSP IP/OBS MODERATE 35: CPT | Performed by: PSYCHIATRY & NEUROLOGY

## 2022-09-06 RX ORDER — CITALOPRAM 20 MG/1
20 TABLET ORAL DAILY
Status: DISCONTINUED | OUTPATIENT
Start: 2022-09-07 | End: 2022-09-09 | Stop reason: HOSPADM

## 2022-09-06 RX ADMIN — Medication 6 MG: at 21:38

## 2022-09-06 RX ADMIN — BUSPIRONE HYDROCHLORIDE 15 MG: 15 TABLET ORAL at 17:05

## 2022-09-06 RX ADMIN — CHOLECALCIFEROL TAB 25 MCG (1000 UNIT) 1000 UNITS: 25 TAB at 08:10

## 2022-09-06 RX ADMIN — TAMSULOSIN HYDROCHLORIDE 0.4 MG: 0.4 CAPSULE ORAL at 17:05

## 2022-09-06 RX ADMIN — BUSPIRONE HYDROCHLORIDE 15 MG: 15 TABLET ORAL at 08:10

## 2022-09-06 RX ADMIN — OXYCODONE HYDROCHLORIDE AND ACETAMINOPHEN 500 MG: 500 TABLET ORAL at 08:10

## 2022-09-06 RX ADMIN — MIRTAZAPINE 15 MG: 15 TABLET, FILM COATED ORAL at 21:38

## 2022-09-06 NOTE — DISCHARGE INSTR - OTHER ORDERS
You are being discharged to your home located at [facility], [facility]; Phone: [phone]     Triggers you have identified during your hospitalization that led to your admission with suicidal ideation and distressed mood due to relationship stressors and ineffective coping skills  Coping skills you have identified during your hospitalization include watching tv and spending time with Ricci Barney  If you are unable to deal with your distressed mood alone please talk to Ricci Barney or contact one of your psychiatric providers at [address] [facility] [address]  If that is not effective and you continue to have suicidal ideation and/or distressed mood, please contact Cindy Malloy at [phone], dial 911 or go to the nearest emergency center  *National Suicide Prevention Lifeline:  4-946.830.5918  *[facility] ([place]) HELPLINE: 590.596.8494/Website: www harjit org  *Substance Abuse and Mental Health Services Administration([facility]) [employer], which is a confidential, free, 24-hour-a-day, 365-day-a-year, information service for individuals and family members facing mental health and/or substance use disorders  This service provides referrals to local treatment facilities, support groups, and community-based organizations  Callers can also order free publications and other information  Call 1-256.765.6091/Website: www [facility] gov  *[facility] 2-1-1: This is a toll free, confidential, 24-hour-a-day service which connects you to a community  in your area who can help you find services and resources that are available to you locally and provide critical services that can improve and save lives    Call: 211  /Website: https://zhaneClinicalBoxerna net/

## 2022-09-06 NOTE — CMS CERTIFICATION NOTE
Recertification: Based upon physical, mental and social evaluations, I certify that inpatient psychiatric services continue to be medically necessary for this patient for a duration of 20 midnights for the treatment of Schizoaffective disorder, depressive type [facility])   Available alternative community resources still do not meet the patient's mental health care needs  I further attest that an established written individualized plan of care has been updated and is outlined in the patient's medical records

## 2022-09-06 NOTE — PROGRESS NOTES
09/06/22   Team Meeting   Meeting Type Daily Rounds   Team Members Present   Team Members Present Physician;Nurse;;; Occupational Therapist   Physician Team Member Dr Hannah Jack MD   Nursing Team Member Dwain Lan RN   Care Management Team Member MS Danny, [facility]   Social Work Team Member Brent Schulz [facility]   OT Team Member Angela Maldonado [place]   Patient/Family Present   Patient Present No   Patient's Family Present No     Pleasant, social; slept; DC Thurs

## 2022-09-06 NOTE — SOCIAL WORK
SW placed phone call to pt significant other, Martha Cardona [phone]; left message on voicemail - awaiting response

## 2022-09-06 NOTE — SOCIAL WORK
JAXON sent PHP referral to [facility] - PHP intake Friday 9/9 at 830a; program start 9/12 9a    SW sent [facility] referral to [facility]

## 2022-09-06 NOTE — NURSING NOTE
Patient visible on the unit  Pleasant and social with peers  Calm and cooperative in interactions  Denies SI, HI, Hallucinations, and depression  Minimal anxiety  Q 7 minute safety checks maintained

## 2022-09-06 NOTE — PROGRESS NOTES
09/06/22 0730   Activity/Group Checklist   Group Community meeting  (Patient check in with coffee/ patients eating and drink in room for covid precautions)   Attendance Attended   Attendance Duration (min) 31-45   Interactions Interacted appropriately   Affect/Mood Appropriate;Blunted/flat   Goals Achieved Able to listen to others; Able to engage in interactions; Able to recieve feedback; Able to self-disclose

## 2022-09-06 NOTE — PLAN OF CARE
Problem: Risk for Self Injury/Neglect  Goal: Verbalize thoughts and feelings  Description: Interventions:  - Assess and re-assess patient's lethality and potential for self-injury  - Engage patient in 1:1 interactions, daily, for a minimum of 15 minutes  - Encourage patient to express feelings, fears, frustrations, hopes  - Establish rapport/trust with patient   Outcome: Progressing  Goal: Refrain from harming self  Description: Interventions:  - Monitor patient closely, per order  - Develop a trusting relationship  - Supervise medication ingestion, monitor effects and side effects   Outcome: Progressing     Problem: Depression  Goal: Refrain from isolation  Description: Interventions:  - Develop a trusting relationship   - Encourage socialization   Outcome: Progressing     Problem: Anxiety  Goal: Anxiety is at manageable level  Description: Interventions:  - Assess and monitor patient's anxiety level  - Monitor for signs and symptoms (heart palpitations, chest pain, shortness of breath, headaches, nausea, feeling jumpy, restlessness, irritable, apprehensive)  - Collaborate with interdisciplinary team and initiate plan and interventions as ordered    - [place] patient to unit/surroundings  - Explain treatment plan  - Encourage participation in care  - Encourage verbalization of concerns/fears  - Identify coping mechanisms  - Assist in developing anxiety-reducing skills  - Administer/offer alternative therapies  - Limit or eliminate stimulants  Outcome: Progressing

## 2022-09-06 NOTE — PROGRESS NOTES
09/06/22 1100   Activity/Group Checklist   Group Wellness   Attendance Attended   Attendance Duration (min) 16-30  (pt joined grp late)   Interactions Interacted appropriately   Affect/Mood Appropriate   Goals Achieved Identified feelings; Able to listen to others; Able to engage in interactions; Able to self-disclose; Able to recieve feedback; Discussed coping strategies

## 2022-09-06 NOTE — NURSING NOTE
Patient visible on the unit  Pleasant and social with peers  He slept well last night  Rated anxiety and depression a 2  Denied SI,HI, or hallucinations  Medication compliant  Q7 minute safety checks maintained

## 2022-09-06 NOTE — DISCHARGE INSTR - APPOINTMENTS
The treatment team recommends participation in a partial hospitalization program with continued medication management, group and individual therapy services upon discharge; you agreed to a referral   A referral was made on your behalf to [facility] located at [address] [place] [address] Phone: [phone]  You are scheduled for intake on Monday, Sept 12th at [address] [facility] with the psychiatrist followed by intake with the  at [place] [address]  You will begin the program on Tuesday, Sept 13th at [facility]  You will receive a detailed email ([email or web address]) from Donnell Mares, program , with directions on how to access the program  Directions have also been included below  The program takes place daily Mon-Fri 9am-2pm and you must participate daily  The treatment team recommends ongoing medication management upon discharge with your current psychiatric provider  A follow up appointment has been made on your behalf with Irvin Grant, [address] [place] [address], [place] [address], Phone: [phone]  Your appointment is scheduled for Tuesday, Sept 20th at 215pm with Dr Elif Huddleston MD  Please plan to arrive 15 minutes prior to your scheduled appointment and bring your insurance card(s), photo ID  Your discharge will be faxed to this provider for continuity of care  **Please note: If you are still attending partial program at the time of this appointment, you must reschedule this appointment for a later date**    You will continue your individual therapy services with your current therapist, Maral Graves, [facility], [address] [place] [address] Phone: [phone]  You are scheduled for your next appointment on Thurs, Sept 22nd at [facility] with Dale Mock  Please plan to arrive 15 minutes prior to your scheduled appointment and bring your insurance card(s), photo ID    Your discharge will be faxed to this provider for continuity of care  **Please note: If you are still attending partial program at the time of this appointment, you must reschedule this appointment for a later date**    The treatment team recommended case management services upon discharge; you were agreeable to a referral  A referral has been made on your behalf to [facility], Phone: [phone]  A  will call you within 10 business days to schedule an appointment for intake for these services  Your discharge will be faxed to this provider for continuity of care

## 2022-09-06 NOTE — PLAN OF CARE
Problem: Ineffective Coping  Goal: Participates in unit activities  Description: Interventions:  - Provide therapeutic environment   - Provide required programming   - Redirect inappropriate behaviors   Outcome: Progressing   Patient out for groups and more interactive and social in group setting

## 2022-09-06 NOTE — PROGRESS NOTES
Progress Note - Behavioral Health   This note was not shared with the patient due to reasonable likelihood of causing patient harm  Carey Singh 62 y o  male MRN: [id number]   Unit/Bed#: [facility] 200-01 Encounter: [id number]    Behavior over the last 24 hours: slowly improving  Sleep: slept better  Appetite: normal  Medication side effects: denies  ROS: all other systems are negative    Mental Status Evaluation:    Appearance:  age appropriate   Behavior:  cooperative   Speech:  normal volume   Mood:  depressed, anxious   Affect:  mood-congruent   Thought Process:  goal directed   Associations: intact associations   Thought Content:  no overt delusions   Perceptual Disturbances: none   Risk Potential: Suicidal ideation - None  Homicidal ideation - None   Sensorium:  oriented to person, place and time/date   Memory:  recent and remote memory grossly intact   Consciousness:  alert and awake   Attention: attention span and concentration are age appropriate   Insight:  limited   Judgment: limited   Gait/Station: normal gait/station   Motor Activity: no abnormal movements     Vital signs in last 24 hours:    Temp:  [98 2 °F (36 8 °C)-98 6 °F (37 °C)] 98 5 °F (36 9 °C)  HR:  [70-96] 70  Resp:  [16-18] 16  BP: ()/(54-76) 95/54    Laboratory results:   I have personally reviewed all pertinent laboratory/tests results    Most Recent Labs:   Lab Results   Component Value Date    WBC 5 49 08/27/2022    RBC 4 82 08/27/2022    HGB 14 9 08/27/2022    HCT 45 1 08/27/2022     08/27/2022    RDW 12 6 08/27/2022    NEUTROABS 2 91 08/27/2022    SODIUM 142 08/27/2022    K 3 8 08/27/2022     08/27/2022    CO2 27 08/27/2022    BUN 11 08/27/2022    CREATININE 1 04 08/27/2022    GLUC 98 08/27/2022    GLUF 94 02/08/2021    CALCIUM 9 1 08/27/2022    AST 12 (L) 08/27/2022    ALT 16 08/27/2022    ALKPHOS 73 08/27/2022    TP 6 6 08/27/2022    ALB 4 1 08/27/2022    TBILI 0 34 08/27/2022    CHOLESTEROL 168 01/18/2021 HDL 41 01/18/2021    TRIG 93 01/18/2021    LDLCALC 108 (H) 01/18/2021    [facility] 127 01/18/2021    [id number] 1 189 08/27/2022    RPR Non-Reactive 03/26/2021    HGBA1C 5 6 08/05/2022     08/05/2022       Assessment/Plan   Principal Problem:    Schizoaffective disorder, depressive type ([address] )  Active Problems:    BETHANY (generalized anxiety disorder)    Gastroesophageal reflux disease without esophagitis    Celiac disease    BPH (benign prostatic hyperplasia)    Essential hypertension    Dependent personality disorder (HCC)    DJD (degenerative joint disease)    Recommended Treatment:     Planned medication and treatment changes:     All current active medications have been reviewed  Encourage group therapy, milieu therapy and occupational therapy  Behavioral Health checks every 7 minutes   Celexa 20 mg po daily    Current Facility-Administered Medications   Medication Dose Route Frequency Provider Last Rate    acetaminophen  650 mg Oral Q4H PRN Murillo Ripper, MD      acetaminophen  650 mg Oral Q4H PRN Murillo Ripper, MD      acetaminophen  975 mg Oral Q6H PRN Murillo Ripper, MD      aluminum-magnesium hydroxide-simethicone  30 mL Oral Q4H PRN  L Dagnall, PA-C      ascorbic acid  500 mg Oral Daily Melissa Vasquez, PA-C      busPIRone  15 mg Oral BID Murillo Ripper, MD      butalbital-acetaminophen-caffeine  1 tablet Oral Q8H PRN  L Dagnall, PA-C      cholecalciferol  1,000 Units Oral Daily  L Dagaugustal, PA-C      hydrOXYzine HCL  25 mg Oral Q6H PRN Max 4/day Murillo Ripper, MD      ibuprofen  800 mg Oral Q6H PRN  L Dagaugustal, PA-C      LORazepam  1 mg Intramuscular Q6H PRN Max 3/day Murillo Ripper, MD      LORazepam  0 5 mg Oral Q6H PRN Max 4/day Murillo Ripper, MD      LORazepam  1 mg Oral Q6H PRN Max 3/day Murillo Ripper, MD      melatonin  6 mg Oral HS Murillo Ripper, MD      mirtazapine  15 mg Oral HS Murillo Ripper, MD      OLANZapine  5 mg Intramuscular Q3H PRN Max 3/day Murillo Ripper, MD     [facility] polyethylene glycol  17 g Oral Daily PRN Melissa Vasquez PA-C      QUEtiapine  100 mg Oral Q3H PRN Max 3/day Clarence Salinas MD      QUEtiapine  25 mg Oral Q6H PRN Max 4/day Clarence Salinas MD      QUEtiapine  50 mg Oral Q6H PRN Max 4/day Clarence Salinas MD      tamsulosin  0 4 mg Oral Daily With Monet Zamora MD      traZODone  50 mg Oral HS PRN Clarence Salinas MD         Risks / Benefits of Treatment:    Risks, benefits, and possible side effects of medications explained to patient and patient verbalizes understanding and agreement for treatment  Counseling / Coordination of Care:    Patient's progress discussed with staff in treatment team meeting  Medications, treatment progress and treatment plan reviewed with patient  Supportive therapy provided to patient  Group attendance encouraged      Tatiana Maharaj MD 09/06/22

## 2022-09-07 PROCEDURE — 99232 SBSQ HOSP IP/OBS MODERATE 35: CPT | Performed by: PSYCHIATRY & NEUROLOGY

## 2022-09-07 RX ORDER — BUTALBITAL, ACETAMINOPHEN AND CAFFEINE 50; 325; 40 MG/1; MG/1; MG/1
1 TABLET ORAL EVERY 8 HOURS PRN
Qty: 50 TABLET | Refills: 0 | Status: SHIPPED | OUTPATIENT
Start: 2022-09-07 | End: 2022-09-17

## 2022-09-07 RX ORDER — TAMSULOSIN HYDROCHLORIDE 0.4 MG/1
0.4 CAPSULE ORAL
Qty: 30 CAPSULE | Refills: 0 | Status: SHIPPED | OUTPATIENT
Start: 2022-09-07

## 2022-09-07 RX ORDER — IBUPROFEN 800 MG/1
800 TABLET ORAL EVERY 6 HOURS PRN
Qty: 60 TABLET | Refills: 0 | Status: SHIPPED | OUTPATIENT
Start: 2022-09-07

## 2022-09-07 RX ORDER — ASCORBIC ACID 500 MG
500 TABLET ORAL DAILY
Qty: 30 TABLET | Refills: 0 | Status: SHIPPED | OUTPATIENT
Start: 2022-09-07

## 2022-09-07 RX ORDER — MELATONIN
2000 DAILY
Qty: 60 TABLET | Refills: 0 | Status: SHIPPED | OUTPATIENT
Start: 2022-09-07

## 2022-09-07 RX ORDER — CITALOPRAM 20 MG/1
20 TABLET ORAL DAILY
Qty: 30 TABLET | Refills: 0 | Status: SHIPPED | OUTPATIENT
Start: 2022-09-08 | End: 2022-10-18 | Stop reason: SDUPTHER

## 2022-09-07 RX ORDER — ACETAMINOPHEN 500 MG
500 TABLET ORAL EVERY 6 HOURS PRN
Qty: 100 TABLET | Refills: 0 | Status: SHIPPED | OUTPATIENT
Start: 2022-09-07

## 2022-09-07 RX ORDER — CHOLECALCIFEROL (VITAMIN D3) 125 MCG
5 CAPSULE ORAL
Qty: 30 TABLET | Refills: 0 | Status: SHIPPED | OUTPATIENT
Start: 2022-09-07

## 2022-09-07 RX ADMIN — CHOLECALCIFEROL TAB 25 MCG (1000 UNIT) 1000 UNITS: 25 TAB at 08:13

## 2022-09-07 RX ADMIN — BUSPIRONE HYDROCHLORIDE 15 MG: 15 TABLET ORAL at 17:12

## 2022-09-07 RX ADMIN — MIRTAZAPINE 15 MG: 15 TABLET, FILM COATED ORAL at 21:31

## 2022-09-07 RX ADMIN — OXYCODONE HYDROCHLORIDE AND ACETAMINOPHEN 500 MG: 500 TABLET ORAL at 08:13

## 2022-09-07 RX ADMIN — TAMSULOSIN HYDROCHLORIDE 0.4 MG: 0.4 CAPSULE ORAL at 17:12

## 2022-09-07 RX ADMIN — BUSPIRONE HYDROCHLORIDE 15 MG: 15 TABLET ORAL at 08:13

## 2022-09-07 RX ADMIN — CITALOPRAM HYDROBROMIDE 20 MG: 20 TABLET ORAL at 08:13

## 2022-09-07 RX ADMIN — Medication 6 MG: at 21:31

## 2022-09-07 NOTE — CASE MANAGEMENT
Writer attempted to contact Miesha Gold, pt's significant other at [phone] per her concerns about patient discharging tomorrow  No response and no voicemail available  Writer also attempted to reach Bhupendra Reardon at [phone], no response, however, writer did leave a message requesting a call back

## 2022-09-07 NOTE — PROGRESS NOTES
09/07/22 0730   Activity/Group Checklist   Group Community meeting  (Patient check in with coffee/ patients eating and drink in room for covid precautions)   Attendance Attended   Attendance Duration (min) 31-45   Interactions Interacted appropriately   Affect/Mood Appropriate;Calm   Goals Achieved Identified feelings; Able to engage in interactions; Able to listen to others; Able to self-disclose; Able to recieve feedback

## 2022-09-07 NOTE — NURSING NOTE
Patient visible on the unit  Pleasant and cooperative  Social with peers  Denies SI, HI, hallucinations  Anxiety and depression minimal Q 7 minute checks maintained  Will continue to monitor and access

## 2022-09-07 NOTE — PROGRESS NOTES
Progress Note - Behavioral Health   This note was not shared with the patient due to reasonable likelihood of causing patient harm  Tamara Wall 62 y o  male MRN: [id number]   Unit/Bed#: [facility] 200-01 Encounter: [id number]    Behavior over the last 24 hours: improving  Sleep: slept better  Appetite: normal  Medication side effects: denies  ROS: all other systems are negative    Mental Status Evaluation:    Appearance:  age appropriate   Behavior:  calm   Speech:  normal rate and volume   Mood:  euthymic   Affect:  mood-congruent   Thought Process:  goal directed   Associations: intact associations   Thought Content:  no overt delusions   Perceptual Disturbances: none   Risk Potential: Suicidal ideation - None  Homicidal ideation - None   Sensorium:  oriented to person, place and time/date   Memory:  recent and remote memory grossly intact   Consciousness:  alert and awake   Attention: attention span and concentration are age appropriate   Insight:  limited   Judgment: fair   Gait/Station: normal gait/station   Motor Activity: no abnormal movements     Vital signs in last 24 hours:    Temp:  [98 1 °F (36 7 °C)-98 7 °F (37 1 °C)] 98 1 °F (36 7 °C)  HR:  [69-97] 69  Resp:  [18-19] 18  BP: ()/(54-77) 97/54    Laboratory results:   I have personally reviewed all pertinent laboratory/tests results    Most Recent Labs:   Lab Results   Component Value Date    WBC 5 49 08/27/2022    RBC 4 82 08/27/2022    HGB 14 9 08/27/2022    HCT 45 1 08/27/2022     08/27/2022    RDW 12 6 08/27/2022    NEUTROABS 2 91 08/27/2022    SODIUM 142 08/27/2022    K 3 8 08/27/2022     08/27/2022    CO2 27 08/27/2022    BUN 11 08/27/2022    CREATININE 1 04 08/27/2022    GLUC 98 08/27/2022    GLUF 94 02/08/2021    CALCIUM 9 1 08/27/2022    AST 12 (L) 08/27/2022    ALT 16 08/27/2022    ALKPHOS 73 08/27/2022    TP 6 6 08/27/2022    ALB 4 1 08/27/2022    TBILI 0 34 08/27/2022    CHOLESTEROL 168 01/18/2021    HDL 41 01/18/2021 TRIG 93 01/18/2021    LDLCALC 108 (H) 01/18/2021    [place] 127 01/18/2021    [id number] 1 189 08/27/2022    RPR Non-Reactive 03/26/2021    HGBA1C 5 6 08/05/2022     08/05/2022       Assessment/Plan   Principal Problem:    Schizoaffective disorder, depressive type ([facility] )  Active Problems:    BETHANY (generalized anxiety disorder)    Gastroesophageal reflux disease without esophagitis    Celiac disease    BPH (benign prostatic hyperplasia)    Essential hypertension    Dependent personality disorder (HCC)    DJD (degenerative joint disease)    Recommended Treatment:     Planned medication and treatment changes:     All current active medications have been reviewed  Encourage group therapy, milieu therapy and occupational therapy  Behavioral Health checks every 7 minutes  Discharge planned for tomorrow  Current Facility-Administered Medications   Medication Dose Route Frequency Provider Last Rate    acetaminophen  650 mg Oral Q4H PRN Lolis Mina MD      acetaminophen  650 mg Oral Q4H PRN Lolis Mina MD      acetaminophen  975 mg Oral Q6H PRN Lolis Mina MD      aluminum-magnesium hydroxide-simethicone  30 mL Oral Q4H PRN  L Dagnall, PA-C      ascorbic acid  500 mg Oral Daily  L Dagaugustal, PA-C      busPIRone  15 mg Oral BID Lolis Mina MD      butalbital-acetaminophen-caffeine  1 tablet Oral Q8H PRN  L Dagnall, PA-C      cholecalciferol  1,000 Units Oral Daily  L Dagaugustal, PA-C      citalopram  20 mg Oral Daily Lolis Mina MD      hydrOXYzine HCL  25 mg Oral Q6H PRN Max 4/day Lolis Mina MD      ibuprofen  800 mg Oral Q6H PRN  L Dagnall, PA-C      LORazepam  1 mg Intramuscular Q6H PRN Max 3/day Lolis Mina MD      LORazepam  0 5 mg Oral Q6H PRN Max 4/day Lolis Mina MD      LORazepam  1 mg Oral Q6H PRN Max 3/day Lolis Mina MD      melatonin  6 mg Oral HS Lolis Mina MD      mirtazapine  15 mg Oral HS Lolis Mina MD      OLANZapine  5 mg Intramuscular Q3H PRN Max 3/day Nahomy Mccord MD      polyethylene glycol  17 g Oral Daily PRN Melissa Vasquez PA-C      QUEtiapine  100 mg Oral Q3H PRN Max 3/day Nahomy Mccord MD      QUEtiapine  25 mg Oral Q6H PRN Max 4/day Nahomy Mccord MD      QUEtiapine  50 mg Oral Q6H PRN Max 4/day Nahomy Mccord MD      tamsulosin  0 4 mg Oral Daily With Wash Rubinstein, MD      traZODone  50 mg Oral HS PRN Nahomy Mccord MD         Risks / Benefits of Treatment:    Risks, benefits, and possible side effects of medications explained to patient and patient verbalizes understanding and agreement for treatment  Counseling / Coordination of Care:    Patient's progress discussed with staff in treatment team meeting  Medications, treatment progress and treatment plan reviewed with patient  Supportive therapy provided to patient  Group attendance encouraged  Discharge plan discussed with patient      Maude Treviño MD 09/07/22

## 2022-09-07 NOTE — SOCIAL WORK
SW received voicemail from pt girlfriend, Faby Dilcia, requesting patient remain in hospital until Friday as "when I talked to his nurse on Monday she told me there was no discharge date but likely the end of week so I budgeted time off to watch him on Friday, Saturday and Sunday "     SW returned phone call and advised pt SO discharge is set for tomorrow, Thursday as patient has no criteria to remain on inpatient unit at this time; JAXON advised pt is scheduled to start PHP on Friday; pt SO states she will not allow patient to return home on Thursday due to her working 12 hours on Thursday and "I don't know what he's capable of"; SW inquired when SO would be home so pt could be discharged during that time, pt SO states "I don't think you understand, he's not coming home tomorrow"; pt SO states "he just had a suicide attempt and now he's there"; SW again explained pt has no current psychiatric criteria to remain on unit and discharge has been determined appropriate by team for tomorrow; pt SO states the team did not give sufficient notice, JAXON had contacted SO yesterday and left message on voicemail advising of discharge - pt SO acknowledges receipt of voicemail stating "but his nurse on Monday said it would be the end of the week and I took off on Friday   I don't think you understand how hard it is for a nurse to take off in this national shortage of nurses"; pt SO requested to speak to SW supv; JAXON offered to have supv contact SO; call mutually ended     JAXON provided SO information to CM supv CN

## 2022-09-07 NOTE — PROGRESS NOTES
09/07/22 0900   Team Meeting   Meeting Type Daily Rounds   Team Members Present   Team Members Present Physician;;;Nurse;Occupational Therapist   Physician Team Member Dr Gabrielle Major MD   Nursing Team Member Georgia Nguyen RN   Care Management Team Member Alejandro Delgado , [facility], [facility]   Social Work Team Member Madeleine Esquivel [facility]   OT Team Member Artem Sousa [place]   Patient/Family Present   Patient Present No   Patient's Family Present No   PT raes anxiety and depression 2, slept minimal, no behaviors, partial referral, D/C tomorrow, return to home, trans  TBD

## 2022-09-07 NOTE — PROGRESS NOTES
09/07/22 1015   Activity/Group Checklist   Group Life Skills   Attendance Did not attend  (Pt offered grp; pt remained in his room)

## 2022-09-07 NOTE — SOCIAL WORK
SW spoke with treatment team about SO concerns; team in agreement to pt remaining on unit until Friday for safe discharge planning     SW placed phone call to pt SO to advise team discussed concerns and agreed for patient to remain on unit for safety concerns; Pt SO will provide transportation on Friday for discharge; call mutually ended     DC Friday 10am - Sig Other providing transport     Epic Inbox message sent to [place] and [place]/PHP program to move intake date for [facility] [place] program - awaiting response

## 2022-09-07 NOTE — NURSING NOTE
Patient has been visible on the unit  He is pleasant,cooperative and social with peers  Medication compliant  He stated he slept well last night  Rated anxiety and depression a 2  Denied SI,HI, or hallucinations  Q 7 minute safety checks maintained

## 2022-09-07 NOTE — PLAN OF CARE
Problem: Ineffective Coping  Goal: Participates in unit activities  Description: Interventions:  - Provide therapeutic environment   - Provide required programming   - Redirect inappropriate behaviors   Outcome: Not Progressing   Patient interactive 1:1 with RT but not open to joining groups today

## 2022-09-07 NOTE — PLAN OF CARE
Problem: Ineffective Coping  Goal: Identifies healthy coping skills  Outcome: Progressing  Goal: Participates in unit activities  Description: Interventions:  - Provide therapeutic environment   - Provide required programming   - Redirect inappropriate behaviors   Outcome: Progressing  Goal: Patient/Family participate in treatment and DC plans  Description: Interventions:  - Provide therapeutic environment  Outcome: Progressing  Goal: Patient/Family verbalizes awareness of resources  Outcome: Progressing

## 2022-09-07 NOTE — BH TRANSITION RECORD
Contact Information: If you have any questions, concerns, pended studies, tests and/or procedures, or emergencies regarding your inpatient behavioral health visit  Please contact [facility] [phone] and ask to speak to a , nurse or physician  A contact is available 24 hours/ 7 days a week at this number  Summary of Procedures Performed During your Stay:  Below is a list of major procedures performed during your hospital stay and a summary of results:  - No major procedures performed  Pending Studies (From admission, onward)    None        Please follow up on the above pending studies with your PCP and/or referring provider

## 2022-09-07 NOTE — PLAN OF CARE
Problem: Risk for Self Injury/Neglect  Goal: Treatment Goal: Remain safe during length of stay, learn and adopt new coping skills, and be free of self-injurious ideation, impulses and acts at the time of discharge  Outcome: Progressing  Goal: Verbalize thoughts and feelings  Description: Interventions:  - Assess and re-assess patient's lethality and potential for self-injury  - Engage patient in 1:1 interactions, daily, for a minimum of 15 minutes  - Encourage patient to express feelings, fears, frustrations, hopes  - Establish rapport/trust with patient   Outcome: Progressing  Goal: Refrain from harming self  Description: Interventions:  - Monitor patient closely, per order  - Develop a trusting relationship  - Supervise medication ingestion, monitor effects and side effects   Outcome: Progressing     Problem: Depression  Goal: Treatment Goal: Demonstrate behavioral control of depressive symptoms, verbalize feelings of improved mood/affect, and adopt new coping skills prior to discharge  Outcome: Progressing  Goal: Refrain from isolation  Description: Interventions:  - Develop a trusting relationship   - Encourage socialization   Outcome: Progressing  Goal: Refrain from self-neglect  Outcome: Progressing  Goal: Complete daily ADLs, including personal hygiene independently, as able  Description: Interventions:  - Observe, teach, and assist patient with ADLS  -  Monitor and promote a balance of rest/activity, with adequate nutrition and elimination   Outcome: Progressing     Problem: Anxiety  Goal: Anxiety is at manageable level  Description: Interventions:  - Assess and monitor patient's anxiety level  - Monitor for signs and symptoms (heart palpitations, chest pain, shortness of breath, headaches, nausea, feeling jumpy, restlessness, irritable, apprehensive)  - Collaborate with interdisciplinary team and initiate plan and interventions as ordered    - Hanna patient to unit/surroundings  - Explain treatment plan  - Encourage participation in care  - Encourage verbalization of concerns/fears  - Identify coping mechanisms  - Assist in developing anxiety-reducing skills  - Administer/offer alternative therapies  - Limit or eliminate stimulants  Outcome: Progressing

## 2022-09-07 NOTE — PROGRESS NOTES
Progress Note - Vic Starks 62 y o  male MRN: [id number]    Unit/Bed#: [facility] Encounter: [id number]        Subjective:   Patient seen and examined at bedside after reviewing the chart and discussing the case with the caring staff  Patient examined at bedside  Patient reports no acute symptoms  Patient is a possible discharge tomorrow 09/08/2022  Patient is requesting all his prescriptions  I reviewed and reconciled patient's problem list and medications  Physical Exam   Vitals: Blood pressure 97/54, pulse 69, temperature 98 1 °F (36 7 °C), temperature source Temporal, resp  rate 18, height 6' 2" (1 88 m), weight 115 kg (252 lb 9 6 oz), SpO2 92 %  ,Body mass index is 32 43 kg/m²  Constitutional: Patient appears well-developed  HEENT: PERR, EOMI, MMM  Cardiovascular: Normal rate and regular rhythm  Pulmonary/Chest: Effort normal and breath sounds normal    Abdomen:  Nondistended  Assessment/Plan:  Vic Starks is a(n) 62 y o  male with MDD  MEDICAL CLEARANCE  Patient is medically cleared for discharge  All scripts will be sent out for the patient     1  Hypertension  Currently not on any antihypertensives  BP is WNL  Continue to monitor  2  Celiac disease  Patient upset regarding his diet  States he does not follow a gluten free diet at home and has no issues with it  Diet changed  3  DJD/OA/migraines  Patient may take Tylenol, Fioricet as needed for migraine  Will add ibuprofen 800 mg q6h prn - states he has taken in the past without reaction  4  BPH  Continue Flomax 0 4 mg daily  5  Constipation  Patient may take MiraLax as needed  6  GERD  Patient may take Mylanta as needed  7  Vitamin-D deficiency  Patient started on vitamin D3 1000 units daily

## 2022-09-07 NOTE — SOCIAL WORK
SW advised to move forward with DC as planned as patient is not impaired, not danger to self or others and cleared for discharge     SW met with patient to determine if patient has access to home upon discharge; pt states he can get into home through back door "it's never locked"; pt states his girlfriend works tonight 11p-7a "so she will be home if you get me home after that"     SW placed phone call to [facility] to arrange transport via [employer] car after 11a - spoke to [facility]; awaiting confirmation via [employer]INFO[employer]

## 2022-09-08 PROCEDURE — 99232 SBSQ HOSP IP/OBS MODERATE 35: CPT | Performed by: PSYCHIATRY & NEUROLOGY

## 2022-09-08 RX ADMIN — Medication 6 MG: at 21:38

## 2022-09-08 RX ADMIN — TAMSULOSIN HYDROCHLORIDE 0.4 MG: 0.4 CAPSULE ORAL at 15:41

## 2022-09-08 RX ADMIN — ACETAMINOPHEN 975 MG: 325 TABLET ORAL at 08:46

## 2022-09-08 RX ADMIN — OXYCODONE HYDROCHLORIDE AND ACETAMINOPHEN 500 MG: 500 TABLET ORAL at 08:25

## 2022-09-08 RX ADMIN — CHOLECALCIFEROL TAB 25 MCG (1000 UNIT) 1000 UNITS: 25 TAB at 08:25

## 2022-09-08 RX ADMIN — MIRTAZAPINE 15 MG: 15 TABLET, FILM COATED ORAL at 21:38

## 2022-09-08 RX ADMIN — BUSPIRONE HYDROCHLORIDE 15 MG: 15 TABLET ORAL at 16:46

## 2022-09-08 RX ADMIN — CITALOPRAM HYDROBROMIDE 20 MG: 20 TABLET ORAL at 08:25

## 2022-09-08 RX ADMIN — BUSPIRONE HYDROCHLORIDE 15 MG: 15 TABLET ORAL at 08:25

## 2022-09-08 NOTE — PROGRESS NOTES
09/08/22 1300   Activity/Group Checklist   Group Pet therapy   Attendance Attended   Attendance Duration (min) 31-45   Interactions Interacted appropriately   Affect/Mood Appropriate   Goals Achieved Identified feelings; Able to listen to others; Able to engage in interactions; Able to recieve feedback

## 2022-09-08 NOTE — PROGRESS NOTES
- Please take your medications regularly.  - Cut down your salt intake.  - Please check your blood pressure daily, write it on a paper (log) with date and time and send it to me.   Progress Note - Behavioral Health     Michelle Decker 62 y o  male MRN: [id number]   Unit/Bed#: [facility] 200-01 Encounter: [id number]    Behavior over the last 24 hours: improved  Jim Arechiga is seen in community room,  Pleasant and social with peers  He reports improvements in mood and no longer having suicidal thoughts  Anticipating dishcharge tomorrow  Limited participation in milieu  Sleep: slept off and on  Appetite: fair  Medication side effects: No   ROS: all other systems are negative    Mental Status Evaluation:    Appearance:  age appropriate   Behavior:  pleasant, cooperative   Speech:  normal rate, normal volume   Mood:  depressed   Affect:  flat   Thought Process:  goal directed   Associations: intact associations   Thought Content:  no overt delusions   Perceptual Disturbances: none   Risk Potential: Suicidal ideation - None  Homicidal ideation - None  Potential for aggression - No   Sensorium:  oriented to person, place and time/date   Memory:  recent and remote memory grossly intact   Consciousness:  alert and awake   Attention: attention span and concentration are age appropriate   Insight:  limited   Judgment: limited   Gait/Station: normal gait/station, normal balance   Motor Activity: no abnormal movements     Vital signs in last 24 hours:    Temp:  [98 2 °F (36 8 °C)-98 4 °F (36 9 °C)] 98 4 °F (36 9 °C)  HR:  [67-92] 67  Resp:  [16-18] 16  BP: (104-111)/(65-78) 110/67    Laboratory results: I have personally reviewed all pertinent laboratory/tests results        Progress Toward Goals: improving    Assessment/Plan   Principal Problem:    Schizoaffective disorder, depressive type (HCC)  Active Problems:    BETHANY (generalized anxiety disorder)    Gastroesophageal reflux disease without esophagitis    Celiac disease    BPH (benign prostatic hyperplasia)    Essential hypertension    Dependent personality disorder (HCC)    DJD (degenerative joint disease)    Recommended Treatment:     Planned medication and treatment changes: All current active medications have been reviewed  Encourage group therapy, milieu therapy and occupational therapy  Behavioral Health checks every 7 minutes    Requires continued inpatient treatment due to chronic illness and high risk of decompensation if discharged before long term stability is achieved      Continue current medications:  Current Facility-Administered Medications   Medication Dose Route Frequency Provider Last Rate    acetaminophen  650 mg Oral Q4H PRN Haseeb Ivey MD      acetaminophen  650 mg Oral Q4H PRN Haseeb Ivey MD      acetaminophen  975 mg Oral Q6H PRN Haseeb Ivey MD      aluminum-magnesium hydroxide-simethicone  30 mL Oral Q4H PRN  L Dagnall, PA-C      ascorbic acid  500 mg Oral Daily  L Dagaugustal, PA-C      busPIRone  15 mg Oral BID Haseeb Ivey MD      butalbital-acetaminophen-caffeine  1 tablet Oral Q8H PRN  L Dagnall, PA-C      cholecalciferol  1,000 Units Oral Daily  L Dagshyam, PA-C      citalopram  20 mg Oral Daily Haseeb Ivey MD      hydrOXYzine HCL  25 mg Oral Q6H PRN Max 4/day Haseeb Ivey MD      ibuprofen  800 mg Oral Q6H PRN  L Dagaugustal, PA-C      LORazepam  1 mg Intramuscular Q6H PRN Max 3/day Haseeb Ivey MD      LORazepam  0 5 mg Oral Q6H PRN Max 4/day Haseeb Ivey MD      LORazepam  1 mg Oral Q6H PRN Max 3/day Haseeb Ivey MD      melatonin  6 mg Oral HS Haseeb Ivey MD      mirtazapine  15 mg Oral HS Haseeb Ivey MD      OLANZapine  5 mg Intramuscular Q3H PRN Max 3/day Haseeb Ivey MD      polyethylene glycol  17 g Oral Daily PRN Melissa Vasquez, PA-C      QUEtiapine  100 mg Oral Q3H PRN Max 3/day Haseeb Ivey MD      QUEtiapine  25 mg Oral Q6H PRN Max 4/day Haseeb Ivey MD      QUEtiapine  50 mg Oral Q6H PRN Max 4/day Haseeb Ivey MD      tamsulosin  0 4 mg Oral Daily With Sixto Hoover MD      traZODone  50 mg Oral HS PRN Haseeb Ivey MD         Risks / Benefits of Treatment:    Risks, benefits, and possible side effects of medications explained to patient and patient verbalizes understanding and agreement for treatment  Counseling / Coordination of Care:    Patient's progress discussed with staff in treatment team meeting  Medications, treatment progress and treatment plan reviewed with patient      BETO Hernandez 09/08/22

## 2022-09-08 NOTE — PLAN OF CARE
Problem: Ineffective Coping  Goal: Participates in unit activities  Description: Interventions:  - Provide therapeutic environment   - Provide required programming   - Redirect inappropriate behaviors   Outcome: Progressing     Problem: Risk for Self Injury/Neglect  Goal: Verbalize thoughts and feelings  Description: Interventions:  - Assess and re-assess patient's lethality and potential for self-injury  - Engage patient in 1:1 interactions, daily, for a minimum of 15 minutes  - Encourage patient to express feelings, fears, frustrations, hopes  - Establish rapport/trust with patient   Outcome: Progressing     Problem: Risk for Self Injury/Neglect  Goal: Refrain from harming self  Description: Interventions:  - Monitor patient closely, per order  - Develop a trusting relationship  - Supervise medication ingestion, monitor effects and side effects   Outcome: Progressing     Problem: Depression  Goal: Refrain from isolation  Description: Interventions:  - Develop a trusting relationship   - Encourage socialization   Outcome: Progressing

## 2022-09-08 NOTE — NURSING NOTE
Patient appears to have slept through the overnight hours without issue  No complaints voiced  No acute behaviors observed  He remains in bed sleeping at the present time  Will CTM  Q7 minute safety checks in progress

## 2022-09-08 NOTE — NURSING NOTE
Patient was observed to be visible in the community this evening, spending time watching television with peers in the small dining area  He is calm and cooperative during staff interactions  Appropriate during staff interactions  Presents with a flat affect  Endorses anxiety and depression as minimal with a rating of 2/10, denies SI, HI and hallucinations  Patient was medication compliant at [facility] CTM  Q7 minute safety checks in progress

## 2022-09-08 NOTE — PROGRESS NOTES
09/08/22 0730   Activity/Group Checklist   Group Community meeting  (Patient check in with coffee/ patients eating and drink in room for covid precautions)   Attendance Attended   Attendance Duration (min) 31-45   Interactions Interacted appropriately   Affect/Mood Appropriate   Goals Achieved Identified feelings; Able to listen to others; Able to engage in interactions; Able to self-disclose; Able to recieve feedback

## 2022-09-08 NOTE — PLAN OF CARE
Problem: Risk for Self Injury/Neglect  Goal: Treatment Goal: Remain safe during length of stay, learn and adopt new coping skills, and be free of self-injurious ideation, impulses and acts at the time of discharge  Outcome: Progressing  Goal: Verbalize thoughts and feelings  Description: Interventions:  - Assess and re-assess patient's lethality and potential for self-injury  - Engage patient in 1:1 interactions, daily, for a minimum of 15 minutes  - Encourage patient to express feelings, fears, frustrations, hopes  - Establish rapport/trust with patient   Outcome: Progressing  Goal: Refrain from harming self  Description: Interventions:  - Monitor patient closely, per order  - Develop a trusting relationship  - Supervise medication ingestion, monitor effects and side effects   Outcome: Progressing     Problem: Depression  Goal: Treatment Goal: Demonstrate behavioral control of depressive symptoms, verbalize feelings of improved mood/affect, and adopt new coping skills prior to discharge  Outcome: Progressing  Goal: Refrain from isolation  Description: Interventions:  - Develop a trusting relationship   - Encourage socialization   Outcome: Progressing  Goal: Refrain from self-neglect  Outcome: Progressing  Goal: Complete daily ADLs, including personal hygiene independently, as able  Description: Interventions:  - Observe, teach, and assist patient with ADLS  -  Monitor and promote a balance of rest/activity, with adequate nutrition and elimination   Outcome: Progressing

## 2022-09-08 NOTE — PROGRESS NOTES
09/08/22   Team Meeting   Meeting Type Daily Rounds   Team Members Present   Team Members Present Physician;Nurse;;; Occupational Therapist   Physician Team Member Dr Gloria Bucio MD   Nursing Team Member Esha Tejada RN   Care Management Team Member Alejandro Mejia , [facility]   Social Work Team Member Angelica Manning [place]   OT Team Member Stephanie Ordoñez [place]   Patient/Family Present   Patient Present No   Patient's Family Present No     DC Fri 10am to home; PHP start Monday; slept; anx/dep 2/10; denies all other symptoms; c/o headache - prn utilized

## 2022-09-08 NOTE — PROGRESS NOTES
Progress Note - Capri Sherman 62 y o  male MRN: [id number]    Unit/Bed#: [facility] Encounter: [id number]        Subjective:   Patient seen and examined at bedside after reviewing the chart and discussing the case with the caring staff  Patient examined at bedside  Patient reports no acute complaints  Patient is being discharged tomorrow, Friday 09/09/2022  Patient is requesting all his prescriptions  I reviewed and reconciled patient's problem list and medications  Physical Exam   Vitals: Blood pressure 110/67, pulse 67, temperature 98 4 °F (36 9 °C), temperature source Temporal, resp  rate 16, height 6' 2" (1 88 m), weight 115 kg (252 lb 9 6 oz), SpO2 90 %  ,Body mass index is 32 43 kg/m²  Constitutional: Patient in no acute distress  HEENT: PERR, EOMI, MMM  Cardiovascular: Normal rate and regular rhythm  Pulmonary/Chest: Effort normal and breath sounds normal    Abdomen:  Nondistended  Assessment/Plan:  Capri Sherman is a(n) 62 y o  male with MDD  MEDICAL CLEARANCE  Patient is medically cleared for discharge  All scripts will be sent out for the patient     1  Hypertension  Currently not on any antihypertensives  BP is WNL  2  Celiac disease  Patient states he does not follow a gluten free diet at home and has no issues with it  Recommend follow-up with GI outpatient  3  DJD/OA/migraines  Patient may take Tylenol/ibuprofen, Fioricet as needed for migraine  4  BPH  Continue Flomax 0 4 mg daily  5  Constipation  Patient may take MiraLax as needed  6  GERD  Patient may take Mylanta as needed  7  Vitamin-D deficiency  Patient started on vitamin D3 1000 units daily  The patient was discussed with Dr Kelly Molina and he is in agreement with the above note

## 2022-09-08 NOTE — PROGRESS NOTES
09/08/22 1030   Activity/Group Checklist   Group Cognitive therapy   Attendance Did not attend  (Pt offered grp; pt remained in his room)

## 2022-09-08 NOTE — NURSING NOTE
Patient has been visible on the unit  He is pleasant and cooperative  C/o headache #7 tylenol 975 mg administered with morning medication administration  Patient stated tylenol was effective  Medication compliant  Social with staff and peers  Anxiety and depression 2/10  Denied SI,HI, or hallucinations  He stated he is ready for discharge tomorrow  Q 7 minute safety checks maintained

## 2022-09-09 VITALS
OXYGEN SATURATION: 94 % | SYSTOLIC BLOOD PRESSURE: 91 MMHG | HEART RATE: 79 BPM | DIASTOLIC BLOOD PRESSURE: 55 MMHG | BODY MASS INDEX: 32.42 KG/M2 | TEMPERATURE: 97.7 F | RESPIRATION RATE: 16 BRPM | WEIGHT: 252.6 LBS | HEIGHT: 74 IN

## 2022-09-09 PROCEDURE — 99238 HOSP IP/OBS DSCHRG MGMT 30/<: CPT | Performed by: PSYCHIATRY & NEUROLOGY

## 2022-09-09 RX ADMIN — BUSPIRONE HYDROCHLORIDE 15 MG: 15 TABLET ORAL at 08:19

## 2022-09-09 RX ADMIN — CHOLECALCIFEROL TAB 25 MCG (1000 UNIT) 1000 UNITS: 25 TAB at 08:19

## 2022-09-09 RX ADMIN — OXYCODONE HYDROCHLORIDE AND ACETAMINOPHEN 500 MG: 500 TABLET ORAL at 08:19

## 2022-09-09 RX ADMIN — CITALOPRAM HYDROBROMIDE 20 MG: 20 TABLET ORAL at 08:19

## 2022-09-09 NOTE — BH TRANSITION RECORD
Contact Information: If you have any questions, concerns, pended studies, tests and/or procedures, or emergencies regarding your inpatient behavioral health visit  [facility] [phone] and ask to speak to a , nurse or physician  A contact is available 24 hours/ 7 days a week at this number  Summary of Procedures Performed During your Stay:  Below is a list of major procedures performed during your hospital stay and a summary of results:  - No major procedures performed  Pending Studies (From admission, onward)    None        Please follow up on the above pending studies with your PCP and/or referring provider

## 2022-09-09 NOTE — NURSING NOTE
Patient appears to have slept through the overnight hours without issue  No complaints voiced  No acute behaviors observed  He remains in bed sleeping at this time  Continuous safety rounding in progress

## 2022-09-09 NOTE — PROGRESS NOTES
Progress Note - Maxi Mosley 62 y o  male MRN: [id number]    Unit/Bed#: [facility] Encounter: [id number]        Subjective:   Patient seen and examined at bedside after reviewing the chart and discussing the case with the caring staff  Patient examined at bedside  Patient reports no acute complaints  Patient is being discharged today, Friday 09/09/2022  Patient is requesting all his prescriptions  I reviewed and reconciled patient's problem list and medications  Physical Exam   Vitals: Blood pressure 91/55, pulse 79, temperature 97 7 °F (36 5 °C), temperature source Temporal, resp  rate 16, height 6' 2" (1 88 m), weight 115 kg (252 lb 9 6 oz), SpO2 94 %  ,Body mass index is 32 43 kg/m²  Constitutional: Patient in no acute distress  HEENT: PERR, EOMI, MMM  Cardiovascular: Normal rate and regular rhythm  Pulmonary/Chest: Effort normal and breath sounds normal    Abdomen:  Nondistended  Assessment/Plan:  Maxi Mosley is a(n) 62 y o  male with MDD  MEDICAL CLEARANCE  Patient is medically cleared for discharge  All scripts were sent out for the patient     1  Hypertension  Currently not on any antihypertensives  BP is WNL  2  Celiac disease  Patient states he does not follow a gluten free diet at home and has no issues with it  Recommend follow-up with GI outpatient  3  DJD/OA/migraines  Patient may take Tylenol/ibuprofen, Fioricet as needed for migraine  4  BPH  Continue Flomax 0 4 mg daily  5  Constipation  Patient may take MiraLax as needed  6  GERD  Patient may take Mylanta as needed  7  Vitamin-D deficiency  Patient started on vitamin D3 1000 units daily  The patient was discussed with Dr Marvin Ko and he is in agreement with the above note

## 2022-09-09 NOTE — NURSING NOTE
Patient was observed to be visible in the community this evening  He is pleasant and calm during staff interactions  Endorses anxiety and depression as 2/10, denies SI, HI and hallucinations  He states he is at his baseline with regards to anxiety and depression levels  Denies any pain  Patient was medication compliant at   No acute behaviors observed  Will CTM  Q7 minute safety checks in progress

## 2022-09-09 NOTE — PROGRESS NOTES
Pt was discharged with the following belongings:    Blue & white striped shirt  Blue shorts  Brown sandals  1 cigar    Pt signed and agreed to the belongings

## 2022-09-09 NOTE — DISCHARGE SUMMARY
Discharge Summary - Rodolfo Malloy 62 y o  male MRN: [id number]  Unit/Bed#: [facility] Encounter: [id number]  This note was not shared with the patient due to reasonable likelihood of causing patient harm     Admission Date: 8/30/2022         Discharge Date: 9/09/2022    Attending Psychiatrist: Kelly Orlando MD    Reason for Admission/HPI: Major depressive disorder, single episode, unspecified [F32 9]  Depression [F32  A]    History of Present Illness:      Emelina Moser is a 62 y o  male with a history of depression versus bipolar disorder, anxiety and derpession who was admitted to the inpatient older adult psychiatric unit on a voluntary 12 commitment basis due to depression, anxiety, unstable mood and suicide attempt by overdose on medications  Patient presented to ED after he OD on Seroquel in a suicide attempt  He is known to the unit team due to previous admission under similar circumstances in the past  UDS is negative, EKG with no acute changes  On evaluation in the inpatient psychiatric unit Jeremy Stark present disheveled but able to answer question appropriately  He admits that he has feeling very depressed, anxious, overwhelmed and considering suicide for some time  He admits that he took several Seroquel tabs in a SA and but denies any active plan or intent to hurt self presently and he is able to contract for safety  Patient also reports history of PTSD and experienced auditory hallucination in the past  Denies hearing any current delusion, paranoia, hallucination, homicidal ideation, denies any recent alcohol or illicit drug use  Patient admits recent inpt treatment in [facility]  He agreed to be compliant with medication and treatment plan in the unit  Hospital Course: The patient was admitted to the inpatient psychiatric unit and started on every 7 minutes precautions   During the hospitalization the patient was attending individual therapy, group therapy, milieu therapy and occupational therapy  Psychiatric medications were titrated over the hospital stay  To address depression, mood instability, anxiety symptoms and insomnia the patient was started on antidepressant Celexa and Remeron, anxiolytic medication Buspar and hypnotic medication Melatonin  Medication doses were titrated during the hospital course  Prior to beginning of treatment medications risks and benefits and possible side effects including risk of parkinsonian symptoms, Tardive Dyskinesia and metabolic syndrome related to treatment with antipsychotic medications, risk of cardiovascular events in elderly related to treatment with antipsychotic medications, risk of suicidality and serotonin syndrome related to treatment with antidepressants and risks of misuse, abuse or dependence, sedation and respiratory depression related to treatment with benzodiazepine medications were reviewed with the patient  The patient verbalized understanding and agreement for treatment  Patient's symptoms improved gradually over the hospital course  At the end of treatment the patient was doing well  Mood was stable at the time of discharge  The patient denied suicidal ideation, intent or plan at the time of discharge and denied homicidal ideation, intent or plan at the time of discharge  There was no overt psychosis at the time of discharge  Sleep and appetite were improved  The patient was tolerating medications and was not reporting any significant side effects at the time of discharge  SW has contacted pt's SO who agreed with patient discharge plan and after care follow up  Since the patient was doing well at the end of the hospitalization, treatment team felt that the patient could be safely discharged to outpatient care  The outpatient follow up was arranged by the unit  upon discharge      Mental Status at time of Discharge:     Appearance:  age appropriate   Behavior:  cooperative   Speech:  normal volume   Mood: euthymic   Affect:  mood-congruent   Thought Process:  goal directed   Thought Content:  normal   Perceptual Disturbances: None   Risk Potential: Suicidal Ideations none   Sensorium:  person, place and time/date   Cognition:  recent and remote memory grossly intact   Consciousness:  alert and awake    Attention: attention span and concentration were age appropriate   Insight:  fair   Judgment: fair   Gait/Station: normal gait/station   Motor Activity: no abnormal movements     Admission Diagnosis:Major depressive disorder, single episode, unspecified [F32 9]  Depression [F32  A]    Discharge Diagnosis:   Principal Problem:    Schizoaffective disorder, depressive type ([place] [id number] )  Active Problems:    Gastroesophageal reflux disease without esophagitis    Celiac disease    BPH (benign prostatic hyperplasia)    Essential hypertension    DJD (degenerative joint disease)  Resolved Problems:    BETHANY (generalized anxiety disorder)    Dependent personality disorder ([place] [id number] )        Lab results:  Admission on 08/30/2022   Component Date Value    Vitamin B-12 08/30/2022 501     Vit D, 25-Hydroxy 08/30/2022 27 2 (A)    Folate 08/30/2022 7 5        Discharge Medications:  Current Discharge Medication List      START taking these medications    Details   butalbital-acetaminophen-caffeine (FIORICET,ESGIC) -40 mg per tablet Take 1 tablet by mouth every 8 (eight) hours as needed for migraine for up to 10 days  Qty: 50 tablet, Refills: 0    Associated Diagnoses: Migraine headache      ibuprofen (MOTRIN) 800 mg tablet Take 1 tablet (800 mg total) by mouth every 6 (six) hours as needed for headaches  Qty: 60 tablet, Refills: 0    Associated Diagnoses: DJD (degenerative joint disease)            Current Discharge Medication List      STOP taking these medications       Brexpiprazole (REXULTI) 2 MG tablet Comments:   Reason for Stopping:         benztropine (COGENTIN) 1 mg tablet Comments:   Reason for Stopping:         polyethylene glycol (MIRALAX) 17 g packet Comments:   Reason for Stopping:         sildenafil (VIAGRA) 100 mg tablet Comments:   Reason for Stopping:              Current Discharge Medication List      CONTINUE these medications which have CHANGED    Details   acetaminophen (TYLENOL) 500 mg tablet Take 1 tablet (500 mg total) by mouth every 6 (six) hours as needed for fever, headaches or moderate pain  Qty: 100 tablet, Refills: 0    Associated Diagnoses: DJD (degenerative joint disease)      ascorbic acid (VITAMIN C) 500 mg tablet Take 1 tablet (500 mg total) by mouth daily  Qty: 30 tablet, Refills: 0    Associated Diagnoses: COVID-19      cholecalciferol (VITAMIN D3) 1,000 units tablet Take 2 tablets (2,000 Units total) by mouth daily  Qty: 60 tablet, Refills: 0    Associated Diagnoses: COVID-19      citalopram (CeleXA) 20 mg tablet Take 1 tablet (20 mg total) by mouth daily  Qty: 30 tablet, Refills: 0    Associated Diagnoses: Schizoaffective disorder, depressive type (HCC)      Melatonin 5 MG TABS Take 1 tablet (5 mg total) by mouth daily at bedtime  Qty: 30 tablet, Refills: 0    Associated Diagnoses: Insomnia      tamsulosin (FLOMAX) 0 4 mg Take 1 capsule (0 4 mg total) by mouth daily with dinner  Qty: 30 capsule, Refills: 0    Associated Diagnoses: BPH (benign prostatic hyperplasia)            Current Discharge Medication List      CONTINUE these medications which have NOT CHANGED    Details   busPIRone (BUSPAR) 15 mg tablet Take 1 tablet (15 mg total) by mouth 2 (two) times a day  Qty: 60 tablet, Refills: 1    Associated Diagnoses: BETHANY (generalized anxiety disorder); History of obsessive compulsive disorder      mirtazapine (REMERON) 15 mg tablet Take 15 mg by mouth daily at bedtime              Discharge instructions/Information to patient and family:   See after visit summary for information provided to patient and family        Provisions for Follow-Up Care:  See after visit summary for information related to follow-up care and any pertinent home health orders  Discharge Statement   I spent 30 minutes discharging the patient  This time was spent on the day of discharge  I had direct contact with the patient on the day of discharge  Additional documentation is required if more than 30 minutes were spent on discharge

## 2022-09-09 NOTE — NURSING NOTE
Patient ambulated from unit accompanied by T  AVS reviewed w/ pt and he verbalizes understanding  All belongings sent w/ pt

## 2022-09-09 NOTE — NURSING NOTE
Patient visible in the community  He is calm, cooperative, pleasant  Engages in conversation  He is compliant with medications  His appetite is good  He rates his anxiety and depression a 2/10  He states this is a manageable level for him  He denies SI/HI and hallucinations  He offers no complaints/ concerns at this time  Looks forward to discharge to home this morning  Will CTM  Q7 minute safety checks in progress

## 2022-09-23 ENCOUNTER — TELEPHONE (OUTPATIENT)
Dept: BEHAVIORAL/MENTAL HEALTH CLINIC | Facility: CLINIC | Age: 57
End: 2022-09-23

## 2022-09-24 NOTE — PROGRESS NOTES
Progress Note - Lachelle Horner 64 y o  male MRN: [id number]    Unit/Bed#[facility] 203-01 Encounter: [id number]        Subjective:   Patient seen and examined at bedside after reviewing the chart and discussing the case with the caring staff  Patient examined at bedside  Nursing staff reports patient's fingernails of a blue discoloration  Upon examination, patient's fingernails are blue with capillary refill less than 3 seconds, no discoloration of hands or fingers, warmth of the hands and fingers, and review of vital signs show stable vitals with pulse ox 99%  Patient denies shortness of breath, chest pain, difficulty breathing  He does not have a history of cardiovascular or pulmonary disease  Examination of bilateral feet and toenails is within normal limits  Discoloration may be due to his blue jeans  Will continue to monitor  Physical Exam   Vitals: Blood pressure 93/50, pulse 87, temperature 97 7 °F (36 5 °C), temperature source Temporal, resp  rate 18, height 6' 2" (1 88 m), weight 103 kg (226 lb 11 2 oz), SpO2 99 %  ,Body mass index is 29 11 kg/m²  Constitutional: He appears well-developed  HEENT: PERR, EOMI, MMM  Cardiovascular: Normal rate and regular rhythm  Pulmonary/Chest: Effort normal and breath sounds normal    Abdomen: Soft, + BS, NT    Assessment/Plan:  Lachelle Horner is a(n) 64 y o  male with MDD      1  Hypertension  Continue lisinopril 10 mg daily  2  GERD  Continue pantoprazole 40 mg daily  3  Constipation  Head continue MiraLax daily  4  BPH  Continue Flomax daily with dinner  5  DJD  Patient may receive Tylenol as needed  6  Dermatitis  Patient may use hydrocortisone cream as needed  7  Dry eyes  Patient may use artificial tears as needed  8  Celiac disease  Continue gluten free diet  9  Vitamin-D deficiency  Continue vitamin-D bolus doses for 8 weeks followed by vitamin D3 1000 units daily  10  Vitamin B12 deficiency    Continue monthly B12 injections  11  Discoloration of fingernails  Patient's fingernails are blue with capillary refill less than 3 seconds, no discoloration of hands or fingers, warmth of the hands and fingers, and review of vital signs show stable vitals with pulse ox 99%  Patient denies shortness of breath, chest pain, difficulty breathing  He does not have a history of cardiovascular or pulmonary disease  Examination of bilateral feet and toenails is within normal limits  Discoloration may be due to his blue jeans  Will continue to monitor  The patient was discussed with Dr Al Boles and he is in agreement with the above note  24-Sep-2022 19:33

## 2022-09-29 ENCOUNTER — HOSPITAL ENCOUNTER (EMERGENCY)
Facility: HOSPITAL | Age: 57
End: 2022-09-30
Attending: EMERGENCY MEDICINE
Payer: COMMERCIAL

## 2022-09-29 DIAGNOSIS — R45.851 SUICIDAL IDEATIONS: Primary | ICD-10-CM

## 2022-09-29 LAB
ALBUMIN SERPL BCP-MCNC: 3.7 G/DL (ref 3.5–5)
ALP SERPL-CCNC: 93 U/L (ref 46–116)
ALT SERPL W P-5'-P-CCNC: 48 U/L (ref 12–78)
AMPHETAMINES SERPL QL SCN: NEGATIVE
ANION GAP SERPL CALCULATED.3IONS-SCNC: 7 MMOL/L (ref 4–13)
APAP SERPL-MCNC: <2 UG/ML (ref 10–20)
AST SERPL W P-5'-P-CCNC: 27 U/L (ref 5–45)
BARBITURATES UR QL: NEGATIVE
BASOPHILS # BLD AUTO: 0.05 THOUSANDS/ΜL (ref 0–0.1)
BASOPHILS NFR BLD AUTO: 1 % (ref 0–1)
BENZODIAZ UR QL: NEGATIVE
BILIRUB SERPL-MCNC: 0.38 MG/DL (ref 0.2–1)
BUN SERPL-MCNC: 11 MG/DL (ref 5–25)
CALCIUM SERPL-MCNC: 9 MG/DL (ref 8.3–10.1)
CHLORIDE SERPL-SCNC: 107 MMOL/L (ref 96–108)
CO2 SERPL-SCNC: 22 MMOL/L (ref 21–32)
COCAINE UR QL: NEGATIVE
CREAT SERPL-MCNC: 0.97 MG/DL (ref 0.6–1.3)
EOSINOPHIL # BLD AUTO: 0.12 THOUSAND/ΜL (ref 0–0.61)
EOSINOPHIL NFR BLD AUTO: 2 % (ref 0–6)
ERYTHROCYTE [DISTWIDTH] IN BLOOD BY AUTOMATED COUNT: 12.7 % (ref 11.6–15.1)
ETHANOL SERPL-MCNC: <3 MG/DL (ref 0–3)
FLUAV RNA RESP QL NAA+PROBE: NEGATIVE
FLUBV RNA RESP QL NAA+PROBE: NEGATIVE
GFR SERPL CREATININE-BSD FRML MDRD: 86 ML/MIN/1.73SQ M
GLUCOSE SERPL-MCNC: 96 MG/DL (ref 65–140)
HCT VFR BLD AUTO: 50.3 % (ref 36.5–49.3)
HGB BLD-MCNC: 16.7 G/DL (ref 12–17)
IMM GRANULOCYTES # BLD AUTO: 0.02 THOUSAND/UL (ref 0–0.2)
IMM GRANULOCYTES NFR BLD AUTO: 0 % (ref 0–2)
LYMPHOCYTES # BLD AUTO: 1.77 THOUSANDS/ΜL (ref 0.6–4.47)
LYMPHOCYTES NFR BLD AUTO: 24 % (ref 14–44)
MCH RBC QN AUTO: 31 PG (ref 26.8–34.3)
MCHC RBC AUTO-ENTMCNC: 33.2 G/DL (ref 31.4–37.4)
MCV RBC AUTO: 94 FL (ref 82–98)
METHADONE UR QL: NEGATIVE
MONOCYTES # BLD AUTO: 0.66 THOUSAND/ΜL (ref 0.17–1.22)
MONOCYTES NFR BLD AUTO: 9 % (ref 4–12)
NEUTROPHILS # BLD AUTO: 4.8 THOUSANDS/ΜL (ref 1.85–7.62)
NEUTS SEG NFR BLD AUTO: 64 % (ref 43–75)
NRBC BLD AUTO-RTO: 0 /100 WBCS
OPIATES UR QL SCN: NEGATIVE
OXYCODONE+OXYMORPHONE UR QL SCN: NEGATIVE
PCP UR QL: NEGATIVE
PLATELET # BLD AUTO: 232 THOUSANDS/UL (ref 149–390)
PMV BLD AUTO: 11.1 FL (ref 8.9–12.7)
POTASSIUM SERPL-SCNC: 4.1 MMOL/L (ref 3.5–5.3)
PROT SERPL-MCNC: 8 G/DL (ref 6.4–8.4)
RBC # BLD AUTO: 5.38 MILLION/UL (ref 3.88–5.62)
RSV RNA RESP QL NAA+PROBE: NEGATIVE
SALICYLATES SERPL-MCNC: <3 MG/DL (ref 3–20)
SARS-COV-2 RNA RESP QL NAA+PROBE: NEGATIVE
SODIUM SERPL-SCNC: 136 MMOL/L (ref 135–147)
THC UR QL: NEGATIVE
TSH SERPL DL<=0.05 MIU/L-ACNC: 2.74 UIU/ML (ref 0.45–4.5)
WBC # BLD AUTO: 7.42 THOUSAND/UL (ref 4.31–10.16)

## 2022-09-29 PROCEDURE — 99285 EMERGENCY DEPT VISIT HI MDM: CPT

## 2022-09-29 PROCEDURE — 99285 EMERGENCY DEPT VISIT HI MDM: CPT | Performed by: EMERGENCY MEDICINE

## 2022-09-29 PROCEDURE — 93005 ELECTROCARDIOGRAM TRACING: CPT

## 2022-09-29 PROCEDURE — 80053 COMPREHEN METABOLIC PANEL: CPT | Performed by: EMERGENCY MEDICINE

## 2022-09-29 PROCEDURE — 85025 COMPLETE CBC W/AUTO DIFF WBC: CPT | Performed by: EMERGENCY MEDICINE

## 2022-09-29 PROCEDURE — 80179 DRUG ASSAY SALICYLATE: CPT | Performed by: EMERGENCY MEDICINE

## 2022-09-29 PROCEDURE — 36415 COLL VENOUS BLD VENIPUNCTURE: CPT | Performed by: EMERGENCY MEDICINE

## 2022-09-29 PROCEDURE — 80143 DRUG ASSAY ACETAMINOPHEN: CPT | Performed by: EMERGENCY MEDICINE

## 2022-09-29 PROCEDURE — 0241U HB NFCT DS VIR RESP RNA 4 TRGT: CPT | Performed by: EMERGENCY MEDICINE

## 2022-09-29 PROCEDURE — 80307 DRUG TEST PRSMV CHEM ANLYZR: CPT | Performed by: EMERGENCY MEDICINE

## 2022-09-29 PROCEDURE — 82077 ASSAY SPEC XCP UR&BREATH IA: CPT | Performed by: EMERGENCY MEDICINE

## 2022-09-29 PROCEDURE — 84443 ASSAY THYROID STIM HORMONE: CPT | Performed by: EMERGENCY MEDICINE

## 2022-09-29 RX ORDER — ACETAMINOPHEN 325 MG/1
650 TABLET ORAL ONCE
Status: COMPLETED | OUTPATIENT
Start: 2022-09-29 | End: 2022-09-29

## 2022-09-29 RX ADMIN — ACETAMINOPHEN 650 MG: 325 TABLET, FILM COATED ORAL at 21:23
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
## 2022-09-29 NOTE — ED NOTES
Patient is accepted at [facility]  Patient is accepted by Dr Concepcion Fischer per Bakari Gonzalez in Admissions  [place] will complete pre-cert  Transportation is arranged with [facility]  Transportation is scheduled for 1815  Patient may go to the floor between 7-10p  Nurse report is not needed          Murphy Jimenez, HEBERW  09/29/22   [id number]

## 2022-09-29 NOTE — ED NOTES
Call received from Catrachita Quintanilla at [facility] stating the discharge they had scheduled did not occur, therefore patient will have to arrive tomorrow  Call placed to [facility] to reschedule transportation; awaiting new pick-up       KAMILAH Vallecillo  09/29/22   1134
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
## 2022-09-29 NOTE — LETTER
[address] [facility] [id number]  Dept: [phone]                EMTALA TRANSFER CONSENT    NAME Franchesca Evangelista                         1965                              MRN [id number]    I have been informed of my rights regarding examination, treatment, and transfer   by Dr Dorian Bates MD    Benefits: Other benefits (Include comment)_______________________ (Inpt MH tx)    Risks: Potential for delay in receiving treatment      Consent for Transfer:  I acknowledge that my medical condition has been evaluated and explained to me by the emergency department physician or other qualified medical person and/or my attending physician, who has recommended that I be transferred to the service of  Accepting Physician: Dr Julisa Clemons at [address] [facility] Name, [address] : [facility]  The above potential benefits of such transfer, the potential risks associated with such transfer, and the probable risks of not being transferred have been explained to me, and I fully understand them  The doctor has explained that, in my case, the benefits of transfer outweigh the risks  I agree to be transferred  I authorize the performance of emergency medical procedures and treatments upon me in both transit and upon arrival at the receiving facility  Additionally, I authorize the release of any and all medical records to the receiving facility and request they be transported with me, if possible  I understand that the safest mode of transportation during a medical emergency is an ambulance and that the Hospital advocates the use of this mode of transport  Risks of traveling to the receiving facility by car, including absence of medical control, life sustaining equipment, such as oxygen, and medical personnel has been explained to me and I fully understand them      (FRANCISCA CORRECT BOX BELOW)  [ X ]  I consent to the stated transfer and to be transported by ambulance/helicopter  [  ]  I consent to the stated transfer, but refuse transportation by ambulance and accept full responsibility for my transportation by car  I understand the risks of non-ambulance transfers and I exonerate the Hospital and its staff from any deterioration in my condition that results from this refusal     X___________________________________________    DATE  22  TIME________  Signature of patient or legally responsible individual signing on patient behalf           RELATIONSHIP TO PATIENT_________________________                        Provider Certification    NAME Ryan MAGALLANES 1965                              MRN [id number]    A medical screening exam was performed on the above named patient  Based on the examination:    Condition Necessitating Transfer There were no encounter diagnoses  Patient Condition: The patient has been stabilized such that within reasonable medical probability, no material deterioration of the patient condition or the condition of the unborn child(gutierrez) is likely to result from the transfer    Reason for Transfer: Level of Care needed not available at this facility    Transfer Requirements: [address]   · Space available and qualified personnel available for treatment as acknowledged by KAMILAH Couch  · Agreed to accept transfer and to provide appropriate medical treatment as acknowledged by       Dr Khalif Edgar  · Appropriate medical records of the examination and treatment of the patient are provided at the time of transfer   [facility], [address] __DC_____  · Transfer will be performed by qualified personnel from [facility]  and appropriate transfer equipment as required, including the use of necessary and appropriate life support measures      Provider Certification: I have examined the patient and explained the following risks and benefits of being transferred/refusing transfer to the patient/family:  General risk, such as traffic hazards, adverse weather conditions, rough terrain or turbulence, possible failure of equipment (including vehicle or aircraft), or consequences of actions of persons outside the control of the transport personnel      Based on these reasonable risks and benefits to the patient and/or the unborn child(gutierrez), and based upon the information available at the time of the patients examination, I certify that the medical benefits reasonably to be expected from the provision of appropriate medical treatments at another medical facility outweigh the increasing risks, if any, to the individuals medical condition, and in the case of labor to the unborn child, from effecting the transfer      X____________________________________________ DATE 09/29/22        TIME_______      ORIGINAL - SEND TO MEDICAL RECORDS   COPY - SEND WITH PATIENT DURING TRANSFER

## 2022-09-30 VITALS
TEMPERATURE: 97.7 F | RESPIRATION RATE: 18 BRPM | DIASTOLIC BLOOD PRESSURE: 68 MMHG | SYSTOLIC BLOOD PRESSURE: 117 MMHG | OXYGEN SATURATION: 94 % | HEART RATE: 82 BPM

## 2022-09-30 NOTE — ED NOTES
Pt sleeping on stretcher  1:1 observer monitor pt   Vitals deferred to allow adequate patient rest     Araceli Rahman  09/30/22 9372

## 2022-09-30 NOTE — ED CARE HANDOFF
Emergency Department Sign Out Note        Sign out and transfer of care from Dr Soni Seymour  See Separate Emergency Department note  The patient, Capri Sherman, was evaluated by the previous provider for SI w/plan to OD  Workup Completed:  Medical clearance for Olimpia Navas    ED Course / Workup Pending (followup):  201 signed, consider 302 if rescinded  Accepted at [facility] [place] for 0800 p/u                                     Procedures  MDM        Disposition  Final diagnoses:   Suicidal ideations     Time reflects when diagnosis was documented in both MDM as applicable and the Disposition within this note     Time User Action Codes Description Comment    9/29/2022  3:50 PM Chico Alberto Add [J99 [id number]] Suicidal ideations       ED Disposition     ED Disposition   Transfer to [address] [place] [address]   --    Date/Time   Thu Sep 29, 2022  5:17 PM    Comment   Capri Sherman should be transferred out to [facility] [place], [facility] and has been medically cleared             MD Documentation    [employer] Most Recent Value   Patient Condition The patient has been stabilized such that within reasonable medical probability, no material deterioration of the patient condition or the condition of the unborn child(gutierrez) is likely to result from the transfer   Reason for Transfer Level of Care needed not available at this facility   Benefits of Transfer Other benefits (Include comment)_______________________  Chang Lin  tx]   Risks of Transfer Potential for delay in receiving treatment   Accepting Physician Dr [place] Name, Le 61 PA    (Name & Tel number) KAMILAH Harvey   Transported by [employer] and Unit #) Charlcie Gottron   Sending MD Dr Celena Johnson   Provider Certification General risk, such as traffic hazards, adverse weather conditions, rough terrain or turbulence, possible failure of equipment (including vehicle or aircraft), or consequences of actions of persons outside the control of the transport personnel      RN Documentation    Flowsheet Row Most [address] [facility] Name, Le Cm [place]    (Name & Tel number) KAMILAH Couch   Transported by [facility] and Unit #) [facility]      Follow-up Information    None       Patient's Medications   Discharge Prescriptions    No medications on file     No discharge procedures on file         ED Provider  Electronically Signed by     Dina Villarreal DO  09/30/22 101

## 2022-09-30 NOTE — ED NOTES
Pt is sleeping on stretcher in the hallway  1:1 observing   Vitals deferred due to adequate pt rest       Nika Penny  09/30/22 8677

## 2022-10-03 LAB
ATRIAL RATE: 87 BPM
P AXIS: 27 DEGREES
PR INTERVAL: 194 MS
QRS AXIS: 41 DEGREES
QRSD INTERVAL: 80 MS
QT INTERVAL: 374 MS
QTC INTERVAL: 450 MS
T WAVE AXIS: 42 DEGREES
VENTRICULAR RATE: 87 BPM

## 2022-10-03 PROCEDURE — 93010 ELECTROCARDIOGRAM REPORT: CPT | Performed by: INTERNAL MEDICINE

## 2022-10-18 ENCOUNTER — OFFICE VISIT (OUTPATIENT)
Dept: PSYCHIATRY | Facility: CLINIC | Age: 57
End: 2022-10-18
Payer: MEDICARE

## 2022-10-18 VITALS — BODY MASS INDEX: 32.86 KG/M2 | WEIGHT: 255.9 LBS

## 2022-10-18 DIAGNOSIS — F17.200 NICOTINE USE DISORDER: ICD-10-CM

## 2022-10-18 DIAGNOSIS — F10.21 ALCOHOL USE DISORDER, SEVERE, IN SUSTAINED REMISSION (HCC): ICD-10-CM

## 2022-10-18 DIAGNOSIS — Z86.59 HISTORY OF OBSESSIVE COMPULSIVE DISORDER: ICD-10-CM

## 2022-10-18 DIAGNOSIS — F25.1 SCHIZOAFFECTIVE DISORDER, DEPRESSIVE TYPE (HCC): Primary | ICD-10-CM

## 2022-10-18 DIAGNOSIS — F41.1 GAD (GENERALIZED ANXIETY DISORDER): ICD-10-CM

## 2022-10-18 PROCEDURE — 99214 OFFICE O/P EST MOD 30 MIN: CPT | Performed by: PSYCHIATRY & NEUROLOGY

## 2022-10-18 RX ORDER — ARIPIPRAZOLE 5 MG/1
5 TABLET ORAL
COMMUNITY
Start: 2022-10-10 | End: 2022-10-20 | Stop reason: SDDI

## 2022-10-18 RX ORDER — BUSPIRONE HYDROCHLORIDE 10 MG/1
20 TABLET ORAL 2 TIMES DAILY
COMMUNITY
Start: 2022-10-10

## 2022-10-18 RX ORDER — CITALOPRAM 40 MG/1
40 TABLET ORAL DAILY
COMMUNITY
Start: 2022-09-27

## 2022-10-18 NOTE — PSYCH
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 Continue scheduled follow-up with psychotherapist  · Continue practicing coping strategies, affirmations, sleep hygiene, lifestyle modifications including adequate hydration, nutrition, and exercise, walks in nature, hobbies, and mindfulness techniques  · Follow-up in 4 weeks    2  BETHANY  3  History of OCD  § As above     4  Alcohol use disorder, severe, in sustained remission  · Continue refraining from substance use    5  Nicotine use disorder  · Continue to encourage cessation      Medical Decision Making / Counseling / Coordination of Care: The following interventions are recommended: return in 1 month for follow up, continue psychotherapy and contracts for safety at present - agrees to call Crisis Intervention Service or go to ED if feeling unsafe  Although patient's acute lethality risk is LOW, long-term/chronic lethality risk is mildly elevated given the risk factors listed above  However, at the current moment, Christopher Lane is future-oriented, forward-thinking, and demonstrates ability to act in a self-preserving manner as evidenced by volitionally seeking psychiatric evaluation and treatment today   To mitigate future risk, patient should adhere to treatment recommendations, avoid alcohol/illicit substance use, utilize community-based resources and familiar support, and prioritize mental health treatment  The diagnosis and treatment plan were reviewed with the patient  Risks, benefits, and alternatives to treatment were discussed  The importance of medication and treatment compliance was reviewed with the patient  Individual supportive psychotherapy was provided for 21 minutes and included processing of stressors such as loneliness and family relationships, coping strategies, affirmations, lifestyle modifications including increasing exercise, nature walks, self-reliance, reviewing safety plan including triggers and possible interventions, utilizing resources, and focus on sleep hygiene and nutrition and hydration      Humphrey León, DO

## 2022-10-18 NOTE — PATIENT INSTRUCTIONS
Look up "grounding techniques" and/or "anchoring demonstration" online and try a few to see what may work for you  Practice these skills before you need them, when you are not feeling too anxious or triggered  You can also search for free guided meditation videos online to help improve your head space when you are feeling very anxious or triggered  Recommendations regarding insomnia:  Wake-up at the same time every day  Refrain from "napping"  Refrain from going to bed unless you're tired  Utilize your bedroom for sleep only  Avoid use of electronics including television and/or cellphone/computers  Refrain from use of electronics including television and/or cellphones/computers prior to bed  Turn your alarm clock away so the light is not visible  Attempt relaxation using various means like reading if you're restless in bed for approximately 15-20 minutes  Participate in regular physical activities like exercise, although avoid approximately 3-4 hours prior to bed  Morning exercise is ideal   Avoid caffeine use prior to bedtime  Consider tapering down excessive use of caffeine  Avoid tobacco use prior to bedtime  Avoid alcohol use prior to bedtime  Consider reading "No More Sleepless nights" by Jade Balderrama, Ph D   Consider use of online resources including:  http://Wappwolf/cbt-online-insomnia-treatment html  [employer]  com  CBT-I   Go! To Sleep by the [facility]  Healthy Diet   The American Heart Association and the [employer] of Cardiology have long recommended a healthy diet for not only patients who are at risk for atherosclerotic cardiovascular disease (ASCVD) but also the general public   In keeping with this evidence-based recommendation, the "2018 Guideline on the Management of Blood Cholesterol" stresses that a healthy diet should include adequate intake of these essentials:   Vegetables, fruits, and whole grains   Legumes and nuts   Low-fat dairy products Low-fat poultry (without the skin)   Fish and seafood   Nontropical vegetable oils     The recent guidelines do provide room for cultural food preferences in a healthy diet, but in general, all patients should limit their intake of saturated and trans fats, sweets, sugar-sweetened beverages, and red meats  Physical Activity   In addition to a healthy diet, all patients should include regular physical activity in their weekly routines, at moderate to vigorous intensity  Any activity is better than nothing, so if your patients can't meet the recommendation of vigorous activity, moderate-intensity activity can still help them reduce their risk of ASCVD  Below are the American Heart Association's recommendations for physical activity per week (preferably spread throughout the week): For Overall Cardiovascular Health and Lowering Cholesterol   At least 150 minutes of moderate-intensity physical activity (for example, 30 minutes, 5 days a week), or   At least 75 minutes of vigorous-intensity physical activity (for example, 25 minutes, 3 days a week); or   A combination of moderate- and vigorous-intensity aerobic activity, and   At least 2 days of moderate- to high-intensity muscle-strengthening activities (such as resistance weight training) for additional health benefits    Weight Control   It's important to work with patients to help them reach and maintain a healthy weight (Table 3)  You may need to suggest that they adjust their caloric intake to avoid weight gain or, in overweight and obese patients, to promote weight loss  Table 3  Body Mass Index          Please call the office nursing staff for medication issues including refills, problems getting medications, bothersome side effects, etc at [phone]  Please return for a follow up appointment as discussed and arrive approximately 15 minutes prior to your appointment time   If you are running late or are unable to attend your appointment, please call our [facility] office at [phone], or if you were seen in the [place] office, please call [phone]  If you have thoughts of harming yourself or are otherwise in psychological crisis, do not hesitate to contact your [address], or 911 or go to the nearest emergency room    [facility] Crisis: [address] [facility] Crisis: [phone]  [address] Crisis: [address] Crisis: [address] Crisis: [address] Crisis: [address] Crisis: [phone]  National Suicide Prevention Hotline: 0-113.445.5584 or call 65

## 2022-10-19 NOTE — PLAN OF CARE
Problem: Ineffective Coping  Goal: Participates in unit activities  Description: Interventions:  - Provide therapeutic environment   - Provide required programming   - Redirect inappropriate behaviors   Outcome: Progressing     Problem: Risk for Self Injury/Neglect  Goal: Verbalize thoughts and feelings  Description: Interventions:  - Assess and re-assess patient's lethality and potential for self-injury  - Engage patient in 1:1 interactions, daily, for a minimum of 15 minutes  - Encourage patient to express feelings, fears, frustrations, hopes  - Establish rapport/trust with patient   Outcome: Progressing     Problem: Risk for Self Injury/Neglect  Goal: Refrain from harming self  Description: Interventions:  - Monitor patient closely, per order  - Develop a trusting relationship  - Supervise medication ingestion, monitor effects and side effects   Outcome: Progressing     Problem: Depression  Goal: Refrain from isolation  Description: Interventions:  - Develop a trusting relationship   - Encourage socialization   Outcome: Progressing     Problem: Anxiety  Goal: Anxiety is at manageable level  Description: Interventions:  - Assess and monitor patient's anxiety level  - Monitor for signs and symptoms (heart palpitations, chest pain, shortness of breath, headaches, nausea, feeling jumpy, restlessness, irritable, apprehensive)  - Collaborate with interdisciplinary team and initiate plan and interventions as ordered    - [place] patient to unit/surroundings  - Explain treatment plan  - Encourage participation in care  - Encourage verbalization of concerns/fears  - Identify coping mechanisms  - Assist in developing anxiety-reducing skills  - Administer/offer alternative therapies  - Limit or eliminate stimulants  Outcome: Progressing 1-2 drinks

## 2022-10-20 RX ORDER — QUETIAPINE FUMARATE 200 MG/1
200 TABLET, FILM COATED ORAL
COMMUNITY
Start: 2022-10-10

## 2022-11-16 ENCOUNTER — OFFICE VISIT (OUTPATIENT)
Dept: PSYCHIATRY | Facility: CLINIC | Age: 57
End: 2022-11-16

## 2022-11-16 VITALS — WEIGHT: 261 LBS | BODY MASS INDEX: 33.51 KG/M2

## 2022-11-16 DIAGNOSIS — Z86.59 HISTORY OF OBSESSIVE COMPULSIVE DISORDER: ICD-10-CM

## 2022-11-16 DIAGNOSIS — F41.1 GAD (GENERALIZED ANXIETY DISORDER): ICD-10-CM

## 2022-11-16 DIAGNOSIS — F10.21 ALCOHOL USE DISORDER, SEVERE, IN SUSTAINED REMISSION (HCC): ICD-10-CM

## 2022-11-16 DIAGNOSIS — F17.200 NICOTINE USE DISORDER: ICD-10-CM

## 2022-11-16 DIAGNOSIS — F25.1 SCHIZOAFFECTIVE DISORDER, DEPRESSIVE TYPE (HCC): Primary | ICD-10-CM

## 2022-11-16 RX ORDER — ARIPIPRAZOLE 5 MG/1
5 TABLET ORAL DAILY
Qty: 30 TABLET | Refills: 1 | Status: SHIPPED | OUTPATIENT
Start: 2022-11-16 | End: 2023-01-15

## 2022-11-16 RX ORDER — ARIPIPRAZOLE 5 MG/1
TABLET ORAL
COMMUNITY
Start: 2022-10-10 | End: 2022-11-16 | Stop reason: SDUPTHER

## 2022-11-16 RX ORDER — MIRTAZAPINE 15 MG/1
15 TABLET, FILM COATED ORAL
Qty: 30 TABLET | Refills: 1 | Status: SHIPPED | OUTPATIENT
Start: 2022-11-16 | End: 2023-01-15

## 2022-11-16 RX ORDER — LANOLIN ALCOHOL/MO/W.PET/CERES
100 CREAM (GRAM) TOPICAL DAILY
COMMUNITY

## 2022-11-16 RX ORDER — CITALOPRAM 40 MG/1
40 TABLET ORAL DAILY
Qty: 30 TABLET | Refills: 1 | Status: SHIPPED | OUTPATIENT
Start: 2022-11-16 | End: 2023-01-15

## 2022-11-16 RX ORDER — CHLORAL HYDRATE 500 MG
CAPSULE ORAL
COMMUNITY

## 2022-11-16 NOTE — PATIENT INSTRUCTIONS
Look up "grounding techniques" and/or "anchoring demonstration" online and try a few to see what may work for you  Practice these skills before you need them, when you are not feeling too anxious or triggered  You can also search for free guided meditation videos online to help improve your head space when you are feeling very anxious or triggered  Recommendations regarding insomnia:  Wake-up at the same time every day  Refrain from "napping"  Refrain from going to bed unless you're tired  Utilize your bedroom for sleep only  Avoid use of electronics including television and/or cellphone/computers  Refrain from use of electronics including television and/or cellphones/computers prior to bed  Turn your alarm clock away so the light is not visible  Attempt relaxation using various means like reading if you're restless in bed for approximately 15-20 minutes  Participate in regular physical activities like exercise, although avoid approximately 3-4 hours prior to bed  Morning exercise is ideal   Avoid caffeine use prior to bedtime  Consider tapering down excessive use of caffeine  Avoid tobacco use prior to bedtime  Avoid alcohol use prior to bedtime  Consider reading "No More Sleepless nights" by Marshal Garcia, Ph D   Consider use of online resources including:  http://Pathflow/cbt-online-insomnia-treatment html  [employer]  com  CBT-I   Go! To Sleep by the [facility]  Healthy Diet   The American Heart Association and the [employer] of Card[employer] have long recommended a healthy diet for not only patients who are at risk for atherosclerotic cardiovascular disease (ASCVD) but also the general public   In keeping with this evidence-based recommendation, the "2018 Guideline on the Management of Blood Cholesterol" stresses that a healthy diet should include adequate intake of these essentials:   Vegetables, fruits, and whole grains   Legumes and nuts   Low-fat dairy products Low-fat poultry (without the skin)   Fish and seafood   Nontropical vegetable oils     The recent guidelines do provide room for cultural food preferences in a healthy diet, but in general, all patients should limit their intake of saturated and trans fats, sweets, sugar-sweetened beverages, and red meats  Physical Activity   In addition to a healthy diet, all patients should include regular physical activity in their weekly routines, at moderate to vigorous intensity  Any activity is better than nothing, so if your patients can't meet the recommendation of vigorous activity, moderate-intensity activity can still help them reduce their risk of ASCVD  Below are the American Heart Association's recommendations for physical activity per week (preferably spread throughout the week): For Overall Cardiovascular Health and Lowering Cholesterol   At least 150 minutes of moderate-intensity physical activity (for example, 30 minutes, 5 days a week), or   At least 75 minutes of vigorous-intensity physical activity (for example, 25 minutes, 3 days a week); or   A combination of moderate- and vigorous-intensity aerobic activity, and   At least 2 days of moderate- to high-intensity muscle-strengthening activities (such as resistance weight training) for additional health benefits    Weight Control   It's important to work with patients to help them reach and maintain a healthy weight (Table 3)  You may need to suggest that they adjust their caloric intake to avoid weight gain or, in overweight and obese patients, to promote weight loss  Table 3  Body Mass Index          Please call the office nursing staff for medication issues including refills, problems getting medications, bothersome side effects, etc at [phone]  Please return for a follow up appointment as discussed and arrive approximately 15 minutes prior to your appointment time   If you are running late or are unable to attend your appointment, please call our [place] office at [phone], or if you were seen in the [place] office, please call [phone]  If you have thoughts of harming yourself or are otherwise in psychological crisis, do not hesitate to contact your [address], or 911 or go to the nearest emergency room    [facility] Crisis: [address] Crisis: [phone]  [address] Crisis: [address] Crisis: [address] Crisis: [address] Crisis: [address] [facility] Crisis: [phone]  National Suicide Prevention Hotline: 0-332.983.8283 or call 65

## 2022-11-28 ENCOUNTER — SOCIAL WORK (OUTPATIENT)
Dept: BEHAVIORAL/MENTAL HEALTH CLINIC | Facility: CLINIC | Age: 57
End: 2022-11-28

## 2022-11-28 DIAGNOSIS — F31.5 BIPOLAR I DISORDER, MOST RECENT EPISODE DEPRESSED, SEVERE W PSYCHOSIS (HCC): Primary | ICD-10-CM

## 2022-11-28 DIAGNOSIS — F25.1 SCHIZOAFFECTIVE DISORDER, DEPRESSIVE TYPE (HCC): ICD-10-CM

## 2022-11-28 DIAGNOSIS — F42.2 MIXED OBSESSIONAL THOUGHTS AND ACTS: ICD-10-CM

## 2022-11-28 NOTE — BH TREATMENT PLAN
Brenda De Guzman  1965       Date of Initial Treatment Plan: 9/16/20   Date of Current Treatment Plan: 11/28/22    Treatment Plan Number 5     Strengths/Personal Resources for Self Care: Intelligent, coaching, caring, football, motivation for treatment, relationship    Diagnosis:   1  Bipolar I disorder, most recent episode depressed, severe w psychosis ([facility]        2  Mixed obsessional thoughts and acts        3  Schizoaffective disorder, depressive type ([facility] )            Area of Needs:   Health  Future        Long Term Goal 1: Be able to move forward with my life     Target Date: 5/28/23  Completion Date: TBD         Short Term Objectives for Goal 1:        Focus on getting well physically       Maintain healthy relationships in my life       Be consistent with medication       Attend all therapy appointments       Build and maintain a schedule       Increase my activity level and help around the house       Focus on myself and not others             GOAL 1: Modality: Individual 1-2x per month   Completion Date TBD and The person(s) responsible for carrying out the plan is Frank Martinez          Behavioral Health Treatment Plan [place]: Diagnosis and Treatment Plan explained to Nirmal Herbert relates understanding diagnosis and is agreeable to Treatment Plan            Client Comments : Please share your thoughts, feelings, need and/or experiences regarding your treatment plan: None

## 2022-11-28 NOTE — PSYCH
Treatment Plan Tracking    # 5 Treatment Plan not completed within required time limits due to: Assistance signing due to disability

## 2022-11-28 NOTE — PSYCH
Psychotherapy Provided: Individual Psychotherapy 47 minutes     Length of time in session: 47 minutes, follow up in 1 month    Encounter Diagnosis     ICD-10-CM    1  Bipolar I disorder, most recent episode depressed, severe w psychosis ([facility] )  F31 5       2  Mixed obsessional thoughts and acts  F42 2       3  Schizoaffective disorder, depressive type ([facility] )  F25 1           Goals addressed in session: Goal 1     Pain:      none    0    Current suicide risk : Low     D- Bruno Curran presented for treatment with his girlfriend, Radha Roman  He presented as very anxious  The clinician addressed his inconsistent attendance with treatment and frequent hospitalizations  Also addressed his non-compliance with IOP  His girlfriend stated that he had refused to go  Discussing why he was reluctant to participate  E stated that it makes him very anxious to leave the house  Discussing the importance of treatment and what his intentions are moving forward  He stated that he does want to participate in outpatient therapy and that he wants to do something with is life such as volunteer  Discussing various ides for this and things close to him in the community that would be familiar  He stated that it has also been difficult not having his sons in his life although they both sent text messages on [employer]  Discussing his path forward in treatment and renewing and reviewing his treatment plan  Giving supportive therapy  A- Progress- Continuing to work towards completing his treatment goals  P_Continue treatment    [address]: Diagnosis and Treatment Plan explained to Joyce Meek relates understanding diagnosis and is agreeable to Treatment Plan   Yes     Visit start and stop times:    11/28/22  Start Time: 0902  Stop Time: 2275  Total Visit Time: 47 minutes

## 2022-12-28 ENCOUNTER — OFFICE VISIT (OUTPATIENT)
Dept: PSYCHIATRY | Facility: CLINIC | Age: 57
End: 2022-12-28

## 2022-12-28 VITALS — BODY MASS INDEX: 33.78 KG/M2 | WEIGHT: 263.1 LBS

## 2022-12-28 DIAGNOSIS — F25.1 SCHIZOAFFECTIVE DISORDER, DEPRESSIVE TYPE (HCC): Primary | ICD-10-CM

## 2022-12-28 DIAGNOSIS — F10.21 ALCOHOL USE DISORDER, SEVERE, IN SUSTAINED REMISSION (HCC): ICD-10-CM

## 2022-12-28 DIAGNOSIS — F17.200 NICOTINE USE DISORDER: ICD-10-CM

## 2022-12-28 DIAGNOSIS — F41.1 GAD (GENERALIZED ANXIETY DISORDER): ICD-10-CM

## 2022-12-28 DIAGNOSIS — Z86.59 HISTORY OF OBSESSIVE COMPULSIVE DISORDER: ICD-10-CM

## 2022-12-28 RX ORDER — CITALOPRAM 40 MG/1
40 TABLET ORAL
Qty: 30 TABLET | Refills: 1 | Status: SHIPPED | OUTPATIENT
Start: 2022-12-28 | End: 2023-02-26

## 2022-12-28 RX ORDER — CITALOPRAM 40 MG/1
40 TABLET ORAL DAILY
Qty: 30 TABLET | Refills: 1 | Status: SHIPPED | OUTPATIENT
Start: 2022-12-28 | End: 2022-12-28

## 2022-12-28 RX ORDER — MIRTAZAPINE 15 MG/1
15 TABLET, FILM COATED ORAL
Qty: 30 TABLET | Refills: 1 | Status: SHIPPED | OUTPATIENT
Start: 2022-12-28 | End: 2023-02-26

## 2022-12-28 RX ORDER — ARIPIPRAZOLE 5 MG/1
5 TABLET ORAL
Qty: 30 TABLET | Refills: 1 | Status: SHIPPED | OUTPATIENT
Start: 2022-12-28 | End: 2023-02-26

## 2022-12-28 RX ORDER — BUSPIRONE HYDROCHLORIDE 15 MG/1
15 TABLET ORAL 3 TIMES DAILY
Qty: 90 TABLET | Refills: 1 | Status: SHIPPED | OUTPATIENT
Start: 2022-12-28 | End: 2023-02-26

## 2022-12-28 NOTE — PATIENT INSTRUCTIONS
Please take the following medications: Call if you are having any side effects or would like to make any changes to dose or frequency  Continue Abilify 5 mg daily but trial in the morning instead of at night  Increase BuSpar from 20 mg twice daily to 15 mg three times daily for anxiety  Continue Celexa 40 mg daily morning for mood  Continue Remeron 15 mg daily one hour before bedtime for mood and sleep  Continue melatonin 3-5 mg daily at bedtime for sleep  Continue scheduled follow-up with psychotherapist   Continue practicing coping strategies, affirmations, sleep hygiene, lifestyle modifications including adequate hydration, nutrition, and exercise, walks in nature, hobbies, and mindfulness techniques  Look up "grounding techniques" and/or "anchoring demonstration" online and try a few to see what may work for you  Practice these skills before you need them, when you are not feeling too anxious or triggered  You can also search for free guided meditation videos online to help improve your head space when you are feeling very anxious or triggered  Recommendations regarding insomnia:  Wake-up at the same time every day  Refrain from "napping"  Refrain from going to bed unless you're tired  Utilize your bedroom for sleep only  Avoid use of electronics including television and/or cellphone/computers  Refrain from use of electronics including television and/or cellphones/computers prior to bed  Turn your alarm clock away so the light is not visible  Attempt relaxation using various means like reading if you're restless in bed for approximately 15-20 minutes  Participate in regular physical activities like exercise, although avoid approximately 3-4 hours prior to bed  Morning exercise is ideal   Avoid caffeine use prior to bedtime  Consider tapering down excessive use of caffeine  Avoid tobacco use prior to bedtime  Avoid alcohol use prior to bedtime    Consider reading "No More Sleepless nights" by Fabio Ramirez, Ph D   Consider use of online resources including:  http://Rangespan/cbt-online-insomnia-treatment html  [employer]  com  CBT-I  Lele on your Smart Phone  Go! To Sleep by the [facility]  Healthy Diet   The American Heart Association and the [employer] of Cardiology have long recommended a healthy diet for not only patients who are at risk for atherosclerotic cardiovascular disease (ASCVD) but also the general public  In keeping with this evidence-based recommendation, the "2018 Guideline on the Management of Blood Cholesterol" stresses that a healthy diet should include adequate intake of these essentials:   Vegetables, fruits, and whole grains   Legumes and nuts   Low-fat dairy products   Low-fat poultry (without the skin)   Fish and seafood   Nontropical vegetable oils     The recent guidelines do provide room for cultural food preferences in a healthy diet, but in general, all patients should limit their intake of saturated and trans fats, sweets, sugar-sweetened beverages, and red meats  Physical Activity   In addition to a healthy diet, all patients should include regular physical activity in their weekly routines, at moderate to vigorous intensity  Any activity is better than nothing, so if your patients can't meet the recommendation of vigorous activity, moderate-intensity activity can still help them reduce their risk of ASCVD  Below are the American Heart Association's recommendations for physical activity per week (preferably spread throughout the week):      For Overall Cardiovascular Health and Lowering Cholesterol   At least 150 minutes of moderate-intensity physical activity (for example, 30 minutes, 5 days a week), or   At least 75 minutes of vigorous-intensity physical activity (for example, 25 minutes, 3 days a week); or   A combination of moderate- and vigorous-intensity aerobic activity, and   At least 2 days of moderate- to high-intensity muscle-strengthening activities (such as resistance weight training) for additional health benefits    Weight Control   It's important to work with patients to help them reach and maintain a healthy weight (Table 3)  You may need to suggest that they adjust their caloric intake to avoid weight gain or, in overweight and obese patients, to promote weight loss  Table 3  Body Mass Index         Please call the office nursing staff for medication issues including refills, problems getting medications, bothersome side effects, etc at [phone]  Please return for a follow up appointment as discussed and arrive approximately 15 minutes prior to your appointment time  If you are running late or are unable to attend your appointment, please call our [place] office at [phone], or if you were seen in the [place] office, please call [phone]  If you have thoughts of harming yourself or are otherwise in psychological crisis, do not hesitate to contact your [address] [facility] [address], or 911 or go to the nearest emergency room    [facility] Crisis: [address] [facility] Crisis: [phone]  [address] Crisis: [address] Crisis: [address] Crisis: [address] Crisis: [facility]: [phone]  National Suicide Prevention Hotline: 8-588.482.7085 or call 65
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
## 2023-01-16 ENCOUNTER — TELEPHONE (OUTPATIENT)
Dept: BEHAVIORAL/MENTAL HEALTH CLINIC | Facility: CLINIC | Age: 58
End: 2023-01-16

## 2023-01-26 ENCOUNTER — TELEPHONE (OUTPATIENT)
Dept: PSYCHIATRY | Facility: CLINIC | Age: 58
End: 2023-01-26

## 2023-01-26 NOTE — TELEPHONE ENCOUNTER
Pt called to confirm appointment for today has been cancelled and to schedule next appointment with provider  Please reach out earliest convenience to do so

## 2023-01-31 ENCOUNTER — OFFICE VISIT (OUTPATIENT)
Dept: FAMILY MEDICINE CLINIC | Facility: CLINIC | Age: 58
End: 2023-01-31

## 2023-01-31 VITALS
WEIGHT: 270.8 LBS | TEMPERATURE: 97.1 F | OXYGEN SATURATION: 98 % | RESPIRATION RATE: 16 BRPM | SYSTOLIC BLOOD PRESSURE: 104 MMHG | HEIGHT: 74 IN | BODY MASS INDEX: 34.75 KG/M2 | HEART RATE: 84 BPM | DIASTOLIC BLOOD PRESSURE: 78 MMHG

## 2023-01-31 DIAGNOSIS — N40.0 BPH (BENIGN PROSTATIC HYPERPLASIA): ICD-10-CM

## 2023-01-31 DIAGNOSIS — Z12.11 SCREENING FOR COLORECTAL CANCER: ICD-10-CM

## 2023-01-31 DIAGNOSIS — F25.1 SCHIZOAFFECTIVE DISORDER, DEPRESSIVE TYPE (HCC): ICD-10-CM

## 2023-01-31 DIAGNOSIS — R63.5 WEIGHT GAIN: Primary | ICD-10-CM

## 2023-01-31 DIAGNOSIS — M47.812 SPONDYLOSIS OF CERVICAL REGION WITHOUT MYELOPATHY OR RADICULOPATHY: ICD-10-CM

## 2023-01-31 DIAGNOSIS — Z12.12 SCREENING FOR COLORECTAL CANCER: ICD-10-CM

## 2023-01-31 RX ORDER — GABAPENTIN 100 MG/1
100 CAPSULE ORAL 2 TIMES DAILY
COMMUNITY
End: 2023-01-31 | Stop reason: SDUPTHER

## 2023-01-31 RX ORDER — GABAPENTIN 100 MG/1
100 CAPSULE ORAL 2 TIMES DAILY
Qty: 180 CAPSULE | Refills: 3 | Status: SHIPPED | OUTPATIENT
Start: 2023-01-31

## 2023-01-31 RX ORDER — TAMSULOSIN HYDROCHLORIDE 0.4 MG/1
0.4 CAPSULE ORAL
Qty: 90 CAPSULE | Refills: 3 | Status: SHIPPED | OUTPATIENT
Start: 2023-01-31

## 2023-01-31 NOTE — PROGRESS NOTES
Name: Kat Parmar      : 1965      MRN: [id number]  Encounter Provider: Jasmyne Lawrence DO  Encounter Date: 2023   Encounter department: [address] [place] [address]   Discussed risks versus benefits of medications and potential side effects of psych meds including weight gain  Recommend patient discussed this with his psychiatrist   Patient seems to be doing better emotionally on current medication regimen  Discussed lifestyle changes including healthy diet and regular aerobic exercise  Recommend patient follow a low-fat and a low carbohydrate diet and get regular aerobic exercise walking 30 minutes daily  Discussed losing 10% of body weight or approximately 27 pounds over the next 3 to 6 months  Return to the office as needed  1  Weight gain    2  Spondylosis of cervical region without myelopathy or radiculopathy  -     gabapentin (NEURONTIN) 100 mg capsule; Take 1 capsule (100 mg total) by mouth 2 (two) times a day    3  BPH (benign prostatic hyperplasia)  -     tamsulosin (FLOMAX) 0 4 mg; Take 1 capsule (0 4 mg total) by mouth daily with dinner    4  [facility]oaffective disorder, depressive [facility] ([facility] )    5  Screening for colorectal cancer  -     Ambulatory referral for colonoscopy; Future           Subjective      Patient is here with his significant other concerned about weight gain related to his medications  Patient takes multiple psych meds per his psychiatrist emotionally has been actually feeling much better overall  He has not been in ER or hospital recently  Follows with a psychiatrist every 6 to 8 weeks and therapist every 2 to 3 weeks  Patient admits to not eating healthy and no regular exercise  Weight is up approximately 20 pounds over the past year  Denies change in bowel or bladder function  Also request refills on Flomax and gabapentin  Anxiety  Presents for follow-up visit   Symptoms include depressed mood, excessive worry, nervous/anxious behavior and restlessness  Patient reports no chest pain, confusion, decreased concentration, hyperventilation, insomnia, irritability, palpitations, panic, shortness of breath or suicidal ideas  Symptoms occur most days  The severity of symptoms is interfering with daily activities  The quality of sleep is fair  Nighttime awakenings: occasional          Review of Systems   Constitutional: Negative for irritability  Respiratory: Negative for shortness of breath  Cardiovascular: Negative for chest pain and palpitations  Psychiatric/Behavioral: Negative for confusion, decreased concentration and suicidal ideas  The patient is nervous/anxious  The patient does not have insomnia          Current Outpatient Medications on File Prior to Visit   Medication Sig   • acetaminophen (TYLENOL) 500 mg tablet Take 1 tablet (500 mg total) by mouth every 6 (six) hours as needed for fever, headaches or moderate pain   • ARIPiprazole (ABILIFY) 5 mg tablet Take 1 tablet (5 mg total) by mouth daily after breakfast   • ascorbic acid (VITAMIN C) 500 mg tablet Take 1 tablet (500 mg total) by mouth daily   • busPIRone (BUSPAR) 15 mg tablet Take 1 tablet (15 mg total) by mouth 3 (three) times a day   • cholecalciferol (VITAMIN D3) 1,000 units tablet Take 2 tablets (2,000 Units total) by mouth daily   • citalopram (CeleXA) 40 mg tablet Take 1 tablet (40 mg total) by mouth daily after breakfast   • ibuprofen (MOTRIN) 800 mg tablet Take 1 tablet (800 mg total) by mouth every 6 (six) hours as needed for headaches   • Melatonin 5 MG TABS Take 1 tablet (5 mg total) by mouth daily at bedtime   • mirtazapine (REMERON) 15 mg tablet Take 1 tablet (15 mg total) by mouth daily at bedtime (1 hour before bedtime)   • Omega-3 Fatty Acids (fish oil) 1,000 mg Take by mouth   • thiamine 100 MG tablet Take 100 mg by mouth daily   • [DISCONTINUED] gabapentin (NEURONTIN) 100 mg capsule Take 100 mg by mouth 2 (two) times a day   • [DISCONTINUED] tamsulosin (FLOMAX) 0 4 mg Take 1 capsule (0 4 mg total) by mouth daily with dinner       Objective     /78 (BP Location: Left arm, Patient Position: Sitting, Cuff Size: Standard)   Pulse 84   Temp (!) 97 1 °F (36 2 °C) (Tympanic)   Resp 16   Ht 6' 2" (1 88 m)   Wt 123 kg (270 lb 12 8 oz)   SpO2 98%   BMI 34 77 kg/m²     Physical Exam  Constitutional:       General: He is not in acute distress  Appearance: Normal appearance  HENT:      Head: Normocephalic  Mouth/Throat:      Mouth: Mucous membranes are moist    Eyes:      General: No scleral icterus  Conjunctiva/sclera: Conjunctivae normal    Cardiovascular:      Rate and Rhythm: Normal rate and regular rhythm  Pulmonary:      Effort: Pulmonary effort is normal       Breath sounds: Normal breath sounds  Abdominal:      Palpations: Abdomen is soft  Tenderness: There is no abdominal tenderness  Musculoskeletal:      Cervical back: Neck supple  Right lower leg: No edema  Left lower leg: No edema  Lymphadenopathy:      Cervical: No cervical adenopathy  Skin:     General: Skin is warm and dry  Neurological:      General: No focal deficit present  Mental Status: He is alert and oriented to person, place, and time  Psychiatric:         Mood and Affect: Mood normal          Behavior: Behavior normal          Thought Content: Thought content normal          Judgment: Judgment normal        Danelle Barron, DO  BMI Counseling: Body mass index is 34 77 kg/m²  The BMI is above normal  Nutrition recommendations include reducing portion sizes, decreasing overall calorie intake, 3-5 servings of fruits/vegetables daily, reducing fast food intake, consuming healthier snacks, decreasing soda and/or juice intake, moderation in carbohydrate intake, increasing intake of lean protein, reducing intake of saturated fat and trans fat and reducing intake of cholesterol   Exercise recommendations include moderate aerobic physical activity for 150 minutes/week

## 2023-02-10 ENCOUNTER — TELEPHONE (OUTPATIENT)
Dept: PSYCHIATRY | Facility: CLINIC | Age: 58
End: 2023-02-10

## 2023-02-10 NOTE — TELEPHONE ENCOUNTER
Skyler Akbar  requested a call back to discuss his appt today at 6  Pt is sick and cant make it,     They can be reached at P# [phone]      Thank you

## 2023-02-16 ENCOUNTER — TELEPHONE (OUTPATIENT)
Dept: PSYCHIATRY | Facility: CLINIC | Age: 58
End: 2023-02-16

## 2023-02-16 NOTE — TELEPHONE ENCOUNTER
Called and LVM that we had to c/x appt for 2/16  Provider called out sick  Asked if patient could call office back to r/s appt

## 2023-02-16 NOTE — TELEPHONE ENCOUNTER
Pt called to cancel his appt today at 2:45 p m  due to pt is sick  Writer informed pt that appt was already cancelled by provider and a message was left to him to call back to reschedule  Call was transferred to resident line
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
## 2023-02-28 ENCOUNTER — OFFICE VISIT (OUTPATIENT)
Dept: PSYCHIATRY | Facility: CLINIC | Age: 58
End: 2023-02-28

## 2023-02-28 VITALS — BODY MASS INDEX: 34.79 KG/M2 | WEIGHT: 271 LBS

## 2023-02-28 DIAGNOSIS — F17.200 NICOTINE USE DISORDER: ICD-10-CM

## 2023-02-28 DIAGNOSIS — F25.1 SCHIZOAFFECTIVE DISORDER, DEPRESSIVE TYPE (HCC): Primary | ICD-10-CM

## 2023-02-28 DIAGNOSIS — Z86.59 HISTORY OF OBSESSIVE COMPULSIVE DISORDER: ICD-10-CM

## 2023-02-28 DIAGNOSIS — F10.21 ALCOHOL USE DISORDER, SEVERE, IN SUSTAINED REMISSION (HCC): ICD-10-CM

## 2023-02-28 DIAGNOSIS — F41.1 GAD (GENERALIZED ANXIETY DISORDER): ICD-10-CM

## 2023-02-28 RX ORDER — ARIPIPRAZOLE 5 MG/1
5 TABLET ORAL
Qty: 30 TABLET | Refills: 1 | Status: SHIPPED | OUTPATIENT
Start: 2023-02-28 | End: 2023-04-29

## 2023-02-28 RX ORDER — CITALOPRAM 40 MG/1
40 TABLET ORAL
Qty: 30 TABLET | Refills: 1 | Status: SHIPPED | OUTPATIENT
Start: 2023-02-28 | End: 2023-04-29

## 2023-02-28 RX ORDER — MIRTAZAPINE 15 MG/1
30 TABLET, FILM COATED ORAL
Qty: 60 TABLET | Refills: 1 | Status: SHIPPED | OUTPATIENT
Start: 2023-02-28 | End: 2023-04-29

## 2023-02-28 RX ORDER — BUSPIRONE HYDROCHLORIDE 15 MG/1
15 TABLET ORAL 3 TIMES DAILY
Qty: 90 TABLET | Refills: 1 | Status: SHIPPED | OUTPATIENT
Start: 2023-02-28 | End: 2023-04-29

## 2023-02-28 NOTE — PATIENT INSTRUCTIONS
Please take the following medications: Call if you are having any side effects or would like to make any changes to dose or frequency  Continue Abilify 5 mg daily but trial in the morning  Continue BuSpar 15 mg three times daily for anxiety  Continue Celexa 40 mg daily morning for mood  Continue Gabapentin 100 mg twice daily for nerve pain and anxiety  Increase Remeron from 15 mg to 30 mg daily one hour before bedtime for mood and sleep  Continue melatonin 3-5 mg daily at bedtime as needed for sleep  Continue scheduled follow-up with psychotherapist   Continue practicing coping strategies, positive affirmations, reframing thoughts, prioritizing sleep hygiene, lifestyle modifications including increasing DAILY exercise, focusing on healthier whole food options and limiting or eliminating packaged items/snacks with added sugars and processed oils, and to maintain adequate hydration, increase mindfulness activities such as journaling, and incorporate more interests and hobbies, walks in nature, and socialization  Follow-up in 6-8 weeks  Continue refraining from substance use  The patient was educated about the risk of smoking, and its negative effects on health; options regarding smoking cessation (including nicotine replacement therapy and medication management) were offered  The patient stated that he is not interested in quitting at the moment, but will consider  Please call the office nursing staff for medication issues including refills, problems getting medications, bothersome side effects, etc , at [phone]  Please return for a follow up appointment as discussed and arrive approximately 15 minutes prior to your appointment time  If you are running late or are unable to attend your appointment, please call our [place] office at [phone], or if you were seen in the [place] office, please call [phone]      Look up "grounding techniques" and/or "anchoring demonstration" online and try a few to see what may work for you  Practice these skills before you need them, when you are not feeling too anxious or triggered  You can also search for free guided meditation videos online to help improve your head space when you are feeling very anxious or triggered  Recommendations regarding insomnia:  Wake-up at the same time every day  Refrain from "napping"  Refrain from going to bed unless you're tired  Utilize your bedroom for sleep only  Avoid use of electronics including television and/or cellphone/computers  Refrain from use of electronics including television and/or cellphones/computers prior to bed  Turn your alarm clock away so the light is not visible  Attempt relaxation using various means like reading if you're restless in bed for approximately 15-20 minutes  Participate in regular physical activities like exercise, although avoid approximately 3-4 hours prior to bed  Morning exercise is ideal   Avoid caffeine use prior to bedtime  Consider tapering down excessive use of caffeine  Avoid tobacco use prior to bedtime  Avoid alcohol use prior to bedtime  Consider reading "No More Sleepless nights" by Chris Spence, Ph D   Consider use of online resources including:  http://[employer]/cbt-online-insomnia-treatment html  [employer]  com  CBT-I  Lele on your Smart Phone  Go! To Sleep by the [facility]  Healthy Diet   The American Heart Association and the [employer] of Cardiology have long recommended a healthy diet for not only patients who are at risk for atherosclerotic cardiovascular disease (ASCVD) but also the general public   In keeping with this evidence-based recommendation, the "2018 Guideline on the Management of Blood Cholesterol" stresses that a healthy diet should include adequate intake of these essentials:   Vegetables, fruits, and whole grains   Legumes and nuts   Low-fat dairy products   Low-fat poultry (without the skin)   Fish and seafood Nontropical vegetable oils     The recent guidelines do provide room for cultural food preferences in a healthy diet, but in general, all patients should limit their intake of saturated and trans fats, sweets, sugar-sweetened beverages, and red meats  Physical Activity   In addition to a healthy diet, all patients should include regular physical activity in their weekly routines, at moderate to vigorous intensity  Any activity is better than nothing, so if your patients can't meet the recommendation of vigorous activity, moderate-intensity activity can still help them reduce their risk of ASCVD  Below are the American Heart Association's recommendations for physical activity per week (preferably spread throughout the week): For Overall Cardiovascular Health and Lowering Cholesterol   At least 150 minutes of moderate-intensity physical activity (for example, 30 minutes, 5 days a week), or   At least 75 minutes of vigorous-intensity physical activity (for example, 25 minutes, 3 days a week); or   A combination of moderate- and vigorous-intensity aerobic activity, and   At least 2 days of moderate- to high-intensity muscle-strengthening activities (such as resistance weight training) for additional health benefits    Weight Control   It's important to work with patients to help them reach and maintain a healthy weight (Table 3)  You may need to suggest that they adjust their caloric intake to avoid weight gain or, in overweight and obese patients, to promote weight loss  Table 3  Body Mass Index           If you have thoughts of harming yourself or are otherwise in psychological crisis, do not hesitate to contact your [facility] hotline, or 911 or go to the nearest emergency room    [facility] Crisis: [address] [facility] Crisis: [phone]  [address] Crisis: [address] Crisis: [address] Crisis: [address] Crisis: [facility] Crisis: [phone]  National Suicide Prevention Hotline: 9-401.162.6882 or call 65

## 2023-02-28 NOTE — BH TREATMENT PLAN
TREATMENT PLAN        [facility]    Name and Date of Birth:  Johnice Saint 62 y o  1965  Date of Treatment Plan: February 28, 2023  Diagnosis/Diagnoses:    1  Schizoaffective disorder, depressive type ([facility] )    2  BETHANY (generalized anxiety disorder)    3  History of obsessive compulsive disorder    4  Alcohol use disorder, severe, in sustained remission ([facility] )    5  Nicotine use disorder        Strengths/Personal Resources for Self-Care: supportive girlfriend, taking medications as prescribed, ability to communicate needs, ability to reason, average or above intelligence, financial security, general fund of knowledge, ability to negotiate basic needs, Orthodoxy affiliation, willingness to work on problems    Area/Areas of need: anxiety symptoms, depressive symptoms, sleep problems, few hobbies or interests, poor self-care    Long Term Goal: continue improvement in mood  Target Date: 6 months - August 28, 2023  Person/Persons responsible for completion of goal: Yenny Farrell and Levy Baron, DO     Short Term Objective (s) - How will we reach this goal?:   1  Take medications as prescribed  2  Attend psychiatry appointments regularly  3  Follow up with medical providers  4  Continue psychotherapy regularly  5  Practice coping skills  6  Try sleep hygiene techniques  7  Avoid alcohol   8  Avoid drugs   9  Eat a healthy diet   10  Exercise DAILY   11  Take walks regularly  12  Spend more time with friends and family  15  Try breathing exercises  14   Try relaxation techniques  Target Date: 3 months - May 28, 2023  Person/Persons Responsible for Completion of Goal: Yenny Farrell     Progress Towards Goals: Continuing treatment    Treatment Modality: medication management every 1-3 months as needed, continue psychotherapy and follow up with PCP  Review due 180 days from date of this plan: August 27, 2023   Expected length of service: Ongoing treatment    My physician and I have developed this plan together, and I agree to work on the goals and objectives  I understand the treatment goals that were developed for my treatment  The treatment plan was created between Etienne Feilz DO and Kylee Lackey on 02/28/23 at 1:14 PM but not signed at the time of the visit due to [facility]  The plan was reviewed, and verbal consent was given

## 2023-03-01 ENCOUNTER — TELEPHONE (OUTPATIENT)
Dept: PSYCHIATRY | Facility: CLINIC | Age: 58
End: 2023-03-01

## 2023-03-01 NOTE — TELEPHONE ENCOUNTER
Writer contacted pt in regards to a vm we received requesting to cancel appt     Date/Time: 3/1/2023 at 12:00     Reason: has another appt    Patient was rescheduled: YES [] NO [x]  If yes, when was Patient reschedule for:     Patient requesting call back to reschedule: YES [x] NO []

## 2023-04-27 ENCOUNTER — TELEPHONE (OUTPATIENT)
Dept: PSYCHIATRY | Facility: CLINIC | Age: 58
End: 2023-04-27

## 2023-04-27 NOTE — TELEPHONE ENCOUNTER
Amy Mercado called from [facility] regarding scheduling patient for therapy follow up  Messaged provider for permission to schedule  When response is received writer will return SW call and inform of response  Patient is being discharged tomorrow 4/28      Amy Mercado [phone]

## 2023-05-11 ENCOUNTER — TELEPHONE (OUTPATIENT)
Dept: PSYCHIATRY | Facility: CLINIC | Age: 58
End: 2023-05-11

## 2023-05-19 ENCOUNTER — TELEPHONE (OUTPATIENT)
Dept: PSYCHIATRY | Facility: CLINIC | Age: 58
End: 2023-05-19

## 2023-05-19 NOTE — TELEPHONE ENCOUNTER
Patient's  contacted the office to schedule a follow up visit with provider  Patient is now scheduled for 7/5/23  at 9:00a in office  Isma Hood

## 2023-05-19 NOTE — TELEPHONE ENCOUNTER
[facility] behavioral unit was calling to get patient scheduled for a follow up appt on his discharge from their unit, writer transferred call to resident line

## 2023-05-19 NOTE — TELEPHONE ENCOUNTER
Charo Stroud  from [facility] contacted the office to schedule an appointment with provider on behalf of the patient  Writer transferred call to resident department for assistance

## 2023-05-22 NOTE — TELEPHONE ENCOUNTER
Returned Jeanna's call at [phone]  LVM for her to call the resident line to schedule an appt for the patient
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
## 2023-05-26 ENCOUNTER — OFFICE VISIT (OUTPATIENT)
Dept: PSYCHIATRY | Facility: CLINIC | Age: 58
End: 2023-05-26

## 2023-05-26 DIAGNOSIS — Z91.199 NO-SHOW FOR APPOINTMENT: Primary | ICD-10-CM

## 2023-05-26 NOTE — PATIENT INSTRUCTIONS
"Please call the office nursing staff for medication issues including refills, problems getting medications, bothersome side effects, etc , at [phone]  Please return for a follow up appointment as discussed and arrive approximately 15 minutes prior to your appointment time  If you are running late or are unable to attend your appointment, please call our [place] office at [phone], or if you were seen in the [place] office, please call [phone]  Look up \"grounding techniques\" and/or \"anchoring demonstration\" online and try a few to see what may work for you  Practice these skills before you need them, when you are not feeling too anxious or triggered  You can also search for free guided meditation videos online to help improve your head space when you are feeling very anxious or triggered  Recommendations regarding insomnia:  Wake-up at the same time every day  Refrain from \"napping\"  Refrain from going to bed unless you're tired  Utilize your bedroom for sleep only  Avoid use of electronics including television and/or cellphone/computers  Refrain from use of electronics including television and/or cellphones/computers prior to bed  Turn your alarm clock away so the light is not visible  Attempt relaxation using various means like reading if you're restless in bed for approximately 15-20 minutes  Participate in regular physical activities like exercise, although avoid approximately 3-4 hours prior to bed  Morning exercise is ideal   Avoid caffeine use prior to bedtime  Consider tapering down excessive use of caffeine  Avoid tobacco use prior to bedtime  Avoid alcohol use prior to bedtime  Consider reading \"No More Sleepless nights\" by Katelyn Spivey, Ph D   Consider use of online resources including:  http://[employer]/cbt-online-insomnia-treatment html  [employer]  com  CBT-I  Lele on your Smart Phone  Go! To Sleep by the [facility]      Healthy Diet   The " "American Heart Association and the [employer] of Cardiology have long recommended a healthy diet for not only patients who are at risk for atherosclerotic cardiovascular disease (ASCVD) but also the general public  In keeping with this evidence-based recommendation, the \"2018 Guideline on the Management of Blood Cholesterol\" stresses that a healthy diet should include adequate intake of these essentials:   Vegetables, fruits, and whole grains   Legumes and nuts   Low-fat dairy products   Low-fat poultry (without the skin)   Fish and seafood   Nontropical vegetable oils     The recent guidelines do provide room for cultural food preferences in a healthy diet, but in general, all patients should limit their intake of saturated and avoid all trans fats, sweets, sugar-sweetened beverages, and red meats  Please maintain adequate hydration of at least 2 Liters of water per day, and improve nutrition by decreasing portion sizes, avoiding fried foods, fast foods, inappropriate snacking and overly processed and packaged items with 'added sugars' (whether in drinks or foods), and observing nutritional facts information on any items that are packaged to reduce intake of saturated fats and AVOID trans fats as mentioned above  Again, consume whole foods such as vegetables, higher lean protein intake, and using healthier lifestyle plans such as the Mediterranean diet with healthier fats such as those from seeds, nuts, and using organic extra virgin olive oil or avocado oil in lieu of processed vegetable oils or margarine  Physical Activity   Recommended DAILY exercise for at least 150 minutes of moderate exercise weekly! In addition to a healthy diet, all patients should include regular physical activity in their weekly routines, at moderate to vigorous intensity   Any activity is better than nothing, so if your patients can't meet the recommendation of vigorous activity, moderate-intensity activity can still help them " reduce their risk of ASCVD  Below are the American Heart Association's recommendations for physical activity per week (preferably spread throughout the week): For Overall Cardiovascular Health and Lowering Cholesterol   At least 150 minutes of moderate-intensity physical activity (for example, 30 minutes, 5 days a week), or   At least 75 minutes of vigorous-intensity physical activity (for example, 25 minutes, 3 days a week); or   A combination of moderate- and vigorous-intensity aerobic activity, and   At least 2 days of moderate- to high-intensity muscle-strengthening activities (such as resistance weight training) for additional health benefits    Weight Control   It's important to work with patients to help them reach and maintain a healthy weight (Table 3)  You may need to suggest that they adjust their caloric intake to avoid weight gain or, in overweight and obese patients, to promote weight loss  Table 3  Body Mass Index           If you have thoughts of harming yourself or are otherwise in psychological crisis, do not hesitate to contact your [facility] hotline, or 911 or go to the nearest emergency room  Adult Crisis Numbers  Suicide Prevention Hotline - Dial   [facility]: [phone] [facility]: JENNIE Wright 56: [address]: [phone]  [facility]: [phone] or [phone]  [facility]: [address] [place] [address] Avenue: [phone] or [facility] Crisis: [address]: [phone] (daytime)  [phone] (after hours, weekends, holidays)     Child/Adolescent Crisis Numbers   [facility]: Luige Stephen 10: [address], [place]: [phone]   [facility]: [phone]  Please note: Some [facility] do not have a separate number for Child/Adolescent specific crisis   If your county is not listed under Child/Adolescent, please call the adult number for your county     National Talk to Text Line   All Ages - [address]: [phone] or call [facility]

## 2023-06-02 ENCOUNTER — TELEPHONE (OUTPATIENT)
Dept: PSYCHIATRY | Facility: CLINIC | Age: 58
End: 2023-06-02

## 2023-06-02 NOTE — TELEPHONE ENCOUNTER
[facility] called to get patient rescheduled, he missed his appt on 5/26/2023 due to being inpatient at the hospital and the CM would like to get another appt scheduled for patient

## 2023-06-06 NOTE — TELEPHONE ENCOUNTER
CM from [facility] called to get patient scheduled with a f/u appt, writer transferred caller to resident line for assistance

## 2023-06-08 ENCOUNTER — TELEPHONE (OUTPATIENT)
Dept: PSYCHIATRY | Facility: CLINIC | Age: 58
End: 2023-06-08

## 2023-06-08 NOTE — TELEPHONE ENCOUNTER
Rebeca killian CM from [facility] called regarding follow up for therapy and stated patient is being discharged tomorrow  Patient has an appt on 7/6 however is inquiring about an earlier appt  Please contact Rebeca and inform whether or not there is availability for patient earlier than scheduled appt  Thank you      [facility] [phone]

## 2023-06-08 NOTE — TELEPHONE ENCOUNTER
returned call to [employer] from [facility] and left a voice message to call the office back regarding scheduling patient    has placed patients 7/06 appointment on providers cancellation list

## 2023-06-09 NOTE — TELEPHONE ENCOUNTER
Rebeca from [facility] returned previous vm left and was made aware that patient has been added to the therapist's cancellation list for when a sooner appointment becomes available  The caller understood and will advise the patient to watch for future calls regarding sooner appointments

## 2023-06-20 ENCOUNTER — TELEPHONE (OUTPATIENT)
Dept: PSYCHIATRY | Facility: CLINIC | Age: 58
End: 2023-06-20

## 2023-06-20 NOTE — TELEPHONE ENCOUNTER
Attempted to contact Marlene Owusu about [facility] [employer]  CM called x2, Marlene Owusu was not available

## 2023-06-20 NOTE — TELEPHONE ENCOUNTER
Case Management received a referral from Dr Olya Kirk to assist patient in obtaining ACT services  As of 6/20/23, [facility] is at capacity and is not accepting referrals   Message was left for [facility] Team

## 2023-06-30 ENCOUNTER — TELEPHONE (OUTPATIENT)
Dept: PSYCHIATRY | Facility: CLINIC | Age: 58
End: 2023-06-30

## 2023-06-30 NOTE — TELEPHONE ENCOUNTER
Writer attempted to contacted patient to reschedule 7/5 appointment and Martha Weeks (have medical consent) informed writer that patient has been hospitalized for the past two months. She is currently in a feud with the hospital regarding his discharge plans. Martha Weeks is stating that the hospital is planning on sending him to adult extended care and or maybe [facility]. She was asking to see if provider could do anything possibly.

## 2023-07-11 ENCOUNTER — TELEPHONE (OUTPATIENT)
Dept: PSYCHIATRY | Facility: CLINIC | Age: 58
End: 2023-07-11

## 2023-07-11 ENCOUNTER — DOCUMENTATION (OUTPATIENT)
Dept: PSYCHIATRY | Facility: CLINIC | Age: 58
End: 2023-07-11

## 2023-07-11 ENCOUNTER — TELEPHONE (OUTPATIENT)
Dept: OTHER | Facility: OTHER | Age: 58
End: 2023-07-11

## 2023-07-11 NOTE — TELEPHONE ENCOUNTER
Patient significant other called to ask the office to stop scheduling appointments. The pt has been admitted for 60 days now and someone continues to schedule appointments and pt obviously no shows the appointment due to admission.

## 2023-07-11 NOTE — TELEPHONE ENCOUNTER
DISCHARGE LETTER for Dr. Walker Heads (certified and regular) placed in outgoing mail on 07/11/23.     Article #:  [id number]    Address:  [address] [place] [address]
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
## 2023-07-31 ENCOUNTER — HOSPITAL ENCOUNTER (EMERGENCY)
Facility: HOSPITAL | Age: 58
Discharge: HOME | End: 2023-08-01
Attending: EMERGENCY MEDICINE
Payer: MEDICARE

## 2023-07-31 VITALS
DIASTOLIC BLOOD PRESSURE: 85 MMHG | SYSTOLIC BLOOD PRESSURE: 123 MMHG | TEMPERATURE: 98.1 F | RESPIRATION RATE: 18 BRPM | OXYGEN SATURATION: 93 % | HEART RATE: 97 BPM

## 2023-07-31 DIAGNOSIS — Z00.8 ENCOUNTER FOR PSYCHOLOGICAL EVALUATION: Primary | ICD-10-CM

## 2023-07-31 PROCEDURE — 99283 EMERGENCY DEPT VISIT LOW MDM: CPT

## 2023-07-31 RX ORDER — LORAZEPAM 0.5 MG/1
0.5 TABLET ORAL ONCE
Status: COMPLETED | OUTPATIENT
Start: 2023-07-31 | End: 2023-07-31

## 2023-07-31 RX ADMIN — LORAZEPAM 0.5 MG: 0.5 TABLET ORAL at 23:30

## 2023-08-01 NOTE — ED ATTENDING ATTESTATION
7/31/2023  IFarida MD, saw and evaluated the patient. I have discussed the patient with the resident/non-physician practitioner and agree with the resident's/non-physician practitioner's findings, Plan of Care, and MDM as documented in the resident's/non-physician practitioner's note, except where noted. All available labs and Radiology studies were reviewed. I was present for key portions of any procedure(s) performed by the resident/non-physician practitioner and I was immediately available to provide assistance. At this point I agree with the current assessment done in the Emergency Department. I have conducted an independent evaluation of this patient a history and physical is as follows:    60-year-old male with a history of schizoaffective disorder currently living in a behavioral health residence court mandated brought for evaluation after having a panic attack there. His symptoms began resolving after he was given hydroxyzine. He still little bit anxious here but states significantly better. He has a caretaker here with him who agrees he is back to baseline. Patient was prescribed 0.5 mg lorazepam last month while he was in inpatient psychiatry but is no longer taking this. He states he still feels a little anxious and would like something for anxiety before going home. We will give him oral lorazepam here and plan discharge. He is currently stable. No SI, HI, or acute psychosis.       ED Course         Critical Care Time  Procedures
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
## 2023-08-01 NOTE — DISCHARGE INSTRUCTIONS
Please come back to the ED if you experience any suicidal or homicidal thoughts, chest pain, shortness of breath

## 2023-08-01 NOTE — ED NOTES
Staff from [facility] facility present with patient at bedside. Patient resting comfortably.      Joey Rodriguez RN  07/31/23 9997

## 2023-10-05 ENCOUNTER — APPOINTMENT (EMERGENCY)
Dept: VASCULAR ULTRASOUND | Facility: HOSPITAL | Age: 58
End: 2023-10-05
Payer: MEDICARE

## 2023-10-05 ENCOUNTER — HOSPITAL ENCOUNTER (EMERGENCY)
Facility: HOSPITAL | Age: 58
Discharge: HOME | End: 2023-10-05
Attending: EMERGENCY MEDICINE
Payer: MEDICARE

## 2023-10-05 VITALS
SYSTOLIC BLOOD PRESSURE: 123 MMHG | OXYGEN SATURATION: 96 % | HEART RATE: 85 BPM | TEMPERATURE: 98.5 F | DIASTOLIC BLOOD PRESSURE: 80 MMHG | RESPIRATION RATE: 18 BRPM

## 2023-10-05 DIAGNOSIS — I82.501 LEG DVT (DEEP VENOUS THROMBOEMBOLISM), CHRONIC, RIGHT (HCC): ICD-10-CM

## 2023-10-05 DIAGNOSIS — I87.2 VENOUS INSUFFICIENCY OF BOTH LOWER EXTREMITIES: Primary | ICD-10-CM

## 2023-10-05 DIAGNOSIS — M79.89 LEG SWELLING: ICD-10-CM

## 2023-10-05 LAB
ALBUMIN SERPL BCP-MCNC: 4.4 G/DL (ref 3.5–5)
ALP SERPL-CCNC: 81 U/L (ref 34–104)
ALT SERPL W P-5'-P-CCNC: 24 U/L (ref 7–52)
ANION GAP SERPL CALCULATED.3IONS-SCNC: 6 MMOL/L
AST SERPL W P-5'-P-CCNC: 24 U/L (ref 13–39)
BASOPHILS # BLD AUTO: 0.04 THOUSANDS/ÂΜL (ref 0–0.1)
BASOPHILS NFR BLD AUTO: 1 % (ref 0–1)
BILIRUB SERPL-MCNC: 0.3 MG/DL (ref 0.2–1)
BNP SERPL-MCNC: 5 PG/ML (ref 0–100)
BUN SERPL-MCNC: 13 MG/DL (ref 5–25)
CALCIUM SERPL-MCNC: 9.2 MG/DL (ref 8.4–10.2)
CHLORIDE SERPL-SCNC: 102 MMOL/L (ref 96–108)
CO2 SERPL-SCNC: 29 MMOL/L (ref 21–32)
CREAT SERPL-MCNC: 1.09 MG/DL (ref 0.6–1.3)
EOSINOPHIL # BLD AUTO: 0.18 THOUSAND/ÂΜL (ref 0–0.61)
EOSINOPHIL NFR BLD AUTO: 3 % (ref 0–6)
ERYTHROCYTE [DISTWIDTH] IN BLOOD BY AUTOMATED COUNT: 13.1 % (ref 11.6–15.1)
GFR SERPL CREATININE-BSD FRML MDRD: 74 ML/MIN/1.73SQ M
GLUCOSE SERPL-MCNC: 82 MG/DL (ref 65–140)
HCT VFR BLD AUTO: 43.9 % (ref 36.5–49.3)
HGB BLD-MCNC: 14.3 G/DL (ref 12–17)
IMM GRANULOCYTES # BLD AUTO: 0.01 THOUSAND/UL (ref 0–0.2)
IMM GRANULOCYTES NFR BLD AUTO: 0 % (ref 0–2)
LYMPHOCYTES # BLD AUTO: 1.9 THOUSANDS/ÂΜL (ref 0.6–4.47)
LYMPHOCYTES NFR BLD AUTO: 32 % (ref 14–44)
MCH RBC QN AUTO: 31 PG (ref 26.8–34.3)
MCHC RBC AUTO-ENTMCNC: 32.6 G/DL (ref 31.4–37.4)
MCV RBC AUTO: 95 FL (ref 82–98)
MONOCYTES # BLD AUTO: 0.69 THOUSAND/ÂΜL (ref 0.17–1.22)
MONOCYTES NFR BLD AUTO: 12 % (ref 4–12)
NEUTROPHILS # BLD AUTO: 3.08 THOUSANDS/ÂΜL (ref 1.85–7.62)
NEUTS SEG NFR BLD AUTO: 52 % (ref 43–75)
NRBC BLD AUTO-RTO: 0 /100 WBCS
PLATELET # BLD AUTO: 267 THOUSANDS/UL (ref 149–390)
PMV BLD AUTO: 10.3 FL (ref 8.9–12.7)
POTASSIUM SERPL-SCNC: 4.3 MMOL/L (ref 3.5–5.3)
PROT SERPL-MCNC: 7.9 G/DL (ref 6.4–8.4)
RBC # BLD AUTO: 4.61 MILLION/UL (ref 3.88–5.62)
SODIUM SERPL-SCNC: 137 MMOL/L (ref 135–147)
WBC # BLD AUTO: 5.9 THOUSAND/UL (ref 4.31–10.16)

## 2023-10-05 PROCEDURE — 36415 COLL VENOUS BLD VENIPUNCTURE: CPT

## 2023-10-05 PROCEDURE — 80053 COMPREHEN METABOLIC PANEL: CPT

## 2023-10-05 PROCEDURE — 76882 US LMTD JT/FCL EVL NVASC XTR: CPT | Performed by: EMERGENCY MEDICINE

## 2023-10-05 PROCEDURE — 83880 ASSAY OF NATRIURETIC PEPTIDE: CPT

## 2023-10-05 PROCEDURE — 99284 EMERGENCY DEPT VISIT MOD MDM: CPT

## 2023-10-05 PROCEDURE — 93970 EXTREMITY STUDY: CPT

## 2023-10-05 PROCEDURE — 85025 COMPLETE CBC W/AUTO DIFF WBC: CPT

## 2023-10-05 PROCEDURE — 93005 ELECTROCARDIOGRAM TRACING: CPT

## 2023-10-05 PROCEDURE — 99285 EMERGENCY DEPT VISIT HI MDM: CPT | Performed by: EMERGENCY MEDICINE

## 2023-10-05 PROCEDURE — 93970 EXTREMITY STUDY: CPT | Performed by: SURGERY
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
## 2023-10-05 NOTE — DISCHARGE INSTRUCTIONS
I would schedule a follow-up appointment with the vascular team specifically with regards to this chronic distal DVT. Should you have any worsening in swelling, development of pain, fevers, shortness of breath or chest pain, immediately return to the emergency department.

## 2023-10-05 NOTE — ED ATTENDING ATTESTATION
10/5/2023  IRyan MD, saw and evaluated the patient. I have discussed the patient with the resident/non-physician practitioner and agree with the resident's/non-physician practitioner's findings, Plan of Care, and MDM as documented in the resident's/non-physician practitioner's note, except where noted. All available labs and Radiology studies were reviewed. I was present for key portions of any procedure(s) performed by the resident/non-physician practitioner and I was immediately available to provide assistance. At this point I agree with the current assessment done in the Emergency Department. I have conducted an independent evaluation of this patient a history and physical is as follows:    51-year-old male brought from [facility] for evaluation of bilateral lower leg welling and some slight erythema over about a week. Patient denies any pain, itching. No injuries. He had COVID last week and was quarantined in his room for at least 6 days. Stated he did not have much to do so spent most of his time standing playing games on his phone, standing watching TV. He reports standing still, not walking around the room for many hours out of the day. He denies any history of leg swelling. No history of renal/cardiac/hepatic disease. Denies any chest pain, difficulty breathing, fevers, chills. On exam he has trace to 1+ pitting edema of both legs, left worse than right. There is a faint nonblanchable macular rash over both lower legs. No tenderness. Differential diagnosis includes venous stasis, DVT, less likely hepatic/renal/cardiac disease. Plan duplex, labs, reevaluate. Suspect most likely dependent edema secondary to standing all week. ED Course     Duplex notable for subacute to chronic soleal vein DVT on the right. Patient has no pain on exam.  This is isolated, small, below the knee, likely chronic. Do not feel the patient needs urgent anticoagulation.   Suspect more of an incidental finding. Will refer to vascular surgery for follow-up.     Critical Care Time  Procedures

## 2023-10-05 NOTE — ED PROCEDURE NOTE
Procedure  POC DVT/PE US    Date/Time: 10/5/2023 2:42 PM    Performed by: Jermaine Clancy MD  Authorized by:  Macho Goldman MD    Patient location:  ED  Performed by:  Resident  Other Assisting Provider: Yes (comment) (Dr. Emilee Santana, Dr. Stef Beltran, Dr. Merly Schwartz, Dr. René Moraes, Dr. Christine Shafer)    Procedure details:     Exam Type:  Diagnostic    Indications: pain in limb      Assessment for:  DVT    Views obtained: femoral vein and popliteal vein      Image quality: diagnostic      Image availability:  Images available in PACS  Findings:     Extremities evaluated:  Right lower extremity and left lower extremity    Right femoral vein: compressible      Right popliteal vein:  Compressible    Left femoral vein: compressible      Left popliteal vein:  Compressible  Interpretation:     DVT location:  None                     Sravanthi Francis MD  10/05/23 6996

## 2023-10-06 LAB
ATRIAL RATE: 84 BPM
P AXIS: 19 DEGREES
PR INTERVAL: 216 MS
QRS AXIS: 67 DEGREES
QRSD INTERVAL: 82 MS
QT INTERVAL: 368 MS
QTC INTERVAL: 434 MS
T WAVE AXIS: 31 DEGREES
VENTRICULAR RATE: 84 BPM

## 2023-10-06 PROCEDURE — 93010 ELECTROCARDIOGRAM REPORT: CPT | Performed by: INTERNAL MEDICINE

## 2023-11-08 ENCOUNTER — TELEPHONE (OUTPATIENT)
Dept: FAMILY MEDICINE CLINIC | Facility: CLINIC | Age: 58
End: 2023-11-08

## 2023-12-28 ENCOUNTER — HOSPITAL ENCOUNTER (EMERGENCY)
Facility: HOSPITAL | Age: 58
DRG: 885 | End: 2023-12-29
Attending: EMERGENCY MEDICINE
Payer: MEDICARE

## 2023-12-28 DIAGNOSIS — Z76.89 ENCOUNTER FOR PSYCHIATRIC ASSESSMENT: Primary | ICD-10-CM

## 2023-12-28 DIAGNOSIS — Z00.8 MEDICAL CLEARANCE FOR PSYCHIATRIC ADMISSION: ICD-10-CM

## 2023-12-28 LAB
ALBUMIN SERPL BCP-MCNC: 3.6 G/DL (ref 3.5–5)
ALP SERPL-CCNC: 62 U/L (ref 34–104)
ALT SERPL W P-5'-P-CCNC: 27 U/L (ref 7–52)
AMPHETAMINES SERPL QL SCN: NEGATIVE
ANION GAP SERPL CALCULATED.3IONS-SCNC: 7 MMOL/L
AST SERPL W P-5'-P-CCNC: 53 U/L (ref 13–39)
BARBITURATES UR QL: NEGATIVE
BASOPHILS # BLD AUTO: 0.02 THOUSANDS/ÂΜL (ref 0–0.1)
BASOPHILS NFR BLD AUTO: 0 % (ref 0–1)
BENZODIAZ UR QL: NEGATIVE
BILIRUB SERPL-MCNC: 0.44 MG/DL (ref 0.2–1)
BUN SERPL-MCNC: 6 MG/DL (ref 5–25)
CALCIUM SERPL-MCNC: 8.7 MG/DL (ref 8.4–10.2)
CHLORIDE SERPL-SCNC: 105 MMOL/L (ref 96–108)
CO2 SERPL-SCNC: 24 MMOL/L (ref 21–32)
COCAINE UR QL: NEGATIVE
CREAT SERPL-MCNC: 0.83 MG/DL (ref 0.6–1.3)
EOSINOPHIL # BLD AUTO: 0.08 THOUSAND/ÂΜL (ref 0–0.61)
EOSINOPHIL NFR BLD AUTO: 1 % (ref 0–6)
ERYTHROCYTE [DISTWIDTH] IN BLOOD BY AUTOMATED COUNT: 13 % (ref 11.6–15.1)
ETHANOL EXG-MCNC: 0 MG/DL
GFR SERPL CREATININE-BSD FRML MDRD: 96 ML/MIN/1.73SQ M
GLUCOSE SERPL-MCNC: 103 MG/DL (ref 65–140)
HCT VFR BLD AUTO: 34.6 % (ref 36.5–49.3)
HGB BLD-MCNC: 11.5 G/DL (ref 12–17)
IMM GRANULOCYTES # BLD AUTO: 0.02 THOUSAND/UL (ref 0–0.2)
IMM GRANULOCYTES NFR BLD AUTO: 0 % (ref 0–2)
LYMPHOCYTES # BLD AUTO: 2.28 THOUSANDS/ÂΜL (ref 0.6–4.47)
LYMPHOCYTES NFR BLD AUTO: 40 % (ref 14–44)
MCH RBC QN AUTO: 30.3 PG (ref 26.8–34.3)
MCHC RBC AUTO-ENTMCNC: 33.2 G/DL (ref 31.4–37.4)
MCV RBC AUTO: 91 FL (ref 82–98)
METHADONE UR QL: NEGATIVE
MONOCYTES # BLD AUTO: 0.78 THOUSAND/ÂΜL (ref 0.17–1.22)
MONOCYTES NFR BLD AUTO: 14 % (ref 4–12)
NEUTROPHILS # BLD AUTO: 2.46 THOUSANDS/ÂΜL (ref 1.85–7.62)
NEUTS SEG NFR BLD AUTO: 45 % (ref 43–75)
NRBC BLD AUTO-RTO: 0 /100 WBCS
OPIATES UR QL SCN: NEGATIVE
OXYCODONE+OXYMORPHONE UR QL SCN: NEGATIVE
PCP UR QL: NEGATIVE
PLATELET # BLD AUTO: 245 THOUSANDS/UL (ref 149–390)
PMV BLD AUTO: 10 FL (ref 8.9–12.7)
POTASSIUM SERPL-SCNC: 3.2 MMOL/L (ref 3.5–5.3)
PROT SERPL-MCNC: 6.3 G/DL (ref 6.4–8.4)
RBC # BLD AUTO: 3.8 MILLION/UL (ref 3.88–5.62)
SODIUM SERPL-SCNC: 136 MMOL/L (ref 135–147)
THC UR QL: NEGATIVE
TSH SERPL DL<=0.05 MIU/L-ACNC: 1.64 UIU/ML (ref 0.45–4.5)
WBC # BLD AUTO: 5.64 THOUSAND/UL (ref 4.31–10.16)

## 2023-12-28 PROCEDURE — 80307 DRUG TEST PRSMV CHEM ANLYZR: CPT

## 2023-12-28 PROCEDURE — 93005 ELECTROCARDIOGRAM TRACING: CPT

## 2023-12-28 PROCEDURE — 99284 EMERGENCY DEPT VISIT MOD MDM: CPT

## 2023-12-28 PROCEDURE — 36415 COLL VENOUS BLD VENIPUNCTURE: CPT

## 2023-12-28 PROCEDURE — 82075 ASSAY OF BREATH ETHANOL: CPT

## 2023-12-28 PROCEDURE — 84443 ASSAY THYROID STIM HORMONE: CPT

## 2023-12-28 PROCEDURE — 80053 COMPREHEN METABOLIC PANEL: CPT

## 2023-12-28 PROCEDURE — 85025 COMPLETE CBC W/AUTO DIFF WBC: CPT

## 2023-12-28 PROCEDURE — 99285 EMERGENCY DEPT VISIT HI MDM: CPT | Performed by: EMERGENCY MEDICINE

## 2023-12-28 RX ORDER — OLANZAPINE 10 MG/1
10 TABLET, ORALLY DISINTEGRATING ORAL ONCE
Status: COMPLETED | OUTPATIENT
Start: 2023-12-28 | End: 2023-12-28

## 2023-12-28 RX ORDER — POTASSIUM CHLORIDE 20 MEQ/1
40 TABLET, EXTENDED RELEASE ORAL ONCE
Status: COMPLETED | OUTPATIENT
Start: 2023-12-28 | End: 2023-12-28

## 2023-12-28 RX ADMIN — OLANZAPINE 10 MG: 10 TABLET, ORALLY DISINTEGRATING ORAL at 22:05

## 2023-12-28 RX ADMIN — POTASSIUM CHLORIDE 40 MEQ: 1500 TABLET, EXTENDED RELEASE ORAL at 22:12

## 2023-12-29 ENCOUNTER — HOSPITAL ENCOUNTER (INPATIENT)
Facility: HOSPITAL | Age: 58
DRG: 885 | End: 2023-12-29
Attending: STUDENT IN AN ORGANIZED HEALTH CARE EDUCATION/TRAINING PROGRAM | Admitting: STUDENT IN AN ORGANIZED HEALTH CARE EDUCATION/TRAINING PROGRAM
Payer: MEDICARE

## 2023-12-29 VITALS
RESPIRATION RATE: 18 BRPM | DIASTOLIC BLOOD PRESSURE: 72 MMHG | TEMPERATURE: 98.5 F | HEART RATE: 101 BPM | OXYGEN SATURATION: 96 % | SYSTOLIC BLOOD PRESSURE: 125 MMHG

## 2023-12-29 DIAGNOSIS — S52.91XA RIGHT RADIAL FRACTURE: Primary | ICD-10-CM

## 2023-12-29 DIAGNOSIS — S52.501A DISTAL RADIUS FRACTURE, RIGHT: ICD-10-CM

## 2023-12-29 DIAGNOSIS — Z00.8 MEDICAL CLEARANCE FOR PSYCHIATRIC ADMISSION: ICD-10-CM

## 2023-12-29 DIAGNOSIS — N40.0 BPH (BENIGN PROSTATIC HYPERPLASIA): ICD-10-CM

## 2023-12-29 DIAGNOSIS — F25.1 SCHIZOAFFECTIVE DISORDER, DEPRESSIVE TYPE (HCC): Primary | ICD-10-CM

## 2023-12-29 LAB
BILIRUB UR QL STRIP: NEGATIVE
CLARITY UR: CLEAR
COLOR UR: COLORLESS
GLUCOSE UR STRIP-MCNC: NEGATIVE MG/DL
HGB UR QL STRIP.AUTO: NEGATIVE
KETONES UR STRIP-MCNC: NEGATIVE MG/DL
LEUKOCYTE ESTERASE UR QL STRIP: NEGATIVE
NITRITE UR QL STRIP: NEGATIVE
PH UR STRIP.AUTO: 5.5 [PH]
PROT UR STRIP-MCNC: NEGATIVE MG/DL
SP GR UR STRIP.AUTO: 1 (ref 1–1.03)
UROBILINOGEN UR STRIP-ACNC: <2 MG/DL

## 2023-12-29 PROCEDURE — 81003 URINALYSIS AUTO W/O SCOPE: CPT

## 2023-12-29 PROCEDURE — 96372 THER/PROPH/DIAG INJ SC/IM: CPT

## 2023-12-29 RX ORDER — ACETAMINOPHEN 325 MG/1
650 TABLET ORAL EVERY 4 HOURS PRN
Status: CANCELLED | OUTPATIENT
Start: 2023-12-29

## 2023-12-29 RX ORDER — HYDROXYZINE HYDROCHLORIDE 25 MG/1
50 TABLET, FILM COATED ORAL
Status: CANCELLED | OUTPATIENT
Start: 2023-12-29

## 2023-12-29 RX ORDER — AMOXICILLIN 250 MG
1 CAPSULE ORAL DAILY PRN
Status: CANCELLED | OUTPATIENT
Start: 2023-12-29

## 2023-12-29 RX ORDER — ACETAMINOPHEN 325 MG/1
975 TABLET ORAL EVERY 6 HOURS PRN
Status: CANCELLED | OUTPATIENT
Start: 2023-12-29

## 2023-12-29 RX ORDER — ACETAMINOPHEN 325 MG/1
650 TABLET ORAL EVERY 4 HOURS PRN
Status: DISPENSED | OUTPATIENT
Start: 2023-12-29

## 2023-12-29 RX ORDER — OLANZAPINE 2.5 MG/1
2.5 TABLET, FILM COATED ORAL
Status: CANCELLED | OUTPATIENT
Start: 2023-12-29

## 2023-12-29 RX ORDER — OLANZAPINE 10 MG/2ML
5 INJECTION, POWDER, FOR SOLUTION INTRAMUSCULAR
Status: CANCELLED | OUTPATIENT
Start: 2023-12-29

## 2023-12-29 RX ORDER — OLANZAPINE 10 MG/1
5 TABLET ORAL
Status: CANCELLED | OUTPATIENT
Start: 2023-12-29

## 2023-12-29 RX ORDER — ACETAMINOPHEN 325 MG/1
975 TABLET ORAL EVERY 6 HOURS PRN
Status: DISPENSED | OUTPATIENT
Start: 2023-12-29

## 2023-12-29 RX ORDER — TRAZODONE HYDROCHLORIDE 50 MG/1
50 TABLET ORAL
Status: DISCONTINUED | OUTPATIENT
Start: 2023-12-29 | End: 2024-01-02

## 2023-12-29 RX ORDER — POLYETHYLENE GLYCOL 3350 17 G/17G
17 POWDER, FOR SOLUTION ORAL DAILY PRN
Status: DISPENSED | OUTPATIENT
Start: 2023-12-29

## 2023-12-29 RX ORDER — HYDROXYZINE HYDROCHLORIDE 25 MG/1
25 TABLET, FILM COATED ORAL
Status: DISPENSED | OUTPATIENT
Start: 2023-12-29

## 2023-12-29 RX ORDER — ACETAMINOPHEN 325 MG/1
650 TABLET ORAL EVERY 4 HOURS PRN
Status: DISCONTINUED | OUTPATIENT
Start: 2023-12-29 | End: 2024-01-13

## 2023-12-29 RX ORDER — LORAZEPAM 1 MG/1
1 TABLET ORAL
Status: DISPENSED | OUTPATIENT
Start: 2023-12-29

## 2023-12-29 RX ORDER — LORAZEPAM 2 MG/ML
2 INJECTION INTRAMUSCULAR ONCE
Status: DISCONTINUED | OUTPATIENT
Start: 2023-12-29 | End: 2023-12-29 | Stop reason: HOSPADM

## 2023-12-29 RX ORDER — HYDROXYZINE HYDROCHLORIDE 25 MG/1
25 TABLET, FILM COATED ORAL
Status: CANCELLED | OUTPATIENT
Start: 2023-12-29

## 2023-12-29 RX ORDER — WATER 10 ML/10ML
INJECTION INTRAMUSCULAR; INTRAVENOUS; SUBCUTANEOUS
Status: COMPLETED
Start: 2023-12-29 | End: 2023-12-29

## 2023-12-29 RX ORDER — POLYETHYLENE GLYCOL 3350 17 G/17G
17 POWDER, FOR SOLUTION ORAL DAILY PRN
Status: CANCELLED | OUTPATIENT
Start: 2023-12-29

## 2023-12-29 RX ORDER — LORAZEPAM 2 MG/ML
1 INJECTION INTRAMUSCULAR
Status: ACTIVE | OUTPATIENT
Start: 2023-12-29

## 2023-12-29 RX ORDER — OLANZAPINE 5 MG/1
5 TABLET ORAL
Status: ACTIVE | OUTPATIENT
Start: 2023-12-29

## 2023-12-29 RX ORDER — LORAZEPAM 1 MG/1
1 TABLET ORAL
Status: CANCELLED | OUTPATIENT
Start: 2023-12-29

## 2023-12-29 RX ORDER — OLANZAPINE 10 MG/2ML
10 INJECTION, POWDER, FOR SOLUTION INTRAMUSCULAR ONCE
Status: DISCONTINUED | OUTPATIENT
Start: 2023-12-29 | End: 2023-12-29 | Stop reason: HOSPADM

## 2023-12-29 RX ORDER — BENZTROPINE MESYLATE 1 MG/ML
1 INJECTION INTRAMUSCULAR; INTRAVENOUS ONCE
Status: DISCONTINUED | OUTPATIENT
Start: 2023-12-29 | End: 2023-12-29 | Stop reason: HOSPADM

## 2023-12-29 RX ORDER — OLANZAPINE 10 MG/2ML
10 INJECTION, POWDER, FOR SOLUTION INTRAMUSCULAR ONCE
Status: COMPLETED | OUTPATIENT
Start: 2023-12-29 | End: 2023-12-29

## 2023-12-29 RX ORDER — OLANZAPINE 2.5 MG/1
2.5 TABLET, FILM COATED ORAL
Status: ACTIVE | OUTPATIENT
Start: 2023-12-29

## 2023-12-29 RX ORDER — TRAZODONE HYDROCHLORIDE 50 MG/1
50 TABLET ORAL
Status: CANCELLED | OUTPATIENT
Start: 2023-12-29

## 2023-12-29 RX ORDER — OLANZAPINE 5 MG/1
5 TABLET ORAL
Status: DISPENSED | OUTPATIENT
Start: 2023-12-29

## 2023-12-29 RX ORDER — MIDAZOLAM HYDROCHLORIDE 2 MG/2ML
2 INJECTION, SOLUTION INTRAMUSCULAR; INTRAVENOUS ONCE
Status: COMPLETED | OUTPATIENT
Start: 2023-12-29 | End: 2023-12-29

## 2023-12-29 RX ORDER — HYDROXYZINE 50 MG/1
50 TABLET, FILM COATED ORAL
Status: DISPENSED | OUTPATIENT
Start: 2023-12-29

## 2023-12-29 RX ORDER — AMOXICILLIN 250 MG
1 CAPSULE ORAL DAILY PRN
Status: ACTIVE | OUTPATIENT
Start: 2023-12-29

## 2023-12-29 RX ORDER — OLANZAPINE 10 MG/2ML
5 INJECTION, POWDER, FOR SOLUTION INTRAMUSCULAR
Status: DISPENSED | OUTPATIENT
Start: 2023-12-29

## 2023-12-29 RX ORDER — LORAZEPAM 2 MG/ML
1 INJECTION INTRAMUSCULAR
Status: CANCELLED | OUTPATIENT
Start: 2023-12-29

## 2023-12-29 RX ADMIN — MIDAZOLAM 2 MG: 1 INJECTION INTRAMUSCULAR; INTRAVENOUS at 19:32

## 2023-12-29 RX ADMIN — WATER 10 ML: 1 INJECTION INTRAMUSCULAR; INTRAVENOUS; SUBCUTANEOUS at 19:32

## 2023-12-29 RX ADMIN — OLANZAPINE 10 MG: 10 INJECTION, POWDER, FOR SOLUTION INTRAMUSCULAR at 19:32
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
## 2023-12-29 NOTE — RESTRAINT FACE TO FACE
Restraint Face to Face   Sudhakar Choe 58 y.o. male MRN: [id number]  Unit/Bed#: SH 02 Encounter: [id number]      Physical Evaluation: agitated, banging on plexiglass wall  Purpose for Restraints/ Seclusion High risk for self harm, High risk for causing significant disruption of treatment environment , and High risk for harm to others  Patient's reaction to the intervention: patient verbally de-escalated, but order placed so staff may intervene if necessary  Patient's medical condition: stable  Patient's Behavioral condition: patient was able to be verbally de-escalated, did not need seclusion  Restraints to be Terminated
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
## 2023-12-29 NOTE — ED NOTES
Call placed to [facility], who verified that patient is no longer active with them.     Kristi Castillo, KAMILAH  12/28/23     [id number]
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
## 2023-12-29 NOTE — ED NOTES
"Lab notified this RN about UA specimen add-on that was unable to be used. RN then asked pt if he will provide another urine sample. Pt states, \"I don't want to give you a urine sample. I already gave you one and you lost it.\" Pt educated about the importance of the UA and how it is needed to further care. Pt still refusing to provide urine sample. Provider made aware.      Dutch Bravo RN  12/29/23 0153    "

## 2023-12-29 NOTE — ED NOTES
Insurance Authorization for admission:     Medicare A&B are primary. No precert required. COB was completed with Bonnie Diaz at [facility]. 15 days approved. Auth #[id number]

## 2023-12-29 NOTE — EMTALA/ACUTE CARE TRANSFER
[facility] EMERGENCY DEPARTMENT  [address] [address]  Dept: [phone]      EMTALA TRANSFER CONSENT    NAME Sudhakar Choe                                         1965                              MRN [id number]    I have been informed of my rights regarding examination, treatment, and transfer   by Dr. Massimo Reyes, DO    Benefits: Specialized equipment and/or services available at the receiving facility (Include comment)________________________    Risks:  deterioration during transfer      Transfer Request   I acknowledge that my medical condition has been evaluated and explained to me by the emergency department physician or other qualified medical person and/or my attending physician who has recommended and offered to me further medical examination and treatment. I understand the Hospital's obligation with respect to the treatment and stabilization of my emergency medical condition. I nevertheless request to be transferred. I release the Hospital, the doctor, and any other persons caring for me from all responsibility or liability for any injury or ill effects that may result from my transfer and agree to accept all responsibility for the consequences of my choice to transfer, rather than receive stabilizing treatment at the Hospital. I understand that because the transfer is my request, my insurance may not provide reimbursement for the services.  The Hospital will assist and direct me and my family in how to make arrangements for transfer, but the hospital is not liable for any fees charged by the transport service.  In spite of this understanding, I refuse to consent to further medical examination and treatment which has been offered to me, and request transfer to Accepting Facility Name, City & State : [facility]. I authorize the performance of emergency medical procedures and treatments upon me in both transit and upon arrival at the receiving facility.   Additionally, I authorize the release of any and all medical records to the receiving facility and request they be transported with me, if possible.    I authorize the performance of emergency medical procedures and treatments upon me in both transit and upon arrival at the receiving facility.  Additionally, I authorize the release of any and all medical records to the receiving facility and request they be transported with me, if possible.  I understand that the safest mode of transportation during a medical emergency is an ambulance and that the Hospital advocates the use of this mode of transport. Risks of traveling to the receiving facility by car, including absence of medical control, life sustaining equipment, such as oxygen, and medical personnel has been explained to me and I fully understand them.    (FRANCISCA CORRECT BOX BELOW)  [  ]  I consent to the stated transfer and to be transported by ambulance/helicopter.  [  ]  I consent to the stated transfer, but refuse transportation by ambulance and accept full responsibility for my transportation by car.  I understand the risks of non-ambulance transfers and I exonerate the Hospital and its staff from any deterioration in my condition that results from this refusal.    X___________________________________________    DATE  23  TIME________  Signature of patient or legally responsible individual signing on patient behalf           RELATIONSHIP TO PATIENT_________________________          Provider Certification    NAME Sudhakar Choe                                         1965                              MRN [id number]    A medical screening exam was performed on the above named patient.  Based on the examination:    Condition Necessitating Transfer The primary encounter diagnosis was Encounter for psychiatric assessment. A diagnosis of Medical clearance for psychiatric admission was also pertinent to this visit.    Patient Condition: The patient  has been stabilized such that within reasonable medical probability, no material deterioration of the patient condition or the condition of the unborn child(gutierrez) is likely to result from the transfer    Reason for Transfer: Level of Care needed not available at this facility    Transfer Requirements: Facility [facility]   Space available and qualified personnel available for treatment as acknowledged by    Agreed to accept transfer and to provide appropriate medical treatment as acknowledged by       Oni  Appropriate medical records of the examination and treatment of the patient are provided at the time of transfer   STAFF INITIAL WHEN COMPLETED _______  Transfer will be performed by qualified personnel from    and appropriate transfer equipment as required, including the use of necessary and appropriate life support measures.    Provider Certification: I have examined the patient and explained the following risks and benefits of being transferred/refusing transfer to the patient/family:         Based on these reasonable risks and benefits to the patient and/or the unborn child(gutierrez), and based upon the information available at the time of the patient’s examination, I certify that the medical benefits reasonably to be expected from the provision of appropriate medical treatments at another medical facility outweigh the increasing risks, if any, to the individual’s medical condition, and in the case of labor to the unborn child, from effecting the transfer.    X____________________________________________ DATE 12/29/23        TIME_______      ORIGINAL - SEND TO MEDICAL RECORDS   COPY - SEND WITH PATIENT DURING TRANSFER

## 2023-12-29 NOTE — ED NOTES
Patient is accepted at 6B  Patient is accepted by Dr. Oni Richey    Transportation is arranged with [employer].     Waiting for transport time   Patient may go to the floor after 1900       Nurse report is to be called to [phone] prior to patient transfer.

## 2023-12-29 NOTE — ED NOTES
Per EVS, patient has primary Medicare A&B and secondary [facility] (ID# [id number]).     Kristi Castillo, KAMILAH  12/28/23    [id number]

## 2023-12-30 PROBLEM — Z00.8 MEDICAL CLEARANCE FOR PSYCHIATRIC ADMISSION: Status: ACTIVE | Noted: 2023-12-30

## 2023-12-30 PROBLEM — F31.2 BIPOLAR AFFECTIVE DISORDER, CURRENT EPISODE MANIC WITH PSYCHOTIC SYMPTOMS (HCC): Status: ACTIVE | Noted: 2021-07-13

## 2023-12-30 PROBLEM — R60.0 BILATERAL LOWER EXTREMITY EDEMA: Status: ACTIVE | Noted: 2023-12-30

## 2023-12-30 LAB
25(OH)D3 SERPL-MCNC: 30.6 NG/ML (ref 30–100)
ALBUMIN SERPL BCP-MCNC: 3.8 G/DL (ref 3.5–5)
ALP SERPL-CCNC: 60 U/L (ref 34–104)
ALT SERPL W P-5'-P-CCNC: 27 U/L (ref 7–52)
ANION GAP SERPL CALCULATED.3IONS-SCNC: 9 MMOL/L
AST SERPL W P-5'-P-CCNC: 43 U/L (ref 13–39)
ATRIAL RATE: 99 BPM
BASOPHILS # BLD AUTO: 0.03 THOUSANDS/ÂΜL (ref 0–0.1)
BASOPHILS NFR BLD AUTO: 1 % (ref 0–1)
BILIRUB SERPL-MCNC: 0.31 MG/DL (ref 0.2–1)
BUN SERPL-MCNC: 7 MG/DL (ref 5–25)
CALCIUM SERPL-MCNC: 8.5 MG/DL (ref 8.4–10.2)
CHLORIDE SERPL-SCNC: 104 MMOL/L (ref 96–108)
CO2 SERPL-SCNC: 25 MMOL/L (ref 21–32)
CREAT SERPL-MCNC: 0.74 MG/DL (ref 0.6–1.3)
EOSINOPHIL # BLD AUTO: 0.14 THOUSAND/ÂΜL (ref 0–0.61)
EOSINOPHIL NFR BLD AUTO: 3 % (ref 0–6)
ERYTHROCYTE [DISTWIDTH] IN BLOOD BY AUTOMATED COUNT: 13.2 % (ref 11.6–15.1)
FOLATE SERPL-MCNC: 9.8 NG/ML
GFR SERPL CREATININE-BSD FRML MDRD: 101 ML/MIN/1.73SQ M
GLUCOSE P FAST SERPL-MCNC: 87 MG/DL (ref 65–99)
GLUCOSE SERPL-MCNC: 87 MG/DL (ref 65–140)
HCT VFR BLD AUTO: 38.3 % (ref 36.5–49.3)
HGB BLD-MCNC: 12.6 G/DL (ref 12–17)
IMM GRANULOCYTES # BLD AUTO: 0.01 THOUSAND/UL (ref 0–0.2)
IMM GRANULOCYTES NFR BLD AUTO: 0 % (ref 0–2)
LYMPHOCYTES # BLD AUTO: 2.01 THOUSANDS/ÂΜL (ref 0.6–4.47)
LYMPHOCYTES NFR BLD AUTO: 37 % (ref 14–44)
MAGNESIUM SERPL-MCNC: 2 MG/DL (ref 1.9–2.7)
MCH RBC QN AUTO: 30.6 PG (ref 26.8–34.3)
MCHC RBC AUTO-ENTMCNC: 32.9 G/DL (ref 31.4–37.4)
MCV RBC AUTO: 93 FL (ref 82–98)
MONOCYTES # BLD AUTO: 0.68 THOUSAND/ÂΜL (ref 0.17–1.22)
MONOCYTES NFR BLD AUTO: 12 % (ref 4–12)
NEUTROPHILS # BLD AUTO: 2.6 THOUSANDS/ÂΜL (ref 1.85–7.62)
NEUTS SEG NFR BLD AUTO: 47 % (ref 43–75)
NRBC BLD AUTO-RTO: 0 /100 WBCS
P AXIS: 33 DEGREES
PHOSPHATE SERPL-MCNC: 3.5 MG/DL (ref 2.7–4.5)
PLATELET # BLD AUTO: 269 THOUSANDS/UL (ref 149–390)
PMV BLD AUTO: 10.8 FL (ref 8.9–12.7)
POTASSIUM SERPL-SCNC: 3.8 MMOL/L (ref 3.5–5.3)
PR INTERVAL: 182 MS
PROT SERPL-MCNC: 6.3 G/DL (ref 6.4–8.4)
QRS AXIS: 13 DEGREES
QRSD INTERVAL: 84 MS
QT INTERVAL: 356 MS
QTC INTERVAL: 456 MS
RBC # BLD AUTO: 4.12 MILLION/UL (ref 3.88–5.62)
SODIUM SERPL-SCNC: 138 MMOL/L (ref 135–147)
T WAVE AXIS: 26 DEGREES
TSH SERPL DL<=0.05 MIU/L-ACNC: 1.43 UIU/ML (ref 0.45–4.5)
VENTRICULAR RATE: 99 BPM
VIT B12 SERPL-MCNC: 621 PG/ML (ref 180–914)
WBC # BLD AUTO: 5.47 THOUSAND/UL (ref 4.31–10.16)

## 2023-12-30 PROCEDURE — 84100 ASSAY OF PHOSPHORUS: CPT

## 2023-12-30 PROCEDURE — 82306 VITAMIN D 25 HYDROXY: CPT

## 2023-12-30 PROCEDURE — 93005 ELECTROCARDIOGRAM TRACING: CPT

## 2023-12-30 PROCEDURE — 99223 1ST HOSP IP/OBS HIGH 75: CPT | Performed by: STUDENT IN AN ORGANIZED HEALTH CARE EDUCATION/TRAINING PROGRAM

## 2023-12-30 PROCEDURE — 83735 ASSAY OF MAGNESIUM: CPT

## 2023-12-30 PROCEDURE — 80053 COMPREHEN METABOLIC PANEL: CPT

## 2023-12-30 PROCEDURE — 84443 ASSAY THYROID STIM HORMONE: CPT

## 2023-12-30 PROCEDURE — 82607 VITAMIN B-12: CPT

## 2023-12-30 PROCEDURE — 82746 ASSAY OF FOLIC ACID SERUM: CPT

## 2023-12-30 PROCEDURE — 99222 1ST HOSP IP/OBS MODERATE 55: CPT | Performed by: PHYSICIAN ASSISTANT

## 2023-12-30 PROCEDURE — 93010 ELECTROCARDIOGRAM REPORT: CPT | Performed by: INTERNAL MEDICINE

## 2023-12-30 PROCEDURE — 85025 COMPLETE CBC W/AUTO DIFF WBC: CPT

## 2023-12-30 RX ORDER — TAMSULOSIN HYDROCHLORIDE 0.4 MG/1
0.4 CAPSULE ORAL
Status: DISPENSED | OUTPATIENT
Start: 2023-12-30

## 2023-12-30 RX ORDER — PALIPERIDONE 6 MG/1
6 TABLET, EXTENDED RELEASE ORAL DAILY
Status: DISCONTINUED | OUTPATIENT
Start: 2023-12-30 | End: 2024-01-02

## 2023-12-30 RX ORDER — ROPINIROLE 1 MG/1
2 TABLET, FILM COATED ORAL
Status: DISCONTINUED | OUTPATIENT
Start: 2023-12-30 | End: 2024-01-09

## 2023-12-30 RX ORDER — HYDROCHLOROTHIAZIDE 12.5 MG/1
12.5 TABLET ORAL DAILY
Status: DISCONTINUED | OUTPATIENT
Start: 2023-12-30 | End: 2024-01-11

## 2023-12-30 RX ORDER — BUPROPION HYDROCHLORIDE 150 MG/1
150 TABLET ORAL DAILY
Status: ON HOLD | COMMUNITY
Start: 2023-07-13

## 2023-12-30 RX ORDER — NICOTINE 21 MG/24HR
1 PATCH, TRANSDERMAL 24 HOURS TRANSDERMAL DAILY PRN
Status: ACTIVE | OUTPATIENT
Start: 2023-12-30

## 2023-12-30 RX ORDER — FLUOXETINE HYDROCHLORIDE 40 MG/1
40 CAPSULE ORAL DAILY
Status: ON HOLD | COMMUNITY
Start: 2023-07-13

## 2023-12-30 RX ORDER — HYDROXYZINE 50 MG/1
50 TABLET, FILM COATED ORAL 3 TIMES DAILY PRN
Status: ON HOLD | COMMUNITY
Start: 2023-07-12

## 2023-12-30 RX ORDER — DIVALPROEX SODIUM 500 MG/1
500 TABLET, EXTENDED RELEASE ORAL 2 TIMES DAILY
Status: DISCONTINUED | OUTPATIENT
Start: 2023-12-30 | End: 2024-01-03

## 2023-12-30 RX ORDER — MIRTAZAPINE 45 MG/1
45 TABLET, FILM COATED ORAL
Status: ON HOLD | COMMUNITY
Start: 2023-11-06

## 2023-12-30 RX ORDER — MELATONIN
2000 DAILY
Status: DISPENSED | OUTPATIENT
Start: 2023-12-30

## 2023-12-30 RX ORDER — PANTOPRAZOLE SODIUM 40 MG/1
40 TABLET, DELAYED RELEASE ORAL
Status: ON HOLD | COMMUNITY
Start: 2023-07-12

## 2023-12-30 RX ORDER — ROPINIROLE 1 MG/1
1 TABLET, FILM COATED ORAL 3 TIMES DAILY
Status: ON HOLD | COMMUNITY
Start: 2023-07-12

## 2023-12-30 RX ORDER — WATER 10 ML/10ML
INJECTION INTRAMUSCULAR; INTRAVENOUS; SUBCUTANEOUS
Status: COMPLETED
Start: 2023-12-30 | End: 2023-12-30

## 2023-12-30 RX ORDER — ACETAMINOPHEN 160 MG
2000 TABLET,DISINTEGRATING ORAL DAILY
Status: ON HOLD | COMMUNITY
Start: 2023-10-03

## 2023-12-30 RX ADMIN — TAMSULOSIN HYDROCHLORIDE 0.4 MG: 0.4 CAPSULE ORAL at 15:34

## 2023-12-30 RX ADMIN — TRAZODONE HYDROCHLORIDE 50 MG: 50 TABLET ORAL at 21:08

## 2023-12-30 RX ADMIN — LORAZEPAM 1 MG: 1 TABLET ORAL at 04:46

## 2023-12-30 RX ADMIN — HYDROCHLOROTHIAZIDE 12.5 MG: 12.5 TABLET ORAL at 14:54

## 2023-12-30 RX ADMIN — DIVALPROEX SODIUM 500 MG: 500 TABLET, EXTENDED RELEASE ORAL at 21:08

## 2023-12-30 RX ADMIN — OLANZAPINE 5 MG: 10 INJECTION, POWDER, LYOPHILIZED, FOR SOLUTION INTRAMUSCULAR at 10:33

## 2023-12-30 RX ADMIN — LORAZEPAM 1 MG: 1 TABLET ORAL at 09:50

## 2023-12-30 RX ADMIN — DIVALPROEX SODIUM 500 MG: 500 TABLET, EXTENDED RELEASE ORAL at 13:21

## 2023-12-30 RX ADMIN — ROPINIROLE HYDROCHLORIDE 2 MG: 1 TABLET, FILM COATED ORAL at 21:08

## 2023-12-30 RX ADMIN — PALIPERIDONE 6 MG: 6 TABLET, EXTENDED RELEASE ORAL at 13:21

## 2023-12-30 RX ADMIN — WATER 10 ML: 1 INJECTION INTRAMUSCULAR; INTRAVENOUS; SUBCUTANEOUS at 10:33

## 2023-12-30 RX ADMIN — CHOLECALCIFEROL TAB 25 MCG (1000 UNIT) 2000 UNITS: 25 TAB at 15:21

## 2023-12-30 NOTE — TREATMENT TEAM
12/30/23 0438   Rangel Anxiety Scale   Anxious Mood 4   Tension 4   Fears 3   Insomnia 3   Intellectual 2   Depressed Mood 0   Somatic Complaints: Muscular 2   Somatic Complaints: Sensory 2   Cardiovascular Symptoms 0   Respiratory Symptoms 0   Gastrointestinal Symptoms 0   Genitourinary Symptoms 0   Autonomic Symptoms 1   Behavior at Interview 4   Rangel Anxiety Score 25     Ativan 1mg given for increased restlessness and increased anxiety. Will follow up for effectiveness

## 2023-12-30 NOTE — ED NOTES
"This RN went to inform patient that transport was here to take him to another hospital. Patient then stated \"no the fuck I'm not, I'm not going anywhere\". Patient then started throwing items. Dr Aparicio made aware.     Bhavesh Adkins RN  12/29/23 1941    "
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
## 2023-12-30 NOTE — ASSESSMENT & PLAN NOTE
Patient with right greater than left lower extremity edema.  This has been worked up by cardiology in the past.  Prior echocardiograms have been within normal limits.  Restart hydrochlorothiazide 12.5 mg daily.  Apply compression stockings.  Keep legs elevated when sitting or resting.

## 2023-12-30 NOTE — NURSING NOTE
"Patient with rapid speech and tangential thought processes. Patient continues with severe anxiety and inability to sit still. Patient agreed to take PO Ativan and PRN Ativan given at 0950 which was ineffective and patient continued to berate staff calling staff \"faggots\" and that we are \"being sued and have to spend a zillion years in [facility]\". Patient  unable to verbally redirected and continued with the thought process that he is \"Odalys's man\" and he can do whatever he wants. Patietn also believed that he is \"secret service\" and because of this, he can also do whatever he wants. Patient using inappropriate language, threatening staff and calling staff names. Security notified for a walkthrough and    Psychiatry provider notified of need for antipsychotic medication despite the recent PRN Ativan given. Psychiatry approved giving PRN dose of Zyprexa for severe agitation.  Patient escorted to his room with security and IM Zyprexa given at 1033 in left deltoid without difficulty. Patient advised that he should stay in his room until medication takes effect. Patient reoriented to reality but refused to believe that he is not \"secret service\" and he has no prior relationship with President [employer] that we are aware of. Patient's PRN Zyprexa was effective and patient stopped calling staff names and his threatening and intrusive behaviors. Patient then fell asleep in his chair in the dayroom for a short period of time waking up for the lunch meal. Will continue to monitor patient status.  "
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
## 2023-12-30 NOTE — NURSING NOTE
Pt appeared calm and settled down and hour after Ativan given. Ativan deemed effective at this time.
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
## 2023-12-30 NOTE — PLAN OF CARE
Problem: PSYCHOSIS  Goal: Will report no hallucinations or delusions  Description: Interventions:  - Administer medication as  ordered  - Every waking shifts and PRN assess for the presence of hallucinations and or delusions  - Assist with reality testing to support increasing orientation  - Assess if patient's hallucinations or delusions are encouraging self-harm or harm to others and intervene as appropriate  12/29/2023 2041 by Sanjeev Jamison RN  Outcome: Progressing  12/29/2023 2041 by Sanjeev Jamison RN  Outcome: Progressing     Problem: BEHAVIOR  Goal: Pt/Family maintain appropriate behavior and adhere to behavioral management agreement, if implemented  Description: INTERVENTIONS:  - Assess the family dynamic   - Encourage verbalization of thoughts and concerns in a socially appropriate manner  - Assess patient/family's coping skills and non-compliant behavior (including use of illegal substances).  - Utilize positive, consistent limit setting strategies supporting safety of patient, staff and others  - Initiate consult with Case Management, Spiritual Care or other ancillary services as appropriate  - If a patient's/visitor's behavior jeopardizes the safety of the patient, staff, or others, refer to organization procedure.   - Notify Security of behavior or suspected illegal substances which indicate the need for search of the patient and/or belongings  - Encourage participation in the decision making process about a behavioral management agreement; implement if patient meets criteria  12/29/2023 2041 by Sanjeev Jamison RN  Outcome: Progressing  12/29/2023 2041 by Sanjeev Jamison RN  Outcome: Progressing     Problem: SLEEP DISTURBANCE  Goal: Will exhibit normal sleeping pattern  Description: Interventions:  -  Assess the patients sleep pattern, noting recent changes  - Administer medication as ordered  - Decrease environmental stimuli, including noise, as appropriate during the night  - Encourage the patient to  actively participate in unit groups and or exercise during the day to enhance ability to achieve adequate sleep at night  - Assess the patient, in the morning, encouraging a description of sleep experience  12/29/2023 2041 by Sanjeev Jamison RN  Outcome: Progressing  12/29/2023 2041 by Sanjeev Jamison RN  Outcome: Progressing     Problem: INVOLUNTARY ADMIT  Goal: Will cooperate with staff recommendations and doctor's orders and will demonstrate appropriate behavior  Description: INTERVENTIONS:  - Treat underlying conditions and offer medication as ordered  - Educate regarding involuntary admission procedures and rules  - Utilize positive consistent limit setting strategies to support patient and staff safety  12/29/2023 2041 by Sanjeev Jamison RN  Outcome: Progressing  12/29/2023 2041 by Sanjeev Jamison RN  Outcome: Progressing     Problem: Risk for Self Injury/Neglect  Goal: Treatment Goal: Remain safe during length of stay, learn and adopt new coping skills, and be free of self-injurious ideation, impulses and acts at the time of discharge  12/29/2023 2041 by Sanjeev Jamison RN  Outcome: Progressing  12/29/2023 2041 by Sanjeev Jamison RN  Outcome: Progressing  Goal: Verbalize thoughts and feelings  Description: Interventions:  - Assess and re-assess patient's lethality and potential for self-injury  - Engage patient in 1:1 interactions, daily, for a minimum of 15 minutes  - Encourage patient to express feelings, fears, frustrations, hopes  - Establish rapport/trust with patient   12/29/2023 2041 by Sanjeev Jamison RN  Outcome: Progressing  12/29/2023 2041 by Sanjeev Jamsion RN  Outcome: Progressing  Goal: Refrain from harming self  Description: Interventions:  - Monitor patient closely, per order  - Develop a trusting relationship  - Supervise medication ingestion, monitor effects and side effects   12/29/2023 2041 by Sanjeev Jamison RN  Outcome: Progressing  12/29/2023 2041 by Sanjeev Jamison RN  Outcome: Progressing  Goal: Attend  and participate in unit activities, including therapeutic, recreational, and educational groups  Description: Interventions:  - Provide therapeutic and educational activities daily, encourage attendance and participation, and document same in the medical record  - Obtain collateral information, encourage visitation and family involvement in care   12/29/2023 2041 by Sanjeev Jamison RN  Outcome: Progressing  12/29/2023 2041 by Sanjeev Jamison RN  Outcome: Progressing  Goal: Recognize maladaptive responses and adopt new coping mechanisms  12/29/2023 2041 by Sanjeev Jamison RN  Outcome: Progressing  12/29/2023 2041 by Sanjeev Jamison RN  Outcome: Progressing  Goal: Complete daily ADLs, including personal hygiene independently, as able  Description: Interventions:  - Observe, teach, and assist patient with ADLS  - Monitor and promote a balance of rest/activity, with adequate nutrition and elimination  12/29/2023 2041 by Sanjeev Jamison RN  Outcome: Progressing  12/29/2023 2041 by Sanjeev Jamison RN  Outcome: Progressing     Problem: Alteration in Orientation  Goal: Treatment Goal: Demonstrate a reduction of confusion and improved orientation to person, place, time and/or situation upon discharge, according to optimum baseline/ability  12/29/2023 2041 by Sanjeev Jamison RN  Outcome: Progressing  12/29/2023 2041 by Sanjeev Jamison RN  Outcome: Progressing  Goal: Interact with staff daily  Description: Interventions:  - Assess and re-assess patient's level of orientation  - Engage patient in 1 on 1 interactions, daily, for a minimum of 15 minutes   - Establish rapport/trust with patient   12/29/2023 2041 by Sanjeev Jamison RN  Outcome: Progressing  12/29/2023 2041 by Sanjeev Jamison RN  Outcome: Progressing  Goal: Express concerns related to confused thinking related to:  Description: Interventions:  - Encourage patient to express feelings, fears, frustrations, hopes  - Assign consistent caregivers   - [place]/re-orient patient as needed  -  Allow comfort items, as appropriate  - Provide visual cues, signs, etc.   12/29/2023 2041 by Sanjeev Jamison RN  Outcome: Progressing  12/29/2023 2041 by Sanjeev Jamison RN  Outcome: Progressing  Goal: Allow medical examinations, as recommended  Description: Interventions:  - Provide physical/neurological exams and/or referrals, per provider   12/29/2023 2041 by Sanjeev Jamison RN  Outcome: Progressing  12/29/2023 2041 by Sanjeev Jamison RN  Outcome: Progressing  Goal: Cooperate with recommended testing/procedures  Description: Interventions:  - Determine need for ancillary testing  - Observe for mental status changes  - Implement falls/precaution protocol   12/29/2023 2041 by Sanjeev Jamison RN  Outcome: Progressing  12/29/2023 2041 by Sanjeev Jamison RN  Outcome: Progressing  Goal: Attend and participate in unit activities, including therapeutic, recreational, and educational groups  Description: Interventions:  - Provide therapeutic and educational activities daily, encourage attendance and participation, and document same in the medical record   - Provide appropriate opportunities for reminiscence   - Provide a consistent daily routine   - Encourage family contact/visitation   12/29/2023 2041 by Sanjeev Jamison RN  Outcome: Progressing  12/29/2023 2041 by Sanjeev Jamison RN  Outcome: Progressing  Goal: Complete daily ADLs, including personal hygiene independently, as able  Description: Interventions:  - Observe, teach, and assist patient with ADLS  12/29/2023 2041 by Sanjeev Jamiosn RN  Outcome: Progressing  12/29/2023 2041 by Sanjeev Jamison RN  Outcome: Progressing

## 2023-12-30 NOTE — NURSING NOTE
"Patient had no AM medications yet ordered. Patient did eat breakfast with good appetite noted. Patient denies symptoms of depression or anxiety. Patient's first statement to staff was \"I am leaving today\" and I am \"Odalys's right hand man\"  and \"if you don't believe it you can call him\".  Patient is confused and exhibiting manic behavior with delusions of grandeur. Patient is pacing, hyper-verbal and has difficulty sitting still. Patient believes he works for [employer] and is unable to be redirected to reality at this time. Will continue to monitor patient for changes in mood or behavior.  "

## 2023-12-31 PROCEDURE — 99232 SBSQ HOSP IP/OBS MODERATE 35: CPT

## 2023-12-31 RX ADMIN — DIVALPROEX SODIUM 500 MG: 500 TABLET, EXTENDED RELEASE ORAL at 08:22

## 2023-12-31 RX ADMIN — LORAZEPAM 1 MG: 1 TABLET ORAL at 05:03

## 2023-12-31 RX ADMIN — DIVALPROEX SODIUM 500 MG: 500 TABLET, EXTENDED RELEASE ORAL at 21:20

## 2023-12-31 RX ADMIN — ROPINIROLE HYDROCHLORIDE 2 MG: 1 TABLET, FILM COATED ORAL at 21:20

## 2023-12-31 RX ADMIN — PALIPERIDONE 6 MG: 6 TABLET, EXTENDED RELEASE ORAL at 08:23

## 2023-12-31 RX ADMIN — CHOLECALCIFEROL TAB 25 MCG (1000 UNIT) 2000 UNITS: 25 TAB at 08:22

## 2023-12-31 RX ADMIN — TAMSULOSIN HYDROCHLORIDE 0.4 MG: 0.4 CAPSULE ORAL at 15:33

## 2023-12-31 RX ADMIN — HYDROCHLOROTHIAZIDE 12.5 MG: 12.5 TABLET ORAL at 08:22

## 2023-12-31 RX ADMIN — HYDROXYZINE HYDROCHLORIDE 50 MG: 50 TABLET, FILM COATED ORAL at 21:20
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
## 2023-12-31 NOTE — TREATMENT TEAM
12/31/23 0500   Rangel Anxiety Scale   Anxious Mood 4   Tension 4   Fears 4   Insomnia 2   Intellectual 3   Depressed Mood 1   Somatic Complaints: Muscular 2   Somatic Complaints: Sensory 2   Cardiovascular Symptoms 0   Respiratory Symptoms 0   Gastrointestinal Symptoms 0   Genitourinary Symptoms 0   Autonomic Symptoms 1   Behavior at Interview 4   Rangel Anxiety Score 27     Ativan 1mg given for increased restlessness, pacing, and fidgeting around the unit. Will f/u for effectiveness
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
## 2023-12-31 NOTE — PROGRESS NOTES
"Progress Note - Behavioral Health   Sudhakar Choe 58 y.o. male MRN: [id number]  Unit/Bed#: [facility] 647-01 Encounter: [id number]    Patient was seen today for continuation of care, records reviewed and patient was discussed with the morning case review team.  Per staff, After receiving PRN Ativan and IM 5mg Zyprexa yesterday morning, Sudhakar was more calm throughout the rest of the day.  He continues with grandiose delusional thoughts.     Sudhakar was seen today for psychiatric follow-up.  On assessment today, Sudhakar was seen in the [place] away from peers.  Sudhakar reports that he is \"perfect\" today and requested to leave.  Sudhakar was advised he was not being discharged today.  He was somewhat argumentative but redirectable.  He continues to be intrusive with peers and staff, frequently seen at the nurses station.  Remains delusional stating \"I need to be discharged because I work for [employer] and he needs me\".  He reports that he is tolerating Invega and Depakote at this time with no adverse effects.     Sudhakar denies acute suicidal/self-harm ideation/intent/plan upon direct inquiry at this time.  Sudhakar remains manic and intrusive but no agitation or aggression noted on exam or in report.  Sudhakar denies HI/AH/VH.  Sudhakar remains adherent to his current psychotropic medication regimen and denies any side effects from medications, as well as none noted on exam.    Recommended Treatment: Treatment plan and medication changes discussed and per the attending physician the plan is:    1.Continue with group therapy, milieu therapy and occupational therapy  2.Behavioral Health checks every 7 minutes  3.Continue frequent safety checks and vitals per unit protocol  4.Continue with SLIM medical management as indicated  5.Continue with current medication regimen. Paliperidone 6mg PO daily, Requip 2mg PO at HS and Depakote 500mg BID started yesterday, VPA level to be drawn Thursday.   6.Will review labs in the a.m.  7.Disposition " Planning: Discharge planning and efforts remain ongoing    Vitals:  Vitals:    12/31/23 0758   BP: 121/64   Pulse: 90   Resp: 16   Temp: 98.2 °F (36.8 °C)   SpO2: 98%       Laboratory Results:  I have personally reviewed all pertinent laboratory/tests results.  Most Recent Labs:   Lab Results   Component Value Date    WBC 5.47 12/30/2023    RBC 4.12 12/30/2023    HGB 12.6 12/30/2023    HCT 38.3 12/30/2023     12/30/2023    RDW 13.2 12/30/2023    NEUTROABS 2.60 12/30/2023    SODIUM 138 12/30/2023    K 3.8 12/30/2023     12/30/2023    CO2 25 12/30/2023    BUN 7 12/30/2023    CREATININE 0.74 12/30/2023    GLUC 87 12/30/2023    GLUF 87 12/30/2023    CALCIUM 8.5 12/30/2023    AST 43 (H) 12/30/2023    ALT 27 12/30/2023    ALKPHOS 60 12/30/2023    TP 6.3 (L) 12/30/2023    ALB 3.8 12/30/2023    TBILI 0.31 12/30/2023    CHOLESTEROL 168 01/18/2021    HDL 41 01/18/2021    TRIG 93 01/18/2021    LDLCALC 108 (H) 01/18/2021    NONHDLC 127 01/18/2021    FNP4SKGMJLZV 1.431 12/30/2023    RPR Non-Reactive 03/26/2021    HGBA1C 5.9 (H) 10/09/2023     10/09/2023       Psychiatric Review of Systems:  Behavior over the last 24 hours:  some slow improvement.   Sleep: about 5 hours last night with PRN trazodone  Appetite: adequate  Medication side effects:  denies and none observed  ROS: no complaints, denies shortness of breath or chest pain and all other systems are negative for acute changes    Mental Status Evaluation:    Appearance:  disheveled, marginal hygiene   Behavior:  bizarre, demanding, restless and fidgety   Speech:  pressured, hypertalkative   Mood:  anxious, labile   Affect:  increased in intensity   Thought Process:  disorganized, illogical, perseverative   Associations: loose associations   Thought Content:  grandiose and bizarre delusions   Perceptual Disturbances: denies auditory or visual hallucinations when asked, does not appear responding to internal stimuli   Risk Potential: Suicidal ideation -  None at present, contracts for safety on the unit, would talk to staff if not feeling safe on the unit  Homicidal ideation - None  Potential for aggression - No   Sensorium:  oriented to person and place   Memory:  recent memory intact   Consciousness:  alert and awake   Attention/Concentration: decreased concentration and decreased attention span   Insight:  impaired   Judgment: impaired   Gait/Station: normal gait/station   Motor Activity: no abnormal movements     Progress Toward Goals:   Sudhakar is progressing towards goals of inpatient psychiatric treatment by continued medication compliance and is attending therapeutic modalities on the milieu. However, the patient continues to require inpatient psychiatric hospitalization for continued medication management and titration to optimize symptom reduction, improve sleep hygiene, and demonstrate adequate self-care.    Risk of Harm to Self:   Nursing Suicide Risk Assessment Last 24 hours: C-SSRS Risk (Since Last Contact)  Calculated C-SSRS Risk Score (Since Last Contact): No Risk Indicated  Current Specific Risk Factors include: mental illness diagnosis  Protective Factors: no current suicidal ideation, ability to communicate with staff on the unit, able to contract for safety on the unit, responds to redirection  Based on today's assessment, Sudhakar presents the following risk of harm to self: low    Risk of Harm to Others:  Nursing Homicide Risk Assessment: Violence Risk to Others: Denies within past 6 months  Current Specific Risk Factors include: sees self as a victim   Protective Factors: no current homicidal ideation, compliant with medications on the unit as ordered, compliant with unit milieu, follows staff redirection  Based on today's assessment, Sudhakar presents the following risk of harm to others: low    The following interventions are recommended: behavioral checks every 7 minutes, continued hospitalization on locked unit      Assessment/Plan   Principal  Problem:    Bipolar affective disorder, current episode manic with psychotic symptoms (HCC)  Active Problems:    Benign prostatic hyperplasia    Brachial plexus injury, right, sequela    Medical clearance for psychiatric admission    Bilateral lower extremity edema        Behavioral Health Medications: all current active meds have been reviewed and continue current psychiatric medications.  Current Facility-Administered Medications   Medication Dose Route Frequency Provider Last Rate    acetaminophen  650 mg Oral Q4H PRN Jenn Strickland, CRNP      acetaminophen  650 mg Oral Q4H PRN Jenn Strickland, CRNP      acetaminophen  975 mg Oral Q6H PRN Jenn Strickland, CRROBERT      cholecalciferol  2,000 Units Oral Daily Rossi Carlson PA-C      divalproex sodium  500 mg Oral BID Fabiana Sousa MD      hydrochlorothiazide  12.5 mg Oral Daily Rossi Carlson PA-C      hydrOXYzine HCL  25 mg Oral Q6H PRN Max 4/day Jenn Strickland, LYLANP      hydrOXYzine HCL  50 mg Oral Q6H PRN Max 4/day Jenn Strickland, CRNP      LORazepam  1 mg Intramuscular Q6H PRN Max 3/day Jenn Strickland, CRNP      LORazepam  1 mg Oral Q6H PRN Max 3/day Jenn Strickland, CRNP      nicotine  1 patch Transdermal Daily PRN Fabiana Sousa MD      OLANZapine  5 mg Intramuscular Q3H PRN Max 3/day Jenn Strickland, CRNP      OLANZapine  2.5 mg Oral Q4H PRN Max 6/day Jenn Strickland, CRNP      OLANZapine  5 mg Oral Q4H PRN Max 3/day Jenn Strickland, CRNP      OLANZapine  5 mg Oral Q3H PRN Max 3/day Jenn Strickland, CRNP      paliperidone  6 mg Oral Daily Fabiana Sousa MD      polyethylene glycol  17 g Oral Daily PRN Jenn Strickland, BETO      rOPINIRole  2 mg Oral HS Fabiana Sousa MD      senna-docusate sodium  1 tablet Oral Daily PRN Jenn Strickland, BETO      tamsulosin  0.4 mg Oral Daily With Dinner Rossi Carlson PA-C      traZODone  50 mg Oral HS PRN Jenn Strickland, BETO         Risks / Benefits of  Treatment:  Risks, benefits, and possible side effects of medications explained to patient and patient verbalizes understanding and agreement for treatment.    Counseling / Coordination of Care:  Patient's progress reviewed with nursing staff.  Medications, treatment progress and treatment plan reviewed with patient.  Supportive counseling provided to the patient.    Total floor/unit time spent today 25 minutes. Greater than 50% of total time was spent with the patient and / or family counseling and / or coordination of care. A description of the counseling / coordination of care: medication education, treatment plan, supportive therapy.

## 2023-12-31 NOTE — NURSING NOTE
"Patient is medication and meal compliant.  No complaints of pain or discomfort. Patient continues to believe that he is \"[employer]'s right hand man\" and that [employer] will be coming to the unit to talk with him. 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  Patient continues with rapid speech, pacing and going from idea to idea without making any sense. Patient however is easy to verbally redirect as needed. Patient JENARO stockings applied as ordered with +3 bilateral foot edema noted.  Patient also had a diuretic ordered yesterday to help with the dependent edema. Patient advised multiple times to lay in bed with his feet elevated to also help with the edema.  Patient agreed but continues to pace the unit and only sits still for short periods of time.  Will continue to monitor patient for changes in mood or behavior.  "

## 2023-12-31 NOTE — NURSING NOTE
Pt visible on the unit throughout the evening hours. Noted to be mostly calm and following commands. Approached this RN and asked if there's something for sleep, Trazodone 50mg offered and was given with HS medication. Pt noted with less delusional thoughts and expressions thus far. Will frequently monitor for safety and support.

## 2023-12-31 NOTE — PLAN OF CARE
Problem: PSYCHOSIS  Goal: Will report no hallucinations or delusions  Description: Interventions:  - Administer medication as  ordered  - Every waking shifts and PRN assess for the presence of hallucinations and or delusions  - Assist with reality testing to support increasing orientation  - Assess if patient's hallucinations or delusions are encouraging self-harm or harm to others and intervene as appropriate  Outcome: Progressing     Problem: BEHAVIOR  Goal: Pt/Family maintain appropriate behavior and adhere to behavioral management agreement, if implemented  Description: INTERVENTIONS:  - Assess the family dynamic   - Encourage verbalization of thoughts and concerns in a socially appropriate manner  - Assess patient/family's coping skills and non-compliant behavior (including use of illegal substances).  - Utilize positive, consistent limit setting strategies supporting safety of patient, staff and others  - Initiate consult with Case Management, Spiritual Care or other ancillary services as appropriate  - If a patient's/visitor's behavior jeopardizes the safety of the patient, staff, or others, refer to organization procedure.   - Notify Security of behavior or suspected illegal substances which indicate the need for search of the patient and/or belongings  - Encourage participation in the decision making process about a behavioral management agreement; implement if patient meets criteria  Outcome: Progressing     Problem: SLEEP DISTURBANCE  Goal: Will exhibit normal sleeping pattern  Description: Interventions:  -  Assess the patients sleep pattern, noting recent changes  - Administer medication as ordered  - Decrease environmental stimuli, including noise, as appropriate during the night  - Encourage the patient to actively participate in unit groups and or exercise during the day to enhance ability to achieve adequate sleep at night  - Assess the patient, in the morning, encouraging a description of sleep  experience  Outcome: Progressing     Problem: INVOLUNTARY ADMIT  Goal: Will cooperate with staff recommendations and doctor's orders and will demonstrate appropriate behavior  Description: INTERVENTIONS:  - Treat underlying conditions and offer medication as ordered  - Educate regarding involuntary admission procedures and rules  - Utilize positive consistent limit setting strategies to support patient and staff safety  Outcome: Progressing     Problem: Risk for Self Injury/Neglect  Goal: Treatment Goal: Remain safe during length of stay, learn and adopt new coping skills, and be free of self-injurious ideation, impulses and acts at the time of discharge  Outcome: Progressing  Goal: Verbalize thoughts and feelings  Description: Interventions:  - Assess and re-assess patient's lethality and potential for self-injury  - Engage patient in 1:1 interactions, daily, for a minimum of 15 minutes  - Encourage patient to express feelings, fears, frustrations, hopes  - Establish rapport/trust with patient   Outcome: Progressing  Goal: Refrain from harming self  Description: Interventions:  - Monitor patient closely, per order  - Develop a trusting relationship  - Supervise medication ingestion, monitor effects and side effects   Outcome: Progressing  Goal: Attend and participate in unit activities, including therapeutic, recreational, and educational groups  Description: Interventions:  - Provide therapeutic and educational activities daily, encourage attendance and participation, and document same in the medical record  - Obtain collateral information, encourage visitation and family involvement in care   Outcome: Progressing  Goal: Recognize maladaptive responses and adopt new coping mechanisms  Outcome: Progressing  Goal: Complete daily ADLs, including personal hygiene independently, as able  Description: Interventions:  - Observe, teach, and assist patient with ADLS  - Monitor and promote a balance of rest/activity, with  adequate nutrition and elimination  Outcome: Progressing     Problem: Alteration in Orientation  Goal: Treatment Goal: Demonstrate a reduction of confusion and improved orientation to person, place, time and/or situation upon discharge, according to optimum baseline/ability  Outcome: Progressing  Goal: Interact with staff daily  Description: Interventions:  - Assess and re-assess patient's level of orientation  - Engage patient in 1 on 1 interactions, daily, for a minimum of 15 minutes   - Establish rapport/trust with patient   Outcome: Progressing  Goal: Express concerns related to confused thinking related to:  Description: Interventions:  - Encourage patient to express feelings, fears, frustrations, hopes  - Assign consistent caregivers   - [place]/re-orient patient as needed  - Allow comfort items, as appropriate  - Provide visual cues, signs, etc.   Outcome: Progressing  Goal: Allow medical examinations, as recommended  Description: Interventions:  - Provide physical/neurological exams and/or referrals, per provider   Outcome: Progressing  Goal: Cooperate with recommended testing/procedures  Description: Interventions:  - Determine need for ancillary testing  - Observe for mental status changes  - Implement falls/precaution protocol   Outcome: Progressing  Goal: Attend and participate in unit activities, including therapeutic, recreational, and educational groups  Description: Interventions:  - Provide therapeutic and educational activities daily, encourage attendance and participation, and document same in the medical record   - Provide appropriate opportunities for reminiscence   - Provide a consistent daily routine   - Encourage family contact/visitation   Outcome: Progressing  Goal: Complete daily ADLs, including personal hygiene independently, as able  Description: Interventions:  - Observe, teach, and assist patient with ADLS  Outcome: Progressing     Problem: DISCHARGE PLANNING - CARE MANAGEMENT  Goal:  Discharge to post-acute care or home with appropriate resources  Description: INTERVENTIONS:  - Conduct assessment to determine patient/family and health care team treatment goals, and need for post-acute services based on payer coverage, community resources, and patient preferences, and barriers to discharge  - Address psychosocial, clinical, and financial barriers to discharge as identified in assessment in conjunction with the patient/family and health care team  - Arrange appropriate level of post-acute services according to patient’s   needs and preference and payer coverage in collaboration with the physician and health care team  - Communicate with and update the patient/family, physician, and health care team regarding progress on the discharge plan  - Arrange appropriate transportation to post-acute venues  Outcome: Progressing

## 2024-01-01 PROCEDURE — 99232 SBSQ HOSP IP/OBS MODERATE 35: CPT

## 2024-01-01 RX ADMIN — TAMSULOSIN HYDROCHLORIDE 0.4 MG: 0.4 CAPSULE ORAL at 15:31

## 2024-01-01 RX ADMIN — OLANZAPINE 5 MG: 5 TABLET, FILM COATED ORAL at 22:00

## 2024-01-01 RX ADMIN — LORAZEPAM 1 MG: 1 TABLET ORAL at 05:44

## 2024-01-01 RX ADMIN — HYDROCHLOROTHIAZIDE 12.5 MG: 12.5 TABLET ORAL at 08:18

## 2024-01-01 RX ADMIN — DIVALPROEX SODIUM 500 MG: 500 TABLET, EXTENDED RELEASE ORAL at 08:18

## 2024-01-01 RX ADMIN — LORAZEPAM 1 MG: 1 TABLET ORAL at 13:08

## 2024-01-01 RX ADMIN — ROPINIROLE HYDROCHLORIDE 2 MG: 1 TABLET, FILM COATED ORAL at 21:14

## 2024-01-01 RX ADMIN — DIVALPROEX SODIUM 500 MG: 500 TABLET, EXTENDED RELEASE ORAL at 21:14

## 2024-01-01 RX ADMIN — HYDROXYZINE HYDROCHLORIDE 50 MG: 50 TABLET, FILM COATED ORAL at 22:00

## 2024-01-01 RX ADMIN — CHOLECALCIFEROL TAB 25 MCG (1000 UNIT) 2000 UNITS: 25 TAB at 08:18

## 2024-01-01 RX ADMIN — PALIPERIDONE 6 MG: 6 TABLET, EXTENDED RELEASE ORAL at 08:18

## 2024-01-01 RX ADMIN — LORAZEPAM 1 MG: 1 TABLET ORAL at 23:54

## 2024-01-01 NOTE — TREATMENT TEAM
01/01/24 0536   Rangel Anxiety Scale   Anxious Mood 4   Tension 4   Fears 2   Insomnia 1   Intellectual 3   Depressed Mood 2   Somatic Complaints: Muscular 2   Somatic Complaints: Sensory 0   Cardiovascular Symptoms 0   Respiratory Symptoms 0   Gastrointestinal Symptoms 0   Genitourinary Symptoms 0   Autonomic Symptoms 3   Behavior at Interview 4   Rangel Anxiety Score 25     Ativan 1mg given for increased anxiety. Will follow up for effectiveness.

## 2024-01-01 NOTE — NURSING NOTE
Patient complained of anxiety related to his wallet, debit card, financial situation and whether to trust his girlfriend or not.  Patient given PRN Ativan 1 mg for severe anxiety at 1308 with effectiveness noted and decrease in anxiety symptoms reported by patient. Will continue to monitor patient status..

## 2024-01-01 NOTE — NURSING NOTE
"Patient is medication and meal compliant.  No complaints of pain or discomfort.  Patient denies depression and anxiety. Patient continues to pace up and down the hallway.  Patient also continues with rapid and rambling speech going from one topic to the next.  Patient signed MAIDA for his significant other Rhea who then brought his wallet to the hospital.  Valuables sent to security and patient's wallet secured in medication room as his room does not have a secure locked drawer. Patient was then asking for his [employer] debit card but would not say what he wanted it for. Patient educated on the rules and said \"its a stupid rule\" and was frustrated regarding his debit card. Patient again educated that all his belongings will be returned on discharge. Patient understood same but was not happy about it. Patient still has the delusion that he works for [email or web address] and that he has important things to do. Will continue to monitor patient status.  "

## 2024-01-01 NOTE — PLAN OF CARE
Problem: PSYCHOSIS  Goal: Will report no hallucinations or delusions  Description: Interventions:  - Administer medication as  ordered  - Every waking shifts and PRN assess for the presence of hallucinations and or delusions  - Assist with reality testing to support increasing orientation  - Assess if patient's hallucinations or delusions are encouraging self-harm or harm to others and intervene as appropriate  Outcome: Progressing     Problem: BEHAVIOR  Goal: Pt/Family maintain appropriate behavior and adhere to behavioral management agreement, if implemented  Description: INTERVENTIONS:  - Assess the family dynamic   - Encourage verbalization of thoughts and concerns in a socially appropriate manner  - Assess patient/family's coping skills and non-compliant behavior (including use of illegal substances).  - Utilize positive, consistent limit setting strategies supporting safety of patient, staff and others  - Initiate consult with Case Management, Spiritual Care or other ancillary services as appropriate  - If a patient's/visitor's behavior jeopardizes the safety of the patient, staff, or others, refer to organization procedure.   - Notify Security of behavior or suspected illegal substances which indicate the need for search of the patient and/or belongings  - Encourage participation in the decision making process about a behavioral management agreement; implement if patient meets criteria  Outcome: Progressing     Problem: SLEEP DISTURBANCE  Goal: Will exhibit normal sleeping pattern  Description: Interventions:  -  Assess the patients sleep pattern, noting recent changes  - Administer medication as ordered  - Decrease environmental stimuli, including noise, as appropriate during the night  - Encourage the patient to actively participate in unit groups and or exercise during the day to enhance ability to achieve adequate sleep at night  - Assess the patient, in the morning, encouraging a description of sleep  experience  Outcome: Progressing     Problem: INVOLUNTARY ADMIT  Goal: Will cooperate with staff recommendations and doctor's orders and will demonstrate appropriate behavior  Description: INTERVENTIONS:  - Treat underlying conditions and offer medication as ordered  - Educate regarding involuntary admission procedures and rules  - Utilize positive consistent limit setting strategies to support patient and staff safety  Outcome: Progressing     Problem: Alteration in Orientation  Goal: Treatment Goal: Demonstrate a reduction of confusion and improved orientation to person, place, time and/or situation upon discharge, according to optimum baseline/ability  Outcome: Progressing  Goal: Interact with staff daily  Description: Interventions:  - Assess and re-assess patient's level of orientation  - Engage patient in 1 on 1 interactions, daily, for a minimum of 15 minutes   - Establish rapport/trust with patient   Outcome: Progressing  Goal: Express concerns related to confused thinking related to:  Description: Interventions:  - Encourage patient to express feelings, fears, frustrations, hopes  - Assign consistent caregivers   - [place]/re-orient patient as needed  - Allow comfort items, as appropriate  - Provide visual cues, signs, etc.   Outcome: Progressing  Goal: Allow medical examinations, as recommended  Description: Interventions:  - Provide physical/neurological exams and/or referrals, per provider   Outcome: Progressing  Goal: Cooperate with recommended testing/procedures  Description: Interventions:  - Determine need for ancillary testing  - Observe for mental status changes  - Implement falls/precaution protocol   Outcome: Progressing  Goal: Attend and participate in unit activities, including therapeutic, recreational, and educational groups  Description: Interventions:  - Provide therapeutic and educational activities daily, encourage attendance and participation, and document same in the medical record   -  Provide appropriate opportunities for reminiscence   - Provide a consistent daily routine   - Encourage family contact/visitation   Outcome: Progressing  Goal: Complete daily ADLs, including personal hygiene independently, as able  Description: Interventions:  - Observe, teach, and assist patient with ADLS  Outcome: Progressing     Problem: DISCHARGE PLANNING - CARE MANAGEMENT  Goal: Discharge to post-acute care or home with appropriate resources  Description: INTERVENTIONS:  - Conduct assessment to determine patient/family and health care team treatment goals, and need for post-acute services based on payer coverage, community resources, and patient preferences, and barriers to discharge  - Address psychosocial, clinical, and financial barriers to discharge as identified in assessment in conjunction with the patient/family and health care team  - Arrange appropriate level of post-acute services according to patient’s   needs and preference and payer coverage in collaboration with the physician and health care team  - Communicate with and update the patient/family, physician, and health care team regarding progress on the discharge plan  - Arrange appropriate transportation to post-acute venues  Outcome: Progressing

## 2024-01-01 NOTE — NURSING NOTE
Received a call at around 0400 from a female who introduced herself as Rhea Calderon. Rhea wanted information about pt and was made aware that no MAIDA was signed for her. Rhea insisted that pt had called her earlier yesterday and she wants to know how the pt is doing. Again, this RN reminded Rhea that the pt's presence on the unit cannot be confirmed or denied at this time. Rhea was encouraged to call back as from 0800 as she requested to talk with the pt. Pt approach at 0500 upon waking and he only rambles off the subject of granting MAIDA to Rhea at this time.

## 2024-01-02 LAB
ATRIAL RATE: 101 BPM
ATRIAL RATE: 99 BPM
P AXIS: 58 DEGREES
P AXIS: 60 DEGREES
PR INTERVAL: 198 MS
PR INTERVAL: 210 MS
QRS AXIS: 53 DEGREES
QRS AXIS: 65 DEGREES
QRSD INTERVAL: 78 MS
QRSD INTERVAL: 84 MS
QT INTERVAL: 354 MS
QT INTERVAL: 356 MS
QTC INTERVAL: 456 MS
QTC INTERVAL: 459 MS
T WAVE AXIS: 38 DEGREES
T WAVE AXIS: 40 DEGREES
VENTRICULAR RATE: 101 BPM
VENTRICULAR RATE: 99 BPM

## 2024-01-02 PROCEDURE — 93010 ELECTROCARDIOGRAM REPORT: CPT | Performed by: INTERNAL MEDICINE

## 2024-01-02 PROCEDURE — 99232 SBSQ HOSP IP/OBS MODERATE 35: CPT | Performed by: STUDENT IN AN ORGANIZED HEALTH CARE EDUCATION/TRAINING PROGRAM

## 2024-01-02 RX ORDER — MIRTAZAPINE 7.5 MG/1
7.5 TABLET, FILM COATED ORAL
Status: DISPENSED | OUTPATIENT
Start: 2024-01-02

## 2024-01-02 RX ORDER — TRAZODONE HYDROCHLORIDE 50 MG/1
50 TABLET ORAL
Status: DISCONTINUED | OUTPATIENT
Start: 2024-01-02 | End: 2024-01-03

## 2024-01-02 RX ORDER — LANOLIN ALCOHOL/MO/W.PET/CERES
3 CREAM (GRAM) TOPICAL
Status: DISPENSED | OUTPATIENT
Start: 2024-01-02

## 2024-01-02 RX ORDER — WATER 10 ML/10ML
INJECTION INTRAMUSCULAR; INTRAVENOUS; SUBCUTANEOUS
Status: COMPLETED
Start: 2024-01-02 | End: 2024-01-02

## 2024-01-02 RX ADMIN — TRAZODONE HYDROCHLORIDE 50 MG: 50 TABLET ORAL at 21:12

## 2024-01-02 RX ADMIN — OLANZAPINE 5 MG: 10 INJECTION, POWDER, LYOPHILIZED, FOR SOLUTION INTRAMUSCULAR at 06:34

## 2024-01-02 RX ADMIN — TAMSULOSIN HYDROCHLORIDE 0.4 MG: 0.4 CAPSULE ORAL at 15:45

## 2024-01-02 RX ADMIN — MELATONIN TAB 3 MG 3 MG: 3 TAB at 21:12

## 2024-01-02 RX ADMIN — WATER 2.1 ML: 1 INJECTION INTRAMUSCULAR; INTRAVENOUS; SUBCUTANEOUS at 06:34

## 2024-01-02 RX ADMIN — PALIPERIDONE 9 MG: 6 TABLET, EXTENDED RELEASE ORAL at 08:25

## 2024-01-02 RX ADMIN — DIVALPROEX SODIUM 500 MG: 500 TABLET, EXTENDED RELEASE ORAL at 08:24

## 2024-01-02 RX ADMIN — CHOLECALCIFEROL TAB 25 MCG (1000 UNIT) 2000 UNITS: 25 TAB at 08:25

## 2024-01-02 RX ADMIN — DIVALPROEX SODIUM 500 MG: 500 TABLET, EXTENDED RELEASE ORAL at 21:12

## 2024-01-02 RX ADMIN — ROPINIROLE HYDROCHLORIDE 2 MG: 1 TABLET, FILM COATED ORAL at 21:11

## 2024-01-02 NOTE — PROGRESS NOTES
01/02/24 1630   Admission   Release of Information Signed Yes  ([facility]; [facility]; Rhea Sesay (SO) [phone]; Mauricio Choe (son) [phone]; Dave Choe (son) [phone])

## 2024-01-02 NOTE — PROGRESS NOTES
01/02/24 0822   Team Meeting   Meeting Type Daily Rounds   Initial Conference Date 01/02/24   Next Conference Date 01/03/24   Team Members Present   Team Members Present Physician;Nurse;;;Occupational Therapist   Physician Team Member ANUSHA Smart M. Noonan   Nursing Team Member    Care Management Team Member Mehreen   Social Work Team Member Tammy ROMAN Team Member Curt   Patient/Family Present   Patient Present No   Patient's Family Present No     Homeless, children and youth involved, hypersexual, paranoid, [employer]'s left hand man, controlling girlfriend, [facility] team released him, hx polysubstance abuse, been in open seclusion, delusional, tangential, clogged toilet with newspapers and tp, PRN meds, threw water this am, barricaded self in room. Increased Invega to 9mg, may start Invega sustenna, reporting one staff was aggressive to him and paranoid of staff.

## 2024-01-02 NOTE — PROGRESS NOTES
01/02/24 1628   Referral Data   Referral Reason Psych   County Information   County of Residence [place]   Readmission Root Cause   30 Day Readmission No   Patient Information   Mental Status Alert   Primary Caregiver Self   Support System Immediate family   Zoroastrianism/Cultural Requests [employer]   Legal Information   Tx Plan Signed Yes  (over weekend)   Current Status: 303  (303 [facility] EXP 1/22/24)   Legal Documentation Status Filed   Legal Issues Charges pending for harrassment, disorderly conduct and simple trespass   Health Care Proxy Appointed No   Activities of Daily Living Prior to Admission   Functional Status Independent   Assistive Device No device needed   Living Arrangement Homeless   Ambulation Independent   Access to Firearms   Access to Firearms No   Income Information   Income Source SSI/SSD   Means of Transportation   Means of Transport to Appts: Drives Self

## 2024-01-02 NOTE — PROGRESS NOTES
"  Progress Note - Behavioral Health   Sudhakar Choe 58 y.o. male MRN: [id number]  Unit/Bed#: [facility] 647-01 Encounter: [id number]       Patient who is well known to this writer from previous outpatient care was visited on unit for continuing care; chart reviewed and discussed with multidisciplinary treatment team. On approach, the patient was calm but on irritable edge, superficially cooperative, with tangential thought process, poor insight and impaired judgement.  The patient remains paranoid towards staff with persecutory delusions about being attacked by \"the nia in green shirt\" as well as his girlfriend.  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  His thought process remains disorganized and tangential, talking about \"the old court house turned into a whore house\" and was ruminating on being discharged.  He has poor insight into his current symptoms and the incidents led to this admission.  Denied any changes in mood, appetite, and energy level. Denied A/VH currently. Denied SI/HI, intent or plan upon direct inquiry at this time.    Patient remains disorganized, labile and on irritable edge, and requires frequent redirection and reorientation by the staff. The patient was reportedly agitated and disorganized, clogged the toilet with newspapers and toilet paper, tried to barricade himself in his room with aggressive behavior towards the staff, throwing objects and water towards the nurses, and required IM medication and seclusion. Also received several PRN meds.      Patient accepted all offered medications and reported feeling better. No adverse effects of medications noted or reported. Invega is being uptitrated to 9 mg daily, and VPA level to be checked tomorrow morning; doses to be adjusted as indicated. Will consider DE ANDA Invega Sustenna upon further dose adjustments.     Current Mental Status Evaluation:  Appearance and attitude: appeared as stated age, " casually dressed, with fair hygiene  Eye contact: good  Motor Function: within normal limits, intact gait, No PMA/PMR  Gait/station: normal gait/station and normal balance  Speech: pressured speech  Language: No overt abnormality  Mood/affect: dysphoric, irritable / Affect was constricted, inappropriate smile, mood-incongruent  Thought Processes: disorganized, tangential, ruminating  Thought content: denied suicidal ideations or homicidal ideations, grandiose and persecutory delusions, paranoid ideation, grandiose ideas  Associations: tangential associations, perseverative  Perceptual disturbances: denies Auditory/Visual/Tactile Hallucinations  Orientation: oriented to time, person, place and to the situational context  Cognitive Function: intact  Memory: not cooperative with formal MMSE  Intellect: average  Fund of knowledge: aware of current events, aware of past history, and vocabulary average  Impulse control: impulsive at times  Insight/judgment: poor/impaired    Vital signs in last 24 hours:    Temp:  [97.5 °F (36.4 °C)-98.6 °F (37 °C)] 97.5 °F (36.4 °C)  HR:  [] 94  Resp:  [18] 18  BP: (103-108)/(66-76) 103/66    Laboratory results: I have personally reviewed all pertinent laboratory/tests results    Results from the past 24 hours: No results found for this or any previous visit (from the past 24 hour(s)).    Progress Toward Goals: limited improvement    Assessment:  Principal Problem:    Bipolar affective disorder, current episode manic with psychotic symptoms (HCC)  Active Problems:    Benign prostatic hyperplasia    Brachial plexus injury, right, sequela    Medical clearance for psychiatric admission    Bilateral lower extremity edema        Plan:  - Trough VPA level tomorrow  - Consider DE ANDA Invega Sustenna upon further dose adjustments  - f/u SLIM recs regarding the medical problems  - Continue medication titration and treatment plan; adjust medication to optimize treatment response and as  clinically indicated.     Scheduled medications:  Current Facility-Administered Medications   Medication Dose Route Frequency Provider Last Rate    acetaminophen  650 mg Oral Q4H PRN Jenn Strickland, CRNP      acetaminophen  650 mg Oral Q4H PRN Jenn Strickland, CRNP      acetaminophen  975 mg Oral Q6H PRN Jenn Strickland, CRNP      cholecalciferol  2,000 Units Oral Daily Rossi Carlson PA-C      divalproex sodium  500 mg Oral BID Fabiana Sousa MD      hydrochlorothiazide  12.5 mg Oral Daily Rossi Carlson PA-C      hydrOXYzine HCL  25 mg Oral Q6H PRN Max 4/day Jenncampbell Strickland, CRNP      hydrOXYzine HCL  50 mg Oral Q6H PRN Max 4/day Jenn Strickland, CRNP      LORazepam  1 mg Intramuscular Q6H PRN Max 3/day Jenn Strickland, CRNP      LORazepam  1 mg Oral Q6H PRN Max 3/day Jenn Strickland, CRNP      melatonin  3 mg Oral HS Rory Bunn MD      mirtazapine  7.5 mg Oral HS PRN Rory Bunn MD      nicotine  1 patch Transdermal Daily PRN Fabiana Sousa MD      OLANZapine  5 mg Intramuscular Q3H PRN Max 3/day Jenn Strickland, CRNP      OLANZapine  2.5 mg Oral Q4H PRN Max 6/day Jenn Strickland, CRNP      OLANZapine  5 mg Oral Q4H PRN Max 3/day Jenn Strickland, CRNP      OLANZapine  5 mg Oral Q3H PRN Max 3/day Jenn Strickland, CRNP      paliperidone  9 mg Oral Daily Rory Bunn MD      polyethylene glycol  17 g Oral Daily PRN Jenn Strickland, LYLANP      rOPINIRole  2 mg Oral HS Fabiana Sousa MD      senna-docusate sodium  1 tablet Oral Daily PRN Jenn Strickland, CRNP      tamsulosin  0.4 mg Oral Daily With Dinner Rossi Carlson PA-C      traZODone  50 mg Oral HS Rory Bunn MD          PRN:    acetaminophen    acetaminophen    acetaminophen    hydrOXYzine HCL    hydrOXYzine HCL    LORazepam    LORazepam    mirtazapine    nicotine    OLANZapine    OLANZapine    OLANZapine    OLANZapine    polyethylene glycol    senna-docusate sodium    - Observation: routine    - VS: as  per unit protocol  - Diet: Regular diet  - Psychoeducation (benefits and potential risks) discussed, importance of compliance with the psychiatric treatment reiterated, and the patient verbalized understanding of the matter  - Encourage group attendance and milieu therapy    - The pt was educated and agreed to verbalize any suicidal thoughts, frustrations or concerns to the nursing staff, immediately.    - Dispo: TBD       Next of Kin  Extended Emergency Contact Information  Primary Emergency Contact: Rhea Sesay  Mobile Phone: [phone]  Relation: Significant Other   needed? No  Secondary Emergency Contact: KEITHPONCHODONNIE  Mobile Phone: [phone]  Relation: Son      Counseling / Coordination of Care  Patient's progress discussed with staff in treatment team meeting.  Medications, treatment progress and treatment plan reviewed with patient.  Medication changes discussed with patient.  Medication education provided to patient.  Supportive therapy provided to patient.  Cognitive techniques utilized during the session.  Reassurance and supportive therapy provided.  Reoriented to reality and reassured.  Encouraged participation in milieu and group therapy on the unit.  Crisis/safety plan discussed with patient.  I attended and testified at the 303 hearing today, with patient present at the hearing. The patient was committed for further inpatient treatment at the hearing.     Rory Bunn MD  Attending Psychiatrist   [facility]

## 2024-01-02 NOTE — TREATMENT TEAM
01/01/24 2351   Rangle Anxiety Scale   Anxious Mood 4   Tension 4   Fears 4   Insomnia 4   Intellectual 3   Depressed Mood 1   Somatic Complaints: Muscular 1   Somatic Complaints: Sensory 1   Cardiovascular Symptoms 0   Respiratory Symptoms 0   Gastrointestinal Symptoms 0   Genitourinary Symptoms 0   Autonomic Symptoms 2   Behavior at Interview 4   Rangel Anxiety Score 28     Pt in the room and continues to ramble off and pushing things around the room, including raising the bed high in the air. On room assessment, bathroom toilet noted to be clogged with a whole toilet paper roll and newspapers littered around the room and on the bathroom floor. While cleaning the room, pt became more argumentative over his rights to have whatever he wants in room and started posturing at staff with a pencil. Pt continued with increased agitation and restlessness, and working the bed up and down and uncooperative with verbal directions. On attempt to re-approach, pt noted to have barricaded the door with the night stand and proceeded to be loud and disruptive on the unit when staff removed the night stand barricading the door. Pt escorted to the quiet room at 2345 till midnight. Ativan offered PO and pt accepted. Ativan 1mg given PO at 2354. Pt back in the room at this time and behaviors appears to be improving. Will frequently assess for further safety and support concerns. .

## 2024-01-02 NOTE — CASE MANAGEMENT
303 hearing completed with [facility] MH/DS. Pt did attend. Dr. Bunn participated and testified. Up to 20 additional days of IP psych tx approved. 303 expires on 1/22/24.

## 2024-01-02 NOTE — PLAN OF CARE
Problem: PSYCHOSIS  Goal: Will report no hallucinations or delusions  Description: Interventions:  - Administer medication as  ordered  - Every waking shifts and PRN assess for the presence of hallucinations and or delusions  - Assist with reality testing to support increasing orientation  - Assess if patient's hallucinations or delusions are encouraging self-harm or harm to others and intervene as appropriate  Outcome: Not Progressing     Problem: BEHAVIOR  Goal: Pt/Family maintain appropriate behavior and adhere to behavioral management agreement, if implemented  Description: INTERVENTIONS:  - Assess the family dynamic   - Encourage verbalization of thoughts and concerns in a socially appropriate manner  - Assess patient/family's coping skills and non-compliant behavior (including use of illegal substances).  - Utilize positive, consistent limit setting strategies supporting safety of patient, staff and others  - Initiate consult with Case Management, Spiritual Care or other ancillary services as appropriate  - If a patient's/visitor's behavior jeopardizes the safety of the patient, staff, or others, refer to organization procedure.   - Notify Security of behavior or suspected illegal substances which indicate the need for search of the patient and/or belongings  - Encourage participation in the decision making process about a behavioral management agreement; implement if patient meets criteria  Outcome: Not Progressing     Problem: SLEEP DISTURBANCE  Goal: Will exhibit normal sleeping pattern  Description: Interventions:  -  Assess the patients sleep pattern, noting recent changes  - Administer medication as ordered  - Decrease environmental stimuli, including noise, as appropriate during the night  - Encourage the patient to actively participate in unit groups and or exercise during the day to enhance ability to achieve adequate sleep at night  - Assess the patient, in the morning, encouraging a description of  sleep experience  Outcome: Progressing     Problem: INVOLUNTARY ADMIT  Goal: Will cooperate with staff recommendations and doctor's orders and will demonstrate appropriate behavior  Description: INTERVENTIONS:  - Treat underlying conditions and offer medication as ordered  - Educate regarding involuntary admission procedures and rules  - Utilize positive consistent limit setting strategies to support patient and staff safety  Outcome: Progressing     Problem: Risk for Self Injury/Neglect  Goal: Treatment Goal: Remain safe during length of stay, learn and adopt new coping skills, and be free of self-injurious ideation, impulses and acts at the time of discharge  Outcome: Progressing  Goal: Verbalize thoughts and feelings  Description: Interventions:  - Assess and re-assess patient's lethality and potential for self-injury  - Engage patient in 1:1 interactions, daily, for a minimum of 15 minutes  - Encourage patient to express feelings, fears, frustrations, hopes  - Establish rapport/trust with patient   Outcome: Progressing  Goal: Refrain from harming self  Description: Interventions:  - Monitor patient closely, per order  - Develop a trusting relationship  - Supervise medication ingestion, monitor effects and side effects   Outcome: Progressing  Goal: Attend and participate in unit activities, including therapeutic, recreational, and educational groups  Description: Interventions:  - Provide therapeutic and educational activities daily, encourage attendance and participation, and document same in the medical record  - Obtain collateral information, encourage visitation and family involvement in care   Outcome: Progressing  Goal: Recognize maladaptive responses and adopt new coping mechanisms  Outcome: Not Progressing  Goal: Complete daily ADLs, including personal hygiene independently, as able  Description: Interventions:  - Observe, teach, and assist patient with ADLS  - Monitor and promote a balance of  rest/activity, with adequate nutrition and elimination  Outcome: Progressing     Problem: Alteration in Orientation  Goal: Treatment Goal: Demonstrate a reduction of confusion and improved orientation to person, place, time and/or situation upon discharge, according to optimum baseline/ability  Outcome: Progressing  Goal: Interact with staff daily  Description: Interventions:  - Assess and re-assess patient's level of orientation  - Engage patient in 1 on 1 interactions, daily, for a minimum of 15 minutes   - Establish rapport/trust with patient   Outcome: Progressing  Goal: Express concerns related to confused thinking related to:  Description: Interventions:  - Encourage patient to express feelings, fears, frustrations, hopes  - Assign consistent caregivers   - Leroy/re-orient patient as needed  - Allow comfort items, as appropriate  - Provide visual cues, signs, etc.   Outcome: Not Progressing  Goal: Allow medical examinations, as recommended  Description: Interventions:  - Provide physical/neurological exams and/or referrals, per provider   Outcome: Progressing  Goal: Cooperate with recommended testing/procedures  Description: Interventions:  - Determine need for ancillary testing  - Observe for mental status changes  - Implement falls/precaution protocol   Outcome: Progressing  Goal: Attend and participate in unit activities, including therapeutic, recreational, and educational groups  Description: Interventions:  - Provide therapeutic and educational activities daily, encourage attendance and participation, and document same in the medical record   - Provide appropriate opportunities for reminiscence   - Provide a consistent daily routine   - Encourage family contact/visitation   Outcome: Progressing  Goal: Complete daily ADLs, including personal hygiene independently, as able  Description: Interventions:  - Observe, teach, and assist patient with ADLS  Outcome: Progressing     Problem: DISCHARGE PLANNING -  CARE MANAGEMENT  Goal: Discharge to post-acute care or home with appropriate resources  Description: INTERVENTIONS:  - Conduct assessment to determine patient/family and health care team treatment goals, and need for post-acute services based on payer coverage, community resources, and patient preferences, and barriers to discharge  - Address psychosocial, clinical, and financial barriers to discharge as identified in assessment in conjunction with the patient/family and health care team  - Arrange appropriate level of post-acute services according to patient’s   needs and preference and payer coverage in collaboration with the physician and health care team  - Communicate with and update the patient/family, physician, and health care team regarding progress on the discharge plan  - Arrange appropriate transportation to post-acute venues  Outcome: Not Progressing

## 2024-01-02 NOTE — CMS CERTIFICATION NOTE
Certification: Based upon physical, mental and social evaluations, I certify that inpatient psychiatric services are medically necessary for this patient for a duration of 21 midnights for the treatment of Bipolar affective disorder, current episode manic with psychotic symptoms (HCC)  Available alternative community resources do not meet the patient's mental health care needs.  I further attest that an established written individualized plan of care has been implemented and is outlined in the patient's medical records.

## 2024-01-02 NOTE — PROGRESS NOTES
01/02/24 3107   Financial Resource Strain   How hard is it for you to pay for the very basics like food, housing, medical care, and heating? Very Hard   Housing Stability   In the last 12 months, was there a time when you were not able to pay the mortgage or rent on time? Y   In the last 12 months, how many places have you lived? 1   In the last 12 months, was there a time when you did not have a steady place to sleep or slept in a shelter (including now)? Y   Transportation Needs   In the past 12 months, has lack of transportation kept you from medical appointments or from getting medications? yes   In the past 12 months, has lack of transportation kept you from meetings, work, or from getting things needed for daily living? Yes   Food Insecurity   Within the past 12 months, you worried that your food would run out before you got the money to buy more. Often true   Within the past 12 months, the food you bought just didn't last and you didn't have money to get more. Often true   Intimate Partner Violence   Within the last year, have you been afraid of your partner or ex-partner? No   Within the last year, have you been humiliated or emotionally abused in other ways by your partner or ex-partner? No   Within the last year, have you been kicked, hit, slapped, or otherwise physically hurt by your partner or ex-partner? No   Within the last year, have you been raped or forced to have any kind of sexual activity by your partner or ex-partner? No   Utilities   In the past 12 months has the electric, gas, oil, or water company threatened to shut off services in your home? Yes

## 2024-01-02 NOTE — CASE MANAGEMENT
Case Management Assessment    Patient name Sudhakar Choe  Location /-01 MRN [id number]  : 1965 Date 2024       Current Admission Date: 2023  Current Admission Diagnosis:Bipolar affective disorder, current episode manic with psychotic symptoms ([facility])   Patient Active Problem List    Diagnosis Date Noted    Medical clearance for psychiatric admission 2023    Bilateral lower extremity edema 2023    COVID-19 2022    Schizoaffective disorder, depressive type ([facility]) 2022    Left foot pain 10/04/2021    Abscess 2021    Bipolar affective disorder, current episode manic with psychotic symptoms ([facility]) 2021    Mixed obsessional thoughts and acts 2021    Alcohol use disorder, moderate, in sustained remission ([facility]) 2021    DJD (degenerative joint disease) 2021    Penis pain 2021    Spondylosis of cervical region without myelopathy or radiculopathy 2020    Closed fracture of nasal bone 10/04/2020    Abnormal CT scan, cervical spine 10/03/2020    Fall 10/03/2020    Alcohol intoxication ([facility]) 10/03/2020    Essential hypertension 2020    Ventral hernia 2020    History of Helicobacter pylori infection 2020    Conflicted attitude towards dietary regime 2020    Benign prostatic hyperplasia 2019    History of obsessive compulsive disorder 2019    Other insomnia 2019    Instability of right elbow joint 2019    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 2019    Closed nondisplaced fracture of body of left calcaneus 2018    Brachial plexus injury, right, sequela 10/31/2018    Recurrent major depressive disorder, in partial remission (HCC) with history of psychotic features 10/31/2018    Multiple fractures of left foot 10/25/2018    Suicide attempt (HCC) 10/23/2018    Gastroesophageal reflux disease without esophagitis 2018    Hiatal  hernia 07/16/2018    Celiac disease 07/16/2018    Weight loss 08/18/2016    Erectile dysfunction 06/16/2016      LOS (days): 4  Geometric Mean LOS (GMLOS) (days):   Days to GMLOS:     OBJECTIVE:    Risk of Unplanned Readmission Score: 12.04         Current admission status: Inpatient Psych  Referral Reason: Psych    Preferred Pharmacy:   [id number] [place] - [address]  [address] [address]  Phone: [phone] Fax: [phone]    [id number] - [place], [place] - [address]  [address]  [place] [address]  Phone: [phone] Fax: [phone]    [facility] - [place], [place] - [address]  [address]  [address]  [place] [address]  Phone: [phone] Fax: [phone]    [facility] [address] [facility] [address] [facility]  [address] [facility] [address]  Phone: [phone] Fax: [phone]    Primary Care Provider: Osvaldo Soler DO    Primary Insurance: MEDICARE  Secondary Insurance: [facility]    ASSESSMENT:  Active Health Care Proxies    There are no active Health Care Proxies on file.                 Readmission Root Cause  30 Day Readmission: No    Patient Information  Mental Status: Alert  Primary Caregiver: Self              Patient Information Continued  Income Source: SSI/SSD  Current Status:: 303 (303 [employer] EXP 1/22/24)         Means of Transportation  Means of Transport to Appts:: Drives Self      Housing Stability: Low Risk  (5/11/2023)    Received from [facility]    Housing Stability Vital Sign     Unable to Pay for Housing in the Last Year: No     Number of Places Lived in the Last Year: 1     Unstable Housing in the Last Year: No   Food Insecurity: No Food Insecurity (5/11/2023)    Received from [facility]    Hunger Vital Sign     Worried About Running Out of Food in the Last Year: Never true     Ran Out of Food in the Last Year: Never true    Transportation Needs: No Transportation Needs (5/11/2023)    Received from [facility]    PRAPARE - Transportation     Lack of Transportation (Medical): No     Lack of Transportation (Non-Medical): No   Utilities: Not on file     Psychosocial Assessment 1:1 completed confidentially with the patient.  58 y.o. male with a history of mood disorder, bipolar disorder versus schizoaffective disorder, anxiety, and substance use who was admitted to the inpatient psychiatric unit on a involuntary 302 commitment basis due to unstable mood, psychotic symptoms, delusional thoughts, paranoid ideation, bizarre behavior, odd behavior, disorganized behavior, increased agitation, inability to care for self because of mental illness, and multiple unsuccessful attempts at outpatient care.     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  He is a provider to allow him to leave the hospital in order to catch flight to [place] and then to [place]. He believes that he is rich and was pretending to smoke cigars during the assessment. 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  Income/Source: SSI/SSD and pension  Congregational: [employer]  Transportation: drives self  Legal Issues: Charges pending for harrassment, disorderly conduct and simple trespass   [facility] [address] [place]  MH Tx HX: multiple IP stays, was previously with [facility] and [facility] and discharged from both for non compliance  Trauma HX: denies  Family hx:  sister has MH illness and father had dementia  D&A HX: states he smokes marijuana (most recent 2 weeks ago), and alcohol once a week  Medical HX: see chart  DME: none  Tobacco: 1/2 pack a day   HX: no  Access to firearms: denies  UDS results: negative  PCP: [facility]  Psych: previously with [facility] and [facility] (discharged)   Therapist: none  ICM/ACT: none  Stressors: girlfriend stealing his checks  Coping Skills: walking, listening to music, petting a puppy  MAIDA's signed: [facility]; [facility]; Rhea Sesay ([place]) [phone]; Mauricio Choe (son) [phone]; Dave Дмитрий (son) [phone]   Treatment Plan signed: yes  IMM signed: yes  Upcoming Appointments: unknown  COVID vaccine received: unknown  Discharge Disposition:  return to previous living arrangement, medication management

## 2024-01-02 NOTE — TREATMENT TEAM
01/01/24 2210   Rangel Anxiety Scale   Anxious Mood 4   Tension 4   Fears 2   Insomnia 1   Intellectual 3   Depressed Mood 1   Somatic Complaints: Muscular 1   Somatic Complaints: Sensory 1   Cardiovascular Symptoms 0   Respiratory Symptoms 0   Gastrointestinal Symptoms 0   Genitourinary Symptoms 0   Autonomic Symptoms 0   Behavior at Interview 4   Rangel Anxiety Score 21   Broset Violence Checklist   Assessment type Shift   Irritability 1   Confusion 1   Boisterousness 1   Threatening physical violence 1   Verbal threats 1   Violence 0   Broset score 5     Pt noted with increased restlessness, agitation, and being argumentative with staff on multiple attempts to redirect. Pt makes derogatory remarks towards staff and sexually inappropriate comments towards female staff as well. Limit setting enforced and pt redirected to his room. Atarax 50mg given for anxiety and Zyprexa 5mg given for agitation. Will frequently monitor for safety and support.

## 2024-01-02 NOTE — TREATMENT PLAN
TREATMENT PLAN REVIEW - Behavioral Health Sudhakar Choe 58 y.o. 1965 male MRN: [id number]    [facility] 6B [facility] Encounter: [id number]          Admit Date/Time:  12/29/2023  8:09 PM    Treatment Team:   MD Reji Byrnes DO Jacqueline Almonte Naomi Antonetty Wilson Fuchi, RN    Diagnosis: Principal Problem:    Bipolar affective disorder, current episode manic with psychotic symptoms (HCC)  Active Problems:    Benign prostatic hyperplasia    Brachial plexus injury, right, sequela    Medical clearance for psychiatric admission    Bilateral lower extremity edema      Patient Strengths/Assets: sense of humor     Patient Barriers/Limitations: difficulty adapting, impaired cognition, lack of social/family support, lack of stable employment, limited education, limited family ties, limited motivation, limited support system, low self esteem, marital/family conflict, no/few hobbies or interests, noncompliant with medication, noncompliant with treatment, patient is on an involuntary commitment, patient is unwilling to work on problems, poor insight, poor interpersonal skills, poor past treatment response, poor physical health, poor reasoning ability, poor self-care, poor support system, resistance to treatment, self-care deficit    Short Term Goals: decrease in depressive symptoms, decrease in anxiety symptoms, decrease in paranoid thoughts, decrease in psychotic symptoms, decrease in suicidal thoughts, decrease in self abusive behaviors, decrease in homicidal thoughts, decrease in level of agitation, ability to stay safe on the unit, ability to stay free of restraints, improvement in ability to express basic needs, improvement in insight, improvement in reality testing, improvement in reasoning ability, improvement in self care, sleep improvement, improvement in appetite, mood stabilization, increase in group attendance,  increase in socialization with peers on the unit, acceptance of need for psychiatric treatment, acceptance of psychiatric medications    Long Term Goals: improvement in depression, improvement in anxiety, resolution of depressive symptoms, resolution of manic symptoms, stabilization of mood, free of suicidal thoughts, free of homicidal thoughts, no self abusive behavior, resolution of psychotic symptoms, improvement in reality testing, improvement in reasoning ability, improved insight, able to express basic needs, acceptance of need for psychiatric medications, acceptance of need for psychiatric treatment, acceptance of need for psychiatric follow up after discharge, acceptance of psychiatric diagnosis, adequate self care, adequate sleep, adequate appetite, adequate oral intake, appropriate interaction with peers, appropriate interaction with family, stable living arrangements upon discharge, establishment of family support upon discharge    Progress Towards Goals: starting psychiatric medications as prescribed    Recommended Treatment: medication management, patient medication education, group therapy, milieu therapy, continued Behavioral Health psychiatric evaluation/assessment process    Treatment Frequency: daily medication monitoring, group and milieu therapy daily, monitoring through interdisciplinary rounds, monitoring through weekly patient care conferences    Expected Discharge Date:  TBD    Discharge Plan: return to previous living arrangement    Treatment Plan Created/Updated By: Fabiana Sousa MD

## 2024-01-02 NOTE — PLAN OF CARE
Problem: Alteration in Orientation  Goal: Attend and participate in unit activities, including therapeutic, recreational, and educational groups  Description: Interventions:  - Provide therapeutic and educational activities daily, encourage attendance and participation, and document same in the medical record   - Provide appropriate opportunities for reminiscence   - Provide a consistent daily routine   - Encourage family contact/visitation   Outcome: Progressing

## 2024-01-02 NOTE — TREATMENT TEAM
01/02/24 0622   Broset Violence Checklist   Assessment type Shift   Irritability 1   Confusion 1   Boisterousness 1   Threatening physical violence 1   Verbal threats 1   Violence 1   Broset score 6     Pt woke up at 0500 and has been verbally threatening staff and disruptive within the milieu. Pt threw a puzzle box on the floor and demanded staff to pick it up. Continued with verbal/physical threats towards staff. Requested a cup of water which was given, then threw it at over the nursing station counter and then the remaining water on staff. Walk through requested from security, pt escorted to the seclusion room and Zyprexa 5mg admin IM for severe agitation. Will follow up for effectiveness.

## 2024-01-02 NOTE — NURSING NOTE
Pt has been in the room since midnight calm and with no disruptive behaviors. Pt noted asleep at 0035. Ativan 1mg now deemed effective.

## 2024-01-02 NOTE — CASE MANAGEMENT
CM met with patient to review pt rights; pt does appear to understand his rights at this time; SW marked 303 paperwork as such and faxed to [facility] for a hearing today. Patient does want to attend.

## 2024-01-02 NOTE — NURSING NOTE
"Pt continues to be labile and hypersexual but he is redirectable. Pt heard on the phone with the courts talking about \"the [facility].\" Pt redirected off the phone and limits were set about phone use. Pt believes he is leaving to \"[place] tonight.\" Pt denies depression and anxiety; denies SI/HI/AH/VH. Compliant with medications, scheduled Hydrodiuril held due to parameters. No behaviors during shift. Will maintain q7 mini checks.   "

## 2024-01-03 LAB
ALBUMIN SERPL BCP-MCNC: 3.8 G/DL (ref 3.5–5)
ALP SERPL-CCNC: 63 U/L (ref 34–104)
ALT SERPL W P-5'-P-CCNC: 18 U/L (ref 7–52)
ANION GAP SERPL CALCULATED.3IONS-SCNC: 12 MMOL/L
AST SERPL W P-5'-P-CCNC: 23 U/L (ref 13–39)
BILIRUB SERPL-MCNC: 0.33 MG/DL (ref 0.2–1)
BUN SERPL-MCNC: 8 MG/DL (ref 5–25)
CALCIUM SERPL-MCNC: 8.5 MG/DL (ref 8.4–10.2)
CHLORIDE SERPL-SCNC: 101 MMOL/L (ref 96–108)
CO2 SERPL-SCNC: 24 MMOL/L (ref 21–32)
CREAT SERPL-MCNC: 0.78 MG/DL (ref 0.6–1.3)
GFR SERPL CREATININE-BSD FRML MDRD: 99 ML/MIN/1.73SQ M
GLUCOSE P FAST SERPL-MCNC: 109 MG/DL (ref 65–99)
GLUCOSE SERPL-MCNC: 109 MG/DL (ref 65–140)
POTASSIUM SERPL-SCNC: 4 MMOL/L (ref 3.5–5.3)
PROT SERPL-MCNC: 6.8 G/DL (ref 6.4–8.4)
SODIUM SERPL-SCNC: 137 MMOL/L (ref 135–147)
VALPROATE SERPL-MCNC: 60 UG/ML (ref 50–100)

## 2024-01-03 PROCEDURE — 99232 SBSQ HOSP IP/OBS MODERATE 35: CPT | Performed by: STUDENT IN AN ORGANIZED HEALTH CARE EDUCATION/TRAINING PROGRAM

## 2024-01-03 PROCEDURE — 80164 ASSAY DIPROPYLACETIC ACD TOT: CPT | Performed by: STUDENT IN AN ORGANIZED HEALTH CARE EDUCATION/TRAINING PROGRAM

## 2024-01-03 PROCEDURE — 80053 COMPREHEN METABOLIC PANEL: CPT | Performed by: STUDENT IN AN ORGANIZED HEALTH CARE EDUCATION/TRAINING PROGRAM

## 2024-01-03 RX ORDER — DIVALPROEX SODIUM 500 MG/1
500 TABLET, EXTENDED RELEASE ORAL DAILY
Status: DISPENSED | OUTPATIENT
Start: 2024-01-03

## 2024-01-03 RX ORDER — PALIPERIDONE 6 MG/1
12 TABLET, EXTENDED RELEASE ORAL DAILY
Status: COMPLETED | OUTPATIENT
Start: 2024-01-04 | End: 2024-01-10

## 2024-01-03 RX ORDER — TRAZODONE HYDROCHLORIDE 100 MG/1
100 TABLET ORAL
Status: DISCONTINUED | OUTPATIENT
Start: 2024-01-03 | End: 2024-01-30

## 2024-01-03 RX ADMIN — ROPINIROLE HYDROCHLORIDE 2 MG: 1 TABLET, FILM COATED ORAL at 21:01

## 2024-01-03 RX ADMIN — DIVALPROEX SODIUM 750 MG: 250 TABLET, FILM COATED, EXTENDED RELEASE ORAL at 21:01

## 2024-01-03 RX ADMIN — HYDROCHLOROTHIAZIDE 12.5 MG: 12.5 TABLET ORAL at 08:48

## 2024-01-03 RX ADMIN — TRAZODONE HYDROCHLORIDE 100 MG: 100 TABLET ORAL at 21:01

## 2024-01-03 RX ADMIN — MELATONIN TAB 3 MG 3 MG: 3 TAB at 21:01

## 2024-01-03 RX ADMIN — HYDROXYZINE HYDROCHLORIDE 50 MG: 50 TABLET, FILM COATED ORAL at 00:01

## 2024-01-03 RX ADMIN — MIRTAZAPINE 7.5 MG: 7.5 TABLET, FILM COATED ORAL at 00:03

## 2024-01-03 RX ADMIN — CHOLECALCIFEROL TAB 25 MCG (1000 UNIT) 2000 UNITS: 25 TAB at 08:48

## 2024-01-03 RX ADMIN — PALIPERIDONE 9 MG: 6 TABLET, EXTENDED RELEASE ORAL at 08:47

## 2024-01-03 RX ADMIN — TAMSULOSIN HYDROCHLORIDE 0.4 MG: 0.4 CAPSULE ORAL at 16:53

## 2024-01-03 RX ADMIN — DIVALPROEX SODIUM 500 MG: 500 TABLET, EXTENDED RELEASE ORAL at 08:48

## 2024-01-03 NOTE — NURSING NOTE
The patient had trouble with sleep. Patient was given some prn medications and slept for awhile. But woke up early. Continue to monitor.

## 2024-01-03 NOTE — TREATMENT TEAM
Pt attended all groups.  Pt grandiose and lacks insight.  Pt noted he was going to [place] today. Another peer also provided some reality to peer in feasibility.      01/03/24 1330   Activity/Group Checklist   Group Other (Comment)  (Traits and values)   Attendance Attended   Attendance Duration (min) 31-45   Interactions Other (Comment)  (grandoise)   Affect/Mood Wide   Goals Achieved Identified feelings;Discussed coping strategies;Able to self-disclose;Able to recieve feedback;Able to listen to others;Able to engage in interactions

## 2024-01-03 NOTE — PROGRESS NOTES
"  Progress Note - Behavioral Health   Sudhakar Choe 58 y.o. male MRN: [id number]  Unit/Bed#: [facility] 647-01 Encounter: [id number]       Patient was visited on unit for continuing care; chart reviewed and discussed with multidisciplinary treatment team. On approach, the patient was on irritable edge, superficially cooperative and preoccupied with discharge. The patient reportedly called his girl-friend yesterday and asked her to bring condoms to the hospital, but he denied upon being confronted. The patient was eucated about the rules and boundaries on the unit. He was minimizing all sxs, and denied any changes in mood, appetite, and energy level. No problem initiating and maintaining sleep. He remains preoccupied with discharge, noted that he does \"want to go to [place], [place]... near [place]\" (probably referring to [place] near [place], [place]) and asked if the SW could find him \"a nice apartment there\". However, he has never been there before and does not have any family or friends in that area, and was not able to elaborate the reason he wanted to go there. Denied A/VH currently. Denied SI/HI, intent or plan upon direct inquiry at this time.    Patient remains disorganized, labile and on irritable edge, and requires frequent redirection and reorientation by the staff.  The patient was observed by the staff walking in the hallway, pretending he was talking to someone as having an ear-piece reportedly stated that he is working for the [employer].  No reports of aggression or self-injurious behavior on unit. No PRN medications used in the past 24 hours. Received Atarax 50 mg at 0001 and Remeron 7.5 mg at 0003 for insomnia.    Patient accepted all offered medications and reported feeling better. No adverse effects of medications noted or reported. Invega was increased to 9 mg yesterday and to be uptitrated to 12 mg daily tomorrow. VPA level was 60 this morning and Depakote is being uptitrated to 500/750 mg; VPA level " to be checked on 1/7/24.  Trazodone is being increased to 100 mg nightly.  Doses to be adjusted as indicated and will consider DE ANDA Invega Sustenna monthly open further dose adjustment.  The patient will benefit from referral to [facility] for further psychiatric stabilization.    Current Mental Status Evaluation:  Appearance and attitude: appeared as stated age, casually dressed, with fair hygiene  Eye contact: good  Motor Function: within normal limits, intact gait, No PMA/PMR  Gait/station: normal gait/station and normal balance  Speech: pressured at times  Language: No overt abnormality  Mood/affect: dysphoric / Affect was constricted, mood-congruent  Thought Processes: tangential, perseverative  Thought content: denied suicidal ideations or homicidal ideations, grandiose delusions, paranoid ideation, grandiose  Associations: tangential associations, perseverative  Perceptual disturbances: denies Auditory/Visual/Tactile Hallucinations, talks to self at times  Orientation: oriented to time, person, place and to the situational context  Cognitive Function: intact  Memory: not cooperative with formal MMSE  Intellect: average  Fund of knowledge: aware of current events, aware of past history, and vocabulary average  Impulse control: impulsive at times  Insight/judgment: poor/impaired    Vital signs in last 24 hours:    Temp:  [97.6 °F (36.4 °C)-98.9 °F (37.2 °C)] 98 °F (36.7 °C)  HR:  [] 78  Resp:  [16-18] 16  BP: ()/(64-73) 95/64    Laboratory results: I have personally reviewed all pertinent laboratory/tests results    Results from the past 24 hours:   Recent Results (from the past 24 hour(s))   Valproic acid level, total    Collection Time: 01/03/24  4:38 AM   Result Value Ref Range    Valproic Acid, Total 60 50 - 100 ug/mL   Comprehensive metabolic panel    Collection Time: 01/03/24  4:38 AM   Result Value Ref Range    Sodium 137 135 - 147 mmol/L    Potassium 4.0 3.5 - 5.3 mmol/L    Chloride 101 96 - 108  mmol/L    CO2 24 21 - 32 mmol/L    ANION GAP 12 mmol/L    BUN 8 5 - 25 mg/dL    Creatinine 0.78 0.60 - 1.30 mg/dL    Glucose 109 65 - 140 mg/dL    Glucose, Fasting 109 (H) 65 - 99 mg/dL    Calcium 8.5 8.4 - 10.2 mg/dL    AST 23 13 - 39 U/L    ALT 18 7 - 52 U/L    Alkaline Phosphatase 63 34 - 104 U/L    Total Protein 6.8 6.4 - 8.4 g/dL    Albumin 3.8 3.5 - 5.0 g/dL    Total Bilirubin 0.33 0.20 - 1.00 mg/dL    eGFR 99 ml/min/1.73sq m       Progress Toward Goals: minimal improvement    Assessment:  Principal Problem:    Bipolar affective disorder, current episode manic with psychotic symptoms (HCC)  Active Problems:    Benign prostatic hyperplasia    Brachial plexus injury, right, sequela    Medical clearance for psychiatric admission    Bilateral lower extremity edema        Plan:  - f/u [facility] recs regarding the medical problems  - Consider DE ANDA Invega Sustenna upon further dose adjustments  - Continue medication titration and treatment plan; adjust medication to optimize treatment response and as clinically indicated.     Scheduled medications:  Current Facility-Administered Medications   Medication Dose Route Frequency Provider Last Rate    acetaminophen  650 mg Oral Q4H PRN Jenn Strickland, LYLANP      acetaminophen  650 mg Oral Q4H PRN Jenn Strickland, BETO      acetaminophen  975 mg Oral Q6H PRN Jenn Strickland, BETO      cholecalciferol  2,000 Units Oral Daily Rossi Carlson PA-C      divalproex sodium  500 mg Oral Daily Rory Bunn MD      divalproex sodium  750 mg Oral HS Rory Bunn MD      hydrochlorothiazide  12.5 mg Oral Daily Rossi Carlson PA-C      hydrOXYzine HCL  25 mg Oral Q6H PRN Max 4/day BETO Novak      hydrOXYzine HCL  50 mg Oral Q6H PRN Max 4/day BETO Novak      LORazepam  1 mg Intramuscular Q6H PRN Max 3/day BETO Novak      LORazepam  1 mg Oral Q6H PRN Max 3/day BETO Novak      melatonin  3 mg Oral HS Rory Bunn MD      mirtazapine  7.5 mg Oral  HS PRN Rory Bunn MD      nicotine  1 patch Transdermal Daily PRN Fabiana Sousa MD      OLANZapine  5 mg Intramuscular Q3H PRN Max 3/day Jenn Strickland, BETO      OLANZapine  2.5 mg Oral Q4H PRN Max 6/day Jenn Strickland, BETO      OLANZapine  5 mg Oral Q4H PRN Max 3/day Jenn Strickland, BETO      OLANZapine  5 mg Oral Q3H PRN Max 3/day Jenn Strickland, BETO      [START ON 1/4/2024] paliperidone  12 mg Oral Daily Rory Bunn MD      polyethylene glycol  17 g Oral Daily PRN Jenn Strickland, BETO      rOPINIRole  2 mg Oral HS Fabiana Sousa MD      senna-docusate sodium  1 tablet Oral Daily PRN BETO Novak      tamsulosin  0.4 mg Oral Daily With Dinner Rossi Carlson PA-C      traZODone  50 mg Oral HS Rory Bunn MD          PRN:    acetaminophen    acetaminophen    acetaminophen    hydrOXYzine HCL    hydrOXYzine HCL    LORazepam    LORazepam    mirtazapine    nicotine    OLANZapine    OLANZapine    OLANZapine    OLANZapine    polyethylene glycol    senna-docusate sodium    - Observation: routine    - VS: as per unit protocol  - Diet: Regular diet  - Psychoeducation (benefits and potential risks) discussed, importance of compliance with the psychiatric treatment reiterated, and the patient verbalized understanding of the matter  - Encourage group attendance and milieu therapy    - The pt was educated and agreed to verbalize any suicidal thoughts, frustrations or concerns to the nursing staff, immediately.    - Dispo: TBD       Next of Kin  Extended Emergency Contact Information  Primary Emergency Contact: Rhea Sesay  Mobile Phone: [phone]  Relation: Significant Other   needed? No  Secondary Emergency Contact: DONNIE CANELA  Mobile Phone: [phone]  Relation: Son      Counseling / Coordination of Care  Patient's progress discussed with staff in treatment team meeting.  Medications, treatment progress and treatment plan reviewed with  patient.  Medication changes discussed with patient.  Supportive therapy provided to patient.  Cognitive techniques utilized during the session.  Reassurance and supportive therapy provided.  Reoriented to reality and reassured.  Encouraged participation in milieu and group therapy on the unit.  Crisis/safety plan discussed with patient.     Rory Bunn MD  Attending Psychiatrist   [facility]

## 2024-01-03 NOTE — NURSING NOTE
Patient visible in milieu. Social with select peers, remains disorganized and labile. Did not use any sexual content or profanity that this writer is aware about this tour. Requires redirection at times. Out of room for meals. Denies anxiety/depression, denies SI/HI, AH/VH, safety precautions maintained.

## 2024-01-03 NOTE — NURSING NOTE
Pt present within the therapeutic milieu. Noted to be pacing and hoarding papers and book into his pants. Noted with delusions and tangential thought expressions. Fixated on going to the [place] tonight and often approaching staff and peers asking to accompany his for a vacation. Medication compliant this shift and no PRN warranted. Continued with delusions of grandiosity.     Received a call from Rhea earlier this evening and Rhea reported that pt had called her and stated that she should buy condoms and deliver them to the hospital because he was going be with another girl in the hospital. As of now, pt continues to be verbally hypersexual and no sexual advances noted and Rhea was made aware of same.

## 2024-01-03 NOTE — PLAN OF CARE
Problem: PSYCHOSIS  Goal: Will report no hallucinations or delusions  Description: Interventions:  - Administer medication as  ordered  - Every waking shifts and PRN assess for the presence of hallucinations and or delusions  - Assist with reality testing to support increasing orientation  - Assess if patient's hallucinations or delusions are encouraging self-harm or harm to others and intervene as appropriate  Outcome: Not Progressing     Problem: BEHAVIOR  Goal: Pt/Family maintain appropriate behavior and adhere to behavioral management agreement, if implemented  Description: INTERVENTIONS:  - Assess the family dynamic   - Encourage verbalization of thoughts and concerns in a socially appropriate manner  - Assess patient/family's coping skills and non-compliant behavior (including use of illegal substances).  - Utilize positive, consistent limit setting strategies supporting safety of patient, staff and others  - Initiate consult with Case Management, Spiritual Care or other ancillary services as appropriate  - If a patient's/visitor's behavior jeopardizes the safety of the patient, staff, or others, refer to organization procedure.   - Notify Security of behavior or suspected illegal substances which indicate the need for search of the patient and/or belongings  - Encourage participation in the decision making process about a behavioral management agreement; implement if patient meets criteria  Outcome: Not Progressing     Problem: SLEEP DISTURBANCE  Goal: Will exhibit normal sleeping pattern  Description: Interventions:  -  Assess the patients sleep pattern, noting recent changes  - Administer medication as ordered  - Decrease environmental stimuli, including noise, as appropriate during the night  - Encourage the patient to actively participate in unit groups and or exercise during the day to enhance ability to achieve adequate sleep at night  - Assess the patient, in the morning, encouraging a description of  sleep experience  Outcome: Progressing     Problem: Risk for Self Injury/Neglect  Goal: Treatment Goal: Remain safe during length of stay, learn and adopt new coping skills, and be free of self-injurious ideation, impulses and acts at the time of discharge  Outcome: Progressing  Goal: Verbalize thoughts and feelings  Description: Interventions:  - Assess and re-assess patient's lethality and potential for self-injury  - Engage patient in 1:1 interactions, daily, for a minimum of 15 minutes  - Encourage patient to express feelings, fears, frustrations, hopes  - Establish rapport/trust with patient   Outcome: Not Progressing  Goal: Refrain from harming self  Description: Interventions:  - Monitor patient closely, per order  - Develop a trusting relationship  - Supervise medication ingestion, monitor effects and side effects   Outcome: Progressing  Goal: Attend and participate in unit activities, including therapeutic, recreational, and educational groups  Description: Interventions:  - Provide therapeutic and educational activities daily, encourage attendance and participation, and document same in the medical record  - Obtain collateral information, encourage visitation and family involvement in care   Outcome: Progressing  Goal: Recognize maladaptive responses and adopt new coping mechanisms  Outcome: Progressing  Goal: Complete daily ADLs, including personal hygiene independently, as able  Description: Interventions:  - Observe, teach, and assist patient with ADLS  - Monitor and promote a balance of rest/activity, with adequate nutrition and elimination  Outcome: Progressing     Problem: Alteration in Orientation  Goal: Treatment Goal: Demonstrate a reduction of confusion and improved orientation to person, place, time and/or situation upon discharge, according to optimum baseline/ability  Outcome: Not Progressing

## 2024-01-03 NOTE — PROGRESS NOTES
01/03/24 1100   Activity/Group Checklist   Group Life Skills  (topic fears.)   Attendance Attended   Attendance Duration (min) 0-15   Interactions Other (Comment)  (joined session at its conclusion.Displayed bright interest in the topic yet minimal engagement in group discussion.)   Affect/Mood Bright   Goals Achieved Able to listen to others

## 2024-01-03 NOTE — NURSING NOTE
The patient requested sleep and anxiety medications.  Patient scored a whitaker of 22.  The patient was given Atarax 50 mg at 0001.  And Remeron 7.5 at 0003.  These were effective.   Continue to monitor

## 2024-01-03 NOTE — PROGRESS NOTES
01/03/24 0758   Team Meeting   Meeting Type Daily Rounds   Initial Conference Date 01/03/24   Next Conference Date 01/04/24   Team Members Present   Team Members Present Physician;Nurse;;;Occupational Therapist   Physician Team Member ANUSHA Smart   Nursing Team Member Kyle   Care Management Team Member Mehreen   Social Work Team Member Tammy   OT Team Member Curt   Patient/Family Present   Patient Present No   Patient's Family Present No     Increased invega to 9mg, may get DE ANDA by end the week, asked girlfriend for condoms to use here, slept intermittently, increased depakote to 750, discharge pending. Possible EAC referral to [facility].

## 2024-01-04 PROCEDURE — 99232 SBSQ HOSP IP/OBS MODERATE 35: CPT | Performed by: STUDENT IN AN ORGANIZED HEALTH CARE EDUCATION/TRAINING PROGRAM

## 2024-01-04 RX ADMIN — MELATONIN TAB 3 MG 3 MG: 3 TAB at 21:09

## 2024-01-04 RX ADMIN — HYDROCHLOROTHIAZIDE 12.5 MG: 12.5 TABLET ORAL at 08:53

## 2024-01-04 RX ADMIN — CHOLECALCIFEROL TAB 25 MCG (1000 UNIT) 2000 UNITS: 25 TAB at 08:53

## 2024-01-04 RX ADMIN — DIVALPROEX SODIUM 500 MG: 500 TABLET, EXTENDED RELEASE ORAL at 08:53

## 2024-01-04 RX ADMIN — PALIPERIDONE 12 MG: 6 TABLET, EXTENDED RELEASE ORAL at 08:53

## 2024-01-04 RX ADMIN — TRAZODONE HYDROCHLORIDE 100 MG: 100 TABLET ORAL at 21:09

## 2024-01-04 RX ADMIN — TAMSULOSIN HYDROCHLORIDE 0.4 MG: 0.4 CAPSULE ORAL at 17:17

## 2024-01-04 RX ADMIN — OLANZAPINE 5 MG: 5 TABLET, FILM COATED ORAL at 17:17

## 2024-01-04 RX ADMIN — ROPINIROLE HYDROCHLORIDE 2 MG: 1 TABLET, FILM COATED ORAL at 21:09

## 2024-01-04 RX ADMIN — DIVALPROEX SODIUM 750 MG: 250 TABLET, FILM COATED, EXTENDED RELEASE ORAL at 21:09

## 2024-01-04 NOTE — PLAN OF CARE
Problem: Risk for Self Injury/Neglect  Goal: Treatment Goal: Remain safe during length of stay, learn and adopt new coping skills, and be free of self-injurious ideation, impulses and acts at the time of discharge  Outcome: Progressing  Goal: Verbalize thoughts and feelings  Description: Interventions:  - Assess and re-assess patient's lethality and potential for self-injury  - Engage patient in 1:1 interactions, daily, for a minimum of 15 minutes  - Encourage patient to express feelings, fears, frustrations, hopes  - Establish rapport/trust with patient   Outcome: Progressing  Goal: Refrain from harming self  Description: Interventions:  - Monitor patient closely, per order  - Develop a trusting relationship  - Supervise medication ingestion, monitor effects and side effects   Outcome: Progressing  Goal: Attend and participate in unit activities, including therapeutic, recreational, and educational groups  Description: Interventions:  - Provide therapeutic and educational activities daily, encourage attendance and participation, and document same in the medical record  - Obtain collateral information, encourage visitation and family involvement in care   Outcome: Progressing  Goal: Recognize maladaptive responses and adopt new coping mechanisms  Outcome: Progressing  Goal: Complete daily ADLs, including personal hygiene independently, as able  Description: Interventions:  - Observe, teach, and assist patient with ADLS  - Monitor and promote a balance of rest/activity, with adequate nutrition and elimination  Outcome: Progressing     Problem: SLEEP DISTURBANCE  Goal: Will exhibit normal sleeping pattern  Description: Interventions:  -  Assess the patients sleep pattern, noting recent changes  - Administer medication as ordered  - Decrease environmental stimuli, including noise, as appropriate during the night  - Encourage the patient to actively participate in unit groups and or exercise during the day to enhance  ability to achieve adequate sleep at night  - Assess the patient, in the morning, encouraging a description of sleep experience  Outcome: Progressing

## 2024-01-04 NOTE — PROGRESS NOTES
"  Progress Note - Behavioral Health   Sudhakar Choe 58 y.o. male MRN: [id number]  Unit/Bed#: [facility] 647-01 Encounter: [id number]       Patient was visited on unit for continuing care; chart reviewed and discussed with multidisciplinary treatment team. On approach, the patient was calm but on irritable edge and superficially cooperative, minimizing all his sxs with poor insight and impaired judgement. The patient remains sexually preoccupied with paranoid ideations and persecutory delusions, asking to talk to the police \"immediately\" and asking the CM to find an apartment in [facility] for him. Denied any changes in mood, appetite, and energy level. No problem initiating and maintaining sleep. Denied A/VH currently. Denied SI/HI, intent or plan upon direct inquiry at this time.    Patient remains disorganized, sexually preoccupied, labile and on irritable edge, and requires frequent redirection and reorientation by the staff, but remains visible in the milieu and interacts with select staff and peers. . No reports of aggression or self-injurious behavior on unit. No PRN medications used in the past 24 hours.    Patient accepted all offered medications and reported feeling better. No adverse effects of medications noted or reported. Invega was increased to 12 mg daily today and will receive Invega Sustenna 234 mg IM tomorrow and 156 mg on 1/9/24; to be continued on Invega Sustenna 234 mg IM q4 weeks. VPA level was 60 on 1/3/24 and Depakote increased to 500/750 mg, and also Trazodone increased to 100 mg nightly on 1/3/23. VPA level to be checked on 1/7/24 and doses to be adjusted as indicated. The patient will benefit from referral to [facility] for further psychiatric stabilization.     Current Mental Status Evaluation:  Appearance and attitude: appeared as stated age, casually dressed, wearing eyeglasses, with fair hygiene  Eye contact: good  Motor Function: within normal limits, intact gait, No PMA/PMR  Gait/station: " normal gait/station and normal balance  Speech: pressured at times  Language: No overt abnormality  Mood/affect: dysphoric / Affect was constricted, inappropriate smile, mood-incongruent  Thought Processes: tangential, flight of ideas, ruminating  Thought content: denied suicidal ideations or homicidal ideations, persecutory delusions, paranoid ideation, grandiose ideas, ruminating thoughts, sexually preoccupied  Associations: tangential associations, perseverative  Perceptual disturbances: denies Auditory/Visual/Tactile Hallucinations, talks to self at times  Orientation: oriented to time, person, place and to the situational context  Cognitive Function: intact  Memory: recent and remote memory grossly intact  Intellect: average  Fund of knowledge: aware of current events, aware of past history, and vocabulary average  Impulse control: fair  Insight/judgment: poor/impaired    Vital signs in last 24 hours:    Temp:  [97.6 °F (36.4 °C)-97.7 °F (36.5 °C)] 97.7 °F (36.5 °C)  HR:  [] 100  Resp:  [16-18] 16  BP: (113-146)/(69-78) 113/71    Laboratory results: I have personally reviewed all pertinent laboratory/tests results    Results from the past 24 hours: No results found for this or any previous visit (from the past 24 hour(s)).    Progress Toward Goals: limited improvement    Assessment:  Principal Problem:    Bipolar affective disorder, current episode manic with psychotic symptoms (HCC)  Active Problems:    Benign prostatic hyperplasia    Brachial plexus injury, right, sequela    Medical clearance for psychiatric admission    Bilateral lower extremity edema        Plan:  - f/u [facility] recs regarding the medical problems  - Check VPA level on 1/7/24  - Invega Sustenna 234 mg IM tomorrow and 156 mg on 1/9/24; to be continued on Invega Sustenna 234 mg IM q4 weeks.  - Continue medication titration and treatment plan; adjust medication to optimize treatment response and as clinically indicated.     Scheduled  medications:  Current Facility-Administered Medications   Medication Dose Route Frequency Provider Last Rate    acetaminophen  650 mg Oral Q4H PRN Jenn Strickland, CRNP      acetaminophen  650 mg Oral Q4H PRN Jenn Strickland, CRNP      acetaminophen  975 mg Oral Q6H PRN Jenn Strickland, CRNP      cholecalciferol  2,000 Units Oral Daily Rossi Carlson PA-C      divalproex sodium  500 mg Oral Daily Rory Bunn MD      divalproex sodium  750 mg Oral HS Rory Bunn MD      hydrochlorothiazide  12.5 mg Oral Daily Rossi Carlson PA-C      hydrOXYzine HCL  25 mg Oral Q6H PRN Max 4/day Jenn Strickland, CRNP      hydrOXYzine HCL  50 mg Oral Q6H PRN Max 4/day Jenn Strickland, CRNP      LORazepam  1 mg Intramuscular Q6H PRN Max 3/day Jenn Strickland, CRNP      LORazepam  1 mg Oral Q6H PRN Max 3/day Jenn Strickland, CRNP      melatonin  3 mg Oral HS Rory Bunn MD      mirtazapine  7.5 mg Oral HS PRN Rory Bunn MD      nicotine  1 patch Transdermal Daily PRN Fabiana Sousa MD      OLANZapine  5 mg Intramuscular Q3H PRN Max 3/day Jenn Strickland, CRNP      OLANZapine  2.5 mg Oral Q4H PRN Max 6/day Jenn Strickland, CRNP      OLANZapine  5 mg Oral Q4H PRN Max 3/day Jenn Strickland, CRNP      OLANZapine  5 mg Oral Q3H PRN Max 3/day Jenn Strickland, CRNP      paliperidone  12 mg Oral Daily Rory Bunn MD      polyethylene glycol  17 g Oral Daily PRN Jenn Strickland, CRNP      rOPINIRole  2 mg Oral HS Fabiana Sousa MD      senna-docusate sodium  1 tablet Oral Daily PRN Jenn Strickland, CRNP      tamsulosin  0.4 mg Oral Daily With Dinner Rossi Carlson PA-C      traZODone  100 mg Oral HS Rory Bunn MD          PRN:    acetaminophen    acetaminophen    acetaminophen    hydrOXYzine HCL    hydrOXYzine HCL    LORazepam    LORazepam    mirtazapine    nicotine    OLANZapine    OLANZapine    OLANZapine    OLANZapine    polyethylene glycol    senna-docusate sodium    - Observation: routine     - VS: as per unit protocol  - Diet: Regular diet  - Psychoeducation (benefits and potential risks) discussed, importance of compliance with the psychiatric treatment reiterated, and the patient verbalized understanding of the matter  - Encourage group attendance and milieu therapy    - The pt was educated and agreed to verbalize any suicidal thoughts, frustrations or concerns to the nursing staff, immediately.    - Dispo: Referral to EAC      Next of Kin  Extended Emergency Contact Information  Primary Emergency Contact: Rhea Sesay  Mobile Phone: [phone]  Relation: Significant Other   needed? No  Secondary Emergency Contact: DONNIE CANELA  Mobile Phone: [phone]  Relation: Son      Counseling / Coordination of Care  Patient's progress discussed with staff in treatment team meeting.  Medications, treatment progress and treatment plan reviewed with patient.  Medication changes discussed with patient.  Medication education provided to patient.  Importance of follow up for substance abuse issues discussed with patient.  Supportive therapy provided to patient.  Reassurance and supportive therapy provided.  Reoriented to reality and reassured.  Encouraged participation in milieu and group therapy on the unit.  Crisis/safety plan discussed with patient.  Discharge plan discussed with patient.     Rory Bunn MD  Attending Psychiatrist   [facility]

## 2024-01-04 NOTE — CASE MANAGEMENT
Called [facility] ([phone]) to see if the patient is currently still with the ACT team or if he was discharged. Also, if he was discharged we would like to see what happened. Left a message for Zulma Harrington.
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
## 2024-01-04 NOTE — TREATMENT TEAM
Pt attended community self care group.  Pt wide and difficult to focus.  Pt needed redirection for focus. Pt does express appreciation for group process spontaneously.       01/04/24 1000   Activity/Group Checklist   Group Community meeting   Attendance Other (Comment)  (late)   Attendance Duration (min) 31-45   Interactions Interacted appropriately   Affect/Mood Wide   Goals Achieved Identified feelings;Identified relapse prevention strategies;Able to listen to others;Able to engage in interactions;Able to reflect/comment on own behavior;Able to manage/cope with feelings;Verbalized increased hopefulness;Able to self-disclose;Discussed self-esteem issues;Discussed coping strategies

## 2024-01-04 NOTE — PROGRESS NOTES
01/04/24 1330   Activity/Group Checklist   Group Life Skills  (guided breathing relaxation.)   Attendance Attended   Attendance Duration (min) 16-30   Interactions Disorganized interaction  (Pt. marked spots on a book as if to sketch a picture yet when ask about his drawing, he claimed that he was writing words.)   Affect/Mood Wide   Goals Achieved Able to engage in interactions;Able to listen to others

## 2024-01-04 NOTE — NURSING NOTE
"Patient visible on the unit pacing in the halls or sitting with peers in the dayroom, frequently socializing with both peers and staff. Patient bright and pleasant on approach, currently denies depression, anxiety, SI/HI/AVH. Patient requesting \"women only\" help him shave, easy to redirect. Patient approaching desk after dinner to \"report to the doctor I was supposed to leave yesterday\", easy to reassure. Compliant with medications and meals, appetite good. No further signs of distress noted.  "
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
## 2024-01-05 ENCOUNTER — APPOINTMENT (INPATIENT)
Dept: RADIOLOGY | Facility: HOSPITAL | Age: 59
DRG: 885 | End: 2024-01-05
Payer: MEDICARE

## 2024-01-05 PROCEDURE — 73090 X-RAY EXAM OF FOREARM: CPT

## 2024-01-05 PROCEDURE — 99232 SBSQ HOSP IP/OBS MODERATE 35: CPT | Performed by: STUDENT IN AN ORGANIZED HEALTH CARE EDUCATION/TRAINING PROGRAM

## 2024-01-05 PROCEDURE — 73060 X-RAY EXAM OF HUMERUS: CPT

## 2024-01-05 RX ADMIN — PALIPERIDONE PALMITATE 234 MG: 234 INJECTION INTRAMUSCULAR at 13:49

## 2024-01-05 RX ADMIN — ROPINIROLE HYDROCHLORIDE 2 MG: 1 TABLET, FILM COATED ORAL at 21:05

## 2024-01-05 RX ADMIN — DIVALPROEX SODIUM 750 MG: 250 TABLET, FILM COATED, EXTENDED RELEASE ORAL at 21:04

## 2024-01-05 RX ADMIN — TAMSULOSIN HYDROCHLORIDE 0.4 MG: 0.4 CAPSULE ORAL at 16:42

## 2024-01-05 RX ADMIN — CHOLECALCIFEROL TAB 25 MCG (1000 UNIT) 2000 UNITS: 25 TAB at 08:09

## 2024-01-05 RX ADMIN — TRAZODONE HYDROCHLORIDE 100 MG: 100 TABLET ORAL at 21:05

## 2024-01-05 RX ADMIN — PALIPERIDONE 12 MG: 6 TABLET, EXTENDED RELEASE ORAL at 08:09

## 2024-01-05 RX ADMIN — DIVALPROEX SODIUM 500 MG: 500 TABLET, EXTENDED RELEASE ORAL at 08:09

## 2024-01-05 RX ADMIN — MELATONIN TAB 3 MG 3 MG: 3 TAB at 21:05

## 2024-01-05 NOTE — CASE MANAGEMENT
"YUE spoke with the patient in the hallway who reported he felt \"great\" and was \"doing great.\" The patient told YUE, \"thank you for being my .\" CM encouraged the patient to make his needs known throughout his stay, which he agreed to do.   "

## 2024-01-05 NOTE — NURSING NOTE
Pt disorganized with thought content. Present in milieu appetite good and medication compliant. Denies SI.
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
## 2024-01-05 NOTE — QUICK NOTE
Nursing called me to say that patient is complaining of right arm pain states that when the police took him down they hurt his right arm.  I did right arm x-rays today and he was noted to have a new nondisplaced transverse distal right radial fracture.  The dorsal fixation opal is also fractured at the level of the radial carpal joint.  I Troy text with Ortho and put in a formal consult and Ortho will evaluate him on 1/6/2024.

## 2024-01-05 NOTE — TREATMENT TEAM
01/05/24 0751   Team Meeting   Meeting Type Tx Team Meeting   Initial Conference Date 01/05/24   Team Members Present   Team Members Present Physician;Nurse;;;Occupational Therapist   Physician Team Member Jenn Rene Jessica   Nursing Team Member July Torres   Care Management Team Member Rossi   Social Work Team Member Tammy   OT Team Member Curt   Patient/Family Present   Patient Present No   Patient's Family Present No     Patient given Zyprexa 5mg Zyprexa yesterday. Patient can be disorganized and irritable. Patient is paranoid at times with poor insight and limited judgement. Has delusions. MD to adjust medications, will received DE ANDA In[address]gg[employer] 234 today, due again 1/9/24.  Patient is medication compliant.

## 2024-01-05 NOTE — PLAN OF CARE
Problem: PSYCHOSIS  Goal: Will report no hallucinations or delusions  Description: Interventions:  - Administer medication as  ordered  - Every waking shifts and PRN assess for the presence of hallucinations and or delusions  - Assist with reality testing to support increasing orientation  - Assess if patient's hallucinations or delusions are encouraging self-harm or harm to others and intervene as appropriate  Outcome: Progressing     Problem: BEHAVIOR  Goal: Pt/Family maintain appropriate behavior and adhere to behavioral management agreement, if implemented  Description: INTERVENTIONS:  - Assess the family dynamic   - Encourage verbalization of thoughts and concerns in a socially appropriate manner  - Assess patient/family's coping skills and non-compliant behavior (including use of illegal substances).  - Utilize positive, consistent limit setting strategies supporting safety of patient, staff and others  - Initiate consult with Case Management, Spiritual Care or other ancillary services as appropriate  - If a patient's/visitor's behavior jeopardizes the safety of the patient, staff, or others, refer to organization procedure.   - Notify Security of behavior or suspected illegal substances which indicate the need for search of the patient and/or belongings  - Encourage participation in the decision making process about a behavioral management agreement; implement if patient meets criteria  Outcome: Progressing     Problem: SLEEP DISTURBANCE  Goal: Will exhibit normal sleeping pattern  Description: Interventions:  -  Assess the patients sleep pattern, noting recent changes  - Administer medication as ordered  - Decrease environmental stimuli, including noise, as appropriate during the night  - Encourage the patient to actively participate in unit groups and or exercise during the day to enhance ability to achieve adequate sleep at night  - Assess the patient, in the morning, encouraging a description of sleep  experience  Outcome: Progressing     Problem: INVOLUNTARY ADMIT  Goal: Will cooperate with staff recommendations and doctor's orders and will demonstrate appropriate behavior  Description: INTERVENTIONS:  - Treat underlying conditions and offer medication as ordered  - Educate regarding involuntary admission procedures and rules  - Utilize positive consistent limit setting strategies to support patient and staff safety  Outcome: Progressing     Problem: Risk for Self Injury/Neglect  Goal: Treatment Goal: Remain safe during length of stay, learn and adopt new coping skills, and be free of self-injurious ideation, impulses and acts at the time of discharge  Outcome: Progressing  Goal: Verbalize thoughts and feelings  Description: Interventions:  - Assess and re-assess patient's lethality and potential for self-injury  - Engage patient in 1:1 interactions, daily, for a minimum of 15 minutes  - Encourage patient to express feelings, fears, frustrations, hopes  - Establish rapport/trust with patient   Outcome: Progressing  Goal: Refrain from harming self  Description: Interventions:  - Monitor patient closely, per order  - Develop a trusting relationship  - Supervise medication ingestion, monitor effects and side effects   Outcome: Progressing  Goal: Attend and participate in unit activities, including therapeutic, recreational, and educational groups  Description: Interventions:  - Provide therapeutic and educational activities daily, encourage attendance and participation, and document same in the medical record  - Obtain collateral information, encourage visitation and family involvement in care   Outcome: Progressing  Goal: Recognize maladaptive responses and adopt new coping mechanisms  Outcome: Progressing  Goal: Complete daily ADLs, including personal hygiene independently, as able  Description: Interventions:  - Observe, teach, and assist patient with ADLS  - Monitor and promote a balance of rest/activity, with  adequate nutrition and elimination  Outcome: Progressing

## 2024-01-05 NOTE — NURSING NOTE
Pt anxious, grandiose and hyper verbal but pleasant and med-compliant with good appetite and steady gait.  /59, HCTZ held in am.  Attended group.  No inappropriate sexual behavior noted.  Monitored for safety and support.

## 2024-01-05 NOTE — TREATMENT TEAM
Pt attended 2/3 groups.  Pt calm and cooperative with grandiose thoughts.     01/05/24 1000   Activity/Group Checklist   Group Community meeting   Attendance Attended   Attendance Duration (min) 31-45   Interactions Interacted appropriately   Affect/Mood Wide   Goals Achieved Identified feelings;Discussed coping strategies;Able to self-disclose;Able to recieve feedback;Able to give feedback to another;Able to experience relief/decrease in symptoms;Able to manage/cope with feelings;Able to reflect/comment on own behavior;Able to engage in interactions;Able to listen to others;Verbalized increased hopefulness

## 2024-01-05 NOTE — PROGRESS NOTES
01/05/24 1100   Activity/Group Checklist   Group Life Skills  (seated exercise/bird trivia challenge task.)   Attendance Attended   Attendance Duration (min) 0-15   Interactions Disorganized interaction  (pt. joined group in progress restless initially,talking over others yet responded well to redirection and eventually more mindful of his environment and task involvement.)   Affect/Mood Wide   Goals Achieved Able to listen to others;Able to engage in interactions        Lab Results   Component Value Date    HGBA1C 7 7 (H) 09/11/2018       No results for input(s): POCGLU in the last 72 hours      Blood Sugar Average: Last 72 hrs:  A1cs appear to be improving  Will recheck level  Admits to being compliant with his metformin and [place] [facility] [place]

## 2024-01-06 PROCEDURE — 99222 1ST HOSP IP/OBS MODERATE 55: CPT | Performed by: STUDENT IN AN ORGANIZED HEALTH CARE EDUCATION/TRAINING PROGRAM

## 2024-01-06 PROCEDURE — 29125 APPL SHORT ARM SPLINT STATIC: CPT | Performed by: PHYSICIAN ASSISTANT

## 2024-01-06 PROCEDURE — 99232 SBSQ HOSP IP/OBS MODERATE 35: CPT | Performed by: PSYCHIATRY & NEUROLOGY

## 2024-01-06 RX ADMIN — PALIPERIDONE 12 MG: 6 TABLET, EXTENDED RELEASE ORAL at 08:09

## 2024-01-06 RX ADMIN — MELATONIN TAB 3 MG 3 MG: 3 TAB at 21:05

## 2024-01-06 RX ADMIN — TRAZODONE HYDROCHLORIDE 100 MG: 100 TABLET ORAL at 21:05

## 2024-01-06 RX ADMIN — TAMSULOSIN HYDROCHLORIDE 0.4 MG: 0.4 CAPSULE ORAL at 16:57

## 2024-01-06 RX ADMIN — ROPINIROLE HYDROCHLORIDE 2 MG: 1 TABLET, FILM COATED ORAL at 21:05

## 2024-01-06 RX ADMIN — HYDROCHLOROTHIAZIDE 12.5 MG: 12.5 TABLET ORAL at 08:09

## 2024-01-06 RX ADMIN — DIVALPROEX SODIUM 750 MG: 250 TABLET, FILM COATED, EXTENDED RELEASE ORAL at 21:05

## 2024-01-06 RX ADMIN — CHOLECALCIFEROL TAB 25 MCG (1000 UNIT) 2000 UNITS: 25 TAB at 08:09

## 2024-01-06 RX ADMIN — DIVALPROEX SODIUM 500 MG: 500 TABLET, EXTENDED RELEASE ORAL at 08:09

## 2024-01-06 NOTE — PLAN OF CARE
Problem: PSYCHOSIS  Goal: Will report no hallucinations or delusions  Description: Interventions:  - Administer medication as  ordered  - Every waking shifts and PRN assess for the presence of hallucinations and or delusions  - Assist with reality testing to support increasing orientation  - Assess if patient's hallucinations or delusions are encouraging self-harm or harm to others and intervene as appropriate  Outcome: Progressing     Problem: BEHAVIOR  Goal: Pt/Family maintain appropriate behavior and adhere to behavioral management agreement, if implemented  Description: INTERVENTIONS:  - Assess the family dynamic   - Encourage verbalization of thoughts and concerns in a socially appropriate manner  - Assess patient/family's coping skills and non-compliant behavior (including use of illegal substances).  - Utilize positive, consistent limit setting strategies supporting safety of patient, staff and others  - Initiate consult with Case Management, Spiritual Care or other ancillary services as appropriate  - If a patient's/visitor's behavior jeopardizes the safety of the patient, staff, or others, refer to organization procedure.   - Notify Security of behavior or suspected illegal substances which indicate the need for search of the patient and/or belongings  - Encourage participation in the decision making process about a behavioral management agreement; implement if patient meets criteria  Outcome: Progressing     Problem: INVOLUNTARY ADMIT  Goal: Will cooperate with staff recommendations and doctor's orders and will demonstrate appropriate behavior  Description: INTERVENTIONS:  - Treat underlying conditions and offer medication as ordered  - Educate regarding involuntary admission procedures and rules  - Utilize positive consistent limit setting strategies to support patient and staff safety  Outcome: Progressing     Problem: Risk for Self Injury/Neglect  Goal: Treatment Goal: Remain safe during length of  stay, learn and adopt new coping skills, and be free of self-injurious ideation, impulses and acts at the time of discharge  Outcome: Progressing  Goal: Verbalize thoughts and feelings  Description: Interventions:  - Assess and re-assess patient's lethality and potential for self-injury  - Engage patient in 1:1 interactions, daily, for a minimum of 15 minutes  - Encourage patient to express feelings, fears, frustrations, hopes  - Establish rapport/trust with patient   Outcome: Progressing  Goal: Refrain from harming self  Description: Interventions:  - Monitor patient closely, per order  - Develop a trusting relationship  - Supervise medication ingestion, monitor effects and side effects   Outcome: Progressing  Goal: Attend and participate in unit activities, including therapeutic, recreational, and educational groups  Description: Interventions:  - Provide therapeutic and educational activities daily, encourage attendance and participation, and document same in the medical record  - Obtain collateral information, encourage visitation and family involvement in care   Outcome: Progressing  Goal: Recognize maladaptive responses and adopt new coping mechanisms  Outcome: Progressing  Goal: Complete daily ADLs, including personal hygiene independently, as able  Description: Interventions:  - Observe, teach, and assist patient with ADLS  - Monitor and promote a balance of rest/activity, with adequate nutrition and elimination  Outcome: Progressing     Problem: Alteration in Orientation  Goal: Treatment Goal: Demonstrate a reduction of confusion and improved orientation to person, place, time and/or situation upon discharge, according to optimum baseline/ability  Outcome: Progressing  Goal: Interact with staff daily  Description: Interventions:  - Assess and re-assess patient's level of orientation  - Engage patient in 1 on 1 interactions, daily, for a minimum of 15 minutes   - Establish rapport/trust with patient    Outcome: Progressing  Goal: Express concerns related to confused thinking related to:  Description: Interventions:  - Encourage patient to express feelings, fears, frustrations, hopes  - Assign consistent caregivers   - [place]/re-orient patient as needed  - Allow comfort items, as appropriate  - Provide visual cues, signs, etc.   Outcome: Progressing  Goal: Allow medical examinations, as recommended  Description: Interventions:  - Provide physical/neurological exams and/or referrals, per provider   Outcome: Progressing  Goal: Cooperate with recommended testing/procedures  Description: Interventions:  - Determine need for ancillary testing  - Observe for mental status changes  - Implement falls/precaution protocol   Outcome: Progressing  Goal: Attend and participate in unit activities, including therapeutic, recreational, and educational groups  Description: Interventions:  - Provide therapeutic and educational activities daily, encourage attendance and participation, and document same in the medical record   - Provide appropriate opportunities for reminiscence   - Provide a consistent daily routine   - Encourage family contact/visitation   Outcome: Progressing  Goal: Complete daily ADLs, including personal hygiene independently, as able  Description: Interventions:  - Observe, teach, and assist patient with ADLS  Outcome: Progressing     Problem: DISCHARGE PLANNING - CARE MANAGEMENT  Goal: Discharge to post-acute care or home with appropriate resources  Description: INTERVENTIONS:  - Conduct assessment to determine patient/family and health care team treatment goals, and need for post-acute services based on payer coverage, community resources, and patient preferences, and barriers to discharge  - Address psychosocial, clinical, and financial barriers to discharge as identified in assessment in conjunction with the patient/family and health care team  - Arrange appropriate level of post-acute services according to  patient’s   needs and preference and payer coverage in collaboration with the physician and health care team  - Communicate with and update the patient/family, physician, and health care team regarding progress on the discharge plan  - Arrange appropriate transportation to post-acute venues  Outcome: Progressing

## 2024-01-06 NOTE — NURSING NOTE
"Patient making call to friend Rhea and stating \"his arm is broken\", stating \"doctor's are getting fired and having bizarre conversations with OZZIE Sesay. Rhea called this writer on the unit and stated that she does not not feel he is getting better, she would like to speak to the doctor Monday.  "

## 2024-01-06 NOTE — ED ATTENDING ATTESTATION
Final Diagnoses:     1. Encounter for psychiatric assessment    2. Medical clearance for psychiatric admission           IJohny DO, saw and evaluated the patient. All available labs and X-rays were ordered by me or the resident / non-physician and have been reviewed by myself. I discussed the patient with the resident / non-physician and agree with the resident's / non-physician practitioner's findings and plan as documented in the resident's / non-physician practicitioner's note, except where noted.   At this point, I agree with the current assessment done in the ED.   I was present during key portions of all procedures performed unless otherwise stated.     Nursing Triage:     Chief Complaint   Patient presents with    Psychiatric Evaluation     Pt claims he is [place]  man, that [place] from the [address] is being resurrected, and that he is going to the [facility]. Calm and cooperate at this time, [facility]. Denies SI/HI       HPI:   This is a 58 y.o. male presenting for evaluation of psychiatric complaint.  Sent in from [facility] police for involuntary commitment.  Patient is completely psychotic and has no association with reality.  Rambling incoherently.  Does not appears to be suffering from any trauma.  No toxidrome.    ASSESSMENT + PLAN:   Psychosis with a history of psychiatric illness off medication 3020 health metabolic evaluation and medical clearance likely that placement for psychiatric admission    Physical:     Vital signs reviewed.  Gen. appearance: No acute distress. Alert and oriented x2 tangential speech rambling  Head: Atraumatic, normocephalic.  Eyes: EOMI, PERRLA. No icterus.  Neck: Supple, no lymphadenopathy, full range of motion.  Chest: Regular rate and rhythm. Lungs are clear to auscultation bilaterally. Nontender.  Abdomen: Soft nontender nondistended. No signs of ecchymosis. Positive bowel sounds.  Extremities: Full range of motion in all extremities. DTRs  "intact.  Neuro: Cranial nerves II through XII intact. Nonfocal exam. GCS 14  Skin: Warm, dry.Vital signs reviewed.            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    Past Surgical:    has a past surgical history that includes Tonsillectomy; Wrist surgery (Left); and Fracture surgery.      No orders to display       Procedures      I reviewed patient's most recent discharge summary and/or outpatient office notes.      Portions of the record may have been created with voice recognition software. Occasional wrong word or \"sound a like\" substitutions may have occurred due to the inherent limitations of voice recognition software. Read the chart carefully and recognize, using context, where substitutions have occurred.    Electronically signed:  Johny Rodriguez, DO  "

## 2024-01-06 NOTE — NURSING NOTE
Pt visible within the milieu and continues with poor insight. Noted to be pacing around the nursing and rambling off grandiosely. Continues to perseverate about his right arm and blames the police for his fractured arm. Pt noted to be suspicious of HS medications and demanded that medication be explained in details before opening. Education offered on medication regimen and pt was compliant. No behavioral outburst. No PRNs admin.

## 2024-01-06 NOTE — NURSING NOTE
Patient cooperative with medications. Visible in milieu, wanders in treatment area, social with peers, appetite good, makes some basic needs known. Noted with periods of confusion. Remains delusional, and continues  to have bizarre conversations with staff, states that his bible was stolen for which patient was redirected and counseled. Patient continues to make the same complaint a few hours later. Denies anxiety, and depression, denied SI/HI, AH/VH, safety precautions maintained.
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
## 2024-01-06 NOTE — PROGRESS NOTES
Psychiatric Progress Note - Department of Behavioral Health   Sudhakar Choe 58 y.o. male MRN: [id number]  Unit/Bed#: -01 Encounter: [id number]    ASSESSMENT & PLAN     Diagnoses:   Principal Problem:    Bipolar affective disorder, current episode manic with psychotic symptoms (HCC)  Active Problems:    Benign prostatic hyperplasia    Brachial plexus injury, right, sequela    Medical clearance for psychiatric admission    Bilateral lower extremity edema      Treatment Recommendations/Precautions:  Continue to promote patient participation in therapeutic milieu.  Continue medical management per medicine.  Orthopedic surgery consultation pending.  Recommendations appreciated.  Continue previously prescribed psychotropic medication regimen; see below.  Continue behavioral health checks q.7 minutes.   Continue vitals per behavioral health unit protocol.  Discharge date per primary team; 302 commitment status.    SUBJECTIVE     Patient evaluated this a.m. for continuity of care. Patient was discussed at length with nursing and treatment team. Per nursing, patient remains predominately calm and cooperative, present in the milieu, pacing throughout said milieu, adherent to his medications without any acute adverse effects. No acute distress is noted throughout evaluation. Sudhakar Choe denies suicidal/homicidal ideation in addition to thoughts of self-injury, receptive to crisis planning provided by this writer, contacting for safety in the inpatient setting, admitting to an ability to appropriately confide in staff including this writer.  Throughout evaluation, patient appears inappropriately bright in affect, superficial, denying any/all psychiatric complaints/concerns, appearing slightly suspicious of this writer, paranoid, perseverating on discharge disposition, receptive to psychoeducation and supportive therapy provided by this writer.    PSYCHIATRIC REVIEW OF SYSTEMS     Behavior over the last  "24 hours:  unchanged  Sleep: adequate  Appetite: adequate  Medication side effects: No    REVIEW OF SYSTEMS   Review of systems: no complaints    OBJECTIVE     Vital Signs in Past 24 Hours:  Temp:  [97.2 °F (36.2 °C)-98.2 °F (36.8 °C)] 98.2 °F (36.8 °C)  HR:  [] 99  Resp:  [16-17] 16  BP: (108-117)/(58-72) 117/72    Intake/Output in Past 24 hours:  I/O last 3 completed shifts:  In: 1500 [P.O.:1500]  Out: -   I/O this shift:  In: 240 [P.O.:240]  Out: -         Laboratory Results:  I have personally reviewed all pertinent laboratory/tests results.  Most Recent Labs:   Lab Results   Component Value Date    WBC 5.47 12/30/2023    RBC 4.12 12/30/2023    HGB 12.6 12/30/2023    HCT 38.3 12/30/2023     12/30/2023    RDW 13.2 12/30/2023    NEUTROABS 2.60 12/30/2023    SODIUM 137 01/03/2024    K 4.0 01/03/2024     01/03/2024    CO2 24 01/03/2024    BUN 8 01/03/2024    CREATININE 0.78 01/03/2024    GLUC 109 01/03/2024    GLUF 109 (H) 01/03/2024    CALCIUM 8.5 01/03/2024    AST 23 01/03/2024    ALT 18 01/03/2024    ALKPHOS 63 01/03/2024    TP 6.8 01/03/2024    ALB 3.8 01/03/2024    TBILI 0.33 01/03/2024    CHOLESTEROL 168 01/18/2021    HDL 41 01/18/2021    TRIG 93 01/18/2021    LDLCALC 108 (H) 01/18/2021    NONHDLC 127 01/18/2021    VALPROICTOT 60 01/03/2024    WNG7EZOMWALC 1.431 12/30/2023    RPR Non-Reactive 03/26/2021    HGBA1C 5.9 (H) 10/09/2023     10/09/2023     Labs in last 72 hours: No results for input(s): \"WBC\", \"RBC\", \"HGB\", \"HCT\", \"PLT\", \"RDW\", \"TOTANEUTABS\", \"NEUTROABS\", \"SODIUM\", \"K\", \"CL\", \"CO2\", \"BUN\", \"CREATININE\", \"GLUC\", \"GLUF\", \"CALCIUM\", \"AST\", \"ALT\", \"ALKPHOS\", \"TP\", \"ALB\", \"TBILI\", \"CHOLESTEROL\", \"HDL\", \"TRIG\", \"LDLCALC\", \"VALPROICTOT\", \"CARBAMAZEPIN\", \"LITHIUM\", \"AMMONIA\", \"[id number]\", \"FREET4\", \"T3FREE\", \"PREGTESTUR\", \"PREGSERUM\", \"HCG\", \"HCGQUANT\", \"RPR\" in the last 72 hours.  Admission Labs:   Admission on 12/29/2023   Component Date Value    Sodium 12/30/2023 138     " Potassium 12/30/2023 3.8     Chloride 12/30/2023 104     CO2 12/30/2023 25     ANION GAP 12/30/2023 9     BUN 12/30/2023 7     Creatinine 12/30/2023 0.74     Glucose 12/30/2023 87     Glucose, Fasting 12/30/2023 87     Calcium 12/30/2023 8.5     AST 12/30/2023 43 (H)     ALT 12/30/2023 27     Alkaline Phosphatase 12/30/2023 60     Total Protein 12/30/2023 6.3 (L)     Albumin 12/30/2023 3.8     Total Bilirubin 12/30/2023 0.31     eGFR 12/30/2023 101     Magnesium 12/30/2023 2.0     Phosphorus 12/30/2023 3.5     WBC 12/30/2023 5.47     RBC 12/30/2023 4.12     Hemoglobin 12/30/2023 12.6     Hematocrit 12/30/2023 38.3     MCV 12/30/2023 93     MCH 12/30/2023 30.6     MCHC 12/30/2023 32.9     RDW 12/30/2023 13.2     MPV 12/30/2023 10.8     Platelets 12/30/2023 269     nRBC 12/30/2023 0     Neutrophils Relative 12/30/2023 47     Immat GRANS % 12/30/2023 0     Lymphocytes Relative 12/30/2023 37     Monocytes Relative 12/30/2023 12     Eosinophils Relative 12/30/2023 3     Basophils Relative 12/30/2023 1     Neutrophils Absolute 12/30/2023 2.60     Immature Grans Absolute 12/30/2023 0.01     Lymphocytes Absolute 12/30/2023 2.01     Monocytes Absolute 12/30/2023 0.68     Eosinophils Absolute 12/30/2023 0.14     Basophils Absolute 12/30/2023 0.03     TSH 3RD GENERATON 12/30/2023 1.431     Vitamin B-12 12/30/2023 621     Folate 12/30/2023 9.8     Vit D, 25-Hydroxy 12/30/2023 30.6     Ventricular Rate 12/30/2023 101     Atrial Rate 12/30/2023 101     ND Interval 12/30/2023 210     QRSD Interval 12/30/2023 78     QT Interval 12/30/2023 354     QTC Interval 12/30/2023 459     P Axis 12/30/2023 60     QRS Axis 12/30/2023 65     T Wave [place] 12/30/2023 40     Ventricular Rate 12/30/2023 99     Atrial Rate 12/30/2023 99     ND Interval 12/30/2023 198     QRSD Interval 12/30/2023 84     QT Interval 12/30/2023 356     QTC Interval 12/30/2023 456     P Axis 12/30/2023 58     QRS [place] 12/30/2023 53     T Wave [place] 12/30/2023 38      Valproic Acid, Total 01/03/2024 60     Sodium 01/03/2024 137     Potassium 01/03/2024 4.0     Chloride 01/03/2024 101     CO2 01/03/2024 24     ANION GAP 01/03/2024 12     BUN 01/03/2024 8     Creatinine 01/03/2024 0.78     Glucose 01/03/2024 109     Glucose, Fasting 01/03/2024 109 (H)     Calcium 01/03/2024 8.5     AST 01/03/2024 23     ALT 01/03/2024 18     Alkaline Phosphatase 01/03/2024 63     Total Protein 01/03/2024 6.8     Albumin 01/03/2024 3.8     Total Bilirubin 01/03/2024 0.33     eGFR 01/03/2024 99        Behavioral Health Medications: all current active meds have been reviewed, continue current psychiatric medications, and current meds:   Current Facility-Administered Medications   Medication Dose Route Frequency    acetaminophen (TYLENOL) tablet 650 mg  650 mg Oral Q4H PRN    acetaminophen (TYLENOL) tablet 650 mg  650 mg Oral Q4H PRN    acetaminophen (TYLENOL) tablet 975 mg  975 mg Oral Q6H PRN    cholecalciferol (VITAMIN D3) tablet 2,000 Units  2,000 Units Oral Daily    divalproex sodium (DEPAKOTE ER) 24 hr tablet 500 mg  500 mg Oral Daily    divalproex sodium (DEPAKOTE ER) 24 hr tablet 750 mg  750 mg Oral HS    hydrochlorothiazide (HYDRODIURIL) tablet 12.5 mg  12.5 mg Oral Daily    hydrOXYzine HCL (ATARAX) tablet 25 mg  25 mg Oral Q6H PRN Max 4/day    hydrOXYzine HCL (ATARAX) tablet 50 mg  50 mg Oral Q6H PRN Max 4/day    LORazepam (ATIVAN) injection 1 mg  1 mg Intramuscular Q6H PRN Max 3/day    LORazepam (ATIVAN) tablet 1 mg  1 mg Oral Q6H PRN Max 3/day    melatonin tablet 3 mg  3 mg Oral HS    mirtazapine (REMERON) tablet 7.5 mg  7.5 mg Oral HS PRN    nicotine (NICODERM CQ) 21 mg/24 hr TD 24 hr patch 1 patch  1 patch Transdermal Daily PRN    OLANZapine (ZyPREXA) IM injection 5 mg  5 mg Intramuscular Q3H PRN Max 3/day    OLANZapine (ZyPREXA) tablet 2.5 mg  2.5 mg Oral Q4H PRN Max 6/day    OLANZapine (ZyPREXA) tablet 5 mg  5 mg Oral Q4H PRN Max 3/day    OLANZapine (ZyPREXA) tablet 5 mg  5 mg Oral Q3H  PRN Max 3/day    paliperidone (INVEGA) 24 hr tablet 12 mg  12 mg Oral Daily    [START ON 1/10/2024] paliperidone palmitate ER (INVEGA SUSTENNA) IM- Deltoid injection 156 mg  156 mg IM- Deltoid Once    paliperidone palmitate ER (INVEGA) IM- Deltoid injection 234 mg  234 mg IM- Deltoid Q4 weeks    polyethylene glycol (MIRALAX) packet 17 g  17 g Oral Daily PRN    rOPINIRole (REQUIP) tablet 2 mg  2 mg Oral HS    senna-docusate sodium (SENOKOT S) 8.6-50 mg per tablet 1 tablet  1 tablet Oral Daily PRN    tamsulosin (FLOMAX) capsule 0.4 mg  0.4 mg Oral Daily With Dinner    traZODone (DESYREL) tablet 100 mg  100 mg Oral HS   .    Risks, benefits and possible side effects of Medications:   Risks, benefits, and possible side effects of medications explained to patient and patient verbalizes understanding.      Mental Status Evaluation:  Appearance:  age appropriate, casually dressed, and possessing appropriate grooming/hygiene wearing eyeglasses   Behavior:  Calm and cooperative although restless at times, superficial, slightly suspicious possessing appropriate eye contact   Speech:  normal pitch and normal volume   Mood:  euthymic   Affect:  increased in intensity, labile, and mood-incongruent   Language naming objects and repeating phrases   Thought Process:  perserverative   Thought Content:  no overt obsessions or delusions although appears paranoid   Perceptual Disturbances: None although appears internally preoccupied and distracted   Risk Potential: Suicidal Ideations none, Homicidal Ideations none, and Potential for Aggression No   Sensorium:  person, place, and time/date   Cognition:  recent and remote memory grossly intact   Consciousness:  alert and awake    Attention: attention span appeared shorter than expected for age   Insight:  limited   Judgment: limited   Intellect Not assessed   Gait/Station: normal gait/station and normal balance   Motor Activity: no abnormal movements     Memory: Short and long term  memory  fair     Progress Toward Goals: unchanged, as evidenced by their participation (or lack thereof) in individual, social and therapeutic milieu in addition to adherence to their medication regimen.    Recommended Treatment:   See above for assessment and plan.    Inpatient Psychiatric Certification: Based upon physical, mental and social evaluations, I certify that inpatient psychiatric services are medically necessary for this patient for a duration of  greater than 2  midnights for the treatment of Bipolar affective disorder, current episode manic with psychotic symptoms (HCC) including psychotropic medication management, participation in therapeutic milieu and referrals as indicated    Counseling/Coordination of Care    Total unit time spent today was greater than 15 minutes. Greater than 50% of total time was spent with the patient and/or patient's relatives and/or coordination of patient's care.     Patient's rights, confidentiality, exceptions to confidentiality, use of electronic medical record including appropriate staff access to medical record regarding behavioral health services and consent to treatment were reviewed.    Note Share:     This note was not shared with the patient due to reasonable likelihood of causing patient harm     This note has been constructed using a voice recognition system.    There may be translation, syntax, or grammatical errors. If you have any questions, please contact the dictating provider.    Jordan Christopher Holter, DO 01/06/24

## 2024-01-07 LAB — VALPROATE SERPL-MCNC: 76 UG/ML (ref 50–100)

## 2024-01-07 PROCEDURE — 80164 ASSAY DIPROPYLACETIC ACD TOT: CPT | Performed by: STUDENT IN AN ORGANIZED HEALTH CARE EDUCATION/TRAINING PROGRAM

## 2024-01-07 PROCEDURE — 99232 SBSQ HOSP IP/OBS MODERATE 35: CPT | Performed by: PSYCHIATRY & NEUROLOGY

## 2024-01-07 RX ADMIN — CHOLECALCIFEROL TAB 25 MCG (1000 UNIT) 2000 UNITS: 25 TAB at 08:17

## 2024-01-07 RX ADMIN — ROPINIROLE HYDROCHLORIDE 2 MG: 1 TABLET, FILM COATED ORAL at 21:04

## 2024-01-07 RX ADMIN — MELATONIN TAB 3 MG 3 MG: 3 TAB at 21:04

## 2024-01-07 RX ADMIN — DIVALPROEX SODIUM 750 MG: 250 TABLET, FILM COATED, EXTENDED RELEASE ORAL at 21:04

## 2024-01-07 RX ADMIN — DIVALPROEX SODIUM 500 MG: 500 TABLET, EXTENDED RELEASE ORAL at 08:16

## 2024-01-07 RX ADMIN — PALIPERIDONE 12 MG: 6 TABLET, EXTENDED RELEASE ORAL at 08:16

## 2024-01-07 RX ADMIN — ACETAMINOPHEN 975 MG: 325 TABLET, FILM COATED ORAL at 21:31

## 2024-01-07 RX ADMIN — TAMSULOSIN HYDROCHLORIDE 0.4 MG: 0.4 CAPSULE ORAL at 16:59

## 2024-01-07 RX ADMIN — TRAZODONE HYDROCHLORIDE 100 MG: 100 TABLET ORAL at 21:04

## 2024-01-07 RX ADMIN — HYDROCHLOROTHIAZIDE 12.5 MG: 12.5 TABLET ORAL at 08:16

## 2024-01-07 NOTE — NURSING NOTE
Pt disorganized with thoughts. Cooperates with medication. Present in milieu interacts with peers appropriately. Appetite good. Right arm remains in splint and sling. Positive circulation noted to fingers able to grasp to a mild degree. Denies pain. Denies SI/HI.

## 2024-01-07 NOTE — PLAN OF CARE
Problem: PSYCHOSIS  Goal: Will report no hallucinations or delusions  Description: Interventions:  - Administer medication as  ordered  - Every waking shifts and PRN assess for the presence of hallucinations and or delusions  - Assist with reality testing to support increasing orientation  - Assess if patient's hallucinations or delusions are encouraging self-harm or harm to others and intervene as appropriate  Outcome: Progressing

## 2024-01-07 NOTE — PROGRESS NOTES
Psychiatric Progress Note - Department of Behavioral Health   Sudhakar Choe 58 y.o. male MRN: [id number]  Unit/Bed#: [facility] 647-01 Encounter: [id number]    ASSESSMENT & PLAN     Diagnoses:   Principal Problem:    Bipolar affective disorder, current episode manic with psychotic symptoms (HCC)  Active Problems:    Benign prostatic hyperplasia    Brachial plexus injury, right, sequela    Medical clearance for psychiatric admission    Bilateral lower extremity edema      Treatment Recommendations/Precautions:  Continue to promote patient participation in therapeutic milieu.  Continue medical management per medicine.  Continue previously prescribed psychotropic medication regimen; see below.  Continue behavioral health checks q.7 minutes.   Continue vitals per behavioral health unit protocol.  Discharge date per primary team; 302 commitment status.    SUBJECTIVE     Patient evaluated this a.m. for continuity of care. Patient was discussed at length with nursing and treatment team. Per nursing, patient remains calm, cooperative, present in the milieu, adherent to his medications without any acute adverse effects . No acute distress is noted throughout evaluation. Sudhakar Choe denies suicidal/homicidal ideation in addition to thoughts of self-injury, receptive to crisis planning provided by this writer, contacting for safety in the inpatient setting, admitting to an ability to appropriately confide in staff including this writer. Patient remains somewhat superficial, suspicious, paranoid, perseverative on his arm, stating that this is a result of police brutality, denying any additional psychiatric complaints/concerns.     PSYCHIATRIC REVIEW OF SYSTEMS     Behavior over the last 24 hours:  unchanged  Sleep: adequate  Appetite: adequate  Medication side effects: No    REVIEW OF SYSTEMS   Review of systems: no complaints    OBJECTIVE     Vital Signs in Past 24 Hours:  Temp:  [96.8 °F (36 °C)-98 °F (36.7 °C)]  "96.8 °F (36 °C)  HR:  [] 100  Resp:  [16-18] 16  BP: (117-130)/(72-75) 130/73    Intake/Output in Past 24 hours:  I/O last 3 completed shifts:  In: 1340 [P.O.:1340]  Out: -   I/O this shift:  In: 600 [P.O.:600]  Out: -         Laboratory Results:  I have personally reviewed all pertinent laboratory/tests results.  Most Recent Labs:   Lab Results   Component Value Date    WBC 5.47 12/30/2023    RBC 4.12 12/30/2023    HGB 12.6 12/30/2023    HCT 38.3 12/30/2023     12/30/2023    RDW 13.2 12/30/2023    NEUTROABS 2.60 12/30/2023    SODIUM 137 01/03/2024    K 4.0 01/03/2024     01/03/2024    CO2 24 01/03/2024    BUN 8 01/03/2024    CREATININE 0.78 01/03/2024    GLUC 109 01/03/2024    GLUF 109 (H) 01/03/2024    CALCIUM 8.5 01/03/2024    AST 23 01/03/2024    ALT 18 01/03/2024    ALKPHOS 63 01/03/2024    TP 6.8 01/03/2024    ALB 3.8 01/03/2024    TBILI 0.33 01/03/2024    CHOLESTEROL 168 01/18/2021    HDL 41 01/18/2021    TRIG 93 01/18/2021    LDLCALC 108 (H) 01/18/2021    NONHDLC 127 01/18/2021    VALPROICTOT 60 01/03/2024    PKC6EFKNAGXC 1.431 12/30/2023    RPR Non-Reactive 03/26/2021    HGBA1C 5.9 (H) 10/09/2023     10/09/2023     Labs in last 72 hours: No results for input(s): \"WBC\", \"RBC\", \"HGB\", \"HCT\", \"PLT\", \"RDW\", \"TOTANEUTABS\", \"NEUTROABS\", \"SODIUM\", \"K\", \"CL\", \"CO2\", \"BUN\", \"CREATININE\", \"GLUC\", \"GLUF\", \"CALCIUM\", \"AST\", \"ALT\", \"ALKPHOS\", \"TP\", \"ALB\", \"TBILI\", \"CHOLESTEROL\", \"HDL\", \"TRIG\", \"LDLCALC\", \"VALPROICTOT\", \"CARBAMAZEPIN\", \"LITHIUM\", \"AMMONIA\", \"IMR0UCJOISQP\", \"FREET4\", \"T3FREE\", \"PREGTESTUR\", \"PREGSERUM\", \"HCG\", \"HCGQUANT\", \"RPR\" in the last 72 hours.  Admission Labs:   Admission on 12/29/2023   Component Date Value    Sodium 12/30/2023 138     Potassium 12/30/2023 3.8     Chloride 12/30/2023 104     CO2 12/30/2023 25     ANION GAP 12/30/2023 9     BUN 12/30/2023 7     Creatinine 12/30/2023 0.74     Glucose 12/30/2023 87     Glucose, Fasting 12/30/2023 87     Calcium 12/30/2023 8.5 "     AST 12/30/2023 43 (H)     ALT 12/30/2023 27     Alkaline Phosphatase 12/30/2023 60     Total Protein 12/30/2023 6.3 (L)     Albumin 12/30/2023 3.8     Total Bilirubin 12/30/2023 0.31     eGFR 12/30/2023 101     Magnesium 12/30/2023 2.0     Phosphorus 12/30/2023 3.5     WBC 12/30/2023 5.47     RBC 12/30/2023 4.12     Hemoglobin 12/30/2023 12.6     Hematocrit 12/30/2023 38.3     MCV 12/30/2023 93     MCH 12/30/2023 30.6     MCHC 12/30/2023 32.9     RDW 12/30/2023 13.2     MPV 12/30/2023 10.8     Platelets 12/30/2023 269     nRBC 12/30/2023 0     Neutrophils Relative 12/30/2023 47     Immat GRANS % 12/30/2023 0     Lymphocytes Relative 12/30/2023 37     Monocytes Relative 12/30/2023 12     Eosinophils Relative 12/30/2023 3     Basophils Relative 12/30/2023 1     Neutrophils Absolute 12/30/2023 2.60     Immature Grans Absolute 12/30/2023 0.01     Lymphocytes Absolute 12/30/2023 2.01     Monocytes Absolute 12/30/2023 0.68     Eosinophils Absolute 12/30/2023 0.14     Basophils Absolute 12/30/2023 0.03     TSH 3RD GENERATON 12/30/2023 1.431     Vitamin B-12 12/30/2023 621     Folate 12/30/2023 9.8     Vit D, 25-Hydroxy 12/30/2023 30.6     Ventricular Rate 12/30/2023 101     Atrial Rate 12/30/2023 101     MS Interval 12/30/2023 210     QRSD Interval 12/30/2023 78     QT Interval 12/30/2023 354     QTC Interval 12/30/2023 459     P Axis 12/30/2023 60     QRS Axis 12/30/2023 65     T Wave [place] 12/30/2023 40     Ventricular Rate 12/30/2023 99     Atrial Rate 12/30/2023 99     MS Interval 12/30/2023 198     QRSD Interval 12/30/2023 84     QT Interval 12/30/2023 356     QTC Interval 12/30/2023 456     P Axis 12/30/2023 58     QRS [place] 12/30/2023 53     T Wave [place] 12/30/2023 38     Valproic Acid, Total 01/03/2024 60     Sodium 01/03/2024 137     Potassium 01/03/2024 4.0     Chloride 01/03/2024 101     CO2 01/03/2024 24     ANION GAP 01/03/2024 12     BUN 01/03/2024 8     Creatinine 01/03/2024 0.78     Glucose 01/03/2024 109      Glucose, Fasting 01/03/2024 109 (H)     Calcium 01/03/2024 8.5     AST 01/03/2024 23     ALT 01/03/2024 18     Alkaline Phosphatase 01/03/2024 63     Total Protein 01/03/2024 6.8     Albumin 01/03/2024 3.8     Total Bilirubin 01/03/2024 0.33     eGFR 01/03/2024 99        Behavioral Health Medications: all current active meds have been reviewed, continue current psychiatric medications, and current meds:   Current Facility-Administered Medications   Medication Dose Route Frequency    acetaminophen (TYLENOL) tablet 650 mg  650 mg Oral Q4H PRN    acetaminophen (TYLENOL) tablet 650 mg  650 mg Oral Q4H PRN    acetaminophen (TYLENOL) tablet 975 mg  975 mg Oral Q6H PRN    cholecalciferol (VITAMIN D3) tablet 2,000 Units  2,000 Units Oral Daily    divalproex sodium (DEPAKOTE ER) 24 hr tablet 500 mg  500 mg Oral Daily    divalproex sodium (DEPAKOTE ER) 24 hr tablet 750 mg  750 mg Oral HS    hydrochlorothiazide (HYDRODIURIL) tablet 12.5 mg  12.5 mg Oral Daily    hydrOXYzine HCL (ATARAX) tablet 25 mg  25 mg Oral Q6H PRN Max 4/day    hydrOXYzine HCL (ATARAX) tablet 50 mg  50 mg Oral Q6H PRN Max 4/day    LORazepam (ATIVAN) injection 1 mg  1 mg Intramuscular Q6H PRN Max 3/day    LORazepam (ATIVAN) tablet 1 mg  1 mg Oral Q6H PRN Max 3/day    melatonin tablet 3 mg  3 mg Oral HS    mirtazapine (REMERON) tablet 7.5 mg  7.5 mg Oral HS PRN    nicotine (NICODERM CQ) 21 mg/24 hr TD 24 hr patch 1 patch  1 patch Transdermal Daily PRN    OLANZapine (ZyPREXA) IM injection 5 mg  5 mg Intramuscular Q3H PRN Max 3/day    OLANZapine (ZyPREXA) tablet 2.5 mg  2.5 mg Oral Q4H PRN Max 6/day    OLANZapine (ZyPREXA) tablet 5 mg  5 mg Oral Q4H PRN Max 3/day    OLANZapine (ZyPREXA) tablet 5 mg  5 mg Oral Q3H PRN Max 3/day    paliperidone (INVEGA) 24 hr tablet 12 mg  12 mg Oral Daily    [START ON 1/10/2024] paliperidone palmitate ER (INVEGA SUSTENNA) IM- Deltoid injection 156 mg  156 mg IM- Deltoid Once    paliperidone palmitate ER (INVEGA) IM- Deltoid  "injection 234 mg  234 mg IM- Deltoid Q4 weeks    polyethylene glycol (MIRALAX) packet 17 g  17 g Oral Daily PRN    rOPINIRole (REQUIP) tablet 2 mg  2 mg Oral HS    senna-docusate sodium (SENOKOT S) 8.6-50 mg per tablet 1 tablet  1 tablet Oral Daily PRN    tamsulosin (FLOMAX) capsule 0.4 mg  0.4 mg Oral Daily With Dinner    traZODone (DESYREL) tablet 100 mg  100 mg Oral HS   .    Risks, benefits and possible side effects of Medications:   Risks, benefits, and possible side effects of medications explained to patient and patient verbalizes understanding.      Mental Status Evaluation:  Appearance:  age appropriate, casually dressed, and appropriate grooming/hygiene   Behavior:  Calm, cooperative, appropriate eye contact , superficial, suspicious   Speech:  normal pitch and normal volume   Mood:  euthymic; \"OK\"   Affect:  increased in intensity and labile   Language naming objects and repeating phrases   Thought Process:  perserverative   Thought Content:  delusions  persecutory, paranoid   Perceptual Disturbances: None   Risk Potential: Suicidal Ideations none, Homicidal Ideations none, and Potential for Aggression No   Sensorium:  person, place, and time/date   Cognition:  recent and remote memory grossly intact   Consciousness:  alert and awake    Attention: attention span appeared shorter than expected for age   Insight:  limited   Judgment: limited   Intellect Not assessed   Gait/Station: normal gait/station   Motor Activity: no abnormal movements     Memory: Short and long term memory  fair     Progress Toward Goals: unchanged, as evidenced by their participation (or lack thereof) in individual, social and therapeutic milieu in addition to adherence to their medication regimen.    Recommended Treatment:   See above for assessment and plan.    Inpatient Psychiatric Certification: Based upon physical, mental and social evaluations, I certify that inpatient psychiatric services are medically necessary for this " patient for a duration of  greater than 2  midnights for the treatment of Bipolar affective disorder, current episode manic with psychotic symptoms (HCC) including psychotropic medication management, participation in therapeutic milieu and referrals as indicated    Counseling/Coordination of Care    Total unit time spent today was greater than 15 minutes. Greater than 50% of total time was spent with the patient and/or patient's relatives and/or coordination of patient's care.     Patient's rights, confidentiality, exceptions to confidentiality, use of electronic medical record including appropriate staff access to medical record regarding behavioral health services and consent to treatment were reviewed.    Note Share:     This note was not shared with the patient due to reasonable likelihood of causing patient harm     This note has been constructed using a voice recognition system.    There may be translation, syntax, or grammatical errors. If you have any questions, please contact the dictating provider.    Jordan Christopher Holter, DO 01/07/24

## 2024-01-07 NOTE — NURSING NOTE
"Patient visible on the unit and social with peers. He is bright and pleasant during interactions. He denies all psych s/s at this time. Patient's significant other called and reported that Sudhakar asked her to order a food delivery to the unit. Patient was educated but stated \"I never asked for that. She's crazy.\" Sudhakar is medication compliant. Denies pain at this time and is seen wearing sling. Safety checks ongoing.   "

## 2024-01-08 PROCEDURE — 90686 IIV4 VACC NO PRSV 0.5 ML IM: CPT | Performed by: NURSE PRACTITIONER

## 2024-01-08 PROCEDURE — 99232 SBSQ HOSP IP/OBS MODERATE 35: CPT | Performed by: STUDENT IN AN ORGANIZED HEALTH CARE EDUCATION/TRAINING PROGRAM

## 2024-01-08 PROCEDURE — G0008 ADMIN INFLUENZA VIRUS VAC: HCPCS | Performed by: NURSE PRACTITIONER

## 2024-01-08 RX ADMIN — MELATONIN TAB 3 MG 3 MG: 3 TAB at 21:06

## 2024-01-08 RX ADMIN — CHOLECALCIFEROL TAB 25 MCG (1000 UNIT) 2000 UNITS: 25 TAB at 08:11

## 2024-01-08 RX ADMIN — DIVALPROEX SODIUM 500 MG: 500 TABLET, EXTENDED RELEASE ORAL at 08:10

## 2024-01-08 RX ADMIN — DIVALPROEX SODIUM 750 MG: 250 TABLET, FILM COATED, EXTENDED RELEASE ORAL at 21:06

## 2024-01-08 RX ADMIN — TRAZODONE HYDROCHLORIDE 100 MG: 100 TABLET ORAL at 21:06

## 2024-01-08 RX ADMIN — ROPINIROLE HYDROCHLORIDE 2 MG: 1 TABLET, FILM COATED ORAL at 21:06

## 2024-01-08 RX ADMIN — INFLUENZA VIRUS VACCINE 0.5 ML: 15; 15; 15; 15 SUSPENSION INTRAMUSCULAR at 14:58

## 2024-01-08 RX ADMIN — PALIPERIDONE 12 MG: 6 TABLET, EXTENDED RELEASE ORAL at 08:11

## 2024-01-08 RX ADMIN — ACETAMINOPHEN 650 MG: 325 TABLET ORAL at 21:56

## 2024-01-08 RX ADMIN — TAMSULOSIN HYDROCHLORIDE 0.4 MG: 0.4 CAPSULE ORAL at 17:02

## 2024-01-08 NOTE — TREATMENT TEAM
Pt attended community meeting.  Pt bright and grandiose.  Pt with further prompting elaborated his gratitude towards President [employer] and Haylee Patton.      01/08/24 1000   Activity/Group Checklist   Group Community meeting   Attendance Attended   Attendance Duration (min) 31-45   Interactions Other (Comment)  (joceline)   Affect/Mood Wide   Goals Achieved Identified feelings;Identified relapse prevention strategies;Discussed self-esteem issues;Able to reflect/comment on own behavior;Able to engage in interactions;Able to listen to others;Able to manage/cope with feelings;Verbalized increased hopefulness;Able to self-disclose;Able to recieve feedback

## 2024-01-08 NOTE — NURSING NOTE
Pt anxious and grandiose with good appetite and steady gait.  Pt maintaining sling on right arm.  /57, HCTZ held in am.  Did not attend group.  Med-compliant.  Monitored for safety and support.

## 2024-01-08 NOTE — NURSING NOTE
"Patient approached nurse's station and asked to talk to this writer privately. He accused staff of stealing his sweater, stealing his bible, throwing puzzles away that he was working on, laughing and making fun of him, and throwing away papers that he had in his room. During room checks, patient was noted to have 2 bags full of miscellaneous papers and 11 books. Support and education provided. Patient stated that he doesn't feel safe here stating \"Oni is an asshole.\" Patient also reported that he's been calling the  to ask them to get him discharged. Patient encouraged to limit hoarding behaviors, keeping his room uncluttered, and to avoid calling the . Patient then requested a grievance but has not returned it at this time. Safety checks ongoing.   "

## 2024-01-08 NOTE — PLAN OF CARE
Pt attends group and participates.    Problem: Alteration in Orientation  Goal: Attend and participate in unit activities, including therapeutic, recreational, and educational groups  Description: Interventions:  - Provide therapeutic and educational activities daily, encourage attendance and participation, and document same in the medical record   - Provide appropriate opportunities for reminiscence   - Provide a consistent daily routine   - Encourage family contact/visitation   Outcome: Progressing

## 2024-01-08 NOTE — CASE MANAGEMENT
"YUE received phone call from the patient's significant other, Rhea Sesay [phone]. Rhea reported that the patient has been calling her demanding that she send him Chinese food, chicken wings, and a cake. CM reminded Rhea of the unit rules and Rhea reported \"he is irate with me when I tell him I cannot send food.\"   Rhea reports she wants the patient to come home once he is stabilized and understand it might be \"a couple months in the hospital.\" YUE reported that it is too early to anticipate discharge plans currently as the patient needs more stabilization and to be able to focus on his mental health. YUE and Rhea agreed to remain in touch about the patient's progress.   "

## 2024-01-08 NOTE — NURSING NOTE
Patient c/o 9/10 right arm pain. PRN Tylenol 975 administered at 2131. Patient's sling readjusted at this time.

## 2024-01-08 NOTE — PROGRESS NOTES
Progress Note - Behavioral Health   Sudhakar Choe 58 y.o. male MRN: [id number]  Unit/Bed#: [facility] 647-01 Encounter: [id number]    Patient was seen today for continuation of care, records reviewed and patient was discussed with the morning case review team.    Sudhakar was seen today for psychiatric follow-up.  On assessment today, Sudhakar was pleasant and superficially cooperative.  Seen in the hallway, patient is noted to be carrying several books and states that he is holding onto items as he believes staff is stealing from him.  Directly questioned about this belief, patient states that staff removed several papers from his room that he felt were mementos of his brother who passed.  Educated about unit rules, patient continues to be suspicious as well as paranoid and preoccupied with his arm which she states was broken by the police.  Invega Sustenna 234 mg given on 1/5/2024, 156 mg to be given on 1/10/2024. VPA level also obtained on 1/7/2024 at 76.  We will continue with current medications with no changes at this time.    Sudhakar denies acute suicidal/self-harm ideation/intent/plan upon direct inquiry at this time.  Sudhakar remains behaviorally appropriate, no agitation or aggression noted on exam or in report.  Sudhakar also denies HI/AH/VH, and does not appear overtly manic.   Impulse control is intact.  Sudhakar remains adherent to his current psychotropic medication regimen and denies any side effects from medications, as well as none noted on exam.    Vitals:  Vitals:    01/08/24 0741   BP: 107/57   Pulse: 92   Resp: 18   Temp: 97.8 °F (36.6 °C)   SpO2: 93%       Laboratory Results:  I have personally reviewed all pertinent laboratory/tests results.  Most Recent Labs:   Lab Results   Component Value Date    WBC 5.47 12/30/2023    RBC 4.12 12/30/2023    HGB 12.6 12/30/2023    HCT 38.3 12/30/2023     12/30/2023    RDW 13.2 12/30/2023    NEUTROABS 2.60 12/30/2023    SODIUM 137 01/03/2024    K 4.0 01/03/2024      01/03/2024    CO2 24 01/03/2024    BUN 8 01/03/2024    CREATININE 0.78 01/03/2024    GLUC 109 01/03/2024    GLUF 109 (H) 01/03/2024    CALCIUM 8.5 01/03/2024    AST 23 01/03/2024    ALT 18 01/03/2024    ALKPHOS 63 01/03/2024    TP 6.8 01/03/2024    ALB 3.8 01/03/2024    TBILI 0.33 01/03/2024    CHOLESTEROL 168 01/18/2021    HDL 41 01/18/2021    TRIG 93 01/18/2021    LDLCALC 108 (H) 01/18/2021    NONHDLC 127 01/18/2021    VALPROICTOT 76 01/07/2024    [id number] 1.431 12/30/2023    RPR Non-Reactive 03/26/2021    HGBA1C 5.9 (H) 10/09/2023     10/09/2023       Psychiatric Review of Systems:  Behavior over the last 24 hours:  unchanged.   Sleep: better  Appetite: good  Medication side effects: none  ROS: no complaints, denies shortness of breath or chest pain and all other systems are negative for acute changes    Mental Status Evaluation:  Appearance:  dressed appropriately   Behavior:  bizarre, guarded   Speech:  normal rate and volume   Mood:  irritable   Affect:  constricted   Thought Process:  tangential   Thought Content:  paranoid ideation, ruminating thoughts   Perceptual Disturbances: denies auditory hallucinations when asked, does not appear responding to internal stimuli   Risk Potential: Suicidal ideation - None  Homicidal ideation - None  Potential for aggression - No   Memory:  recent and remote memory grossly intact   Sensorium  person and place      Consciousness:  alert and awake   Attention: attention span and concentration are age appropriate   Insight:  limited   Judgment: limited   Gait/Station: normal gait/station   Motor Activity: no abnormal movements   Progress Toward Goals:   Sudhakar is progressing towards goals of inpatient psychiatric treatment by continued medication compliance and is attending therapeutic modalities on the milieu. However, the patient continues to require inpatient psychiatric hospitalization for continued medication management and titration to optimize symptom  reduction, improve sleep hygiene, and demonstrate adequate self-care.    Assessment/Plan   Principal Problem:    Bipolar affective disorder, current episode manic with psychotic symptoms (HCC)  Active Problems:    Benign prostatic hyperplasia    Brachial plexus injury, right, sequela    Medical clearance for psychiatric admission    Bilateral lower extremity edema      Recommended Treatment: Treatment plan and medication changes discussed and per the attending physician the plan is:    1.Continue with group therapy, milieu therapy and occupational therapy  2.Behavioral Health checks every 7 minutes  3.Continue frequent safety checks and vitals per unit protocol  4.Continue with SLIM medical management as indicated  5.Continue with current medication regimen  6.Will review labs in the a.m.  7.Disposition Planning: Discharge planning and efforts remain ongoing    Behavioral Health Medications: all current active meds have been reviewed and continue current psychiatric medications.  Current Facility-Administered Medications   Medication Dose Route Frequency Provider Last Rate    acetaminophen  650 mg Oral Q4H PRN Jenn Strickland, CRNP      acetaminophen  650 mg Oral Q4H PRN Jenn Strickland, BETO      acetaminophen  975 mg Oral Q6H PRN BETO Novak      cholecalciferol  2,000 Units Oral Daily Rossi Carlson PA-C      divalproex sodium  500 mg Oral Daily Rory Bunn MD      divalproex sodium  750 mg Oral HS Rory Bunn MD      hydrochlorothiazide  12.5 mg Oral Daily Rossi Carlson PA-C      hydrOXYzine HCL  25 mg Oral Q6H PRN Max 4/day BETO Novak      hydrOXYzine HCL  50 mg Oral Q6H PRN Max 4/day BETO Novak      influenza vaccine  0.5 mL Intramuscular Once BETO Shell      LORazepam  1 mg Intramuscular Q6H PRN Max 3/day BETO Novak      LORazepam  1 mg Oral Q6H PRN Max 3/day BETO Novak      melatonin  3 mg Oral HS Rory Bunn MD      mirtazapine   7.5 mg Oral HS PRN Rory Bunn MD      nicotine  1 patch Transdermal Daily PRN Fabiana Sousa MD      OLANZapine  5 mg Intramuscular Q3H PRN Max 3/day Jenn Strickland, BETO      OLANZapine  2.5 mg Oral Q4H PRN Max 6/day Jenn Strickland, CRNP      OLANZapine  5 mg Oral Q4H PRN Max 3/day Jenn Strickland, CRNP      OLANZapine  5 mg Oral Q3H PRN Max 3/day Jenn Strickland, BETO      paliperidone  12 mg Oral Daily Rory Bunn MD      [START ON 1/10/2024] paliperidone  156 mg IM- Deltoid Once Rory Bunn MD      paliperidone  234 mg IM- Deltoid Q4 weeks Rory Bunn MD      polyethylene glycol  17 g Oral Daily PRN Jenn Strickland, BETO      rOPINIRole  2 mg Oral HS Fabiana Sousa MD      senna-docusate sodium  1 tablet Oral Daily PRN BETO Novak      tamsulosin  0.4 mg Oral Daily With Dinner Rossi Carlson PA-C      traZODone  100 mg Oral HS Rory Bunn MD         Risks / Benefits of Treatment:  Risks, benefits, and possible side effects of medications explained to patient and patient verbalizes understanding and agreement for treatment.    Counseling / Coordination of Care:  Patient's progress reviewed with nursing staff.  Medications, treatment progress and treatment plan reviewed with patient.  Supportive counseling provided to the patient.    Total floor/unit time spent today 25 minutes. Greater than 50% of total time was spent with the patient and / or family counseling and / or coordination of care. A description of the counseling / coordination of care: medication education, treatment plan, supportive therapy.    This note was completed in part utilizing Dragon dictation Software. Grammatical, translation, syntax errors, random word insertions, spelling mistakes, and incomplete sentences may be an occasional consequence of this system secondary to software limitations with voice recognition, ambient noise, and hardware issues. If you have any questions or  concerns about the content, text, or information contained within the body of this dictation, please contact the provider for clarification

## 2024-01-08 NOTE — PLAN OF CARE
Problem: PSYCHOSIS  Goal: Will report no hallucinations or delusions  Description: Interventions:  - Administer medication as  ordered  - Every waking shifts and PRN assess for the presence of hallucinations and or delusions  - Assist with reality testing to support increasing orientation  - Assess if patient's hallucinations or delusions are encouraging self-harm or harm to others and intervene as appropriate  Outcome: Not Progressing     Problem: SLEEP DISTURBANCE  Goal: Will exhibit normal sleeping pattern  Description: Interventions:  -  Assess the patients sleep pattern, noting recent changes  - Administer medication as ordered  - Decrease environmental stimuli, including noise, as appropriate during the night  - Encourage the patient to actively participate in unit groups and or exercise during the day to enhance ability to achieve adequate sleep at night  - Assess the patient, in the morning, encouraging a description of sleep experience  Outcome: Progressing     Problem: Alteration in Orientation  Goal: Treatment Goal: Demonstrate a reduction of confusion and improved orientation to person, place, time and/or situation upon discharge, according to optimum baseline/ability  Outcome: Not Progressing  Goal: Interact with staff daily  Description: Interventions:  - Assess and re-assess patient's level of orientation  - Engage patient in 1 on 1 interactions, daily, for a minimum of 15 minutes   - Establish rapport/trust with patient   Outcome: Progressing  Goal: Express concerns related to confused thinking related to:  Description: Interventions:  - Encourage patient to express feelings, fears, frustrations, hopes  - Assign consistent caregivers   - [place]/re-orient patient as needed  - Allow comfort items, as appropriate  - Provide visual cues, signs, etc.   Outcome: Progressing  Goal: Allow medical examinations, as recommended  Description: Interventions:  - Provide physical/neurological exams and/or  referrals, per provider   Outcome: Progressing  Goal: Cooperate with recommended testing/procedures  Description: Interventions:  - Determine need for ancillary testing  - Observe for mental status changes  - Implement falls/precaution protocol   Outcome: Progressing  Goal: Complete daily ADLs, including personal hygiene independently, as able  Description: Interventions:  - Observe, teach, and assist patient with ADLS  Outcome: Progressing

## 2024-01-08 NOTE — TREATMENT TEAM
01/08/24 0803   Team Meeting   Meeting Type Daily Rounds   Initial Conference Date 01/08/24   Team Members Present   Team Members Present Physician;Nurse;;;Occupational Therapist   Physician Team Member Jenn Rene   Nursing Team Member Manas Torres   Care Management Team Member Rossi   Social Work Team Member Tammy   OT Team Member Curt   Patient/Family Present   Patient Present No   Patient's Family Present No     2nd dose of DE ANDA scheduled for tomorrow. Depakote level is 76. Patient is cooperative with care and medication compliant. Seems accusatory toward staff and care team. No pending d/c date at this time.

## 2024-01-09 PROCEDURE — 99232 SBSQ HOSP IP/OBS MODERATE 35: CPT | Performed by: STUDENT IN AN ORGANIZED HEALTH CARE EDUCATION/TRAINING PROGRAM

## 2024-01-09 RX ORDER — GABAPENTIN 300 MG/1
300 CAPSULE ORAL
Status: DISPENSED | OUTPATIENT
Start: 2024-01-09

## 2024-01-09 RX ADMIN — TRAZODONE HYDROCHLORIDE 100 MG: 100 TABLET ORAL at 21:15

## 2024-01-09 RX ADMIN — CHOLECALCIFEROL TAB 25 MCG (1000 UNIT) 2000 UNITS: 25 TAB at 08:37

## 2024-01-09 RX ADMIN — PALIPERIDONE 12 MG: 6 TABLET, EXTENDED RELEASE ORAL at 08:37

## 2024-01-09 RX ADMIN — MELATONIN TAB 3 MG 3 MG: 3 TAB at 21:15

## 2024-01-09 RX ADMIN — GABAPENTIN 300 MG: 300 CAPSULE ORAL at 21:15

## 2024-01-09 RX ADMIN — DIVALPROEX SODIUM 500 MG: 500 TABLET, EXTENDED RELEASE ORAL at 08:37

## 2024-01-09 RX ADMIN — TAMSULOSIN HYDROCHLORIDE 0.4 MG: 0.4 CAPSULE ORAL at 16:49

## 2024-01-09 RX ADMIN — DIVALPROEX SODIUM 750 MG: 250 TABLET, FILM COATED, EXTENDED RELEASE ORAL at 21:15

## 2024-01-09 NOTE — NURSING NOTE
Patient resting with eyes closed when approached for reassessment. PRN Tylenol appears to have been effective at this time.

## 2024-01-09 NOTE — TREATMENT TEAM
Pt attended Journaling and mindfulness group.  Disorganized and using arm sling to carry multiple papers and books.  Encouraged pt to wear correctly and not utilize as holding excess   papers.     01/09/24 1300   Activity/Group Checklist   Group Other (Comment)  (Journaling and mindfulness)   Attendance Attended   Attendance Duration (min) 46-60   Interactions Disorganized interaction   Affect/Mood Incongruent   Goals Achieved Identified feelings;Identified relapse prevention strategies;Able to reflect/comment on own behavior;Able to listen to others;Able to engage in interactions;Able to manage/cope with feelings;Verbalized increased hopefulness;Able to self-disclose;Discussed self-esteem issues

## 2024-01-09 NOTE — PLAN OF CARE
Problem: PSYCHOSIS  Goal: Will report no hallucinations or delusions  Description: Interventions:  - Administer medication as  ordered  - Every waking shifts and PRN assess for the presence of hallucinations and or delusions  - Assist with reality testing to support increasing orientation  - Assess if patient's hallucinations or delusions are encouraging self-harm or harm to others and intervene as appropriate  Outcome: Not Progressing     Problem: BEHAVIOR  Goal: Pt/Family maintain appropriate behavior and adhere to behavioral management agreement, if implemented  Description: INTERVENTIONS:  - Assess the family dynamic   - Encourage verbalization of thoughts and concerns in a socially appropriate manner  - Assess patient/family's coping skills and non-compliant behavior (including use of illegal substances).  - Utilize positive, consistent limit setting strategies supporting safety of patient, staff and others  - Initiate consult with Case Management, Spiritual Care or other ancillary services as appropriate  - If a patient's/visitor's behavior jeopardizes the safety of the patient, staff, or others, refer to organization procedure.   - Notify Security of behavior or suspected illegal substances which indicate the need for search of the patient and/or belongings  - Encourage participation in the decision making process about a behavioral management agreement; implement if patient meets criteria  Outcome: Progressing     Problem: SLEEP DISTURBANCE  Goal: Will exhibit normal sleeping pattern  Description: Interventions:  -  Assess the patients sleep pattern, noting recent changes  - Administer medication as ordered  - Decrease environmental stimuli, including noise, as appropriate during the night  - Encourage the patient to actively participate in unit groups and or exercise during the day to enhance ability to achieve adequate sleep at night  - Assess the patient, in the morning, encouraging a description of  sleep experience  Outcome: Progressing     Problem: Risk for Self Injury/Neglect  Goal: Treatment Goal: Remain safe during length of stay, learn and adopt new coping skills, and be free of self-injurious ideation, impulses and acts at the time of discharge  Outcome: Progressing  Goal: Verbalize thoughts and feelings  Description: Interventions:  - Assess and re-assess patient's lethality and potential for self-injury  - Engage patient in 1:1 interactions, daily, for a minimum of 15 minutes  - Encourage patient to express feelings, fears, frustrations, hopes  - Establish rapport/trust with patient   Outcome: Progressing  Goal: Refrain from harming self  Description: Interventions:  - Monitor patient closely, per order  - Develop a trusting relationship  - Supervise medication ingestion, monitor effects and side effects   Outcome: Progressing  Goal: Recognize maladaptive responses and adopt new coping mechanisms  Outcome: Not Progressing  Goal: Complete daily ADLs, including personal hygiene independently, as able  Description: Interventions:  - Observe, teach, and assist patient with ADLS  - Monitor and promote a balance of rest/activity, with adequate nutrition and elimination  Outcome: Progressing

## 2024-01-09 NOTE — NURSING NOTE
Patient visible on the unit sitting with peers in the dayroom or pacing in the halls, socializes with staff and peers when approached and to make needs known. Patient cooperative with assessment questions, currently denies depression, anxiety, SI/HI/AVH. Patient intermittently irritable throughout the shift, at times argumentative with staff and needing redirection. Patient noted to be using sling as a bag to carry books and papers, needing frequent reminders to use sling appropriately. Patient making grandiose remarks in the evening about knowing the president and winning the lottery, redirectable at this time. Compliant with medications and meals, appetite good. No further signs of distress noted.

## 2024-01-09 NOTE — NURSING NOTE
Pt less anxious and hyperverbal.  Med-compliant with good appetite and steady gait.  /65, HCTZ held this am.  Pt remains grandiose with no inappropriate sexual behavior.  Monitored for safety and support.

## 2024-01-09 NOTE — CASE MANAGEMENT
CM securely emailed the patient's [facility] and [facility] referral to Alhaji Pittman at [facility] and Comfort Flores at [facility].   CM will await a response on next steps.   [email or web address]  [email or web address]

## 2024-01-09 NOTE — CASE MANAGEMENT
CM called [facility] [phone] to see if the patient is currently still with the ACT team or if he was discharged. CM made aware that the patient was discharged from ACT services 12/14/23 due to non compliance with the program.

## 2024-01-09 NOTE — TREATMENT TEAM
01/09/24 0745   Team Meeting   Meeting Type Tx Team Meeting   Initial Conference Date 01/09/24   Team Members Present   Team Members Present Physician;Nurse;;;Occupational Therapist   Physician Team Member Dr. Bunn, Suzanna BETTS, Jenn PÉREZ   Nursing Team Member July Torres   Care Management Team Member Rossi   Social Work Team Member Tammy ROMAN Team Member Curt   Patient/Family Present   Patient Present No   Patient's Family Present No   Pharmacy: [facility]    Patient remains grandiose and irritable at times. Patient is disorganized. Patient is medication compliant. MD to adjust medications. Potential referral to return to [facility]. No pending d/c date at this time.

## 2024-01-09 NOTE — TREATMENT TEAM
Pt attempted  relapse prevention plan which pt handed document.  Crumpled paper and scribbled, illegible, checking off all items.  Disorganized.  Will continue to monitor for appropriateness of content and ability.      01/09/24 1500   Activity/Group Checklist   Group Admission/Discharge   Attendance Other (Comment)  (attempted)

## 2024-01-10 PROCEDURE — 99232 SBSQ HOSP IP/OBS MODERATE 35: CPT | Performed by: STUDENT IN AN ORGANIZED HEALTH CARE EDUCATION/TRAINING PROGRAM

## 2024-01-10 RX ADMIN — PALIPERIDONE PALMITATE 156 MG: 156 INJECTION INTRAMUSCULAR at 10:15

## 2024-01-10 RX ADMIN — PALIPERIDONE 12 MG: 6 TABLET, EXTENDED RELEASE ORAL at 08:12

## 2024-01-10 RX ADMIN — DIVALPROEX SODIUM 500 MG: 500 TABLET, EXTENDED RELEASE ORAL at 08:13

## 2024-01-10 RX ADMIN — DIVALPROEX SODIUM 750 MG: 250 TABLET, FILM COATED, EXTENDED RELEASE ORAL at 21:14

## 2024-01-10 RX ADMIN — TAMSULOSIN HYDROCHLORIDE 0.4 MG: 0.4 CAPSULE ORAL at 16:56

## 2024-01-10 RX ADMIN — GABAPENTIN 300 MG: 300 CAPSULE ORAL at 21:14

## 2024-01-10 RX ADMIN — MELATONIN TAB 3 MG 3 MG: 3 TAB at 21:14

## 2024-01-10 RX ADMIN — TRAZODONE HYDROCHLORIDE 100 MG: 100 TABLET ORAL at 21:14

## 2024-01-10 RX ADMIN — CHOLECALCIFEROL TAB 25 MCG (1000 UNIT) 2000 UNITS: 25 TAB at 08:13

## 2024-01-10 NOTE — PLAN OF CARE
Problem: PSYCHOSIS  Goal: Will report no hallucinations or delusions  Description: Interventions:  - Administer medication as  ordered  - Every waking shifts and PRN assess for the presence of hallucinations and or delusions  - Assist with reality testing to support increasing orientation  - Assess if patient's hallucinations or delusions are encouraging self-harm or harm to others and intervene as appropriate  Outcome: Progressing     Problem: SLEEP DISTURBANCE  Goal: Will exhibit normal sleeping pattern  Description: Interventions:  -  Assess the patients sleep pattern, noting recent changes  - Administer medication as ordered  - Decrease environmental stimuli, including noise, as appropriate during the night  - Encourage the patient to actively participate in unit groups and or exercise during the day to enhance ability to achieve adequate sleep at night  - Assess the patient, in the morning, encouraging a description of sleep experience  Outcome: Progressing     Problem: INVOLUNTARY ADMIT  Goal: Will cooperate with staff recommendations and doctor's orders and will demonstrate appropriate behavior  Description: INTERVENTIONS:  - Treat underlying conditions and offer medication as ordered  - Educate regarding involuntary admission procedures and rules  - Utilize positive consistent limit setting strategies to support patient and staff safety  Outcome: Progressing     Problem: Risk for Self Injury/Neglect  Goal: Treatment Goal: Remain safe during length of stay, learn and adopt new coping skills, and be free of self-injurious ideation, impulses and acts at the time of discharge  Outcome: Progressing  Goal: Verbalize thoughts and feelings  Description: Interventions:  - Assess and re-assess patient's lethality and potential for self-injury  - Engage patient in 1:1 interactions, daily, for a minimum of 15 minutes  - Encourage patient to express feelings, fears, frustrations, hopes  - Establish rapport/trust with  patient   Outcome: Progressing  Goal: Refrain from harming self  Description: Interventions:  - Monitor patient closely, per order  - Develop a trusting relationship  - Supervise medication ingestion, monitor effects and side effects   Outcome: Progressing  Goal: Attend and participate in unit activities, including therapeutic, recreational, and educational groups  Description: Interventions:  - Provide therapeutic and educational activities daily, encourage attendance and participation, and document same in the medical record  - Obtain collateral information, encourage visitation and family involvement in care   Outcome: Progressing  Goal: Recognize maladaptive responses and adopt new coping mechanisms  Outcome: Progressing  Goal: Complete daily ADLs, including personal hygiene independently, as able  Description: Interventions:  - Observe, teach, and assist patient with ADLS  - Monitor and promote a balance of rest/activity, with adequate nutrition and elimination  Outcome: Progressing     Problem: Alteration in Orientation  Goal: Treatment Goal: Demonstrate a reduction of confusion and improved orientation to person, place, time and/or situation upon discharge, according to optimum baseline/ability  Outcome: Progressing  Goal: Interact with staff daily  Description: Interventions:  - Assess and re-assess patient's level of orientation  - Engage patient in 1 on 1 interactions, daily, for a minimum of 15 minutes   - Establish rapport/trust with patient   Outcome: Progressing  Goal: Express concerns related to confused thinking related to:  Description: Interventions:  - Encourage patient to express feelings, fears, frustrations, hopes  - Assign consistent caregivers   - [place]/re-orient patient as needed  - Allow comfort items, as appropriate  - Provide visual cues, signs, etc.   Outcome: Progressing  Goal: Allow medical examinations, as recommended  Description: Interventions:  - Provide physical/neurological  exams and/or referrals, per provider   Outcome: Progressing  Goal: Cooperate with recommended testing/procedures  Description: Interventions:  - Determine need for ancillary testing  - Observe for mental status changes  - Implement falls/precaution protocol   Outcome: Progressing

## 2024-01-10 NOTE — NURSING NOTE
Patient is visible on the unit playing dominos with peers. He required redirection from accusing staff from stealing his belongings out of his room. Patient educated on minimizing clutter and hoarding multiple bottles of hygiene products. Patient endorses anxiety but denies depression, SI/HI/AVH. Medication compliant. Encouraged to inform staff of any needs or concerns.

## 2024-01-10 NOTE — PROGRESS NOTES
"  Progress Note - Behavioral Health   Sudhakar Choe 58 y.o. male MRN: [id number]  Unit/Bed#: [facility] 647-01 Encounter: [id number]       Patient was visited on unit for continuing care; chart reviewed and discussed with multidisciplinary treatment team. On approach, the patient was calm and guarded, revising his symptoms with poor insight and judgment into his mental illness.  Continues to report paranoid ideations towards the staff and his girlfriend.  He ruminates on going to [place] or [place] or \"anywhere in the world\".  Denied any changes in mood, appetite, and energy level. No problem initiating and maintaining sleep. Denied A/VH currently. Denied SI/HI, intent or plan upon direct inquiry at this time.    Patient continues to be interactive with staff and peers. No reports of aggression or self-injurious behavior on unit. No PRN medications used in the past 24 hours.    Patient accepted all offered medications and reported feeling better. No adverse effects of medications noted or reported.  Received Invega Sustenna 234 mg IM on 1/5/2024 and 150 mg IM is due tomorrow; to be continued on Invega Sustenna 234 mg IM every 4 weeks.  VPA level was 76 on 1/7/2024.  The patient benefits from referral to the [facility] for further psychiatric stabilization.    Current Mental Status Evaluation:  Appearance and attitude: appeared as stated age, casually dressed, with fair hygiene, right hand in sling, wearing eyeglasses  Eye contact: good  Motor Function: within normal limits, intact gait, No PMA/PMR  Gait/station: normal gait/station and normal balance  Speech: normal for rate, rhythm, volume, latency, amount and pressured at times  Language: No overt abnormality  Mood/affect: dysphoric / Affect was constricted but reactive, mood congruent  Thought Processes: tangential, ruminating  Thought content: denied suicidal ideations or homicidal ideations, paranoid ideation, grandiose ideas, ruminations  Associations: tangential " associations  Perceptual disturbances: denies Auditory/Visual/Tactile Hallucinations  Orientation: oriented to time, person, place and to the situational context  Cognitive Function: intact  Memory: recent and remote memory grossly intact  Intellect: average  Fund of knowledge: aware of current events, aware of past history, and vocabulary average  Impulse control: good  Insight/judgment: impaired/impaired    Vitals: WNL    Laboratory results: I have personally reviewed all pertinent laboratory/tests results    Results from the past 24 hours: No results found for this or any previous visit (from the past 24 hour(s)).    Progress Toward Goals: limited improvement    Assessment:  Principal Problem:    Bipolar affective disorder, current episode manic with psychotic symptoms (HCC)  Active Problems:    Benign prostatic hyperplasia    Brachial plexus injury, right, sequela    Medical clearance for psychiatric admission    Bilateral lower extremity edema        Plan:  - f/u [facility] recs regarding the medical problems  - Continue DE ANDA Invega Sustenna 234 mg IM every 4 weeks  - Continue medication titration and treatment plan; adjust medication to optimize treatment response and as clinically indicated.     Scheduled medications:  Current Facility-Administered Medications   Medication Dose Route Frequency Provider Last Rate    acetaminophen  650 mg Oral Q4H PRN BETO Novak      acetaminophen  650 mg Oral Q4H PRN BETO Novak      acetaminophen  975 mg Oral Q6H PRN BETO Novak      cholecalciferol  2,000 Units Oral Daily Rossi Carlson PA-C      divalproex sodium  500 mg Oral Daily Rory Bunn MD      divalproex sodium  750 mg Oral HS Rory Bunn MD      gabapentin  300 mg Oral HS Rory Bunn MD      hydrochlorothiazide  12.5 mg Oral Daily Rossi Carlson PA-C      hydrOXYzine HCL  25 mg Oral Q6H PRN Max 4/day BETO Novak      hydrOXYzine HCL  50 mg Oral Q6H PRN Max 4/day Jenn  Marco A, BETO      LORazepam  1 mg Intramuscular Q6H PRN Max 3/day Jenn Strickland, CRNP      LORazepam  1 mg Oral Q6H PRN Max 3/day Jenn Strickland, BETO      melatonin  3 mg Oral HS Rory Bunn MD      mirtazapine  7.5 mg Oral HS PRN Rory Bunn MD      nicotine  1 patch Transdermal Daily PRN Fabiana Sousa MD      OLANZapine  5 mg Intramuscular Q3H PRN Max 3/day Jenncampbell Strickland, CRNP      OLANZapine  2.5 mg Oral Q4H PRN Max 6/day Jenn Strickland, CRNP      OLANZapine  5 mg Oral Q4H PRN Max 3/day Jenn Strickland, CRNP      OLANZapine  5 mg Oral Q3H PRN Max 3/day Jenn Strickland, BETO      paliperidone  234 mg IM- Deltoid Q4 weeks Rory Bunn MD      polyethylene glycol  17 g Oral Daily PRN Jenn Strickland, BETO      senna-docusate sodium  1 tablet Oral Daily PRN Jenn Strickland, BETO      tamsulosin  0.4 mg Oral Daily With Dinner Rossi Carlson PA-C      traZODone  100 mg Oral HS Rory Bunn MD          PRN:    acetaminophen    acetaminophen    acetaminophen    hydrOXYzine HCL    hydrOXYzine HCL    LORazepam    LORazepam    mirtazapine    nicotine    OLANZapine    OLANZapine    OLANZapine    OLANZapine    polyethylene glycol    senna-docusate sodium    - Observation: routine    - VS: as per unit protocol  - Diet: Regular diet  - Psychoeducation (benefits and potential risks) discussed, importance of compliance with the psychiatric treatment reiterated, and the patient verbalized understanding of the matter  - Encourage group attendance and milieu therapy    - The pt was educated and agreed to verbalize any suicidal thoughts, frustrations or concerns to the nursing staff, immediately.    - Dispo: TBD       Next of Kin  Extended Emergency Contact Information  Primary Emergency Contact: Rhea Sesay  Mobile Phone: [phone]  Relation: Significant Other   needed? No  Secondary Emergency Contact: DONNIE CANELA  Mobile Phone: [phone]  Relation: Son      Counseling /  Coordination of Care  Patient's progress discussed with staff in treatment team meeting.  Medications, treatment progress and treatment plan reviewed with patient.  Medication changes discussed with patient.  Medication education provided to patient.  Supportive therapy provided to patient.  Cognitive techniques utilized during the session.  Reassurance and supportive therapy provided.  Reoriented to reality and reassured.  Encouraged participation in milieu and group therapy on the unit.  Crisis/safety plan discussed with patient.  Discharge plan discussed with patient.     Rory Bunn MD  Attending Psychiatrist   [facility]

## 2024-01-10 NOTE — TREATMENT TEAM
Pt attended all groups.  Pt grandiose and lacks insight.  Pt mentioned he went to beach with his ex and was with Xiomara from the [employer].  Pt attempted to share a positive story which he was speaking about pain and being in bed.  Another peer confronted pt on perspective.     01/10/24 1330   Activity/Group Checklist   Group Other (Comment)  (coummincation and boundaries)   Attendance Attended   Attendance Duration (min) 46-60   Interactions Disorganized interaction   Affect/Mood Wide   Goals Achieved Identified feelings;Able to listen to others;Increased hopefulness;Discussed coping strategies;Able to reflect/comment on own behavior;Able to engage in interactions;Verbalized increased hopefulness;Able to manage/cope with feelings;Able to self-disclose

## 2024-01-10 NOTE — NURSING NOTE
Patient visible on the unit pacing the halls or sitting with peers in the dayroom watching television or playing dominos. Patient brightens when approached by staff or peers, but notably irritable in conversation. Patient cooperative with assessment questions, denies depression, anxiety, SI/HI/AVH. Patient argumentative at times with staff about receiving extra snacks or proper sling usage, redirectable at this time. Compliant with medications and meals, appetite good. No further signs of distress noted.

## 2024-01-10 NOTE — PROGRESS NOTES
01/10/24 1100   Activity/Group Checklist   Group Exercise   Attendance Attended   Attendance Duration (min) 31-45   Interactions Other (Comment)  (moved as able within RUE limits. Grandiouse re: ex girlfriend was in a relationship with Fortunato Singh.)   Affect/Mood Wide   Goals Achieved Able to engage in interactions;Able to listen to others

## 2024-01-10 NOTE — NURSING NOTE
Pt less anxious with no agitation noted.  Pt continues with grandiose statements.  Good appetite and steady gait.  Maintaining right arm sling.  /65, HCTZ held in am.  Attended group.  Monitored for safety and support.

## 2024-01-10 NOTE — NURSING NOTE
"Patient is awake and sitting at the nurses' station. Patient encouraged to return to his room to get some rest. Patient states \" I'm not tired I feel like I slept for 12 hours already.\" Safety checks ongoing.  "

## 2024-01-10 NOTE — TREATMENT TEAM
01/10/24 0752   Team Meeting   Meeting Type Tx Team Meeting   Initial Conference Date 01/10/24   Team Members Present   Team Members Present Physician;Nurse;;;Occupational Therapist   Physician Team Member Suzanna Rene PA-C   Nursing Team Member July Torres   Care Management Team Member Rossi   Social Work Team Member Tammy   OT Team Member Curt   Patient/Family Present   Patient Present No   Patient's Family Present No     Patient will get IM Invega Sustenna today. Patient remains disorganized, hoarding things in his arm sling, on irritable edge at times. Patient is visible and social. CM submitted [facility] EAC referral to [facility], awaiting response. No d/c date pending.

## 2024-01-11 PROCEDURE — 99232 SBSQ HOSP IP/OBS MODERATE 35: CPT | Performed by: STUDENT IN AN ORGANIZED HEALTH CARE EDUCATION/TRAINING PROGRAM

## 2024-01-11 RX ORDER — HYDROCHLOROTHIAZIDE 12.5 MG/1
12.5 TABLET ORAL DAILY
Status: DISCONTINUED | OUTPATIENT
Start: 2024-01-12 | End: 2024-01-31

## 2024-01-11 RX ADMIN — DIVALPROEX SODIUM 750 MG: 250 TABLET, FILM COATED, EXTENDED RELEASE ORAL at 21:39

## 2024-01-11 RX ADMIN — GABAPENTIN 300 MG: 300 CAPSULE ORAL at 21:39

## 2024-01-11 RX ADMIN — TRAZODONE HYDROCHLORIDE 100 MG: 100 TABLET ORAL at 21:39

## 2024-01-11 RX ADMIN — MELATONIN TAB 3 MG 3 MG: 3 TAB at 21:39

## 2024-01-11 RX ADMIN — DIVALPROEX SODIUM 500 MG: 500 TABLET, EXTENDED RELEASE ORAL at 08:17

## 2024-01-11 RX ADMIN — ACETAMINOPHEN 650 MG: 325 TABLET ORAL at 08:52

## 2024-01-11 RX ADMIN — TAMSULOSIN HYDROCHLORIDE 0.4 MG: 0.4 CAPSULE ORAL at 17:11

## 2024-01-11 RX ADMIN — CHOLECALCIFEROL TAB 25 MCG (1000 UNIT) 2000 UNITS: 25 TAB at 08:17

## 2024-01-11 NOTE — PLAN OF CARE
Problem: PSYCHOSIS  Goal: Will report no hallucinations or delusions  Description: Interventions:  - Administer medication as  ordered  - Every waking shifts and PRN assess for the presence of hallucinations and or delusions  - Assist with reality testing to support increasing orientation  - Assess if patient's hallucinations or delusions are encouraging self-harm or harm to others and intervene as appropriate  Outcome: Progressing     Problem: INVOLUNTARY ADMIT  Goal: Will cooperate with staff recommendations and doctor's orders and will demonstrate appropriate behavior  Description: INTERVENTIONS:  - Treat underlying conditions and offer medication as ordered  - Educate regarding involuntary admission procedures and rules  - Utilize positive consistent limit setting strategies to support patient and staff safety  Outcome: Progressing     Problem: Risk for Self Injury/Neglect  Goal: Treatment Goal: Remain safe during length of stay, learn and adopt new coping skills, and be free of self-injurious ideation, impulses and acts at the time of discharge  Outcome: Progressing  Goal: Refrain from harming self  Description: Interventions:  - Monitor patient closely, per order  - Develop a trusting relationship  - Supervise medication ingestion, monitor effects and side effects   Outcome: Progressing     Problem: Alteration in Orientation  Goal: Treatment Goal: Demonstrate a reduction of confusion and improved orientation to person, place, time and/or situation upon discharge, according to optimum baseline/ability  Outcome: Progressing  Goal: Complete daily ADLs, including personal hygiene independently, as able  Description: Interventions:  - Observe, teach, and assist patient with ADLS  Outcome: Progressing     Problem: DISCHARGE PLANNING - CARE MANAGEMENT  Goal: Discharge to post-acute care or home with appropriate resources  Description: INTERVENTIONS:  - Conduct assessment to determine patient/family and health care  team treatment goals, and need for post-acute services based on payer coverage, community resources, and patient preferences, and barriers to discharge  - Address psychosocial, clinical, and financial barriers to discharge as identified in assessment in conjunction with the patient/family and health care team  - Arrange appropriate level of post-acute services according to patient’s   needs and preference and payer coverage in collaboration with the physician and health care team  - Communicate with and update the patient/family, physician, and health care team regarding progress on the discharge plan  - Arrange appropriate transportation to post-acute venues  Outcome: Progressing

## 2024-01-11 NOTE — PROGRESS NOTES
01/11/24 1330   Activity/Group Checklist   Group Self Esteem  (self discovery task.)   Attendance Attended   Attendance Duration (min) 46-60   Interactions Interacted appropriately  (Pt. able to make topic related coments and dislplay less grandiousity to this session.)   Affect/Mood Normal range   Goals Achieved Able to engage in interactions;Identified feelings;Discussed coping strategies

## 2024-01-11 NOTE — PROGRESS NOTES
"Progress Note - Behavioral Health   Sudhakar Choe 58 y.o. male MRN: [id number]  Unit/Bed#: [facility] 647-01 Encounter: [id number]    Patient was seen today for continuation of care, records reviewed and patient was discussed with the morning case review team.    Sudhakar was seen today for psychiatric follow-up.  On assessment today, Sudhakar was seen walking the hallways.  Tolerating his sling well, is preoccupied with being discharged to [facility] and states \" I don't want to be there for long\".  Educated about plan of care including need for supportive services, patient agrees that he is welcome to their services.  Less grandiose and irritable patient does continue to be disorganized and often illogical. 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  No changes in sleep or appetite reported.  Invega Sustenna 15 6 mg given on 1/10/2024, we will continue with Invega Sustenna 234 mg q. 4 weeks.  Discharge disposition ongoing as [facility] referral pending.    Sudhakar denies acute suicidal/self-harm ideation/intent/plan upon direct inquiry at this time.  Sudhakar remains behaviorally appropriate, no agitation or aggression noted on exam or in report.  Sudhakar also denies HI/AH/VH, and does not appear overtly manic.  No overt delusions or paranoia are verbalized. Impulse control is intact.  Sudhakar remains adherent to his current psychotropic medication regimen and denies any side effects from medications, as well as none noted on exam.    Vitals:  Vitals:    01/11/24 0738   BP: 103/62   Pulse: 76   Resp: 19   Temp: 97.7 °F (36.5 °C)   SpO2: 90%       Laboratory Results:  I have personally reviewed all pertinent laboratory/tests results.  Most Recent Labs:   Lab Results   Component Value Date    WBC 5.47 12/30/2023    RBC 4.12 12/30/2023    HGB 12.6 12/30/2023    HCT 38.3 12/30/2023     12/30/2023    RDW 13.2 12/30/2023    NEUTROABS 2.60 12/30/2023    SODIUM 137 01/03/2024    K 4.0 " 01/03/2024     01/03/2024    CO2 24 01/03/2024    BUN 8 01/03/2024    CREATININE 0.78 01/03/2024    GLUC 109 01/03/2024    GLUF 109 (H) 01/03/2024    CALCIUM 8.5 01/03/2024    AST 23 01/03/2024    ALT 18 01/03/2024    ALKPHOS 63 01/03/2024    TP 6.8 01/03/2024    ALB 3.8 01/03/2024    TBILI 0.33 01/03/2024    CHOLESTEROL 168 01/18/2021    HDL 41 01/18/2021    TRIG 93 01/18/2021    LDLCALC 108 (H) 01/18/2021    NONHDLC 127 01/18/2021    VALPROICTOT 76 01/07/2024    OUG5BJQQTKTO 1.431 12/30/2023    RPR Non-Reactive 03/26/2021    HGBA1C 5.9 (H) 10/09/2023     10/09/2023       Psychiatric Review of Systems:  Behavior over the last 24 hours:  unchanged.   Sleep: good  Appetite: good  Medication side effects: none  ROS: no complaints, denies shortness of breath or chest pain and all other systems are negative for acute changes    Mental Status Evaluation:  Appearance:  adequate grooming   Behavior:  bizarre   Speech:  normal rate and volume   Mood:  still at times irritable   Affect:  constricted   Thought Process:  disorganized, illogical, tangential   Thought Content:  paranoid ideation, grandiose   Perceptual Disturbances: denies auditory hallucinations when asked, does not appear responding to internal stimuli   Risk Potential: Suicidal ideation - None  Homicidal ideation - None  Potential for aggression - No   Memory:  recent and remote memory grossly intact   Sensorium  person, place, and time/date      Consciousness:  alert and awake   Attention: attention span and concentration are age appropriate   Insight:  poor   Judgment: partial   Gait/Station: normal gait/station   Motor Activity: no abnormal movements   Progress Toward Goals:   Sudhakar is progressing towards goals of inpatient psychiatric treatment by continued medication compliance and is attending therapeutic modalities on the milieu. However, the patient continues to require inpatient psychiatric hospitalization for continued medication  management and titration to optimize symptom reduction, improve sleep hygiene, and demonstrate adequate self-care.    Assessment/Plan   Principal Problem:    Bipolar affective disorder, current episode manic with psychotic symptoms (HCC)  Active Problems:    Benign prostatic hyperplasia    Brachial plexus injury, right, sequela    Medical clearance for psychiatric admission    Bilateral lower extremity edema      Recommended Treatment: Treatment plan and medication changes discussed and per the attending physician the plan is:    1.Continue with group therapy, milieu therapy and occupational therapy  2.Behavioral Health checks every 7 minutes  3.Continue frequent safety checks and vitals per unit protocol  4.Continue with [facility] medical management as indicated  5.Continue with current medication regimen  6.Will review labs in the a.m.  7.Disposition Planning: Discharge planning and efforts remain ongoing    Behavioral Health Medications: all current active meds have been reviewed and continue current psychiatric medications.  Current Facility-Administered Medications   Medication Dose Route Frequency Provider Last Rate    acetaminophen  650 mg Oral Q4H PRN Jenncampbell Strickland, CRNP      acetaminophen  650 mg Oral Q4H PRN Jenn Strickland, CRNP      acetaminophen  975 mg Oral Q6H PRN Jenn Strickland, LYLANP      cholecalciferol  2,000 Units Oral Daily Rossi Carlson PA-C      divalproex sodium  500 mg Oral Daily Rory Bunn MD      divalproex sodium  750 mg Oral HS Rory Bunn MD      gabapentin  300 mg Oral HS Rory Bunn MD      hydrochlorothiazide  12.5 mg Oral Daily Rossi Carlson PA-C      hydrOXYzine HCL  25 mg Oral Q6H PRN Max 4/day Jenn Strickland, LYLANP      hydrOXYzine HCL  50 mg Oral Q6H PRN Max 4/day Jenn Strickland, BETO      LORazepam  1 mg Intramuscular Q6H PRN Max 3/day Jenn Strickland, LYLANP      LORazepam  1 mg Oral Q6H PRN Max 3/day Jenn Strickland, BETO      melatonin  3 mg Oral HS Rory Bunn MD       mirtazapine  7.5 mg Oral HS PRN Rory Bunn MD      nicotine  1 patch Transdermal Daily PRN Fabiana Sousa MD      OLANZapine  5 mg Intramuscular Q3H PRN Max 3/day Jenn Strickland, CRNP      OLANZapine  2.5 mg Oral Q4H PRN Max 6/day eJnn Strickland, CRNP      OLANZapine  5 mg Oral Q4H PRN Max 3/day Jenn Strickland, CRNP      OLANZapine  5 mg Oral Q3H PRN Max 3/day Jenn Strickland, CRNP      paliperidone  234 mg IM- Deltoid Q4 weeks Rory Bunn MD      polyethylene glycol  17 g Oral Daily PRN Jenn Strickland, BETO      senna-docusate sodium  1 tablet Oral Daily PRN Jenn Strickland, BETO      tamsulosin  0.4 mg Oral Daily With Dinner Rossi Carlson PA-C      traZODone  100 mg Oral HS Rory Bunn MD         Risks / Benefits of Treatment:  Risks, benefits, and possible side effects of medications explained to patient and patient verbalizes understanding and agreement for treatment.    Counseling / Coordination of Care:  Patient's progress reviewed with nursing staff.  Medications, treatment progress and treatment plan reviewed with patient.  Supportive counseling provided to the patient.    Total floor/unit time spent today 25 minutes. Greater than 50% of total time was spent with the patient and / or family counseling and / or coordination of care. A description of the counseling / coordination of care: medication education, treatment plan, supportive therapy.    This note was completed in part utilizing Dragon dictation Software. Grammatical, translation, syntax errors, random word insertions, spelling mistakes, and incomplete sentences may be an occasional consequence of this system secondary to software limitations with voice recognition, ambient noise, and hardware issues. If you have any questions or concerns about the content, text, or information contained within the body of this dictation, please contact the provider for clarification

## 2024-01-11 NOTE — TREATMENT TEAM
01/11/24 0745   Team Meeting   Meeting Type Daily Rounds   Initial Conference Date 01/11/24   Team Members Present   Team Members Present Physician;Nurse;;;Occupational Therapist   Physician Team Member Jenn Rene   Nursing Team Member July Kenyon   Care Management Team Member Rossi   Social Work Team Member Tammy   OT Team Member Curt   Patient/Family Present   Patient Present No   Patient's Family Present No     Invega Sustenna DE ANDA given yesterday. Slept well, medication compliant. [facility]. Remains disorganized and accusatory of staff sometimes. Patient will have 304 hearing on Friday 1/19/24. No pending d/c date at this time.

## 2024-01-11 NOTE — NURSING NOTE
Pt less grandiose with less rambling.  Bp 103/62, hctz  held in am, DARNELL Pelayo ([facility]) notified.  Good appetite and steady gait.  Pt compliant with wearing right arm sling with reminders.  Attended group.  Monitored for safety and support.

## 2024-01-12 PROCEDURE — 99232 SBSQ HOSP IP/OBS MODERATE 35: CPT | Performed by: STUDENT IN AN ORGANIZED HEALTH CARE EDUCATION/TRAINING PROGRAM

## 2024-01-12 RX ADMIN — HYDROCHLOROTHIAZIDE 12.5 MG: 12.5 TABLET ORAL at 08:03

## 2024-01-12 RX ADMIN — TRAZODONE HYDROCHLORIDE 100 MG: 100 TABLET ORAL at 21:10

## 2024-01-12 RX ADMIN — DIVALPROEX SODIUM 750 MG: 250 TABLET, FILM COATED, EXTENDED RELEASE ORAL at 21:10

## 2024-01-12 RX ADMIN — GABAPENTIN 300 MG: 300 CAPSULE ORAL at 21:10

## 2024-01-12 RX ADMIN — POLYETHYLENE GLYCOL 3350 17 G: 17 POWDER, FOR SOLUTION ORAL at 21:10

## 2024-01-12 RX ADMIN — ACETAMINOPHEN 650 MG: 325 TABLET ORAL at 08:37

## 2024-01-12 RX ADMIN — CHOLECALCIFEROL TAB 25 MCG (1000 UNIT) 2000 UNITS: 25 TAB at 08:02

## 2024-01-12 RX ADMIN — TAMSULOSIN HYDROCHLORIDE 0.4 MG: 0.4 CAPSULE ORAL at 15:40

## 2024-01-12 RX ADMIN — MELATONIN TAB 3 MG 3 MG: 3 TAB at 21:10

## 2024-01-12 RX ADMIN — ACETAMINOPHEN 975 MG: 325 TABLET, FILM COATED ORAL at 15:39

## 2024-01-12 RX ADMIN — DIVALPROEX SODIUM 500 MG: 500 TABLET, EXTENDED RELEASE ORAL at 08:02

## 2024-01-12 NOTE — PLAN OF CARE
Problem: PSYCHOSIS  Goal: Will report no hallucinations or delusions  Description: Interventions:  - Administer medication as  ordered  - Every waking shifts and PRN assess for the presence of hallucinations and or delusions  - Assist with reality testing to support increasing orientation  - Assess if patient's hallucinations or delusions are encouraging self-harm or harm to others and intervene as appropriate  Outcome: Not Progressing     Problem: SLEEP DISTURBANCE  Goal: Will exhibit normal sleeping pattern  Description: Interventions:  -  Assess the patients sleep pattern, noting recent changes  - Administer medication as ordered  - Decrease environmental stimuli, including noise, as appropriate during the night  - Encourage the patient to actively participate in unit groups and or exercise during the day to enhance ability to achieve adequate sleep at night  - Assess the patient, in the morning, encouraging a description of sleep experience  Outcome: Progressing     Problem: Risk for Self Injury/Neglect  Goal: Verbalize thoughts and feelings  Description: Interventions:  - Assess and re-assess patient's lethality and potential for self-injury  - Engage patient in 1:1 interactions, daily, for a minimum of 15 minutes  - Encourage patient to express feelings, fears, frustrations, hopes  - Establish rapport/trust with patient   Outcome: Progressing  Goal: Complete daily ADLs, including personal hygiene independently, as able  Description: Interventions:  - Observe, teach, and assist patient with ADLS  - Monitor and promote a balance of rest/activity, with adequate nutrition and elimination  Outcome: Progressing     Problem: Alteration in Orientation  Goal: Treatment Goal: Demonstrate a reduction of confusion and improved orientation to person, place, time and/or situation upon discharge, according to optimum baseline/ability  Outcome: Not Progressing  Goal: Interact with staff daily  Description: Interventions:  -  Assess and re-assess patient's level of orientation  - Engage patient in 1 on 1 interactions, daily, for a minimum of 15 minutes   - Establish rapport/trust with patient   Outcome: Progressing

## 2024-01-12 NOTE — NURSING NOTE
Pt cooperative some grandiose behavior at times but easily redirected. Present in milieu interacting with peers. Appetite good. Medication compliant. Denies SI/HI.

## 2024-01-12 NOTE — NURSING NOTE
Pt anxious, grandiose and guarded.  Pt had multiple c/o, including not being permitted to [employer] newspapers for recipes and President Biden articles.  Pt Maintaining right arm sling in place.  Med-compliant.  Monitored for safety and support.

## 2024-01-12 NOTE — PROGRESS NOTES
01/12/24 1100 01/12/24 1300   Activity/Group Checklist   Group Community meeting  (topic:life advice.Pt.more focused and less grandiouse today.) Wellness  (breathing relaxation.)   Attendance Attended Attended  (joined group in progress.)   Attendance Duration (min) 31-45 0-15   Interactions Interacted appropriately Interacted appropriately   Affect/Mood Appropriate;Calm;Normal range Bright;Normal range;Appropriate   Goals Achieved Able to engage in interactions;Discussed coping strategies Able to engage in interactions;Able to listen to others;Discussed coping strategies
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
## 2024-01-12 NOTE — NURSING NOTE
Pt was on the unit reading the paper when he seen that Perry Morrow is coming to the [facility] tomorrow. Pt is now telling staff that Perry is coming to pick him up tomorrow and he is making a list of what needs to be done for Perry.
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
## 2024-01-12 NOTE — TREATMENT TEAM
01/12/24 0756   Team Meeting   Meeting Type Tx Team Meeting   Initial Conference Date 01/12/24   Team Members Present   Team Members Present Physician;Nurse;;;Occupational Therapist   Physician Team Member Jenn Rene   Nursing Team Member July Torres   Care Management Team Member Rossi   Social Work Team Member Tammy   OT Team Member Curt   Patient/Family Present   Patient Present No   Patient's Family Present No   Pharmacy: [facility]    Patient is politically preoccupied, but pleasant and calm. Cooperative with care and medication compliant. Pending placement into the [facility]. [place] [id number] hearing Friday 1/19/24. No d/c date pending at this time.

## 2024-01-13 PROCEDURE — 0241U HB NFCT DS VIR RESP RNA 4 TRGT: CPT | Performed by: INTERNAL MEDICINE

## 2024-01-13 PROCEDURE — 99232 SBSQ HOSP IP/OBS MODERATE 35: CPT | Performed by: STUDENT IN AN ORGANIZED HEALTH CARE EDUCATION/TRAINING PROGRAM

## 2024-01-13 RX ORDER — ACETAMINOPHEN 325 MG/1
650 TABLET ORAL EVERY 4 HOURS PRN
Status: DISPENSED | OUTPATIENT
Start: 2024-01-13

## 2024-01-13 RX ADMIN — TAMSULOSIN HYDROCHLORIDE 0.4 MG: 0.4 CAPSULE ORAL at 15:40

## 2024-01-13 RX ADMIN — CHOLECALCIFEROL TAB 25 MCG (1000 UNIT) 2000 UNITS: 25 TAB at 08:02

## 2024-01-13 RX ADMIN — TRAZODONE HYDROCHLORIDE 100 MG: 100 TABLET ORAL at 21:32

## 2024-01-13 RX ADMIN — DIVALPROEX SODIUM 500 MG: 500 TABLET, EXTENDED RELEASE ORAL at 08:02

## 2024-01-13 RX ADMIN — MELATONIN TAB 3 MG 3 MG: 3 TAB at 21:32

## 2024-01-13 RX ADMIN — ACETAMINOPHEN 975 MG: 325 TABLET, FILM COATED ORAL at 05:15

## 2024-01-13 RX ADMIN — POLYETHYLENE GLYCOL 3350 17 G: 17 POWDER, FOR SOLUTION ORAL at 05:19

## 2024-01-13 RX ADMIN — ACETAMINOPHEN 650 MG: 325 TABLET ORAL at 17:50

## 2024-01-13 RX ADMIN — HYDROCHLOROTHIAZIDE 12.5 MG: 12.5 TABLET ORAL at 08:02

## 2024-01-13 RX ADMIN — DIVALPROEX SODIUM 750 MG: 250 TABLET, FILM COATED, EXTENDED RELEASE ORAL at 21:32

## 2024-01-13 RX ADMIN — GABAPENTIN 300 MG: 300 CAPSULE ORAL at 21:32

## 2024-01-13 NOTE — PROGRESS NOTES
"  Progress Note - Behavioral Health   Sudhakar Choe 58 y.o. male MRN: [id number]  Unit/Bed#: [facility] 647-01 Encounter: [id number]       Patient was visited on unit for continuing care; chart reviewed and discussed with the nursing staff. On approach, the patient was calm and superficially cooperative and remains politically preoccupied.  He reported feeling upset as presidents Odalys did not come to pick him up from the hospital and stated: \"But he might have been very busy with the campaign.\" Denied any changes in mood, appetite, and energy level. No problem initiating and maintaining sleep. Denied A/VH currently. Denied SI/HI, intent or plan upon direct inquiry at this time.    Patient continues to be visible in the milieu and remains interactive with staff and peers but requires frequent redirection and reorientation by the staff.continues to carry old newspapers, books, magazines and his writings with him as he walks/paces the unit. No reports of aggression or self-injurious behavior on unit. No PRN medications used in the past 24 hours.    Patient accepted all offered medications and reported feeling better. No adverse effects of medications noted or reported. Received Invega Sustenna 234 mg IM on 1/5/24 and 156 mg IM on 1/10/24; to be continued on Invega Sustenna 234 mg IM every 4 weeks. VPA level was 76 on 1/7/24. Pending EAC referral for further stabilization. 304 documents submitted for the hearing next week.     Current Mental Status Evaluation:  Appearance and attitude: appeared as stated age, casually dressed, with fair hygiene  Eye contact: fair  Motor Function: within normal limits, intact gait, No PMA/PMR  Gait/station: normal gait/station and normal balance  Speech: normal for rate, rhythm, volume, latency, amount and less pressured speech  Language: No overt abnormality  Mood/affect:  \"good\"  / Affect was constricted but reactive, mood congruent  Thought Processes: illogical, circumstantial, " ruminating  Thought content: denied suicidal ideations or homicidal ideations, politically preoccupation, paranoid ideation, grandiose ideas  Associations: circumstantial associations  Perceptual disturbances: denies Auditory/Visual/Tactile Hallucinations  Orientation: oriented to time, person, place and to the situational context  Cognitive Function: intact  Memory: recent and remote memory grossly intact  Intellect: average  Fund of knowledge: aware of current events, aware of past history, and vocabulary average  Impulse control: good  Insight/judgment: limited/impaired    Vital signs in last 24 hours:    Temp:  [98.2 °F (36.8 °C)-99 °F (37.2 °C)] 98.9 °F (37.2 °C)  HR:  [81-88] 88  Resp:  [16] 16  BP: (100-111)/(60-68) 100/60    Laboratory results: I have personally reviewed all pertinent laboratory/tests results    Results from the past 24 hours: No results found for this or any previous visit (from the past 24 hour(s)).    Progress Toward Goals: slight improvement    Assessment:  Principal Problem:    Bipolar affective disorder, current episode manic with psychotic symptoms (HCC)  Active Problems:    Benign prostatic hyperplasia    Brachial plexus injury, right, sequela    Medical clearance for psychiatric admission    Bilateral lower extremity edema        Plan:  - f/u SLIM recs regarding the medical problems  - Continue DE ANDA Invega Sustenna 234 mg IM q4 weeks (next dose due on 2/2/24)  - Continue medication titration and treatment plan; adjust medication to optimize treatment response and as clinically indicated.     Scheduled medications:  Current Facility-Administered Medications   Medication Dose Route Frequency Provider Last Rate    acetaminophen  650 mg Oral Q4H PRN BETO Novak      acetaminophen  650 mg Oral Q4H PRN BETO Novak      acetaminophen  975 mg Oral Q6H PRN BETO Novak      cholecalciferol  2,000 Units Oral Daily Rossi Carlson PA-C      divalproex sodium  500 mg  Oral Daily Rory Bunn MD      divalproex sodium  750 mg Oral HS Rory Bunn MD      gabapentin  300 mg Oral HS Rory Bunn MD      hydrochlorothiazide  12.5 mg Oral Daily Dorene Pelayo, CRNP      hydrOXYzine HCL  25 mg Oral Q6H PRN Max 4/day Jenn Marco A, CRNP      hydrOXYzine HCL  50 mg Oral Q6H PRN Max 4/day Jenn Marco A, CRNP      LORazepam  1 mg Intramuscular Q6H PRN Max 3/day Jenn Marco A, CRNP      LORazepam  1 mg Oral Q6H PRN Max 3/day Jenn Marco A, CRNP      melatonin  3 mg Oral HS Rory Bunn MD      mirtazapine  7.5 mg Oral HS PRN Rory Bunn MD      nicotine  1 patch Transdermal Daily PRN Fabiana Sousa MD      OLANZapine  5 mg Intramuscular Q3H PRN Max 3/day Jennara Gonzalez, CRNP      OLANZapine  2.5 mg Oral Q4H PRN Max 6/day Jenn Marco A, CRNP      OLANZapine  5 mg Oral Q4H PRN Max 3/day Jennara Gonzalez, CRNP      OLANZapine  5 mg Oral Q3H PRN Max 3/day Jenn Marco A, CRNP      paliperidone  234 mg IM- Deltoid Q4 weeks Rory Bunn MD      polyethylene glycol  17 g Oral Daily PRN Jenn Marco A, CRNP      senna-docusate sodium  1 tablet Oral Daily PRN Jenncampbell Strickland, CRNP      tamsulosin  0.4 mg Oral Daily With Dinner Rossi Carlson PA-C      traZODone  100 mg Oral HS Rory Bunn MD          PRN:    acetaminophen    acetaminophen    acetaminophen    hydrOXYzine HCL    hydrOXYzine HCL    LORazepam    LORazepam    mirtazapine    nicotine    OLANZapine    OLANZapine    OLANZapine    OLANZapine    polyethylene glycol    senna-docusate sodium    - Observation: routine    - VS: as per unit protocol  - Diet: Regular diet  - Psychoeducation (benefits and potential risks) discussed, importance of compliance with the psychiatric treatment reiterated, and the patient verbalized understanding of the matter  - Encourage group attendance and milieu therapy    - The pt was educated and agreed to verbalize any suicidal thoughts, frustrations or concerns to  the nursing staff, immediately.    - Dispo: EAC      Next of Kin  Extended Emergency Contact Information  Primary Emergency Contact: Rhea Sesay  Mobile Phone: [phone]  Relation: Significant Other   needed? No  Secondary Emergency Contact: DONNIE CANELA  Mobile Phone: [phone]  Relation: Son      Counseling / Coordination of Care    Patient's progress reviewed with nursing staff.  Medications, treatment progress and treatment plan reviewed with patient.  Medication education provided to patient.  Reassurance and supportive therapy provided.  Reoriented to reality and reassured.  Encouraged participation in milieu and group therapy on the unit.     Rory Bunn MD  Attending Psychiatrist   [facility]

## 2024-01-13 NOTE — PLAN OF CARE
Problem: PSYCHOSIS  Goal: Will report no hallucinations or delusions  Description: Interventions:  - Administer medication as  ordered  - Every waking shifts and PRN assess for the presence of hallucinations and or delusions  - Assist with reality testing to support increasing orientation  - Assess if patient's hallucinations or delusions are encouraging self-harm or harm to others and intervene as appropriate  Outcome: Progressing     Problem: BEHAVIOR  Goal: Pt/Family maintain appropriate behavior and adhere to behavioral management agreement, if implemented  Description: INTERVENTIONS:  - Assess the family dynamic   - Encourage verbalization of thoughts and concerns in a socially appropriate manner  - Assess patient/family's coping skills and non-compliant behavior (including use of illegal substances).  - Utilize positive, consistent limit setting strategies supporting safety of patient, staff and others  - Initiate consult with Case Management, Spiritual Care or other ancillary services as appropriate  - If a patient's/visitor's behavior jeopardizes the safety of the patient, staff, or others, refer to organization procedure.   - Notify Security of behavior or suspected illegal substances which indicate the need for search of the patient and/or belongings  - Encourage participation in the decision making process about a behavioral management agreement; implement if patient meets criteria  Outcome: Progressing     Problem: SLEEP DISTURBANCE  Goal: Will exhibit normal sleeping pattern  Description: Interventions:  -  Assess the patients sleep pattern, noting recent changes  - Administer medication as ordered  - Decrease environmental stimuli, including noise, as appropriate during the night  - Encourage the patient to actively participate in unit groups and or exercise during the day to enhance ability to achieve adequate sleep at night  - Assess the patient, in the morning, encouraging a description of sleep  experience  Outcome: Progressing     Problem: INVOLUNTARY ADMIT  Goal: Will cooperate with staff recommendations and doctor's orders and will demonstrate appropriate behavior  Description: INTERVENTIONS:  - Treat underlying conditions and offer medication as ordered  - Educate regarding involuntary admission procedures and rules  - Utilize positive consistent limit setting strategies to support patient and staff safety  Outcome: Progressing     Problem: Risk for Self Injury/Neglect  Goal: Treatment Goal: Remain safe during length of stay, learn and adopt new coping skills, and be free of self-injurious ideation, impulses and acts at the time of discharge  Outcome: Progressing  Goal: Verbalize thoughts and feelings  Description: Interventions:  - Assess and re-assess patient's lethality and potential for self-injury  - Engage patient in 1:1 interactions, daily, for a minimum of 15 minutes  - Encourage patient to express feelings, fears, frustrations, hopes  - Establish rapport/trust with patient   Outcome: Progressing  Goal: Refrain from harming self  Description: Interventions:  - Monitor patient closely, per order  - Develop a trusting relationship  - Supervise medication ingestion, monitor effects and side effects   Outcome: Progressing  Goal: Attend and participate in unit activities, including therapeutic, recreational, and educational groups  Description: Interventions:  - Provide therapeutic and educational activities daily, encourage attendance and participation, and document same in the medical record  - Obtain collateral information, encourage visitation and family involvement in care   Outcome: Progressing  Goal: Recognize maladaptive responses and adopt new coping mechanisms  Outcome: Progressing  Goal: Complete daily ADLs, including personal hygiene independently, as able  Description: Interventions:  - Observe, teach, and assist patient with ADLS  - Monitor and promote a balance of rest/activity, with  adequate nutrition and elimination  Outcome: Progressing     Problem: Alteration in Orientation  Goal: Treatment Goal: Demonstrate a reduction of confusion and improved orientation to person, place, time and/or situation upon discharge, according to optimum baseline/ability  Outcome: Progressing  Goal: Interact with staff daily  Description: Interventions:  - Assess and re-assess patient's level of orientation  - Engage patient in 1 on 1 interactions, daily, for a minimum of 15 minutes   - Establish rapport/trust with patient   Outcome: Progressing  Goal: Express concerns related to confused thinking related to:  Description: Interventions:  - Encourage patient to express feelings, fears, frustrations, hopes  - Assign consistent caregivers   - Lynd/re-orient patient as needed  - Allow comfort items, as appropriate  - Provide visual cues, signs, etc.   Outcome: Progressing  Goal: Allow medical examinations, as recommended  Description: Interventions:  - Provide physical/neurological exams and/or referrals, per provider   Outcome: Progressing  Goal: Cooperate with recommended testing/procedures  Description: Interventions:  - Determine need for ancillary testing  - Observe for mental status changes  - Implement falls/precaution protocol   Outcome: Progressing  Goal: Attend and participate in unit activities, including therapeutic, recreational, and educational groups  Description: Interventions:  - Provide therapeutic and educational activities daily, encourage attendance and participation, and document same in the medical record   - Provide appropriate opportunities for reminiscence   - Provide a consistent daily routine   - Encourage family contact/visitation   Outcome: Progressing  Goal: Complete daily ADLs, including personal hygiene independently, as able  Description: Interventions:  - Observe, teach, and assist patient with ADLS  Outcome: Progressing     Problem: DISCHARGE PLANNING - CARE MANAGEMENT  Goal:  Discharge to post-acute care or home with appropriate resources  Description: INTERVENTIONS:  - Conduct assessment to determine patient/family and health care team treatment goals, and need for post-acute services based on payer coverage, community resources, and patient preferences, and barriers to discharge  - Address psychosocial, clinical, and financial barriers to discharge as identified in assessment in conjunction with the patient/family and health care team  - Arrange appropriate level of post-acute services according to patient’s   needs and preference and payer coverage in collaboration with the physician and health care team  - Communicate with and update the patient/family, physician, and health care team regarding progress on the discharge plan  - Arrange appropriate transportation to post-acute venues  Outcome: Progressing

## 2024-01-13 NOTE — NURSING NOTE
Patient c/o 8/10 right glute pain and constipation. PRN Tylenol 975 mg administered at 0515. PRN Miralax administered at 0519.

## 2024-01-13 NOTE — NURSING NOTE
Pt guarded yet cooperative. Requesting assistive devices for ambulation pt evaluation ordered. Multiple phone calls received from his girlfriend. Appetite good. Medication compliant. Denies SI/HI.

## 2024-01-13 NOTE — NURSING NOTE
Patient is medication and meal compliant. No complaints of pain or discomfort. Patient denies depression or anxiety. Patient also denies SI, AH or VH. Patient was admitted for delusional thoughts regarding President Odalys.  Patient is visible and social with peers on the unit. Patient still carries old newspapers, books, magazines and his writings with him as he walks/paces the unit. Patient also continues with labile speech and rambling thought patterns. Will continue to monitor patient for changes in mood or behavior.

## 2024-01-14 PROCEDURE — 99232 SBSQ HOSP IP/OBS MODERATE 35: CPT | Performed by: STUDENT IN AN ORGANIZED HEALTH CARE EDUCATION/TRAINING PROGRAM

## 2024-01-14 RX ADMIN — DIVALPROEX SODIUM 750 MG: 250 TABLET, FILM COATED, EXTENDED RELEASE ORAL at 21:06

## 2024-01-14 RX ADMIN — GABAPENTIN 300 MG: 300 CAPSULE ORAL at 21:05

## 2024-01-14 RX ADMIN — DIVALPROEX SODIUM 500 MG: 500 TABLET, EXTENDED RELEASE ORAL at 08:05

## 2024-01-14 RX ADMIN — CHOLECALCIFEROL TAB 25 MCG (1000 UNIT) 2000 UNITS: 25 TAB at 08:05

## 2024-01-14 RX ADMIN — MELATONIN TAB 3 MG 3 MG: 3 TAB at 21:06

## 2024-01-14 RX ADMIN — TAMSULOSIN HYDROCHLORIDE 0.4 MG: 0.4 CAPSULE ORAL at 15:30

## 2024-01-14 RX ADMIN — TRAZODONE HYDROCHLORIDE 100 MG: 100 TABLET ORAL at 21:06

## 2024-01-14 NOTE — NURSING NOTE
Patient is medication and meal compliant. No complaint of pain or discomfort. Patient denies depression or anxiety. Patient also denies SI, AH or VH. Patient is currently COVID (+) and is mostly staying in his room. Patient is compliant with masking when he comes out. Will continue to monitor patient for changes in mood or behavior.

## 2024-01-14 NOTE — PROGRESS NOTES
Progress Note - Behavioral Health   Sudhakar Choe 58 y.o. male MRN: [id number]  Unit/Bed#: [facility] 647-01 Encounter: [id number]       Patient was visited on unit for continuing care; chart reviewed and discussed with the nursing staff. On approach, the patient was calm and superficially cooperative.  Remains politically preoccupied.  He noted that he does not want to deal with the president anymore and was upset yesterday that he did not come to pick him up from the hospital.  Remains paranoid at times, asking if everything is okay with the court hearing. Denied any changes in mood, appetite, and energy level. No problem initiating and maintaining sleep. Denied A/VH currently. Denied SI/HI, intent or plan upon direct inquiry at this time.    Patient continues to be visible in the milieu and remains interactive with staff and peers but requires frequent redirection and reorientation by the staff.continues to bruno old newspapers and his writings with him in his sling as he walks/paces the unit and requires frequent redirections to use the facemask properly as he is on airborne precautions for testing positive for COVID-19.  No reports of aggression or self-injurious behavior on unit. No PRN medications used in the past 24 hours.    Patient accepted all offered medications and reported feeling better. No adverse effects of medications noted or reported.  Received Invega Sustenna 234 mg IM on 1/5/24 and 156 mg IM on 1/10/24; to be continued on Invega Sustenna 234 mg IM every 4 weeks. VPA level was 76 on 1/7/24. Pending EAC referral for further stabilization. 304 documents submitted for the hearing next week.       Current Mental Status Evaluation:  Appearance and attitude: appeared as stated age, wearing a facemask, with good hygiene  Eye contact: good  Motor Function: within normal limits, intact gait, No PMA/PMR  Gait/station: normal gait/station and normal balance  Speech: normal for rate, rhythm, volume,  "latency, amount  Language: No overt abnormality  Mood/affect:  \"good\"  / Affect was constricted but reactive, mood congruent  Thought Processes: ruminating  Thought content: denied suicidal ideations or homicidal ideations, politically preoccupation, some paranoia, ruminating thoughts  Associations: tangential associations, concrete associations, perseverative  Perceptual disturbances: denies Auditory/Visual/Tactile Hallucinations  Orientation: oriented to time, person, place and to the situational context  Cognitive Function: intact  Memory: recent and remote memory grossly intact  Intellect: average  Fund of knowledge: aware of current events, aware of past history, and vocabulary average  Impulse control: good  Insight/judgment: limited/limited    Vital signs in last 24 hours:    Temp:  [97.9 °F (36.6 °C)-100.8 °F (38.2 °C)] 97.9 °F (36.6 °C)  HR:  [] 83  Resp:  [16-18] 16  BP: ()/(52-66) 95/66    Laboratory results: I have personally reviewed all pertinent laboratory/tests results    Results from the past 24 hours:   Recent Results (from the past 24 hour(s))   COVID/FLU/RSV    Collection Time: 01/13/24  5:39 PM    Specimen: Nose; Nares   Result Value Ref Range    SARS-CoV-2 Positive (A) Negative    INFLUENZA A PCR Negative Negative    INFLUENZA B PCR Negative Negative    RSV PCR Negative Negative       Progress Toward Goals: limited improvement    Assessment:  Principal Problem:    Bipolar affective disorder, current episode manic with psychotic symptoms (HCC)  Active Problems:    Benign prostatic hyperplasia    Brachial plexus injury, right, sequela    Medical clearance for psychiatric admission    Bilateral lower extremity edema        Plan:  - f/u SLIM recs regarding the medical problems  - Continue DE ANDA Invega Sustenna 234 mg IM q4 weeks (next dose due on 2/2/24)  - Continue medication titration and treatment plan; adjust medication to optimize treatment response and as clinically indicated. "     Scheduled medications:  Current Facility-Administered Medications   Medication Dose Route Frequency Provider Last Rate    acetaminophen  650 mg Oral Q4H PRN Jnen Strickland, CRNP      acetaminophen  650 mg Oral Q4H PRN Reji Looney DO      acetaminophen  975 mg Oral Q6H PRN Jenn Strickland, CRNP      cholecalciferol  2,000 Units Oral Daily Rossi Carlson PA-C      divalproex sodium  500 mg Oral Daily Rory Bunn MD      divalproex sodium  750 mg Oral HS Rory Bunn MD      gabapentin  300 mg Oral HS Rory Bunn MD      hydrochlorothiazide  12.5 mg Oral Daily Dorene Rodrigueztremayne, LYLANP      hydrOXYzine HCL  25 mg Oral Q6H PRN Max 4/day Jenn Strickland, CRNP      hydrOXYzine HCL  50 mg Oral Q6H PRN Max 4/day Jenn Strickland, LYLANP      LORazepam  1 mg Intramuscular Q6H PRN Max 3/day Jenn Strickland, CRNP      LORazepam  1 mg Oral Q6H PRN Max 3/day Jenn Strickland, LYLANP      melatonin  3 mg Oral HS Rory Bunn MD      mirtazapine  7.5 mg Oral HS PRN Rory Bunn MD      nicotine  1 patch Transdermal Daily PRN Fabiana Sousa MD      OLANZapine  5 mg Intramuscular Q3H PRN Max 3/day Jenn Strickland, CRNP      OLANZapine  2.5 mg Oral Q4H PRN Max 6/day Jenn Strickland, CRNP      OLANZapine  5 mg Oral Q4H PRN Max 3/day Jenn Strickland, CRNP      OLANZapine  5 mg Oral Q3H PRN Max 3/day Jenn Strickland, CRNP      paliperidone  234 mg IM- Deltoid Q4 weeks Rory Bunn MD      polyethylene glycol  17 g Oral Daily PRN Jenn Strickland, BETO      senna-docusate sodium  1 tablet Oral Daily PRN Jenn Strickland, CRNP      tamsulosin  0.4 mg Oral Daily With Dinner Rossi Carlson PA-C      traZODone  100 mg Oral HS Rory Bunn MD          PRN:    acetaminophen    acetaminophen    acetaminophen    hydrOXYzine HCL    hydrOXYzine HCL    LORazepam    LORazepam    mirtazapine    nicotine    OLANZapine    OLANZapine    OLANZapine    OLANZapine    polyethylene glycol    senna-docusate sodium    -  Observation: routine    - VS: as per unit protocol  - Diet: Regular diet  - Psychoeducation (benefits and potential risks) discussed, importance of compliance with the psychiatric treatment reiterated, and the patient verbalized understanding of the matter  - Encourage group attendance and milieu therapy    - The pt was educated and agreed to verbalize any suicidal thoughts, frustrations or concerns to the nursing staff, immediately.    - Dispo: EAC      Next of Kin  Extended Emergency Contact Information  Primary Emergency Contact: Rhea Sesay  Mobile Phone: [phone]  Relation: Significant Other   needed? No  Secondary Emergency Contact: DONNIE CANELA  Mobile Phone: [phone]  Relation: Son      Counseling / Coordination of Care    Patient's progress reviewed with nursing staff.  Medications, treatment progress and treatment plan reviewed with patient.  Medication education provided to patient.  Reassurance and supportive therapy provided.  Reoriented to reality and reassured.  Encouraged participation in milieu and group therapy on the unit.     Rory Bunn MD  Attending Psychiatrist   [facility]

## 2024-01-14 NOTE — PLAN OF CARE
Problem: PSYCHOSIS  Goal: Will report no hallucinations or delusions  Description: Interventions:  - Administer medication as  ordered  - Every waking shifts and PRN assess for the presence of hallucinations and or delusions  - Assist with reality testing to support increasing orientation  - Assess if patient's hallucinations or delusions are encouraging self-harm or harm to others and intervene as appropriate  Outcome: Progressing     Problem: BEHAVIOR  Goal: Pt/Family maintain appropriate behavior and adhere to behavioral management agreement, if implemented  Description: INTERVENTIONS:  - Assess the family dynamic   - Encourage verbalization of thoughts and concerns in a socially appropriate manner  - Assess patient/family's coping skills and non-compliant behavior (including use of illegal substances).  - Utilize positive, consistent limit setting strategies supporting safety of patient, staff and others  - Initiate consult with Case Management, Spiritual Care or other ancillary services as appropriate  - If a patient's/visitor's behavior jeopardizes the safety of the patient, staff, or others, refer to organization procedure.   - Notify Security of behavior or suspected illegal substances which indicate the need for search of the patient and/or belongings  - Encourage participation in the decision making process about a behavioral management agreement; implement if patient meets criteria  Outcome: Progressing     Problem: SLEEP DISTURBANCE  Goal: Will exhibit normal sleeping pattern  Description: Interventions:  -  Assess the patients sleep pattern, noting recent changes  - Administer medication as ordered  - Decrease environmental stimuli, including noise, as appropriate during the night  - Encourage the patient to actively participate in unit groups and or exercise during the day to enhance ability to achieve adequate sleep at night  - Assess the patient, in the morning, encouraging a description of sleep  experience  Outcome: Progressing     Problem: INVOLUNTARY ADMIT  Goal: Will cooperate with staff recommendations and doctor's orders and will demonstrate appropriate behavior  Description: INTERVENTIONS:  - Treat underlying conditions and offer medication as ordered  - Educate regarding involuntary admission procedures and rules  - Utilize positive consistent limit setting strategies to support patient and staff safety  Outcome: Progressing     Problem: Risk for Self Injury/Neglect  Goal: Treatment Goal: Remain safe during length of stay, learn and adopt new coping skills, and be free of self-injurious ideation, impulses and acts at the time of discharge  Outcome: Progressing  Goal: Verbalize thoughts and feelings  Description: Interventions:  - Assess and re-assess patient's lethality and potential for self-injury  - Engage patient in 1:1 interactions, daily, for a minimum of 15 minutes  - Encourage patient to express feelings, fears, frustrations, hopes  - Establish rapport/trust with patient   Outcome: Progressing  Goal: Refrain from harming self  Description: Interventions:  - Monitor patient closely, per order  - Develop a trusting relationship  - Supervise medication ingestion, monitor effects and side effects   Outcome: Progressing  Goal: Attend and participate in unit activities, including therapeutic, recreational, and educational groups  Description: Interventions:  - Provide therapeutic and educational activities daily, encourage attendance and participation, and document same in the medical record  - Obtain collateral information, encourage visitation and family involvement in care   Outcome: Progressing  Goal: Recognize maladaptive responses and adopt new coping mechanisms  Outcome: Progressing  Goal: Complete daily ADLs, including personal hygiene independently, as able  Description: Interventions:  - Observe, teach, and assist patient with ADLS  - Monitor and promote a balance of rest/activity, with  adequate nutrition and elimination  Outcome: Progressing     Problem: Alteration in Orientation  Goal: Treatment Goal: Demonstrate a reduction of confusion and improved orientation to person, place, time and/or situation upon discharge, according to optimum baseline/ability  Outcome: Progressing  Goal: Interact with staff daily  Description: Interventions:  - Assess and re-assess patient's level of orientation  - Engage patient in 1 on 1 interactions, daily, for a minimum of 15 minutes   - Establish rapport/trust with patient   Outcome: Progressing  Goal: Express concerns related to confused thinking related to:  Description: Interventions:  - Encourage patient to express feelings, fears, frustrations, hopes  - Assign consistent caregivers   - Welch/re-orient patient as needed  - Allow comfort items, as appropriate  - Provide visual cues, signs, etc.   Outcome: Progressing  Goal: Allow medical examinations, as recommended  Description: Interventions:  - Provide physical/neurological exams and/or referrals, per provider   Outcome: Progressing  Goal: Cooperate with recommended testing/procedures  Description: Interventions:  - Determine need for ancillary testing  - Observe for mental status changes  - Implement falls/precaution protocol   Outcome: Progressing  Goal: Attend and participate in unit activities, including therapeutic, recreational, and educational groups  Description: Interventions:  - Provide therapeutic and educational activities daily, encourage attendance and participation, and document same in the medical record   - Provide appropriate opportunities for reminiscence   - Provide a consistent daily routine   - Encourage family contact/visitation   Outcome: Progressing  Goal: Complete daily ADLs, including personal hygiene independently, as able  Description: Interventions:  - Observe, teach, and assist patient with ADLS  Outcome: Progressing     Problem: DISCHARGE PLANNING - CARE MANAGEMENT  Goal:  Discharge to post-acute care or home with appropriate resources  Description: INTERVENTIONS:  - Conduct assessment to determine patient/family and health care team treatment goals, and need for post-acute services based on payer coverage, community resources, and patient preferences, and barriers to discharge  - Address psychosocial, clinical, and financial barriers to discharge as identified in assessment in conjunction with the patient/family and health care team  - Arrange appropriate level of post-acute services according to patient’s   needs and preference and payer coverage in collaboration with the physician and health care team  - Communicate with and update the patient/family, physician, and health care team regarding progress on the discharge plan  - Arrange appropriate transportation to post-acute venues  Outcome: Progressing

## 2024-01-15 PROCEDURE — 99232 SBSQ HOSP IP/OBS MODERATE 35: CPT | Performed by: STUDENT IN AN ORGANIZED HEALTH CARE EDUCATION/TRAINING PROGRAM

## 2024-01-15 RX ADMIN — GABAPENTIN 300 MG: 300 CAPSULE ORAL at 21:12

## 2024-01-15 RX ADMIN — HYDROCHLOROTHIAZIDE 12.5 MG: 12.5 TABLET ORAL at 08:04

## 2024-01-15 RX ADMIN — MELATONIN TAB 3 MG 3 MG: 3 TAB at 21:12

## 2024-01-15 RX ADMIN — TRAZODONE HYDROCHLORIDE 100 MG: 100 TABLET ORAL at 21:12

## 2024-01-15 RX ADMIN — DIVALPROEX SODIUM 750 MG: 250 TABLET, FILM COATED, EXTENDED RELEASE ORAL at 21:12

## 2024-01-15 RX ADMIN — TAMSULOSIN HYDROCHLORIDE 0.4 MG: 0.4 CAPSULE ORAL at 15:31

## 2024-01-15 RX ADMIN — DIVALPROEX SODIUM 500 MG: 500 TABLET, EXTENDED RELEASE ORAL at 08:04

## 2024-01-15 RX ADMIN — CHOLECALCIFEROL TAB 25 MCG (1000 UNIT) 2000 UNITS: 25 TAB at 08:04

## 2024-01-15 NOTE — NURSING NOTE
"Patient is medication and meal compliant. No complaints of pain or discomfort. Patient is visible on the unit, pacing the hallways, labile and will interact with anyone who will listen to him. Patient continues with delusions of grandeur believing he is a \"friend of Perry Morrow\" and has important things to do. Patient denies depression or anxiety. Patient also denies SI, AH or VH. Will continue to monitor patient for changes in mood or behavior.  "

## 2024-01-15 NOTE — TREATMENT TEAM
01/15/24 0732   Team Meeting   Meeting Type Daily Rounds   Initial Conference Date 01/15/24   Team Members Present   Team Members Present Physician;Nurse;;;Occupational Therapist   Physician Team Member Jenn Rene   Nursing Team Member Manas Torres   Care Management Team Member Rossi   Social Work Team Member Tammy ROMAN Team Member Curt   Patient/Family Present   Patient Present No   Patient's Family Present No     COVID+, remains manic and grandiose. Medication compliant. Writes grievances often. CM made referral to [facility], awaiting next steps. No pending discharge date at this time.

## 2024-01-15 NOTE — CASE MANAGEMENT
CM called the patient's other son, Dave Cheo [phone], per the patient's request. No answer, CM left VM with the contact information for the patient.
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
## 2024-01-15 NOTE — CASE MANAGEMENT
"YUE received a VM from the patient's significant other, hRea Sesay, [phone] reporting the patient told her he will be discharging on Wednesday of this week.     CM spoke to the patient about this, and the patient denied ever saying that. CM reminded the patient that the current plan is for him to return to [facility], which he was in agreement with. The patient reported \"I will not be returning there.\" CM reminded the patient of the upcoming 304 hearing this Friday, patient would like to attend. The patient asked CM to call his son to let him know that the patient would like to speak with him.  YUE called Mauricio Fernandezrobertchristine . No answer, YUE left  with nurse's station phone number to contact the patient directly.   "

## 2024-01-15 NOTE — PLAN OF CARE
Problem: PSYCHOSIS  Goal: Will report no hallucinations or delusions  Description: Interventions:  - Administer medication as  ordered  - Every waking shifts and PRN assess for the presence of hallucinations and or delusions  - Assist with reality testing to support increasing orientation  - Assess if patient's hallucinations or delusions are encouraging self-harm or harm to others and intervene as appropriate  Outcome: Progressing     Problem: BEHAVIOR  Goal: Pt/Family maintain appropriate behavior and adhere to behavioral management agreement, if implemented  Description: INTERVENTIONS:  - Assess the family dynamic   - Encourage verbalization of thoughts and concerns in a socially appropriate manner  - Assess patient/family's coping skills and non-compliant behavior (including use of illegal substances).  - Utilize positive, consistent limit setting strategies supporting safety of patient, staff and others  - Initiate consult with Case Management, Spiritual Care or other ancillary services as appropriate  - If a patient's/visitor's behavior jeopardizes the safety of the patient, staff, or others, refer to organization procedure.   - Notify Security of behavior or suspected illegal substances which indicate the need for search of the patient and/or belongings  - Encourage participation in the decision making process about a behavioral management agreement; implement if patient meets criteria  Outcome: Progressing     Problem: SLEEP DISTURBANCE  Goal: Will exhibit normal sleeping pattern  Description: Interventions:  -  Assess the patients sleep pattern, noting recent changes  - Administer medication as ordered  - Decrease environmental stimuli, including noise, as appropriate during the night  - Encourage the patient to actively participate in unit groups and or exercise during the day to enhance ability to achieve adequate sleep at night  - Assess the patient, in the morning, encouraging a description of sleep  experience  Outcome: Progressing     Problem: INVOLUNTARY ADMIT  Goal: Will cooperate with staff recommendations and doctor's orders and will demonstrate appropriate behavior  Description: INTERVENTIONS:  - Treat underlying conditions and offer medication as ordered  - Educate regarding involuntary admission procedures and rules  - Utilize positive consistent limit setting strategies to support patient and staff safety  Outcome: Progressing     Problem: Risk for Self Injury/Neglect  Goal: Treatment Goal: Remain safe during length of stay, learn and adopt new coping skills, and be free of self-injurious ideation, impulses and acts at the time of discharge  Outcome: Progressing  Goal: Verbalize thoughts and feelings  Description: Interventions:  - Assess and re-assess patient's lethality and potential for self-injury  - Engage patient in 1:1 interactions, daily, for a minimum of 15 minutes  - Encourage patient to express feelings, fears, frustrations, hopes  - Establish rapport/trust with patient   Outcome: Progressing  Goal: Refrain from harming self  Description: Interventions:  - Monitor patient closely, per order  - Develop a trusting relationship  - Supervise medication ingestion, monitor effects and side effects   Outcome: Progressing  Goal: Attend and participate in unit activities, including therapeutic, recreational, and educational groups  Description: Interventions:  - Provide therapeutic and educational activities daily, encourage attendance and participation, and document same in the medical record  - Obtain collateral information, encourage visitation and family involvement in care   Outcome: Progressing  Goal: Recognize maladaptive responses and adopt new coping mechanisms  Outcome: Progressing  Goal: Complete daily ADLs, including personal hygiene independently, as able  Description: Interventions:  - Observe, teach, and assist patient with ADLS  - Monitor and promote a balance of rest/activity, with  adequate nutrition and elimination  Outcome: Progressing     Problem: Alteration in Orientation  Goal: Treatment Goal: Demonstrate a reduction of confusion and improved orientation to person, place, time and/or situation upon discharge, according to optimum baseline/ability  Outcome: Progressing  Goal: Interact with staff daily  Description: Interventions:  - Assess and re-assess patient's level of orientation  - Engage patient in 1 on 1 interactions, daily, for a minimum of 15 minutes   - Establish rapport/trust with patient   Outcome: Progressing  Goal: Express concerns related to confused thinking related to:  Description: Interventions:  - Encourage patient to express feelings, fears, frustrations, hopes  - Assign consistent caregivers   - [place]/re-orient patient as needed  - Allow comfort items, as appropriate  - Provide visual cues, signs, etc.   Outcome: Progressing  Goal: Allow medical examinations, as recommended  Description: Interventions:  - Provide physical/neurological exams and/or referrals, per provider   Outcome: Progressing  Goal: Cooperate with recommended testing/procedures  Description: Interventions:  - Determine need for ancillary testing  - Observe for mental status changes  - Implement falls/precaution protocol   Outcome: Progressing  Goal: Attend and participate in unit activities, including therapeutic, recreational, and educational groups  Description: Interventions:  - Provide therapeutic and educational activities daily, encourage attendance and participation, and document same in the medical record   - Provide appropriate opportunities for reminiscence   - Provide a consistent daily routine   - Encourage family contact/visitation   Outcome: Progressing  Goal: Complete daily ADLs, including personal hygiene independently, as able  Description: Interventions:  - Observe, teach, and assist patient with ADLS  Outcome: Progressing     Problem: DISCHARGE PLANNING - CARE MANAGEMENT  Goal:  Discharge to post-acute care or home with appropriate resources  Description: INTERVENTIONS:  - Conduct assessment to determine patient/family and health care team treatment goals, and need for post-acute services based on payer coverage, community resources, and patient preferences, and barriers to discharge  - Address psychosocial, clinical, and financial barriers to discharge as identified in assessment in conjunction with the patient/family and health care team  - Arrange appropriate level of post-acute services according to patient’s   needs and preference and payer coverage in collaboration with the physician and health care team  - Communicate with and update the patient/family, physician, and health care team regarding progress on the discharge plan  - Arrange appropriate transportation to post-acute venues  Outcome: Progressing

## 2024-01-15 NOTE — TREATMENT TEAM
Pt attended all groups today.  Crisis, warmline, support groups and [phone] numbers discussed.  Pt wore mask appropriately.  Pt was attempting to color during mindfulness deep breathing activity. Pt hoards papers and not wearing sling.     01/15/24 1330   Activity/Group Checklist   Group Other (Comment)  (community support and resources)   Attendance Attended   Attendance Duration (min) 46-60   Interactions Disorganized interaction   Affect/Mood Wide  (coloring during mindfulness activity)   Goals Achieved Identified feelings;Identified relapse prevention strategies;Able to listen to others;Identified resources and support systems;Able to engage in interactions;Able to reflect/comment on own behavior;Able to self-disclose;Able to recieve feedback

## 2024-01-16 PROCEDURE — 99232 SBSQ HOSP IP/OBS MODERATE 35: CPT | Performed by: STUDENT IN AN ORGANIZED HEALTH CARE EDUCATION/TRAINING PROGRAM

## 2024-01-16 RX ADMIN — GABAPENTIN 300 MG: 300 CAPSULE ORAL at 21:07

## 2024-01-16 RX ADMIN — DIVALPROEX SODIUM 750 MG: 250 TABLET, FILM COATED, EXTENDED RELEASE ORAL at 21:07

## 2024-01-16 RX ADMIN — CHOLECALCIFEROL TAB 25 MCG (1000 UNIT) 2000 UNITS: 25 TAB at 08:22

## 2024-01-16 RX ADMIN — TAMSULOSIN HYDROCHLORIDE 0.4 MG: 0.4 CAPSULE ORAL at 17:01

## 2024-01-16 RX ADMIN — HYDROCHLOROTHIAZIDE 12.5 MG: 12.5 TABLET ORAL at 08:22

## 2024-01-16 RX ADMIN — TRAZODONE HYDROCHLORIDE 100 MG: 100 TABLET ORAL at 21:07

## 2024-01-16 RX ADMIN — DIVALPROEX SODIUM 500 MG: 500 TABLET, EXTENDED RELEASE ORAL at 08:22

## 2024-01-16 RX ADMIN — MELATONIN TAB 3 MG 3 MG: 3 TAB at 21:07

## 2024-01-16 NOTE — PLAN OF CARE
Problem: PSYCHOSIS  Goal: Will report no hallucinations or delusions  Description: Interventions:  - Administer medication as  ordered  - Every waking shifts and PRN assess for the presence of hallucinations and or delusions  - Assist with reality testing to support increasing orientation  - Assess if patient's hallucinations or delusions are encouraging self-harm or harm to others and intervene as appropriate  Outcome: Progressing     Problem: BEHAVIOR  Goal: Pt/Family maintain appropriate behavior and adhere to behavioral management agreement, if implemented  Description: INTERVENTIONS:  - Assess the family dynamic   - Encourage verbalization of thoughts and concerns in a socially appropriate manner  - Assess patient/family's coping skills and non-compliant behavior (including use of illegal substances).  - Utilize positive, consistent limit setting strategies supporting safety of patient, staff and others  - Initiate consult with Case Management, Spiritual Care or other ancillary services as appropriate  - If a patient's/visitor's behavior jeopardizes the safety of the patient, staff, or others, refer to organization procedure.   - Notify Security of behavior or suspected illegal substances which indicate the need for search of the patient and/or belongings  - Encourage participation in the decision making process about a behavioral management agreement; implement if patient meets criteria  Outcome: Progressing     Problem: Risk for Self Injury/Neglect  Goal: Treatment Goal: Remain safe during length of stay, learn and adopt new coping skills, and be free of self-injurious ideation, impulses and acts at the time of discharge  Outcome: Progressing  Goal: Verbalize thoughts and feelings  Description: Interventions:  - Assess and re-assess patient's lethality and potential for self-injury  - Engage patient in 1:1 interactions, daily, for a minimum of 15 minutes  - Encourage patient to express feelings, fears,  frustrations, hopes  - Establish rapport/trust with patient   Outcome: Progressing  Goal: Refrain from harming self  Description: Interventions:  - Monitor patient closely, per order  - Develop a trusting relationship  - Supervise medication ingestion, monitor effects and side effects   Outcome: Progressing  Goal: Attend and participate in unit activities, including therapeutic, recreational, and educational groups  Description: Interventions:  - Provide therapeutic and educational activities daily, encourage attendance and participation, and document same in the medical record  - Obtain collateral information, encourage visitation and family involvement in care   Outcome: Progressing  Goal: Recognize maladaptive responses and adopt new coping mechanisms  Outcome: Progressing  Goal: Complete daily ADLs, including personal hygiene independently, as able  Description: Interventions:  - Observe, teach, and assist patient with ADLS  - Monitor and promote a balance of rest/activity, with adequate nutrition and elimination  Outcome: Progressing     Problem: Alteration in Orientation  Goal: Treatment Goal: Demonstrate a reduction of confusion and improved orientation to person, place, time and/or situation upon discharge, according to optimum baseline/ability  Outcome: Progressing  Goal: Interact with staff daily  Description: Interventions:  - Assess and re-assess patient's level of orientation  - Engage patient in 1 on 1 interactions, daily, for a minimum of 15 minutes   - Establish rapport/trust with patient   Outcome: Progressing  Goal: Express concerns related to confused thinking related to:  Description: Interventions:  - Encourage patient to express feelings, fears, frustrations, hopes  - Assign consistent caregivers   - [place]/re-orient patient as needed  - Allow comfort items, as appropriate  - Provide visual cues, signs, etc.   Outcome: Progressing  Goal: Allow medical examinations, as recommended  Description:  Interventions:  - Provide physical/neurological exams and/or referrals, per provider   Outcome: Progressing  Goal: Cooperate with recommended testing/procedures  Description: Interventions:  - Determine need for ancillary testing  - Observe for mental status changes  - Implement falls/precaution protocol   Outcome: Progressing  Goal: Complete daily ADLs, including personal hygiene independently, as able  Description: Interventions:  - Observe, teach, and assist patient with ADLS  Outcome: Progressing

## 2024-01-16 NOTE — NURSING NOTE
Received a call from Rhea Sesay who stated that pt has been calling her saying that he is unsafe on the unit and wants to leave. Rhea reassured that pt's safety on unit is not a concern at the moment. Rhea proceeded to saying that pt's  has not returned her calls and VM for three days now. Also stated that  has been cutting her off from visiting the pt. Rhea made aware of the visiting hours policy. Rhea asked to speak to pt but hung up the phone before staff could get pt on the phone. Supervisor present in the time of call with Rhea and aware of same conversation.

## 2024-01-16 NOTE — TREATMENT TEAM
01/16/24 0735   Team Meeting   Meeting Type Tx Team Meeting   Initial Conference Date 01/16/24   Team Members Present   Team Members Present Physician;Nurse;;;Occupational Therapist   Physician Team Member Jenn Rene   Nursing Team Member July Kenyon   Care Management Team Member Rossi   Social Work Team Member Tammy ROMAN Team Member Curt   Patient/Family Present   Patient Present No   Patient's Family Present No     Patient remains grandiose and very delusional. Medication compliant. CM made referral for Alex GERONIMO, waiting on [facility] meeting to be scheduled via the [facility]. No pending discharge date at this time.

## 2024-01-16 NOTE — NURSING NOTE
Pt present within the milieu. Continues to ramble and being repetitive and expressing delusional thoughts. Also continues to be grandiose. Fixated on the ongoing games and often requesting to stay up watching games. Medication compliant this shift.

## 2024-01-16 NOTE — PROGRESS NOTES
"Progress Note - Behavioral Health   Sudhakar Choe 58 y.o. male MRN: [id number]  Unit/Bed#: [facility] 647-01 Encounter: [id number]    Patient was seen today for continuation of care, records reviewed and patient was discussed with the morning case review team.    Sudhakar was seen today for psychiatric follow-up.  On assessment today, Sudhakar was seen walking in the hallway.  Patient is noted to be pleasant but often still bizarre.  Chronic fixed statements of grandiosity continue with Sudhakar talking about future job prospects including being a  for major [employer] and college teams.  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  No insight or judgment into current condition, then patient became more frustrated when presented with the plan of care including transition to [facility].  Counseling provided.  Patient continues to be medication compliant with no behavioral agitation or aggression towards staff.  Invega Sustenna given on 1/5/2024, patient continues to also tolerate Depakote 500/750 daily.  No medication changes to be made at this time.  Discharge disposition ongoing.    Sudhakar denies acute suicidal/self-harm ideation/intent/plan upon direct inquiry at this time.  Sudhakar remains behaviorally appropriate, no agitation or aggression noted on exam or in report.  Sudhakar also denies HI/AH/VH, and does not appear overtly manic.  No overt delusions or paranoia are verbalized. Impulse control is intact.  Sudhakar remains adherent to his current psychotropic medication regimen and denies any side effects from medications, as well as none noted on exam.    Vitals:  Vitals:    01/16/24 0754   BP: 110/67   Pulse: 71   Resp: 14   Temp: (!) 97.2 °F (36.2 °C)   SpO2: 92%       Laboratory Results:  I have personally reviewed all pertinent laboratory/tests results.  Most Recent Labs:   Lab Results   Component Value Date    WBC 5.47 12/30/2023    RBC " 4.12 12/30/2023    HGB 12.6 12/30/2023    HCT 38.3 12/30/2023     12/30/2023    RDW 13.2 12/30/2023    NEUTROABS 2.60 12/30/2023    SODIUM 137 01/03/2024    K 4.0 01/03/2024     01/03/2024    CO2 24 01/03/2024    BUN 8 01/03/2024    CREATININE 0.78 01/03/2024    GLUC 109 01/03/2024    GLUF 109 (H) 01/03/2024    CALCIUM 8.5 01/03/2024    AST 23 01/03/2024    ALT 18 01/03/2024    ALKPHOS 63 01/03/2024    TP 6.8 01/03/2024    ALB 3.8 01/03/2024    TBILI 0.33 01/03/2024    CHOLESTEROL 168 01/18/2021    HDL 41 01/18/2021    TRIG 93 01/18/2021    LDLCALC 108 (H) 01/18/2021    NONHDLC 127 01/18/2021    VALPROICTOT 76 01/07/2024    QGO9FMMYXCKW 1.431 12/30/2023    RPR Non-Reactive 03/26/2021    HGBA1C 5.9 (H) 10/09/2023     10/09/2023       Psychiatric Review of Systems:  Behavior over the last 24 hours:  unchanged.   Sleep: good  Appetite: good  Medication side effects: none  ROS: no complaints, denies shortness of breath or chest pain and all other systems are negative for acute changes    Mental Status Evaluation:  Appearance:  dressed appropriately   Behavior:  cooperative, calm   Speech:  normal rate and volume   Mood:  less irritable   Affect:  constricted   Thought Process:  illogical   Thought Content:  grandiose delusions   Perceptual Disturbances: denies auditory hallucinations when asked, does not appear responding to internal stimuli   Risk Potential: Suicidal ideation - None  Homicidal ideation - None  Potential for aggression - No   Memory:  recent and remote memory grossly intact   Sensorium  person, place, and time/date      Consciousness:  alert and awake   Attention: attention span and concentration are age appropriate   Insight:  limited   Judgment: limited   Gait/Station: normal gait/station   Motor Activity: no abnormal movements   Progress Toward Goals:   Sduhakar is progressing towards goals of inpatient psychiatric treatment by continued medication compliance and is attending  therapeutic modalities on the milieu. However, the patient continues to require inpatient psychiatric hospitalization for continued medication management and titration to optimize symptom reduction, improve sleep hygiene, and demonstrate adequate self-care.    Assessment/Plan   Principal Problem:    Bipolar affective disorder, current episode manic with psychotic symptoms (HCC)  Active Problems:    Benign prostatic hyperplasia    Brachial plexus injury, right, sequela    Medical clearance for psychiatric admission    Bilateral lower extremity edema      Recommended Treatment: Treatment plan and medication changes discussed and per the attending physician the plan is:    1.Continue with group therapy, milieu therapy and occupational therapy  2.Behavioral Health checks every 7 minutes  3.Continue frequent safety checks and vitals per unit protocol  4.Continue with SLIM medical management as indicated  5.Continue with current medication regimen  6.Will review labs in the a.m.  7.Disposition Planning: Discharge planning and efforts remain ongoing    Behavioral Health Medications: all current active meds have been reviewed and continue current psychiatric medications.  Current Facility-Administered Medications   Medication Dose Route Frequency Provider Last Rate    acetaminophen  650 mg Oral Q4H PRN LYLA NovakNP      acetaminophen  650 mg Oral Q4H PRN Reji Looney DO      acetaminophen  975 mg Oral Q6H PRN BETO Novak      cholecalciferol  2,000 Units Oral Daily Rossi Carlson PA-C      divalproex sodium  500 mg Oral Daily Rory Bunn MD      divalproex sodium  750 mg Oral HS Rory Bunn MD      gabapentin  300 mg Oral HS Rory Bunn MD      hydrochlorothiazide  12.5 mg Oral Daily BETO Garcia      hydrOXYzine HCL  25 mg Oral Q6H PRN Max 4/day BETO Novak      hydrOXYzine HCL  50 mg Oral Q6H PRN Max 4/day BETO Novak      LORazepam  1 mg Intramuscular  Q6H PRN Max 3/day Jenn Strickland, CRNP      LORazepam  1 mg Oral Q6H PRN Max 3/day Jenn Strickland, CRNP      melatonin  3 mg Oral HS Rory Bunn MD      mirtazapine  7.5 mg Oral HS PRN Rory Bunn MD      nicotine  1 patch Transdermal Daily PRN Fabiana Sousa MD      OLANZapine  5 mg Intramuscular Q3H PRN Max 3/day Jenncampbell Strickland, CRNP      OLANZapine  2.5 mg Oral Q4H PRN Max 6/day Jenncampbell Strickland, CRNP      OLANZapine  5 mg Oral Q4H PRN Max 3/day Jenncampbell Strickland, CRNP      OLANZapine  5 mg Oral Q3H PRN Max 3/day Jenncampbell Strickland, CRNP      paliperidone  234 mg IM- Deltoid Q4 weeks Rory Bunn MD      polyethylene glycol  17 g Oral Daily PRN Jenn Strickland, CRROBERT      senna-docusate sodium  1 tablet Oral Daily PRN Jenn Strickland, CRNP      tamsulosin  0.4 mg Oral Daily With Dinner Rossi Carlson PA-C      traZODone  100 mg Oral HS Rory Bunn MD         Risks / Benefits of Treatment:  Risks, benefits, and possible side effects of medications explained to patient and patient verbalizes understanding and agreement for treatment.    Counseling / Coordination of Care:  Patient's progress reviewed with nursing staff.  Medications, treatment progress and treatment plan reviewed with patient.  Supportive counseling provided to the patient.    Total floor/unit time spent today 25 minutes. Greater than 50% of total time was spent with the patient and / or family counseling and / or coordination of care. A description of the counseling / coordination of care: medication education, treatment plan, supportive therapy.    This note was completed in part utilizing Dragon dictation Software. Grammatical, translation, syntax errors, random word insertions, spelling mistakes, and incomplete sentences may be an occasional consequence of this system secondary to software limitations with voice recognition, ambient noise, and hardware issues. If you have any questions or concerns about the content, text,  or information contained within the body of this dictation, please contact the provider for clarification

## 2024-01-16 NOTE — NURSING NOTE
Pt rambling at times stating he plans to go to [place] for two weeks and then [place] for a week.  Good appetite and steady gait.  VSS.  Attended group.  No inappropriate sexual behavior noted.  Monitored for safety and support.

## 2024-01-17 ENCOUNTER — APPOINTMENT (INPATIENT)
Dept: RADIOLOGY | Facility: HOSPITAL | Age: 59
DRG: 885 | End: 2024-01-17
Payer: MEDICARE

## 2024-01-17 PROCEDURE — 97163 PT EVAL HIGH COMPLEX 45 MIN: CPT

## 2024-01-17 PROCEDURE — 73090 X-RAY EXAM OF FOREARM: CPT

## 2024-01-17 PROCEDURE — 99232 SBSQ HOSP IP/OBS MODERATE 35: CPT | Performed by: STUDENT IN AN ORGANIZED HEALTH CARE EDUCATION/TRAINING PROGRAM

## 2024-01-17 PROCEDURE — 99231 SBSQ HOSP IP/OBS SF/LOW 25: CPT | Performed by: PHYSICIAN ASSISTANT

## 2024-01-17 PROCEDURE — NC001 PR NO CHARGE: Performed by: PHYSICIAN ASSISTANT

## 2024-01-17 RX ORDER — HALOPERIDOL 5 MG/1
5 TABLET ORAL
Status: DISCONTINUED | OUTPATIENT
Start: 2024-01-17 | End: 2024-01-23

## 2024-01-17 RX ADMIN — DIVALPROEX SODIUM 500 MG: 500 TABLET, EXTENDED RELEASE ORAL at 08:06

## 2024-01-17 RX ADMIN — HALOPERIDOL 5 MG: 5 TABLET ORAL at 21:19

## 2024-01-17 RX ADMIN — GABAPENTIN 300 MG: 300 CAPSULE ORAL at 21:19

## 2024-01-17 RX ADMIN — TAMSULOSIN HYDROCHLORIDE 0.4 MG: 0.4 CAPSULE ORAL at 16:36

## 2024-01-17 RX ADMIN — CHOLECALCIFEROL TAB 25 MCG (1000 UNIT) 2000 UNITS: 25 TAB at 08:06

## 2024-01-17 RX ADMIN — HYDROCHLOROTHIAZIDE 12.5 MG: 12.5 TABLET ORAL at 08:06

## 2024-01-17 RX ADMIN — DIVALPROEX SODIUM 750 MG: 250 TABLET, FILM COATED, EXTENDED RELEASE ORAL at 21:19

## 2024-01-17 RX ADMIN — MELATONIN TAB 3 MG 3 MG: 3 TAB at 21:19

## 2024-01-17 RX ADMIN — TRAZODONE HYDROCHLORIDE 100 MG: 100 TABLET ORAL at 21:19

## 2024-01-17 NOTE — CASE MANAGEMENT
The patient approached CM in the [place] requesting help in calling hotels, since PT told him his arm is better and he can be discharged. CM reminded the patient he is currently involuntarily hospitalized for his mental health. The patient began to report he will be suing the providers and that he will ask the county to give him a new doctor. CM tried to redirect the patient.

## 2024-01-17 NOTE — PROGRESS NOTES
Pt attended 2 groups.  Pt disorganized and was attempting to bring paper bag of belongings stating they will get stolen.  Pt provided unit rules and reminders.  Pt needed reminder about masking policy and covid protocols.      01/17/24 1000   Activity/Group Checklist   Group Anger management   Attendance Attended;Other (Comment)  (late)   Attendance Duration (min) 31-45   Interactions Disorganized interaction   Affect/Mood Wide   Goals Achieved Identified feelings;Discussed coping strategies;Able to listen to others;Able to engage in interactions;Able to reflect/comment on own behavior;Identified relapse prevention strategies;Able to manage/cope with feelings;Verbalized increased hopefulness;Able to self-disclose

## 2024-01-17 NOTE — CASE MANAGEMENT
CM spoke to the patient who denied he ever had access to his cell phone on the unit. The patient also reported he never masturbated in front of his significant other's children and that she lied about it. The patient reports he will be finding his own place to live after he gets discharged to home. The patient reported he is not in agreement with the E[facility] and plans to christiano several members of the treatment team and the hospital as a whole.
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
## 2024-01-17 NOTE — PROGRESS NOTES
"Progress Note - Behavioral Health   Sudhakar Choe 58 y.o. male MRN: [id number]  Unit/Bed#: [facility] 647-01 Encounter: [id number]    Assessment/Plan   Principal Problem:    Bipolar affective disorder, current episode manic with psychotic symptoms ([facility])  Active Problems:    Benign prostatic hyperplasia    Brachial plexus injury, right, sequela    Medical clearance for psychiatric admission    Bilateral lower extremity edema      Recommended Treatment:   Initiate Haldol 5 mg nightly for ongoing irritability and paranoia  Continue with group therapy, milieu therapy and occupational therapy.    Continue frequent safety checks and vitals per unit protocol.  Case discussed with treatment team.  Risks, benefits and possible side effects of Medications: Risks, benefits, and possible side effects of medications have been explained to the patient, who verbalizes understanding    ------------------------------------------------------------    Subjective: Per nursing report, Sudhakar has been irritable and guarded.  Seen in the hallway with all of his belongings, patient states that \"They are stealing from me\".  Educated about staff's role, patient continues to endorse paranoid thoughts.  No depression or anxiety verbalized.  Patient is also noted to be still very disorganized and illogical during conversation.  Preoccupied with suing this writer as well as attending physician.  Patient noted to be increasingly irritable in the afternoon stating that he wanted to be moved and wanted a new provider.  Patient verbally redirectable.  Will initiate second antipsychotics due to moderate efficacy in managing symptoms.  Haldol 5 mg nightly to be given starting 1/17/2024, patient will also continue on Invega Sustenna 234 mg IM to be given next on 2/2/2024.  No changes in sleep or appetite reported.  Discharge disposition ongoing as patient will need referral to [facility] post discharge.    Progress Toward Goals: slow improvement    Psychiatric " Review of Systems:  Behavior over the last 24 hours: unchanged  Sleep: improving  Appetite: adequate  Medication side effects: none verbalized  ROS: Complete review of systems is negative except as noted above.    Vital signs in last 24 hours:  Temp:  [97.7 °F (36.5 °C)-98.2 °F (36.8 °C)] 97.7 °F (36.5 °C)  HR:  [81-92] 92  Resp:  [16-18] 16  BP: (100-111)/(65-68) 100/67    Mental Status Exam:  Appearance:  alert and appropriate grooming and hygiene   Behavior:  calm, limited cooperativity, and guarded   Motor: no abnormal movements and normal gait and balance   Speech:  spontaneous and coherent   Mood:  irritable   Affect:  blunted   Thought Process:  disorganized, illogical   Thought Content: grandiose delusions, paranoid ideation   Perceptual disturbances: no reported hallucinations and does not appear to be responding to internal stimuli at this time   Risk Potential: No active or passive suicidal or homicidal ideation was verbalized during interview   Cognition: oriented to self and situation and cognition not formally tested   Insight:  Limited   Judgment: Limited     Current Medications:  Current Facility-Administered Medications   Medication Dose Route Frequency Provider Last Rate    acetaminophen  650 mg Oral Q4H PRN LYLA NovakNP      acetaminophen  650 mg Oral Q4H PRN Reji Looney DO      acetaminophen  975 mg Oral Q6H PRN BETO Novak      cholecalciferol  2,000 Units Oral Daily Rossi Carlson PA-C      divalproex sodium  500 mg Oral Daily Rory Bunn MD      divalproex sodium  750 mg Oral HS Rory Bunn MD      gabapentin  300 mg Oral HS Rory Bunn MD      haloperidol  5 mg Oral HS BETO Novak      hydrochlorothiazide  12.5 mg Oral Daily BETO Garcia      hydrOXYzine HCL  25 mg Oral Q6H PRN Max 4/day BETO Novak      hydrOXYzine HCL  50 mg Oral Q6H PRN Max 4/day BETO Novak      LORazepam  1 mg Intramuscular Q6H PRN Max 3/day  BETO Novak      LORazepam  1 mg Oral Q6H PRN Max 3/day Jenn Strickland, BETO      melatonin  3 mg Oral HS Rory Bunn MD      mirtazapine  7.5 mg Oral HS PRN Rory Bunn MD      nicotine  1 patch Transdermal Daily PRN Fabiana Sousa MD      OLANZapine  5 mg Intramuscular Q3H PRN Max 3/day Jenn Strickland, BETO      OLANZapine  2.5 mg Oral Q4H PRN Max 6/day Jenn Strickland, BETO      OLANZapine  5 mg Oral Q4H PRN Max 3/day Jenn Strickland, CRNP      OLANZapine  5 mg Oral Q3H PRN Max 3/day Jenn Strickland, BETO      paliperidone  234 mg IM- Deltoid Q4 weeks Rory Bunn MD      polyethylene glycol  17 g Oral Daily PRN Jenn Strickland, BETO      senna-docusate sodium  1 tablet Oral Daily PRN Jenn Strickland, BETO      tamsulosin  0.4 mg Oral Daily With Dinner Rossi Carlson PA-C      traZODone  100 mg Oral HS Rory Bunn MD         Behavioral Health Medications: all current active meds have been reviewed. Changes as in plan section above.    Laboratory results:  I have personally reviewed all pertinent laboratory/tests results.  No results found for this or any previous visit (from the past 48 hour(s)).     BETO Keller    This note was completed in part utilizing Dragon dictation Software. Grammatical, translation, syntax errors, random word insertions, spelling mistakes, and incomplete sentences may be an occasional consequence of this system secondary to software limitations with voice recognition, ambient noise, and hardware issues. If you have any questions or concerns about the content, text, or information contained within the body of this dictation, please contact the provider for clarification

## 2024-01-17 NOTE — NURSING NOTE
"Patient visible on the unit wandering in the halls or sitting with peers in the dining room.  Patient initially brightens when approached for assessment questions, however notably irritable in conversation. Patient reporting \"great\" mood when asked about depression or anxiety; grandiose  in conversation and states \"I have a million places to go home to: [facility], [place], or wherever but no one will let me go\". Patient also making statement intermittently throughout the shift about staff \"stealing what dosent belong to them\" and making comments about suing various staff members, patient difficult to redirect at times. Compliant with medications and meals, appetite good. Patient needing frequent reminders to utilize arm sling properly. No further signs of distress noted.  "

## 2024-01-17 NOTE — NURSING NOTE
Patient noted to be carrying a brown paper bag around full of various food items. Patient reminded food should be eaten during snack and meal times, and not be carried around constantly. Patient argumentative with this, unit rules reviewed with patient. Patient agreeable at this time.
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
## 2024-01-17 NOTE — NURSING NOTE
Pt anxious and delusional.  Pt inviting staff members to [facility], [facility] and [place].  Pt continues to carry a bag of papers around with him he states so they aren't stolen.  VSS.  Med-compliant.  Pt maintaining right arm sling.  Good appetite and steady gait.  Monitored for safety and support.

## 2024-01-18 PROCEDURE — 99232 SBSQ HOSP IP/OBS MODERATE 35: CPT | Performed by: STUDENT IN AN ORGANIZED HEALTH CARE EDUCATION/TRAINING PROGRAM

## 2024-01-18 RX ADMIN — HYDROCHLOROTHIAZIDE 12.5 MG: 12.5 TABLET ORAL at 08:06

## 2024-01-18 RX ADMIN — GABAPENTIN 300 MG: 300 CAPSULE ORAL at 21:28

## 2024-01-18 RX ADMIN — CHOLECALCIFEROL TAB 25 MCG (1000 UNIT) 2000 UNITS: 25 TAB at 08:06

## 2024-01-18 RX ADMIN — DIVALPROEX SODIUM 500 MG: 500 TABLET, EXTENDED RELEASE ORAL at 08:06

## 2024-01-18 RX ADMIN — MELATONIN TAB 3 MG 3 MG: 3 TAB at 21:28

## 2024-01-18 RX ADMIN — HALOPERIDOL 5 MG: 5 TABLET ORAL at 21:28

## 2024-01-18 RX ADMIN — DIVALPROEX SODIUM 750 MG: 250 TABLET, FILM COATED, EXTENDED RELEASE ORAL at 21:28

## 2024-01-18 RX ADMIN — ACETAMINOPHEN 975 MG: 325 TABLET, FILM COATED ORAL at 06:16

## 2024-01-18 RX ADMIN — TAMSULOSIN HYDROCHLORIDE 0.4 MG: 0.4 CAPSULE ORAL at 16:23

## 2024-01-18 RX ADMIN — TRAZODONE HYDROCHLORIDE 100 MG: 100 TABLET ORAL at 21:28

## 2024-01-18 NOTE — DISCHARGE INSTR - AVS FIRST PAGE
Discharge Instructions - Orthopedics  Sudhakar Choe 58 y.o. male MRN: [id number]  Unit/Bed#: [facility] 647-01    Weight Bearing Status:                                           Maintain the splint at all times.  Do not attempt to lift anything with the right arm.    Pain:  Continue analgesics as directed    Dressing Instructions:   Please keep clean, dry and intact until follow up     Appt Instructions:   If you do not have your appointment, please call the clinic at [phone].  Request to see one of our hand specialists.  Otherwise follow up as scheduled.    Contact the office sooner if you experience any increased numbness/tingling in the extremities.      Miscellaneous:  None

## 2024-01-18 NOTE — NURSING NOTE
Pt remains grandiose, but is pleasant on approach. He is visible and social in the milieu. Med/meal compliant. No agitation or aggressive behaviors noted. Pt able to make needs known.

## 2024-01-18 NOTE — TREATMENT TEAM
01/18/24 0742   Team Meeting   Meeting Type Daily Rounds   Initial Conference Date 01/18/24   Team Members Present   Team Members Present Physician;Nurse;;;Occupational Therapist   Physician Team Member Jenn Rene   Nursing Team Member July Kenyon   Care Management Team Member Rossi   Social Work Team Member Tammy ROMAN Team Member Curt   Patient/Family Present   Patient Present No   Patient's Family Present No     Patient has [facility] meeting scheduled for 1/26/24 @ 11h. Patient has [place] [id number] hearing tomorrow. Patient was holding hands with another patient. Patient remains grandiose, accusatory, and displays some hoarding behaviors. No pending discharge date at this time.

## 2024-01-18 NOTE — PROGRESS NOTES
Pt attended ALL groups.  Pt was able to self express, hopeful and future orientated.  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  Pt note the impact hospitalizations have had on his goals.      01/18/24 1000   Activity/Group Checklist   Group Community meeting   Attendance Attended   Attendance Duration (min) 31-45   Interactions Other (Comment)  (sleepy at end)   Affect/Mood Calm;Appropriate   Goals Achieved Identified feelings;Identified relapse prevention strategies;Discussed coping strategies;Able to listen to others;Able to engage in interactions;Able to reflect/comment on own behavior;Able to manage/cope with feelings;Verbalized increased hopefulness;Able to self-disclose

## 2024-01-18 NOTE — CASE MANAGEMENT
The patient's 304 hearing with [facility] is scheduled for tomorrow 1/19/24 @ 8am. The patient would like to attend and participate.

## 2024-01-18 NOTE — NURSING NOTE
Pt delusional with poor insight but redirectable.  Good appetite and steady gait.  VSS.  Attended group.  Med-compliant.  Pt with irritable edge, no agitation noted.  Monitored for safety and support.

## 2024-01-18 NOTE — PLAN OF CARE
Problem: PSYCHOSIS  Goal: Will report no hallucinations or delusions  Description: Interventions:  - Administer medication as  ordered  - Every waking shifts and PRN assess for the presence of hallucinations and or delusions  - Assist with reality testing to support increasing orientation  - Assess if patient's hallucinations or delusions are encouraging self-harm or harm to others and intervene as appropriate  Outcome: Progressing     Problem: BEHAVIOR  Goal: Pt/Family maintain appropriate behavior and adhere to behavioral management agreement, if implemented  Description: INTERVENTIONS:  - Assess the family dynamic   - Encourage verbalization of thoughts and concerns in a socially appropriate manner  - Assess patient/family's coping skills and non-compliant behavior (including use of illegal substances).  - Utilize positive, consistent limit setting strategies supporting safety of patient, staff and others  - Initiate consult with Case Management, Spiritual Care or other ancillary services as appropriate  - If a patient's/visitor's behavior jeopardizes the safety of the patient, staff, or others, refer to organization procedure.   - Notify Security of behavior or suspected illegal substances which indicate the need for search of the patient and/or belongings  - Encourage participation in the decision making process about a behavioral management agreement; implement if patient meets criteria  Outcome: Progressing     Problem: SLEEP DISTURBANCE  Goal: Will exhibit normal sleeping pattern  Description: Interventions:  -  Assess the patients sleep pattern, noting recent changes  - Administer medication as ordered  - Decrease environmental stimuli, including noise, as appropriate during the night  - Encourage the patient to actively participate in unit groups and or exercise during the day to enhance ability to achieve adequate sleep at night  - Assess the patient, in the morning, encouraging a description of sleep  experience  Outcome: Progressing     Problem: INVOLUNTARY ADMIT  Goal: Will cooperate with staff recommendations and doctor's orders and will demonstrate appropriate behavior  Description: INTERVENTIONS:  - Treat underlying conditions and offer medication as ordered  - Educate regarding involuntary admission procedures and rules  - Utilize positive consistent limit setting strategies to support patient and staff safety  Outcome: Progressing     Problem: Risk for Self Injury/Neglect  Goal: Treatment Goal: Remain safe during length of stay, learn and adopt new coping skills, and be free of self-injurious ideation, impulses and acts at the time of discharge  Outcome: Progressing  Goal: Verbalize thoughts and feelings  Description: Interventions:  - Assess and re-assess patient's lethality and potential for self-injury  - Engage patient in 1:1 interactions, daily, for a minimum of 15 minutes  - Encourage patient to express feelings, fears, frustrations, hopes  - Establish rapport/trust with patient   Outcome: Progressing  Goal: Refrain from harming self  Description: Interventions:  - Monitor patient closely, per order  - Develop a trusting relationship  - Supervise medication ingestion, monitor effects and side effects   Outcome: Progressing  Goal: Attend and participate in unit activities, including therapeutic, recreational, and educational groups  Description: Interventions:  - Provide therapeutic and educational activities daily, encourage attendance and participation, and document same in the medical record  - Obtain collateral information, encourage visitation and family involvement in care   Outcome: Progressing  Goal: Recognize maladaptive responses and adopt new coping mechanisms  Outcome: Not Progressing  Goal: Complete daily ADLs, including personal hygiene independently, as able  Description: Interventions:  - Observe, teach, and assist patient with ADLS  - Monitor and promote a balance of rest/activity,  with adequate nutrition and elimination  Outcome: Progressing     Problem: Alteration in Orientation  Goal: Treatment Goal: Demonstrate a reduction of confusion and improved orientation to person, place, time and/or situation upon discharge, according to optimum baseline/ability  Outcome: Progressing  Goal: Interact with staff daily  Description: Interventions:  - Assess and re-assess patient's level of orientation  - Engage patient in 1 on 1 interactions, daily, for a minimum of 15 minutes   - Establish rapport/trust with patient   Outcome: Progressing  Goal: Express concerns related to confused thinking related to:  Description: Interventions:  - Encourage patient to express feelings, fears, frustrations, hopes  - Assign consistent caregivers   - [facility] [place]/re-orient patient as needed  - Allow comfort items, as appropriate  - Provide visual cues, signs, etc.   Outcome: Not Progressing  Goal: Allow medical examinations, as recommended  Description: Interventions:  - Provide physical/neurological exams and/or referrals, per provider   Outcome: Progressing  Goal: Cooperate with recommended testing/procedures  Description: Interventions:  - Determine need for ancillary testing  - Observe for mental status changes  - Implement falls/precaution protocol   Outcome: Progressing  Goal: Attend and participate in unit activities, including therapeutic, recreational, and educational groups  Description: Interventions:  - Provide therapeutic and educational activities daily, encourage attendance and participation, and document same in the medical record   - Provide appropriate opportunities for reminiscence   - Provide a consistent daily routine   - Encourage family contact/visitation   Outcome: Progressing  Goal: Complete daily ADLs, including personal hygiene independently, as able  Description: Interventions:  - Observe, teach, and assist patient with ADLS  Outcome: Progressing     Problem: DISCHARGE PLANNING - CARE  MANAGEMENT  Goal: Discharge to post-acute care or home with appropriate resources  Description: INTERVENTIONS:  - Conduct assessment to determine patient/family and health care team treatment goals, and need for post-acute services based on payer coverage, community resources, and patient preferences, and barriers to discharge  - Address psychosocial, clinical, and financial barriers to discharge as identified in assessment in conjunction with the patient/family and health care team  - Arrange appropriate level of post-acute services according to patient’s   needs and preference and payer coverage in collaboration with the physician and health care team  - Communicate with and update the patient/family, physician, and health care team regarding progress on the discharge plan  - Arrange appropriate transportation to post-acute venues  Outcome: Progressing

## 2024-01-18 NOTE — NURSING NOTE
"Patient visible and social mainly with 1 other patient. Patient disorganized with conversation. Patient stating \"I am putting you on my christiano list\" to multiple staff. Patient seen sharing drink with other peer and was sitting outside that peers room. Patient seen holding hands with same peer. Patient also seen handing his folded pants to other peer to take to her room. Security walk through requested around 2150 to address issue with patient and the other peer. Limit setting initiated. No PRN's given at this time. VSS. Appetite intact. Continual rounding in place  "

## 2024-01-18 NOTE — QUICK NOTE
New right forearm X-rays have been reviewed.  The dorsal wrist-spanning plate is unchanged from previous alignment noted on X-rays from 1/5.      Assessment/Plan:  59 yo male with right distal radius fracture, history of distal radius ORIF with dorsal spanning plate.  New X-rays reveal unchanged hardware.  Continue splint to RUE.   Continue NWB to RUE.   May wear sling for comfort. Sling can come off for shoulder motion.   Pain control prn.   Medical management per primary team.   Ortho signing off  Follow-up outpatient with hand surgery once discharged from the hospital - ideally within 2 weeks.

## 2024-01-18 NOTE — CASE MANAGEMENT
[facility] scheduled the patient's [facility] meeting to discuss the EAC for Friday 1/26/24 @ 11h.

## 2024-01-19 PROCEDURE — 99232 SBSQ HOSP IP/OBS MODERATE 35: CPT | Performed by: STUDENT IN AN ORGANIZED HEALTH CARE EDUCATION/TRAINING PROGRAM

## 2024-01-19 RX ADMIN — HYDROCHLOROTHIAZIDE 12.5 MG: 12.5 TABLET ORAL at 08:06

## 2024-01-19 RX ADMIN — HALOPERIDOL 5 MG: 5 TABLET ORAL at 21:16

## 2024-01-19 RX ADMIN — TAMSULOSIN HYDROCHLORIDE 0.4 MG: 0.4 CAPSULE ORAL at 16:47

## 2024-01-19 RX ADMIN — DIVALPROEX SODIUM 750 MG: 250 TABLET, FILM COATED, EXTENDED RELEASE ORAL at 21:15

## 2024-01-19 RX ADMIN — DIVALPROEX SODIUM 500 MG: 500 TABLET, EXTENDED RELEASE ORAL at 08:06

## 2024-01-19 RX ADMIN — TRAZODONE HYDROCHLORIDE 100 MG: 100 TABLET ORAL at 21:16

## 2024-01-19 RX ADMIN — MELATONIN TAB 3 MG 3 MG: 3 TAB at 21:16

## 2024-01-19 RX ADMIN — CHOLECALCIFEROL TAB 25 MCG (1000 UNIT) 2000 UNITS: 25 TAB at 08:06

## 2024-01-19 RX ADMIN — GABAPENTIN 300 MG: 300 CAPSULE ORAL at 21:15

## 2024-01-19 NOTE — NURSING NOTE
Pt visible on the unit socializing with peers. Pt is pleasant on approach.Pt visible talking to peer which he was seen sharing drinks with and holding hands with on previous day. Pt needed to be redirected away from peer. Pt denies AH/VH/SI/HI. Pt denies any unmet needs at this time. Continual rounding in place.

## 2024-01-19 NOTE — CASE MANAGEMENT
304 hearing completed with [facility]/DS. Pt did attend. Dr. Bunn participated and testified. Up to 90 additional days of IP psych tx approved. 304 expires on 4/18/24.

## 2024-01-19 NOTE — PROGRESS NOTES
01/19/24 0737   Team Meeting   Meeting Type Daily Rounds   Initial Conference Date 01/19/24   Next Conference Date 01/22/24   Team Members Present   Team Members Present Physician;Nurse;;;Occupational Therapist   Physician Team Member Dr Bunn, Dr Max, ANUSHA Jaffe   Nursing Team Member July   Care Management Team Member Mehreen   Social Work Team Member Tammy   OT Team Member Curt   Patient/Family Present   Patient Present No   Patient's Family Present No     304 hearing this am and upheld, trying to start a relationship with another patient, discharge pending to EAC possible

## 2024-01-19 NOTE — PROGRESS NOTES
"   01/19/24 1100 01/19/24 1400   Activity/Group Checklist   Group Community meeting  (topic refuting negative thoughts.) Wellness  (guided imagery relaxation\"wooded walk\")   Attendance Attended Attended   Attendance Duration (min) 31-45 16-30   Interactions Other (Comment)  (Pt. expressed a desire to stop thinking about his ex girlfriend.Pt. tangential at times yet could replace his negative thoughts with positive past experiances.) Other (Comment)  (Pt. eating an orange also while listening to the guided imagery CD. Remained quiet with these two tasks.)   Affect/Mood Wide Calm;Normal range   Goals Achieved Able to engage in interactions;Able to listen to others;Able to self-disclose;Discussed coping strategies;Identified feelings Able to engage in interactions;Able to listen to others;Discussed coping strategies       "

## 2024-01-19 NOTE — NURSING NOTE
Pt anxious, calling multiple  to represent him in order to be discharged.  Good appetite and steady gait.  VSS.  Med-compliant.  Pt stating that he will pay for staff to see Vinicio Fabian in concert in [place].  Monitored for safety and support.
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
## 2024-01-19 NOTE — PLAN OF CARE
Problem: PSYCHOSIS  Goal: Will report no hallucinations or delusions  Description: Interventions:  - Administer medication as  ordered  - Every waking shifts and PRN assess for the presence of hallucinations and or delusions  - Assist with reality testing to support increasing orientation  - Assess if patient's hallucinations or delusions are encouraging self-harm or harm to others and intervene as appropriate  Outcome: Progressing     Problem: BEHAVIOR  Goal: Pt/Family maintain appropriate behavior and adhere to behavioral management agreement, if implemented  Description: INTERVENTIONS:  - Assess the family dynamic   - Encourage verbalization of thoughts and concerns in a socially appropriate manner  - Assess patient/family's coping skills and non-compliant behavior (including use of illegal substances).  - Utilize positive, consistent limit setting strategies supporting safety of patient, staff and others  - Initiate consult with Case Management, Spiritual Care or other ancillary services as appropriate  - If a patient's/visitor's behavior jeopardizes the safety of the patient, staff, or others, refer to organization procedure.   - Notify Security of behavior or suspected illegal substances which indicate the need for search of the patient and/or belongings  - Encourage participation in the decision making process about a behavioral management agreement; implement if patient meets criteria  Outcome: Progressing     Problem: SLEEP DISTURBANCE  Goal: Will exhibit normal sleeping pattern  Description: Interventions:  -  Assess the patients sleep pattern, noting recent changes  - Administer medication as ordered  - Decrease environmental stimuli, including noise, as appropriate during the night  - Encourage the patient to actively participate in unit groups and or exercise during the day to enhance ability to achieve adequate sleep at night  - Assess the patient, in the morning, encouraging a description of sleep  experience  Outcome: Progressing     Problem: INVOLUNTARY ADMIT  Goal: Will cooperate with staff recommendations and doctor's orders and will demonstrate appropriate behavior  Description: INTERVENTIONS:  - Treat underlying conditions and offer medication as ordered  - Educate regarding involuntary admission procedures and rules  - Utilize positive consistent limit setting strategies to support patient and staff safety  Outcome: Progressing     Problem: Risk for Self Injury/Neglect  Goal: Treatment Goal: Remain safe during length of stay, learn and adopt new coping skills, and be free of self-injurious ideation, impulses and acts at the time of discharge  Outcome: Progressing  Goal: Verbalize thoughts and feelings  Description: Interventions:  - Assess and re-assess patient's lethality and potential for self-injury  - Engage patient in 1:1 interactions, daily, for a minimum of 15 minutes  - Encourage patient to express feelings, fears, frustrations, hopes  - Establish rapport/trust with patient   Outcome: Progressing  Goal: Refrain from harming self  Description: Interventions:  - Monitor patient closely, per order  - Develop a trusting relationship  - Supervise medication ingestion, monitor effects and side effects   Outcome: Progressing  Goal: Recognize maladaptive responses and adopt new coping mechanisms  Outcome: Progressing  Goal: Complete daily ADLs, including personal hygiene independently, as able  Description: Interventions:  - Observe, teach, and assist patient with ADLS  - Monitor and promote a balance of rest/activity, with adequate nutrition and elimination  Outcome: Progressing     Problem: Alteration in Orientation  Goal: Treatment Goal: Demonstrate a reduction of confusion and improved orientation to person, place, time and/or situation upon discharge, according to optimum baseline/ability  Outcome: Progressing  Goal: Interact with staff daily  Description: Interventions:  - Assess and re-assess  patient's level of orientation  - Engage patient in 1 on 1 interactions, daily, for a minimum of 15 minutes   - Establish rapport/trust with patient   Outcome: Progressing  Goal: Express concerns related to confused thinking related to:  Description: Interventions:  - Encourage patient to express feelings, fears, frustrations, hopes  - Assign consistent caregivers   - [place]/re-orient patient as needed  - Allow comfort items, as appropriate  - Provide visual cues, signs, etc.   Outcome: Progressing  Goal: Allow medical examinations, as recommended  Description: Interventions:  - Provide physical/neurological exams and/or referrals, per provider   Outcome: Progressing  Goal: Cooperate with recommended testing/procedures  Description: Interventions:  - Determine need for ancillary testing  - Observe for mental status changes  - Implement falls/precaution protocol   Outcome: Progressing  Goal: Attend and participate in unit activities, including therapeutic, recreational, and educational groups  Description: Interventions:  - Provide therapeutic and educational activities daily, encourage attendance and participation, and document same in the medical record   - Provide appropriate opportunities for reminiscence   - Provide a consistent daily routine   - Encourage family contact/visitation   Outcome: Progressing  Goal: Complete daily ADLs, including personal hygiene independently, as able  Description: Interventions:  - Observe, teach, and assist patient with ADLS  Outcome: Progressing

## 2024-01-20 PROCEDURE — 99232 SBSQ HOSP IP/OBS MODERATE 35: CPT | Performed by: PSYCHIATRY & NEUROLOGY

## 2024-01-20 RX ADMIN — MELATONIN TAB 3 MG 3 MG: 3 TAB at 21:08

## 2024-01-20 RX ADMIN — TAMSULOSIN HYDROCHLORIDE 0.4 MG: 0.4 CAPSULE ORAL at 16:35

## 2024-01-20 RX ADMIN — DIVALPROEX SODIUM 500 MG: 500 TABLET, EXTENDED RELEASE ORAL at 08:22

## 2024-01-20 RX ADMIN — HALOPERIDOL 5 MG: 5 TABLET ORAL at 21:08

## 2024-01-20 RX ADMIN — TRAZODONE HYDROCHLORIDE 100 MG: 100 TABLET ORAL at 21:08

## 2024-01-20 RX ADMIN — HYDROCHLOROTHIAZIDE 12.5 MG: 12.5 TABLET ORAL at 08:23

## 2024-01-20 RX ADMIN — CHOLECALCIFEROL TAB 25 MCG (1000 UNIT) 2000 UNITS: 25 TAB at 08:23

## 2024-01-20 RX ADMIN — GABAPENTIN 300 MG: 300 CAPSULE ORAL at 21:08

## 2024-01-20 RX ADMIN — DIVALPROEX SODIUM 750 MG: 250 TABLET, FILM COATED, EXTENDED RELEASE ORAL at 21:08

## 2024-01-20 NOTE — NURSING NOTE
Patient was calm, pleasant on approach, social with staff reporting no needs today. He also denied the current s/s including psych s/s. Denied any issues with his arm. He remained NWB RUE. Meals, med compliant, will continue to monitor.

## 2024-01-20 NOTE — PROGRESS NOTES
Progress Note - Behavioral Health     Sudhakar Choe 58 y.o. male MRN: @MRN   Unit/Bed#: [facility] 647-01 Encounter: [id number]    Per nursing report and sign out, patient can be bizarre, grandiose, irritable. No SI    Sudhakar Choe reports today that he is in no pain but very upset about his arm and feels he has been mistreated, plans to christiano 'everyone'. No issue presently but issues he had before. Notes no issue with his arm's treatment at presently. No other issues acutely. He notes depression and anxiety are absent, and denies SI, HI, AVH. Focused on police, staff, provider, and his grievances, nothing of which seemed actionable for his perceptions to improve or resolve .     Energy:  good  Sleep: normal  Appetite: normal  Medication side effects: No   ROS: all other systems are negative    Mental Status Evaluation:    Appearance:  dressed casually, disheveled   Behavior:  pleasant, cooperative, with good eye contact   Speech:  Normal volume, regular rate and rhythm   Mood:  euthymic   Affect:  dysphoric, irritated       Thought Process:  perserverative, concrete   Associations: intact associations   Thought Content:  negative thinking and cognitive distortions   Perceptual Disturbances: no auditory or visual hallcunations   Risk Potential: Suicidal ideation - None  Homicidal ideation - None  Potential for aggression - No   Sensorium:  Grossly oriented   Cognition:  grossly intact   Consciousness:  Alert & Awake   Memory:  recent and remote memory grossly intact   Attention: attention span and concentration are age appropriate           Insight:  poor   Judgment: limited   Muscle Strength  Muscle Tone normal  normal   Gait/Station: normal gait/station with good balance   Motor Activity: no abnormal movements       Vital signs in last 24 hours:    Temp:  [97.8 °F (36.6 °C)] 97.8 °F (36.6 °C)  HR:  [81-87] 87  Resp:  [17] 17  BP: (106-111)/(63-65) 111/63    Laboratory results:  I have personally reviewed all  pertinent laboratory/tests results.    Assessment/Plan   Principal Problem:    Bipolar affective disorder, current episode manic with psychotic symptoms (HCC)  Active Problems:    Benign prostatic hyperplasia    Brachial plexus injury, right, sequela    Medical clearance for psychiatric admission    Bilateral lower extremity edema      Sudhakar Choe is relatively unchanged    Recommended Treatment:     Planned medication and treatment changes:    All current active medications have been reviewed.  Encourage group therapy, milieu therapy and occupational therapy  Behavioral Health checks as unit standard unless ordered or noted otherwise    Current Facility-Administered Medications   Medication Dose Route Frequency Provider Last Rate    acetaminophen  650 mg Oral Q4H PRN Jenn Strickland, CRNP      acetaminophen  650 mg Oral Q4H PRN Reji Looney DO      acetaminophen  975 mg Oral Q6H PRN Jenn Strickland, CRNP      cholecalciferol  2,000 Units Oral Daily Rossi Carlson PA-C      divalproex sodium  500 mg Oral Daily Rory Bunn MD      divalproex sodium  750 mg Oral HS Rory Bunn MD      gabapentin  300 mg Oral HS Rory Bunn MD      haloperidol  5 mg Oral HS LYLA NovakNP      hydrochlorothiazide  12.5 mg Oral Daily Dorene Pelayo, CRNP      hydrOXYzine HCL  25 mg Oral Q6H PRN Max 4/day Jenn Strickland, CRNP      hydrOXYzine HCL  50 mg Oral Q6H PRN Max 4/day Jenn Strickland, LYLANP      LORazepam  1 mg Intramuscular Q6H PRN Max 3/day Jenn Strickland, CRNP      LORazepam  1 mg Oral Q6H PRN Max 3/day Jenn Strickland, CRNP      melatonin  3 mg Oral HS Rory Bunn MD      mirtazapine  7.5 mg Oral HS PRN Rory Bunn MD      nicotine  1 patch Transdermal Daily PRN Fabiana Sousa MD      OLANZapine  5 mg Intramuscular Q3H PRN Max 3/day Jenn Strickland, CRNP      OLANZapine  2.5 mg Oral Q4H PRN Max 6/day Jenn Strickland, LYLANP      OLANZapine  5 mg Oral Q4H PRN Max 3/day  BETO Novak      OLANZapine  5 mg Oral Q3H PRN Max 3/day BETO Novak      paliperidone  234 mg IM- Deltoid Q4 weeks Rory Bunn MD      polyethylene glycol  17 g Oral Daily PRN BETO Novak      senna-docusate sodium  1 tablet Oral Daily PRN BETO Novak      tamsulosin  0.4 mg Oral Daily With Dinner Rossi Carlson PA-C      traZODone  100 mg Oral HS Rory Bunn MD         1) continue current treatment  2) Continue to support patient and engage them in the programs available as feasible and appropriate. Continue case management support and therapy. Continue discharge planning.      Risks / Benefits of Treatment:    Risks, benefits, and possible side effects of medications explained to patient and patient verbalizes understanding and agreement for treatment.    Counseling / Coordination of Care:    Medication changes reviewed with nursing staff.  Medications, treatment progress and treatment plan reviewed with patient.      Alhaji Mane III, DO  1/20/2024  9:17 AM

## 2024-01-20 NOTE — NURSING NOTE
"Patient visible on the unit pacing the halls or sitting with peers in the dining room throughout the shift. Patient cooperative with assessment questions, currently denies depression, anxiety, SI/HI/AVH. Patient irritable for the majority of the shift, often argumentative with staff with things like room checks, medications, or proper use of sling.  Patient also frequently telling staff \"I'm going to christiano you\" and requiring redirection and limit setting. Patient compliant with medications and meals, appetite good. No further signs of distress noted.  "

## 2024-01-20 NOTE — NURSING NOTE
"Patient's son Dave Choe calling the unit requesting update from writer. Dave reporting he has received multiple calls from patient and \"bizarre messages and lies\". Update provided, family reassured. Son requesting an update from  next week, stating he does not want an update from psychiatry due to \"wanting to be as little involved as possible\".  "

## 2024-01-21 PROCEDURE — 99232 SBSQ HOSP IP/OBS MODERATE 35: CPT | Performed by: PSYCHIATRY & NEUROLOGY

## 2024-01-21 RX ADMIN — MELATONIN TAB 3 MG 3 MG: 3 TAB at 21:19

## 2024-01-21 RX ADMIN — HALOPERIDOL 5 MG: 5 TABLET ORAL at 21:19

## 2024-01-21 RX ADMIN — CHOLECALCIFEROL TAB 25 MCG (1000 UNIT) 2000 UNITS: 25 TAB at 08:17

## 2024-01-21 RX ADMIN — DIVALPROEX SODIUM 500 MG: 500 TABLET, EXTENDED RELEASE ORAL at 08:17

## 2024-01-21 RX ADMIN — TRAZODONE HYDROCHLORIDE 100 MG: 100 TABLET ORAL at 21:19

## 2024-01-21 RX ADMIN — DIVALPROEX SODIUM 750 MG: 250 TABLET, FILM COATED, EXTENDED RELEASE ORAL at 21:19

## 2024-01-21 RX ADMIN — GABAPENTIN 300 MG: 300 CAPSULE ORAL at 21:19

## 2024-01-21 RX ADMIN — TAMSULOSIN HYDROCHLORIDE 0.4 MG: 0.4 CAPSULE ORAL at 17:05

## 2024-01-21 NOTE — PLAN OF CARE
Problem: PSYCHOSIS  Goal: Will report no hallucinations or delusions  Description: Interventions:  - Administer medication as  ordered  - Every waking shifts and PRN assess for the presence of hallucinations and or delusions  - Assist with reality testing to support increasing orientation  - Assess if patient's hallucinations or delusions are encouraging self-harm or harm to others and intervene as appropriate  Outcome: Progressing     Problem: BEHAVIOR  Goal: Pt/Family maintain appropriate behavior and adhere to behavioral management agreement, if implemented  Description: INTERVENTIONS:  - Assess the family dynamic   - Encourage verbalization of thoughts and concerns in a socially appropriate manner  - Assess patient/family's coping skills and non-compliant behavior (including use of illegal substances).  - Utilize positive, consistent limit setting strategies supporting safety of patient, staff and others  - Initiate consult with Case Management, Spiritual Care or other ancillary services as appropriate  - If a patient's/visitor's behavior jeopardizes the safety of the patient, staff, or others, refer to organization procedure.   - Notify Security of behavior or suspected illegal substances which indicate the need for search of the patient and/or belongings  - Encourage participation in the decision making process about a behavioral management agreement; implement if patient meets criteria  Outcome: Progressing     Problem: SLEEP DISTURBANCE  Goal: Will exhibit normal sleeping pattern  Description: Interventions:  -  Assess the patients sleep pattern, noting recent changes  - Administer medication as ordered  - Decrease environmental stimuli, including noise, as appropriate during the night  - Encourage the patient to actively participate in unit groups and or exercise during the day to enhance ability to achieve adequate sleep at night  - Assess the patient, in the morning, encouraging a description of sleep  experience  Outcome: Progressing     Problem: INVOLUNTARY ADMIT  Goal: Will cooperate with staff recommendations and doctor's orders and will demonstrate appropriate behavior  Description: INTERVENTIONS:  - Treat underlying conditions and offer medication as ordered  - Educate regarding involuntary admission procedures and rules  - Utilize positive consistent limit setting strategies to support patient and staff safety  Outcome: Progressing     Problem: Risk for Self Injury/Neglect  Goal: Treatment Goal: Remain safe during length of stay, learn and adopt new coping skills, and be free of self-injurious ideation, impulses and acts at the time of discharge  Outcome: Progressing  Goal: Verbalize thoughts and feelings  Description: Interventions:  - Assess and re-assess patient's lethality and potential for self-injury  - Engage patient in 1:1 interactions, daily, for a minimum of 15 minutes  - Encourage patient to express feelings, fears, frustrations, hopes  - Establish rapport/trust with patient   Outcome: Progressing  Goal: Refrain from harming self  Description: Interventions:  - Monitor patient closely, per order  - Develop a trusting relationship  - Supervise medication ingestion, monitor effects and side effects   Outcome: Progressing  Goal: Attend and participate in unit activities, including therapeutic, recreational, and educational groups  Description: Interventions:  - Provide therapeutic and educational activities daily, encourage attendance and participation, and document same in the medical record  - Obtain collateral information, encourage visitation and family involvement in care   Outcome: Progressing  Goal: Recognize maladaptive responses and adopt new coping mechanisms  Outcome: Progressing  Goal: Complete daily ADLs, including personal hygiene independently, as able  Description: Interventions:  - Observe, teach, and assist patient with ADLS  - Monitor and promote a balance of rest/activity, with  adequate nutrition and elimination  Outcome: Progressing     Problem: Alteration in Orientation  Goal: Treatment Goal: Demonstrate a reduction of confusion and improved orientation to person, place, time and/or situation upon discharge, according to optimum baseline/ability  Outcome: Progressing  Goal: Interact with staff daily  Description: Interventions:  - Assess and re-assess patient's level of orientation  - Engage patient in 1 on 1 interactions, daily, for a minimum of 15 minutes   - Establish rapport/trust with patient   Outcome: Progressing  Goal: Express concerns related to confused thinking related to:  Description: Interventions:  - Encourage patient to express feelings, fears, frustrations, hopes  - Assign consistent caregivers   - [place]/re-orient patient as needed  - Allow comfort items, as appropriate  - Provide visual cues, signs, etc.   Outcome: Progressing  Goal: Allow medical examinations, as recommended  Description: Interventions:  - Provide physical/neurological exams and/or referrals, per provider   Outcome: Progressing  Goal: Cooperate with recommended testing/procedures  Description: Interventions:  - Determine need for ancillary testing  - Observe for mental status changes  - Implement falls/precaution protocol   Outcome: Progressing  Goal: Attend and participate in unit activities, including therapeutic, recreational, and educational groups  Description: Interventions:  - Provide therapeutic and educational activities daily, encourage attendance and participation, and document same in the medical record   - Provide appropriate opportunities for reminiscence   - Provide a consistent daily routine   - Encourage family contact/visitation   Outcome: Progressing  Goal: Complete daily ADLs, including personal hygiene independently, as able  Description: Interventions:  - Observe, teach, and assist patient with ADLS  Outcome: Progressing     Problem: DISCHARGE PLANNING - CARE MANAGEMENT  Goal:  Discharge to post-acute care or home with appropriate resources  Description: INTERVENTIONS:  - Conduct assessment to determine patient/family and health care team treatment goals, and need for post-acute services based on payer coverage, community resources, and patient preferences, and barriers to discharge  - Address psychosocial, clinical, and financial barriers to discharge as identified in assessment in conjunction with the patient/family and health care team  - Arrange appropriate level of post-acute services according to patient’s   needs and preference and payer coverage in collaboration with the physician and health care team  - Communicate with and update the patient/family, physician, and health care team regarding progress on the discharge plan  - Arrange appropriate transportation to post-acute venues  Outcome: Progressing

## 2024-01-21 NOTE — NURSING NOTE
Pt is visible on unit, cooperative with care, social with peers and staff. Pt denies all psych s/s, meal and medication complaint. Safety checks ongoing.

## 2024-01-21 NOTE — PROGRESS NOTES
Progress Note - Behavioral Health     Sudhakar hCoe 58 y.o. male MRN: @MRN   Unit/Bed#: [facility] 647-01 Encounter: [id number]    Per nursing report and sign out, patient had no acute changes or issues    Sudhakar Choe reports today that he is doing well today, thanked me for my time listening to him. Notes no depression or anxiety present, and denies SI, HI, AVH, paranoia or other concerns. Sleep, energy, and appetite reported to be good. No pain or other concerns.   Medication side effects: No   ROS: all other systems are negative    Mental Status Evaluation:    Appearance:  age appropriate   Behavior:  pleasant, cooperative, with good eye contact   Speech:  Normal volume, regular rate and rhythm   Mood:  euthymic   Affect:  constricted       Thought Process:  Linear and goal directed   Associations: intact associations   Thought Content:  negative thinking and cognitive distortions   Perceptual Disturbances: no auditory or visual hallcunations   Risk Potential: Suicidal ideation - None  Homicidal ideation - None  Potential for aggression - No   Sensorium:  Grossly oriented   Cognition:  grossly intact   Consciousness:  Alert & Awake   Memory:  recent and remote memory grossly intact   Attention: attention span and concentration are age appropriate           Insight:  poor   Judgment: limited   Muscle Strength  Muscle Tone normal  normal   Gait/Station: normal gait/station with good balance   Motor Activity: no abnormal movements       Vital signs in last 24 hours:    Temp:  [97.1 °F (36.2 °C)-98.3 °F (36.8 °C)] 97.3 °F (36.3 °C)  HR:  [78-84] 78  Resp:  [16-18] 16  BP: ()/(62-76) 95/62    Laboratory results:  I have personally reviewed all pertinent laboratory/tests results.    Assessment/Plan   Principal Problem:    Bipolar affective disorder, current episode manic with psychotic symptoms (HCC)  Active Problems:    Benign prostatic hyperplasia    Brachial plexus injury, right, sequela    Medical  clearance for psychiatric admission    Bilateral lower extremity edema      Sudhakar Choe is relatively unchanged, but was less irritable/depressed today in presentation    Recommended Treatment:     Planned medication and treatment changes:    All current active medications have been reviewed.  Encourage group therapy, milieu therapy and occupational therapy  Behavioral Health checks as unit standard unless ordered or noted otherwise    Current Facility-Administered Medications   Medication Dose Route Frequency Provider Last Rate    acetaminophen  650 mg Oral Q4H PRN Jenn Strickland, CRNP      acetaminophen  650 mg Oral Q4H PRN Reji Looney DO      acetaminophen  975 mg Oral Q6H PRN Jenn Strickland, CRNP      cholecalciferol  2,000 Units Oral Daily Rossi Carlson PA-C      divalproex sodium  500 mg Oral Daily Rory Bunn MD      divalproex sodium  750 mg Oral HS Rory Bunn MD      gabapentin  300 mg Oral HS Rory Bunn MD      haloperidol  5 mg Oral HS Jenn Strickland, CRNP      hydrochlorothiazide  12.5 mg Oral Daily Dorene Pelayo, LYLANP      hydrOXYzine HCL  25 mg Oral Q6H PRN Max 4/day Jenn Strickland, CRNP      hydrOXYzine HCL  50 mg Oral Q6H PRN Max 4/day Jenn Strickland, CRNP      LORazepam  1 mg Intramuscular Q6H PRN Max 3/day Jenn Strickland, CRNP      LORazepam  1 mg Oral Q6H PRN Max 3/day Jenn Strickland, CRNP      melatonin  3 mg Oral HS Rory Bunn MD      mirtazapine  7.5 mg Oral HS PRN Rory Bunn MD      nicotine  1 patch Transdermal Daily PRN Fabiana Sousa MD      OLANZapine  5 mg Intramuscular Q3H PRN Max 3/day Jenn Strickland, CRNP      OLANZapine  2.5 mg Oral Q4H PRN Max 6/day Jenn Strickland, CRNP      OLANZapine  5 mg Oral Q4H PRN Max 3/day Jenn Strickland, CRNP      OLANZapine  5 mg Oral Q3H PRN Max 3/day Jenn Strickland, LYLANP      paliperidone  234 mg IM- Deltoid Q4 weeks Rory Bunn MD      polyethylene glycol  17 g Oral Daily PRN Jenn  BETO Strickland      senna-docusate sodium  1 tablet Oral Daily PRN BETO Novak      tamsulosin  0.4 mg Oral Daily With Dinner Rossi Carlson PA-C      traZODone  100 mg Oral HS Rory Bunn MD         1) continue current treatment  2) Continue to support patient and engage them in the programs available as feasible and appropriate. Continue case management support and therapy. Continue discharge planning.      Risks / Benefits of Treatment:    Risks, benefits, and possible side effects of medications explained to patient and patient verbalizes understanding and agreement for treatment.    Counseling / Coordination of Care:    Medication changes reviewed with nursing staff.  Medications, treatment progress and treatment plan reviewed with patient.      Alhaji Mane III,   1/21/2024  1:46 PM

## 2024-01-21 NOTE — NURSING NOTE
Patient visible on unit. Social with select peers. Compliant with meals and medication. Denies all s/s at this time. Pleasantly confused throughout shift. Safety checks ongoing.

## 2024-01-21 NOTE — NURSING NOTE
Received a phone call from Rhea at 0100. Rhea proceeded to asking about pt's hearing last Friday. When informed that Pt's 304 status was upheld at hearing, Rhea continued to question the hearing process. This writer directed Rhea to call and speak with the provider or  during the week. Hospital supervisor made aware of same.

## 2024-01-22 PROCEDURE — 99232 SBSQ HOSP IP/OBS MODERATE 35: CPT | Performed by: STUDENT IN AN ORGANIZED HEALTH CARE EDUCATION/TRAINING PROGRAM

## 2024-01-22 RX ORDER — DIVALPROEX SODIUM 500 MG/1
1000 TABLET, EXTENDED RELEASE ORAL
Status: DISPENSED | OUTPATIENT
Start: 2024-01-22

## 2024-01-22 RX ADMIN — CHOLECALCIFEROL TAB 25 MCG (1000 UNIT) 2000 UNITS: 25 TAB at 08:31

## 2024-01-22 RX ADMIN — DIVALPROEX SODIUM 1000 MG: 500 TABLET, EXTENDED RELEASE ORAL at 21:18

## 2024-01-22 RX ADMIN — MELATONIN TAB 3 MG 3 MG: 3 TAB at 21:18

## 2024-01-22 RX ADMIN — DIVALPROEX SODIUM 500 MG: 500 TABLET, EXTENDED RELEASE ORAL at 08:30

## 2024-01-22 RX ADMIN — TAMSULOSIN HYDROCHLORIDE 0.4 MG: 0.4 CAPSULE ORAL at 17:02

## 2024-01-22 RX ADMIN — HALOPERIDOL 5 MG: 5 TABLET ORAL at 21:18

## 2024-01-22 RX ADMIN — GABAPENTIN 300 MG: 300 CAPSULE ORAL at 21:18

## 2024-01-22 RX ADMIN — TRAZODONE HYDROCHLORIDE 100 MG: 100 TABLET ORAL at 21:18

## 2024-01-22 NOTE — PLAN OF CARE
Pt attends group and participates.    Problem: Risk for Self Injury/Neglect  Goal: Attend and participate in unit activities, including therapeutic, recreational, and educational groups  Description: Interventions:  - Provide therapeutic and educational activities daily, encourage attendance and participation, and document same in the medical record  - Obtain collateral information, encourage visitation and family involvement in care   Outcome: Progressing

## 2024-01-22 NOTE — PLAN OF CARE
Problem: PSYCHOSIS  Goal: Will report no hallucinations or delusions  Description: Interventions:  - Administer medication as  ordered  - Every waking shifts and PRN assess for the presence of hallucinations and or delusions  - Assist with reality testing to support increasing orientation  - Assess if patient's hallucinations or delusions are encouraging self-harm or harm to others and intervene as appropriate  Outcome: Progressing     Problem: Risk for Self Injury/Neglect  Goal: Verbalize thoughts and feelings  Description: Interventions:  - Assess and re-assess patient's lethality and potential for self-injury  - Engage patient in 1:1 interactions, daily, for a minimum of 15 minutes  - Encourage patient to express feelings, fears, frustrations, hopes  - Establish rapport/trust with patient   Outcome: Progressing  Goal: Refrain from harming self  Description: Interventions:  - Monitor patient closely, per order  - Develop a trusting relationship  - Supervise medication ingestion, monitor effects and side effects   Outcome: Progressing  Goal: Complete daily ADLs, including personal hygiene independently, as able  Description: Interventions:  - Observe, teach, and assist patient with ADLS  - Monitor and promote a balance of rest/activity, with adequate nutrition and elimination  Outcome: Progressing     Problem: Alteration in Orientation  Goal: Interact with staff daily  Description: Interventions:  - Assess and re-assess patient's level of orientation  - Engage patient in 1 on 1 interactions, daily, for a minimum of 15 minutes   - Establish rapport/trust with patient   Outcome: Progressing

## 2024-01-22 NOTE — PROGRESS NOTES
"Progress Note - Behavioral Health   Sudhakar Choe 58 y.o. male MRN: [id number]  Unit/Bed#: [facility] 647-01 Encounter: [id number]    Patient was seen today for continuation of care, records reviewed and patient was discussed with the morning case review team.    Sudhakar was seen today for psychiatric follow-up.  On assessment today, Sudhakar was more pleasant and less disorganized. Verbal grievances still noted, patient continues to make this writer aware of his \" pending\" lawsuit towards this writer and attending.  Paranoid ideation and persecutory delusions also noted, patient is also hoarding items and insist on bringing them with him into the day room due to his belief that staff will steal from him.  Medications evaluated, will increase Depakote to 1000 mg nightly for ongoing symptoms.  VPA level to be obtained on 1/25/2024.  Patient still needs the use of 2 antipsychotics due to multiple failed trials of monotherapy.  Tolerating all medications well with no adverse effects.  Discharge disposition ongoing as patient will need referral to [facility] to assist with transition into the community.    Sudhakar denies acute suicidal/self-harm ideation/intent/plan upon direct inquiry at this time.  Sudhakar remains behaviorally appropriate, no agitation or aggression noted on exam or in report.  Sudhakar also denies HI/AH/VH, and does not appear overtly manic.  No overt delusions or paranoia are verbalized. Impulse control is intact.  Sudhakar remains adherent to his current psychotropic medication regimen and denies any side effects from medications, as well as none noted on exam.    Vitals:  Vitals:    01/22/24 0900   BP: 94/67   Pulse: 71   Resp: 16   Temp: 97.9 °F (36.6 °C)   SpO2: 98%       Laboratory Results:  I have personally reviewed all pertinent laboratory/tests results.    Psychiatric Review of Systems:  Behavior over the last 24 hours:  unchanged.   Sleep: good  Appetite: good  Medication side effects: none  ROS: no complaints, " denies shortness of breath or chest pain and all other systems are negative for acute changes    Mental Status Evaluation:  Appearance:  marginal hygiene   Behavior:  cooperative, calm, bizarre   Speech:  normal rate and volume   Mood:  irritable, less manic   Affect:  constricted   Thought Process:  disorganized   Thought Content:  persecutory delusions, paranoid ideation   Perceptual Disturbances: denies auditory hallucinations when asked, does not appear responding to internal stimuli   Risk Potential: Suicidal ideation - None  Homicidal ideation - None  Potential for aggression - No   Memory:  recent and remote memory grossly intact   Sensorium  person, place, and time/date      Consciousness:  alert and awake   Attention: attention span and concentration are age appropriate   Insight:  limited   Judgment: limited   Gait/Station: normal gait/station   Motor Activity: no abnormal movements   Progress Toward Goals:   Sudhakar is progressing towards goals of inpatient psychiatric treatment by continued medication compliance and is attending therapeutic modalities on the milieu. However, the patient continues to require inpatient psychiatric hospitalization for continued medication management and titration to optimize symptom reduction, improve sleep hygiene, and demonstrate adequate self-care.    Assessment/Plan   Principal Problem:    Bipolar affective disorder, current episode manic with psychotic symptoms (HCC)  Active Problems:    Benign prostatic hyperplasia    Brachial plexus injury, right, sequela    Medical clearance for psychiatric admission    Bilateral lower extremity edema      Recommended Treatment: Treatment plan and medication changes discussed and per the attending physician the plan is:    1.Continue with group therapy, milieu therapy and occupational therapy  2.Behavioral Health checks every 7 minutes  3.Continue frequent safety checks and vitals per unit protocol  4.Continue with SLIM medical  management as indicated  5.Continue with current medication regimen  6.Will review labs in the a.m.  7.Disposition Planning: Discharge planning and efforts remain ongoing    Behavioral Health Medications: all current active meds have been reviewed and continue current psychiatric medications.  Current Facility-Administered Medications   Medication Dose Route Frequency Provider Last Rate    acetaminophen  650 mg Oral Q4H PRN Jenn Strickland, BETO      acetaminophen  650 mg Oral Q4H PRN Reji Looney DO      acetaminophen  975 mg Oral Q6H PRN BETO Novak      cholecalciferol  2,000 Units Oral Daily Rossi Carlson PA-C      divalproex sodium  1,000 mg Oral HS Rory Bunn MD      divalproex sodium  500 mg Oral Daily Rory Bunn MD      gabapentin  300 mg Oral HS Rory Bunn MD      haloperidol  5 mg Oral HS BETO Novak      hydrochlorothiazide  12.5 mg Oral Daily BETO Garcia      hydrOXYzine HCL  25 mg Oral Q6H PRN Max 4/day BETO Novak      hydrOXYzine HCL  50 mg Oral Q6H PRN Max 4/day BETO Novak      LORazepam  1 mg Intramuscular Q6H PRN Max 3/day BETO Novak      LORazepam  1 mg Oral Q6H PRN Max 3/day BETO Novak      melatonin  3 mg Oral HS Rory Bunn MD      mirtazapine  7.5 mg Oral HS PRN Rory Bunn MD      nicotine  1 patch Transdermal Daily PRN Fabiana Sousa MD      OLANZapine  5 mg Intramuscular Q3H PRN Max 3/day BETO Novak      OLANZapine  2.5 mg Oral Q4H PRN Max 6/day BETO Novak      OLANZapine  5 mg Oral Q4H PRN Max 3/day BEOT Novak      OLANZapine  5 mg Oral Q3H PRN Max 3/day BETO Novak      [START ON 1/31/2024] paliperidone  234 mg IM- Deltoid Q4 weeks BETO Novak      polyethylene glycol  17 g Oral Daily PRN BETO Novak      senna-docusate sodium  1 tablet Oral Daily PRN BETO Novak      tamsulosin  0.4 mg Oral Daily  With Genia Carlson PA-C      traZODone  100 mg Oral HS Rory Bunn MD         Risks / Benefits of Treatment:  Risks, benefits, and possible side effects of medications explained to patient and patient verbalizes understanding and agreement for treatment.    Counseling / Coordination of Care:  Patient's progress reviewed with nursing staff.  Medications, treatment progress and treatment plan reviewed with patient.  Supportive counseling provided to the patient.    Total floor/unit time spent today 25 minutes. Greater than 50% of total time was spent with the patient and / or family counseling and / or coordination of care. A description of the counseling / coordination of care: medication education, treatment plan, supportive therapy.    This note was completed in part utilizing Dragon dictation Software. Grammatical, translation, syntax errors, random word insertions, spelling mistakes, and incomplete sentences may be an occasional consequence of this system secondary to software limitations with voice recognition, ambient noise, and hardware issues. If you have any questions or concerns about the content, text, or information contained within the body of this dictation, please contact the provider for clarification

## 2024-01-22 NOTE — PROGRESS NOTES
01/22/24 0811   Team Meeting   Meeting Type Daily Rounds   Initial Conference Date 01/22/24   Next Conference Date 01/23/24   Team Members Present   Team Members Present Physician;Nurse;;;Occupational Therapist   Physician Team Member Dr Bnun, Dr Max, ANUSHA Jaffe   Nursing Team Member Manas   Care Management Team Member Mehreen   Social Work Team Member Tammy ROMAN Team Member Curt   Patient/Family Present   Patient Present No   Patient's Family Present No     Add Marla - Pharmacy: will go further on haldol, CPC meeting scheduled for 1/26/24 @ 11 am, paranoid, on the phone all the time, discharge pending

## 2024-01-22 NOTE — NURSING NOTE
"Patient visible on the unit sitting with peers in the dining room or pacing in the halls, social with staff and peers. Patient brightens on approach, reporting a \"great\" mood and denies depression, anxiety, SI/HI/AVH. Patient irritable at times throughout shift, easily redirectable. Compliant with medications and meals, appetite good. No further signs of distress noted.  "

## 2024-01-22 NOTE — PROGRESS NOTES
Pt attended all groups.  Pt volunteered to read Serenity prayer.  Pt sleepy at end of group.  Pt noted he forgot that he attended am group.      01/22/24 1330   Activity/Group Checklist   Group Other (Comment)  (Community support and resources)   Attendance Attended   Attendance Duration (min) 46-60   Interactions Interacted appropriately   Affect/Mood Appropriate;Other (Comment)  (sleepy at end)   Goals Achieved Identified feelings;Identified relapse prevention strategies;Identified resources and support systems;Discussed self-esteem issues;Discussed coping strategies;Verbalized increased hopefulness;Able to reflect/comment on own behavior;Able to manage/cope with feelings;Able to engage in interactions;Able to listen to others

## 2024-01-22 NOTE — NURSING NOTE
Pt anxious with wide affect.  BP 94/67, HCTZ held at 0900.  Good appetite.  Did not attend group.  Pt with poor insight, continues with paranoia and persecutory delusions.  Pt social with peers, no agitation noted.  Monitored for safety and support.

## 2024-01-23 LAB
CHOLEST SERPL-MCNC: 124 MG/DL
EST. AVERAGE GLUCOSE BLD GHB EST-MCNC: 131 MG/DL
HBA1C MFR BLD: 6.2 %
HDLC SERPL-MCNC: 28 MG/DL
LDLC SERPL CALC-MCNC: 73 MG/DL (ref 0–100)
NONHDLC SERPL-MCNC: 96 MG/DL
TRIGL SERPL-MCNC: 116 MG/DL

## 2024-01-23 PROCEDURE — 99232 SBSQ HOSP IP/OBS MODERATE 35: CPT | Performed by: STUDENT IN AN ORGANIZED HEALTH CARE EDUCATION/TRAINING PROGRAM

## 2024-01-23 PROCEDURE — 83036 HEMOGLOBIN GLYCOSYLATED A1C: CPT | Performed by: STUDENT IN AN ORGANIZED HEALTH CARE EDUCATION/TRAINING PROGRAM

## 2024-01-23 PROCEDURE — 80061 LIPID PANEL: CPT | Performed by: STUDENT IN AN ORGANIZED HEALTH CARE EDUCATION/TRAINING PROGRAM

## 2024-01-23 RX ORDER — HALOPERIDOL 10 MG/1
10 TABLET ORAL
Status: DISCONTINUED | OUTPATIENT
Start: 2024-01-23 | End: 2024-02-04

## 2024-01-23 RX ADMIN — TRAZODONE HYDROCHLORIDE 100 MG: 100 TABLET ORAL at 21:09

## 2024-01-23 RX ADMIN — TAMSULOSIN HYDROCHLORIDE 0.4 MG: 0.4 CAPSULE ORAL at 16:37

## 2024-01-23 RX ADMIN — DIVALPROEX SODIUM 500 MG: 500 TABLET, EXTENDED RELEASE ORAL at 08:22

## 2024-01-23 RX ADMIN — CHOLECALCIFEROL TAB 25 MCG (1000 UNIT) 2000 UNITS: 25 TAB at 08:22

## 2024-01-23 RX ADMIN — GABAPENTIN 300 MG: 300 CAPSULE ORAL at 21:09

## 2024-01-23 RX ADMIN — DIVALPROEX SODIUM 1000 MG: 500 TABLET, EXTENDED RELEASE ORAL at 21:09

## 2024-01-23 RX ADMIN — MELATONIN TAB 3 MG 3 MG: 3 TAB at 21:09

## 2024-01-23 RX ADMIN — HYDROCHLOROTHIAZIDE 12.5 MG: 12.5 TABLET ORAL at 08:22

## 2024-01-23 RX ADMIN — HALOPERIDOL 10 MG: 10 TABLET ORAL at 21:09

## 2024-01-23 NOTE — PLAN OF CARE
Problem: PSYCHOSIS  Goal: Will report no hallucinations or delusions  Description: Interventions:  - Administer medication as  ordered  - Every waking shifts and PRN assess for the presence of hallucinations and or delusions  - Assist with reality testing to support increasing orientation  - Assess if patient's hallucinations or delusions are encouraging self-harm or harm to others and intervene as appropriate  Outcome: Progressing     Problem: BEHAVIOR  Goal: Pt/Family maintain appropriate behavior and adhere to behavioral management agreement, if implemented  Description: INTERVENTIONS:  - Assess the family dynamic   - Encourage verbalization of thoughts and concerns in a socially appropriate manner  - Assess patient/family's coping skills and non-compliant behavior (including use of illegal substances).  - Utilize positive, consistent limit setting strategies supporting safety of patient, staff and others  - Initiate consult with Case Management, Spiritual Care or other ancillary services as appropriate  - If a patient's/visitor's behavior jeopardizes the safety of the patient, staff, or others, refer to organization procedure.   - Notify Security of behavior or suspected illegal substances which indicate the need for search of the patient and/or belongings  - Encourage participation in the decision making process about a behavioral management agreement; implement if patient meets criteria  Outcome: Progressing     Problem: SLEEP DISTURBANCE  Goal: Will exhibit normal sleeping pattern  Description: Interventions:  -  Assess the patients sleep pattern, noting recent changes  - Administer medication as ordered  - Decrease environmental stimuli, including noise, as appropriate during the night  - Encourage the patient to actively participate in unit groups and or exercise during the day to enhance ability to achieve adequate sleep at night  - Assess the patient, in the morning, encouraging a description of sleep  experience  Outcome: Progressing     Problem: INVOLUNTARY ADMIT  Goal: Will cooperate with staff recommendations and doctor's orders and will demonstrate appropriate behavior  Description: INTERVENTIONS:  - Treat underlying conditions and offer medication as ordered  - Educate regarding involuntary admission procedures and rules  - Utilize positive consistent limit setting strategies to support patient and staff safety  Outcome: Progressing     Problem: Risk for Self Injury/Neglect  Goal: Treatment Goal: Remain safe during length of stay, learn and adopt new coping skills, and be free of self-injurious ideation, impulses and acts at the time of discharge  Outcome: Progressing  Goal: Verbalize thoughts and feelings  Description: Interventions:  - Assess and re-assess patient's lethality and potential for self-injury  - Engage patient in 1:1 interactions, daily, for a minimum of 15 minutes  - Encourage patient to express feelings, fears, frustrations, hopes  - Establish rapport/trust with patient   Outcome: Progressing  Goal: Refrain from harming self  Description: Interventions:  - Monitor patient closely, per order  - Develop a trusting relationship  - Supervise medication ingestion, monitor effects and side effects   Outcome: Progressing  Goal: Attend and participate in unit activities, including therapeutic, recreational, and educational groups  Description: Interventions:  - Provide therapeutic and educational activities daily, encourage attendance and participation, and document same in the medical record  - Obtain collateral information, encourage visitation and family involvement in care   Outcome: Progressing  Goal: Recognize maladaptive responses and adopt new coping mechanisms  Outcome: Progressing  Goal: Complete daily ADLs, including personal hygiene independently, as able  Description: Interventions:  - Observe, teach, and assist patient with ADLS  - Monitor and promote a balance of rest/activity, with  adequate nutrition and elimination  Outcome: Progressing     Problem: Alteration in Orientation  Goal: Treatment Goal: Demonstrate a reduction of confusion and improved orientation to person, place, time and/or situation upon discharge, according to optimum baseline/ability  Outcome: Progressing  Goal: Interact with staff daily  Description: Interventions:  - Assess and re-assess patient's level of orientation  - Engage patient in 1 on 1 interactions, daily, for a minimum of 15 minutes   - Establish rapport/trust with patient   Outcome: Progressing  Goal: Express concerns related to confused thinking related to:  Description: Interventions:  - Encourage patient to express feelings, fears, frustrations, hopes  - Assign consistent caregivers   - Jones/re-orient patient as needed  - Allow comfort items, as appropriate  - Provide visual cues, signs, etc.   Outcome: Not Progressing  Goal: Allow medical examinations, as recommended  Description: Interventions:  - Provide physical/neurological exams and/or referrals, per provider   Outcome: Progressing  Goal: Cooperate with recommended testing/procedures  Description: Interventions:  - Determine need for ancillary testing  - Observe for mental status changes  - Implement falls/precaution protocol   Outcome: Progressing  Goal: Attend and participate in unit activities, including therapeutic, recreational, and educational groups  Description: Interventions:  - Provide therapeutic and educational activities daily, encourage attendance and participation, and document same in the medical record   - Provide appropriate opportunities for reminiscence   - Provide a consistent daily routine   - Encourage family contact/visitation   Outcome: Progressing  Goal: Complete daily ADLs, including personal hygiene independently, as able  Description: Interventions:  - Observe, teach, and assist patient with ADLS  Outcome: Progressing

## 2024-01-23 NOTE — QUICK NOTE
Orthopedics   Sudhakar Choe 58 y.o. male MRN: [id number]  Unit/Bed#: [facility] 647-01 Encounter: [id number]      Assessment/Plan     Assessment:  57 yo male with right distal radius fracture, history of distal radius ORIF with dorsal spanning plate.     Plan:  I spoke with , Orthopedic Hand Surgeon, regarding patient's case. The new transverse distal radius fracture is healing appropriately and will be able to keep being treated non-operatively. But the dorsal wrist spanning plate is currently broken and does pose the risk of future tendon rupture with attempted wrist or finger motion after the fracture is healed and splint is removed. Due to this the Right volar splint is to remain in place until follow-up in the outpatient setting to discussion of wrist plate removal once the fracture is healed. Maintain splint at all times and prevent from getting wet.  Continue splint to RUE.   Continue NWB to RUE.   May wear sling for comfort. Sling can come off for shoulder motion.   Pain control prn.   Medical management per primary team.   Ortho signing off  He will require outpatient follow-up with Hand Surgery in 2 weeks, if he is still in house please re-consult ortho    Lainey Delcid PA-C

## 2024-01-23 NOTE — PROGRESS NOTES
Pt attended afternoon group.  Pt calm and cooperative.     01/23/24 1300   Activity/Group Checklist   Group Other (Comment)  (poetry and reflection (IMELDA philip))   Attendance Attended   Attendance Duration (min) 46-60   Interactions Interacted appropriately   Affect/Mood Appropriate   Goals Achieved Identified feelings;Able to listen to others;Able to engage in interactions;Able to reflect/comment on own behavior;Able to manage/cope with feelings;Verbalized increased hopefulness;Able to self-disclose;Able to recieve feedback

## 2024-01-23 NOTE — NURSING NOTE
Pt pleasant and med-compliant with good appetite and steady gait.  VSS.  Attended group.  No delusions or inappropriate sexual behavior noted.  Monitored for safety and support.

## 2024-01-23 NOTE — PROGRESS NOTES
01/23/24 0805   Team Meeting   Meeting Type Daily Rounds   Initial Conference Date 01/23/24   Next Conference Date 01/24/24   Team Members Present   Team Members Present Physician;Nurse;;;Occupational Therapist   Physician Team Member Dr Bunn, ANUSHA Ash   Nursing Team Member July   Care Management Team Member Mehreen   Social Work Team Member Tammy ROMAN Team Member Curt   Patient/Family Present   Patient Present No   Patient's Family Present No     CPC on Friday, irritable, depakote raised, level on 25th, can increase haldol, invega on 31st, discharge pending.

## 2024-01-24 PROCEDURE — 99232 SBSQ HOSP IP/OBS MODERATE 35: CPT | Performed by: STUDENT IN AN ORGANIZED HEALTH CARE EDUCATION/TRAINING PROGRAM

## 2024-01-24 RX ADMIN — DIVALPROEX SODIUM 500 MG: 500 TABLET, EXTENDED RELEASE ORAL at 08:08

## 2024-01-24 RX ADMIN — HYDROCHLOROTHIAZIDE 12.5 MG: 12.5 TABLET ORAL at 08:09

## 2024-01-24 RX ADMIN — CHOLECALCIFEROL TAB 25 MCG (1000 UNIT) 2000 UNITS: 25 TAB at 08:08

## 2024-01-24 RX ADMIN — HALOPERIDOL 10 MG: 10 TABLET ORAL at 21:16

## 2024-01-24 RX ADMIN — TAMSULOSIN HYDROCHLORIDE 0.4 MG: 0.4 CAPSULE ORAL at 17:07

## 2024-01-24 RX ADMIN — GABAPENTIN 300 MG: 300 CAPSULE ORAL at 21:16

## 2024-01-24 RX ADMIN — TRAZODONE HYDROCHLORIDE 100 MG: 100 TABLET ORAL at 21:16

## 2024-01-24 RX ADMIN — DIVALPROEX SODIUM 1000 MG: 500 TABLET, EXTENDED RELEASE ORAL at 21:16

## 2024-01-24 RX ADMIN — MELATONIN TAB 3 MG 3 MG: 3 TAB at 21:16

## 2024-01-24 NOTE — TREATMENT TEAM
Pt attended ALL groups.  Pt calm and cooperative.  Pt displayed awareness of his mh roecvery process and communicating with CM.      01/24/24 1000   Activity/Group Checklist   Group Community meeting   Attendance Attended   Attendance Duration (min) 31-45   Interactions Interacted appropriately   Affect/Mood Appropriate   Goals Achieved Identified feelings;Identified relapse prevention strategies;Discussed coping strategies;Increased hopefulness;Able to reflect/comment on own behavior;Able to engage in interactions;Able to listen to others;Able to manage/cope with feelings;Verbalized increased hopefulness;Able to self-disclose;Able to recieve feedback

## 2024-01-24 NOTE — NURSING NOTE
"Patient visible on the unit sitting with peers in the dayroom or making phone calls, social with staff on approach and to make needs known. Patient brightens on approach, initially reporting a \"great\" mood and denies depression, anxiety, SI/HI/AVH. Patient briefly irritable and defensive at the end of the assessment questions, requesting to meet with unit manager. Patient redirectable. Compliant with medications and meals, appetite good. No further signs of distress noted.  "

## 2024-01-24 NOTE — NURSING NOTE
Pt pleasant and med-compliant with no delusions noted.  VSS.  Good appetite and steady gait.  Attended group.  Monitored for safety and support.

## 2024-01-24 NOTE — PROGRESS NOTES
01/24/24 1100   Activity/Group Checklist   Group Wellness  (task on breathing relaxation and stress.)   Attendance Attended   Attendance Duration (min) 31-45   Interactions Interacted appropriately   Affect/Mood Appropriate;Normal range;Calm   Goals Achieved Able to engage in interactions;Identified feelings;Discussed coping strategies;Able to listen to others

## 2024-01-24 NOTE — PROGRESS NOTES
Progress Note - Behavioral Health   Sudhakar Choe 58 y.o. male MRN: [id number]  Unit/Bed#: [facility] 647-01 Encounter: [id number]    Patient was seen today for continuation of care, records reviewed and patient was discussed with the morning case review team.    Sudhakar was seen today for psychiatric follow-up.  On assessment today, Sudhakar was calm and cooperative.  Still preoccupied with suing this provider as well as attending physician.  Sudhakar although less irritable, continues to be voice delusions of paranoia.  Haldol recently increased to 10 mg nightly to assist with symptoms, patient continues to need the use of 2 antipsychotics secondary to multiple failures of monotherapy.  VPA level to be obtained tomorrow.  No medication changes to be made at this time.  No changes in sleep or appetite reported.  Discharge disposition ongoing with patient needing referral to [facility].    Sudhakar denies acute suicidal/self-harm ideation/intent/plan upon direct inquiry at this time.  Sudhakar remains behaviorally appropriate, no agitation or aggression noted on exam or in report.  Sudhakar also denies HI/AH/VH, and does not appear overtly manic.  No overt delusions or paranoia are verbalized. Impulse control is intact.  Sudhakar remains adherent to his current psychotropic medication regimen and denies any side effects from medications, as well as none noted on exam.    Vitals:  Vitals:    01/24/24 0700   BP: 104/71   Pulse: (!) 107   Resp: 16   Temp:    SpO2: 97%       Laboratory Results:  I have personally reviewed all pertinent laboratory/tests results.  Most Recent Labs:   Lab Results   Component Value Date    WBC 5.47 12/30/2023    RBC 4.12 12/30/2023    HGB 12.6 12/30/2023    HCT 38.3 12/30/2023     12/30/2023    RDW 13.2 12/30/2023    NEUTROABS 2.60 12/30/2023    SODIUM 137 01/03/2024    K 4.0 01/03/2024     01/03/2024    CO2 24 01/03/2024    BUN 8 01/03/2024    CREATININE 0.78 01/03/2024    GLUC 109 01/03/2024    GLUF  109 (H) 01/03/2024    CALCIUM 8.5 01/03/2024    AST 23 01/03/2024    ALT 18 01/03/2024    ALKPHOS 63 01/03/2024    TP 6.8 01/03/2024    ALB 3.8 01/03/2024    TBILI 0.33 01/03/2024    CHOLESTEROL 124 01/23/2024    HDL 28 (L) 01/23/2024    TRIG 116 01/23/2024    LDLCALC 73 01/23/2024    NONHDLC 96 01/23/2024    VALPROICTOT 76 01/07/2024    KKX8HZWOTVSK 1.431 12/30/2023    RPR Non-Reactive 03/26/2021    HGBA1C 6.2 (H) 01/23/2024     01/23/2024       Psychiatric Review of Systems:  Behavior over the last 24 hours:  unchanged.   Sleep: good  Appetite: good  Medication side effects: none  ROS: no complaints, denies shortness of breath or chest pain and all other systems are negative for acute changes    Mental Status Evaluation:  Appearance:  adequate grooming, overweight   Behavior:  bizarre, demanding   Speech:  normal rate and volume   Mood:  still at times irritable, no longer manic   Affect:  constricted   Thought Process:  linear   Thought Content:  no overt delusions   Perceptual Disturbances: denies auditory hallucinations when asked, does not appear responding to internal stimuli   Risk Potential: Suicidal ideation - None  Homicidal ideation - None  Potential for aggression - No   Memory:  recent and remote memory grossly intact   Sensorium  person, place, and time/date      Consciousness:  alert and awake   Attention: attention span and concentration are age appropriate   Insight:  limited   Judgment: limited   Gait/Station: normal gait/station   Motor Activity: no abnormal movements   Progress Toward Goals:   Sudhakar is progressing towards goals of inpatient psychiatric treatment by continued medication compliance and is attending therapeutic modalities on the milieu. However, the patient continues to require inpatient psychiatric hospitalization for continued medication management and titration to optimize symptom reduction, improve sleep hygiene, and demonstrate adequate self-care.    Assessment/Plan    Principal Problem:    Bipolar affective disorder, current episode manic with psychotic symptoms (HCC)  Active Problems:    Benign prostatic hyperplasia    Brachial plexus injury, right, sequela    Medical clearance for psychiatric admission    Bilateral lower extremity edema      Recommended Treatment: Treatment plan and medication changes discussed and per the attending physician the plan is:    1.Continue with group therapy, milieu therapy and occupational therapy  2.Behavioral Health checks every 7 minutes  3.Continue frequent safety checks and vitals per unit protocol  4.Continue with SLIM medical management as indicated  5.Continue with current medication regimen  6.Will review labs in the a.m.  7.Disposition Planning: Discharge planning and efforts remain ongoing    Behavioral Health Medications: all current active meds have been reviewed and continue current psychiatric medications.  Current Facility-Administered Medications   Medication Dose Route Frequency Provider Last Rate    acetaminophen  650 mg Oral Q4H PRN Jenn Strickland, CRNP      acetaminophen  650 mg Oral Q4H PRN Reji Looney DO      acetaminophen  975 mg Oral Q6H PRN Jenn Strickland, LYLANP      cholecalciferol  2,000 Units Oral Daily Rossi Carlson PA-C      divalproex sodium  1,000 mg Oral HS Rory Bunn MD      divalproex sodium  500 mg Oral Daily Rory Bunn MD      gabapentin  300 mg Oral HS Rory Bunn MD      haloperidol  10 mg Oral HS Jenn Strickland, LYLANP      hydrochlorothiazide  12.5 mg Oral Daily Dorene Pelayo, LYLANP      hydrOXYzine HCL  25 mg Oral Q6H PRN Max 4/day Jenn Strickland, CRNP      hydrOXYzine HCL  50 mg Oral Q6H PRN Max 4/day Jenn Strickland, LYLANP      LORazepam  1 mg Intramuscular Q6H PRN Max 3/day Jenn Strickland, CRNP      LORazepam  1 mg Oral Q6H PRN Max 3/day Jenn Strickland, CRNP      melatonin  3 mg Oral HS Rory Bunn MD      mirtazapine  7.5 mg Oral HS PRN Rory Bunn MD      nicotine   1 patch Transdermal Daily PRN Fabiana Sousa MD      OLANZapine  5 mg Intramuscular Q3H PRN Max 3/day BETO Novak      OLANZapine  2.5 mg Oral Q4H PRN Max 6/day BETO Novak      OLANZapine  5 mg Oral Q4H PRN Max 3/day Jenn Strickland, BETO      OLANZapine  5 mg Oral Q3H PRN Max 3/day BETO Novak      [START ON 1/31/2024] paliperidone  234 mg IM- Deltoid Q4 weeks BETO Novak      polyethylene glycol  17 g Oral Daily PRN BETO Novak      senna-docusate sodium  1 tablet Oral Daily PRN BETO Novak      tamsulosin  0.4 mg Oral Daily With Dinner Rossi Carlson PA-C      traZODone  100 mg Oral HS Rory Bunn MD         Risks / Benefits of Treatment:  Risks, benefits, and possible side effects of medications explained to patient and patient verbalizes understanding and agreement for treatment.    Counseling / Coordination of Care:  Patient's progress reviewed with nursing staff.  Medications, treatment progress and treatment plan reviewed with patient.  Supportive counseling provided to the patient.    Total floor/unit time spent today 25 minutes. Greater than 50% of total time was spent with the patient and / or family counseling and / or coordination of care. A description of the counseling / coordination of care: medication education, treatment plan, supportive therapy.    This note was completed in part utilizing Dragon dictation Software. Grammatical, translation, syntax errors, random word insertions, spelling mistakes, and incomplete sentences may be an occasional consequence of this system secondary to software limitations with voice recognition, ambient noise, and hardware issues. If you have any questions or concerns about the content, text, or information contained within the body of this dictation, please contact the provider for clarification

## 2024-01-24 NOTE — NURSING NOTE
"Patient is visible on the unit. He is argumentative with staff at times. He perseverates on not having his metformin since admission and stated \"It's nursing's fault. I'm not asking the doctor. Am I a diabetic now?\" Support provided. Patient also impolitely requested his meals selections to be redone with him after he already did them stating \"I never did it, do it again.\" Meal selections were redone per his request. Patient denies all psych s/s. He is amendable to taking medications. Safety checks ongoing.   "

## 2024-01-24 NOTE — PROGRESS NOTES
01/24/24 0817   Team Meeting   Meeting Type Daily Rounds   Initial Conference Date 01/24/24   Next Conference Date 01/25/24   Team Members Present   Team Members Present Physician;Nurse;;;Occupational Therapist   Physician Team Member Dr Bunn, Dr Santiago, ANUSHA Ash   Nursing Team Member July   Care Management Team Member Mehreen   Social Work Team Member Tammy ROMAN Team Member Curt   Patient/Family Present   Patient Present No   Patient's Family Present No     Add Pharmacy - [facility]: depakote increased, [facility] meeting on Friday, discharge pending

## 2024-01-25 LAB
ALBUMIN SERPL BCP-MCNC: 4 G/DL (ref 3.5–5)
ALP SERPL-CCNC: 56 U/L (ref 34–104)
ALT SERPL W P-5'-P-CCNC: 10 U/L (ref 7–52)
ANION GAP SERPL CALCULATED.3IONS-SCNC: 9 MMOL/L
AST SERPL W P-5'-P-CCNC: 18 U/L (ref 13–39)
BILIRUB SERPL-MCNC: 0.33 MG/DL (ref 0.2–1)
BUN SERPL-MCNC: 12 MG/DL (ref 5–25)
CALCIUM SERPL-MCNC: 9.7 MG/DL (ref 8.4–10.2)
CHLORIDE SERPL-SCNC: 95 MMOL/L (ref 96–108)
CO2 SERPL-SCNC: 32 MMOL/L (ref 21–32)
CREAT SERPL-MCNC: 1 MG/DL (ref 0.6–1.3)
GFR SERPL CREATININE-BSD FRML MDRD: 82 ML/MIN/1.73SQ M
GLUCOSE SERPL-MCNC: 90 MG/DL (ref 65–140)
POTASSIUM SERPL-SCNC: 3.8 MMOL/L (ref 3.5–5.3)
PROT SERPL-MCNC: 7.1 G/DL (ref 6.4–8.4)
SODIUM SERPL-SCNC: 136 MMOL/L (ref 135–147)
VALPROATE SERPL-MCNC: 79 UG/ML (ref 50–100)

## 2024-01-25 PROCEDURE — 80053 COMPREHEN METABOLIC PANEL: CPT | Performed by: STUDENT IN AN ORGANIZED HEALTH CARE EDUCATION/TRAINING PROGRAM

## 2024-01-25 PROCEDURE — 99232 SBSQ HOSP IP/OBS MODERATE 35: CPT | Performed by: STUDENT IN AN ORGANIZED HEALTH CARE EDUCATION/TRAINING PROGRAM

## 2024-01-25 PROCEDURE — 80164 ASSAY DIPROPYLACETIC ACD TOT: CPT | Performed by: STUDENT IN AN ORGANIZED HEALTH CARE EDUCATION/TRAINING PROGRAM

## 2024-01-25 RX ADMIN — MELATONIN TAB 3 MG 3 MG: 3 TAB at 21:24

## 2024-01-25 RX ADMIN — TAMSULOSIN HYDROCHLORIDE 0.4 MG: 0.4 CAPSULE ORAL at 16:57

## 2024-01-25 RX ADMIN — CHOLECALCIFEROL TAB 25 MCG (1000 UNIT) 2000 UNITS: 25 TAB at 08:28

## 2024-01-25 RX ADMIN — HALOPERIDOL 10 MG: 10 TABLET ORAL at 21:24

## 2024-01-25 RX ADMIN — DIVALPROEX SODIUM 500 MG: 500 TABLET, EXTENDED RELEASE ORAL at 08:28

## 2024-01-25 RX ADMIN — DIVALPROEX SODIUM 1000 MG: 500 TABLET, EXTENDED RELEASE ORAL at 21:24

## 2024-01-25 RX ADMIN — METFORMIN HYDROCHLORIDE 500 MG: 500 TABLET, FILM COATED ORAL at 08:35

## 2024-01-25 RX ADMIN — ACETAMINOPHEN 650 MG: 325 TABLET ORAL at 08:35

## 2024-01-25 RX ADMIN — TRAZODONE HYDROCHLORIDE 100 MG: 100 TABLET ORAL at 21:24

## 2024-01-25 RX ADMIN — HYDROCHLOROTHIAZIDE 12.5 MG: 12.5 TABLET ORAL at 08:29

## 2024-01-25 RX ADMIN — GABAPENTIN 300 MG: 300 CAPSULE ORAL at 21:24

## 2024-01-25 NOTE — PROGRESS NOTES
Progress Note - Behavioral Health   Sudhakar Choe 58 y.o. male MRN: [id number]  Unit/Bed#: [facility] 647-01 Encounter: [id number]    Patient was seen today for continuation of care, records reviewed and patient was discussed with the morning case review team.    Sudhakar was seen today for psychiatric follow-up.  On assessment today, Sudhakar was calm and cooperative.  Still slightly irritable at times, patient is preoccupied with meal and states that he is unhappy with his options since being transitioned to diabetic diet.  Diet and medication education provided, patient currently verbalizes understanding.  VPA level to be obtained prior to nightly dose on 1/25/2024, patient recently increased to 500/1000 mg daily to assist with mood stabilization.  Haldol also increased to 10 mg nightly for ongoing mood instability and irritability.  Invega Sustenna 234 mg IM to be given on 1/31/2024.  Patient continues to receive 2 antipsychotics secondary to multiple failures of monotherapy.  No medication changes be made at this time.  No adverse effects reported.  Discharge disposition ongoing with [facility] meeting tomorrow to ensure safe disposition.    Sudhakar denies acute suicidal/self-harm ideation/intent/plan upon direct inquiry at this time.  Sudhakar remains behaviorally appropriate, no agitation or aggression noted on exam or in report.  Sudhakar also denies HI/AH/VH, and does not appear overtly manic.  No overt delusions or paranoia are verbalized. Impulse control is intact.  Sudhakar remains adherent to his current psychotropic medication regimen and denies any side effects from medications, as well as none noted on exam.    Vitals:  Vitals:    01/25/24 0753   BP: 106/56   Pulse: 71   Resp: 16   Temp: 98 °F (36.7 °C)   SpO2: 90%       Laboratory Results:  I have personally reviewed all pertinent laboratory/tests results.  Most Recent Labs:   Lab Results   Component Value Date    WBC 5.47 12/30/2023    RBC 4.12 12/30/2023    HGB 12.6  12/30/2023    HCT 38.3 12/30/2023     12/30/2023    RDW 13.2 12/30/2023    NEUTROABS 2.60 12/30/2023    SODIUM 137 01/03/2024    K 4.0 01/03/2024     01/03/2024    CO2 24 01/03/2024    BUN 8 01/03/2024    CREATININE 0.78 01/03/2024    GLUC 109 01/03/2024    GLUF 109 (H) 01/03/2024    CALCIUM 8.5 01/03/2024    AST 23 01/03/2024    ALT 18 01/03/2024    ALKPHOS 63 01/03/2024    TP 6.8 01/03/2024    ALB 3.8 01/03/2024    TBILI 0.33 01/03/2024    CHOLESTEROL 124 01/23/2024    HDL 28 (L) 01/23/2024    TRIG 116 01/23/2024    LDLCALC 73 01/23/2024    NONHDLC 96 01/23/2024    VALPROICTOT 76 01/07/2024    [id number] 1.431 12/30/2023    RPR Non-Reactive 03/26/2021    HGBA1C 6.2 (H) 01/23/2024     01/23/2024       Psychiatric Review of Systems:  Behavior over the last 24 hours:  unchanged.   Sleep: good  Appetite: good  Medication side effects: none  ROS: no complaints, denies shortness of breath or chest pain and all other systems are negative for acute changes    Mental Status Evaluation:  Appearance:  adequate grooming   Behavior:  calm, demanding   Speech:  normal rate and volume   Mood:  irritable   Affect:  constricted   Thought Process:  linear   Thought Content:  no overt delusions   Perceptual Disturbances: denies auditory hallucinations when asked, does not appear responding to internal stimuli   Risk Potential: Suicidal ideation - None  Homicidal ideation - None  Potential for aggression - No   Memory:  recent and remote memory grossly intact   Sensorium  person, place, and time/date      Consciousness:  alert and awake   Attention: attention span and concentration are age appropriate   Insight:  limited   Judgment: limited   Gait/Station: normal gait/station   Motor Activity: no abnormal movements   Progress Toward Goals:   Sudhakar is progressing towards goals of inpatient psychiatric treatment by continued medication compliance and is attending therapeutic modalities on the milieu. However, the  patient continues to require inpatient psychiatric hospitalization for continued medication management and titration to optimize symptom reduction, improve sleep hygiene, and demonstrate adequate self-care.    Assessment/Plan   Principal Problem:    Bipolar affective disorder, current episode manic with psychotic symptoms (HCC)  Active Problems:    Benign prostatic hyperplasia    Brachial plexus injury, right, sequela    Medical clearance for psychiatric admission    Bilateral lower extremity edema      Recommended Treatment: Treatment plan and medication changes discussed and per the attending physician the plan is:    1.Continue with group therapy, milieu therapy and occupational therapy  2.Behavioral Health checks every 7 minutes  3.Continue frequent safety checks and vitals per unit protocol  4.Continue with [facility]IM medical management as indicated  5.Continue with current medication regimen  6.Will review labs in the a.m.  7.Disposition Planning: Discharge planning and efforts remain ongoing    Behavioral Health Medications: all current active meds have been reviewed and continue current psychiatric medications.  Current Facility-Administered Medications   Medication Dose Route Frequency Provider Last Rate    acetaminophen  650 mg Oral Q4H PRN LYLA NovakNP      acetaminophen  650 mg Oral Q4H PRN Reji Looney DO      acetaminophen  975 mg Oral Q6H PRN BETO Novak      cholecalciferol  2,000 Units Oral Daily Rossi Carlson PA-C      divalproex sodium  1,000 mg Oral HS Rory Bunn MD      divalproex sodium  500 mg Oral Daily Rory Bunn MD      gabapentin  300 mg Oral HS Rory Bunn MD      haloperidol  10 mg Oral HS BETO Novak      hydrochlorothiazide  12.5 mg Oral Daily BETO Garcia      hydrOXYzine HCL  25 mg Oral Q6H PRN Max 4/day BETO Novak      hydrOXYzine HCL  50 mg Oral Q6H PRN Max 4/day BETO Novak      LORazepam  1 mg  Intramuscular Q6H PRN Max 3/day BETO Novak      LORazepam  1 mg Oral Q6H PRN Max 3/day BETO Novak      melatonin  3 mg Oral HS Rory Bunn MD      metFORMIN  500 mg Oral Daily With Breakfast Dorenedaiana JorgeBETO Castro      mirtazapine  7.5 mg Oral HS PRN oRry Bunn MD      nicotine  1 patch Transdermal Daily PRN Fabiana Sousa MD      OLANZapine  5 mg Intramuscular Q3H PRN Max 3/day BETO Novak      OLANZapine  2.5 mg Oral Q4H PRN Max 6/day Jenn Strickland, BETO      OLANZapine  5 mg Oral Q4H PRN Max 3/day Jenn Strickland, BETO      OLANZapine  5 mg Oral Q3H PRN Max 3/day BETO Novak      [START ON 1/31/2024] paliperidone  234 mg IM- Deltoid Q4 weeks BETO Novak      polyethylene glycol  17 g Oral Daily PRN BETO Novak      senna-docusate sodium  1 tablet Oral Daily PRN BETO Novak      tamsulosin  0.4 mg Oral Daily With Dinner Rossi Carlson PA-C      traZODone  100 mg Oral HS Rory Bunn MD         Risks / Benefits of Treatment:  Risks, benefits, and possible side effects of medications explained to patient and patient verbalizes understanding and agreement for treatment.    Counseling / Coordination of Care:  Patient's progress reviewed with nursing staff.  Medications, treatment progress and treatment plan reviewed with patient.  Supportive counseling provided to the patient.    Total floor/unit time spent today 25 minutes. Greater than 50% of total time was spent with the patient and / or family counseling and / or coordination of care. A description of the counseling / coordination of care: medication education, treatment plan, supportive therapy.    This note was completed in part utilizing Dragon dictation Software. Grammatical, translation, syntax errors, random word insertions, spelling mistakes, and incomplete sentences may be an occasional consequence of this system secondary to software limitations with  voice recognition, ambient noise, and hardware issues. If you have any questions or concerns about the content, text, or information contained within the body of this dictation, please contact the provider for clarification

## 2024-01-25 NOTE — NURSING NOTE
Pt pleasant and med-compliant.  Attended group.  VSS.  Good appetite and steady gait.  No delusions noted.  Monitored for safety and support.

## 2024-01-25 NOTE — TREATMENT TEAM
01/25/24 1000   Activity/Group Checklist   Group Community meeting   Attendance Attended   Attendance Duration (min) 31-45   Interactions Interacted appropriately   Affect/Mood Appropriate   Goals Achieved Identified feelings;Able to self-disclose;Able to recieve feedback;Able to give feedback to another;Able to listen to others;Able to engage in interactions;Increased hopefulness;Discussed self-esteem issues;Discussed coping strategies;Identified relapse prevention strategies

## 2024-01-25 NOTE — PLAN OF CARE
Problem: Risk for Self Injury/Neglect  Goal: Verbalize thoughts and feelings  Description: Interventions:  - Assess and re-assess patient's lethality and potential for self-injury  - Engage patient in 1:1 interactions, daily, for a minimum of 15 minutes  - Encourage patient to express feelings, fears, frustrations, hopes  - Establish rapport/trust with patient   Outcome: Progressing  Goal: Refrain from harming self  Description: Interventions:  - Monitor patient closely, per order  - Develop a trusting relationship  - Supervise medication ingestion, monitor effects and side effects   Outcome: Progressing  Goal: Attend and participate in unit activities, including therapeutic, recreational, and educational groups  Description: Interventions:  - Provide therapeutic and educational activities daily, encourage attendance and participation, and document same in the medical record  - Obtain collateral information, encourage visitation and family involvement in care   Outcome: Progressing  Goal: Complete daily ADLs, including personal hygiene independently, as able  Description: Interventions:  - Observe, teach, and assist patient with ADLS  - Monitor and promote a balance of rest/activity, with adequate nutrition and elimination  Outcome: Progressing     Problem: Alteration in Orientation  Goal: Interact with staff daily  Description: Interventions:  - Assess and re-assess patient's level of orientation  - Engage patient in 1 on 1 interactions, daily, for a minimum of 15 minutes   - Establish rapport/trust with patient   Outcome: Progressing  Goal: Complete daily ADLs, including personal hygiene independently, as able  Description: Interventions:  - Observe, teach, and assist patient with ADLS  Outcome: Progressing

## 2024-01-25 NOTE — PROGRESS NOTES
01/25/24 0758   Team Meeting   Meeting Type Daily Rounds   Initial Conference Date 01/25/24   Next Conference Date 01/26/24   Team Members Present   Team Members Present Physician;Nurse;;;Occupational Therapist   Physician Team Member Dr Bunn, Dr Santiago, ANUSHA Hoffmann J. Stives   Nursing Team Member July   Care Management Team Member Mehreen   Social Work Team Member Tammy ROMAN Team Member Curt   Patient/Family Present   Patient Present No   Patient's Family Present No     Diet changed to diabetic, asking for more food, [facility] meeting tomorrow, discharge pending.

## 2024-01-25 NOTE — PROGRESS NOTES
01/25/24 1330   Activity/Group Checklist   Group Life Skills  (self awareness  topic)   Attendance Attended   Attendance Duration (min) 46-60   Interactions Interacted appropriately  (P.t less talkative yielded to peer discussion on self awareness.)   Affect/Mood Calm;Appropriate   Goals Achieved Able to engage in interactions;Able to listen to others;Discussed coping strategies        DISPLAY PLAN FREE TEXT

## 2024-01-26 PROCEDURE — 99232 SBSQ HOSP IP/OBS MODERATE 35: CPT | Performed by: STUDENT IN AN ORGANIZED HEALTH CARE EDUCATION/TRAINING PROGRAM

## 2024-01-26 RX ADMIN — TRAZODONE HYDROCHLORIDE 100 MG: 100 TABLET ORAL at 21:27

## 2024-01-26 RX ADMIN — MELATONIN TAB 3 MG 3 MG: 3 TAB at 21:27

## 2024-01-26 RX ADMIN — METFORMIN HYDROCHLORIDE 500 MG: 500 TABLET, FILM COATED ORAL at 08:18

## 2024-01-26 RX ADMIN — HALOPERIDOL 10 MG: 10 TABLET ORAL at 21:27

## 2024-01-26 RX ADMIN — DIVALPROEX SODIUM 1000 MG: 500 TABLET, EXTENDED RELEASE ORAL at 21:27

## 2024-01-26 RX ADMIN — DIVALPROEX SODIUM 500 MG: 500 TABLET, EXTENDED RELEASE ORAL at 08:18

## 2024-01-26 RX ADMIN — GABAPENTIN 300 MG: 300 CAPSULE ORAL at 21:27

## 2024-01-26 RX ADMIN — CHOLECALCIFEROL TAB 25 MCG (1000 UNIT) 2000 UNITS: 25 TAB at 08:18

## 2024-01-26 RX ADMIN — TAMSULOSIN HYDROCHLORIDE 0.4 MG: 0.4 CAPSULE ORAL at 16:33

## 2024-01-26 NOTE — NURSING NOTE
Pt withdrawn yet cooperative. Present in milieu watching television. Appetite good. Medication compliant. Denies SI/HI.

## 2024-01-26 NOTE — CASE MANAGEMENT
Had the [facility] meeting with Sudhakar to be assessed for Alex GERONIMO. Alex will be out next week to assess him and see if he is appropriate for their program. If he is they may have an opening in February. Awaiting Alex assessment.     Yesterday children and youth called her to speak to the patient regarding an open investigation. The worker is Karishma La [phone] email - [email or web address]

## 2024-01-26 NOTE — CMS CERTIFICATION NOTE
Recertification: Based upon physical, mental and social evaluations, I certify that inpatient psychiatric services continue to be medically necessary for this patient for a duration of 21 midnights for the treatment of  Bipolar affective disorder, current episode manic with psychotic symptoms (HCC) Available alternative community resources still do not meet the patient's mental health care needs. I further attest that an established written individualized plan of care has been updated and is outlined in the patient's medical records.

## 2024-01-26 NOTE — PLAN OF CARE
Problem: SLEEP DISTURBANCE  Goal: Will exhibit normal sleeping pattern  Description: Interventions:  -  Assess the patients sleep pattern, noting recent changes  - Administer medication as ordered  - Decrease environmental stimuli, including noise, as appropriate during the night  - Encourage the patient to actively participate in unit groups and or exercise during the day to enhance ability to achieve adequate sleep at night  - Assess the patient, in the morning, encouraging a description of sleep experience  Outcome: Progressing     Problem: Risk for Self Injury/Neglect  Goal: Verbalize thoughts and feelings  Description: Interventions:  - Assess and re-assess patient's lethality and potential for self-injury  - Engage patient in 1:1 interactions, daily, for a minimum of 15 minutes  - Encourage patient to express feelings, fears, frustrations, hopes  - Establish rapport/trust with patient   Outcome: Progressing  Goal: Refrain from harming self  Description: Interventions:  - Monitor patient closely, per order  - Develop a trusting relationship  - Supervise medication ingestion, monitor effects and side effects   Outcome: Progressing     Problem: Alteration in Orientation  Goal: Interact with staff daily  Description: Interventions:  - Assess and re-assess patient's level of orientation  - Engage patient in 1 on 1 interactions, daily, for a minimum of 15 minutes   - Establish rapport/trust with patient   Outcome: Progressing

## 2024-01-26 NOTE — NURSING NOTE
Pt anxious with constricted affect.  Med-compliant with good appetite and steady gait.  BP 93/57, HCTZ held at 0900.  Attended group.  No delusions noted.  Monitored for safety and support.

## 2024-01-26 NOTE — PROGRESS NOTES
01/26/24 1351   Team Meeting   Meeting Type Tx Team Meeting   Initial Conference Date 01/26/24   Next Conference Date 02/23/24   Team Members Present   Team Members Present Physician;Nurse;   Physician Team Member Dr Bunn   Nursing Team Member July   Care Management Team Member Mehreen   Patient/Family Present   Patient Present Yes   Patient's Family Present No     Met with the patient to go over their treatment plan. Goals discussed were to have a decrease in paranoid thoughts, decrease in psychotic symptoms, ability to stay safe on the unit, ability to stay free of restraints, improvement in insight, improvement in reality testing, mood stabilization, increase in group attendance, acceptance of need for psychiatric treatment, acceptance of psychiatric medications. Goals can be attained through medication management and group/milieu therapy.     Patient does NOT agree with his treatment plan. He did not like that it said referral to EAC unit for long term psychiatric treatment. He thought that sounded like he would have to be there for a long time and he thinks it should only be two months or less. Did explain that the time frame is individualized to each patient, but he was not in agreement.

## 2024-01-26 NOTE — PROGRESS NOTES
01/26/24 0751   Team Meeting   Meeting Type Daily Rounds   Initial Conference Date 01/26/24   Next Conference Date 01/29/24   Team Members Present   Team Members Present Physician;Nurse;;;Occupational Therapist   Physician Team Member Dr Bunn, Dr Santiago, ANUSHA Hoffmann J. Stives   Nursing Team Member July   Care Management Team Member Mehreen   Social Work Team Member Tammy ROMAN Team Member Curt   Patient/Family Present   Patient Present No   Patient's Family Present No     Depakote level 79, quiet and less intrusive. CPC meeting. Discharge pending

## 2024-01-26 NOTE — PROGRESS NOTES
Pt attended 2/3 groups.  Pt tearful in am and sleepy in afternoon.  Pt was in behavioral control.      01/26/24 1000   Activity/Group Checklist   Group Community meeting   Attendance Attended   Attendance Duration (min) 31-45   Interactions Other (Comment)  (tearful)   Affect/Mood Other (Comment)  (sad)   Goals Achieved Identified feelings;Identified relapse prevention strategies;Discussed coping strategies;Able to listen to others;Able to engage in interactions;Able to reflect/comment on own behavior;Able to manage/cope with feelings;Verbalized increased hopefulness;Able to self-disclose;Able to recieve feedback

## 2024-01-26 NOTE — TREATMENT PLAN
TREATMENT PLAN REVIEW - Behavioral Health Sudhakar Choe 58 y.o. 1965 male MRN: [id number]    [facility] 6B [facility] Room / Bed: [facility] 648/[facility] 648-01 Encounter: [id number]          Admit Date/Time:  12/29/2023  8:09 PM    Treatment Team:   MD Reji Byrnes, DO Jenn Strickland, BETO Felipe, JULIO Ayala, DO Debra Avila, ARACELI Boothe, ARACELI Eastman, AARON Carvajal RN    Diagnosis: Principal Problem:    Bipolar affective disorder, current episode manic with psychotic symptoms (HCC)  Active Problems:    Benign prostatic hyperplasia    Brachial plexus injury, right, sequela    Medical clearance for psychiatric admission    Bilateral lower extremity edema      Patient Strengths/Assets: communication skills, compliant with medication, family ties    Patient Barriers/Limitations: difficulty adapting, homeless, limited support system, marital/family conflict, patient is on an involuntary commitment, poor insight, poor past treatment response, substance abuse    Short Term Goals: decrease in paranoid thoughts, decrease in psychotic symptoms, ability to stay safe on the unit, ability to stay free of restraints, improvement in insight, improvement in reality testing, mood stabilization, increase in group attendance, acceptance of need for psychiatric treatment, acceptance of psychiatric medications    Long Term Goals: resolution of manic symptoms, stabilization of mood, free of suicidal thoughts, free of homicidal thoughts, resolution of psychotic symptoms, improvement in reality testing, improved insight, acceptance of need for psychiatric medications, acceptance of need for psychiatric follow up after discharge, appropriate interaction with peers    Progress Towards Goals: continue psychiatric medications as  prescribed    Recommended Treatment: medication management, patient medication education, group therapy, milieu therapy, continued Behavioral Health psychiatric evaluation/assessment process    Treatment Frequency: daily medication monitoring, group and milieu therapy daily, monitoring through interdisciplinary rounds, monitoring through weekly patient care conferences    Expected Discharge Date:  14 days    Discharge Plan: referral to Extended Acute Care unit for a long term psychiatric treatment    Treatment Plan Created/Updated By: Rory Bunn MD

## 2024-01-26 NOTE — PROGRESS NOTES
Progress Note - Behavioral Health   Sudhakar Choe 58 y.o. male MRN: [id number]  Unit/Bed#: [facility] 648-01 Encounter: [id number]    Patient was seen today for continuation of care, records reviewed and patient was discussed with the morning case review team.    Sudhakar was seen today for psychiatric follow-up.  On assessment today, Sudhakar was pleasant.  Not as intrusive, patient is noted to be sitting quietly in the day room working on puzzles.  No irritability or agitation noted on exam, patient condition continues to improve.  Depakote level obtained on 1/25/2024 at 79, will continue her current dose of 500/1000 mg daily.  Invega Sustenna to be given on 1/31/2024.  Case management to attend [facility] meeting to assist with discharge planning.  Tentative discharge ongoing.    Sudhakar denies acute suicidal/self-harm ideation/intent/plan upon direct inquiry at this time.  Sudhakar remains behaviorally appropriate, no agitation or aggression noted on exam or in report.  Sudhakar also denies HI/AH/VH, and does not appear overtly manic.  No overt delusions or paranoia are verbalized. Impulse control is intact.  Sudhakar remains adherent to his current psychotropic medication regimen and denies any side effects from medications, as well as none noted on exam.    Vitals:  Vitals:    01/26/24 0742   BP: 93/57   Pulse: 70   Resp: 17   Temp: 98.3 °F (36.8 °C)   SpO2: 92%       Laboratory Results:  I have personally reviewed all pertinent laboratory/tests results.  Most Recent Labs:   Lab Results   Component Value Date    WBC 5.47 12/30/2023    RBC 4.12 12/30/2023    HGB 12.6 12/30/2023    HCT 38.3 12/30/2023     12/30/2023    RDW 13.2 12/30/2023    NEUTROABS 2.60 12/30/2023    SODIUM 136 01/25/2024    K 3.8 01/25/2024    CL 95 (L) 01/25/2024    CO2 32 01/25/2024    BUN 12 01/25/2024    CREATININE 1.00 01/25/2024    GLUC 90 01/25/2024    GLUF 109 (H) 01/03/2024    CALCIUM 9.7 01/25/2024    AST 18 01/25/2024    ALT 10 01/25/2024     ALKPHOS 56 01/25/2024    TP 7.1 01/25/2024    ALB 4.0 01/25/2024    TBILI 0.33 01/25/2024    CHOLESTEROL 124 01/23/2024    HDL 28 (L) 01/23/2024    TRIG 116 01/23/2024    LDLCALC 73 01/23/2024    NONHDLC 96 01/23/2024    VALPROICTOT 79 01/25/2024    TRU6VDOCIIME 1.431 12/30/2023    RPR Non-Reactive 03/26/2021    HGBA1C 6.2 (H) 01/23/2024     01/23/2024       Psychiatric Review of Systems:  Behavior over the last 24 hours:  unchanged.   Sleep: good  Appetite: good  Medication side effects: none  ROS: no complaints, denies shortness of breath or chest pain and all other systems are negative for acute changes    Mental Status Evaluation:  Appearance:  adequate grooming   Behavior:  cooperative, calm   Speech:  normal rate and volume   Mood:  still at times irritable   Affect:  constricted   Thought Process:  linear   Thought Content:  no overt delusions   Perceptual Disturbances: denies auditory hallucinations when asked, does not appear responding to internal stimuli   Risk Potential: Suicidal ideation - None  Homicidal ideation - None  Potential for aggression - No   Memory:  recent and remote memory grossly intact   Sensorium  person, place, and time/date      Consciousness:  alert and awake   Attention: attention span and concentration are age appropriate   Insight:  limited   Judgment: limited   Gait/Station: normal gait/station   Motor Activity: no abnormal movements   Progress Toward Goals:   Sudhakar is progressing towards goals of inpatient psychiatric treatment by continued medication compliance and is attending therapeutic modalities on the milieu. However, the patient continues to require inpatient psychiatric hospitalization for continued medication management and titration to optimize symptom reduction, improve sleep hygiene, and demonstrate adequate self-care.    Assessment/Plan   Principal Problem:    Bipolar affective disorder, current episode manic with psychotic symptoms (HCC)  Active Problems:     Benign prostatic hyperplasia    Brachial plexus injury, right, sequela    Medical clearance for psychiatric admission    Bilateral lower extremity edema      Recommended Treatment: Treatment plan and medication changes discussed and per the attending physician the plan is:    1.Continue with group therapy, milieu therapy and occupational therapy  2.Behavioral Health checks every 7 minutes  3.Continue frequent safety checks and vitals per unit protocol  4.Continue with SLIM medical management as indicated  5.Continue with current medication regimen  6.Will review labs in the a.m.  7.Disposition Planning: Discharge planning and efforts remain ongoing    Behavioral Health Medications: all current active meds have been reviewed and continue current psychiatric medications.  Current Facility-Administered Medications   Medication Dose Route Frequency Provider Last Rate    acetaminophen  650 mg Oral Q4H PRN Jenn Strickland, CRNP      acetaminophen  650 mg Oral Q4H PRN Reji Looney DO      acetaminophen  975 mg Oral Q6H PRN BETO Novak      cholecalciferol  2,000 Units Oral Daily Rossi Carlson PA-C      divalproex sodium  1,000 mg Oral HS Rory Bunn MD      divalproex sodium  500 mg Oral Daily Rory Bunn MD      gabapentin  300 mg Oral HS Rory Bunn MD      haloperidol  10 mg Oral HS LYLA NovakNP      hydroCHLOROthiazide  12.5 mg Oral Daily BETO Garcia      hydrOXYzine HCL  25 mg Oral Q6H PRN Max 4/day Jenn Strickland, LYLANP      hydrOXYzine HCL  50 mg Oral Q6H PRN Max 4/day Jenn Strickland, BETO      LORazepam  1 mg Intramuscular Q6H PRN Max 3/day Jenn Strickland, BETO      LORazepam  1 mg Oral Q6H PRN Max 3/day LYLA NovakNP      melatonin  3 mg Oral HS Rory Bunn MD      metFORMIN  500 mg Oral Daily With Breakfast BETO Garcia      mirtazapine  7.5 mg Oral HS PRN Rory Bunn MD      nicotine  1 patch Transdermal Daily PRN Fabiana Salgado  Juarez Sousa MD      OLANZapine  5 mg Intramuscular Q3H PRN Max 3/day Jenn Strickland, BETO      OLANZapine  2.5 mg Oral Q4H PRN Max 6/day Jenn Strickland, BETO      OLANZapine  5 mg Oral Q4H PRN Max 3/day Jenn Strickland, BETO      OLANZapine  5 mg Oral Q3H PRN Max 3/day BETO Novak      [START ON 1/31/2024] paliperidone  234 mg IM- Deltoid Q4 weeks BETO Novak      polyethylene glycol  17 g Oral Daily PRN BETO Novak      senna-docusate sodium  1 tablet Oral Daily PRN BETO Novak      tamsulosin  0.4 mg Oral Daily With Dinner Rossi Carlson PA-C      traZODone  100 mg Oral HS Rory Bunn MD         Risks / Benefits of Treatment:  Risks, benefits, and possible side effects of medications explained to patient and patient verbalizes understanding and agreement for treatment.    Counseling / Coordination of Care:  Patient's progress reviewed with nursing staff.  Medications, treatment progress and treatment plan reviewed with patient.  Supportive counseling provided to the patient.    Total floor/unit time spent today 25 minutes. Greater than 50% of total time was spent with the patient and / or family counseling and / or coordination of care. A description of the counseling / coordination of care: medication education, treatment plan, supportive therapy.    This note was completed in part utilizing Dragon dictation Software. Grammatical, translation, syntax errors, random word insertions, spelling mistakes, and incomplete sentences may be an occasional consequence of this system secondary to software limitations with voice recognition, ambient noise, and hardware issues. If you have any questions or concerns about the content, text, or information contained within the body of this dictation, please contact the provider for clarification

## 2024-01-27 PROCEDURE — 99232 SBSQ HOSP IP/OBS MODERATE 35: CPT

## 2024-01-27 RX ADMIN — GABAPENTIN 300 MG: 300 CAPSULE ORAL at 21:23

## 2024-01-27 RX ADMIN — METFORMIN HYDROCHLORIDE 500 MG: 500 TABLET, FILM COATED ORAL at 07:58

## 2024-01-27 RX ADMIN — DIVALPROEX SODIUM 500 MG: 500 TABLET, EXTENDED RELEASE ORAL at 08:02

## 2024-01-27 RX ADMIN — DIVALPROEX SODIUM 1000 MG: 500 TABLET, EXTENDED RELEASE ORAL at 21:23

## 2024-01-27 RX ADMIN — TRAZODONE HYDROCHLORIDE 100 MG: 100 TABLET ORAL at 21:23

## 2024-01-27 RX ADMIN — HALOPERIDOL 10 MG: 10 TABLET ORAL at 21:23

## 2024-01-27 RX ADMIN — MELATONIN TAB 3 MG 3 MG: 3 TAB at 21:23

## 2024-01-27 RX ADMIN — CHOLECALCIFEROL TAB 25 MCG (1000 UNIT) 2000 UNITS: 25 TAB at 08:01

## 2024-01-27 RX ADMIN — ACETAMINOPHEN 975 MG: 325 TABLET, FILM COATED ORAL at 07:57

## 2024-01-27 RX ADMIN — TAMSULOSIN HYDROCHLORIDE 0.4 MG: 0.4 CAPSULE ORAL at 16:43

## 2024-01-27 NOTE — NURSING NOTE
Patient visible on the unit pacing the halls or sitting with peers in the dayroom, social on approach. Patient cooperative with assessment questions, currently denies depression, anxiety, SI/HI/AVH. Patient approaching the desk periodically to ask writer questions about EAC placement, easy to reassure. Compliant with medications and meals, appetite good. No further signs of distress noted.

## 2024-01-27 NOTE — PROGRESS NOTES
Progress Note - Behavioral Health   Sudhakar Choe 58 y.o. male MRN: [id number]  Unit/Bed#: [facility] 648-01 Encounter: [id number]    Patient was seen today for continuation of care, records reviewed and patient was discussed with the morning case review team.  Per staff, patient is visible in the milieu, denies psychiatric symptoms.  Compliant with meals and medication.     Sudhakar was seen today for psychiatric follow-up.  On assessment today, Sudhakar was seen in the group room away from peers.  Sudhakar was calm and cooperative with the interview.  He was somewhat perseverative about his Metformin that he states was not restarted for him until recently.  He is easily redirectable however. Continues to be intermittently irritable and circumstantial.       Sudhakar denies acute suicidal/self-harm ideation/intent/plan upon direct inquiry at this time.  Sudhakar remains behaviorally appropriate, no agitation or aggression noted on exam or in report.  Sudhakar also denies HI/AH/VH, and does not appear overtly manic.  No overt delusions but continues to be somewhat paranoid.    Sudhakar remains adherent to his current psychotropic medication regimen and denies any side effects from medications, as well as none noted on exam.    Recommended Treatment: Treatment plan and medication changes discussed and per the attending physician the plan is:    1.Continue with group therapy, milieu therapy and occupational therapy  2.Behavioral Health checks every 7 minutes  3.Continue frequent safety checks and vitals per unit protocol  4.Continue with SLIM medical management as indicated  5.Continue with current medication regimen.   6.Will review labs in the a.m.  7.Disposition Planning: Discharge planning and efforts remain ongoing    Vitals:  Vitals:    01/27/24 0753   BP: 94/66   Pulse: 81   Resp: 15   Temp: 97.8 °F (36.6 °C)   SpO2: 90%       Laboratory Results:  I have personally reviewed all pertinent laboratory/tests results.  Most Recent Labs:    Lab Results   Component Value Date    WBC 5.47 12/30/2023    RBC 4.12 12/30/2023    HGB 12.6 12/30/2023    HCT 38.3 12/30/2023     12/30/2023    RDW 13.2 12/30/2023    NEUTROABS 2.60 12/30/2023    SODIUM 136 01/25/2024    K 3.8 01/25/2024    CL 95 (L) 01/25/2024    CO2 32 01/25/2024    BUN 12 01/25/2024    CREATININE 1.00 01/25/2024    GLUC 90 01/25/2024    GLUF 109 (H) 01/03/2024    CALCIUM 9.7 01/25/2024    AST 18 01/25/2024    ALT 10 01/25/2024    ALKPHOS 56 01/25/2024    TP 7.1 01/25/2024    ALB 4.0 01/25/2024    TBILI 0.33 01/25/2024    CHOLESTEROL 124 01/23/2024    HDL 28 (L) 01/23/2024    TRIG 116 01/23/2024    LDLCALC 73 01/23/2024    NONHDLC 96 01/23/2024    VALPROICTOT 79 01/25/2024    [id number] 1.431 12/30/2023    RPR Non-Reactive 03/26/2021    HGBA1C 6.2 (H) 01/23/2024     01/23/2024       Psychiatric Review of Systems:  Behavior over the last 24 hours:  unchanged.   Sleep: adequate  Appetite: adequate  Medication side effects: denies and none observed  ROS: no complaints, denies shortness of breath or chest pain and all other systems are negative for acute changes    Mental Status Evaluation:    Appearance:  disheveled, marginal hygiene, cast on arm   Behavior:  cooperative, calm   Speech:  circumstantial   Mood:  less irritable   Affect:  constricted   Thought Process:  circumstantial   Associations: circumstantial associations   Thought Content:  mild paranoia   Perceptual Disturbances: denies auditory or visual hallucinations when asked, does not appear responding to internal stimuli   Risk Potential: Suicidal ideation - None at present  Homicidal ideation - None  Potential for aggression - No   Sensorium:  oriented to person, place, and situation   Memory:  recent memory intact   Consciousness:  alert and awake   Attention/Concentration: attention span and concentration appear shorter than expected for age   Insight:  limited   Judgment: limited   Gait/Station: normal gait/station    Motor Activity: no abnormal movements     Progress Toward Goals:   Sudhakar is progressing towards goals of inpatient psychiatric treatment by continued medication compliance and is attending therapeutic modalities on the milieu. However, the patient continues to require inpatient psychiatric hospitalization for continued medication management and titration to optimize symptom reduction, improve sleep hygiene, and demonstrate adequate self-care.    Risk of Harm to Self:   Nursing Suicide Risk Assessment Last 24 hours: C-SSRS Risk (Since Last Contact)  Calculated C-SSRS Risk Score (Since Last Contact): No Risk Indicated  Current Specific Risk Factors include: diagnosis of mood disorder  Protective Factors: no current suicidal ideation, able to contract for safety on the unit, taking medications as ordered on the unit, responds to redirection  Based on today's assessment, Sudhakar presents the following risk of harm to self: low    Risk of Harm to Others:  Nursing Homicide Risk Assessment: Violence Risk to Others: Denies within past 6 months  Current Specific Risk Factors include: social difficulties  Protective Factors: no current homicidal ideation, compliant with medications on the unit as ordered  Based on today's assessment, Sudhakar presents the following risk of harm to others: low    The following interventions are recommended: behavioral checks every 7 minutes, continued hospitalization on locked unit      Assessment/Plan   Principal Problem:    Bipolar affective disorder, current episode manic with psychotic symptoms (HCC)  Active Problems:    Benign prostatic hyperplasia    Brachial plexus injury, right, sequela    Medical clearance for psychiatric admission    Bilateral lower extremity edema        Behavioral Health Medications: all current active meds have been reviewed and continue current psychiatric medications.  Current Facility-Administered Medications   Medication Dose Route Frequency Provider Last Rate     acetaminophen  650 mg Oral Q4H PRN Jenn Strickland, CRNP      acetaminophen  650 mg Oral Q4H PRN Reji Looney DO      acetaminophen  975 mg Oral Q6H PRN Jenn Strickland, BETO      cholecalciferol  2,000 Units Oral Daily Rossi Carlson PA-C      divalproex sodium  1,000 mg Oral HS Rory Bunn MD      divalproex sodium  500 mg Oral Daily Rroy Bunn MD      gabapentin  300 mg Oral HS Rory Bunn MD      haloperidol  10 mg Oral HS Jenn Strickland, LYLANP      hydroCHLOROthiazide  12.5 mg Oral Daily Dorene Alysia Pelayo, CRROBERT      hydrOXYzine HCL  25 mg Oral Q6H PRN Max 4/day Jenn Strickland, CRNP      hydrOXYzine HCL  50 mg Oral Q6H PRN Max 4/day Jenn Strickland, BETO      LORazepam  1 mg Intramuscular Q6H PRN Max 3/day Jenn Strickland, BETO      LORazepam  1 mg Oral Q6H PRN Max 3/day Jenn Strickland, BETO      melatonin  3 mg Oral HS Rory Bunn MD      metFORMIN  500 mg Oral Daily With Breakfast Dorene Pelayo, BETO      mirtazapine  7.5 mg Oral HS PRN Rory Bunn MD      nicotine  1 patch Transdermal Daily PRN Fabiana Sousa MD      OLANZapine  5 mg Intramuscular Q3H PRN Max 3/day Jenn Strickland, BETO      OLANZapine  2.5 mg Oral Q4H PRN Max 6/day Jenn Strickland, BETO      OLANZapine  5 mg Oral Q4H PRN Max 3/day Jenn Strickland, BETO      OLANZapine  5 mg Oral Q3H PRN Max 3/day BETO Novak      [START ON 1/31/2024] paliperidone  234 mg IM- Deltoid Q4 weeks BETO Novak      polyethylene glycol  17 g Oral Daily PRN Jenn Strickland, BETO      senna-docusate sodium  1 tablet Oral Daily PRN BETO Novak      tamsulosin  0.4 mg Oral Daily With Dinner Rossi Carlson PA-C      traZODone  100 mg Oral HS Rory Bunn MD         Risks / Benefits of Treatment:  Risks, benefits, and possible side effects of medications explained to patient and patient verbalizes understanding and agreement for treatment.    Counseling / Coordination of Care:  Patient's  progress reviewed with nursing staff.  Medications, treatment progress and treatment plan reviewed with patient.  Supportive counseling provided to the patient.    Total floor/unit time spent today 25 minutes. Greater than 50% of total time was spent with the patient and / or family counseling and / or coordination of care. A description of the counseling / coordination of care: medication education, treatment plan, supportive therapy.

## 2024-01-28 PROCEDURE — 99232 SBSQ HOSP IP/OBS MODERATE 35: CPT

## 2024-01-28 RX ADMIN — TRAZODONE HYDROCHLORIDE 100 MG: 100 TABLET ORAL at 21:22

## 2024-01-28 RX ADMIN — GABAPENTIN 300 MG: 300 CAPSULE ORAL at 21:22

## 2024-01-28 RX ADMIN — CHOLECALCIFEROL TAB 25 MCG (1000 UNIT) 2000 UNITS: 25 TAB at 08:03

## 2024-01-28 RX ADMIN — DIVALPROEX SODIUM 500 MG: 500 TABLET, EXTENDED RELEASE ORAL at 08:03

## 2024-01-28 RX ADMIN — DIVALPROEX SODIUM 1000 MG: 500 TABLET, EXTENDED RELEASE ORAL at 21:22

## 2024-01-28 RX ADMIN — MELATONIN TAB 3 MG 3 MG: 3 TAB at 21:22

## 2024-01-28 RX ADMIN — METFORMIN HYDROCHLORIDE 500 MG: 500 TABLET, FILM COATED ORAL at 08:03

## 2024-01-28 RX ADMIN — TAMSULOSIN HYDROCHLORIDE 0.4 MG: 0.4 CAPSULE ORAL at 17:30

## 2024-01-28 RX ADMIN — HYDROCHLOROTHIAZIDE 12.5 MG: 12.5 TABLET ORAL at 08:03

## 2024-01-28 RX ADMIN — ACETAMINOPHEN 975 MG: 325 TABLET, FILM COATED ORAL at 08:02

## 2024-01-28 RX ADMIN — HALOPERIDOL 10 MG: 10 TABLET ORAL at 21:22

## 2024-01-28 NOTE — PROGRESS NOTES
"Progress Note - Behavioral Health   Sudhakar Choe 58 y.o. male MRN: [id number]  Unit/Bed#: [facility] 648-01 Encounter: [id number]    Patient was seen today for continuation of care, records reviewed and patient was discussed with the morning case review team.  Per staff, Sudhakar continues to be visible, meal and medication compliant on the unit.  He received PRN tylenol this morning for some low back pain.     Sudhakar was seen today for psychiatric follow-up.  On assessment today, Sudhakar was seen in his room.  Sudhakar showered today and his dress and grooming is appropriate.  He reports that his mood is \"okay\" today.  He state he is upset that his Depakote was increased last week and he was not told.  We discussed Depakote titration based on symptoms, VPA levels and therapeutic values.  Sudhakar was receptive to education.  He was calm and pleasant throughout the interview.      Sudhakar denies acute suicidal/self-harm ideation/intent/plan upon direct inquiry at this time.  Sudhakar remains behaviorally appropriate, no agitation or aggression noted on exam or in report.  Sudhakar also denies HI/AH/VH, and does not appear overtly manic.  No overt delusions but continues to be somewhat paranoid.    Sudhakar remains adherent to his current psychotropic medication regimen and denies any side effects from medications, as well as none noted on exam.    Recommended Treatment: Treatment plan and medication changes discussed and per the attending physician the plan is:    1.Continue with group therapy, milieu therapy and occupational therapy  2.Behavioral Health checks every 7 minutes  3.Continue frequent safety checks and vitals per unit protocol  4.Continue with SLIM medical management as indicated  5.Continue with current medication regimen.   6.Will review labs in the a.m.  7.Disposition Planning: Discharge planning and efforts remain ongoing    Vitals:  Vitals:    01/28/24 0738   BP: 96/55   Pulse: 75   Resp: 16   Temp: 97.6 °F (36.4 °C) " "  SpO2: 94%       Laboratory Results:  I have personally reviewed all pertinent laboratory/tests results.  Most Recent Labs:   Lab Results   Component Value Date    WBC 5.47 12/30/2023    RBC 4.12 12/30/2023    HGB 12.6 12/30/2023    HCT 38.3 12/30/2023     12/30/2023    RDW 13.2 12/30/2023    NEUTROABS 2.60 12/30/2023    SODIUM 136 01/25/2024    K 3.8 01/25/2024    CL 95 (L) 01/25/2024    CO2 32 01/25/2024    BUN 12 01/25/2024    CREATININE 1.00 01/25/2024    GLUC 90 01/25/2024    GLUF 109 (H) 01/03/2024    CALCIUM 9.7 01/25/2024    AST 18 01/25/2024    ALT 10 01/25/2024    ALKPHOS 56 01/25/2024    TP 7.1 01/25/2024    ALB 4.0 01/25/2024    TBILI 0.33 01/25/2024    CHOLESTEROL 124 01/23/2024    HDL 28 (L) 01/23/2024    TRIG 116 01/23/2024    LDLCALC 73 01/23/2024    NONHDLC 96 01/23/2024    VALPROICTOT 79 01/25/2024    DMZ4JFTXZTKG 1.431 12/30/2023    RPR Non-Reactive 03/26/2021    HGBA1C 6.2 (H) 01/23/2024     01/23/2024       Psychiatric Review of Systems:  Behavior over the last 24 hours:  unchanged.   Sleep: adequate  Appetite: adequate  Medication side effects: denies and none observed  ROS: no complaints, denies shortness of breath or chest pain and all other systems are negative for acute changes    Mental Status Evaluation:    Appearance:  casually dressed, dressed appropriately, improved grooming, cast on arm   Behavior:  cooperative, calm   Speech:  normal rate and volume   Mood:  improved, \"okay\"   Affect:  constricted   Thought Process:  circumstantial   Associations: circumstantial associations   Thought Content:  mild paranoia   Perceptual Disturbances: denies auditory or visual hallucinations when asked, does not appear responding to internal stimuli   Risk Potential: Suicidal ideation - None at present  Homicidal ideation - None  Potential for aggression - No   Sensorium:  oriented to person, place, and situation   Memory:  recent memory intact   Consciousness:  alert and awake "   Attention/Concentration: attention span and concentration appear shorter than expected for age   Insight:  limited   Judgment: limited   Gait/Station: normal gait/station   Motor Activity: no abnormal movements     Progress Toward Goals:   Sudhakar is progressing towards goals of inpatient psychiatric treatment by continued medication compliance and is attending therapeutic modalities on the milieu. However, the patient continues to require inpatient psychiatric hospitalization for continued medication management and titration to optimize symptom reduction, improve sleep hygiene, and demonstrate adequate self-care.    Risk of Harm to Self:   Nursing Suicide Risk Assessment Last 24 hours:    Current Specific Risk Factors include: diagnosis of mood disorder  Protective Factors: no current suicidal ideation, able to contract for safety on the unit, taking medications as ordered on the unit, responds to redirection  Based on today's assessment, Sudhakar presents the following risk of harm to self: low    Risk of Harm to Others:  Nursing Homicide Risk Assessment: Violence Risk to Others: Denies within past 6 months  Current Specific Risk Factors include: social difficulties  Protective Factors: no current homicidal ideation, compliant with medications on the unit as ordered  Based on today's assessment, Sudhakar presents the following risk of harm to others: low    The following interventions are recommended: behavioral checks every 7 minutes, continued hospitalization on locked unit      Assessment/Plan   Principal Problem:    Bipolar affective disorder, current episode manic with psychotic symptoms (HCC)  Active Problems:    Benign prostatic hyperplasia    Brachial plexus injury, right, sequela    Medical clearance for psychiatric admission    Bilateral lower extremity edema        Behavioral Health Medications: all current active meds have been reviewed and continue current psychiatric medications.  Current  Facility-Administered Medications   Medication Dose Route Frequency Provider Last Rate    acetaminophen  650 mg Oral Q4H PRN Jenn Strickland, BETO      acetaminophen  650 mg Oral Q4H PRN Reji Looney DO      acetaminophen  975 mg Oral Q6H PRN BETO Novak      cholecalciferol  2,000 Units Oral Daily Rossi Carlson PA-C      divalproex sodium  1,000 mg Oral HS Rory Bunn MD      divalproex sodium  500 mg Oral Daily Rory Bunn MD      gabapentin  300 mg Oral HS Rory Bunn MD      haloperidol  10 mg Oral HS BETO Novak      hydroCHLOROthiazide  12.5 mg Oral Daily Dorene Alysia Pelayo, BETO      hydrOXYzine HCL  25 mg Oral Q6H PRN Max 4/day Jenn Strickland, BETO      hydrOXYzine HCL  50 mg Oral Q6H PRN Max 4/day BETO Novak      LORazepam  1 mg Intramuscular Q6H PRN Max 3/day BETO Novak      LORazepam  1 mg Oral Q6H PRN Max 3/day BETO Novak      melatonin  3 mg Oral HS Rory Bunn MD      metFORMIN  500 mg Oral Daily With Breakfast Dorene Pelayo, BETO      mirtazapine  7.5 mg Oral HS PRN Rory Bunn MD      nicotine  1 patch Transdermal Daily PRN Fabiana Sousa MD      OLANZapine  5 mg Intramuscular Q3H PRN Max 3/day BETO Novak      OLANZapine  2.5 mg Oral Q4H PRN Max 6/day BETO Novak      OLANZapine  5 mg Oral Q4H PRN Max 3/day BETO Novak      OLANZapine  5 mg Oral Q3H PRN Max 3/day BETO Novak      [START ON 1/31/2024] paliperidone  234 mg IM- Deltoid Q4 weeks BETO Novak      polyethylene glycol  17 g Oral Daily PRN BETO Novak      senna-docusate sodium  1 tablet Oral Daily PRN BETO Novak      tamsulosin  0.4 mg Oral Daily With Dinner Rossi Carlson PA-C      traZODone  100 mg Oral HS Rory Bunn MD         Risks / Benefits of Treatment:  Risks, benefits, and possible side effects of medications explained to patient and patient verbalizes  understanding and agreement for treatment.    Counseling / Coordination of Care:  Patient's progress reviewed with nursing staff.  Medications, treatment progress and treatment plan reviewed with patient.  Supportive counseling provided to the patient.    Total floor/unit time spent today 25 minutes. Greater than 50% of total time was spent with the patient and / or family counseling and / or coordination of care. A description of the counseling / coordination of care: medication education, treatment plan, supportive therapy.

## 2024-01-28 NOTE — PLAN OF CARE
Problem: PSYCHOSIS  Goal: Will report no hallucinations or delusions  Description: Interventions:  - Administer medication as  ordered  - Every waking shifts and PRN assess for the presence of hallucinations and or delusions  - Assist with reality testing to support increasing orientation  - Assess if patient's hallucinations or delusions are encouraging self-harm or harm to others and intervene as appropriate  Outcome: Progressing     Problem: BEHAVIOR  Goal: Pt/Family maintain appropriate behavior and adhere to behavioral management agreement, if implemented  Description: INTERVENTIONS:  - Assess the family dynamic   - Encourage verbalization of thoughts and concerns in a socially appropriate manner  - Assess patient/family's coping skills and non-compliant behavior (including use of illegal substances).  - Utilize positive, consistent limit setting strategies supporting safety of patient, staff and others  - Initiate consult with Case Management, Spiritual Care or other ancillary services as appropriate  - If a patient's/visitor's behavior jeopardizes the safety of the patient, staff, or others, refer to organization procedure.   - Notify Security of behavior or suspected illegal substances which indicate the need for search of the patient and/or belongings  - Encourage participation in the decision making process about a behavioral management agreement; implement if patient meets criteria  Outcome: Progressing     Problem: SLEEP DISTURBANCE  Goal: Will exhibit normal sleeping pattern  Description: Interventions:  -  Assess the patients sleep pattern, noting recent changes  - Administer medication as ordered  - Decrease environmental stimuli, including noise, as appropriate during the night  - Encourage the patient to actively participate in unit groups and or exercise during the day to enhance ability to achieve adequate sleep at night  - Assess the patient, in the morning, encouraging a description of sleep  experience  Outcome: Progressing     Problem: INVOLUNTARY ADMIT  Goal: Will cooperate with staff recommendations and doctor's orders and will demonstrate appropriate behavior  Description: INTERVENTIONS:  - Treat underlying conditions and offer medication as ordered  - Educate regarding involuntary admission procedures and rules  - Utilize positive consistent limit setting strategies to support patient and staff safety  Outcome: Progressing     Problem: Risk for Self Injury/Neglect  Goal: Treatment Goal: Remain safe during length of stay, learn and adopt new coping skills, and be free of self-injurious ideation, impulses and acts at the time of discharge  Outcome: Progressing  Goal: Verbalize thoughts and feelings  Description: Interventions:  - Assess and re-assess patient's lethality and potential for self-injury  - Engage patient in 1:1 interactions, daily, for a minimum of 15 minutes  - Encourage patient to express feelings, fears, frustrations, hopes  - Establish rapport/trust with patient   Outcome: Progressing  Goal: Refrain from harming self  Description: Interventions:  - Monitor patient closely, per order  - Develop a trusting relationship  - Supervise medication ingestion, monitor effects and side effects   Outcome: Progressing  Goal: Attend and participate in unit activities, including therapeutic, recreational, and educational groups  Description: Interventions:  - Provide therapeutic and educational activities daily, encourage attendance and participation, and document same in the medical record  - Obtain collateral information, encourage visitation and family involvement in care   Outcome: Progressing  Goal: Recognize maladaptive responses and adopt new coping mechanisms  Outcome: Progressing  Goal: Complete daily ADLs, including personal hygiene independently, as able  Description: Interventions:  - Observe, teach, and assist patient with ADLS  - Monitor and promote a balance of rest/activity, with  adequate nutrition and elimination  Outcome: Progressing     Problem: Alteration in Orientation  Goal: Treatment Goal: Demonstrate a reduction of confusion and improved orientation to person, place, time and/or situation upon discharge, according to optimum baseline/ability  Outcome: Progressing  Goal: Interact with staff daily  Description: Interventions:  - Assess and re-assess patient's level of orientation  - Engage patient in 1 on 1 interactions, daily, for a minimum of 15 minutes   - Establish rapport/trust with patient   Outcome: Progressing  Goal: Express concerns related to confused thinking related to:  Description: Interventions:  - Encourage patient to express feelings, fears, frustrations, hopes  - Assign consistent caregivers   - [place]/re-orient patient as needed  - Allow comfort items, as appropriate  - Provide visual cues, signs, etc.   Outcome: Progressing  Goal: Allow medical examinations, as recommended  Description: Interventions:  - Provide physical/neurological exams and/or referrals, per provider   Outcome: Progressing  Goal: Cooperate with recommended testing/procedures  Description: Interventions:  - Determine need for ancillary testing  - Observe for mental status changes  - Implement falls/precaution protocol   Outcome: Progressing  Goal: Attend and participate in unit activities, including therapeutic, recreational, and educational groups  Description: Interventions:  - Provide therapeutic and educational activities daily, encourage attendance and participation, and document same in the medical record   - Provide appropriate opportunities for reminiscence   - Provide a consistent daily routine   - Encourage family contact/visitation   Outcome: Progressing  Goal: Complete daily ADLs, including personal hygiene independently, as able  Description: Interventions:  - Observe, teach, and assist patient with ADLS  Outcome: Progressing     Problem: DISCHARGE PLANNING - CARE MANAGEMENT  Goal:  Discharge to post-acute care or home with appropriate resources  Description: INTERVENTIONS:  - Conduct assessment to determine patient/family and health care team treatment goals, and need for post-acute services based on payer coverage, community resources, and patient preferences, and barriers to discharge  - Address psychosocial, clinical, and financial barriers to discharge as identified in assessment in conjunction with the patient/family and health care team  - Arrange appropriate level of post-acute services according to patient’s   needs and preference and payer coverage in collaboration with the physician and health care team  - Communicate with and update the patient/family, physician, and health care team regarding progress on the discharge plan  - Arrange appropriate transportation to post-acute venues  Outcome: Progressing

## 2024-01-28 NOTE — NURSING NOTE
Patient is visible on the unit and social with staff and peers. Patient is able to make needs known. Patient denies all psych S/S at this time. Patient is compliant with meals and medications. Cooperative and calm. Safety checks ongoing.

## 2024-01-28 NOTE — TREATMENT TEAM
01/28/24 0902   Pain Assessment Post Intervention   Pain Assessment Tool Used: 0-10   Post Intervention Pain Score No Pain   Post Intervention Pain Location/Orientation Orientation: Lower;Location: Back   Post Intervention POSS 1   Response to Interventions effective     Patient reports no lower back pain after taking Acetaminophen 975 MG PO. Medication effective.

## 2024-01-28 NOTE — NURSING NOTE
Patient visible on unit. Social with peers. Compliant with meals and medications. Denies all s/s at this time. Able to make needs known; denies any needs at this time. Safety checks ongoing.

## 2024-01-28 NOTE — TREATMENT TEAM
01/28/24 0802   Pain Assessment   Pain Assessment Tool 0-10   Pain Score 8   Pain Location/Orientation Orientation: Lower;Location: Back   Pain Radiating Towards n/a   Pain Onset/Description Onset: Ongoing   Effect of Pain on Daily Activities discomfort with activity   Patient's Stated Pain Goal No pain   Hospital Pain Intervention(s) Medication (See MAR)   Multiple Pain Sites No     Patient C/O lower back pain and requested Acetaminophen. Patient offered and accepted Acetaminophen 975 MG PO. Will reassess.

## 2024-01-29 PROCEDURE — 99232 SBSQ HOSP IP/OBS MODERATE 35: CPT | Performed by: STUDENT IN AN ORGANIZED HEALTH CARE EDUCATION/TRAINING PROGRAM

## 2024-01-29 RX ADMIN — CHOLECALCIFEROL TAB 25 MCG (1000 UNIT) 2000 UNITS: 25 TAB at 08:09

## 2024-01-29 RX ADMIN — HALOPERIDOL 10 MG: 10 TABLET ORAL at 21:31

## 2024-01-29 RX ADMIN — GABAPENTIN 300 MG: 300 CAPSULE ORAL at 21:31

## 2024-01-29 RX ADMIN — TAMSULOSIN HYDROCHLORIDE 0.4 MG: 0.4 CAPSULE ORAL at 16:48

## 2024-01-29 RX ADMIN — METFORMIN HYDROCHLORIDE 500 MG: 500 TABLET, FILM COATED ORAL at 08:10

## 2024-01-29 RX ADMIN — HYDROCHLOROTHIAZIDE 12.5 MG: 12.5 TABLET ORAL at 08:09

## 2024-01-29 RX ADMIN — DIVALPROEX SODIUM 1000 MG: 500 TABLET, EXTENDED RELEASE ORAL at 21:31

## 2024-01-29 RX ADMIN — DIVALPROEX SODIUM 500 MG: 500 TABLET, EXTENDED RELEASE ORAL at 08:10

## 2024-01-29 RX ADMIN — MELATONIN TAB 3 MG 3 MG: 3 TAB at 21:31

## 2024-01-29 RX ADMIN — TRAZODONE HYDROCHLORIDE 100 MG: 100 TABLET ORAL at 21:31

## 2024-01-29 NOTE — NURSING NOTE
Pt is visible on unit, calm and cooperative with care. Pt denies all psych s/s , meal and medication complaint, safety checks ongoing.

## 2024-01-29 NOTE — TREATMENT TEAM
01/29/24 0800   Team Meeting   Meeting Type Daily Rounds   Initial Conference Date 01/29/24   Team Members Present   Team Members Present Physician;Nurse;;;Occupational Therapist   Physician Team Member Jenn Rene   Nursing Team Member Manas Torres   Care Management Team Member Rossi   Social Work Team Member Tammy ROMAN Team Member Curt   Patient/Family Present   Patient Present No   Patient's Family Present No     [facility] meeting determined the patient is appropriate for E[facility]. [facility] will come assess the patient this week or next week for admittance when a bed opens in February. Patient is due for [facility] on 1/31/24. Patient is medication compliant and cooperative with care.    Information printed and given to doctor to view

## 2024-01-29 NOTE — CASE MANAGEMENT
CM spoke with the patient who reported his [facility] meeting last week went well and he is in agreement to go [facility] before getting his own place. The patient reported he does not want to move back in with his significant other. The patient reports he believes Alex will be out to see him in person this week, and that they will have an open bed for him in February. CM agreed to keep the patient updated.

## 2024-01-29 NOTE — PLAN OF CARE
Problem: PSYCHOSIS  Goal: Will report no hallucinations or delusions  Description: Interventions:  - Administer medication as  ordered  - Every waking shifts and PRN assess for the presence of hallucinations and or delusions  - Assist with reality testing to support increasing orientation  - Assess if patient's hallucinations or delusions are encouraging self-harm or harm to others and intervene as appropriate  Outcome: Progressing     Problem: SLEEP DISTURBANCE  Goal: Will exhibit normal sleeping pattern  Description: Interventions:  -  Assess the patients sleep pattern, noting recent changes  - Administer medication as ordered  - Decrease environmental stimuli, including noise, as appropriate during the night  - Encourage the patient to actively participate in unit groups and or exercise during the day to enhance ability to achieve adequate sleep at night  - Assess the patient, in the morning, encouraging a description of sleep experience  Outcome: Progressing     Problem: Risk for Self Injury/Neglect  Goal: Treatment Goal: Remain safe during length of stay, learn and adopt new coping skills, and be free of self-injurious ideation, impulses and acts at the time of discharge  Outcome: Progressing  Goal: Verbalize thoughts and feelings  Description: Interventions:  - Assess and re-assess patient's lethality and potential for self-injury  - Engage patient in 1:1 interactions, daily, for a minimum of 15 minutes  - Encourage patient to express feelings, fears, frustrations, hopes  - Establish rapport/trust with patient   Outcome: Progressing  Goal: Refrain from harming self  Description: Interventions:  - Monitor patient closely, per order  - Develop a trusting relationship  - Supervise medication ingestion, monitor effects and side effects   Outcome: Progressing     Problem: Alteration in Orientation  Goal: Treatment Goal: Demonstrate a reduction of confusion and improved orientation to person, place, time and/or  situation upon discharge, according to optimum baseline/ability  Outcome: Progressing  Goal: Interact with staff daily  Description: Interventions:  - Assess and re-assess patient's level of orientation  - Engage patient in 1 on 1 interactions, daily, for a minimum of 15 minutes   - Establish rapport/trust with patient   Outcome: Progressing

## 2024-01-29 NOTE — PROGRESS NOTES
Progress Note - Behavioral Health   Sudhakar Choe 58 y.o. male MRN: [id number]  Unit/Bed#: [facility] 648-01 Encounter: [id number]    Patient was seen today for continuation of care, records reviewed and patient was discussed with the morning case review team.    Sudhakar was seen today for psychiatric follow-up.  On assessment today, Sudhakar was pleasant.  Still intermittently questioning recent increase of Depakote, patient continues to be in better behavioral control with less irritability.  No statements of paranoia verbalized during interview.  Patient continues to be in good spirits and is noted to be active on the unit.  Inquiring about [facility] meeting this week, patient states that he is looking forward to a possible discharge.  No medication issues noted, Sudhakar continues to be medication compliant.  Invega Sustenna to be given on 1/31/2024, patient also tolerates Haldol 10 mg nightly to assist with symptoms.  2 antipsychotics as needed due to multiple failures of monotherapy, we will continue with current regimen as patient continues to approach his baseline.    Sudhakar denies acute suicidal/self-harm ideation/intent/plan upon direct inquiry at this time.  Sudhakar remains behaviorally appropriate, no agitation or aggression noted on exam or in report.  Sudhakar also denies HI/AH/VH, and does not appear overtly manic.  No overt delusions or paranoia are verbalized. Impulse control is intact.  Sudhakar remains adherent to he current psychotropic medication regimen and denies any side effects from medications, as well as none noted on exam.    Vitals:  Vitals:    01/29/24 0737   BP: 105/68   Pulse: 93   Resp: 18   Temp: 98.6 °F (37 °C)   SpO2: 94%       Laboratory Results:  I have personally reviewed all pertinent laboratory/tests results.  Most Recent Labs:   Lab Results   Component Value Date    WBC 5.47 12/30/2023    RBC 4.12 12/30/2023    HGB 12.6 12/30/2023    HCT 38.3 12/30/2023     12/30/2023    RDW 13.2 12/30/2023     NEUTROABS 2.60 12/30/2023    SODIUM 136 01/25/2024    K 3.8 01/25/2024    CL 95 (L) 01/25/2024    CO2 32 01/25/2024    BUN 12 01/25/2024    CREATININE 1.00 01/25/2024    GLUC 90 01/25/2024    GLUF 109 (H) 01/03/2024    CALCIUM 9.7 01/25/2024    AST 18 01/25/2024    ALT 10 01/25/2024    ALKPHOS 56 01/25/2024    TP 7.1 01/25/2024    ALB 4.0 01/25/2024    TBILI 0.33 01/25/2024    CHOLESTEROL 124 01/23/2024    HDL 28 (L) 01/23/2024    TRIG 116 01/23/2024    LDLCALC 73 01/23/2024    NONHDLC 96 01/23/2024    VALPROICTOT 79 01/25/2024    GDB0NPXSRWVH 1.431 12/30/2023    RPR Non-Reactive 03/26/2021    HGBA1C 6.2 (H) 01/23/2024     01/23/2024       Psychiatric Review of Systems:  Behavior over the last 24 hours:  unchanged.   Sleep: good  Appetite: good  Medication side effects: none  ROS: no complaints, denies shortness of breath or chest pain and all other systems are negative for acute changes    Mental Status Evaluation:  Appearance:  adequate grooming   Behavior:  more calm   Speech:  normal rate and volume   Mood:  less irritable   Affect:  constricted   Thought Process:  linear   Thought Content:  some paranoia   Perceptual Disturbances: denies auditory hallucinations when asked, does not appear responding to internal stimuli   Risk Potential: Suicidal ideation - None  Homicidal ideation - None  Potential for aggression - No   Memory:  recent and remote memory grossly intact   Sensorium  person, place, and time/date      Consciousness:  alert and awake   Attention: attention span and concentration are age appropriate   Insight:  limited   Judgment: limited   Gait/Station: normal gait/station   Motor Activity: no abnormal movements   Progress Toward Goals:   Sudhakar is progressing towards goals of inpatient psychiatric treatment by continued medication compliance and is attending therapeutic modalities on the milieu. However, the patient continues to require inpatient psychiatric hospitalization for continued  medication management and titration to optimize symptom reduction, improve sleep hygiene, and demonstrate adequate self-care.    Assessment/Plan   Principal Problem:    Bipolar affective disorder, current episode manic with psychotic symptoms (HCC)  Active Problems:    Benign prostatic hyperplasia    Brachial plexus injury, right, sequela    Medical clearance for psychiatric admission    Bilateral lower extremity edema      Recommended Treatment: Treatment plan and medication changes discussed and per the attending physician the plan is:    1.Continue with group therapy, milieu therapy and occupational therapy  2.Behavioral Health checks every 7 minutes  3.Continue frequent safety checks and vitals per unit protocol  4.Continue with SLIM medical management as indicated  5.Continue with current medication regimen  6.Will review labs in the a.m.  7.Disposition Planning: Discharge planning and efforts remain ongoing    Behavioral Health Medications: all current active meds have been reviewed and continue current psychiatric medications.  Current Facility-Administered Medications   Medication Dose Route Frequency Provider Last Rate    acetaminophen  650 mg Oral Q4H PRN Jenn Strickland, CRNP      acetaminophen  650 mg Oral Q4H PRN Reji Looney DO      acetaminophen  975 mg Oral Q6H PRN BETO Novak      cholecalciferol  2,000 Units Oral Daily Rossi Carlson PA-C      divalproex sodium  1,000 mg Oral HS Rory Bunn MD      divalproex sodium  500 mg Oral Daily Rory Bunn MD      gabapentin  300 mg Oral HS Rory Bunn MD      haloperidol  10 mg Oral HS BETO Novak      hydroCHLOROthiazide  12.5 mg Oral Daily BETO Garcia      hydrOXYzine HCL  25 mg Oral Q6H PRN Max 4/day Jenn Strickland, LYLANP      hydrOXYzine HCL  50 mg Oral Q6H PRN Max 4/day Jenn Strickland, LYLANP      LORazepam  1 mg Intramuscular Q6H PRN Max 3/day Jenn Strickland, BETO      LORazepam  1 mg Oral Q6H PRN Max  3/day BETO Novak      melatonin  3 mg Oral HS Rory Bunn MD      metFORMIN  500 mg Oral Daily With Breakfast Dorenedaiana JorgeBETO Castro      mirtazapine  7.5 mg Oral HS PRN Rory Bunn MD      nicotine  1 patch Transdermal Daily PRN Fabiana Sousa MD      OLANZapine  5 mg Intramuscular Q3H PRN Max 3/day Jenn Strickland, BETO      OLANZapine  2.5 mg Oral Q4H PRN Max 6/day Jenn Strickland, BTEO      OLANZapine  5 mg Oral Q4H PRN Max 3/day Jenn Strickland, BETO      OLANZapine  5 mg Oral Q3H PRN Max 3/day BETO Novak      [START ON 1/31/2024] paliperidone  234 mg IM- Deltoid Q4 weeks BETO Novak      polyethylene glycol  17 g Oral Daily PRN BETO Novak      senna-docusate sodium  1 tablet Oral Daily PRN BETO Novak      tamsulosin  0.4 mg Oral Daily With Dinner Rossi Carlson PA-C      traZODone  100 mg Oral HS Rory Bunn MD         Risks / Benefits of Treatment:  Risks, benefits, and possible side effects of medications explained to patient and patient verbalizes understanding and agreement for treatment.    Counseling / Coordination of Care:  Patient's progress reviewed with nursing staff.  Medications, treatment progress and treatment plan reviewed with patient.  Supportive counseling provided to the patient.    Total floor/unit time spent today 25 minutes. Greater than 50% of total time was spent with the patient and / or family counseling and / or coordination of care. A description of the counseling / coordination of care: medication education, treatment plan, supportive therapy.    This note was completed in part utilizing Dragon dictation Software. Grammatical, translation, syntax errors, random word insertions, spelling mistakes, and incomplete sentences may be an occasional consequence of this system secondary to software limitations with voice recognition, ambient noise, and hardware issues. If you have any questions or concerns  about the content, text, or information contained within the body of this dictation, please contact the provider for clarification

## 2024-01-29 NOTE — PROGRESS NOTES
Pt attended am group.  Pt sleepy and stayed for duration.  Pt arm wrapped.     01/29/24 1000   Activity/Group Checklist   Group Community meeting   Attendance Attended   Attendance Duration (min) 46-60   Interactions Did not interact   Affect/Mood Other (Comment)  (slept)   Goals Achieved Identified feelings;Identified triggers;Discussed coping strategies;Able to listen to others

## 2024-01-29 NOTE — NURSING NOTE
Patient is visible on the unit and social with staff and peers. Patient denies all psych S/S. Patient is able to make needs known. Patient is compliant with meals and medications. Cooperative and calm. Safety checks ongoing.

## 2024-01-29 NOTE — NURSING NOTE
Pt visible on unit; social with select peers. Currently denies all s/s. Compliant with meals and medications. Able to make needs known; denies any needs at this time. Safety checks ongoing.

## 2024-01-30 ENCOUNTER — TELEPHONE (OUTPATIENT)
Dept: FAMILY MEDICINE CLINIC | Facility: CLINIC | Age: 59
End: 2024-01-30

## 2024-01-30 PROCEDURE — 99232 SBSQ HOSP IP/OBS MODERATE 35: CPT | Performed by: STUDENT IN AN ORGANIZED HEALTH CARE EDUCATION/TRAINING PROGRAM

## 2024-01-30 RX ORDER — TRAZODONE HYDROCHLORIDE 100 MG/1
100 TABLET ORAL
Status: ACTIVE | OUTPATIENT
Start: 2024-01-30

## 2024-01-30 RX ORDER — DOCUSATE SODIUM 100 MG/1
100 CAPSULE, LIQUID FILLED ORAL 2 TIMES DAILY
Status: DISPENSED | OUTPATIENT
Start: 2024-01-30

## 2024-01-30 RX ADMIN — POLYETHYLENE GLYCOL 3350 17 G: 17 POWDER, FOR SOLUTION ORAL at 11:46

## 2024-01-30 RX ADMIN — DIVALPROEX SODIUM 500 MG: 500 TABLET, EXTENDED RELEASE ORAL at 08:37

## 2024-01-30 RX ADMIN — MELATONIN TAB 3 MG 3 MG: 3 TAB at 21:28

## 2024-01-30 RX ADMIN — GABAPENTIN 300 MG: 300 CAPSULE ORAL at 21:29

## 2024-01-30 RX ADMIN — HALOPERIDOL 10 MG: 10 TABLET ORAL at 21:28

## 2024-01-30 RX ADMIN — METFORMIN HYDROCHLORIDE 500 MG: 500 TABLET, FILM COATED ORAL at 08:37

## 2024-01-30 RX ADMIN — TAMSULOSIN HYDROCHLORIDE 0.4 MG: 0.4 CAPSULE ORAL at 17:08

## 2024-01-30 RX ADMIN — CHOLECALCIFEROL TAB 25 MCG (1000 UNIT) 2000 UNITS: 25 TAB at 08:37

## 2024-01-30 RX ADMIN — DOCUSATE SODIUM 100 MG: 100 CAPSULE, LIQUID FILLED ORAL at 11:46

## 2024-01-30 RX ADMIN — DIVALPROEX SODIUM 1000 MG: 500 TABLET, EXTENDED RELEASE ORAL at 21:28

## 2024-01-30 RX ADMIN — DOCUSATE SODIUM 100 MG: 100 CAPSULE, LIQUID FILLED ORAL at 17:08

## 2024-01-30 NOTE — NURSING NOTE
Pt pleasant and med-compliant with good appetite and steady gait.  BP 90/54, HCTZ held at 0900.  Attended group.  No delusions or inappropriate behavior noted.  Monitored for safety and support.

## 2024-01-30 NOTE — TREATMENT TEAM
01/30/24 0826   Team Meeting   Meeting Type Daily Rounds   Initial Conference Date 01/30/24   Team Members Present   Team Members Present Physician;Nurse;;;Occupational Therapist   Physician Team Member Jenn Rene   Nursing Team Member July Kenyon   Care Management Team Member Rossi   Social Work Team Member Tammy ROMAN Team Member Curt   Patient/Family Present   Patient Present No   Patient's Family Present No   Pharmacy: [facility] will come assess the patient this week or next week for admittance when a bed opens in February. Patient is due for [facility] on 1/31/24. Patient is medication compliant and cooperative with care.  No pending discharge date at this time.

## 2024-01-30 NOTE — NURSING NOTE
Pt visible on the unit. Sitting with peers in the dayroom, or pacing the halls. Pt bright on approach, and cooperative with assessment questions, pt denies depression, anxiety, SI/HI/AVH. Compliant with medications and meals, appetite is good. Patient safety precautions maintained.

## 2024-01-30 NOTE — PROGRESS NOTES
01/30/24 1300   Activity/Group Checklist   Group Pet therapy   Attendance Attended   Attendance Duration (min) 16-30   Interactions Other (Comment)  (Pt. watched peers interact with the dog more than self.)   Affect/Mood Calm;Normal range   Goals Achieved Able to listen to others;Able to engage in interactions

## 2024-01-30 NOTE — PLAN OF CARE
Problem: PSYCHOSIS  Goal: Will report no hallucinations or delusions  Description: Interventions:  - Administer medication as  ordered  - Every waking shifts and PRN assess for the presence of hallucinations and or delusions  - Assist with reality testing to support increasing orientation  - Assess if patient's hallucinations or delusions are encouraging self-harm or harm to others and intervene as appropriate  Outcome: Progressing     Problem: BEHAVIOR  Goal: Pt/Family maintain appropriate behavior and adhere to behavioral management agreement, if implemented  Description: INTERVENTIONS:  - Assess the family dynamic   - Encourage verbalization of thoughts and concerns in a socially appropriate manner  - Assess patient/family's coping skills and non-compliant behavior (including use of illegal substances).  - Utilize positive, consistent limit setting strategies supporting safety of patient, staff and others  - Initiate consult with Case Management, Spiritual Care or other ancillary services as appropriate  - If a patient's/visitor's behavior jeopardizes the safety of the patient, staff, or others, refer to organization procedure.   - Notify Security of behavior or suspected illegal substances which indicate the need for search of the patient and/or belongings  - Encourage participation in the decision making process about a behavioral management agreement; implement if patient meets criteria  Outcome: Progressing     Problem: SLEEP DISTURBANCE  Goal: Will exhibit normal sleeping pattern  Description: Interventions:  -  Assess the patients sleep pattern, noting recent changes  - Administer medication as ordered  - Decrease environmental stimuli, including noise, as appropriate during the night  - Encourage the patient to actively participate in unit groups and or exercise during the day to enhance ability to achieve adequate sleep at night  - Assess the patient, in the morning, encouraging a description of sleep  Pt was seen 6/22/20.  Closing encounter.   experience  Outcome: Progressing     Problem: INVOLUNTARY ADMIT  Goal: Will cooperate with staff recommendations and doctor's orders and will demonstrate appropriate behavior  Description: INTERVENTIONS:  - Treat underlying conditions and offer medication as ordered  - Educate regarding involuntary admission procedures and rules  - Utilize positive consistent limit setting strategies to support patient and staff safety  Outcome: Progressing     Problem: Risk for Self Injury/Neglect  Goal: Treatment Goal: Remain safe during length of stay, learn and adopt new coping skills, and be free of self-injurious ideation, impulses and acts at the time of discharge  Outcome: Progressing  Goal: Verbalize thoughts and feelings  Description: Interventions:  - Assess and re-assess patient's lethality and potential for self-injury  - Engage patient in 1:1 interactions, daily, for a minimum of 15 minutes  - Encourage patient to express feelings, fears, frustrations, hopes  - Establish rapport/trust with patient   Outcome: Progressing  Goal: Refrain from harming self  Description: Interventions:  - Monitor patient closely, per order  - Develop a trusting relationship  - Supervise medication ingestion, monitor effects and side effects   Outcome: Progressing  Goal: Recognize maladaptive responses and adopt new coping mechanisms  Outcome: Progressing  Goal: Complete daily ADLs, including personal hygiene independently, as able  Description: Interventions:  - Observe, teach, and assist patient with ADLS  - Monitor and promote a balance of rest/activity, with adequate nutrition and elimination  Outcome: Progressing     Problem: Alteration in Orientation  Goal: Treatment Goal: Demonstrate a reduction of confusion and improved orientation to person, place, time and/or situation upon discharge, according to optimum baseline/ability  Outcome: Progressing  Goal: Interact with staff daily  Description: Interventions:  - Assess and re-assess  patient's level of orientation  - Engage patient in 1 on 1 interactions, daily, for a minimum of 15 minutes   - Establish rapport/trust with patient   Outcome: Progressing  Goal: Express concerns related to confused thinking related to:  Description: Interventions:  - Encourage patient to express feelings, fears, frustrations, hopes  - Assign consistent caregivers   - [place]/re-orient patient as needed  - Allow comfort items, as appropriate  - Provide visual cues, signs, etc.   Outcome: Progressing  Goal: Allow medical examinations, as recommended  Description: Interventions:  - Provide physical/neurological exams and/or referrals, per provider   Outcome: Progressing  Goal: Cooperate with recommended testing/procedures  Description: Interventions:  - Determine need for ancillary testing  - Observe for mental status changes  - Implement falls/precaution protocol   Outcome: Progressing  Goal: Complete daily ADLs, including personal hygiene independently, as able  Description: Interventions:  - Observe, teach, and assist patient with ADLS  Outcome: Progressing

## 2024-01-30 NOTE — PROGRESS NOTES
Progress Note - Behavioral Health   Sudhakar Choe 58 y.o. male MRN: [id number]  Unit/Bed#: [facility] 648-01 Encounter: [id number]    Patient was seen today for continuation of care, records reviewed and patient was discussed with the morning case review team.    Sudhakar was seen today for psychiatric follow-up.  On assessment today, Sudhakar was pleasant.  Complaining of increased daytime somnolence, patient reports feeling groggy in the morning.  Medications reviewed, will make trazodone 100 mg nightly as needed to assist with symptoms.  No depression or anxiety currently verbalized.  Patient is noted to be exhibiting better impulse control as well as improving insight into current mental condition.  No medication issues currently noted, Invega Sustenna 234 mg IM to be given tomorrow 1/31/2024.  Patient also continues to tolerate two antipsychotics secondary to multiple failures of monotherapy.  Discharge disposition ongoing with referral placed to [facility].    Sudhakar denies acute suicidal/self-harm ideation/intent/plan upon direct inquiry at this time.  Sudhakar remains behaviorally appropriate, no agitation or aggression noted on exam or in report.  Sudhakar also denies HI/AH/VH, and does not appear overtly manic.  No overt delusions or paranoia are verbalized. Impulse control is intact.  Sudhakar remains adherent to his current psychotropic medication regimen and denies any side effects from medications, as well as none noted on exam.    Vitals:  Vitals:    01/30/24 0752   BP: 90/54   Pulse:    Resp: 16   Temp: 97.8 °F (36.6 °C)   SpO2: 100%       Laboratory Results:  I have personally reviewed all pertinent laboratory/tests results.  Most Recent Labs:   Lab Results   Component Value Date    WBC 5.47 12/30/2023    RBC 4.12 12/30/2023    HGB 12.6 12/30/2023    HCT 38.3 12/30/2023     12/30/2023    RDW 13.2 12/30/2023    NEUTROABS 2.60 12/30/2023    SODIUM 136 01/25/2024    K 3.8 01/25/2024    CL 95 (L) 01/25/2024    CO2 32  01/25/2024    BUN 12 01/25/2024    CREATININE 1.00 01/25/2024    GLUC 90 01/25/2024    GLUF 109 (H) 01/03/2024    CALCIUM 9.7 01/25/2024    AST 18 01/25/2024    ALT 10 01/25/2024    ALKPHOS 56 01/25/2024    TP 7.1 01/25/2024    ALB 4.0 01/25/2024    TBILI 0.33 01/25/2024    CHOLESTEROL 124 01/23/2024    HDL 28 (L) 01/23/2024    TRIG 116 01/23/2024    LDLCALC 73 01/23/2024    NONHDLC 96 01/23/2024    VALPROICTOT 79 01/25/2024    [id number] 1.431 12/30/2023    RPR Non-Reactive 03/26/2021    HGBA1C 6.2 (H) 01/23/2024     01/23/2024       Psychiatric Review of Systems:  Behavior over the last 24 hours:  unchanged.   Sleep: good  Appetite: good  Medication side effects: none  ROS: no complaints, denies shortness of breath or chest pain and all other systems are negative for acute changes    Mental Status Evaluation:  Appearance:  adequate grooming   Behavior:  cooperative, calm   Speech:  normal rate and volume   Mood:  less irritable   Affect:  constricted   Thought Process:  linear   Thought Content:  less paranoid   Perceptual Disturbances: denies auditory hallucinations when asked, does not appear responding to internal stimuli   Risk Potential: Suicidal ideation - None  Homicidal ideation - None  Potential for aggression - No   Memory:  recent and remote memory grossly intact   Sensorium  person and place      Consciousness:  alert and awake   Attention: attention span and concentration are age appropriate   Insight:  limited   Judgment: limited   Gait/Station: normal gait/station   Motor Activity: no abnormal movements   Progress Toward Goals:   Sudhakar is progressing towards goals of inpatient psychiatric treatment by continued medication compliance and is attending therapeutic modalities on the milieu. However, the patient continues to require inpatient psychiatric hospitalization for continued medication management and titration to optimize symptom reduction, improve sleep hygiene, and demonstrate  adequate self-care.    Assessment/Plan   Principal Problem:    Bipolar affective disorder, current episode manic with psychotic symptoms (HCC)  Active Problems:    Benign prostatic hyperplasia    Brachial plexus injury, right, sequela    Medical clearance for psychiatric admission    Bilateral lower extremity edema      Recommended Treatment: Treatment plan and medication changes discussed and per the attending physician the plan is:    1.Continue with group therapy, milieu therapy and occupational therapy  2.Behavioral Health checks every 7 minutes  3.Continue frequent safety checks and vitals per unit protocol  4.Continue with SLIM medical management as indicated  5.Continue with current medication regimen  6.Will review labs in the a.m.  7.Disposition Planning: Discharge planning and efforts remain ongoing    Behavioral Health Medications: all current active meds have been reviewed and continue current psychiatric medications.  Current Facility-Administered Medications   Medication Dose Route Frequency Provider Last Rate    acetaminophen  650 mg Oral Q4H PRN Jenn Strickland, LYLANP      acetaminophen  650 mg Oral Q4H PRN Reji Looney DO      acetaminophen  975 mg Oral Q6H PRN BETO Novak      cholecalciferol  2,000 Units Oral Daily Rossi Carlson PA-C      divalproex sodium  1,000 mg Oral HS Rory Bunn MD      divalproex sodium  500 mg Oral Daily Rory Bunn MD      docusate sodium  100 mg Oral BID BETO Novak      gabapentin  300 mg Oral HS Rory Bunn MD      haloperidol  10 mg Oral HS BETO Novak      hydroCHLOROthiazide  12.5 mg Oral Daily BETO Garcia      hydrOXYzine HCL  25 mg Oral Q6H PRN Max 4/day Jenn Strickland, LYLANP      hydrOXYzine HCL  50 mg Oral Q6H PRN Max 4/day BETO Novak      LORazepam  1 mg Intramuscular Q6H PRN Max 3/day Jenn Strickland, BETO      LORazepam  1 mg Oral Q6H PRN Max 3/day LYLA NovakNP      melatonin  3  mg Oral HS Rory Bunn MD      metFORMIN  500 mg Oral Daily With Breakfast BETO Garcia      mirtazapine  7.5 mg Oral HS PRN Rory Bunn MD      nicotine  1 patch Transdermal Daily PRN Fabiana Sousa MD      OLANZapine  5 mg Intramuscular Q3H PRN Max 3/day BETO Novak      OLANZapine  2.5 mg Oral Q4H PRN Max 6/day BETO Novak      OLANZapine  5 mg Oral Q4H PRN Max 3/day Jenn Strickland, BETO      OLANZapine  5 mg Oral Q3H PRN Max 3/day BETO Novak      [START ON 1/31/2024] paliperidone  234 mg IM- Deltoid Q4 weeks BETO Novak      polyethylene glycol  17 g Oral Daily PRN BETO Novak      senna-docusate sodium  1 tablet Oral Daily PRN BETO Novak      tamsulosin  0.4 mg Oral Daily With Dinner Rossi Carlson PA-C      traZODone  100 mg Oral HS Rory Bunn MD         Risks / Benefits of Treatment:  Risks, benefits, and possible side effects of medications explained to patient and patient verbalizes understanding and agreement for treatment.    Counseling / Coordination of Care:  Patient's progress reviewed with nursing staff.  Medications, treatment progress and treatment plan reviewed with patient.  Supportive counseling provided to the patient.    Total floor/unit time spent today 25 minutes. Greater than 50% of total time was spent with the patient and / or family counseling and / or coordination of care. A description of the counseling / coordination of care: medication education, treatment plan, supportive therapy.    This note was completed in part utilizing Dragon dictation Software. Grammatical, translation, syntax errors, random word insertions, spelling mistakes, and incomplete sentences may be an occasional consequence of this system secondary to software limitations with voice recognition, ambient noise, and hardware issues. If you have any questions or concerns about the content, text, or information  contained within the body of this dictation, please contact the provider for clarification

## 2024-01-31 PROCEDURE — 99232 SBSQ HOSP IP/OBS MODERATE 35: CPT | Performed by: STUDENT IN AN ORGANIZED HEALTH CARE EDUCATION/TRAINING PROGRAM

## 2024-01-31 RX ADMIN — TAMSULOSIN HYDROCHLORIDE 0.4 MG: 0.4 CAPSULE ORAL at 16:53

## 2024-01-31 RX ADMIN — PALIPERIDONE PALMITATE 234 MG: 234 INJECTION INTRAMUSCULAR at 11:27

## 2024-01-31 RX ADMIN — DIVALPROEX SODIUM 1000 MG: 500 TABLET, EXTENDED RELEASE ORAL at 21:24

## 2024-01-31 RX ADMIN — HALOPERIDOL 10 MG: 10 TABLET ORAL at 21:24

## 2024-01-31 RX ADMIN — DIVALPROEX SODIUM 500 MG: 500 TABLET, EXTENDED RELEASE ORAL at 08:11

## 2024-01-31 RX ADMIN — METFORMIN HYDROCHLORIDE 500 MG: 500 TABLET, FILM COATED ORAL at 08:11

## 2024-01-31 RX ADMIN — DOCUSATE SODIUM 100 MG: 100 CAPSULE, LIQUID FILLED ORAL at 17:16

## 2024-01-31 RX ADMIN — MELATONIN TAB 3 MG 3 MG: 3 TAB at 21:24

## 2024-01-31 RX ADMIN — GABAPENTIN 300 MG: 300 CAPSULE ORAL at 21:24

## 2024-01-31 RX ADMIN — CHOLECALCIFEROL TAB 25 MCG (1000 UNIT) 2000 UNITS: 25 TAB at 08:11

## 2024-01-31 RX ADMIN — DOCUSATE SODIUM 100 MG: 100 CAPSULE, LIQUID FILLED ORAL at 08:11

## 2024-01-31 NOTE — TREATMENT TEAM
Pt attended groups.  Pt calm and cooperative  Pt more alert asking if his past relapse prevention plan is suitable.  Indicated he needs to continue working on his plan (last copy was not appropriate).      01/31/24 1000   Activity/Group Checklist   Group Community meeting   Attendance Attended   Attendance Duration (min) 31-45   Interactions Interacted appropriately   Affect/Mood Calm;Appropriate   Goals Achieved Identified feelings;Identified relapse prevention strategies;Discussed coping strategies;Able to listen to others;Able to engage in interactions;Able to reflect/comment on own behavior;Verbalized increased hopefulness;Able to manage/cope with feelings;Able to self-disclose

## 2024-01-31 NOTE — TREATMENT TEAM
01/31/24 0801   Team Meeting   Meeting Type Daily Rounds   Initial Conference Date 01/31/24   Team Members Present   Team Members Present Physician;Nurse;;;Occupational Therapist   Physician Team Member Jenn Rene   Nursing Team Member July Kenyon   Care Management Team Member Rossi   Social Work Team Member Tammy   OT Team Member Curt   Patient/Family Present   Patient Present No   Patient's Family Present No     [facility] will come assess the patient this week or next week for admittance when a bed opens in February. Patient is due for DE ANDA today. Patient is medication compliant and cooperative with care.  No pending discharge date at this time.

## 2024-01-31 NOTE — CASE MANAGEMENT
Alex GERONIMO will be out to assess the patient on Friday 2/9/24. CM awaiting on a confirmed time. The patient is aware and in agreement.

## 2024-01-31 NOTE — PROGRESS NOTES
01/31/24 1100   Activity/Group Checklist   Group Life Skills  (topic : peace of mind.)   Attendance Attended   Attendance Duration (min) 31-45   Interactions Other (Comment)  (pt. needed prompts to stay alert to topic,dozing off initially.Pt. able to state that some public disagreements about current laws do not effect him in his personal peace of mind.Pt. more reality based today.)   Affect/Mood Calm;Appropriate;Normal range   Goals Achieved Able to engage in interactions;Able to listen to others;Identified feelings;Discussed coping strategies

## 2024-01-31 NOTE — PROGRESS NOTES
Progress Note - Behavioral Health   Sudhakar Choe 58 y.o. male MRN: [id number]  Unit/Bed#: [facility] 648-01 Encounter: [id number]    Patient was seen today for continuation of care, records reviewed and patient was discussed with the morning case review team.    Sudhakar was seen today for psychiatric follow-up.  On assessment today, Sudhakar was pleasant.  Less irritable on approach, patient is noted to be more organized as well as cooperative with peers and staff.  Visible and social on the unit, patient also continues to exhibit better insight as well as judgment into current mental illness.  Invega Sustenna to be given today 1/31/2024, patient also continues to tolerate Haldol 10 mg nightly to assist with symptoms.  Two antipsychotics utilized due to multiple failures of monotherapy, patient reports no adverse effects.  Discharge disposition ongoing as patient continues to await placement in [facility].    Sudhakar denies acute suicidal/self-harm ideation/intent/plan upon direct inquiry at this time.  Sudhakar remains behaviorally appropriate, no agitation or aggression noted on exam or in report.  Sudhakar also denies HI/AH/VH, and does not appear overtly manic.  No overt delusions or paranoia are verbalized. Impulse control is intact.  Sudhakar remains adherent to his current psychotropic medication regimen and denies any side effects from medications, as well as none noted on exam.    Vitals:  Vitals:    01/31/24 0900   BP: 102/58   Pulse:    Resp:    Temp:    SpO2:        Laboratory Results:  I have personally reviewed all pertinent laboratory/tests results.  Most Recent Labs:   Lab Results   Component Value Date    WBC 5.47 12/30/2023    RBC 4.12 12/30/2023    HGB 12.6 12/30/2023    HCT 38.3 12/30/2023     12/30/2023    RDW 13.2 12/30/2023    NEUTROABS 2.60 12/30/2023    SODIUM 136 01/25/2024    K 3.8 01/25/2024    CL 95 (L) 01/25/2024    CO2 32 01/25/2024    BUN 12 01/25/2024    CREATININE 1.00 01/25/2024    GLUC 90  01/25/2024    GLUF 109 (H) 01/03/2024    CALCIUM 9.7 01/25/2024    AST 18 01/25/2024    ALT 10 01/25/2024    ALKPHOS 56 01/25/2024    TP 7.1 01/25/2024    ALB 4.0 01/25/2024    TBILI 0.33 01/25/2024    CHOLESTEROL 124 01/23/2024    HDL 28 (L) 01/23/2024    TRIG 116 01/23/2024    LDLCALC 73 01/23/2024    NONHDLC 96 01/23/2024    VALPROICTOT 79 01/25/2024    [id number] 1.431 12/30/2023    RPR Non-Reactive 03/26/2021    HGBA1C 6.2 (H) 01/23/2024     01/23/2024       Psychiatric Review of Systems:  Behavior over the last 24 hours:  unchanged.   Sleep: good  Appetite: good  Medication side effects: none  ROS: no complaints, denies shortness of breath or chest pain and all other systems are negative for acute changes    Mental Status Evaluation:  Appearance:  disheveled   Behavior:  cooperative, calm   Speech:  normal rate and volume   Mood:  less irritable   Affect:  constricted   Thought Process:  linear   Thought Content:  some paranoia   Perceptual Disturbances: denies auditory hallucinations when asked, does not appear responding to internal stimuli   Risk Potential: Suicidal ideation - None  Homicidal ideation - None  Potential for aggression - No   Memory:  recent and remote memory grossly intact   Sensorium  person, place, and time/date      Consciousness:  alert and awake   Attention: attention span and concentration are age appropriate   Insight:  limited   Judgment: limited   Gait/Station: normal gait/station   Motor Activity: no abnormal movements   Progress Toward Goals:   Sudhakar is progressing towards goals of inpatient psychiatric treatment by continued medication compliance and is attending therapeutic modalities on the milieu. However, the patient continues to require inpatient psychiatric hospitalization for continued medication management and titration to optimize symptom reduction, improve sleep hygiene, and demonstrate adequate self-care.    Assessment/Plan   Principal Problem:    Bipolar  affective disorder, current episode manic with psychotic symptoms (HCC)  Active Problems:    Benign prostatic hyperplasia    Brachial plexus injury, right, sequela    Medical clearance for psychiatric admission    Bilateral lower extremity edema      Recommended Treatment: Treatment plan and medication changes discussed and per the attending physician the plan is:    1.Continue with group therapy, milieu therapy and occupational therapy  2.Behavioral Health checks every 7 minutes  3.Continue frequent safety checks and vitals per unit protocol  4.Continue with [facility] medical management as indicated  5.Continue with current medication regimen  6.Will review labs in the a.m.  7.Disposition Planning: Discharge planning and efforts remain ongoing    Behavioral Health Medications: all current active meds have been reviewed and continue current psychiatric medications.  Current Facility-Administered Medications   Medication Dose Route Frequency Provider Last Rate    acetaminophen  650 mg Oral Q4H PRN LYLA NovakNP      acetaminophen  650 mg Oral Q4H PRN Reji Looney DO      acetaminophen  975 mg Oral Q6H PRN BETO Novak      cholecalciferol  2,000 Units Oral Daily Rossi Carlson PA-C      divalproex sodium  1,000 mg Oral HS Rory Bunn MD      divalproex sodium  500 mg Oral Daily Rory Bunn MD      docusate sodium  100 mg Oral BID Jenn Strickland, LYLANP      gabapentin  300 mg Oral HS Rory Bunn MD      haloperidol  10 mg Oral HS BETO Novak      hydroCHLOROthiazide  12.5 mg Oral Daily Dorene Pelayo, LYLANP      hydrOXYzine HCL  25 mg Oral Q6H PRN Max 4/day Jenn Strickland, CRNP      hydrOXYzine HCL  50 mg Oral Q6H PRN Max 4/day Jenn Strickland, BETO      LORazepam  1 mg Intramuscular Q6H PRN Max 3/day Jenn Strickland, CRNP      LORazepam  1 mg Oral Q6H PRN Max 3/day Jenn Strickland, LYLANP      melatonin  3 mg Oral HS Rroy Bunn MD      metFORMIN  500 mg Oral Daily With  Breakfast Dorene Pelayo, BETO      mirtazapine  7.5 mg Oral HS PRN Rory Bunn MD      nicotine  1 patch Transdermal Daily PRN Fabiana Sousa MD      OLANZapine  5 mg Intramuscular Q3H PRN Max 3/day Jenn Strickland, CRNP      OLANZapine  2.5 mg Oral Q4H PRN Max 6/day Jenn Strickland, CRNP      OLANZapine  5 mg Oral Q4H PRN Max 3/day Jenn Strickland, CRNP      OLANZapine  5 mg Oral Q3H PRN Max 3/day Jenn Strickland, CRNP      paliperidone  234 mg IM- Deltoid Q4 weeks Jenn Strickland, CRNP      polyethylene glycol  17 g Oral Daily PRN Jenn Strickland, CRNP      senna-docusate sodium  1 tablet Oral Daily PRN Jenn Strickland, LYLANP      tamsulosin  0.4 mg Oral Daily With Dinner Rossi Carlson PA-C      traZODone  100 mg Oral HS PRN Jenn Strickland, BETO         Risks / Benefits of Treatment:  Risks, benefits, and possible side effects of medications explained to patient and patient verbalizes understanding and agreement for treatment.    Counseling / Coordination of Care:  Patient's progress reviewed with nursing staff.  Medications, treatment progress and treatment plan reviewed with patient.  Supportive counseling provided to the patient.    Total floor/unit time spent today 25 minutes. Greater than 50% of total time was spent with the patient and / or family counseling and / or coordination of care. A description of the counseling / coordination of care: medication education, treatment plan, supportive therapy.    This note was completed in part utilizing Dragon dictation Software. Grammatical, translation, syntax errors, random word insertions, spelling mistakes, and incomplete sentences may be an occasional consequence of this system secondary to software limitations with voice recognition, ambient noise, and hardware issues. If you have any questions or concerns about the content, text, or information contained within the body of this dictation, please contact the provider for  clarification

## 2024-01-31 NOTE — NURSING NOTE
Pt very pleasant and med-compliant with good appetite and steady gait.  Pt with BP 86/58, HCTZ held and /58 upon recheck, ANUSHA PÉREZ notified and pt given am Depakote and Invega LA  mg.  Attended group.  No delusions noted.  Monitored for safety and support.

## 2024-02-01 PROCEDURE — 99232 SBSQ HOSP IP/OBS MODERATE 35: CPT | Performed by: STUDENT IN AN ORGANIZED HEALTH CARE EDUCATION/TRAINING PROGRAM

## 2024-02-01 RX ADMIN — DOCUSATE SODIUM 100 MG: 100 CAPSULE, LIQUID FILLED ORAL at 17:14

## 2024-02-01 RX ADMIN — GABAPENTIN 300 MG: 300 CAPSULE ORAL at 21:39

## 2024-02-01 RX ADMIN — DIVALPROEX SODIUM 500 MG: 500 TABLET, EXTENDED RELEASE ORAL at 08:10

## 2024-02-01 RX ADMIN — CHOLECALCIFEROL TAB 25 MCG (1000 UNIT) 2000 UNITS: 25 TAB at 08:10

## 2024-02-01 RX ADMIN — HALOPERIDOL 10 MG: 10 TABLET ORAL at 21:39

## 2024-02-01 RX ADMIN — DIVALPROEX SODIUM 1000 MG: 500 TABLET, EXTENDED RELEASE ORAL at 21:39

## 2024-02-01 RX ADMIN — METFORMIN HYDROCHLORIDE 500 MG: 500 TABLET, FILM COATED ORAL at 08:10

## 2024-02-01 RX ADMIN — HYDROXYZINE HYDROCHLORIDE 50 MG: 50 TABLET, FILM COATED ORAL at 18:53

## 2024-02-01 RX ADMIN — DOCUSATE SODIUM 100 MG: 100 CAPSULE, LIQUID FILLED ORAL at 08:10

## 2024-02-01 RX ADMIN — ACETAMINOPHEN 975 MG: 325 TABLET, FILM COATED ORAL at 08:31

## 2024-02-01 RX ADMIN — POLYETHYLENE GLYCOL 3350 17 G: 17 POWDER, FOR SOLUTION ORAL at 13:00

## 2024-02-01 RX ADMIN — TAMSULOSIN HYDROCHLORIDE 0.4 MG: 0.4 CAPSULE ORAL at 17:14

## 2024-02-01 RX ADMIN — MELATONIN TAB 3 MG 3 MG: 3 TAB at 21:39

## 2024-02-01 NOTE — NURSING NOTE
Patient complaining of right earlobe dryness and redness. [facility] notified and recommended using unit lotion at this time. Patient applied lotion and earlobe looks less dry and red. Patient will speak with staff if he feels ear is getting worse.

## 2024-02-01 NOTE — NURSING NOTE
PRN Atarax 50 mg given for a Rangel of 24 complaining of increased anxiety. Vital signs noted  to be elevated prior to administration.

## 2024-02-01 NOTE — TREATMENT TEAM
02/01/24 0738   Team Meeting   Meeting Type Daily Rounds   Initial Conference Date 02/01/24   Team Members Present   Team Members Present Physician;Nurse;;;Occupational Therapist   Physician Team Member Jenn Rene   Nursing Team Member Kyle Torres   Care Management Team Member Rossi   Social Work Team Member Tammy   OT Team Member Curt   Patient/Family Present   Patient Present No   Patient's Family Present No     Received DE ANDA yesterday, tolerated well. Sleeping well. Patient is pleasant and cooperative with care. BPs were running low yesterday. Alex GERONIMO will come assess the patient on Friday 2/9/24 @ 10h. No discharge date pending at this time.

## 2024-02-01 NOTE — TREATMENT TEAM
Pt completed  relapse prevention plan.  Pt signed and copy in chart.  Crisis, warmline and [phone] numbers provided.   Pharmacy [facility].  Provider Dr Booker.  Pt attends groups.         02/01/24 1100   Activity/Group Checklist   Group Admission/Discharge   Attendance Attended   Attendance Duration (min) 16-30   Interactions Interacted appropriately   Affect/Mood Appropriate   Goals Achieved Identified feelings;Identified relapse prevention strategies;Able to listen to others;Able to engage in interactions;Able to manage/cope with feelings;Able to reflect/comment on own behavior;Verbalized increased hopefulness;Able to self-disclose;Able to recieve feedback

## 2024-02-01 NOTE — PLAN OF CARE
Problem: SLEEP DISTURBANCE  Goal: Will exhibit normal sleeping pattern  Description: Interventions:  -  Assess the patients sleep pattern, noting recent changes  - Administer medication as ordered  - Decrease environmental stimuli, including noise, as appropriate during the night  - Encourage the patient to actively participate in unit groups and or exercise during the day to enhance ability to achieve adequate sleep at night  - Assess the patient, in the morning, encouraging a description of sleep experience  Outcome: Progressing     Problem: INVOLUNTARY ADMIT  Goal: Will cooperate with staff recommendations and doctor's orders and will demonstrate appropriate behavior  Description: INTERVENTIONS:  - Treat underlying conditions and offer medication as ordered  - Educate regarding involuntary admission procedures and rules  - Utilize positive consistent limit setting strategies to support patient and staff safety  Outcome: Progressing     Problem: Risk for Self Injury/Neglect  Goal: Verbalize thoughts and feelings  Description: Interventions:  - Assess and re-assess patient's lethality and potential for self-injury  - Engage patient in 1:1 interactions, daily, for a minimum of 15 minutes  - Encourage patient to express feelings, fears, frustrations, hopes  - Establish rapport/trust with patient   Outcome: Progressing  Goal: Refrain from harming self  Description: Interventions:  - Monitor patient closely, per order  - Develop a trusting relationship  - Supervise medication ingestion, monitor effects and side effects   Outcome: Progressing  Goal: Attend and participate in unit activities, including therapeutic, recreational, and educational groups  Description: Interventions:  - Provide therapeutic and educational activities daily, encourage attendance and participation, and document same in the medical record  - Obtain collateral information, encourage visitation and family involvement in care   Outcome:  Progressing

## 2024-02-01 NOTE — PROGRESS NOTES
02/01/24 1330   Activity/Group Checklist   Group Life Skills  (topic self awareness/instructions for my care task.)   Attendance Attended   Attendance Duration (min) 46-60   Interactions Interacted appropriately  (Pt. able to remain calm yet stated a desire to learn more information about his right arm recovery and timeline for being in the splint.)   Affect/Mood Appropriate;Calm;Normal range   Goals Achieved Able to engage in interactions;Able to listen to others

## 2024-02-01 NOTE — PROGRESS NOTES
Progress Note - Behavioral Health   Sudhakar Choe 58 y.o. male MRN: [id number]  Unit/Bed#: [facility] 648-01 Encounter: [id number]    Patient was seen today for continuation of care, records reviewed and patient was discussed with the morning case review team.    Sudhakar was seen today for psychiatric follow-up.  On assessment today, Sudhakar was less irritable and more pleasant.  Stating that he feels overall okay today, patient does not report any overt delusions.  No SI/HI.  No AVH.  Invega Sustenna received on 1/31/2024.  Patient continues to need 2 antipsychotics secondary to multiple failures of monotherapy.  Discharge disposition ongoing with referral made to [facility].    Sudhakar denies acute suicidal/self-harm ideation/intent/plan upon direct inquiry at this time.  Sudhakar remains behaviorally appropriate, no agitation or aggression noted on exam or in report.  Sudhakar also denies HI/AH/VH, and does not appear overtly manic.  No overt delusions or paranoia are verbalized. Impulse control is intact.  Sudhakar remains adherent to his current psychotropic medication regimen and denies any side effects from medications, as well as none noted on exam.    Vitals:  Vitals:    02/01/24 0746   BP: 97/55   Pulse: 77   Resp: 18   Temp: 97.5 °F (36.4 °C)   SpO2: 93%       Laboratory Results:  I have personally reviewed all pertinent laboratory/tests results.  Most Recent Labs:   Lab Results   Component Value Date    WBC 5.47 12/30/2023    RBC 4.12 12/30/2023    HGB 12.6 12/30/2023    HCT 38.3 12/30/2023     12/30/2023    RDW 13.2 12/30/2023    NEUTROABS 2.60 12/30/2023    SODIUM 136 01/25/2024    K 3.8 01/25/2024    CL 95 (L) 01/25/2024    CO2 32 01/25/2024    BUN 12 01/25/2024    CREATININE 1.00 01/25/2024    GLUC 90 01/25/2024    GLUF 109 (H) 01/03/2024    CALCIUM 9.7 01/25/2024    AST 18 01/25/2024    ALT 10 01/25/2024    ALKPHOS 56 01/25/2024    TP 7.1 01/25/2024    ALB 4.0 01/25/2024    TBILI 0.33 01/25/2024    CHOLESTEROL 124  01/23/2024    HDL 28 (L) 01/23/2024    TRIG 116 01/23/2024    LDLCALC 73 01/23/2024    NONHDLC 96 01/23/2024    VALPROICTOT 79 01/25/2024    [id number] 1.431 12/30/2023    RPR Non-Reactive 03/26/2021    HGBA1C 6.2 (H) 01/23/2024     01/23/2024       Psychiatric Review of Systems:  Behavior over the last 24 hours:  unchanged.   Sleep: good  Appetite: good  Medication side effects: none  ROS: no complaints, denies shortness of breath or chest pain and all other systems are negative for acute changes    Mental Status Evaluation:  Appearance:  disheveled   Behavior:  cooperative, calm   Speech:  normal rate and volume   Mood:  less irritable   Affect:  constricted   Thought Process:  linear   Thought Content:  some paranoia   Perceptual Disturbances: denies auditory hallucinations when asked, does not appear responding to internal stimuli   Risk Potential: Suicidal ideation - None  Homicidal ideation - None  Potential for aggression - No   Memory:  recent and remote memory grossly intact   Sensorium  person, place, and time/date      Consciousness:  alert and awake   Attention: attention span and concentration are age appropriate   Insight:  limited   Judgment: limited   Gait/Station: normal gait/station   Motor Activity: no abnormal movements   Progress Toward Goals:   Sudhakar is progressing towards goals of inpatient psychiatric treatment by continued medication compliance and is attending therapeutic modalities on the milieu. However, the patient continues to require inpatient psychiatric hospitalization for continued medication management and titration to optimize symptom reduction, improve sleep hygiene, and demonstrate adequate self-care.    Assessment/Plan   Principal Problem:    Bipolar affective disorder, current episode manic with psychotic symptoms (HCC)  Active Problems:    Benign prostatic hyperplasia    Brachial plexus injury, right, sequela    Medical clearance for psychiatric admission     Bilateral lower extremity edema      Recommended Treatment: Treatment plan and medication changes discussed and per the attending physician the plan is:    1.Continue with group therapy, milieu therapy and occupational therapy  2.Behavioral Health checks every 7 minutes  3.Continue frequent safety checks and vitals per unit protocol  4.Continue with [facility] medical management as indicated  5.Continue with current medication regimen  6.Will review labs in the a.m.  7.Disposition Planning: Discharge planning and efforts remain ongoing    Behavioral Health Medications: all current active meds have been reviewed and continue current psychiatric medications.  Current Facility-Administered Medications   Medication Dose Route Frequency Provider Last Rate    acetaminophen  650 mg Oral Q4H PRN Jenn Strickland, CRNP      acetaminophen  650 mg Oral Q4H PRN Reji Looney DO      acetaminophen  975 mg Oral Q6H PRN BETO Novak      cholecalciferol  2,000 Units Oral Daily Rossi Carlson PA-C      divalproex sodium  1,000 mg Oral HS Rory Bunn MD      divalproex sodium  500 mg Oral Daily Rory Bunn MD      docusate sodium  100 mg Oral BID BETO Novak      gabapentin  300 mg Oral HS Rory Bunn MD      haloperidol  10 mg Oral HS BETO Novak      hydrOXYzine HCL  25 mg Oral Q6H PRN Max 4/day Jenn Strickland, CRNP      hydrOXYzine HCL  50 mg Oral Q6H PRN Max 4/day Jenn Strickland, BETO      LORazepam  1 mg Intramuscular Q6H PRN Max 3/day Jenn Strickland, BETO      LORazepam  1 mg Oral Q6H PRN Max 3/day BETO Novak      melatonin  3 mg Oral HS Rory Bunn MD      metFORMIN  500 mg Oral Daily With Breakfast BETO Garcia      mirtazapine  7.5 mg Oral HS PRN Rory Bunn MD      nicotine  1 patch Transdermal Daily PRN Fabiana Sousa MD      OLANZapine  5 mg Intramuscular Q3H PRN Max 3/day BETO Novak      OLANZapine  2.5 mg Oral Q4H PRN Max  6/day BETO Novak      OLANZapine  5 mg Oral Q4H PRN Max 3/day BETO Novak      OLANZapine  5 mg Oral Q3H PRN Max 3/day BETO Novak      paliperidone  234 mg IM- Deltoid Q4 weeks BETO Novak      polyethylene glycol  17 g Oral Daily PRN BETO Novak      senna-docusate sodium  1 tablet Oral Daily PRN BETO Novak      tamsulosin  0.4 mg Oral Daily With Dinner Rossi Carlson PA-C      traZODone  100 mg Oral HS PRN BETO Novak         Risks / Benefits of Treatment:  Risks, benefits, and possible side effects of medications explained to patient and patient verbalizes understanding and agreement for treatment.    Counseling / Coordination of Care:  Patient's progress reviewed with nursing staff.  Medications, treatment progress and treatment plan reviewed with patient.  Supportive counseling provided to the patient.    Total floor/unit time spent today 25 minutes. Greater than 50% of total time was spent with the patient and / or family counseling and / or coordination of care. A description of the counseling / coordination of care: medication education, treatment plan, supportive therapy.    This note was completed in part utilizing Dragon dictation Software. Grammatical, translation, syntax errors, random word insertions, spelling mistakes, and incomplete sentences may be an occasional consequence of this system secondary to software limitations with voice recognition, ambient noise, and hardware issues. If you have any questions or concerns about the content, text, or information contained within the body of this dictation, please contact the provider for clarification

## 2024-02-01 NOTE — NURSING NOTE
Pt is calm, cooperative and visible on unit. Pt denies all psych signs and symptoms. Pt interacts appropriately with staff and peers. Pt is able to make needs known and is medication compliant. Will maintain q7 min checks.

## 2024-02-01 NOTE — NURSING NOTE
Patient is calm and cooperative on the unit. Denies SI, HI, auditory and visual hallucinations. Patient showered this morning, right arm sling wrapped prior. Patient is pleasant and approachable. Med and meal compliant. Visible on the unit and social with staff and peers. Denies any unmet needs at this time. Q7 safety checks ongoing.

## 2024-02-02 PROCEDURE — 99232 SBSQ HOSP IP/OBS MODERATE 35: CPT | Performed by: STUDENT IN AN ORGANIZED HEALTH CARE EDUCATION/TRAINING PROGRAM

## 2024-02-02 RX ADMIN — TAMSULOSIN HYDROCHLORIDE 0.4 MG: 0.4 CAPSULE ORAL at 17:09

## 2024-02-02 RX ADMIN — CHOLECALCIFEROL TAB 25 MCG (1000 UNIT) 2000 UNITS: 25 TAB at 08:15

## 2024-02-02 RX ADMIN — DIVALPROEX SODIUM 500 MG: 500 TABLET, EXTENDED RELEASE ORAL at 08:15

## 2024-02-02 RX ADMIN — METFORMIN HYDROCHLORIDE 500 MG: 500 TABLET, FILM COATED ORAL at 08:14

## 2024-02-02 RX ADMIN — GABAPENTIN 300 MG: 300 CAPSULE ORAL at 21:30

## 2024-02-02 RX ADMIN — DOCUSATE SODIUM 100 MG: 100 CAPSULE, LIQUID FILLED ORAL at 08:15

## 2024-02-02 RX ADMIN — DIVALPROEX SODIUM 1000 MG: 500 TABLET, EXTENDED RELEASE ORAL at 21:30

## 2024-02-02 RX ADMIN — DOCUSATE SODIUM 100 MG: 100 CAPSULE, LIQUID FILLED ORAL at 17:09

## 2024-02-02 RX ADMIN — HALOPERIDOL 10 MG: 10 TABLET ORAL at 21:30

## 2024-02-02 RX ADMIN — MELATONIN TAB 3 MG 3 MG: 3 TAB at 21:30

## 2024-02-02 RX ADMIN — HYDROXYZINE HYDROCHLORIDE 50 MG: 50 TABLET, FILM COATED ORAL at 17:09

## 2024-02-02 NOTE — PROGRESS NOTES
Progress Note - Behavioral Health   Sudhakar Choe 58 y.o. male MRN: [id number]  Unit/Bed#: [facility] 642-02 Encounter: [id number]    Patient was seen today for continuation of care, records reviewed and patient was discussed with the morning case review team.    Sudhakar was seen today for psychiatric follow-up.  On assessment today, Sudhakar was cooperative.  Stating that he looks forward to [place] campus visit next week.  Patient is noted to be more goal oriented, as well as have improved insight into current mental condition.  No depression or anxiety currently verbalized.  No behavioral issues overnight, patient continues to tolerate medication regimen well.  Invega Sustenna given 1/31/24, patient continues to need two antipsychotics secondary to multiple failures of monotherapy.  Tentative discharge ongoing    Sudhakar denies acute suicidal/self-harm ideation/intent/plan upon direct inquiry at this time.  Sudhakar remains behaviorally appropriate, no agitation or aggression noted on exam or in report.  Sudhakar also denies HI/AH/VH, and does not appear overtly manic.  No overt delusions or paranoia are verbalized. Impulse control is intact.  Sudhakar remains adherent to her current psychotropic medication regimen and denies any side effects from medications, as well as none noted on exam.    Vitals:  Vitals:    02/02/24 0758   BP: 119/67   Pulse: 69   Resp: 16   Temp: 97.9 °F (36.6 °C)   SpO2: 92%       Laboratory Results:  I have personally reviewed all pertinent laboratory/tests results.  Most Recent Labs:   Lab Results   Component Value Date    WBC 5.47 12/30/2023    RBC 4.12 12/30/2023    HGB 12.6 12/30/2023    HCT 38.3 12/30/2023     12/30/2023    RDW 13.2 12/30/2023    NEUTROABS 2.60 12/30/2023    SODIUM 136 01/25/2024    K 3.8 01/25/2024    CL 95 (L) 01/25/2024    CO2 32 01/25/2024    BUN 12 01/25/2024    CREATININE 1.00 01/25/2024    GLUC 90 01/25/2024    GLUF 109 (H) 01/03/2024    CALCIUM 9.7 01/25/2024    AST 18  01/25/2024    ALT 10 01/25/2024    ALKPHOS 56 01/25/2024    TP 7.1 01/25/2024    ALB 4.0 01/25/2024    TBILI 0.33 01/25/2024    CHOLESTEROL 124 01/23/2024    HDL 28 (L) 01/23/2024    TRIG 116 01/23/2024    LDLCALC 73 01/23/2024    NONHDLC 96 01/23/2024    VALPROICTOT 79 01/25/2024    ZIS7OCFFDDKN 1.431 12/30/2023    RPR Non-Reactive 03/26/2021    HGBA1C 6.2 (H) 01/23/2024     01/23/2024       Psychiatric Review of Systems:  Behavior over the last 24 hours:  unchanged.   Sleep: good  Appetite: good  Medication side effects:none   ROS: no complaints, denies shortness of breath or chest pain and all other systems are negative for acute changes    Mental Status Evaluation:  Appearance:  adequate grooming   Behavior:  cooperative, calm   Speech:  normal rate and volume   Mood:  less irritable   Affect:  constricted   Thought Process:  linear   Thought Content:  no overt delusions   Perceptual Disturbances: denies auditory hallucinations when asked, does not appear responding to internal stimuli   Risk Potential: Suicidal ideation - None  Homicidal ideation - None  Potential for aggression - No   Memory:  recent and remote memory grossly intact   Sensorium  person, place, and time/date      Consciousness:  alert and awake   Attention: attention span and concentration are age appropriate   Insight:  limited   Judgment: limited   Gait/Station: normal gait/station   Motor Activity: no abnormal movements   Progress Toward Goals:   Sudhakar is progressing towards goals of inpatient psychiatric treatment by continued medication compliance and is attending therapeutic modalities on the milieu. However, the patient continues to require inpatient psychiatric hospitalization for continued medication management and titration to optimize symptom reduction, improve sleep hygiene, and demonstrate adequate self-care.    Assessment/Plan   Principal Problem:    Bipolar affective disorder, current episode manic with psychotic symptoms  (HCC)  Active Problems:    Benign prostatic hyperplasia    Brachial plexus injury, right, sequela    Medical clearance for psychiatric admission    Bilateral lower extremity edema      Recommended Treatment: Treatment plan and medication changes discussed and per the attending physician the plan is:    1.Continue with group therapy, milieu therapy and occupational therapy  2.Behavioral Health checks every 7 minutes  3.Continue frequent safety checks and vitals per unit protocol  4.Continue with [facility] medical management as indicated  5.Continue with current medication regimen  6.Will review labs in the a.m.  7.Disposition Planning: Discharge planning and efforts remain ongoing    Behavioral Health Medications: all current active meds have been reviewed and continue current psychiatric medications.  Current Facility-Administered Medications   Medication Dose Route Frequency Provider Last Rate    acetaminophen  650 mg Oral Q4H PRN Jenncampbell Strickland, CRNP      acetaminophen  650 mg Oral Q4H PRN Reji Looney DO      acetaminophen  975 mg Oral Q6H PRN Jenn Strickland, CRNP      cholecalciferol  2,000 Units Oral Daily Rossi Carlson PA-C      divalproex sodium  1,000 mg Oral HS Rory Bunn MD      divalproex sodium  500 mg Oral Daily Rory Bunn MD      docusate sodium  100 mg Oral BID Jenn Strickland, LYLANP      gabapentin  300 mg Oral HS Rory Bunn MD      haloperidol  10 mg Oral HS Jenn Strickland, CRNP      hydrOXYzine HCL  25 mg Oral Q6H PRN Max 4/day Jenn Strickland, CRNP      hydrOXYzine HCL  50 mg Oral Q6H PRN Max 4/day Jenn Strickland, CRNP      LORazepam  1 mg Intramuscular Q6H PRN Max 3/day Jenn Strickland, CRNP      LORazepam  1 mg Oral Q6H PRN Max 3/day Jenn Strickland, CRNP      melatonin  3 mg Oral HS Rory Bunn MD      metFORMIN  500 mg Oral Daily With Breakfast BETO Garcia      mirtazapine  7.5 mg Oral HS PRN Rory Bunn MD      nicotine  1 patch Transdermal Daily PRN  Fabiana Sousa MD      OLANZapine  5 mg Intramuscular Q3H PRN Max 3/day Jenn Strickland, CRNP      OLANZapine  2.5 mg Oral Q4H PRN Max 6/day Jenn Strickland, CRNP      OLANZapine  5 mg Oral Q4H PRN Max 3/day Jenn Strickland, CRNP      OLANZapine  5 mg Oral Q3H PRN Max 3/day Jenn Strickland, CRNP      paliperidone  234 mg IM- Deltoid Q4 weeks Jenn Strickland, BETO      polyethylene glycol  17 g Oral Daily PRN Jenn Strickland, BETO      senna-docusate sodium  1 tablet Oral Daily PRN Jenn Strickland, LYLANP      tamsulosin  0.4 mg Oral Daily With Dinner Rossi Carlson PA-C      traZODone  100 mg Oral HS PRN Jenn Strickland, BETO         Risks / Benefits of Treatment:  Risks, benefits, and possible side effects of medications explained to patient and patient verbalizes understanding and agreement for treatment.    Counseling / Coordination of Care:  Patient's progress reviewed with nursing staff.  Medications, treatment progress and treatment plan reviewed with patient.  Supportive counseling provided to the patient.    Total floor/unit time spent today 25 minutes. Greater than 50% of total time was spent with the patient and / or family counseling and / or coordination of care. A description of the counseling / coordination of care: medication education, treatment plan, supportive therapy.    This note was completed in part utilizing Dragon dictation Software. Grammatical, translation, syntax errors, random word insertions, spelling mistakes, and incomplete sentences may be an occasional consequence of this system secondary to software limitations with voice recognition, ambient noise, and hardware issues. If you have any questions or concerns about the content, text, or information contained within the body of this dictation, please contact the provider for clarification

## 2024-02-02 NOTE — TREATMENT TEAM
02/02/24 1710   Rangel Anxiety Scale   Anxious Mood 3   Tension 3   Fears 3   Insomnia 2   Intellectual 2   Depressed Mood 1   Somatic Complaints: Muscular 0   Somatic Complaints: Sensory 0   Cardiovascular Symptoms 0   Respiratory Symptoms 0   Gastrointestinal Symptoms 0   Genitourinary Symptoms 0   Autonomic Symptoms 3   Behavior at Interview 3   Rangel Anxiety Score 20     PRN Atarax given for anxiety and restlessness. Will monitor for effectiveness.

## 2024-02-02 NOTE — NURSING NOTE
"Patient C/O dizziness and \"not feeling well\" at nurses station. Patient walked back to room. VSS. Currently lying in bed. Fluid offered. Patient reported feeling better. No further distress noted.  "

## 2024-02-02 NOTE — NURSING NOTE
Patient AA&O x 4. Visible on unit, participated in group meetings today. Denies depression and anxiety. Denies SI/HI/AH/VH. C/O tenderness in left deltoid from Invega IM injection on 1/31/24. No redness or inflammation noted at injection site. Will continue to monitor.

## 2024-02-02 NOTE — TREATMENT TEAM
02/02/24 0741   Team Meeting   Meeting Type Daily Rounds   Initial Conference Date 02/02/24   Team Members Present   Team Members Present Physician;Nurse;;;Occupational Therapist   Physician Team Member Jenn Rene   Nursing Team Member July Torres   Care Management Team Member Rossi   Social Work Team Member Tammy   OT Team Member Curt   Patient/Family Present   Patient Present No   Patient's Family Present No     Patient is pleasant and cooperative with care. Alex GERONIMO will come assess the patient on Friday 2/9/24 @ 10h. No discharge date pending at this time.

## 2024-02-02 NOTE — TREATMENT TEAM
Pt attended all groups.      02/02/24 7825   Activity/Group Checklist   Group Self Esteem   Attendance Attended   Attendance Duration (min) 31-45   Interactions Interacted appropriately   Affect/Mood Appropriate   Goals Achieved Identified feelings;Discussed coping strategies;Able to listen to others;Able to reflect/comment on own behavior;Able to engage in interactions;Verbalized increased hopefulness;Able to manage/cope with feelings;Able to self-disclose;Able to recieve feedback

## 2024-02-02 NOTE — CASE MANAGEMENT
"The patient asked to speak to CM. The patient reported he wanted to know if he had any options to discharge. The patient asked about signing a 72 hour notice. CM reminded the patient that he is currently here under and involuntary commitment and the decision to safely discharge would need to be discussed with his treatment team. YUE reminded the patient that the recommendation remains [facility] and reminded the patient that Alex would be coming out for an assessment next week. Sudhakar reported he is in agreement with that but wont \"wait for months for an open bed there.\"   "

## 2024-02-02 NOTE — CASE MANAGEMENT
CM called [facility] at [phone] to register the patient with [facility], as directed by [facility]. YUE spoke with Ayesha who reported she will send the referral to .    YUE also sent [facility] additional information regarding last week's [facility] meeting, as directed by [facility] staff.

## 2024-02-02 NOTE — PLAN OF CARE
Problem: SLEEP DISTURBANCE  Goal: Will exhibit normal sleeping pattern  Description: Interventions:  -  Assess the patients sleep pattern, noting recent changes  - Administer medication as ordered  - Decrease environmental stimuli, including noise, as appropriate during the night  - Encourage the patient to actively participate in unit groups and or exercise during the day to enhance ability to achieve adequate sleep at night  - Assess the patient, in the morning, encouraging a description of sleep experience  Outcome: Progressing     Problem: INVOLUNTARY ADMIT  Goal: Will cooperate with staff recommendations and doctor's orders and will demonstrate appropriate behavior  Description: INTERVENTIONS:  - Treat underlying conditions and offer medication as ordered  - Educate regarding involuntary admission procedures and rules  - Utilize positive consistent limit setting strategies to support patient and staff safety  Outcome: Progressing     Problem: Risk for Self Injury/Neglect  Goal: Verbalize thoughts and feelings  Description: Interventions:  - Assess and re-assess patient's lethality and potential for self-injury  - Engage patient in 1:1 interactions, daily, for a minimum of 15 minutes  - Encourage patient to express feelings, fears, frustrations, hopes  - Establish rapport/trust with patient   Outcome: Progressing  Goal: Refrain from harming self  Description: Interventions:  - Monitor patient closely, per order  - Develop a trusting relationship  - Supervise medication ingestion, monitor effects and side effects   Outcome: Progressing  Goal: Attend and participate in unit activities, including therapeutic, recreational, and educational groups  Description: Interventions:  - Provide therapeutic and educational activities daily, encourage attendance and participation, and document same in the medical record  - Obtain collateral information, encourage visitation and family involvement in care   Outcome:  Progressing     Problem: Alteration in Orientation  Goal: Interact with staff daily  Description: Interventions:  - Assess and re-assess patient's level of orientation  - Engage patient in 1 on 1 interactions, daily, for a minimum of 15 minutes   - Establish rapport/trust with patient   Outcome: Progressing

## 2024-02-03 ENCOUNTER — APPOINTMENT (INPATIENT)
Dept: RADIOLOGY | Facility: HOSPITAL | Age: 59
DRG: 885 | End: 2024-02-03
Payer: MEDICARE

## 2024-02-03 PROCEDURE — 73090 X-RAY EXAM OF FOREARM: CPT

## 2024-02-03 PROCEDURE — 99231 SBSQ HOSP IP/OBS SF/LOW 25: CPT | Performed by: STUDENT IN AN ORGANIZED HEALTH CARE EDUCATION/TRAINING PROGRAM

## 2024-02-03 PROCEDURE — 99232 SBSQ HOSP IP/OBS MODERATE 35: CPT | Performed by: STUDENT IN AN ORGANIZED HEALTH CARE EDUCATION/TRAINING PROGRAM

## 2024-02-03 RX ADMIN — TAMSULOSIN HYDROCHLORIDE 0.4 MG: 0.4 CAPSULE ORAL at 15:33

## 2024-02-03 RX ADMIN — METFORMIN HYDROCHLORIDE 500 MG: 500 TABLET, FILM COATED ORAL at 07:55

## 2024-02-03 RX ADMIN — DOCUSATE SODIUM 100 MG: 100 CAPSULE, LIQUID FILLED ORAL at 17:04

## 2024-02-03 RX ADMIN — MELATONIN TAB 3 MG 3 MG: 3 TAB at 21:24

## 2024-02-03 RX ADMIN — DIVALPROEX SODIUM 1000 MG: 500 TABLET, EXTENDED RELEASE ORAL at 21:24

## 2024-02-03 RX ADMIN — HALOPERIDOL 10 MG: 10 TABLET ORAL at 21:24

## 2024-02-03 RX ADMIN — GABAPENTIN 300 MG: 300 CAPSULE ORAL at 21:24

## 2024-02-03 RX ADMIN — CHOLECALCIFEROL TAB 25 MCG (1000 UNIT) 2000 UNITS: 25 TAB at 08:27

## 2024-02-03 RX ADMIN — DOCUSATE SODIUM 100 MG: 100 CAPSULE, LIQUID FILLED ORAL at 08:27

## 2024-02-03 RX ADMIN — DIVALPROEX SODIUM 500 MG: 500 TABLET, EXTENDED RELEASE ORAL at 08:27

## 2024-02-03 NOTE — NURSING NOTE
Patient is medication and meal compliant. Appetite is good with patient eating 100% of most meals. No complaints of pain or discomfort. Patient with new order for Orthopedics consult to see him for old fracture of the right hand for which he is still non-weight bearing on that hand. Patient denies all symptoms of depression or anxiety. Patient also denies SI, AH or VH. Will continue to monitor patient for changes in mood or behavior.

## 2024-02-03 NOTE — PROGRESS NOTES
"  Progress Note - Behavioral Health   Sudhakar Choe 58 y.o. male MRN: [id number]  Unit/Bed#: [facility] 642-02 Encounter: [id number]       Patient was visited on unit for continuing care; chart reviewed and discussed with the nursing staff. On approach, the patient was calm and superficially cooperative, minimizing his sxs. He was preoccupied with his R hand fracture and ortho f/u. Denied any changes in mood, appetite, and energy level. No problem initiating and maintaining sleep. Denied A/VH currently. Denied SI/HI, intent or plan upon direct inquiry at this time.    Patient continues to be visible in the milieu and remains interactive with staff and peers but requires frequent redirection and reorientation by the staff. No reports of aggression or self-injurious behavior on unit. No PRN medications used in the past 24 hours except Atarax 50 mg po PRN on 2/2/24 at 1709 for anxiety.    Patient accepted all offered medications and reported feeling \"good\". No adverse effects of medications noted or reported.  Another Ortho consult requested (as per last Ortho note on 1/18/2024 recommended to follow-up in 2 weeks regarding NWB/sling to RUE). Will continue DE ANDA Invega Sustenna 234 mg IM every 4 weeks (next dose due on 2/28/2024).  The patient has to remain in the hospital while awaiting placement as their mental illness does not allow for them to be treated at a lower level of care. Case management is assertively/actively working on securing the necessary level of care (pending EAC).       Current Mental Status Evaluation:  Appearance and attitude: appeared as stated age, casually dressed, wearing eyeglasses, with fair hygiene  Eye contact: fair  Motor Function: within normal limits, intact gait, No PMA/PMR  Gait/station: normal gait/station and normal balance  Speech: normal for rate, rhythm, volume, and latency with decreased amount  Language: No overt abnormality  Mood/affect:  \"good\"  / Affect was constricted but " reactive, mood congruent  Thought Processes: ruminating  Thought content: denied suicidal ideations or homicidal ideations, somatic preoccupation, ruminating thoughts  Associations: concrete associations  Perceptual disturbances: denies Auditory/Visual/Tactile Hallucinations  Orientation: oriented to time, person, place and to the situational context  Cognitive Function: intact  Memory: recent and remote memory grossly intact  Intellect: average  Fund of knowledge: aware of current events, aware of past history, and vocabulary average  Impulse control: good  Insight/judgment: limited/limited    Vital signs in last 24 hours:    Temp:  [97.6 °F (36.4 °C)-98.8 °F (37.1 °C)] 97.8 °F (36.6 °C)  HR:  [] 105  Resp:  [15-17] 16  BP: (111-166)/(57-89) 119/57    Laboratory results: I have personally reviewed all pertinent laboratory/tests results    Results from the past 24 hours: No results found for this or any previous visit (from the past 24 hour(s)).    Progress Toward Goals: placement pending    Assessment:  Principal Problem:    Bipolar affective disorder, current episode manic with psychotic symptoms (HCC)  Active Problems:    Benign prostatic hyperplasia    Brachial plexus injury, right, sequela    Medical clearance for psychiatric admission    Bilateral lower extremity edema        Plan:  - f/u [facility] recs regarding the medical problems  - f/u ortho consult / recs  - The patient has a history of at least 3 antipsychotic medication trials and at this time requires treatment with 2 antipsychotic agents due to multiple failed trials of monotherapy.  Continue DE ANDA Invega Sustenna 234 mg IM every 4 weeks (next dose due on 2/28/2024)  - Continue medication titration and treatment plan; adjust medication to optimize treatment response and as clinically indicated.     Scheduled medications:  Current Facility-Administered Medications   Medication Dose Route Frequency Provider Last Rate    acetaminophen  650 mg Oral Q4H  PRN Jenn Strickland, CRNP      acetaminophen  650 mg Oral Q4H PRN Reji Looney DO      acetaminophen  975 mg Oral Q6H PRN Jenn Strickland, CRNP      cholecalciferol  2,000 Units Oral Daily Rossi Carlson PA-C      divalproex sodium  1,000 mg Oral HS Rory Bunn MD      divalproex sodium  500 mg Oral Daily Rory Bunn MD      docusate sodium  100 mg Oral BID Jenn Strickland, CRNP      gabapentin  300 mg Oral HS Rory Bunn MD      haloperidol  10 mg Oral HS Jenn Strickland, CRNP      hydrOXYzine HCL  25 mg Oral Q6H PRN Max 4/day Jenn Strickland, CRNP      hydrOXYzine HCL  50 mg Oral Q6H PRN Max 4/day Jenn Strickland, CRNP      LORazepam  1 mg Intramuscular Q6H PRN Max 3/day Jenn Strickland, CRNP      LORazepam  1 mg Oral Q6H PRN Max 3/day Jenn Strickland, CRNP      melatonin  3 mg Oral HS Rory Bunn MD      metFORMIN  500 mg Oral Daily With Breakfast Dorene Pelayo, CRNP      mirtazapine  7.5 mg Oral HS PRN Rory Bunn MD      nicotine  1 patch Transdermal Daily PRN Fabiana Sousa MD      OLANZapine  5 mg Intramuscular Q3H PRN Max 3/day Jenn Strickland, CRNP      OLANZapine  2.5 mg Oral Q4H PRN Max 6/day Jenn Strickland, CRNP      OLANZapine  5 mg Oral Q4H PRN Max 3/day Jenn Strickland, CRNP      OLANZapine  5 mg Oral Q3H PRN Max 3/day Jenn Strickland, CRNP      paliperidone  234 mg IM- Deltoid Q4 weeks Jenn Strickland, CRNP      polyethylene glycol  17 g Oral Daily PRN Jenn Strickland, CRNP      senna-docusate sodium  1 tablet Oral Daily PRN Jenn Strickland, CRNP      tamsulosin  0.4 mg Oral Daily With Dinner Rossi Carlson PA-C      traZODone  100 mg Oral HS PRN Jenn Strickland, CRNP          PRN:    acetaminophen    acetaminophen    acetaminophen    hydrOXYzine HCL    hydrOXYzine HCL    LORazepam    LORazepam    mirtazapine    nicotine    OLANZapine    OLANZapine    OLANZapine    OLANZapine    polyethylene glycol    senna-docusate sodium    traZODone    - Observation:  routine    - VS: as per unit protocol  - Diet: Regular diet  - Psychoeducation (benefits and potential risks) discussed, importance of compliance with the psychiatric treatment reiterated, and the patient verbalized understanding of the matter  - Encourage group attendance and milieu therapy    - The pt was educated and agreed to verbalize any suicidal thoughts, frustrations or concerns to the nursing staff, immediately.    - Dispo: Pending EAC       Next of Kin  Extended Emergency Contact Information  Primary Emergency Contact: Rhea Sesay  Mobile Phone: [phone]  Relation: Significant Other   needed? No  Secondary Emergency Contact: DONNIE CANELA  Mobile Phone: [phone]  Relation: Son      Counseling / Coordination of Care    Patient's progress reviewed with nursing staff.  Medications, treatment progress and treatment plan reviewed with patient.  Medication education provided to patient.  Reassurance and supportive therapy provided.  Reoriented to reality and reassured.  Encouraged participation in milieu and group therapy on the unit.     Rory Bunn MD  Attending Psychiatrist   [facility]
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
## 2024-02-03 NOTE — PLAN OF CARE
Problem: Risk for Self Injury/Neglect  Goal: Verbalize thoughts and feelings  Description: Interventions:  - Assess and re-assess patient's lethality and potential for self-injury  - Engage patient in 1:1 interactions, daily, for a minimum of 15 minutes  - Encourage patient to express feelings, fears, frustrations, hopes  - Establish rapport/trust with patient   Outcome: Progressing     Problem: Risk for Self Injury/Neglect  Goal: Attend and participate in unit activities, including therapeutic, recreational, and educational groups  Description: Interventions:  - Provide therapeutic and educational activities daily, encourage attendance and participation, and document same in the medical record  - Obtain collateral information, encourage visitation and family involvement in care   Outcome: Progressing     Problem: Risk for Self Injury/Neglect  Goal: Complete daily ADLs, including personal hygiene independently, as able  Description: Interventions:  - Observe, teach, and assist patient with ADLS  - Monitor and promote a balance of rest/activity, with adequate nutrition and elimination  Outcome: Progressing

## 2024-02-03 NOTE — NURSING NOTE
Patient is alert and oriented, withdrawn to room. Patient is calm and pleasant on approach. He is medication compliant.  Patient reports mild anxiety/depression but denies SI/HI/AVH. Safety checks ongoing.

## 2024-02-04 LAB
ALBUMIN SERPL BCP-MCNC: 4.2 G/DL (ref 3.5–5)
ALP SERPL-CCNC: 53 U/L (ref 34–104)
ALT SERPL W P-5'-P-CCNC: 10 U/L (ref 7–52)
ANION GAP SERPL CALCULATED.3IONS-SCNC: 10 MMOL/L
AST SERPL W P-5'-P-CCNC: 17 U/L (ref 13–39)
BASOPHILS # BLD AUTO: 0.06 THOUSANDS/ÂΜL (ref 0–0.1)
BASOPHILS NFR BLD AUTO: 1 % (ref 0–1)
BILIRUB SERPL-MCNC: 0.32 MG/DL (ref 0.2–1)
BUN SERPL-MCNC: 10 MG/DL (ref 5–25)
CALCIUM SERPL-MCNC: 9.4 MG/DL (ref 8.4–10.2)
CHLORIDE SERPL-SCNC: 100 MMOL/L (ref 96–108)
CO2 SERPL-SCNC: 26 MMOL/L (ref 21–32)
CREAT SERPL-MCNC: 0.87 MG/DL (ref 0.6–1.3)
EOSINOPHIL # BLD AUTO: 0.35 THOUSAND/ÂΜL (ref 0–0.61)
EOSINOPHIL NFR BLD AUTO: 4 % (ref 0–6)
ERYTHROCYTE [DISTWIDTH] IN BLOOD BY AUTOMATED COUNT: 14.1 % (ref 11.6–15.1)
GFR SERPL CREATININE-BSD FRML MDRD: 95 ML/MIN/1.73SQ M
GLUCOSE SERPL-MCNC: 102 MG/DL (ref 65–140)
HCT VFR BLD AUTO: 40.8 % (ref 36.5–49.3)
HGB BLD-MCNC: 13.9 G/DL (ref 12–17)
IMM GRANULOCYTES # BLD AUTO: 0.01 THOUSAND/UL (ref 0–0.2)
IMM GRANULOCYTES NFR BLD AUTO: 0 % (ref 0–2)
LYMPHOCYTES # BLD AUTO: 3.2 THOUSANDS/ÂΜL (ref 0.6–4.47)
LYMPHOCYTES NFR BLD AUTO: 40 % (ref 14–44)
MCH RBC QN AUTO: 31 PG (ref 26.8–34.3)
MCHC RBC AUTO-ENTMCNC: 34.1 G/DL (ref 31.4–37.4)
MCV RBC AUTO: 91 FL (ref 82–98)
MONOCYTES # BLD AUTO: 1.31 THOUSAND/ÂΜL (ref 0.17–1.22)
MONOCYTES NFR BLD AUTO: 16 % (ref 4–12)
NEUTROPHILS # BLD AUTO: 3.14 THOUSANDS/ÂΜL (ref 1.85–7.62)
NEUTS SEG NFR BLD AUTO: 39 % (ref 43–75)
NRBC BLD AUTO-RTO: 0 /100 WBCS
PLATELET # BLD AUTO: 198 THOUSANDS/UL (ref 149–390)
PMV BLD AUTO: 11 FL (ref 8.9–12.7)
POTASSIUM SERPL-SCNC: 3.8 MMOL/L (ref 3.5–5.3)
PROT SERPL-MCNC: 7.3 G/DL (ref 6.4–8.4)
RBC # BLD AUTO: 4.48 MILLION/UL (ref 3.88–5.62)
SODIUM SERPL-SCNC: 136 MMOL/L (ref 135–147)
VALPROATE SERPL-MCNC: 84 UG/ML (ref 50–100)
WBC # BLD AUTO: 8.07 THOUSAND/UL (ref 4.31–10.16)

## 2024-02-04 PROCEDURE — 85025 COMPLETE CBC W/AUTO DIFF WBC: CPT | Performed by: STUDENT IN AN ORGANIZED HEALTH CARE EDUCATION/TRAINING PROGRAM

## 2024-02-04 PROCEDURE — 80053 COMPREHEN METABOLIC PANEL: CPT | Performed by: STUDENT IN AN ORGANIZED HEALTH CARE EDUCATION/TRAINING PROGRAM

## 2024-02-04 PROCEDURE — 80164 ASSAY DIPROPYLACETIC ACD TOT: CPT | Performed by: STUDENT IN AN ORGANIZED HEALTH CARE EDUCATION/TRAINING PROGRAM

## 2024-02-04 PROCEDURE — 99232 SBSQ HOSP IP/OBS MODERATE 35: CPT | Performed by: STUDENT IN AN ORGANIZED HEALTH CARE EDUCATION/TRAINING PROGRAM

## 2024-02-04 RX ORDER — HALOPERIDOL 5 MG/1
5 TABLET ORAL
Status: DISCONTINUED | OUTPATIENT
Start: 2024-02-04 | End: 2024-02-05

## 2024-02-04 RX ADMIN — DOCUSATE SODIUM 100 MG: 100 CAPSULE, LIQUID FILLED ORAL at 08:01

## 2024-02-04 RX ADMIN — HYDROXYZINE HYDROCHLORIDE 25 MG: 25 TABLET, FILM COATED ORAL at 15:56

## 2024-02-04 RX ADMIN — GABAPENTIN 300 MG: 300 CAPSULE ORAL at 21:20

## 2024-02-04 RX ADMIN — HALOPERIDOL 5 MG: 5 TABLET ORAL at 21:20

## 2024-02-04 RX ADMIN — MELATONIN TAB 3 MG 3 MG: 3 TAB at 21:20

## 2024-02-04 RX ADMIN — METFORMIN HYDROCHLORIDE 500 MG: 500 TABLET, FILM COATED ORAL at 07:17

## 2024-02-04 RX ADMIN — CHOLECALCIFEROL TAB 25 MCG (1000 UNIT) 2000 UNITS: 25 TAB at 08:01

## 2024-02-04 RX ADMIN — DIVALPROEX SODIUM 1000 MG: 500 TABLET, EXTENDED RELEASE ORAL at 21:20

## 2024-02-04 RX ADMIN — TAMSULOSIN HYDROCHLORIDE 0.4 MG: 0.4 CAPSULE ORAL at 15:42

## 2024-02-04 RX ADMIN — DOCUSATE SODIUM 100 MG: 100 CAPSULE, LIQUID FILLED ORAL at 17:04

## 2024-02-04 RX ADMIN — DIVALPROEX SODIUM 500 MG: 500 TABLET, EXTENDED RELEASE ORAL at 08:01

## 2024-02-04 NOTE — NURSING NOTE
Patient complained of anxiety and requested PRN Atarax for same.  RN Atarax given at 1556 with effectiveness noted and no further complaints of anxiety. Will continue to monitor patient status.

## 2024-02-04 NOTE — PLAN OF CARE
Problem: PSYCHOSIS  Goal: Will report no hallucinations or delusions  Description: Interventions:  - Administer medication as  ordered  - Every waking shifts and PRN assess for the presence of hallucinations and or delusions  - Assist with reality testing to support increasing orientation  - Assess if patient's hallucinations or delusions are encouraging self-harm or harm to others and intervene as appropriate  Outcome: Progressing     Problem: SLEEP DISTURBANCE  Goal: Will exhibit normal sleeping pattern  Description: Interventions:  -  Assess the patients sleep pattern, noting recent changes  - Administer medication as ordered  - Decrease environmental stimuli, including noise, as appropriate during the night  - Encourage the patient to actively participate in unit groups and or exercise during the day to enhance ability to achieve adequate sleep at night  - Assess the patient, in the morning, encouraging a description of sleep experience  Outcome: Progressing     Problem: INVOLUNTARY ADMIT  Goal: Will cooperate with staff recommendations and doctor's orders and will demonstrate appropriate behavior  Description: INTERVENTIONS:  - Treat underlying conditions and offer medication as ordered  - Educate regarding involuntary admission procedures and rules  - Utilize positive consistent limit setting strategies to support patient and staff safety  Outcome: Progressing     Problem: Risk for Self Injury/Neglect  Goal: Treatment Goal: Remain safe during length of stay, learn and adopt new coping skills, and be free of self-injurious ideation, impulses and acts at the time of discharge  Outcome: Progressing  Goal: Verbalize thoughts and feelings  Description: Interventions:  - Assess and re-assess patient's lethality and potential for self-injury  - Engage patient in 1:1 interactions, daily, for a minimum of 15 minutes  - Encourage patient to express feelings, fears, frustrations, hopes  - Establish rapport/trust with  patient   Outcome: Progressing  Goal: Refrain from harming self  Description: Interventions:  - Monitor patient closely, per order  - Develop a trusting relationship  - Supervise medication ingestion, monitor effects and side effects   Outcome: Progressing  Goal: Recognize maladaptive responses and adopt new coping mechanisms  Outcome: Progressing  Goal: Complete daily ADLs, including personal hygiene independently, as able  Description: Interventions:  - Observe, teach, and assist patient with ADLS  - Monitor and promote a balance of rest/activity, with adequate nutrition and elimination  Outcome: Progressing     Problem: Alteration in Orientation  Goal: Treatment Goal: Demonstrate a reduction of confusion and improved orientation to person, place, time and/or situation upon discharge, according to optimum baseline/ability  Outcome: Progressing  Goal: Interact with staff daily  Description: Interventions:  - Assess and re-assess patient's level of orientation  - Engage patient in 1 on 1 interactions, daily, for a minimum of 15 minutes   - Establish rapport/trust with patient   Outcome: Progressing  Goal: Express concerns related to confused thinking related to:  Description: Interventions:  - Encourage patient to express feelings, fears, frustrations, hopes  - Assign consistent caregivers   - [place]/re-orient patient as needed  - Allow comfort items, as appropriate  - Provide visual cues, signs, etc.   Outcome: Progressing  Goal: Allow medical examinations, as recommended  Description: Interventions:  - Provide physical/neurological exams and/or referrals, per provider   Outcome: Progressing  Goal: Cooperate with recommended testing/procedures  Description: Interventions:  - Determine need for ancillary testing  - Observe for mental status changes  - Implement falls/precaution protocol   Outcome: Progressing  Goal: Attend and participate in unit activities, including therapeutic, recreational, and educational  groups  Description: Interventions:  - Provide therapeutic and educational activities daily, encourage attendance and participation, and document same in the medical record   - Provide appropriate opportunities for reminiscence   - Provide a consistent daily routine   - Encourage family contact/visitation   Outcome: Progressing  Goal: Complete daily ADLs, including personal hygiene independently, as able  Description: Interventions:  - Observe, teach, and assist patient with ADLS  Outcome: Progressing

## 2024-02-04 NOTE — NURSING NOTE
Patient is pleasant, calm and cooperative with care. Denies all psych s/s. Medication compliant. Patient is visible on the milieu sociable with staff and peers. Patient encouraged to make needs known. Safety checks ongoing.

## 2024-02-04 NOTE — NURSING NOTE
"Patient is medication and meal compliant. No complaints of pain or discomfort. Dr Bunn notified of patient behaviors yesterday when he purposely sat on the floor and said his legs are too tired to ambulate. Patient was also observed to be walking with his eyes closed but refused to lie down for a nap.  Patient continuously paces the unit and rarely sits down. New orders from Dr Bunn for fall precautions, labs tonight including a Depakote level and decrease in patient's evening Haldol. Patient did have his DE ANDA on 1/31/23 of Invega Systana 234 mg IM. Patient denies depression or anxiety and reports his \"energy\" is good.  Patient reportedly does sleep at night but is an early riser and is usually up by 6:45 AM and is pacing the hallways at that time. Patient denies SI, AH or VH. Patient was admitted for delusional thinking which has since decreased and is now controlled. Will continue to monitor patient for changes in mood or behavior.  "

## 2024-02-04 NOTE — PROGRESS NOTES
"  Progress Note - Behavioral Health   Sudhakar Choe 58 y.o. male MRN: [id number]  Unit/Bed#: [facility] 642-02 Encounter: [id number]       Patient was visited on unit for continuing care; chart reviewed and discussed with the nursing staff. On approach, the patient was calm and superficially cooperative. He reported feeling \"tense\" and \"anxious\" mostly in the afternoon, \"coming out of nowhere\", and reportedly asked for Atarax, but as per chart review, the patient only asked for Atarax on 2/1 and 2/2, and no anxiety reported yesterday. Also, as per nursing report, the patient was noted sitting on the floor, reported being \"tired\", but he stated: \"I heard the nurse told you that I'm tired, but I have descent energy\". Denied any changes in mood, appetite, and energy level. No problem initiating and maintaining sleep. Denied A/VH currently. Denied SI/HI, intent or plan upon direct inquiry at this time.    Patient continues to be visible in the milieu and remains interactive with staff and peers but requires frequent redirection and reorientation by the staff. No reports of aggression or self-injurious behavior on unit. No PRN medications used in the past 24 hours.    Patient accepted all offered medications and reported feeling better. No adverse effects of medications noted or reported. The patient has a history of at least 3 antipsychotic medication trials and at this time requires treatment with 2 antipsychotic agents due to multiple failed trials of monotherapy. The patient received maintenance dose of Invega Sustenna 234 mg IM on 1/31 (next dose due on 2/28), and Haldol po is being decreased to 5 mg nightly on 2/4/24 to see if the patient can tolerate less doses for his psychotic sxs. Labs including VPA level to be checked this evening. Ortho was consulted again on 2/3/24 and recommended \"follow up with hand surgery when discharged.  If patient is still inpatient in 2 weeks, recommend repeat xray to eval healing.\"  " "The patient has to remain in the hospital while awaiting placement as their mental illness does not allow for them to be treated at a lower level of care. Case management is assertively/actively working on securing the necessary level of care (pending EAC).       Current Mental Status Evaluation:  Appearance and attitude: appeared as stated age, dressed appropriately, wearing eyeglasses, with good hygiene, RUE in sling  Eye contact: good  Motor Function: within normal limits, intact gait, No PMA/PMR  Gait/station: normal gait/station and normal balance  Speech: normal for rate, rhythm, volume, latency, amount  Language: No overt abnormality  Mood/affect:  \"good\"  / Affect was constricted but reactive, mood congruent  Thought Processes: concrete, less ruminating  Thought content: denied suicidal ideations or homicidal ideations, somatic preoccupation, ruminating thoughts  Associations: concrete associations  Perceptual disturbances: denies Auditory/Visual/Tactile Hallucinations  Orientation: oriented to time, person, place and to the situational context  Cognitive Function: intact  Memory: recent and remote memory grossly intact  Intellect: average  Fund of knowledge: aware of current events, aware of past history, and vocabulary average  Impulse control: good  Insight/judgment: limited/limited    Pain: denied  Pain scale: 0      Vital signs in last 24 hours:    Temp:  [97.9 °F (36.6 °C)-98.4 °F (36.9 °C)] 97.9 °F (36.6 °C)  HR:  [] 65  Resp:  [16-18] 18  BP: (106-126)/(66-75) 106/73    Laboratory results: I have personally reviewed all pertinent laboratory/tests results    Results from the past 24 hours: No results found for this or any previous visit (from the past 24 hour(s)).    Progress Toward Goals: mood is stabilizing, placement pending    Assessment:  Principal Problem:    Bipolar affective disorder, current episode manic with psychotic symptoms (HCC)  Active Problems:    Benign prostatic hyperplasia    " Brachial plexus injury, right, sequela    Medical clearance for psychiatric admission    Bilateral lower extremity edema        Plan:  - f/u SLIM recs regarding the medical problems  - f/u labs including VPA level this evening  - Fall precaution  - f/u ortho recs  - Continue DE ANDA Invega Sustenna 234 mg IM q4 weeks (next dose due on 2/28/24)  - Continue medication titration and treatment plan; adjust medication to optimize treatment response and as clinically indicated.     Scheduled medications:  Current Facility-Administered Medications   Medication Dose Route Frequency Provider Last Rate    acetaminophen  650 mg Oral Q4H PRN BETO Novak      acetaminophen  650 mg Oral Q4H PRN Reji Looney DO      acetaminophen  975 mg Oral Q6H PRN BETO Novak      cholecalciferol  2,000 Units Oral Daily Rossi Carlson PA-C      divalproex sodium  1,000 mg Oral HS Rory Bunn MD      divalproex sodium  500 mg Oral Daily Rory Bunn MD      docusate sodium  100 mg Oral BID BETO Novak      gabapentin  300 mg Oral HS Rory Bunn MD      haloperidol  5 mg Oral HS Rory Bunn MD      hydrOXYzine HCL  25 mg Oral Q6H PRN Max 4/day Jenn Strickland, BETO      hydrOXYzine HCL  50 mg Oral Q6H PRN Max 4/day BETO Novak      LORazepam  1 mg Intramuscular Q6H PRN Max 3/day BETO Novak      LORazepam  1 mg Oral Q6H PRN Max 3/day BETO Novak      melatonin  3 mg Oral HS Rory Bunn MD      metFORMIN  500 mg Oral Daily With Breakfast BETO Garcia      mirtazapine  7.5 mg Oral HS PRN Rory Bunn MD      nicotine  1 patch Transdermal Daily PRN Fabiana Sousa MD      OLANZapine  5 mg Intramuscular Q3H PRN Max 3/day BETO Novak      OLANZapine  2.5 mg Oral Q4H PRN Max 6/day Jenn Strickland, BETO      OLANZapine  5 mg Oral Q4H PRN Max 3/day Jenn Strickland, BETO      OLANZapine  5 mg Oral Q3H PRN Max 3/day BETO Novak       paliperidone  234 mg IM- Deltoid Q4 weeks BETO Novak      polyethylene glycol  17 g Oral Daily PRN BETO Novak      senna-docusate sodium  1 tablet Oral Daily PRN BETO Novak      tamsulosin  0.4 mg Oral Daily With Dinner Rossi Carlson PA-C      traZODone  100 mg Oral HS PRN BETO Novak          PRN:    acetaminophen    acetaminophen    acetaminophen    hydrOXYzine HCL    hydrOXYzine HCL    LORazepam    LORazepam    mirtazapine    nicotine    OLANZapine    OLANZapine    OLANZapine    OLANZapine    polyethylene glycol    senna-docusate sodium    traZODone    - Observation: routine    - VS: as per unit protocol  - Diet: Regular diet  - Psychoeducation (benefits and potential risks) discussed, importance of compliance with the psychiatric treatment reiterated, and the patient verbalized understanding of the matter  - Encourage group attendance and milieu therapy    - The pt was educated and agreed to verbalize any suicidal thoughts, frustrations or concerns to the nursing staff, immediately.    - Dispo: Pending EAC       Next of Kin  Extended Emergency Contact Information  Primary Emergency Contact: Rhea Sesay  Mobile Phone: [phone]  Relation: Significant Other   needed? No  Secondary Emergency Contact: DONNIE CANELA  Mobile Phone: [phone]  Relation: Son      Counseling / Coordination of Care    Patient's progress reviewed with nursing staff.  Medications, treatment progress and treatment plan reviewed with patient.  Medication changes discussed with patient.  Medication education provided to patient.  Reassurance and supportive therapy provided.  Reoriented to reality and reassured.  Encouraged participation in milieu and group therapy on the unit.     Rory Bunn MD  Attending Psychiatrist   [facility]

## 2024-02-05 PROCEDURE — 99232 SBSQ HOSP IP/OBS MODERATE 35: CPT | Performed by: STUDENT IN AN ORGANIZED HEALTH CARE EDUCATION/TRAINING PROGRAM

## 2024-02-05 RX ORDER — HALOPERIDOL 10 MG/1
10 TABLET ORAL
Status: DISPENSED | OUTPATIENT
Start: 2024-02-05

## 2024-02-05 RX ADMIN — HALOPERIDOL 10 MG: 10 TABLET ORAL at 21:12

## 2024-02-05 RX ADMIN — DOCUSATE SODIUM 100 MG: 100 CAPSULE, LIQUID FILLED ORAL at 17:03

## 2024-02-05 RX ADMIN — DOCUSATE SODIUM 100 MG: 100 CAPSULE, LIQUID FILLED ORAL at 08:12

## 2024-02-05 RX ADMIN — GABAPENTIN 300 MG: 300 CAPSULE ORAL at 21:12

## 2024-02-05 RX ADMIN — MELATONIN TAB 3 MG 3 MG: 3 TAB at 21:12

## 2024-02-05 RX ADMIN — DIVALPROEX SODIUM 1000 MG: 500 TABLET, EXTENDED RELEASE ORAL at 21:12

## 2024-02-05 RX ADMIN — TAMSULOSIN HYDROCHLORIDE 0.4 MG: 0.4 CAPSULE ORAL at 15:46

## 2024-02-05 RX ADMIN — DIVALPROEX SODIUM 500 MG: 500 TABLET, EXTENDED RELEASE ORAL at 08:13

## 2024-02-05 RX ADMIN — CHOLECALCIFEROL TAB 25 MCG (1000 UNIT) 2000 UNITS: 25 TAB at 08:12

## 2024-02-05 RX ADMIN — METFORMIN HYDROCHLORIDE 500 MG: 500 TABLET, FILM COATED ORAL at 08:12

## 2024-02-05 NOTE — NURSING NOTE
"Patient is cooperative with care. Denies all psych s/s. Patient states \"I'm just tired today\". Medication compliant. Patient denies any needs at this time. Safety checks ongoing.  "

## 2024-02-05 NOTE — TREATMENT TEAM
Pt attended afternoon group.  Pt initially stating he did not want to attend.  Pt encouraged and he stayed for duration (left 1 for drink and returned.)  Pt displayed positive thinking patterns during discussion and future orientated. Pt indicated he wanted to cook more in the future.  Pt showed compassion for peer.       02/05/24 1330   Activity/Group Checklist   Group Self Esteem   Attendance Attended   Attendance Duration (min) 46-60   Interactions Interacted appropriately   Affect/Mood Appropriate;Other (Comment)  (initially anxious stating he did not want to attend)   Goals Achieved Identified feelings;Discussed coping strategies;Able to listen to others;Able to give feedback to another;Able to recieve feedback;Able to self-disclose;Verbalized increased hopefulness;Able to manage/cope with feelings;Able to reflect/comment on own behavior;Able to engage in interactions;Identified distorted thoughts/beliefs;Displayed empathy;Increased hopefulness;Discussed self-esteem issues

## 2024-02-05 NOTE — CASE MANAGEMENT
CM spoke to the patient in his room. The patient reported he did not attend morning group because he did not sleep well last night. The patient reports he has been having increased anxiety and is not sure why. The patient remains in agreement to meet with Alex GERONIMO on Friday 2/9/24 for an assessment. CM encouraged the patient to make his needs known to staff.

## 2024-02-05 NOTE — PROGRESS NOTES
Progress Note - Behavioral Health   Sudhakar Choe 58 y.o. male MRN: [id number]  Unit/Bed#: [facility] 642-02 Encounter: [id number]    Patient was seen today for continuation of care, records reviewed and patient was discussed with the morning case review team.    Sudhakar was seen today for psychiatric follow-up.  On assessment today, Sudhakar was in his room.  Pleasant and cooperative, patient continues to report increased anxiety during the day.  Also noted to be continuously somatically preoccupied, will increase Haldol to 10 mg nightly to assist with symptoms.  Depakote also tolerated at 500/1000 mg daily, VPA level obtained on 2/4/2024 at 84.  Invega Sustenna given on 1/31/2024, patient continues to need the use of two antipsychotics secondary to multiple failures of monotherapy.  Discharge disposition ongoing as he continues to await placement at [facility].    Sudhakar denies acute suicidal/self-harm ideation/intent/plan upon direct inquiry at this time.  Sudhakar remains behaviorally appropriate, no agitation or aggression noted on exam or in report.  Sudhakar also denies HI/AH/VH, and does not appear overtly manic.  No overt delusions or paranoia are verbalized. Impulse control is intact.  Sudhakar remains adherent to his current psychotropic medication regimen and denies any side effects from medications, as well as none noted on exam.    Vitals:  Vitals:    02/05/24 0733   BP: 115/80   Pulse: 75   Resp: 19   Temp: 97.6 °F (36.4 °C)   SpO2: 98%       Laboratory Results:  I have personally reviewed all pertinent laboratory/tests results.    Psychiatric Review of Systems:  Behavior over the last 24 hours:  unchanged.   Sleep: good  Appetite: good  Medication side effects: none  ROS: no complaints, denies shortness of breath or chest pain and all other systems are negative for acute changes    Mental Status Evaluation:  Appearance:  disheveled   Behavior:  cooperative, calm   Speech:  normal rate and volume   Mood:  anxious,  still at times irritable   Affect:  constricted   Thought Process:  linear, less disorganized   Thought Content:  no overt delusions   Perceptual Disturbances: denies auditory hallucinations when asked, does not appear responding to internal stimuli   Risk Potential: Suicidal ideation - None  Homicidal ideation - None  Potential for aggression - No   Memory:  recent and remote memory grossly intact   Sensorium  person, place, and time/date      Consciousness:  alert and awake   Attention: attention span and concentration are age appropriate   Insight:  limited   Judgment: limited   Gait/Station: normal gait/station   Motor Activity: no abnormal movements   Progress Toward Goals:   Sudhakar is progressing towards goals of inpatient psychiatric treatment by continued medication compliance and is attending therapeutic modalities on the milieu. However, the patient continues to require inpatient psychiatric hospitalization for continued medication management and titration to optimize symptom reduction, improve sleep hygiene, and demonstrate adequate self-care.    Assessment/Plan   Principal Problem:    Bipolar affective disorder, current episode manic with psychotic symptoms (HCC)  Active Problems:    Benign prostatic hyperplasia    Brachial plexus injury, right, sequela    Medical clearance for psychiatric admission    Bilateral lower extremity edema      Recommended Treatment: Treatment plan and medication changes discussed and per the attending physician the plan is:    1.Continue with group therapy, milieu therapy and occupational therapy  2.Behavioral Health checks every 7 minutes  3.Continue frequent safety checks and vitals per unit protocol  4.Continue with SLIM medical management as indicated  5.Continue with current medication regimen  6.Will review labs in the a.m.  7.Disposition Planning: Discharge planning and efforts remain ongoing    Behavioral Health Medications: all current active meds have been reviewed  and continue current psychiatric medications.  Current Facility-Administered Medications   Medication Dose Route Frequency Provider Last Rate    acetaminophen  650 mg Oral Q4H PRN Jenn Strickland, CRNP      acetaminophen  650 mg Oral Q4H PRN Reji Looney DO      acetaminophen  975 mg Oral Q6H PRN Jenn Strickland, BETO      cholecalciferol  2,000 Units Oral Daily Rossi Carlson PA-C      divalproex sodium  1,000 mg Oral HS Rory Bunn MD      divalproex sodium  500 mg Oral Daily Rory Bunn MD      docusate sodium  100 mg Oral BID Jenn Strickland, BETO      gabapentin  300 mg Oral HS Rory Bunn MD      haloperidol  10 mg Oral HS Saul Sorensen PA-C      hydrOXYzine HCL  25 mg Oral Q6H PRN Max 4/day Jenn Strickland, CRROBERT      hydrOXYzine HCL  50 mg Oral Q6H PRN Max 4/day Jenn Strickland, BETO      LORazepam  1 mg Intramuscular Q6H PRN Max 3/day Jenn Strickland, BETO      LORazepam  1 mg Oral Q6H PRN Max 3/day Jenn Strickland, BETO      melatonin  3 mg Oral HS Rory Bunn MD      metFORMIN  500 mg Oral Daily With Breakfast Dorene Pelayo, BETO      mirtazapine  7.5 mg Oral HS PRN Rory Bunn MD      nicotine  1 patch Transdermal Daily PRN Fabiana Sousa MD      OLANZapine  5 mg Intramuscular Q3H PRN Max 3/day Jenn Strickland, LYLANP      OLANZapine  2.5 mg Oral Q4H PRN Max 6/day Jenn Strickland, CRNP      OLANZapine  5 mg Oral Q4H PRN Max 3/day Jenn Strickland, CRNP      OLANZapine  5 mg Oral Q3H PRN Max 3/day Jenn Strickland, CRROBERT      paliperidone  234 mg IM- Deltoid Q4 weeks Jenn Strickland, BETO      polyethylene glycol  17 g Oral Daily PRN Jenn Strickland, BETO      senna-docusate sodium  1 tablet Oral Daily PRN Jenn Strickland, BETO      tamsulosin  0.4 mg Oral Daily With Dinner Rossi Carlson PA-C      traZODone  100 mg Oral HS PRN Jenn Strickland, BETO         Risks / Benefits of Treatment:  Risks, benefits, and possible side effects of medications explained to patient  and patient verbalizes understanding and agreement for treatment.    Counseling / Coordination of Care:  Patient's progress reviewed with nursing staff.  Medications, treatment progress and treatment plan reviewed with patient.  Supportive counseling provided to the patient.    Total floor/unit time spent today 25 minutes. Greater than 50% of total time was spent with the patient and / or family counseling and / or coordination of care. A description of the counseling / coordination of care: medication education, treatment plan, supportive therapy.    This note was completed in part utilizing Dragon dictation Software. Grammatical, translation, syntax errors, random word insertions, spelling mistakes, and incomplete sentences may be an occasional consequence of this system secondary to software limitations with voice recognition, ambient noise, and hardware issues. If you have any questions or concerns about the content, text, or information contained within the body of this dictation, please contact the provider for clarification

## 2024-02-05 NOTE — TREATMENT TEAM
"   02/05/24 0731   Team Meeting   Meeting Type Daily Rounds   Initial Conference Date 02/05/24   Team Members Present   Team Members Present Physician;Nurse;;;Occupational Therapist   Physician Team Member Jenn Rene   Nursing Team Member Manas Muniz   Care Management Team Member Rossi   Social Work Team Member Tammy   OT Team Member Curt   Patient/Family Present   Patient Present No   Patient's Family Present No     Depakote increased. Patient did have instance of sitting on floor, appearing more tired. Patient reports \"good\" energy. Haldol decreased to 5mg, but will increase back to 10mg. Patient is calm and cooperative with care. Patient has in person assessment with Alex GERONIMO on Friday 2/9/24 @ 10h. Discharge pending [facility] placement.   " DISCHARGE

## 2024-02-05 NOTE — PLAN OF CARE
Problem: PSYCHOSIS  Goal: Will report no hallucinations or delusions  Description: Interventions:  - Administer medication as  ordered  - Every waking shifts and PRN assess for the presence of hallucinations and or delusions  - Assist with reality testing to support increasing orientation  - Assess if patient's hallucinations or delusions are encouraging self-harm or harm to others and intervene as appropriate  Outcome: Progressing     Problem: BEHAVIOR  Goal: Pt/Family maintain appropriate behavior and adhere to behavioral management agreement, if implemented  Description: INTERVENTIONS:  - Assess the family dynamic   - Encourage verbalization of thoughts and concerns in a socially appropriate manner  - Assess patient/family's coping skills and non-compliant behavior (including use of illegal substances).  - Utilize positive, consistent limit setting strategies supporting safety of patient, staff and others  - Initiate consult with Case Management, Spiritual Care or other ancillary services as appropriate  - If a patient's/visitor's behavior jeopardizes the safety of the patient, staff, or others, refer to organization procedure.   - Notify Security of behavior or suspected illegal substances which indicate the need for search of the patient and/or belongings  - Encourage participation in the decision making process about a behavioral management agreement; implement if patient meets criteria  Outcome: Progressing     Problem: INVOLUNTARY ADMIT  Goal: Will cooperate with staff recommendations and doctor's orders and will demonstrate appropriate behavior  Description: INTERVENTIONS:  - Treat underlying conditions and offer medication as ordered  - Educate regarding involuntary admission procedures and rules  - Utilize positive consistent limit setting strategies to support patient and staff safety  Outcome: Progressing     Problem: Risk for Self Injury/Neglect  Goal: Treatment Goal: Remain safe during length of  stay, learn and adopt new coping skills, and be free of self-injurious ideation, impulses and acts at the time of discharge  Outcome: Progressing  Goal: Verbalize thoughts and feelings  Description: Interventions:  - Assess and re-assess patient's lethality and potential for self-injury  - Engage patient in 1:1 interactions, daily, for a minimum of 15 minutes  - Encourage patient to express feelings, fears, frustrations, hopes  - Establish rapport/trust with patient   Outcome: Progressing  Goal: Refrain from harming self  Description: Interventions:  - Monitor patient closely, per order  - Develop a trusting relationship  - Supervise medication ingestion, monitor effects and side effects   Outcome: Progressing  Goal: Attend and participate in unit activities, including therapeutic, recreational, and educational groups  Description: Interventions:  - Provide therapeutic and educational activities daily, encourage attendance and participation, and document same in the medical record  - Obtain collateral information, encourage visitation and family involvement in care   Outcome: Progressing  Goal: Recognize maladaptive responses and adopt new coping mechanisms  Outcome: Progressing  Goal: Complete daily ADLs, including personal hygiene independently, as able  Description: Interventions:  - Observe, teach, and assist patient with ADLS  - Monitor and promote a balance of rest/activity, with adequate nutrition and elimination  Outcome: Progressing      No

## 2024-02-05 NOTE — NURSING NOTE
Patient is medication and meal compliant. No complaints of pain or discomfort. Patient is visible on the unit but isolative to self. Patient denies symptoms of depression but does endorse anxiety. Patient does pace up and down the hallway and still has difficulty sitting still during the day. Patient did attend group and was appropriate in same. Will continue to monitor patient for changes in mood or behavior.

## 2024-02-06 PROCEDURE — 99232 SBSQ HOSP IP/OBS MODERATE 35: CPT | Performed by: STUDENT IN AN ORGANIZED HEALTH CARE EDUCATION/TRAINING PROGRAM

## 2024-02-06 RX ADMIN — TAMSULOSIN HYDROCHLORIDE 0.4 MG: 0.4 CAPSULE ORAL at 15:33

## 2024-02-06 RX ADMIN — DIVALPROEX SODIUM 500 MG: 500 TABLET, EXTENDED RELEASE ORAL at 08:01

## 2024-02-06 RX ADMIN — METFORMIN HYDROCHLORIDE 500 MG: 500 TABLET, FILM COATED ORAL at 08:02

## 2024-02-06 RX ADMIN — DOCUSATE SODIUM 100 MG: 100 CAPSULE, LIQUID FILLED ORAL at 08:01

## 2024-02-06 RX ADMIN — CHOLECALCIFEROL TAB 25 MCG (1000 UNIT) 2000 UNITS: 25 TAB at 08:02

## 2024-02-06 RX ADMIN — MELATONIN TAB 3 MG 3 MG: 3 TAB at 21:29

## 2024-02-06 RX ADMIN — GABAPENTIN 300 MG: 300 CAPSULE ORAL at 21:29

## 2024-02-06 RX ADMIN — DIVALPROEX SODIUM 1000 MG: 500 TABLET, EXTENDED RELEASE ORAL at 21:28

## 2024-02-06 RX ADMIN — HALOPERIDOL 10 MG: 10 TABLET ORAL at 21:29

## 2024-02-06 RX ADMIN — HYDROXYZINE HYDROCHLORIDE 50 MG: 50 TABLET, FILM COATED ORAL at 15:32

## 2024-02-06 RX ADMIN — DOCUSATE SODIUM 100 MG: 100 CAPSULE, LIQUID FILLED ORAL at 17:02

## 2024-02-06 NOTE — PROGRESS NOTES
Progress Note - Behavioral Health   Sudhakar Choe 58 y.o. male MRN: [id number]  Unit/Bed#: [facility] 642-02 Encounter: [id number]    Patient was seen today for continuation of care, records reviewed and patient was discussed with the morning case review team.    Sudhakar was seen today for psychiatric follow-up.  On assessment today, Sudhakar was calm and pleasant.  Stating that he continues to have anxiety later on in the afternoon, we will introduce low-dose Neurontin to assist with daytime symptoms.  Sleep also reported as interrupted, patient continues to be encouraged to use as needed trazodone to assist with symptoms.  No depression currently verbalized.  No overt delusions noted on exam.  Patient continues to exhibit improved insight as well as judgment into current mental illness.  Also requesting double portions at this time, patient counseled on weight management.  Discharge disposition ongoing.     Sudhakar denies acute suicidal/self-harm ideation/intent/plan upon direct inquiry at this time.  Sudhakar remains behaviorally appropriate, no agitation or aggression noted on exam or in report.  Sudhakar also denies HI/AH/VH, and does not appear overtly manic.  No overt delusions or paranoia are verbalized. Impulse control is intact.  Sudhakar remains adherent to his current psychotropic medication regimen and denies any side effects from medications, as well as none noted on exam.    Vitals:  Vitals:    02/06/24 0738   BP: 114/70   Pulse: 94   Resp: 17   Temp: 97.6 °F (36.4 °C)   SpO2: 90%       Laboratory Results:  I have personally reviewed all pertinent laboratory/tests results.  Most Recent Labs:   Lab Results   Component Value Date    WBC 8.07 02/04/2024    RBC 4.48 02/04/2024    HGB 13.9 02/04/2024    HCT 40.8 02/04/2024     02/04/2024    RDW 14.1 02/04/2024    NEUTROABS 3.14 02/04/2024    SODIUM 136 02/04/2024    K 3.8 02/04/2024     02/04/2024    CO2 26 02/04/2024    BUN 10 02/04/2024    CREATININE 0.87  02/04/2024    GLUC 102 02/04/2024    GLUF 109 (H) 01/03/2024    CALCIUM 9.4 02/04/2024    AST 17 02/04/2024    ALT 10 02/04/2024    ALKPHOS 53 02/04/2024    TP 7.3 02/04/2024    ALB 4.2 02/04/2024    TBILI 0.32 02/04/2024    CHOLESTEROL 124 01/23/2024    HDL 28 (L) 01/23/2024    TRIG 116 01/23/2024    LDLCALC 73 01/23/2024    NONHDLC 96 01/23/2024    VALPROICTOT 84 02/04/2024    BOA3UEENQNZM 1.431 12/30/2023    RPR Non-Reactive 03/26/2021    HGBA1C 6.2 (H) 01/23/2024     01/23/2024       Psychiatric Review of Systems:  Behavior over the last 24 hours:  unchanged.   Sleep: good  Appetite: good  Medication side effects: none  ROS: no complaints, denies shortness of breath or chest pain and all other systems are negative for acute changes    Mental Status Evaluation:  Appearance:  adequate grooming   Behavior:  cooperative, calm   Speech:  normal rate and volume   Mood:  less irritable   Affect:  constricted   Thought Process:  goal directed, linear   Thought Content:  no overt delusions   Perceptual Disturbances: denies auditory hallucinations when asked, does not appear responding to internal stimuli   Risk Potential: Suicidal ideation - None  Homicidal ideation - None  Potential for aggression - No   Memory:  recent and remote memory grossly intact   Sensorium  person, place, and time/date      Consciousness:  alert and awake   Attention: attention span and concentration are age appropriate   Insight:  limited   Judgment: limited   Gait/Station: normal gait/station   Motor Activity: no abnormal movements   Progress Toward Goals:   Sudhakar is progressing towards goals of inpatient psychiatric treatment by continued medication compliance and is attending therapeutic modalities on the milieu. However, the patient continues to require inpatient psychiatric hospitalization for continued medication management and titration to optimize symptom reduction, improve sleep hygiene, and demonstrate adequate  self-care.    Assessment/Plan   Principal Problem:    Bipolar affective disorder, current episode manic with psychotic symptoms (HCC)  Active Problems:    Benign prostatic hyperplasia    Brachial plexus injury, right, sequela    Medical clearance for psychiatric admission    Bilateral lower extremity edema      Recommended Treatment: Treatment plan and medication changes discussed and per the attending physician the plan is:    1.Continue with group therapy, milieu therapy and occupational therapy  2.Behavioral Health checks every 7 minutes  3.Continue frequent safety checks and vitals per unit protocol  4.Continue with SLIM medical management as indicated  5.Continue with current medication regimen  6.Will review labs in the a.m.  7.Disposition Planning: Discharge planning and efforts remain ongoing    Behavioral Health Medications: all current active meds have been reviewed and continue current psychiatric medications.  Current Facility-Administered Medications   Medication Dose Route Frequency Provider Last Rate    acetaminophen  650 mg Oral Q4H PRN Jenn Strickland, CRNP      acetaminophen  650 mg Oral Q4H PRN Reji Looney DO      acetaminophen  975 mg Oral Q6H PRN BETO Novak      cholecalciferol  2,000 Units Oral Daily Rossi Carlson PA-C      divalproex sodium  1,000 mg Oral HS Rory Bunn MD      divalproex sodium  500 mg Oral Daily Rory Bunn MD      docusate sodium  100 mg Oral BID BETO Novak      gabapentin  300 mg Oral HS Rory Bunn MD      haloperidol  10 mg Oral HS Saul Sorensen PA-C      hydrOXYzine HCL  25 mg Oral Q6H PRN Max 4/day Jenn Strickland, BETO      hydrOXYzine HCL  50 mg Oral Q6H PRN Max 4/day BETO Novak      LORazepam  1 mg Intramuscular Q6H PRN Max 3/day Jenn Strickland, BETO      LORazepam  1 mg Oral Q6H PRN Max 3/day Jenn Strickland, BETO      melatonin  3 mg Oral HS Rory Bunn MD      metFORMIN  500 mg Oral Daily With Breakfast  Dorene Pelayo, BETO      mirtazapine  7.5 mg Oral HS PRN Rory Bunn MD      nicotine  1 patch Transdermal Daily PRN Fabiana Sousa MD      OLANZapine  5 mg Intramuscular Q3H PRN Max 3/day Jenn Strickland, CRNP      OLANZapine  2.5 mg Oral Q4H PRN Max 6/day Jenn Strickland, CRNP      OLANZapine  5 mg Oral Q4H PRN Max 3/day Jenn Strickland, CRNP      OLANZapine  5 mg Oral Q3H PRN Max 3/day Jenn Strickland, CRNP      paliperidone  234 mg IM- Deltoid Q4 weeks Jenn Strickland, CRNP      polyethylene glycol  17 g Oral Daily PRN Jenn Strickland, CRNP      senna-docusate sodium  1 tablet Oral Daily PRN Jenn Strickland, CRNP      tamsulosin  0.4 mg Oral Daily With Dinner Rossi Carlson PA-C      traZODone  100 mg Oral HS PRN Jenn Strickland, BETO         Risks / Benefits of Treatment:  Risks, benefits, and possible side effects of medications explained to patient and patient verbalizes understanding and agreement for treatment.    Counseling / Coordination of Care:  Patient's progress reviewed with nursing staff.  Medications, treatment progress and treatment plan reviewed with patient.  Supportive counseling provided to the patient.    Total floor/unit time spent today 25 minutes. Greater than 50% of total time was spent with the patient and / or family counseling and / or coordination of care. A description of the counseling / coordination of care: medication education, treatment plan, supportive therapy.    This note was completed in part utilizing Dragon dictation Software. Grammatical, translation, syntax errors, random word insertions, spelling mistakes, and incomplete sentences may be an occasional consequence of this system secondary to software limitations with voice recognition, ambient noise, and hardware issues. If you have any questions or concerns about the content, text, or information contained within the body of this dictation, please contact the provider for clarification

## 2024-02-06 NOTE — NURSING NOTE
Pt reporting anxiety r/t not getting enough food during mealtimes. Pt educated on why he needs a specialized diet. He is visible in the milieu but has been mostly isolative to self. Pt denies other psych symptoms. Pt denying any unmet needs.

## 2024-02-06 NOTE — TREATMENT TEAM
Pt attended all groups.  Restless at times.  Empathetic to peers and polite.      02/06/24 1330   Activity/Group Checklist   Group Other (Comment)  (positive reflection)   Attendance Attended;Other (Comment)  (left and returned)   Attendance Duration (min) 46-60   Interactions Interacted appropriately   Affect/Mood Other (Comment)  (anxious)   Goals Achieved Identified feelings;Discussed coping strategies;Able to listen to others;Able to engage in interactions;Able to reflect/comment on own behavior;Able to manage/cope with feelings;Verbalized increased hopefulness;Able to self-disclose;Able to recieve feedback

## 2024-02-06 NOTE — NURSING NOTE
"PRN 50mg of Atarax administered at 1532 per pt request. Prn effective, per pt. Pt states that he feels \"a lot\" better.   "

## 2024-02-06 NOTE — TREATMENT TEAM
"   02/06/24 0738   Team Meeting   Meeting Type Daily Rounds   Initial Conference Date 02/06/24   Team Members Present   Team Members Present Physician;Nurse;;;Occupational Therapist   Physician Team Member Jenn Rene   Nursing Team Member July eKnyon   Care Management Team Member Rossi   Social Work Team Member Tammy ROMAN Team Member Curt   Patient/Family Present   Patient Present No   Patient's Family Present No   Pharmacy: [facility]    The patient is reporting increased anxiety in the afternoons, reports feeling hungry and wanting double portions. The patient reports \"good\" energy. The patient is medication compliant. The patient has an in person assessment with Pamela GERONIMO on Friday 2/9/24 @ 10h. No pending discharge date at this time.   "

## 2024-02-07 PROCEDURE — 99232 SBSQ HOSP IP/OBS MODERATE 35: CPT | Performed by: STUDENT IN AN ORGANIZED HEALTH CARE EDUCATION/TRAINING PROGRAM

## 2024-02-07 RX ADMIN — HALOPERIDOL 10 MG: 10 TABLET ORAL at 21:42

## 2024-02-07 RX ADMIN — MELATONIN TAB 3 MG 3 MG: 3 TAB at 21:42

## 2024-02-07 RX ADMIN — DIVALPROEX SODIUM 1000 MG: 500 TABLET, EXTENDED RELEASE ORAL at 21:42

## 2024-02-07 RX ADMIN — CHOLECALCIFEROL TAB 25 MCG (1000 UNIT) 2000 UNITS: 25 TAB at 08:14

## 2024-02-07 RX ADMIN — DOCUSATE SODIUM 100 MG: 100 CAPSULE, LIQUID FILLED ORAL at 08:14

## 2024-02-07 RX ADMIN — TAMSULOSIN HYDROCHLORIDE 0.4 MG: 0.4 CAPSULE ORAL at 16:37

## 2024-02-07 RX ADMIN — DIVALPROEX SODIUM 500 MG: 500 TABLET, EXTENDED RELEASE ORAL at 08:14

## 2024-02-07 RX ADMIN — GABAPENTIN 300 MG: 300 CAPSULE ORAL at 21:42

## 2024-02-07 RX ADMIN — DOCUSATE SODIUM 100 MG: 100 CAPSULE, LIQUID FILLED ORAL at 17:29

## 2024-02-07 RX ADMIN — METFORMIN HYDROCHLORIDE 500 MG: 500 TABLET, FILM COATED ORAL at 08:14

## 2024-02-07 RX ADMIN — HYDROXYZINE HYDROCHLORIDE 50 MG: 50 TABLET, FILM COATED ORAL at 12:50

## 2024-02-07 NOTE — PLAN OF CARE
Problem: Risk for Self Injury/Neglect  Goal: Treatment Goal: Remain safe during length of stay, learn and adopt new coping skills, and be free of self-injurious ideation, impulses and acts at the time of discharge  Outcome: Progressing  Goal: Verbalize thoughts and feelings  Description: Interventions:  - Assess and re-assess patient's lethality and potential for self-injury  - Engage patient in 1:1 interactions, daily, for a minimum of 15 minutes  - Encourage patient to express feelings, fears, frustrations, hopes  - Establish rapport/trust with patient   Outcome: Progressing  Goal: Refrain from harming self  Description: Interventions:  - Monitor patient closely, per order  - Develop a trusting relationship  - Supervise medication ingestion, monitor effects and side effects   Outcome: Progressing      Palliative Care

## 2024-02-07 NOTE — NURSING NOTE
Pt given 50mg of PRN Atarax at 1250 for c/o moderate anxiety. PRN effective. Pt says he feels calm.

## 2024-02-07 NOTE — TREATMENT TEAM
Pt attended 1 afternoon group.  Pt calm and cooperative.  Pt able to focus on topic and provide feedback to peers.      02/07/24 1330   Activity/Group Checklist   Group Other (Comment)  (Communication: Recovery and rewards)   Attendance Attended   Attendance Duration (min) 46-60   Interactions Interacted appropriately   Affect/Mood Calm   Goals Achieved Identified feelings;Discussed self-esteem issues;Discussed coping strategies;Able to listen to others;Able to engage in interactions;Able to reflect/comment on own behavior;Verbalized increased hopefulness;Able to manage/cope with feelings;Able to self-disclose;Able to recieve feedback;Able to give feedback to another;Increased hopefulness;Identified distorted thoughts/beliefs

## 2024-02-07 NOTE — PROGRESS NOTES
"Progress Note - Behavioral Health   Sudhakar Choe 58 y.o. male MRN: [id number]  Unit/Bed#: [facility] 642-02 Encounter: [id number]    Patient was seen today for continuation of care, records reviewed and patient was discussed with the morning case review team.    Sudhakar was seen today for psychiatric follow-up.  On assessment today, Sudhakar was seen lying in his bed, and sat up to speak with this writer. Sudhakar reports feeling uneasy with lower energy today because of his meal portions. He reports \"they're trying to starve me.\" Sudhakar was recently switched to a diabetic diet, due to an HgbA1C of 6.2 on 1/23/2024. He was counseled that this diet is important and appropriate for him, and he will be continued on this diet. He reports no other complaints or sources of pain, and denies current depressive symptoms. Patient was given 50mg prn Atarax 1532 on 2/6 and this was successful. His last Depakote level was 84 on 2/4/2024, and he reports no medication side effects. Alex GERONIMO will come to the unit on 2/16 to assess this patient. Discharge disposition is ongoing.     Sudhakar denies acute suicidal/self-harm ideation/intent/plan upon direct inquiry at this time.  Sudhakar remains behaviorally appropriate, no agitation or aggression noted on exam or in report.  Sudhakar also denies HI/AH/VH, and does not appear overtly manic.  No overt delusions or paranoia are verbalized. Impulse control is intact.  Sudhakar remains adherent to his current psychotropic medication regimen and denies any side effects from medications, as well as none noted on exam.    Vitals:  Vitals:    02/07/24 0742   BP: 106/78   Pulse: 100   Resp: 16   Temp: 97.5 °F (36.4 °C)   SpO2: 96%       Laboratory Results:  I have personally reviewed all pertinent laboratory/tests results.  CBC:   Lab Results   Component Value Date    WBC 8.07 02/04/2024    RBC 4.48 02/04/2024    HGB 13.9 02/04/2024    HCT 40.8 02/04/2024    MCV 91 02/04/2024     02/04/2024    MCH 31.0 " 02/04/2024    MCHC 34.1 02/04/2024    RDW 14.1 02/04/2024    MPV 11.0 02/04/2024    NRBC 0 02/04/2024    NEUTROABS 3.14 02/04/2024     CMP:   Lab Results   Component Value Date    SODIUM 136 02/04/2024    K 3.8 02/04/2024     02/04/2024    CO2 26 02/04/2024    AGAP 10 02/04/2024    BUN 10 02/04/2024    CREATININE 0.87 02/04/2024    GLUC 102 02/04/2024    GLUF 109 (H) 01/03/2024    CALCIUM 9.4 02/04/2024    AST 17 02/04/2024    ALT 10 02/04/2024    ALKPHOS 53 02/04/2024    TP 7.3 02/04/2024    ALB 4.2 02/04/2024    TBILI 0.32 02/04/2024    EGFR 95 02/04/2024     Depakote:   Lab Results   Component Value Date    VALPROICTOT 84 02/04/2024       Psychiatric Review of Systems:  Behavior over the last 24 hours:  unchanged.   Sleep: Good  Appetite: Good  Medication side effects: None  ROS: no complaints, denies shortness of breath or chest pain and all other systems are negative for acute changes    Mental Status Evaluation:  Appearance:  age appropriate, adequate grooming   Behavior:  cooperative, calm   Speech:  normal rate and volume   Mood:  less irritable   Affect:  constricted   Thought Process:  linear   Thought Content:  no overt delusions   Perceptual Disturbances: Denies auditory, visual, and tactile hallucinations when asked.   Risk Potential: Suicidal ideation - None  Homicidal ideation - None  Potential for aggression - No   Memory:  recent and remote memory grossly intact   Sensorium  person, place, and time/date      Consciousness:  alert and awake   Attention: attention span and concentration are age appropriate   Insight:  limited   Judgment: limited   Gait/Station: normal gait/station   Motor Activity: no abnormal movements   Progress Toward Goals:   Sudhakar is progressing towards goals of inpatient psychiatric treatment by continued medication compliance and is attending therapeutic modalities on the milieu. However, the patient continues to require inpatient psychiatric hospitalization for continued  medication management and titration to optimize symptom reduction, improve sleep hygiene, and demonstrate adequate self-care.    Assessment/Plan   Principal Problem:    Bipolar affective disorder, current episode manic with psychotic symptoms (HCC)  Active Problems:    Benign prostatic hyperplasia    Brachial plexus injury, right, sequela    Medical clearance for psychiatric admission    Bilateral lower extremity edema      Recommended Treatment: Treatment plan and medication changes discussed and per the attending physician the plan is:    1.Continue with group therapy, milieu therapy and occupational therapy  2.Behavioral Health checks every 7 minutes  3.Continue frequent safety checks and vitals per unit protocol  4.Continue with SLIM medical management as indicated  5.Continue with current medication regimen  6.Will review labs in the a.m.  7.Disposition Planning: Discharge planning and efforts remain ongoing    Behavioral Health Medications: all current active meds have been reviewed.  Current Facility-Administered Medications   Medication Dose Route Frequency Provider Last Rate    acetaminophen  650 mg Oral Q4H PRN Jenn Strickland, CRNP      acetaminophen  650 mg Oral Q4H PRN Reji Looney DO      acetaminophen  975 mg Oral Q6H PRN BETO Novak      cholecalciferol  2,000 Units Oral Daily Rossi Carlson PA-C      divalproex sodium  1,000 mg Oral HS Rory Bunn MD      divalproex sodium  500 mg Oral Daily Rory Bunn MD      docusate sodium  100 mg Oral BID BETO Novak      gabapentin  300 mg Oral HS Rory Bunn MD      haloperidol  10 mg Oral HS Saul Sorensen PA-C      hydrOXYzine HCL  25 mg Oral Q6H PRN Max 4/day Jenn Strickland, LYLANP      hydrOXYzine HCL  50 mg Oral Q6H PRN Max 4/day Jenn Strickland, BETO      LORazepam  1 mg Intramuscular Q6H PRN Max 3/day Jenn Strickland, LYLANP      LORazepam  1 mg Oral Q6H PRN Max 3/day Jenn Strickland, BETO      melatonin  3 mg Oral HS  Rory Bunn MD      metFORMIN  500 mg Oral Daily With Breakfast Dorenedaiana Hatfield Gita, BETO      mirtazapine  7.5 mg Oral HS PRN Rory Bunn MD      nicotine  1 patch Transdermal Daily PRN Fabiana Sousa MD      OLANZapine  5 mg Intramuscular Q3H PRN Max 3/day Jenn Strickland, CRNP      OLANZapine  2.5 mg Oral Q4H PRN Max 6/day Jenn Strickland, CRNP      OLANZapine  5 mg Oral Q4H PRN Max 3/day Jenn Strickland, CRNP      OLANZapine  5 mg Oral Q3H PRN Max 3/day Jenn Strickland, CRNP      paliperidone  234 mg IM- Deltoid Q4 weeks Jenn Strickland, CRNP      polyethylene glycol  17 g Oral Daily PRN Jenn Strickland, CRROBERT      senna-docusate sodium  1 tablet Oral Daily PRN Jenn Strickland, LYLANP      tamsulosin  0.4 mg Oral Daily With Dinner Rossi Carlson PA-C      traZODone  100 mg Oral HS PRN Jenn Strickland, BETO         Risks / Benefits of Treatment:  Risks, benefits, and possible side effects of medications explained to patient. Patient has limited understanding of risks and benefits of treatment at this time, but agrees to take medications as prescribed.    Counseling / Coordination of Care:  Patient's progress reviewed with nursing staff.  Medications, treatment progress and treatment plan reviewed with patient.  Supportive counseling provided to the patient.    Total floor/unit time spent today 25 minutes. Greater than 50% of total time was spent with the patient and / or family counseling and / or coordination of care. A description of the counseling / coordination of care: medication education, treatment plan, supportive therapy.    This note was completed in part utilizing Dragon dictation Software. Grammatical, translation, syntax errors, random word insertions, spelling mistakes, and incomplete sentences may be an occasional consequence of this system secondary to software limitations with voice recognition, ambient noise, and hardware issues. If you have any questions or concerns about  the content, text, or information contained within the body of this dictation, please contact the provider for clarification

## 2024-02-07 NOTE — TREATMENT TEAM
"   02/07/24 0742   Team Meeting   Meeting Type Daily Rounds   Initial Conference Date 02/07/24   Team Members Present   Team Members Present Physician;Nurse;;;Occupational Therapist   Physician Team Member Jenn Rene   Nursing Team Member July Kenyon   Care Management Team Member Rossi   Social Work Team Member Tammy ROMAN Team Member Curt   Patient/Family Present   Patient Present No   Patient's Family Present No     The patient is reporting increased anxiety in the afternoons, reports feeling hungry and wanting double portions. The patient reports \"good\" energy. The patient is medication compliant. The patient has an in person assessment with Pamela GERONIMO on Friday 2/9/24 @ 10h. No pending discharge date at this time.   "

## 2024-02-07 NOTE — NURSING NOTE
Pt visible on the unit but minimal interaction with peers. Pt is calm and cooperative. Pt denies AH/VH/SI/HI. Pt  denies any unmet needs at this time. Continual rounding in place.

## 2024-02-07 NOTE — NURSING NOTE
Pt is pleasant and cooperative. He is isolative to self today, appears depressed.  He reports feeling anxious about his meals and discharge. He is med/meal compliant. Pt is currently denying any unmet needs.

## 2024-02-07 NOTE — CASE MANAGEMENT
CM received an email from [facility] reporting they will be unable to assess the patient as scheduled this week, and would be able to assess him on Friday 2/16/24. CM inquired into a sooner date/time due to the patient being anxious for the next step, CM made aware no sooner time available. Alex reported that they don't have an open bed yet and likely will not until the end of February. Alex GERONIMO will come to the unit to assess the patient on Friday 2/16/24 @ 10h.   CM to make patient aware of scheduling change.

## 2024-02-07 NOTE — NURSING NOTE
Patient calm and cooperative on unit. Visible but minimal interaction with peers. Denies all s/s at this time. Compliant with meals and medications. Denies any needs at this time. Safety checks in place.

## 2024-02-08 PROCEDURE — 99232 SBSQ HOSP IP/OBS MODERATE 35: CPT | Performed by: STUDENT IN AN ORGANIZED HEALTH CARE EDUCATION/TRAINING PROGRAM

## 2024-02-08 RX ADMIN — HALOPERIDOL 10 MG: 10 TABLET ORAL at 21:18

## 2024-02-08 RX ADMIN — MELATONIN TAB 3 MG 3 MG: 3 TAB at 21:18

## 2024-02-08 RX ADMIN — GABAPENTIN 300 MG: 300 CAPSULE ORAL at 21:18

## 2024-02-08 RX ADMIN — DOCUSATE SODIUM 100 MG: 100 CAPSULE, LIQUID FILLED ORAL at 17:22

## 2024-02-08 RX ADMIN — DIVALPROEX SODIUM 1000 MG: 500 TABLET, EXTENDED RELEASE ORAL at 21:18

## 2024-02-08 RX ADMIN — TAMSULOSIN HYDROCHLORIDE 0.4 MG: 0.4 CAPSULE ORAL at 17:21

## 2024-02-08 RX ADMIN — METFORMIN HYDROCHLORIDE 500 MG: 500 TABLET, FILM COATED ORAL at 08:13

## 2024-02-08 RX ADMIN — DOCUSATE SODIUM 100 MG: 100 CAPSULE, LIQUID FILLED ORAL at 08:13

## 2024-02-08 RX ADMIN — CHOLECALCIFEROL TAB 25 MCG (1000 UNIT) 2000 UNITS: 25 TAB at 08:13

## 2024-02-08 RX ADMIN — DIVALPROEX SODIUM 500 MG: 500 TABLET, EXTENDED RELEASE ORAL at 08:13

## 2024-02-08 NOTE — PROGRESS NOTES
"Progress Note - Behavioral Health   Sudhakar Choe 58 y.o. male MRN: [id number]  Unit/Bed#: [facility] 660-02 Encounter: [id number]    Patient was seen today for continuation of care, records reviewed and patient was discussed with the morning case review team.    Sudhakar was seen today for psychiatric follow-up.  On assessment today, Sudhakar was seen lying in bed.  Currently preoccupied with his diet change, patient states \" I am not being fed properly\".  Education provided about diet change including long-term management of prediabetes to assist with blood glucose levels.  Patient expresses intermittent understanding, but will need reeducation.  Tolerating all medication changes well, patient continues to tolerate 2 antipsychotics secondary to multiple failures of monotherapy.  No EPS or other adverse effects reported.  Invega Sustenna 234 mg IM given on 1/31/2024.  No medication change be made at this time.  No changes in sleep or appetite reported.  Patient continues to await placement for [facility] to ensure long-term positive outcome.    Sudhakar denies acute suicidal/self-harm ideation/intent/plan upon direct inquiry at this time.  Sudhakar remains behaviorally appropriate, no agitation or aggression noted on exam or in report.  Sudhakar also denies HI/AH/VH, and does not appear overtly manic.  No overt delusions or paranoia are verbalized. Impulse control is intact.  Sudhakar remains adherent to his current psychotropic medication regimen and denies any side effects from medications, as well as none noted on exam.    Vitals:  Vitals:    02/08/24 0730   BP: 97/53   Pulse: 91   Resp: 18   Temp: 98.5 °F (36.9 °C)   SpO2: 100%       Laboratory Results:  I have personally reviewed all pertinent laboratory/tests results.  Most Recent Labs:   Lab Results   Component Value Date    WBC 8.07 02/04/2024    RBC 4.48 02/04/2024    HGB 13.9 02/04/2024    HCT 40.8 02/04/2024     02/04/2024    RDW 14.1 02/04/2024    NEUTROABS 3.14 " 02/04/2024    SODIUM 136 02/04/2024    K 3.8 02/04/2024     02/04/2024    CO2 26 02/04/2024    BUN 10 02/04/2024    CREATININE 0.87 02/04/2024    GLUC 102 02/04/2024    GLUF 109 (H) 01/03/2024    CALCIUM 9.4 02/04/2024    AST 17 02/04/2024    ALT 10 02/04/2024    ALKPHOS 53 02/04/2024    TP 7.3 02/04/2024    ALB 4.2 02/04/2024    TBILI 0.32 02/04/2024    CHOLESTEROL 124 01/23/2024    HDL 28 (L) 01/23/2024    TRIG 116 01/23/2024    LDLCALC 73 01/23/2024    NONHDLC 96 01/23/2024    VALPROICTOT 84 02/04/2024    ORE9LCFKPJMZ 1.431 12/30/2023    RPR Non-Reactive 03/26/2021    HGBA1C 6.2 (H) 01/23/2024     01/23/2024       Psychiatric Review of Systems:  Behavior over the last 24 hours:  unchanged.   Sleep: good  Appetite: good  Medication side effects: none  ROS: no complaints, denies shortness of breath or chest pain and all other systems are negative for acute changes    Mental Status Evaluation:  Appearance:  disheveled   Behavior:  demanding   Speech:  normal rate and volume   Mood:  less irritable   Affect:  constricted   Thought Process:  linear   Thought Content:  mild paranoia   Perceptual Disturbances: denies auditory hallucinations when asked, does not appear responding to internal stimuli   Risk Potential: Suicidal ideation - None  Homicidal ideation - None  Potential for aggression - No   Memory:  recent and remote memory grossly intact   Sensorium  person, place, and time/date      Consciousness:  alert and awake   Attention: attention span and concentration are age appropriate   Insight:  limited   Judgment: limited   Gait/Station: normal gait/station   Motor Activity: no abnormal movements   Progress Toward Goals:   Sudhakar is progressing towards goals of inpatient psychiatric treatment by continued medication compliance and is attending therapeutic modalities on the milieu. However, the patient continues to require inpatient psychiatric hospitalization for continued medication management and  titration to optimize symptom reduction, improve sleep hygiene, and demonstrate adequate self-care.    Assessment/Plan   Principal Problem:    Bipolar affective disorder, current episode manic with psychotic symptoms (HCC)  Active Problems:    Benign prostatic hyperplasia    Brachial plexus injury, right, sequela    Medical clearance for psychiatric admission    Bilateral lower extremity edema      Recommended Treatment: Treatment plan and medication changes discussed and per the attending physician the plan is:    1.Continue with group therapy, milieu therapy and occupational therapy  2.Behavioral Health checks every 7 minutes  3.Continue frequent safety checks and vitals per unit protocol  4.Continue with SLIM medical management as indicated  5.Continue with current medication regimen  6.Will review labs in the a.m.  7.Disposition Planning: Discharge planning and efforts remain ongoing    Behavioral Health Medications: all current active meds have been reviewed and continue current psychiatric medications.  Current Facility-Administered Medications   Medication Dose Route Frequency Provider Last Rate    acetaminophen  650 mg Oral Q4H PRN Jenn Strickland, LYLANP      acetaminophen  650 mg Oral Q4H PRN Reji Looney DO      acetaminophen  975 mg Oral Q6H PRN BETO Novak      cholecalciferol  2,000 Units Oral Daily Rossi Carlson PA-C      divalproex sodium  1,000 mg Oral HS Rory Bunn MD      divalproex sodium  500 mg Oral Daily Rory Bunn MD      docusate sodium  100 mg Oral BID BETO Novak      gabapentin  300 mg Oral HS Rory Bunn MD      haloperidol  10 mg Oral HS Saul Sorensen PA-C      hydrOXYzine HCL  25 mg Oral Q6H PRN Max 4/day BETO Novak      hydrOXYzine HCL  50 mg Oral Q6H PRN Max 4/day BETO Novak      LORazepam  1 mg Intramuscular Q6H PRN Max 3/day BETO Novak      LORazepam  1 mg Oral Q6H PRN Max 3/day BETO Novak       melatonin  3 mg Oral HS Rory Bunn MD      metFORMIN  500 mg Oral Daily With Breakfast BETO Garcia      mirtazapine  7.5 mg Oral HS PRN Rory Bunn MD      nicotine  1 patch Transdermal Daily PRN Fabiana Sousa MD      OLANZapine  5 mg Intramuscular Q3H PRN Max 3/day Jenn Strickland, CRNP      OLANZapine  2.5 mg Oral Q4H PRN Max 6/day Jenn Strickland, CRNP      OLANZapine  5 mg Oral Q4H PRN Max 3/day Jenn Strickland, CRNP      OLANZapine  5 mg Oral Q3H PRN Max 3/day Jenn Strickland, CRNP      paliperidone  234 mg IM- Deltoid Q4 weeks Jenn Strickland, BETO      polyethylene glycol  17 g Oral Daily PRN Jenn Strickland, BETO      senna-docusate sodium  1 tablet Oral Daily PRN Jenn Strickland, BETO      tamsulosin  0.4 mg Oral Daily With Dinner Rossi Carlson PA-C      traZODone  100 mg Oral HS PRN Jenn Strickland, BETO         Risks / Benefits of Treatment:  Risks, benefits, and possible side effects of medications explained to patient and patient verbalizes understanding and agreement for treatment.    Counseling / Coordination of Care:  Patient's progress reviewed with nursing staff.  Medications, treatment progress and treatment plan reviewed with patient.  Supportive counseling provided to the patient.    Total floor/unit time spent today 25 minutes. Greater than 50% of total time was spent with the patient and / or family counseling and / or coordination of care. A description of the counseling / coordination of care: medication education, treatment plan, supportive therapy.    This note was completed in part utilizing Dragon dictation Software. Grammatical, translation, syntax errors, random word insertions, spelling mistakes, and incomplete sentences may be an occasional consequence of this system secondary to software limitations with voice recognition, ambient noise, and hardware issues. If you have any questions or concerns about the content, text, or information  contained within the body of this dictation, please contact the provider for clarification

## 2024-02-08 NOTE — NURSING NOTE
No behavioral issues noted, patient is calm, visible, medication and meals compliant. Patient reports anxious and depressed r/t relationship with his girlfriend. Patient denies SI, AH, VH. No further distress noted.

## 2024-02-08 NOTE — PLAN OF CARE
Problem: BEHAVIOR  Goal: Pt/Family maintain appropriate behavior and adhere to behavioral management agreement, if implemented  Description: INTERVENTIONS:  - Assess the family dynamic   - Encourage verbalization of thoughts and concerns in a socially appropriate manner  - Assess patient/family's coping skills and non-compliant behavior (including use of illegal substances).  - Utilize positive, consistent limit setting strategies supporting safety of patient, staff and others  - Initiate consult with Case Management, Spiritual Care or other ancillary services as appropriate  - If a patient's/visitor's behavior jeopardizes the safety of the patient, staff, or others, refer to organization procedure.   - Notify Security of behavior or suspected illegal substances which indicate the need for search of the patient and/or belongings  - Encourage participation in the decision making process about a behavioral management agreement; implement if patient meets criteria  Outcome: Progressing     Problem: INVOLUNTARY ADMIT  Goal: Will cooperate with staff recommendations and doctor's orders and will demonstrate appropriate behavior  Description: INTERVENTIONS:  - Treat underlying conditions and offer medication as ordered  - Educate regarding involuntary admission procedures and rules  - Utilize positive consistent limit setting strategies to support patient and staff safety  Outcome: Progressing     Problem: Risk for Self Injury/Neglect  Goal: Treatment Goal: Remain safe during length of stay, learn and adopt new coping skills, and be free of self-injurious ideation, impulses and acts at the time of discharge  Outcome: Progressing  Goal: Verbalize thoughts and feelings  Description: Interventions:  - Assess and re-assess patient's lethality and potential for self-injury  - Engage patient in 1:1 interactions, daily, for a minimum of 15 minutes  - Encourage patient to express feelings, fears, frustrations, hopes  - Establish  rapport/trust with patient   Outcome: Progressing  Goal: Refrain from harming self  Description: Interventions:  - Monitor patient closely, per order  - Develop a trusting relationship  - Supervise medication ingestion, monitor effects and side effects   Outcome: Progressing

## 2024-02-08 NOTE — TREATMENT TEAM
"   02/08/24 0755   Team Meeting   Meeting Type Daily Rounds   Initial Conference Date 02/08/24   Team Members Present   Team Members Present Physician;Nurse;;;Occupational Therapist   Physician Team Member Jenn Rene   Nursing Team Member July Kenyon   Care Management Team Member Rossi   Social Work Team Member Tammy ROMAN Team Member Curt   Patient/Family Present   Patient Present No   Patient's Family Present No     The patient is reporting increased anxiety in the afternoons. The patient reports \"good\" energy. The patient is medication compliant. The patient has an in person assessment with Pamela GERONIMO on Friday 2/16/24 @ 10h. No pending discharge date at this time.   "

## 2024-02-09 PROCEDURE — 99232 SBSQ HOSP IP/OBS MODERATE 35: CPT | Performed by: STUDENT IN AN ORGANIZED HEALTH CARE EDUCATION/TRAINING PROGRAM

## 2024-02-09 RX ADMIN — DIVALPROEX SODIUM 500 MG: 500 TABLET, EXTENDED RELEASE ORAL at 08:28

## 2024-02-09 RX ADMIN — METFORMIN HYDROCHLORIDE 500 MG: 500 TABLET, FILM COATED ORAL at 08:29

## 2024-02-09 RX ADMIN — TAMSULOSIN HYDROCHLORIDE 0.4 MG: 0.4 CAPSULE ORAL at 17:04

## 2024-02-09 RX ADMIN — CHOLECALCIFEROL TAB 25 MCG (1000 UNIT) 2000 UNITS: 25 TAB at 08:28

## 2024-02-09 RX ADMIN — GABAPENTIN 300 MG: 300 CAPSULE ORAL at 21:31

## 2024-02-09 RX ADMIN — HYDROXYZINE HYDROCHLORIDE 50 MG: 50 TABLET, FILM COATED ORAL at 17:49

## 2024-02-09 RX ADMIN — POLYETHYLENE GLYCOL 3350 17 G: 17 POWDER, FOR SOLUTION ORAL at 08:29

## 2024-02-09 RX ADMIN — HALOPERIDOL 10 MG: 10 TABLET ORAL at 21:31

## 2024-02-09 RX ADMIN — DOCUSATE SODIUM 100 MG: 100 CAPSULE, LIQUID FILLED ORAL at 17:04

## 2024-02-09 RX ADMIN — MELATONIN TAB 3 MG 3 MG: 3 TAB at 21:31

## 2024-02-09 RX ADMIN — DOCUSATE SODIUM 100 MG: 100 CAPSULE, LIQUID FILLED ORAL at 08:29

## 2024-02-09 RX ADMIN — DIVALPROEX SODIUM 1000 MG: 500 TABLET, EXTENDED RELEASE ORAL at 21:31

## 2024-02-09 NOTE — PROGRESS NOTES
"Progress Note - Behavioral Health   Sudhakar Choe 58 y.o. male MRN: [id number]  Unit/Bed#: [facility] 660-02 Encounter: [id number]    Patient was seen today for continuation of care, records reviewed and patient was discussed with the morning case review team.    Sudhakar was seen today for psychiatric follow-up.  On assessment today, Sudhakar was seen lying in bed and remained lying the whole interview, but was calm and comfortable. He reports feeling \"the same\" when asked about his depressive and anxiety symptoms compared to the last two days. He reports feeling the anxiety as tension and rates this a 5/10, with ten being the worst, and gets slightly more tense in the afternoons. He reports his depression as a 5/10, and reports his depressed mood as similar to yesterday. He denied any specific reason for his depressed mood and stated, \"it's just how it is.\" Patient states he still has a strong appetite, but was educated on the importance of adhering to a diabetic diet. He reports his energy is \"good\" and that he slept \"decent\" last night. He denies any other psychiatric complaints at this time when questioned, and shows no signs of EPS on exam. Last depakote level was 84 on 2/4/24, and he received his last Invega Sustenna IM injection on 1/31/24. Patient is tolerating medication regimen and continues on two antipsychotic medications due to multiple failures of monotherapy. Discharge disposition ongoing with an in-person assessment with Alex GERONIMO on Friday 2/16/24.    Sudhakar denies acute suicidal/self-harm ideation/intent/plan upon direct inquiry at this time.  Sudhakar remains behaviorally appropriate, no agitation or aggression noted on exam or in report.  Sudhakar also denies HI/AH/VH, and does not appear overtly manic.  No overt delusions or paranoia are verbalized. Impulse control is intact.  Sudhakar remains adherent to his current psychotropic medication regimen and denies any side effects from medications, as well as " none noted on exam.    Vitals:  Vitals:    02/09/24 0728   BP: 98/55   Pulse: 65   Resp: 16   Temp: 97.9 °F (36.6 °C)   SpO2: 93%       Laboratory Results:  I have personally reviewed all pertinent laboratory/tests results.  CBC:   Lab Results   Component Value Date    WBC 8.07 02/04/2024    RBC 4.48 02/04/2024    HGB 13.9 02/04/2024    HCT 40.8 02/04/2024    MCV 91 02/04/2024     02/04/2024    MCH 31.0 02/04/2024    MCHC 34.1 02/04/2024    RDW 14.1 02/04/2024    MPV 11.0 02/04/2024    NRBC 0 02/04/2024    NEUTROABS 3.14 02/04/2024     CMP:   Lab Results   Component Value Date    SODIUM 136 02/04/2024    K 3.8 02/04/2024     02/04/2024    CO2 26 02/04/2024    AGAP 10 02/04/2024    BUN 10 02/04/2024    CREATININE 0.87 02/04/2024    GLUC 102 02/04/2024    GLUF 109 (H) 01/03/2024    CALCIUM 9.4 02/04/2024    AST 17 02/04/2024    ALT 10 02/04/2024    ALKPHOS 53 02/04/2024    TP 7.3 02/04/2024    ALB 4.2 02/04/2024    TBILI 0.32 02/04/2024    EGFR 95 02/04/2024     Depakote:   Lab Results   Component Value Date    VALPROICTOT 84 02/04/2024       Psychiatric Review of Systems:  Behavior over the last 24 hours:  unchanged.   Sleep: good  Appetite: good  Medication side effects: none  ROS: no complaints, denies shortness of breath or chest pain and all other systems are negative for acute changes    Mental Status Evaluation:  Appearance:  disheveled, looks stated age   Behavior:  cooperative, calm   Speech:  normal rate and volume   Mood:  less irritable, states depressed mood   Affect:  constricted   Thought Process:  linear   Thought Content:  no overt delusions   Perceptual Disturbances: none   Risk Potential: Suicidal ideation - None  Homicidal ideation - None  Potential for aggression - No   Memory:  recent and remote memory grossly intact   Sensorium  person, place, and time/date      Consciousness:  alert and awake   Attention: attention span and concentration are age appropriate   Insight:  limited    Judgment: limited   Gait/Station: normal gait/station   Motor Activity: no abnormal movements   Progress Toward Goals:   Sudhakar is progressing towards goals of inpatient psychiatric treatment by continued medication compliance and is attending therapeutic modalities on the milieu. However, the patient continues to require inpatient psychiatric hospitalization for continued medication management and titration to optimize symptom reduction, improve sleep hygiene, and demonstrate adequate self-care.    Assessment/Plan   Principal Problem:    Bipolar affective disorder, current episode manic with psychotic symptoms (HCC)  Active Problems:    Benign prostatic hyperplasia    Brachial plexus injury, right, sequela    Medical clearance for psychiatric admission    Bilateral lower extremity edema      Recommended Treatment: Treatment plan and medication changes discussed and per the attending physician the plan is:    1.Continue with group therapy, milieu therapy and occupational therapy  2.Behavioral Health checks every 7 minutes  3.Continue frequent safety checks and vitals per unit protocol  4.Continue with SLIM medical management as indicated  5.Continue with current medication regimen  6.Will review labs in the a.m.  7.Disposition Planning: Discharge planning and efforts remain ongoing    Behavioral Health Medications: all current active meds have been reviewed.  Current Facility-Administered Medications   Medication Dose Route Frequency Provider Last Rate    acetaminophen  650 mg Oral Q4H PRN BETO Novak      acetaminophen  650 mg Oral Q4H PRN Reji Looney DO      acetaminophen  975 mg Oral Q6H PRN BETO Novak      cholecalciferol  2,000 Units Oral Daily Rossi Carlson PA-C      divalproex sodium  1,000 mg Oral HS Rory Bunn MD      divalproex sodium  500 mg Oral Daily Rory Bunn MD      docusate sodium  100 mg Oral BID BETO Novak      gabapentin  300 mg Oral HS Rory  MD Oni      haloperidol  10 mg Oral HS Saul Sorensen PA-C      hydrOXYzine HCL  25 mg Oral Q6H PRN Max 4/day Jenn Strickland, CRNP      hydrOXYzine HCL  50 mg Oral Q6H PRN Max 4/day Jenn Strickland, CRNP      LORazepam  1 mg Intramuscular Q6H PRN Max 3/day Jenn Strickland, CRNP      LORazepam  1 mg Oral Q6H PRN Max 3/day Jenn Strickland, CRNP      melatonin  3 mg Oral HS Rory Bunn MD      metFORMIN  500 mg Oral Daily With Breakfast Dorene Hatfield Gita, BETO      mirtazapine  7.5 mg Oral HS PRN Rory Bunn MD      nicotine  1 patch Transdermal Daily PRN Fabiana Sousa MD      OLANZapine  5 mg Intramuscular Q3H PRN Max 3/day Jenn Strikcland, CRNP      OLANZapine  2.5 mg Oral Q4H PRN Max 6/day Jenn Strickland, CRNP      OLANZapine  5 mg Oral Q4H PRN Max 3/day Jenn Strickland, CRNP      OLANZapine  5 mg Oral Q3H PRN Max 3/day Jenn Strickland, CRNP      paliperidone  234 mg IM- Deltoid Q4 weeks Jenn Strickland, CRNP      polyethylene glycol  17 g Oral Daily PRN Jenn Strickland, CRNP      senna-docusate sodium  1 tablet Oral Daily PRN Jenn Strickland, CRNP      tamsulosin  0.4 mg Oral Daily With Dinner Rossi Carlson PA-C      traZODone  100 mg Oral HS PRN Jenn Strickland, CRNP         Risks / Benefits of Treatment:  Risks, benefits, and possible side effects of medications explained to patient. Patient has limited understanding of risks and benefits of treatment at this time, but agrees to take medications as prescribed.    Counseling / Coordination of Care:  Patient's progress reviewed with nursing staff.  Medications, treatment progress and treatment plan reviewed with patient.  Supportive counseling provided to the patient.    Total floor/unit time spent today 25 minutes. Greater than 50% of total time was spent with the patient and / or family counseling and / or coordination of care. A description of the counseling / coordination of care: medication education, treatment plan,  supportive therapy.    This note was completed in part utilizing Dragon dictation Software. Grammatical, translation, syntax errors, random word insertions, spelling mistakes, and incomplete sentences may be an occasional consequence of this system secondary to software limitations with voice recognition, ambient noise, and hardware issues. If you have any questions or concerns about the content, text, or information contained within the body of this dictation, please contact the provider for clarification

## 2024-02-09 NOTE — TREATMENT TEAM
Pt attends group.  Pt clam and cooperative.  Pt initially stated he didn't want to attend group.  Pt did attend and stayed for duration.      02/09/24 1330   Activity/Group Checklist   Group Other (Comment)  (Coping skills and reflection)   Attendance Attended   Attendance Duration (min) 46-60   Interactions Interacted appropriately   Affect/Mood Calm   Goals Achieved Identified feelings;Discussed coping strategies;Able to listen to others;Able to engage in interactions;Able to reflect/comment on own behavior;Able to manage/cope with feelings;Verbalized increased hopefulness;Able to self-disclose;Able to recieve feedback;Able to give feedback to another

## 2024-02-09 NOTE — CASE MANAGEMENT
The patient has an in person assessment with Pamela GERONIMO on Friday 2/16/24 @ 10h.  He remains calm, cooperative, and pleasant in all interactions with CM.

## 2024-02-09 NOTE — PROGRESS NOTES
02/09/24 1100   Activity/Group Checklist   Group Life Skills  (memory game on self care.)   Attendance Attended   Attendance Duration (min) 31-45   Interactions Interacted appropriately  (Pt.quietly alert and able to make many card matches from memory skills.)   Affect/Mood Bright;Appropriate;Normal range   Goals Achieved Able to engage in interactions

## 2024-02-09 NOTE — PLAN OF CARE
Problem: SLEEP DISTURBANCE  Goal: Will exhibit normal sleeping pattern  Description: Interventions:  -  Assess the patients sleep pattern, noting recent changes  - Administer medication as ordered  - Decrease environmental stimuli, including noise, as appropriate during the night  - Encourage the patient to actively participate in unit groups and or exercise during the day to enhance ability to achieve adequate sleep at night  - Assess the patient, in the morning, encouraging a description of sleep experience  Outcome: Progressing     Problem: Risk for Self Injury/Neglect  Goal: Verbalize thoughts and feelings  Description: Interventions:  - Assess and re-assess patient's lethality and potential for self-injury  - Engage patient in 1:1 interactions, daily, for a minimum of 15 minutes  - Encourage patient to express feelings, fears, frustrations, hopes  - Establish rapport/trust with patient   Outcome: Progressing  Goal: Complete daily ADLs, including personal hygiene independently, as able  Description: Interventions:  - Observe, teach, and assist patient with ADLS  - Monitor and promote a balance of rest/activity, with adequate nutrition and elimination  Outcome: Progressing

## 2024-02-09 NOTE — TREATMENT TEAM
"   02/09/24 0738   Team Meeting   Meeting Type Daily Rounds   Initial Conference Date 02/09/24   Team Members Present   Team Members Present Physician;Nurse;;;Occupational Therapist   Physician Team Member Jenn Rene   Nursing Team Member July Kenyon   Care Management Team Member Rossi   Social Work Team Member Tammy ROMAN Team Member Curt   Patient/Family Present   Patient Present No   Patient's Family Present No   Pharmacy: [facility]    The patient is reporting anxiety in the afternoons. Less visible on the unit. The patient reports \"good\" energy. The patient is medication compliant. The patient has an in person assessment with Pamela GERONIMO on Friday 2/16/24 @ 10h. No pending discharge date at this time.    "

## 2024-02-09 NOTE — NURSING NOTE
No behavioral issues noted, patient is calm, visible in the milieu. Patient offers no c/o at the moment, medication, and meals compliant. PRN Miralax requested, effective. No further distress noted.

## 2024-02-09 NOTE — TREATMENT TEAM
02/09/24 1748   Rangel Anxiety Scale   Anxious Mood 3   Tension 3   Fears 3   Insomnia 0   Intellectual 2   Depressed Mood 2   Somatic Complaints: Muscular 0   Somatic Complaints: Sensory 0   Cardiovascular Symptoms 0   Respiratory Symptoms 0   Gastrointestinal Symptoms 0   Genitourinary Symptoms 0   Autonomic Symptoms 2   Behavior at Interview 3   Rangel Anxiety Score 18     Patient requested PRN for anxiety, PRN Atarax given. Will monitor for effectiveness.

## 2024-02-10 PROCEDURE — 99232 SBSQ HOSP IP/OBS MODERATE 35: CPT | Performed by: PSYCHIATRY & NEUROLOGY

## 2024-02-10 RX ADMIN — GABAPENTIN 300 MG: 300 CAPSULE ORAL at 21:21

## 2024-02-10 RX ADMIN — TAMSULOSIN HYDROCHLORIDE 0.4 MG: 0.4 CAPSULE ORAL at 16:51

## 2024-02-10 RX ADMIN — HALOPERIDOL 10 MG: 10 TABLET ORAL at 21:21

## 2024-02-10 RX ADMIN — DOCUSATE SODIUM 100 MG: 100 CAPSULE, LIQUID FILLED ORAL at 17:19

## 2024-02-10 RX ADMIN — DIVALPROEX SODIUM 500 MG: 500 TABLET, EXTENDED RELEASE ORAL at 09:10

## 2024-02-10 RX ADMIN — DOCUSATE SODIUM 100 MG: 100 CAPSULE, LIQUID FILLED ORAL at 09:10

## 2024-02-10 RX ADMIN — CHOLECALCIFEROL TAB 25 MCG (1000 UNIT) 2000 UNITS: 25 TAB at 09:10

## 2024-02-10 RX ADMIN — METFORMIN HYDROCHLORIDE 500 MG: 500 TABLET, FILM COATED ORAL at 09:10

## 2024-02-10 RX ADMIN — DIVALPROEX SODIUM 1000 MG: 500 TABLET, EXTENDED RELEASE ORAL at 21:21

## 2024-02-10 RX ADMIN — MELATONIN TAB 3 MG 3 MG: 3 TAB at 21:22

## 2024-02-10 NOTE — NURSING NOTE
Pt visible throughout milieu this shift. Pt pleasant on approach. Pt reported anxiety earlier in shift that was effectively relieved by PRN atarax. Pt denies all other psych symptoms. Pt compliant with medications. Pt currently resting in bed with even, unlabored respirations. Pt able to and encouraged to inform staff of any needs.

## 2024-02-10 NOTE — PLAN OF CARE
Problem: PSYCHOSIS  Goal: Will report no hallucinations or delusions  Description: Interventions:  - Administer medication as  ordered  - Every waking shifts and PRN assess for the presence of hallucinations and or delusions  - Assist with reality testing to support increasing orientation  - Assess if patient's hallucinations or delusions are encouraging self-harm or harm to others and intervene as appropriate  Outcome: Progressing     Problem: SLEEP DISTURBANCE  Goal: Will exhibit normal sleeping pattern  Description: Interventions:  -  Assess the patients sleep pattern, noting recent changes  - Administer medication as ordered  - Decrease environmental stimuli, including noise, as appropriate during the night  - Encourage the patient to actively participate in unit groups and or exercise during the day to enhance ability to achieve adequate sleep at night  - Assess the patient, in the morning, encouraging a description of sleep experience  Outcome: Progressing     Problem: Risk for Self Injury/Neglect  Goal: Verbalize thoughts and feelings  Description: Interventions:  - Assess and re-assess patient's lethality and potential for self-injury  - Engage patient in 1:1 interactions, daily, for a minimum of 15 minutes  - Encourage patient to express feelings, fears, frustrations, hopes  - Establish rapport/trust with patient   Outcome: Progressing  Goal: Refrain from harming self  Description: Interventions:  - Monitor patient closely, per order  - Develop a trusting relationship  - Supervise medication ingestion, monitor effects and side effects   Outcome: Progressing     Problem: Alteration in Orientation  Goal: Allow medical examinations, as recommended  Description: Interventions:  - Provide physical/neurological exams and/or referrals, per provider   Outcome: Progressing

## 2024-02-10 NOTE — NURSING NOTE
Patient alert, pleasant and cooperative. Intermittently visible in the milieu. Preoccupied with talking to the doctor. Reassurance given. Medication and meal compliant. Endorses anxiety/depression, denies SI/HI, AH/VH.

## 2024-02-10 NOTE — PROGRESS NOTES
Progress Note - Behavioral Health     Sudhakar Choe 58 y.o. male MRN: [id number]   Unit/Bed#: [facility] 660-02 Encounter: [id number]    Behavior over the last 24 hours: unchanged.     Sudhakar is a 58-year-old male diagnosed with bipolar affective disorder, currently manic with psychosis seen today in follow-up.  Per staff, has been generally more cooperative and pleasant without any significant behavioral issues noted.  Presently, he is seen resting in bed comfortably without any signs of distress.  He reports some increased depression today after an argument with his girlfriend which she was hesitant to discuss.  Otherwise he is trying to remain optimistic, is hopeful that they will be able to resolve their issues.  Feels safe on unit and is able to contract for safety.  Denies SI/HI/AVH.  Tolerating medications.    Sleep: normal  Appetite: normal  Medication side effects: No   ROS: no complaints, all other systems are negative    Mental Status Evaluation:    Appearance:  age appropriate, casually dressed, dressed appropriately   Behavior:  pleasant, cooperative, calm   Speech:  scant   Mood:  depressed   Affect:  flat   Thought Process:  linear   Associations: intact associations   Thought Content:  negative thoughts, ruminations   Perceptual Disturbances: no auditory hallucinations, no visual hallucinations   Risk Potential: Suicidal ideation - None at present  Homicidal ideation - None at present  Potential for aggression - No   Sensorium:  oriented to person, place, and time/date   Memory:  recent and remote memory grossly intact   Consciousness:  alert and awake   Attention/Concentration: attention span and concentration are age appropriate   Insight:  limited   Judgment: limited   Gait/Station: in bed   Motor Activity: no abnormal movements     Vital signs in last 24 hours:    Temp:  [97.8 °F (36.6 °C)-98.5 °F (36.9 °C)] 98.5 °F (36.9 °C)  HR:  [83-98] 83  Resp:  [16-18] 16  BP: ()/(52-73)  90/52    Laboratory results: I have personally reviewed all pertinent laboratory/tests results    Results from the past 24 hours: No results found for this or any previous visit (from the past 24 hour(s)).  Most Recent Labs:   Lab Results   Component Value Date    WBC 8.07 02/04/2024    RBC 4.48 02/04/2024    HGB 13.9 02/04/2024    HCT 40.8 02/04/2024     02/04/2024    RDW 14.1 02/04/2024    NEUTROABS 3.14 02/04/2024    SODIUM 136 02/04/2024    K 3.8 02/04/2024     02/04/2024    CO2 26 02/04/2024    BUN 10 02/04/2024    CREATININE 0.87 02/04/2024    GLUC 102 02/04/2024    CALCIUM 9.4 02/04/2024    AST 17 02/04/2024    ALT 10 02/04/2024    ALKPHOS 53 02/04/2024    TP 7.3 02/04/2024    ALB 4.2 02/04/2024    TBILI 0.32 02/04/2024    CHOLESTEROL 124 01/23/2024    HDL 28 (L) 01/23/2024    TRIG 116 01/23/2024    LDLCALC 73 01/23/2024    NONHDLC 96 01/23/2024    VALPROICTOT 84 02/04/2024    [id number] 1.431 12/30/2023    HGBA1C 6.2 (H) 01/23/2024     01/23/2024       Progress Toward Goals: progressing    Assessment/Plan   Principal Problem:    Bipolar affective disorder, current episode manic with psychotic symptoms (HCC)  Active Problems:    Benign prostatic hyperplasia    Brachial plexus injury, right, sequela    Medical clearance for psychiatric admission    Bilateral lower extremity edema      Recommended Treatment:     Planned medication and treatment changes:    All current active medications have been reviewed  Encourage group therapy, milieu therapy and occupational therapy  Behavioral Health checks every 7 minutes    Continue with current medications and therapy for now    Current Facility-Administered Medications   Medication Dose Route Frequency Provider Last Rate    acetaminophen  650 mg Oral Q4H PRN BETO Novak      acetaminophen  650 mg Oral Q4H PRN Reji Looney DO      acetaminophen  975 mg Oral Q6H PRN BETO Novak      cholecalciferol  2,000 Units Oral Daily  Rossi Carlson PA-C      divalproex sodium  1,000 mg Oral HS Rory Bunn MD      divalproex sodium  500 mg Oral Daily Rory Bunn MD      docusate sodium  100 mg Oral BID Jenn Strickland, BETO      gabapentin  300 mg Oral HS Rory Bunn MD      haloperidol  10 mg Oral HS Saul Sorensen PA-C      hydrOXYzine HCL  25 mg Oral Q6H PRN Max 4/day Jenn Strickland, CRROBERT      hydrOXYzine HCL  50 mg Oral Q6H PRN Max 4/day Jenn Strickland, CRNP      LORazepam  1 mg Intramuscular Q6H PRN Max 3/day Jenn Strickland, CRNP      LORazepam  1 mg Oral Q6H PRN Max 3/day Jenn Strickland, BETO      melatonin  3 mg Oral HS Rory Bunn MD      metFORMIN  500 mg Oral Daily With Breakfast Dorene Pelayo, BETO      mirtazapine  7.5 mg Oral HS PRN Rory Bunn MD      nicotine  1 patch Transdermal Daily PRN Fabiana Sousa MD      OLANZapine  5 mg Intramuscular Q3H PRN Max 3/day Jenn Strickland, CRNP      OLANZapine  2.5 mg Oral Q4H PRN Max 6/day Jenn Strickland, CRNP      OLANZapine  5 mg Oral Q4H PRN Max 3/day Jenn Strickland, CRNP      OLANZapine  5 mg Oral Q3H PRN Max 3/day Jenn Strickland, CRNP      paliperidone  234 mg IM- Deltoid Q4 weeks Jenn Strickland, BETO      polyethylene glycol  17 g Oral Daily PRN Jenn Strickland, BETO      senna-docusate sodium  1 tablet Oral Daily PRN Jenn Strickland, BETO      tamsulosin  0.4 mg Oral Daily With Dinner Rossi Carlson PA-C      traZODone  100 mg Oral HS PRN Jenn Strickland, BETO       Risks / Benefits of Treatment:    Risks, benefits, and possible side effects of medications explained to patient and patient verbalizes understanding and agreement for treatment.    Counseling / Coordination of Care:    Total floor / unit time spent today 20 minutes. Greater than 50% of total time was spent with the patient and / or family counseling and / or coordination of care. A description of counseling / coordination of care:  Patient's progress discussed with staff in  treatment team meeting.  Medications, treatment progress and treatment plan reviewed with patient.    Suzanna Covarrubias PA-C 02/10/24

## 2024-02-11 PROCEDURE — 99232 SBSQ HOSP IP/OBS MODERATE 35: CPT | Performed by: PSYCHIATRY & NEUROLOGY

## 2024-02-11 RX ADMIN — HALOPERIDOL 10 MG: 10 TABLET ORAL at 21:32

## 2024-02-11 RX ADMIN — MELATONIN TAB 3 MG 3 MG: 3 TAB at 21:32

## 2024-02-11 RX ADMIN — METFORMIN HYDROCHLORIDE 500 MG: 500 TABLET, FILM COATED ORAL at 08:19

## 2024-02-11 RX ADMIN — DOCUSATE SODIUM 100 MG: 100 CAPSULE, LIQUID FILLED ORAL at 17:06

## 2024-02-11 RX ADMIN — CHOLECALCIFEROL TAB 25 MCG (1000 UNIT) 2000 UNITS: 25 TAB at 08:19

## 2024-02-11 RX ADMIN — TAMSULOSIN HYDROCHLORIDE 0.4 MG: 0.4 CAPSULE ORAL at 17:06

## 2024-02-11 RX ADMIN — DOCUSATE SODIUM 100 MG: 100 CAPSULE, LIQUID FILLED ORAL at 08:19

## 2024-02-11 RX ADMIN — DIVALPROEX SODIUM 1000 MG: 500 TABLET, EXTENDED RELEASE ORAL at 21:32

## 2024-02-11 RX ADMIN — DIVALPROEX SODIUM 500 MG: 500 TABLET, EXTENDED RELEASE ORAL at 08:19

## 2024-02-11 RX ADMIN — GABAPENTIN 300 MG: 300 CAPSULE ORAL at 21:32

## 2024-02-11 RX ADMIN — HYDROXYZINE HYDROCHLORIDE 50 MG: 50 TABLET, FILM COATED ORAL at 19:45

## 2024-02-11 NOTE — NURSING NOTE
Patient alert, pleasant and cooperative. Visible in the milieu with selected peers interactions. Brightens on approach, medication and meal compliant. No c/o of pain. VSS. Endorses anxiety/depression, denies SI/HI, AH/VH

## 2024-02-11 NOTE — PROGRESS NOTES
Psychiatric Progress Note - Department of Behavioral Health   Sudhakar Choe 58 y.o. male MRN: [id number]  Unit/Bed#: [facility] 660-02 Encounter: [id number]    ASSESSMENT & PLAN     Diagnoses:   Principal Problem:    Bipolar affective disorder, current episode manic with psychotic symptoms (HCC)  Active Problems:    Benign prostatic hyperplasia    Brachial plexus injury, right, sequela    Medical clearance for psychiatric admission    Bilateral lower extremity edema      Treatment Recommendations/Precautions:  Continue to promote patient participation in therapeutic milieu.  Continue medical management per medicine.  Continue previously prescribed psychotropic medication regimen; see below.  Continue behavioral health checks q.7 minutes.   Continue vitals per behavioral health unit protocol.  Discharge date per primary team; 304 commitment status.    SUBJECTIVE     Patient evaluated this a.m. for continuity of care. Patient was discussed at length with nursing and treatment team. Per nursing, patient remains calm and cooperative, intermittently interactive in the milieu, adherent to his medications without any acute adverse effects. No acute distress is noted throughout evaluation. Sudhakar Choe denies suicidal/homicidal ideation in addition to thoughts of self-injury, receptive to crisis planning provided by this writer, contacting for safety in the inpatient setting, admitting to an ability to appropriately confide in staff including this writer.  Patient appears scant, sparse, minimally interactive involving this writer, denying any/all psychiatric complaints/concerns despite appearing somewhat restricted in affect, possessing previously complaints pertaining to depression related to psychosocial stressors.    PSYCHIATRIC REVIEW OF SYSTEMS     Behavior over the last 24 hours:  unchanged  Sleep: adequate  Appetite: adequate  Medication side effects: No    REVIEW OF SYSTEMS   Review of systems: no  "complaints    OBJECTIVE     Vital Signs in Past 24 Hours:  Temp:  [97.6 °F (36.4 °C)-98.2 °F (36.8 °C)] 97.6 °F (36.4 °C)  HR:  [] 104  Resp:  [16-18] 17  BP: ()/(52-67) 101/67    Intake/Output in Past 24 hours:  I/O last 3 completed shifts:  In: 1520 [P.O.:1520]  Out: -   No intake/output data recorded.        Laboratory Results:  I have personally reviewed all pertinent laboratory/tests results.  Most Recent Labs:   Lab Results   Component Value Date    WBC 8.07 02/04/2024    RBC 4.48 02/04/2024    HGB 13.9 02/04/2024    HCT 40.8 02/04/2024     02/04/2024    RDW 14.1 02/04/2024    NEUTROABS 3.14 02/04/2024    SODIUM 136 02/04/2024    K 3.8 02/04/2024     02/04/2024    CO2 26 02/04/2024    BUN 10 02/04/2024    CREATININE 0.87 02/04/2024    GLUC 102 02/04/2024    GLUF 109 (H) 01/03/2024    CALCIUM 9.4 02/04/2024    AST 17 02/04/2024    ALT 10 02/04/2024    ALKPHOS 53 02/04/2024    TP 7.3 02/04/2024    ALB 4.2 02/04/2024    TBILI 0.32 02/04/2024    CHOLESTEROL 124 01/23/2024    HDL 28 (L) 01/23/2024    TRIG 116 01/23/2024    LDLCALC 73 01/23/2024    NONHDLC 96 01/23/2024    VALPROICTOT 84 02/04/2024    [id number] 1.431 12/30/2023    RPR Non-Reactive 03/26/2021    HGBA1C 6.2 (H) 01/23/2024     01/23/2024     Labs in last 72 hours: No results for input(s): \"WBC\", \"RBC\", \"HGB\", \"HCT\", \"PLT\", \"RDW\", \"TOTANEUTABS\", \"NEUTROABS\", \"SODIUM\", \"K\", \"CL\", \"CO2\", \"BUN\", \"CREATININE\", \"GLUC\", \"GLUF\", \"CALCIUM\", \"AST\", \"ALT\", \"ALKPHOS\", \"TP\", \"ALB\", \"TBILI\", \"CHOLESTEROL\", \"HDL\", \"TRIG\", \"LDLCALC\", \"VALPROICTOT\", \"CARBAMAZEPIN\", \"LITHIUM\", \"AMMONIA\", \"PLD9NOEWRBSF\", \"FREET4\", \"T3FREE\", \"PREGTESTUR\", \"PREGSERUM\", \"HCG\", \"HCGQUANT\", \"RPR\" in the last 72 hours.  Admission Labs:   Admission on 12/29/2023   Component Date Value    Sodium 12/30/2023 138     Potassium 12/30/2023 3.8     Chloride 12/30/2023 104     CO2 12/30/2023 25     ANION GAP 12/30/2023 9     BUN 12/30/2023 7     Creatinine 12/30/2023 " 0.74     Glucose 12/30/2023 87     Glucose, Fasting 12/30/2023 87     Calcium 12/30/2023 8.5     AST 12/30/2023 43 (H)     ALT 12/30/2023 27     Alkaline Phosphatase 12/30/2023 60     Total Protein 12/30/2023 6.3 (L)     Albumin 12/30/2023 3.8     Total Bilirubin 12/30/2023 0.31     eGFR 12/30/2023 101     Magnesium 12/30/2023 2.0     Phosphorus 12/30/2023 3.5     WBC 12/30/2023 5.47     RBC 12/30/2023 4.12     Hemoglobin 12/30/2023 12.6     Hematocrit 12/30/2023 38.3     MCV 12/30/2023 93     MCH 12/30/2023 30.6     MCHC 12/30/2023 32.9     RDW 12/30/2023 13.2     MPV 12/30/2023 10.8     Platelets 12/30/2023 269     nRBC 12/30/2023 0     Neutrophils Relative 12/30/2023 47     Immat GRANS % 12/30/2023 0     Lymphocytes Relative 12/30/2023 37     Monocytes Relative 12/30/2023 12     Eosinophils Relative 12/30/2023 3     Basophils Relative 12/30/2023 1     Neutrophils Absolute 12/30/2023 2.60     Immature Grans Absolute 12/30/2023 0.01     Lymphocytes Absolute 12/30/2023 2.01     Monocytes Absolute 12/30/2023 0.68     Eosinophils Absolute 12/30/2023 0.14     Basophils Absolute 12/30/2023 0.03     TSH 3RD GENERATON 12/30/2023 1.431     Vitamin B-12 12/30/2023 621     Folate 12/30/2023 9.8     Vit D, 25-Hydroxy 12/30/2023 30.6     Ventricular Rate 12/30/2023 101     Atrial Rate 12/30/2023 101     MA Interval 12/30/2023 210     QRSD Interval 12/30/2023 78     QT Interval 12/30/2023 354     QTC Interval 12/30/2023 459     P Axis 12/30/2023 60     QRS Axis 12/30/2023 65     T Wave Coila 12/30/2023 40     Ventricular Rate 12/30/2023 99     Atrial Rate 12/30/2023 99     MA Interval 12/30/2023 198     QRSD Interval 12/30/2023 84     QT Interval 12/30/2023 356     QTC Interval 12/30/2023 456     P Axis 12/30/2023 58     QRS Coila 12/30/2023 53     T Wave Coila 12/30/2023 38     Valproic Acid, Total 01/03/2024 60     Sodium 01/03/2024 137     Potassium 01/03/2024 4.0     Chloride 01/03/2024 101     CO2 01/03/2024 24     ANION GAP  01/03/2024 12     BUN 01/03/2024 8     Creatinine 01/03/2024 0.78     Glucose 01/03/2024 109     Glucose, Fasting 01/03/2024 109 (H)     Calcium 01/03/2024 8.5     AST 01/03/2024 23     ALT 01/03/2024 18     Alkaline Phosphatase 01/03/2024 63     Total Protein 01/03/2024 6.8     Albumin 01/03/2024 3.8     Total Bilirubin 01/03/2024 0.33     eGFR 01/03/2024 99     Valproic Acid, Total 01/07/2024 76     SARS-CoV-2 01/13/2024 Positive (A)     INFLUENZA A PCR 01/13/2024 Negative     INFLUENZA B PCR 01/13/2024 Negative     RSV PCR 01/13/2024 Negative     Cholesterol 01/23/2024 124     Triglycerides 01/23/2024 116     HDL, Direct 01/23/2024 28 (L)     LDL Calculated 01/23/2024 73     Non-HDL-Chol (CHOL-HDL) 01/23/2024 96     Hemoglobin A1C 01/23/2024 6.2 (H)     EAG 01/23/2024 131     Valproic Acid, Total 01/25/2024 79     Sodium 01/25/2024 136     Potassium 01/25/2024 3.8     Chloride 01/25/2024 95 (L)     CO2 01/25/2024 32     ANION GAP 01/25/2024 9     BUN 01/25/2024 12     Creatinine 01/25/2024 1.00     Glucose 01/25/2024 90     Calcium 01/25/2024 9.7     AST 01/25/2024 18     ALT 01/25/2024 10     Alkaline Phosphatase 01/25/2024 56     Total Protein 01/25/2024 7.1     Albumin 01/25/2024 4.0     Total Bilirubin 01/25/2024 0.33     eGFR 01/25/2024 82     WBC 02/04/2024 8.07     RBC 02/04/2024 4.48     Hemoglobin 02/04/2024 13.9     Hematocrit 02/04/2024 40.8     MCV 02/04/2024 91     MCH 02/04/2024 31.0     MCHC 02/04/2024 34.1     RDW 02/04/2024 14.1     MPV 02/04/2024 11.0     Platelets 02/04/2024 198     nRBC 02/04/2024 0     Neutrophils Relative 02/04/2024 39 (L)     Immat GRANS % 02/04/2024 0     Lymphocytes Relative 02/04/2024 40     Monocytes Relative 02/04/2024 16 (H)     Eosinophils Relative 02/04/2024 4     Basophils Relative 02/04/2024 1     Neutrophils Absolute 02/04/2024 3.14     Immature Grans Absolute 02/04/2024 0.01     Lymphocytes Absolute 02/04/2024 3.20     Monocytes Absolute 02/04/2024 1.31 (H)      Eosinophils Absolute 02/04/2024 0.35     Basophils Absolute 02/04/2024 0.06     Sodium 02/04/2024 136     Potassium 02/04/2024 3.8     Chloride 02/04/2024 100     CO2 02/04/2024 26     ANION GAP 02/04/2024 10     BUN 02/04/2024 10     Creatinine 02/04/2024 0.87     Glucose 02/04/2024 102     Calcium 02/04/2024 9.4     AST 02/04/2024 17     ALT 02/04/2024 10     Alkaline Phosphatase 02/04/2024 53     Total Protein 02/04/2024 7.3     Albumin 02/04/2024 4.2     Total Bilirubin 02/04/2024 0.32     eGFR 02/04/2024 95     Valproic Acid, Total 02/04/2024 84        Behavioral Health Medications: all current active meds have been reviewed, continue current psychiatric medications, and current meds:   Current Facility-Administered Medications   Medication Dose Route Frequency    acetaminophen (TYLENOL) tablet 650 mg  650 mg Oral Q4H PRN    acetaminophen (TYLENOL) tablet 650 mg  650 mg Oral Q4H PRN    acetaminophen (TYLENOL) tablet 975 mg  975 mg Oral Q6H PRN    cholecalciferol (VITAMIN D3) tablet 2,000 Units  2,000 Units Oral Daily    divalproex sodium (DEPAKOTE ER) 24 hr tablet 1,000 mg  1,000 mg Oral HS    divalproex sodium (DEPAKOTE ER) 24 hr tablet 500 mg  500 mg Oral Daily    docusate sodium (COLACE) capsule 100 mg  100 mg Oral BID    gabapentin (NEURONTIN) capsule 300 mg  300 mg Oral HS    haloperidol (HALDOL) tablet 10 mg  10 mg Oral HS    hydrOXYzine HCL (ATARAX) tablet 25 mg  25 mg Oral Q6H PRN Max 4/day    hydrOXYzine HCL (ATARAX) tablet 50 mg  50 mg Oral Q6H PRN Max 4/day    LORazepam (ATIVAN) injection 1 mg  1 mg Intramuscular Q6H PRN Max 3/day    LORazepam (ATIVAN) tablet 1 mg  1 mg Oral Q6H PRN Max 3/day    melatonin tablet 3 mg  3 mg Oral HS    metFORMIN (GLUCOPHAGE) tablet 500 mg  500 mg Oral Daily With Breakfast    mirtazapine (REMERON) tablet 7.5 mg  7.5 mg Oral HS PRN    nicotine (NICODERM CQ) 21 mg/24 hr TD 24 hr patch 1 patch  1 patch Transdermal Daily PRN    OLANZapine (ZyPREXA) IM injection 5 mg  5 mg  "Intramuscular Q3H PRN Max 3/day    OLANZapine (ZyPREXA) tablet 2.5 mg  2.5 mg Oral Q4H PRN Max 6/day    OLANZapine (ZyPREXA) tablet 5 mg  5 mg Oral Q4H PRN Max 3/day    OLANZapine (ZyPREXA) tablet 5 mg  5 mg Oral Q3H PRN Max 3/day    paliperidone palmitate ER (INVEGA) IM- Deltoid injection 234 mg  234 mg IM- Deltoid Q4 weeks    polyethylene glycol (MIRALAX) packet 17 g  17 g Oral Daily PRN    senna-docusate sodium (SENOKOT S) 8.6-50 mg per tablet 1 tablet  1 tablet Oral Daily PRN    tamsulosin (FLOMAX) capsule 0.4 mg  0.4 mg Oral Daily With Dinner    traZODone (DESYREL) tablet 100 mg  100 mg Oral HS PRN   .    Risks, benefits and possible side effects of Medications:   Risks, benefits, and possible side effects of medications explained to patient and patient verbalizes understanding.      Mental Status Evaluation:  Appearance:  age appropriate, bearded, casually dressed, and possessing appropriate grooming/hygiene, slightly disheveled with an Ace wrap around his forearm/wrist   Behavior:  Cooperative although slightly superficial possessing appropriate eye contact   Speech:  normal pitch and normal volume   Mood:  euthymic; \"OK\"   Affect:  constricted and mood-incongruent   Language naming objects and repeating phrases   Thought Process:  goal directed and linear   Thought Content:  no possessions or delusions, negative thinking   Perceptual Disturbances: None   Risk Potential: Suicidal Ideations none, Homicidal Ideations none, and Potential for Aggression No   Sensorium:  person, place, and time/date   Cognition:  recent and remote memory grossly intact   Consciousness:  alert and awake    Attention: attention span appeared shorter than expected for age   Insight:  limited   Judgment: limited   Intellect Not assessed   Gait/Station: normal gait/station   Motor Activity: no abnormal movements     Memory: Short and long term memory  fair     Progress Toward Goals: unchanged, as evidenced by their participation (or " lack thereof) in individual, social and therapeutic milieu in addition to adherence to their medication regimen.    Recommended Treatment:   See above for assessment and plan.    Inpatient Psychiatric Certification: Based upon physical, mental and social evaluations, I certify that inpatient psychiatric services are medically necessary for this patient for a duration of greater than 2 midnights for the treatment of Bipolar affective disorder, current episode manic with psychotic symptoms (HCC) including psychotropic medication management, participation in the therapeutic milieu and referrals as indicated. Available alternative community resources do not meet the patient's mental health care needs. I further attest that an established written individualized plan of care has been implemented and is outlined in the patient's medical records.    Counseling/Coordination of Care    Total unit time spent today was greater than 15 minutes. Greater than 50% of total time was spent with the patient and/or patient's relatives and/or coordination of patient's care.     Patient's rights, confidentiality, exceptions to confidentiality, use of electronic medical record including appropriate staff access to medical record regarding behavioral health services and consent to treatment were reviewed.    Note Share:     This note was not shared with the patient due to reasonable likelihood of causing patient harm     This note has been constructed using a voice recognition system.    There may be translation, syntax, or grammatical errors. If you have any questions, please contact the dictating provider.    Jordan Christopher Holter, DO 02/11/24

## 2024-02-12 PROCEDURE — 99232 SBSQ HOSP IP/OBS MODERATE 35: CPT | Performed by: STUDENT IN AN ORGANIZED HEALTH CARE EDUCATION/TRAINING PROGRAM

## 2024-02-12 RX ORDER — GABAPENTIN 100 MG/1
100 CAPSULE ORAL DAILY
Status: DISCONTINUED | OUTPATIENT
Start: 2024-02-12 | End: 2024-02-14

## 2024-02-12 RX ADMIN — GABAPENTIN 300 MG: 300 CAPSULE ORAL at 21:35

## 2024-02-12 RX ADMIN — TAMSULOSIN HYDROCHLORIDE 0.4 MG: 0.4 CAPSULE ORAL at 17:06

## 2024-02-12 RX ADMIN — CHOLECALCIFEROL TAB 25 MCG (1000 UNIT) 2000 UNITS: 25 TAB at 08:18

## 2024-02-12 RX ADMIN — HALOPERIDOL 10 MG: 10 TABLET ORAL at 21:35

## 2024-02-12 RX ADMIN — DIVALPROEX SODIUM 1000 MG: 500 TABLET, EXTENDED RELEASE ORAL at 21:35

## 2024-02-12 RX ADMIN — GABAPENTIN 100 MG: 100 CAPSULE ORAL at 13:02

## 2024-02-12 RX ADMIN — MELATONIN TAB 3 MG 3 MG: 3 TAB at 21:35

## 2024-02-12 RX ADMIN — DIVALPROEX SODIUM 500 MG: 500 TABLET, EXTENDED RELEASE ORAL at 08:18

## 2024-02-12 RX ADMIN — DOCUSATE SODIUM 100 MG: 100 CAPSULE, LIQUID FILLED ORAL at 08:18

## 2024-02-12 RX ADMIN — METFORMIN HYDROCHLORIDE 500 MG: 500 TABLET, FILM COATED ORAL at 08:18

## 2024-02-12 RX ADMIN — DOCUSATE SODIUM 100 MG: 100 CAPSULE, LIQUID FILLED ORAL at 17:06

## 2024-02-12 NOTE — PROGRESS NOTES
"Progress Note - Behavioral Health   Sudhakar Choe 58 y.o. male MRN: [id number]  Unit/Bed#: [facility] 660-02 Encounter: [id number]    Patient was seen today for continuation of care, records reviewed and patient was discussed with the morning case review team.    Sudhakar was seen today for psychiatric follow-up.  On assessment today, Sudhakar was pleasant.    Patient continues to be in good behavioral control.  Directly questioned about mood, patient reports that he is doing \"okay\".  Tolerating all medication changes well, patient reports no adverse effects.  Mild anxiety reported in the afternoon, we will implement gabapentin 100 mg during the day to assist with symptoms.  Discharge disposition ongoing as patient continues to await placement and [facility]    Sudhakar denies acute suicidal/self-harm ideation/intent/plan upon direct inquiry at this time.  Sudhakar remains behaviorally appropriate, no agitation or aggression noted on exam or in report.  Sudhakar also denies HI/AH/VH, and does not appear overtly manic.  No overt delusions or paranoia are verbalized. Impulse control is intact.  Sudhakar remains adherent to his current psychotropic medication regimen and denies any side effects from medications, as well as none noted on exam.    Vitals:  Vitals:    02/12/24 0802   BP: 111/68   Pulse: 83   Resp: 18   Temp: 97.6 °F (36.4 °C)   SpO2: 96%       Laboratory Results:  I have personally reviewed all pertinent laboratory/tests results.  Most Recent Labs:   Lab Results   Component Value Date    WBC 8.07 02/04/2024    RBC 4.48 02/04/2024    HGB 13.9 02/04/2024    HCT 40.8 02/04/2024     02/04/2024    RDW 14.1 02/04/2024    NEUTROABS 3.14 02/04/2024    SODIUM 136 02/04/2024    K 3.8 02/04/2024     02/04/2024    CO2 26 02/04/2024    BUN 10 02/04/2024    CREATININE 0.87 02/04/2024    GLUC 102 02/04/2024    GLUF 109 (H) 01/03/2024    CALCIUM 9.4 02/04/2024    AST 17 02/04/2024    ALT 10 02/04/2024    ALKPHOS 53 " 02/04/2024    TP 7.3 02/04/2024    ALB 4.2 02/04/2024    TBILI 0.32 02/04/2024    CHOLESTEROL 124 01/23/2024    HDL 28 (L) 01/23/2024    TRIG 116 01/23/2024    LDLCALC 73 01/23/2024    NONHDLC 96 01/23/2024    VALPROICTOT 84 02/04/2024    [id number] 1.431 12/30/2023    RPR Non-Reactive 03/26/2021    HGBA1C 6.2 (H) 01/23/2024     01/23/2024       Psychiatric Review of Systems:  Behavior over the last 24 hours:  unchanged.   Sleep: good  Appetite: good  Medication side effects: none  ROS: no complaints, denies shortness of breath or chest pain and all other systems are negative for acute changes    Mental Status Evaluation:  Appearance:  adequate grooming   Behavior:  cooperative, calm   Speech:  normal rate and volume   Mood:  slightly more depressed   Affect:  constricted   Thought Process:  linear   Thought Content:  no overt delusions   Perceptual Disturbances: denies auditory hallucinations when asked, does not appear responding to internal stimuli   Risk Potential: Suicidal ideation - None  Homicidal ideation - None  Potential for aggression - No   Memory:  recent and remote memory grossly intact   Sensorium  person, place, and time/date      Consciousness:  alert and awake   Attention: attention span and concentration are age appropriate   Insight:  limited   Judgment: limited   Gait/Station: normal gait/station   Motor Activity: no abnormal movements   Progress Toward Goals:   Sudhakar is progressing towards goals of inpatient psychiatric treatment by continued medication compliance and is attending therapeutic modalities on the milieu. However, the patient continues to require inpatient psychiatric hospitalization for continued medication management and titration to optimize symptom reduction, improve sleep hygiene, and demonstrate adequate self-care.    Assessment/Plan   Principal Problem:    Bipolar affective disorder, current episode manic with psychotic symptoms (HCC)  Active Problems:    Benign  prostatic hyperplasia    Brachial plexus injury, right, sequela    Medical clearance for psychiatric admission    Bilateral lower extremity edema      Recommended Treatment: Treatment plan and medication changes discussed and per the attending physician the plan is:    1.Continue with group therapy, milieu therapy and occupational therapy  2.Behavioral Health checks every 7 minutes  3.Continue frequent safety checks and vitals per unit protocol  4.Continue with [facility] medical management as indicated  5.Continue with current medication regimen  6.Will review labs in the a.m.  7.Disposition Planning: Discharge planning and efforts remain ongoing    Behavioral Health Medications: all current active meds have been reviewed and continue current psychiatric medications.  Current Facility-Administered Medications   Medication Dose Route Frequency Provider Last Rate    acetaminophen  650 mg Oral Q4H PRN Jenn Strickland, CRNP      acetaminophen  650 mg Oral Q4H PRN Reji Looney DO      acetaminophen  975 mg Oral Q6H PRN Jenn Strickland, BETO      cholecalciferol  2,000 Units Oral Daily Rossi Carlson PA-C      divalproex sodium  1,000 mg Oral HS Rory Bunn MD      divalproex sodium  500 mg Oral Daily Rory Bunn MD      docusate sodium  100 mg Oral BID Jenn Strickland, LYLANP      gabapentin  300 mg Oral HS Rory Bunn MD      haloperidol  10 mg Oral HS Saul Sorensen PA-C      hydrOXYzine HCL  25 mg Oral Q6H PRN Max 4/day Jenn Strickland, CRNP      hydrOXYzine HCL  50 mg Oral Q6H PRN Max 4/day Jenn Strickland, BETO      LORazepam  1 mg Intramuscular Q6H PRN Max 3/day Jenn Strickland, LYLANP      LORazepam  1 mg Oral Q6H PRN Max 3/day BETO Novak      melatonin  3 mg Oral HS Rory Bunn MD      metFORMIN  500 mg Oral Daily With Breakfast BETO Garcia      mirtazapine  7.5 mg Oral HS PRN Rory Bunn MD      nicotine  1 patch Transdermal Daily PRN Fabiana Sousa MD       OLANZapine  5 mg Intramuscular Q3H PRN Max 3/day Jenn Strickland, BETO      OLANZapine  2.5 mg Oral Q4H PRN Max 6/day Jenn Strickland, CRNP      OLANZapine  5 mg Oral Q4H PRN Max 3/day Jenn Strickland, CRNP      OLANZapine  5 mg Oral Q3H PRN Max 3/day Jenn Strickland, CRNP      paliperidone  234 mg IM- Deltoid Q4 weeks Jenn Strickland, BETO      polyethylene glycol  17 g Oral Daily PRN Jenn Strickland, BETO      senna-docusate sodium  1 tablet Oral Daily PRN Jenn Strickland, BETO      tamsulosin  0.4 mg Oral Daily With Dinner Rossi Carlson PA-C      traZODone  100 mg Oral HS PRN Jenn Strickland, BETO         Risks / Benefits of Treatment:  Risks, benefits, and possible side effects of medications explained to patient and patient verbalizes understanding and agreement for treatment.    Counseling / Coordination of Care:  Patient's progress reviewed with nursing staff.  Medications, treatment progress and treatment plan reviewed with patient.  Supportive counseling provided to the patient.    Total floor/unit time spent today 25 minutes. Greater than 50% of total time was spent with the patient and / or family counseling and / or coordination of care. A description of the counseling / coordination of care: medication education, treatment plan, supportive therapy.    This note was completed in part utilizing Dragon dictation Software. Grammatical, translation, syntax errors, random word insertions, spelling mistakes, and incomplete sentences may be an occasional consequence of this system secondary to software limitations with voice recognition, ambient noise, and hardware issues. If you have any questions or concerns about the content, text, or information contained within the body of this dictation, please contact the provider for clarification

## 2024-02-12 NOTE — TREATMENT TEAM
02/12/24 0736   Team Meeting   Meeting Type Daily Rounds   Initial Conference Date 02/12/24   Team Members Present   Team Members Present Physician;Nurse;;;Occupational Therapist   Physician Team Member Jenn Rene   Nursing Team Member Kyle Muniz   Care Management Team Member Rossi   Social Work Team Member Tammy   OT Team Member Curt   Patient/Family Present   Patient Present No   Patient's Family Present No     Patient is calm, cooperative, and medication compliant. Reports anxiety. Pamela GERONIMO coming to assess the patient on Friday 2/16/24 @ 10h for admission to their program. No pending discharge date at this time.

## 2024-02-12 NOTE — NURSING NOTE
Pt present on the unit and mostly seen laying in bed. Endorsed anxiety earlier and denies after receiving Atarax. No acute changes in mood/behaviors. Affect remains somewhat expansive.

## 2024-02-12 NOTE — TREATMENT TEAM
"   02/11/24 1940   Rangel Anxiety Scale   Anxious Mood 3   Tension 3   Fears 2   Insomnia 0   Intellectual 2   Depressed Mood 3   Somatic Complaints: Muscular 1   Somatic Complaints: Sensory 0   Cardiovascular Symptoms 0   Respiratory Symptoms 0   Gastrointestinal Symptoms 0   Genitourinary Symptoms 0   Autonomic Symptoms 2   Behavior at Interview 3   Rangel Anxiety Score 19     Pt appraoched staff and reported \"feeling like I'm out of my body\". Endorsed moderate severity of anxiety. Atarax 50mg given for same. Will follow up for effectiveness.   "

## 2024-02-12 NOTE — CASE MANAGEMENT
The patient reported to CM that he has been having positive interactions with his significant other lately. CM reminded the patient of his meeting with Alex on Friday 2/16/24 @ 10h.

## 2024-02-12 NOTE — PLAN OF CARE
Problem: PSYCHOSIS  Goal: Will report no hallucinations or delusions  Description: Interventions:  - Administer medication as  ordered  - Every waking shifts and PRN assess for the presence of hallucinations and or delusions  - Assist with reality testing to support increasing orientation  - Assess if patient's hallucinations or delusions are encouraging self-harm or harm to others and intervene as appropriate  Outcome: Progressing     Problem: BEHAVIOR  Goal: Pt/Family maintain appropriate behavior and adhere to behavioral management agreement, if implemented  Description: INTERVENTIONS:  - Assess the family dynamic   - Encourage verbalization of thoughts and concerns in a socially appropriate manner  - Assess patient/family's coping skills and non-compliant behavior (including use of illegal substances).  - Utilize positive, consistent limit setting strategies supporting safety of patient, staff and others  - Initiate consult with Case Management, Spiritual Care or other ancillary services as appropriate  - If a patient's/visitor's behavior jeopardizes the safety of the patient, staff, or others, refer to organization procedure.   - Notify Security of behavior or suspected illegal substances which indicate the need for search of the patient and/or belongings  - Encourage participation in the decision making process about a behavioral management agreement; implement if patient meets criteria  Outcome: Progressing     Problem: INVOLUNTARY ADMIT  Goal: Will cooperate with staff recommendations and doctor's orders and will demonstrate appropriate behavior  Description: INTERVENTIONS:  - Treat underlying conditions and offer medication as ordered  - Educate regarding involuntary admission procedures and rules  - Utilize positive consistent limit setting strategies to support patient and staff safety  Outcome: Progressing     Problem: SLEEP DISTURBANCE  Goal: Will exhibit normal sleeping pattern  Description:  Interventions:  -  Assess the patients sleep pattern, noting recent changes  - Administer medication as ordered  - Decrease environmental stimuli, including noise, as appropriate during the night  - Encourage the patient to actively participate in unit groups and or exercise during the day to enhance ability to achieve adequate sleep at night  - Assess the patient, in the morning, encouraging a description of sleep experience  Outcome: Progressing     Problem: Risk for Self Injury/Neglect  Goal: Treatment Goal: Remain safe during length of stay, learn and adopt new coping skills, and be free of self-injurious ideation, impulses and acts at the time of discharge  Outcome: Progressing  Goal: Verbalize thoughts and feelings  Description: Interventions:  - Assess and re-assess patient's lethality and potential for self-injury  - Engage patient in 1:1 interactions, daily, for a minimum of 15 minutes  - Encourage patient to express feelings, fears, frustrations, hopes  - Establish rapport/trust with patient   Outcome: Progressing  Goal: Refrain from harming self  Description: Interventions:  - Monitor patient closely, per order  - Develop a trusting relationship  - Supervise medication ingestion, monitor effects and side effects   Outcome: Progressing  Goal: Attend and participate in unit activities, including therapeutic, recreational, and educational groups  Description: Interventions:  - Provide therapeutic and educational activities daily, encourage attendance and participation, and document same in the medical record  - Obtain collateral information, encourage visitation and family involvement in care   Outcome: Progressing  Goal: Recognize maladaptive responses and adopt new coping mechanisms  Outcome: Progressing  Goal: Complete daily ADLs, including personal hygiene independently, as able  Description: Interventions:  - Observe, teach, and assist patient with ADLS  - Monitor and promote a balance of rest/activity,  with adequate nutrition and elimination  Outcome: Progressing     Problem: Alteration in Orientation  Goal: Treatment Goal: Demonstrate a reduction of confusion and improved orientation to person, place, time and/or situation upon discharge, according to optimum baseline/ability  Outcome: Progressing  Goal: Interact with staff daily  Description: Interventions:  - Assess and re-assess patient's level of orientation  - Engage patient in 1 on 1 interactions, daily, for a minimum of 15 minutes   - Establish rapport/trust with patient   Outcome: Progressing  Goal: Express concerns related to confused thinking related to:  Description: Interventions:  - Encourage patient to express feelings, fears, frustrations, hopes  - Assign consistent caregivers   - [place]/re-orient patient as needed  - Allow comfort items, as appropriate  - Provide visual cues, signs, etc.   Outcome: Progressing  Goal: Allow medical examinations, as recommended  Description: Interventions:  - Provide physical/neurological exams and/or referrals, per provider   Outcome: Progressing  Goal: Cooperate with recommended testing/procedures  Description: Interventions:  - Determine need for ancillary testing  - Observe for mental status changes  - Implement falls/precaution protocol   Outcome: Progressing  Goal: Attend and participate in unit activities, including therapeutic, recreational, and educational groups  Description: Interventions:  - Provide therapeutic and educational activities daily, encourage attendance and participation, and document same in the medical record   - Provide appropriate opportunities for reminiscence   - Provide a consistent daily routine   - Encourage family contact/visitation   Outcome: Progressing  Goal: Complete daily ADLs, including personal hygiene independently, as able  Description: Interventions:  - Observe, teach, and assist patient with ADLS  Outcome: Progressing

## 2024-02-12 NOTE — NURSING NOTE
Pt constricted but pleasant and med-compliant with good appetite and steady gait.  VSS.  Did not attend group.  No delusions or inappropriate sexual behavior noted.  Monitored for safety and support.

## 2024-02-13 PROCEDURE — 99232 SBSQ HOSP IP/OBS MODERATE 35: CPT | Performed by: STUDENT IN AN ORGANIZED HEALTH CARE EDUCATION/TRAINING PROGRAM

## 2024-02-13 RX ADMIN — LORAZEPAM 1 MG: 1 TABLET ORAL at 18:56

## 2024-02-13 RX ADMIN — GABAPENTIN 100 MG: 100 CAPSULE ORAL at 11:48

## 2024-02-13 RX ADMIN — DIVALPROEX SODIUM 1000 MG: 500 TABLET, EXTENDED RELEASE ORAL at 21:12

## 2024-02-13 RX ADMIN — DIVALPROEX SODIUM 500 MG: 500 TABLET, EXTENDED RELEASE ORAL at 08:00

## 2024-02-13 RX ADMIN — POLYETHYLENE GLYCOL 3350 17 G: 17 POWDER, FOR SOLUTION ORAL at 08:15

## 2024-02-13 RX ADMIN — METFORMIN HYDROCHLORIDE 500 MG: 500 TABLET, FILM COATED ORAL at 08:00

## 2024-02-13 RX ADMIN — DOCUSATE SODIUM 100 MG: 100 CAPSULE, LIQUID FILLED ORAL at 17:00

## 2024-02-13 RX ADMIN — DOCUSATE SODIUM 100 MG: 100 CAPSULE, LIQUID FILLED ORAL at 08:00

## 2024-02-13 RX ADMIN — TAMSULOSIN HYDROCHLORIDE 0.4 MG: 0.4 CAPSULE ORAL at 17:00

## 2024-02-13 RX ADMIN — HALOPERIDOL 10 MG: 10 TABLET ORAL at 21:12

## 2024-02-13 RX ADMIN — CHOLECALCIFEROL TAB 25 MCG (1000 UNIT) 2000 UNITS: 25 TAB at 08:00

## 2024-02-13 RX ADMIN — MELATONIN TAB 3 MG 3 MG: 3 TAB at 21:12

## 2024-02-13 RX ADMIN — GABAPENTIN 300 MG: 300 CAPSULE ORAL at 21:12

## 2024-02-13 NOTE — NURSING NOTE
Pt's roommate came to the nurse's station reporting that pt was on his knees in his room. When staff went into pt's room, pt expressed severe anxiety. PRN Ativan 1mg given at 1856.

## 2024-02-13 NOTE — PLAN OF CARE
Problem: BEHAVIOR  Goal: Pt/Family maintain appropriate behavior and adhere to behavioral management agreement, if implemented  Description: INTERVENTIONS:  - Assess the family dynamic   - Encourage verbalization of thoughts and concerns in a socially appropriate manner  - Assess patient/family's coping skills and non-compliant behavior (including use of illegal substances).  - Utilize positive, consistent limit setting strategies supporting safety of patient, staff and others  - Initiate consult with Case Management, Spiritual Care or other ancillary services as appropriate  - If a patient's/visitor's behavior jeopardizes the safety of the patient, staff, or others, refer to organization procedure.   - Notify Security of behavior or suspected illegal substances which indicate the need for search of the patient and/or belongings  - Encourage participation in the decision making process about a behavioral management agreement; implement if patient meets criteria  Outcome: Progressing     Problem: SLEEP DISTURBANCE  Goal: Will exhibit normal sleeping pattern  Description: Interventions:  -  Assess the patients sleep pattern, noting recent changes  - Administer medication as ordered  - Decrease environmental stimuli, including noise, as appropriate during the night  - Encourage the patient to actively participate in unit groups and or exercise during the day to enhance ability to achieve adequate sleep at night  - Assess the patient, in the morning, encouraging a description of sleep experience  Outcome: Progressing     Problem: INVOLUNTARY ADMIT  Goal: Will cooperate with staff recommendations and doctor's orders and will demonstrate appropriate behavior  Description: INTERVENTIONS:  - Treat underlying conditions and offer medication as ordered  - Educate regarding involuntary admission procedures and rules  - Utilize positive consistent limit setting strategies to support patient and staff safety  Outcome:  Progressing     Problem: Alteration in Orientation  Goal: Treatment Goal: Demonstrate a reduction of confusion and improved orientation to person, place, time and/or situation upon discharge, according to optimum baseline/ability  Outcome: Progressing  Goal: Interact with staff daily  Description: Interventions:  - Assess and re-assess patient's level of orientation  - Engage patient in 1 on 1 interactions, daily, for a minimum of 15 minutes   - Establish rapport/trust with patient   Outcome: Progressing  Goal: Express concerns related to confused thinking related to:  Description: Interventions:  - Encourage patient to express feelings, fears, frustrations, hopes  - Assign consistent caregivers   - [place]/re-orient patient as needed  - Allow comfort items, as appropriate  - Provide visual cues, signs, etc.   Outcome: Progressing  Goal: Allow medical examinations, as recommended  Description: Interventions:  - Provide physical/neurological exams and/or referrals, per provider   Outcome: Progressing  Goal: Cooperate with recommended testing/procedures  Description: Interventions:  - Determine need for ancillary testing  - Observe for mental status changes  - Implement falls/precaution protocol   Outcome: Progressing  Goal: Attend and participate in unit activities, including therapeutic, recreational, and educational groups  Description: Interventions:  - Provide therapeutic and educational activities daily, encourage attendance and participation, and document same in the medical record   - Provide appropriate opportunities for reminiscence   - Provide a consistent daily routine   - Encourage family contact/visitation   Outcome: Progressing  Goal: Complete daily ADLs, including personal hygiene independently, as able  Description: Interventions:  - Observe, teach, and assist patient with ADLS  Outcome: Progressing

## 2024-02-13 NOTE — PROGRESS NOTES
Progress Note - Behavioral Health   Sudhakar Choe 58 y.o. male MRN: [id number]  Unit/Bed#: [facility] 660-02 Encounter: [id number]    Patient was seen today for continuation of care, records reviewed and patient was discussed with the morning case review team.    Sudhakar was seen today for psychiatric follow-up.  On assessment today, Sudhakar was pleasant.  Reporting continued interrupted sleep, we will reintroduce 50 mg of trazodone nightly and to regimen to assist with sleep.  No depression currently verbalized, anxiety improved.  Patient continues to be visible in the unit and social with select peers.  Saying that he is looking forward to meeting with Alex on Friday, discharge disposition ongoing.    Sudhakar denies acute suicidal/self-harm ideation/intent/plan upon direct inquiry at this time.  Sudhakar remains behaviorally appropriate, no agitation or aggression noted on exam or in report.  Sudhakar also denies HI/AH/VH, and does not appear overtly manic.  No overt delusions or paranoia are verbalized. Impulse control is intact.  Sudhakar remains adherent to his current psychotropic medication regimen and denies any side effects from medications, as well as none noted on exam.    Vitals:  Vitals:    02/13/24 0803   BP: 111/62   Pulse: 81   Resp: 18   Temp: 97.8 °F (36.6 °C)   SpO2: 97%       Laboratory Results:  I have personally reviewed all pertinent laboratory/tests results.  Most Recent Labs:   Lab Results   Component Value Date    WBC 8.07 02/04/2024    RBC 4.48 02/04/2024    HGB 13.9 02/04/2024    HCT 40.8 02/04/2024     02/04/2024    RDW 14.1 02/04/2024    NEUTROABS 3.14 02/04/2024    SODIUM 136 02/04/2024    K 3.8 02/04/2024     02/04/2024    CO2 26 02/04/2024    BUN 10 02/04/2024    CREATININE 0.87 02/04/2024    GLUC 102 02/04/2024    GLUF 109 (H) 01/03/2024    CALCIUM 9.4 02/04/2024    AST 17 02/04/2024    ALT 10 02/04/2024    ALKPHOS 53 02/04/2024    TP 7.3 02/04/2024    ALB 4.2 02/04/2024    TBILI  0.32 02/04/2024    CHOLESTEROL 124 01/23/2024    HDL 28 (L) 01/23/2024    TRIG 116 01/23/2024    LDLCALC 73 01/23/2024    NONHDLC 96 01/23/2024    VALPROICTOT 84 02/04/2024    DNH5EYRWVFFV 1.431 12/30/2023    RPR Non-Reactive 03/26/2021    HGBA1C 6.2 (H) 01/23/2024     01/23/2024       Psychiatric Review of Systems:  Behavior over the last 24 hours:  unchanged.   Sleep: good  Appetite: good  Medication side effects: none  ROS: no complaints, denies shortness of breath or chest pain and all other systems are negative for acute changes    Mental Status Evaluation:  Appearance:  disheveled   Behavior:  cooperative, calm   Speech:  normal rate and volume   Mood:  less irritable   Affect:  constricted   Thought Process:  linear   Thought Content:  no overt delusions   Perceptual Disturbances: denies auditory hallucinations when asked, does not appear responding to internal stimuli   Risk Potential: Suicidal ideation - None  Homicidal ideation - None  Potential for aggression - No   Memory:  recent and remote memory grossly intact   Sensorium  person, place, and time/date      Consciousness:  alert and awake   Attention: attention span and concentration are age appropriate   Insight:  limited   Judgment: limited   Gait/Station: normal gait/station   Motor Activity: no abnormal movements   Progress Toward Goals:   Sudhakar is progressing towards goals of inpatient psychiatric treatment by continued medication compliance and is attending therapeutic modalities on the milieu. However, the patient continues to require inpatient psychiatric hospitalization for continued medication management and titration to optimize symptom reduction, improve sleep hygiene, and demonstrate adequate self-care.    Assessment/Plan   Principal Problem:    Bipolar affective disorder, current episode manic with psychotic symptoms (HCC)  Active Problems:    Benign prostatic hyperplasia    Brachial plexus injury, right, sequela    Medical  clearance for psychiatric admission    Bilateral lower extremity edema      Recommended Treatment: Treatment plan and medication changes discussed and per the attending physician the plan is:    1.Continue with group therapy, milieu therapy and occupational therapy  2.Behavioral Health checks every 7 minutes  3.Continue frequent safety checks and vitals per unit protocol  4.Continue with SLIM medical management as indicated  5.Continue with current medication regimen  6.Will review labs in the a.m.  7.Disposition Planning: Discharge planning and efforts remain ongoing    Behavioral Health Medications: all current active meds have been reviewed and continue current psychiatric medications.  Current Facility-Administered Medications   Medication Dose Route Frequency Provider Last Rate    acetaminophen  650 mg Oral Q4H PRN Jenn Strickland, LYLANP      acetaminophen  650 mg Oral Q4H PRN Reji Looney DO      acetaminophen  975 mg Oral Q6H PRN BETO Novak      cholecalciferol  2,000 Units Oral Daily Rossi Carlson PA-C      divalproex sodium  1,000 mg Oral HS Rory Bunn MD      divalproex sodium  500 mg Oral Daily Rory Bunn MD      docusate sodium  100 mg Oral BID Jenn Strickland, BETO      gabapentin  100 mg Oral Daily Jenn Strickland, LYLANP      gabapentin  300 mg Oral HS Rory Bunn MD      haloperidol  10 mg Oral HS Saul Sorensen PA-C      hydrOXYzine HCL  25 mg Oral Q6H PRN Max 4/day Jenn Strickland, BTEO      hydrOXYzine HCL  50 mg Oral Q6H PRN Max 4/day Jenn Strickland, BETO      LORazepam  1 mg Intramuscular Q6H PRN Max 3/day Jenn Strickland, BETO      LORazepam  1 mg Oral Q6H PRN Max 3/day BETO Novak      melatonin  3 mg Oral HS Rory Bunn MD      metFORMIN  500 mg Oral Daily With Breakfast BETO Garcia      mirtazapine  7.5 mg Oral HS PRN Rory Bunn MD      nicotine  1 patch Transdermal Daily PRN Fabiana Sousa MD      OLANZapine  5 mg  Intramuscular Q3H PRN Max 3/day Jenn Strickland, BETO      OLANZapine  2.5 mg Oral Q4H PRN Max 6/day Jenn Strickland, BETO      OLANZapine  5 mg Oral Q4H PRN Max 3/day Jenn Strickland, BETO      OLANZapine  5 mg Oral Q3H PRN Max 3/day Jenn Strickland, BETO      paliperidone  234 mg IM- Deltoid Q4 weeks Jenn Strickland, BETO      polyethylene glycol  17 g Oral Daily PRN Jenn Strickland, BETO      senna-docusate sodium  1 tablet Oral Daily PRN Jenn Strickland, BETO      tamsulosin  0.4 mg Oral Daily With Dinner Rossi Carlson PA-C      traZODone  100 mg Oral HS PRN BETO Novak         Risks / Benefits of Treatment:  Risks, benefits, and possible side effects of medications explained to patient and patient verbalizes understanding and agreement for treatment.    Counseling / Coordination of Care:  Patient's progress reviewed with nursing staff.  Medications, treatment progress and treatment plan reviewed with patient.  Supportive counseling provided to the patient.    Total floor/unit time spent today 25 minutes. Greater than 50% of total time was spent with the patient and / or family counseling and / or coordination of care. A description of the counseling / coordination of care: medication education, treatment plan, supportive therapy.    This note was completed in part utilizing Dragon dictation Software. Grammatical, translation, syntax errors, random word insertions, spelling mistakes, and incomplete sentences may be an occasional consequence of this system secondary to software limitations with voice recognition, ambient noise, and hardware issues. If you have any questions or concerns about the content, text, or information contained within the body of this dictation, please contact the provider for clarification

## 2024-02-13 NOTE — NURSING NOTE
"Patient visible on the unit to attend meals and make needs known, otherwise withdrawn and isolative to self in room. Patient scant and minimal in conversation on approach. Patient cooperative with assessment questions, reporting \"a little but not much\" when asked about both depression and anxiety. Denies SI/HI/AVH. Patient compliant with medications and meals, appetite good. No further signs of distress noted.  "

## 2024-02-14 PROCEDURE — 99232 SBSQ HOSP IP/OBS MODERATE 35: CPT | Performed by: STUDENT IN AN ORGANIZED HEALTH CARE EDUCATION/TRAINING PROGRAM

## 2024-02-14 RX ORDER — TRAZODONE HYDROCHLORIDE 50 MG/1
50 TABLET ORAL
Status: DISPENSED | OUTPATIENT
Start: 2024-02-14

## 2024-02-14 RX ORDER — GABAPENTIN 300 MG/1
300 CAPSULE ORAL DAILY
Status: DISPENSED | OUTPATIENT
Start: 2024-02-14

## 2024-02-14 RX ADMIN — TRAZODONE HYDROCHLORIDE 50 MG: 50 TABLET ORAL at 21:20

## 2024-02-14 RX ADMIN — GABAPENTIN 300 MG: 300 CAPSULE ORAL at 12:09

## 2024-02-14 RX ADMIN — DIVALPROEX SODIUM 500 MG: 500 TABLET, EXTENDED RELEASE ORAL at 08:00

## 2024-02-14 RX ADMIN — GABAPENTIN 300 MG: 300 CAPSULE ORAL at 21:20

## 2024-02-14 RX ADMIN — METFORMIN HYDROCHLORIDE 500 MG: 500 TABLET, FILM COATED ORAL at 08:00

## 2024-02-14 RX ADMIN — DIVALPROEX SODIUM 1000 MG: 500 TABLET, EXTENDED RELEASE ORAL at 21:21

## 2024-02-14 RX ADMIN — TAMSULOSIN HYDROCHLORIDE 0.4 MG: 0.4 CAPSULE ORAL at 17:20

## 2024-02-14 RX ADMIN — MELATONIN TAB 3 MG 3 MG: 3 TAB at 21:20

## 2024-02-14 RX ADMIN — DOCUSATE SODIUM 100 MG: 100 CAPSULE, LIQUID FILLED ORAL at 17:19

## 2024-02-14 RX ADMIN — CHOLECALCIFEROL TAB 25 MCG (1000 UNIT) 2000 UNITS: 25 TAB at 08:00

## 2024-02-14 RX ADMIN — DOCUSATE SODIUM 100 MG: 100 CAPSULE, LIQUID FILLED ORAL at 08:00

## 2024-02-14 RX ADMIN — HALOPERIDOL 10 MG: 10 TABLET ORAL at 21:20

## 2024-02-14 NOTE — NURSING NOTE
Pt has flat affect, scant in conversation. Pt reports feeling better than he did last night, since he was having anxiety yesterday. He is med/meal compliant. Remains in behavioral control. Denies any unmet needs.

## 2024-02-14 NOTE — NURSING NOTE
Pt withdrawn to room this shift. Pt is calm and cooperative. Pt reports depression but denies all other psych S/S. Pt denies any unmet needs at this time. Continual rounding in place.

## 2024-02-14 NOTE — PLAN OF CARE
Problem: PSYCHOSIS  Goal: Will report no hallucinations or delusions  Description: Interventions:  - Administer medication as  ordered  - Every waking shifts and PRN assess for the presence of hallucinations and or delusions  - Assist with reality testing to support increasing orientation  - Assess if patient's hallucinations or delusions are encouraging self-harm or harm to others and intervene as appropriate  Outcome: Progressing     Problem: BEHAVIOR  Goal: Pt/Family maintain appropriate behavior and adhere to behavioral management agreement, if implemented  Description: INTERVENTIONS:  - Assess the family dynamic   - Encourage verbalization of thoughts and concerns in a socially appropriate manner  - Assess patient/family's coping skills and non-compliant behavior (including use of illegal substances).  - Utilize positive, consistent limit setting strategies supporting safety of patient, staff and others  - Initiate consult with Case Management, Spiritual Care or other ancillary services as appropriate  - If a patient's/visitor's behavior jeopardizes the safety of the patient, staff, or others, refer to organization procedure.   - Notify Security of behavior or suspected illegal substances which indicate the need for search of the patient and/or belongings  - Encourage participation in the decision making process about a behavioral management agreement; implement if patient meets criteria  Outcome: Progressing     Problem: SLEEP DISTURBANCE  Goal: Will exhibit normal sleeping pattern  Description: Interventions:  -  Assess the patients sleep pattern, noting recent changes  - Administer medication as ordered  - Decrease environmental stimuli, including noise, as appropriate during the night  - Encourage the patient to actively participate in unit groups and or exercise during the day to enhance ability to achieve adequate sleep at night  - Assess the patient, in the morning, encouraging a description of sleep  experience  Outcome: Progressing     Problem: INVOLUNTARY ADMIT  Goal: Will cooperate with staff recommendations and doctor's orders and will demonstrate appropriate behavior  Description: INTERVENTIONS:  - Treat underlying conditions and offer medication as ordered  - Educate regarding involuntary admission procedures and rules  - Utilize positive consistent limit setting strategies to support patient and staff safety  Outcome: Progressing

## 2024-02-14 NOTE — PROGRESS NOTES
Progress Note - Behavioral Health   Sudhakar Choe 58 y.o. male MRN: [id number]  Unit/Bed#: [facility] 660-02 Encounter: [id number]    Patient was seen today for continuation of care, records reviewed and patient was discussed with the morning case review team.    Sudhakar was seen today for psychiatric follow-up.  On assessment today, Sudhakar was pleasant but mildly withdrawn.  Encouraged to be more active on the unit as well as social with peers.  Patient is noted to be more scant in conversation and less interactive.  Reporting increased anxiety yesterday resulting in him receiving as needed 1 mg Ativan to assist with symptoms.  Will increase Neurontin to 300 mg daily at this time.  Looking forward to meeting with EAC on Friday, patient states that he feels he is ready for discharge.  All medication changes tolerated well, discharge disposition ongoing.    Sudhakar denies acute suicidal/self-harm ideation/intent/plan upon direct inquiry at this time.  Sudhakar remains behaviorally appropriate, no agitation or aggression noted on exam or in report.  Sudhakar also denies HI/AH/VH, and does not appear overtly manic.  No overt delusions or paranoia are verbalized. Impulse control is intact.  Sudhakar remains adherent to his current psychotropic medication regimen and denies any side effects from medications, as well as none noted on exam.    Vitals:  Vitals:    02/14/24 0735   BP: 114/66   Pulse: 85   Resp: 18   Temp: (!) 96.6 °F (35.9 °C)   SpO2: 92%       Laboratory Results:  I have personally reviewed all pertinent laboratory/tests results.    Psychiatric Review of Systems:  Behavior over the last 24 hours:  unchanged.   Sleep: good  Appetite: good  Medication side effects: none  ROS: no complaints, denies shortness of breath or chest pain and all other systems are negative for acute changes    Mental Status Evaluation:  Appearance:  disheveled   Behavior:  cooperative   Speech:  normal rate and volume, scant   Mood:  slightly more  depressed   Affect:  flat   Thought Process:  linear, perseverative   Thought Content:  no overt delusions   Perceptual Disturbances: denies auditory hallucinations when asked, does not appear responding to internal stimuli   Risk Potential: Suicidal ideation - None  Homicidal ideation - None  Potential for aggression - No   Memory:  recent and remote memory grossly intact   Sensorium  person, place, and time/date      Consciousness:  alert and awake   Attention: attention span and concentration are age appropriate   Insight:  limited   Judgment: limited   Gait/Station: normal gait/station   Motor Activity: no abnormal movements   Progress Toward Goals:   Sudhakar is progressing towards goals of inpatient psychiatric treatment by continued medication compliance and is attending therapeutic modalities on the milieu. However, the patient continues to require inpatient psychiatric hospitalization for continued medication management and titration to optimize symptom reduction, improve sleep hygiene, and demonstrate adequate self-care.    Assessment/Plan   Principal Problem:    Bipolar affective disorder, current episode manic with psychotic symptoms (HCC)  Active Problems:    Benign prostatic hyperplasia    Brachial plexus injury, right, sequela    Medical clearance for psychiatric admission    Bilateral lower extremity edema      Recommended Treatment: Treatment plan and medication changes discussed and per the attending physician the plan is:    1.Continue with group therapy, milieu therapy and occupational therapy  2.Behavioral Health checks every 7 minutes  3.Continue frequent safety checks and vitals per unit protocol  4.Continue with SLIM medical management as indicated  5.Continue with current medication regimen  6.Will review labs in the a.m.  7.Disposition Planning: Discharge planning and efforts remain ongoing    Behavioral Health Medications: all current active meds have been reviewed and continue current  psychiatric medications.  Current Facility-Administered Medications   Medication Dose Route Frequency Provider Last Rate    acetaminophen  650 mg Oral Q4H PRN Jenn Strickland, CRNP      acetaminophen  650 mg Oral Q4H PRN Reji Looney DO      acetaminophen  975 mg Oral Q6H PRN Jenn Strickland, CRROBERT      cholecalciferol  2,000 Units Oral Daily Rossi Carlson PA-C      divalproex sodium  1,000 mg Oral HS Rory Bunn MD      divalproex sodium  500 mg Oral Daily Rory Bunn MD      docusate sodium  100 mg Oral BID Jenn Strickland, CRNP      gabapentin  100 mg Oral Daily Jenn Strickland, CRROBERT      gabapentin  300 mg Oral HS Rory Bunn MD      haloperidol  10 mg Oral HS Saul Sorensen PA-C      hydrOXYzine HCL  25 mg Oral Q6H PRN Max 4/day eJnn Strickland, CRNP      hydrOXYzine HCL  50 mg Oral Q6H PRN Max 4/day Jenn Strickland, BETO      LORazepam  1 mg Intramuscular Q6H PRN Max 3/day Jenn Strickland, BETO      LORazepam  1 mg Oral Q6H PRN Max 3/day Jenn Strickland, CRNP      melatonin  3 mg Oral HS Rory Bunn MD      metFORMIN  500 mg Oral Daily With Breakfast Dorene Pelayo, BETO      mirtazapine  7.5 mg Oral HS PRN Rory Bunn MD      nicotine  1 patch Transdermal Daily PRN Fabiana Sousa MD      OLANZapine  5 mg Intramuscular Q3H PRN Max 3/day Jenn Strickland, EBTO      OLANZapine  2.5 mg Oral Q4H PRN Max 6/day Jenn Strickland, CRNP      OLANZapine  5 mg Oral Q4H PRN Max 3/day Jenn Strickland, CRNP      OLANZapine  5 mg Oral Q3H PRN Max 3/day eJnn Strickland, CRROBERT      paliperidone  234 mg IM- Deltoid Q4 weeks Jenn Strickland, BETO      polyethylene glycol  17 g Oral Daily PRN Jenn Strickland, BETO      senna-docusate sodium  1 tablet Oral Daily PRN Jenn Strickland, BETO      tamsulosin  0.4 mg Oral Daily With Dinner Rossi Carlson PA-C      traZODone  100 mg Oral HS PRN Jenn Strickland, BETO      traZODone  50 mg Oral HS Jenn Strickland, BETO         Risks / Benefits of  Treatment:  Risks, benefits, and possible side effects of medications explained to patient and patient verbalizes understanding and agreement for treatment.    Counseling / Coordination of Care:  Patient's progress reviewed with nursing staff.  Medications, treatment progress and treatment plan reviewed with patient.  Supportive counseling provided to the patient.    Total floor/unit time spent today 25 minutes. Greater than 50% of total time was spent with the patient and / or family counseling and / or coordination of care. A description of the counseling / coordination of care: medication education, treatment plan, supportive therapy.    This note was completed in part utilizing Dragon dictation Software. Grammatical, translation, syntax errors, random word insertions, spelling mistakes, and incomplete sentences may be an occasional consequence of this system secondary to software limitations with voice recognition, ambient noise, and hardware issues. If you have any questions or concerns about the content, text, or information contained within the body of this dictation, please contact the provider for clarification

## 2024-02-14 NOTE — TREATMENT TEAM
02/14/24 0802   Team Meeting   Meeting Type Daily Rounds   Initial Conference Date 02/14/24   Team Members Present   Team Members Present Physician;Nurse;;;Occupational Therapist   Physician Team Member Jenn Rene   Nursing Team Member July Kenyon   Care Management Team Member Rossi   Social Work Team Member Tammy   OT Team Member Curt   Patient/Family Present   Patient Present No   Patient's Family Present No     Patient is calm, cooperative, and medication compliant. Reports anxiety. Pamela GERONIMO coming to assess the patient on Friday 2/16/24 @ 10h for admission to their program. No pending discharge date at this time.

## 2024-02-15 PROCEDURE — 99232 SBSQ HOSP IP/OBS MODERATE 35: CPT | Performed by: STUDENT IN AN ORGANIZED HEALTH CARE EDUCATION/TRAINING PROGRAM

## 2024-02-15 RX ADMIN — DOCUSATE SODIUM 100 MG: 100 CAPSULE, LIQUID FILLED ORAL at 17:11

## 2024-02-15 RX ADMIN — HALOPERIDOL 10 MG: 10 TABLET ORAL at 21:43

## 2024-02-15 RX ADMIN — GABAPENTIN 300 MG: 300 CAPSULE ORAL at 11:56

## 2024-02-15 RX ADMIN — TRAZODONE HYDROCHLORIDE 50 MG: 50 TABLET ORAL at 21:43

## 2024-02-15 RX ADMIN — METFORMIN HYDROCHLORIDE 500 MG: 500 TABLET, FILM COATED ORAL at 08:06

## 2024-02-15 RX ADMIN — MELATONIN TAB 3 MG 3 MG: 3 TAB at 21:43

## 2024-02-15 RX ADMIN — DIVALPROEX SODIUM 1000 MG: 500 TABLET, EXTENDED RELEASE ORAL at 21:42

## 2024-02-15 RX ADMIN — TAMSULOSIN HYDROCHLORIDE 0.4 MG: 0.4 CAPSULE ORAL at 17:11

## 2024-02-15 RX ADMIN — DOCUSATE SODIUM 100 MG: 100 CAPSULE, LIQUID FILLED ORAL at 08:06

## 2024-02-15 RX ADMIN — CHOLECALCIFEROL TAB 25 MCG (1000 UNIT) 2000 UNITS: 25 TAB at 08:06

## 2024-02-15 RX ADMIN — DIVALPROEX SODIUM 500 MG: 500 TABLET, EXTENDED RELEASE ORAL at 08:05

## 2024-02-15 RX ADMIN — GABAPENTIN 300 MG: 300 CAPSULE ORAL at 21:42

## 2024-02-15 NOTE — NURSING NOTE
Pt is pleasant on approach. Med/meal compliant. Pt is visible intermittently in the milieu, but is mostly isolative to self. Pt guarded, scant in conversation. Pt currently denying any unmet needs.

## 2024-02-15 NOTE — PROGRESS NOTES
Progress Note - Behavioral Health   Sudhakar Choe 58 y.o. male MRN: [id number]  Unit/Bed#: [facility] 660-02 Encounter: [id number]    Patient was seen today for continuation of care, records reviewed and patient was discussed with the morning case review team.    Sudhakar was seen today for psychiatric follow-up.  On assessment today, Sudhakar was pleasant.  Stating that he slept better over the weekend, patient was noted to be more isolated and withdrawn.  Encouraged to be more active on the unit as well as social with peers.  Also reports improved anxiety due to recent increase of gabapentin to 300 mg daily to assist with symptoms.  No medication change to be made at this time, discharge disposition ongoing with patient having meeting with Alex GERONIMO tomorrow.     Sudhakar denies acute suicidal/self-harm ideation/intent/plan upon direct inquiry at this time.  Sudhakar remains behaviorally appropriate, no agitation or aggression noted on exam or in report.  Sudhakar also denies HI/AH/VH, and does not appear overtly manic.  No overt delusions or paranoia are verbalized. Impulse control is intact.  Sudhakar remains adherent to his current psychotropic medication regimen and denies any side effects from medications, as well as none noted on exam.    Vitals:  Vitals:    02/15/24 0731   BP: 110/64   Pulse: 85   Resp: 16   Temp: 97.7 °F (36.5 °C)   SpO2: 91%       Laboratory Results:  I have personally reviewed all pertinent laboratory/tests results.  Most Recent Labs:   Lab Results   Component Value Date    WBC 8.07 02/04/2024    RBC 4.48 02/04/2024    HGB 13.9 02/04/2024    HCT 40.8 02/04/2024     02/04/2024    RDW 14.1 02/04/2024    NEUTROABS 3.14 02/04/2024    SODIUM 136 02/04/2024    K 3.8 02/04/2024     02/04/2024    CO2 26 02/04/2024    BUN 10 02/04/2024    CREATININE 0.87 02/04/2024    GLUC 102 02/04/2024    GLUF 109 (H) 01/03/2024    CALCIUM 9.4 02/04/2024    AST 17 02/04/2024    ALT 10 02/04/2024    ALKPHOS 53  02/04/2024    TP 7.3 02/04/2024    ALB 4.2 02/04/2024    TBILI 0.32 02/04/2024    CHOLESTEROL 124 01/23/2024    HDL 28 (L) 01/23/2024    TRIG 116 01/23/2024    LDLCALC 73 01/23/2024    NONHDLC 96 01/23/2024    VALPROICTOT 84 02/04/2024    FLB6CFPVCICP 1.431 12/30/2023    RPR Non-Reactive 03/26/2021    HGBA1C 6.2 (H) 01/23/2024     01/23/2024       Psychiatric Review of Systems:  Behavior over the last 24 hours:  unchanged.   Sleep: good  Appetite: good  Medication side effects: none  ROS: no complaints, denies shortness of breath or chest pain and all other systems are negative for acute changes    Mental Status Evaluation:  Appearance:  dressed appropriately, adequate grooming   Behavior:  cooperative, calm   Speech:  normal rate and volume   Mood:  less anxious, less depressed   Affect:  constricted   Thought Process:  linear   Thought Content:  no overt delusions   Perceptual Disturbances: denies auditory hallucinations when asked, does not appear responding to internal stimuli   Risk Potential: Suicidal ideation - None  Homicidal ideation - None  Potential for aggression - No   Memory:  recent and remote memory grossly intact   Sensorium  person, place, and time/date      Consciousness:  alert and awake   Attention: attention span and concentration are age appropriate   Insight:  limited   Judgment: limited   Gait/Station: normal gait/station   Motor Activity: no abnormal movements   Progress Toward Goals:   Sudhakar is progressing towards goals of inpatient psychiatric treatment by continued medication compliance and is attending therapeutic modalities on the milieu. However, the patient continues to require inpatient psychiatric hospitalization for continued medication management and titration to optimize symptom reduction, improve sleep hygiene, and demonstrate adequate self-care.    Assessment/Plan   Principal Problem:    Bipolar affective disorder, current episode manic with psychotic symptoms  (HCC)  Active Problems:    Benign prostatic hyperplasia    Brachial plexus injury, right, sequela    Medical clearance for psychiatric admission    Bilateral lower extremity edema      Recommended Treatment: Treatment plan and medication changes discussed and per the attending physician the plan is:    1.Continue with group therapy, milieu therapy and occupational therapy  2.Behavioral Health checks every 7 minutes  3.Continue frequent safety checks and vitals per unit protocol  4.Continue with SLIM medical management as indicated  5.Continue with current medication regimen  6.Will review labs in the a.m.  7.Disposition Planning: Discharge planning and efforts remain ongoing    Behavioral Health Medications: all current active meds have been reviewed and continue current psychiatric medications.  Current Facility-Administered Medications   Medication Dose Route Frequency Provider Last Rate    acetaminophen  650 mg Oral Q4H PRN Jenn Strickland, LYLANP      acetaminophen  650 mg Oral Q4H PRN Reji Looney DO      acetaminophen  975 mg Oral Q6H PRN Jenn Strickland, BETO      cholecalciferol  2,000 Units Oral Daily Rossi Carlson PA-C      divalproex sodium  1,000 mg Oral HS Rory Bunn MD      divalproex sodium  500 mg Oral Daily Rory Bunn MD      docusate sodium  100 mg Oral BID Jenn Strickland, BETO      gabapentin  300 mg Oral HS Rory Bunn MD      gabapentin  300 mg Oral Daily BETO Novak      haloperidol  10 mg Oral HS Saul Sorensen PA-C      hydrOXYzine HCL  25 mg Oral Q6H PRN Max 4/day Jenn Strickland, LYLANP      hydrOXYzine HCL  50 mg Oral Q6H PRN Max 4/day BETO Novak      LORazepam  1 mg Intramuscular Q6H PRN Max 3/day Jenn Strickland, BETO      LORazepam  1 mg Oral Q6H PRN Max 3/day Jenn Strickland, LYLANP      melatonin  3 mg Oral HS Rory Bunn MD      metFORMIN  500 mg Oral Daily With Breakfast BETO Garcia      mirtazapine  7.5 mg Oral HS PRN Rory  MD Oni      nicotine  1 patch Transdermal Daily PRN Fabiana Sousa MD      OLANZapine  5 mg Intramuscular Q3H PRN Max 3/day Jenn Strickland, CRNP      OLANZapine  2.5 mg Oral Q4H PRN Max 6/day Jenn Strickland, CRNP      OLANZapine  5 mg Oral Q4H PRN Max 3/day Jenn Strickland, CRNP      OLANZapine  5 mg Oral Q3H PRN Max 3/day Jenn Strickland, CRNP      paliperidone  234 mg IM- Deltoid Q4 weeks Jenn Strickland, CRNP      polyethylene glycol  17 g Oral Daily PRN Jenn Strickland, CRNP      senna-docusate sodium  1 tablet Oral Daily PRN Jenn Strickland, CRNP      tamsulosin  0.4 mg Oral Daily With Dinner Rossi Carlson PA-C      traZODone  100 mg Oral HS PRN Jenn Strickland, BETO      traZODone  50 mg Oral HS Jenn Strickland, BETO         Risks / Benefits of Treatment:  Risks, benefits, and possible side effects of medications explained to patient and patient verbalizes understanding and agreement for treatment.    Counseling / Coordination of Care:  Patient's progress reviewed with nursing staff.  Medications, treatment progress and treatment plan reviewed with patient.  Supportive counseling provided to the patient.    Total floor/unit time spent today 25 minutes. Greater than 50% of total time was spent with the patient and / or family counseling and / or coordination of care. A description of the counseling / coordination of care: medication education, treatment plan, supportive therapy.    This note was completed in part utilizing Dragon dictation Software. Grammatical, translation, syntax errors, random word insertions, spelling mistakes, and incomplete sentences may be an occasional consequence of this system secondary to software limitations with voice recognition, ambient noise, and hardware issues. If you have any questions or concerns about the content, text, or information contained within the body of this dictation, please contact the provider for clarification

## 2024-02-15 NOTE — NURSING NOTE
Pt visible on the milieu during meals and to make needs known,  appetite fair, Pt cooperative with assessment questions. Pt endorses minimal depression, denies all other s/s. Meds taken without incedent, patient safety precautions maintained.

## 2024-02-15 NOTE — TREATMENT TEAM
02/15/24 0732   Team Meeting   Meeting Type Daily Rounds   Initial Conference Date 02/15/24   Team Members Present   Team Members Present Physician;Nurse;;;Occupational Therapist   Physician Team Member Jenn Rene   Nursing Team Member July Kenyon   Care Management Team Member Rsosi   Social Work Team Member Tammy   OT Team Member Curt   Patient/Family Present   Patient Present No   Patient's Family Present No     Patient is calm, cooperative, and medication compliant. Reports anxiety. Pamela GERONIMO coming to assess the patient on Friday 2/16/24 @ 10h for admission to their program. No pending discharge date at this time.

## 2024-02-15 NOTE — PROGRESS NOTES
02/15/24 1330   Activity/Group Checklist   Group Life Skills  (therapeutic password game on self awareness.)   Attendance Attended   Attendance Duration (min) 31-45   Interactions Other (Comment)  (Pt. initially displayed head down posture yet when prompted offered to volunteer to take a speaking role in the game.Pt.progressively more engaged as game progressed.)   Affect/Mood Constricted   Goals Achieved Identified triggers;Discussed coping strategies;Able to engage in interactions;Able to listen to others

## 2024-02-15 NOTE — PLAN OF CARE
Problem: BEHAVIOR  Goal: Pt/Family maintain appropriate behavior and adhere to behavioral management agreement, if implemented  Description: INTERVENTIONS:  - Assess the family dynamic   - Encourage verbalization of thoughts and concerns in a socially appropriate manner  - Assess patient/family's coping skills and non-compliant behavior (including use of illegal substances).  - Utilize positive, consistent limit setting strategies supporting safety of patient, staff and others  - Initiate consult with Case Management, Spiritual Care or other ancillary services as appropriate  - If a patient's/visitor's behavior jeopardizes the safety of the patient, staff, or others, refer to organization procedure.   - Notify Security of behavior or suspected illegal substances which indicate the need for search of the patient and/or belongings  - Encourage participation in the decision making process about a behavioral management agreement; implement if patient meets criteria  Outcome: Progressing     Problem: SLEEP DISTURBANCE  Goal: Will exhibit normal sleeping pattern  Description: Interventions:  -  Assess the patients sleep pattern, noting recent changes  - Administer medication as ordered  - Decrease environmental stimuli, including noise, as appropriate during the night  - Encourage the patient to actively participate in unit groups and or exercise during the day to enhance ability to achieve adequate sleep at night  - Assess the patient, in the morning, encouraging a description of sleep experience  Outcome: Progressing     Problem: INVOLUNTARY ADMIT  Goal: Will cooperate with staff recommendations and doctor's orders and will demonstrate appropriate behavior  Description: INTERVENTIONS:  - Treat underlying conditions and offer medication as ordered  - Educate regarding involuntary admission procedures and rules  - Utilize positive consistent limit setting strategies to support patient and staff safety  Outcome:  Progressing     Problem: Alteration in Orientation  Goal: Allow medical examinations, as recommended  Description: Interventions:  - Provide physical/neurological exams and/or referrals, per provider   Outcome: Progressing  Goal: Complete daily ADLs, including personal hygiene independently, as able  Description: Interventions:  - Observe, teach, and assist patient with ADLS  Outcome: Progressing

## 2024-02-15 NOTE — CASE MANAGEMENT
CM spoke to the patient who reported he is in agreement and ready for his meeting with Alex GERONIMO tomorrow 2/16/24 @ 10h.

## 2024-02-15 NOTE — NURSING NOTE
Patient visible on the unit pacing in the halls, social with staffa nd peers on approach. Patient scant and minimal in conversation on approach, currently denies depression, anxiety, SI/HI/AVH. No further signs of distress noted.

## 2024-02-16 PROCEDURE — 99232 SBSQ HOSP IP/OBS MODERATE 35: CPT | Performed by: PSYCHIATRY & NEUROLOGY

## 2024-02-16 PROCEDURE — NC001 PR NO CHARGE: Performed by: PSYCHIATRY & NEUROLOGY

## 2024-02-16 RX ADMIN — HALOPERIDOL 10 MG: 10 TABLET ORAL at 21:08

## 2024-02-16 RX ADMIN — METFORMIN HYDROCHLORIDE 500 MG: 500 TABLET, FILM COATED ORAL at 08:02

## 2024-02-16 RX ADMIN — TRAZODONE HYDROCHLORIDE 50 MG: 50 TABLET ORAL at 21:08

## 2024-02-16 RX ADMIN — DOCUSATE SODIUM 100 MG: 100 CAPSULE, LIQUID FILLED ORAL at 17:05

## 2024-02-16 RX ADMIN — GABAPENTIN 300 MG: 300 CAPSULE ORAL at 12:34

## 2024-02-16 RX ADMIN — TAMSULOSIN HYDROCHLORIDE 0.4 MG: 0.4 CAPSULE ORAL at 17:05

## 2024-02-16 RX ADMIN — DOCUSATE SODIUM 100 MG: 100 CAPSULE, LIQUID FILLED ORAL at 08:03

## 2024-02-16 RX ADMIN — HYDROXYZINE HYDROCHLORIDE 25 MG: 25 TABLET, FILM COATED ORAL at 19:07

## 2024-02-16 RX ADMIN — DIVALPROEX SODIUM 1000 MG: 500 TABLET, EXTENDED RELEASE ORAL at 21:08

## 2024-02-16 RX ADMIN — MELATONIN TAB 3 MG 3 MG: 3 TAB at 21:08

## 2024-02-16 RX ADMIN — GABAPENTIN 300 MG: 300 CAPSULE ORAL at 21:08

## 2024-02-16 RX ADMIN — CHOLECALCIFEROL TAB 25 MCG (1000 UNIT) 2000 UNITS: 25 TAB at 08:02

## 2024-02-16 RX ADMIN — DIVALPROEX SODIUM 500 MG: 500 TABLET, EXTENDED RELEASE ORAL at 08:02

## 2024-02-16 NOTE — NURSING NOTE
"Pts BP 87/52 at 2043 prior to HS medication administration. Pt symptomatic and reported feeling \"fine.\" Consulting provider notified. Per provider, HS medications administered. BP reassessed at 2243 and /58. Pt remains asymptomatic. VSS at this time.   "

## 2024-02-16 NOTE — NURSING NOTE
Patient visible on the unit pacing the halls, social with staff and peers if approached. Patient minimally social and scant on approach, cooperative with assessment questions. Patient denies depression, anxiety, SI/HI/AVH. Patient compliant with medications and meals, appetite good. No further signs of distress noted.

## 2024-02-16 NOTE — NURSING NOTE
Pt is scant in conversation, slightly guarded on approach. He is visible intermittently in the milieu, but is mostly isolative to self. He is med/meal compliant. Pt denies any unmet needs.

## 2024-02-16 NOTE — PLAN OF CARE
Pt does not attend group when prompted or offered.     Problem: Risk for Self Injury/Neglect  Goal: Attend and participate in unit activities, including therapeutic, recreational, and educational groups  Description: Interventions:  - Provide therapeutic and educational activities daily, encourage attendance and participation, and document same in the medical record  - Obtain collateral information, encourage visitation and family involvement in care   Outcome: Not Progressing

## 2024-02-16 NOTE — PLAN OF CARE
Problem: PSYCHOSIS  Goal: Will report no hallucinations or delusions  Description: Interventions:  - Administer medication as  ordered  - Every waking shifts and PRN assess for the presence of hallucinations and or delusions  - Assist with reality testing to support increasing orientation  - Assess if patient's hallucinations or delusions are encouraging self-harm or harm to others and intervene as appropriate  Outcome: Progressing     Problem: SLEEP DISTURBANCE  Goal: Will exhibit normal sleeping pattern  Description: Interventions:  -  Assess the patients sleep pattern, noting recent changes  - Administer medication as ordered  - Decrease environmental stimuli, including noise, as appropriate during the night  - Encourage the patient to actively participate in unit groups and or exercise during the day to enhance ability to achieve adequate sleep at night  - Assess the patient, in the morning, encouraging a description of sleep experience  Outcome: Progressing     Problem: Risk for Self Injury/Neglect  Goal: Verbalize thoughts and feelings  Description: Interventions:  - Assess and re-assess patient's lethality and potential for self-injury  - Engage patient in 1:1 interactions, daily, for a minimum of 15 minutes  - Encourage patient to express feelings, fears, frustrations, hopes  - Establish rapport/trust with patient   Outcome: Progressing  Goal: Refrain from harming self  Description: Interventions:  - Monitor patient closely, per order  - Develop a trusting relationship  - Supervise medication ingestion, monitor effects and side effects   Outcome: Progressing

## 2024-02-16 NOTE — TREATMENT TEAM
02/16/24 0746   Team Meeting   Meeting Type Daily Rounds   Initial Conference Date 02/16/24   Team Members Present   Team Members Present Physician;Nurse;;;Occupational Therapist   Physician Team Member Suzanna Garcia PA-C   Nursing Team Member July Kenyon   Care Management Team Member Rossi   Social Work Team Member Tammy   OT Team Member Curt   Patient/Family Present   Patient Present No   Patient's Family Present No   [facility]: [facility]    Patient is calm, cooperative, and medication compliant. Reports anxiety. Pamela GERONIMO assessing the patient today 2/16/24 @ 10h for admission to their program. No pending discharge date at this time.

## 2024-02-16 NOTE — NURSING NOTE
Pt withdrawn to self this evening but visible pacing through milieu. Pt appears depressed with constricted affect. Pt pleasant on approach and denies depression/anxiety/SI/HI/AVH. Pt interacted appropriately with staff. Pt independent for ADLs. Pt compliant with HS medications. Pt had asymptomatic hypotensive episode this evening as referenced in previous note. Pt currently resting in bed with even, unlabored respirations. Pt able to and encouraged to inform staff of any needs.

## 2024-02-16 NOTE — CASE MANAGEMENT
Virtual [facility] assessment held via Zoom with CM and the patient present. Alex reported they will be accepting the patient back in to their program, but do not yet have a definitve date. Alex reported it should be by the end of February due to a planned discharge they have. The patient is in agreement to go to [facility] for continued treatment.

## 2024-02-16 NOTE — PROGRESS NOTES
"Progress Note - Behavioral Health   Sudhakar Choe 58 y.o. male MRN: [id number]  Unit/Bed#: [facility] 660-02 Encounter: [id number]    Patient was seen today for continuation of care, records reviewed and patient was discussed with the morning case review team.    Sudhakar was seen today for psychiatric follow-up.  On assessment today, Sudhakar was seen sitting in the unit common room, calm and cooperative. Sudhakar appeared slightly anxious, but reported he felt \"good\" about his upcoming meeting with Alex GERONIMO today. He reports no decrease in mood and concentration, and denies guilt, feelings of hopelessness, and suicidal/homicidal ideations. He states he still feels slight anxiety, in the form of tension, and less energy in the afternoons, which he believes both are due to his diet regimen. He reports adhering to his medications and meals. No medication changes to be made at this time. Per nursing note, patient is social with staff and peers on approach. This patient requires two antipsychotics at this time due to multiple failures of monotherapy.    Sudhakar denies acute suicidal/self-harm ideation/intent/plan upon direct inquiry at this time.  Sudhakar remains behaviorally appropriate, no agitation or aggression noted on exam or in report.  Sudhakar also denies HI/AH/VH, and does not appear overtly manic.  No overt delusions or paranoia are verbalized. Impulse control is intact.  Sudhakar remains adherent to his current psychotropic medication regimen and denies any side effects from medications, as well as none noted on exam.    Vitals:  Vitals:    02/16/24 0804   BP: 101/62   Pulse: 100   Resp: 16   Temp: (!) 97.1 °F (36.2 °C)   SpO2: 98%       Laboratory Results:  I have personally reviewed all pertinent laboratory/tests results.    Psychiatric Review of Systems:  Behavior over the last 24 hours:  unchanged.   Sleep: good  Appetite: good  Medication side effects: None  ROS: no complaints, denies shortness of breath or chest pain " and all other systems are negative for acute changes    Mental Status Evaluation:  Appearance:  dressed appropriately, adequate grooming   Behavior:  pleasant, cooperative, calm   Speech:  normal rate and volume   Mood:  dysphoric   Affect:  constricted   Thought Process:  linear   Thought Content:  no overt delusions   Perceptual Disturbances: denies auditory hallucinations when asked, denies visual hallucinations when asked, does not appear to be responding to internal stimuli   Risk Potential: Suicidal ideation - None  Homicidal ideation - None  Potential for aggression - No   Memory:  recent and remote memory grossly intact   Sensorium  person, place, and time/date      Consciousness:  alert and awake   Attention: attention span and concentration are age appropriate   Insight:  limited   Judgment: limited   Gait/Station: normal gait/station   Motor Activity: no abnormal movements   Progress Toward Goals:   Sudhakar is progressing towards goals of inpatient psychiatric treatment by continued medication compliance and is attending therapeutic modalities on the milieu. However, the patient continues to require inpatient psychiatric hospitalization for continued medication management and titration to optimize symptom reduction, improve sleep hygiene, and demonstrate adequate self-care.    Assessment/Plan   Principal Problem:    Bipolar affective disorder, current episode manic with psychotic symptoms (HCC)  Active Problems:    Benign prostatic hyperplasia    Brachial plexus injury, right, sequela    Medical clearance for psychiatric admission    Bilateral lower extremity edema      Recommended Treatment: Treatment plan and medication changes discussed and per the attending physician the plan is:    1.Continue with group therapy, milieu therapy and occupational therapy  2.Behavioral Health checks every 7 minutes  3.Continue frequent safety checks and vitals per unit protocol  4.Continue with SLIM medical management as  indicated  5.Continue with current medication regimen  6.Will review labs in the a.m.  7.Disposition Planning: Discharge planning and efforts remain ongoing    Behavioral Health Medications: all current active meds have been reviewed.  Current Facility-Administered Medications   Medication Dose Route Frequency Provider Last Rate    acetaminophen  650 mg Oral Q4H PRN Jenn Strickland, CRNP      acetaminophen  650 mg Oral Q4H PRN Reji Looney DO      acetaminophen  975 mg Oral Q6H PRN Jenn Strickland, BETO      cholecalciferol  2,000 Units Oral Daily Rossi Carlson PA-C      divalproex sodium  1,000 mg Oral HS Rory Bunn MD      divalproex sodium  500 mg Oral Daily Rory Bunn MD      docusate sodium  100 mg Oral BID Jenn Strickland, BETO      gabapentin  300 mg Oral HS Rory Bunn MD      gabapentin  300 mg Oral Daily Jenn Strickland, BETO      haloperidol  10 mg Oral HS Saul Sorensen PA-C      hydrOXYzine HCL  25 mg Oral Q6H PRN Max 4/day Jenn Strickland, BETO      hydrOXYzine HCL  50 mg Oral Q6H PRN Max 4/day Jenn Strickland, BETO      LORazepam  1 mg Intramuscular Q6H PRN Max 3/day Jenn Strickland, BETO      LORazepam  1 mg Oral Q6H PRN Max 3/day Jenn Strickland, BETO      melatonin  3 mg Oral HS Rory Bunn MD      metFORMIN  500 mg Oral Daily With Breakfast Dorene Pelayo, BETO      mirtazapine  7.5 mg Oral HS PRN Rory Bunn MD      nicotine  1 patch Transdermal Daily PRN Fabiana Sousa MD      OLANZapine  5 mg Intramuscular Q3H PRN Max 3/day Jenn Strickland, LYLANP      OLANZapine  2.5 mg Oral Q4H PRN Max 6/day Jenn Strickland, BETO      OLANZapine  5 mg Oral Q4H PRN Max 3/day Jenn Strickland, CRROBERT      OLANZapine  5 mg Oral Q3H PRN Max 3/day Jenn Strickland, BETO      paliperidone  234 mg IM- Deltoid Q4 weeks Jenn Strickland, BETO      polyethylene glycol  17 g Oral Daily PRN Jenn Strickland, LYLANP      senna-docusate sodium  1 tablet Oral Daily PRN Jenn Strickland,  BETO      tamsulosin  0.4 mg Oral Daily With Dinner Rossi Carlson PA-C      traZODone  100 mg Oral HS PRN BETO Novak      traZODone  50 mg Oral HS BETO Novak         Risks / Benefits of Treatment:  Risks, benefits, and possible side effects of medications explained to patient and patient verbalizes understanding and agreement for treatment.  The patient has a history of at least 3 antipsychotic medication trials and at this time requires treatment with 2 antipsychotic agents (Invega and Haldol) due to failed multiple trials of monotherapy.    Counseling / Coordination of Care:  Patient's progress reviewed with nursing staff.  Medications, treatment progress and treatment plan reviewed with patient.  Supportive counseling provided to the patient.    Total floor/unit time spent today 25 minutes. Greater than 50% of total time was spent with the patient and / or family counseling and / or coordination of care. A description of the counseling / coordination of care: medication education, treatment plan, supportive therapy.    This note was completed in part utilizing Dragon dictation Software. Grammatical, translation, syntax errors, random word insertions, spelling mistakes, and incomplete sentences may be an occasional consequence of this system secondary to software limitations with voice recognition, ambient noise, and hardware issues. If you have any questions or concerns about the content, text, or information contained within the body of this dictation, please contact the provider for clarification

## 2024-02-17 ENCOUNTER — APPOINTMENT (INPATIENT)
Dept: RADIOLOGY | Facility: HOSPITAL | Age: 59
DRG: 885 | End: 2024-02-17
Payer: MEDICARE

## 2024-02-17 PROCEDURE — 73090 X-RAY EXAM OF FOREARM: CPT

## 2024-02-17 PROCEDURE — 99232 SBSQ HOSP IP/OBS MODERATE 35: CPT | Performed by: PSYCHIATRY & NEUROLOGY

## 2024-02-17 RX ADMIN — DIVALPROEX SODIUM 500 MG: 500 TABLET, EXTENDED RELEASE ORAL at 08:06

## 2024-02-17 RX ADMIN — TRAZODONE HYDROCHLORIDE 50 MG: 50 TABLET ORAL at 21:31

## 2024-02-17 RX ADMIN — CHOLECALCIFEROL TAB 25 MCG (1000 UNIT) 2000 UNITS: 25 TAB at 08:06

## 2024-02-17 RX ADMIN — GABAPENTIN 300 MG: 300 CAPSULE ORAL at 11:26

## 2024-02-17 RX ADMIN — METFORMIN HYDROCHLORIDE 500 MG: 500 TABLET, FILM COATED ORAL at 07:44

## 2024-02-17 RX ADMIN — GABAPENTIN 300 MG: 300 CAPSULE ORAL at 21:31

## 2024-02-17 RX ADMIN — DIVALPROEX SODIUM 1000 MG: 500 TABLET, EXTENDED RELEASE ORAL at 21:31

## 2024-02-17 RX ADMIN — MELATONIN TAB 3 MG 3 MG: 3 TAB at 21:31

## 2024-02-17 RX ADMIN — DOCUSATE SODIUM 100 MG: 100 CAPSULE, LIQUID FILLED ORAL at 17:01

## 2024-02-17 RX ADMIN — DOCUSATE SODIUM 100 MG: 100 CAPSULE, LIQUID FILLED ORAL at 08:06

## 2024-02-17 RX ADMIN — TAMSULOSIN HYDROCHLORIDE 0.4 MG: 0.4 CAPSULE ORAL at 15:35

## 2024-02-17 RX ADMIN — HALOPERIDOL 10 MG: 10 TABLET ORAL at 21:31

## 2024-02-17 NOTE — NURSING NOTE
"Patient approaching the desk reporting moderate anxiety and feeling \"pretty tense\", and requesting PRN. Rangel score 6. PRN atarax 25mg given at 1907  "

## 2024-02-17 NOTE — NURSING NOTE
Patient is medication and meal compliant. No complaints of pain or discomfort. Patient is visible on the unit and paces up and down the hallway for most of the shift. Patient endorses some depression related to his being here in the hospital. Patient denies symptoms of anxiety. Patient also denies SI, AH or VH. Patient was originally admitted for delusions of grandeur but they have since been controlled with medications. Patient is awaiting placement at this time. Will continue to monitor patient status.

## 2024-02-17 NOTE — PROGRESS NOTES
Progress Note - Behavioral Health   Sudhakar Choe 58 y.o. male MRN: [id number]  Unit/Bed#: [facility] 660-02 Encounter: [id number]    Assessment/Plan   Principal Problem:    Bipolar affective disorder, current episode manic with psychotic symptoms (HCC)  Active Problems:    Benign prostatic hyperplasia    Brachial plexus injury, right, sequela    Medical clearance for psychiatric admission    Bilateral lower extremity edema    Progress Toward Goals: Unchanged.  No significant changes in behaviors or clinical status over the last 24 hours.  Sudhakar will continue working on current treatment goals which include:  1.Continue with group therapy, milieu therapy and occupational therapy  2.Behavioral Health checks every 7 minutes  3.Continue frequent safety checks and vitals per unit protocol  4.Continue with SLIM medical management as indicated -patient seen by Ortho recommended follow-up in 2 weeks if patient was still inpatient.  X-rays were completed and Ortho consult placed  5.The patient will be maintained on the following medications: Depakote ER 1,000mg PO QHS, Depakote ER 500mg PO Daily, Neurontin 300mg PO BID, Haldol 10mg PO QHS, Melatonin 3mg PO QHS, Invega Sustenna 234mg IM q4 weeks, Trazodone 50mg PO QHS  Invega Sustenna last given on 1/31, next due on 2/28  VPA level on 2/4 was therapeutic at 84  6. Disposition Planning: Discharge planning and efforts remain ongoing per primary treatment teams recommendation    Psychiatric Review of Systems:  Behavior over the last 24 hours: Unchanged  Sleep: sleeping okay throughout the night  Appetite: adequate  Medication side effects: none reported  ROS:no complaints, all other systems are negative    Patient was seen today for continuation of care, records reviewed and patient was discussed with the morning case review team.  Last night, he did receive PRN Atarax 25mg PO at 1907 for increased anxiety and feeling tense.    On assessment today, Sudhakar was found laying in his  bed.  He is guarded initially, however does become more polite and pleasant throughout our contact.  Does continue to struggle with some depression reports feeling anxious today.  He does overall voice an improvement in his symptoms since admission.  We reviewed once more the specific as-needed medications they can use going forward if they experience any insomnia or destabilization of their mood, they understood and were agreeable. Milieu visibility and group attendance encouraged to promote an active participation in treatment.    Sudhakar denies acute suicidal/self-harm ideation/intent/plan upon direct inquiry at this time. Sudhakar is able to contract for safety while on the unit and would feel comfortable seeking staff support should suicidal symptoms or urges appear or worsen. Sudhakar remains behaviorally appropriate, no agitation or aggression noted on exam or in report. Sudhakar also denies HI/AH/VH, and does not appear overtly manic.  Patient does not verbalize any experiences that can be categorized as paranoid, persecutory, bizarre, or somatic delusions. Sudhakar remains adherent to his current psychotropic medication regimen and denies any side effects from medications, as well as none noted on exam.    Vitals:  Vitals:    02/17/24 0759   BP: 98/65   Pulse: 95   Resp: 16   Temp: 97.5 °F (36.4 °C)   SpO2: 100%     Laboratory Results:  I have personally reviewed all pertinent laboratory/tests results.  Most Recent Labs:   Lab Results   Component Value Date    WBC 8.07 02/04/2024    RBC 4.48 02/04/2024    HGB 13.9 02/04/2024    HCT 40.8 02/04/2024     02/04/2024    RDW 14.1 02/04/2024    NEUTROABS 3.14 02/04/2024    SODIUM 136 02/04/2024    K 3.8 02/04/2024     02/04/2024    CO2 26 02/04/2024    BUN 10 02/04/2024    CREATININE 0.87 02/04/2024    GLUC 102 02/04/2024    GLUF 109 (H) 01/03/2024    CALCIUM 9.4 02/04/2024    AST 17 02/04/2024    ALT 10 02/04/2024    ALKPHOS 53 02/04/2024    TP 7.3 02/04/2024     ALB 4.2 02/04/2024    TBILI 0.32 02/04/2024    CHOLESTEROL 124 01/23/2024    HDL 28 (L) 01/23/2024    TRIG 116 01/23/2024    LDLCALC 73 01/23/2024    NONHDLC 96 01/23/2024    VALPROICTOT 84 02/04/2024    YPA6HKYODHBQ 1.431 12/30/2023    RPR Non-Reactive 03/26/2021    HGBA1C 6.2 (H) 01/23/2024     01/23/2024       Behavioral Health Medications: all current active meds have been reviewed and continue current psychiatric medications.  Current Facility-Administered Medications   Medication Dose Route Frequency Provider Last Rate    acetaminophen  650 mg Oral Q4H PRN BETO Novak      acetaminophen  650 mg Oral Q4H PRN Reji Looney DO      acetaminophen  975 mg Oral Q6H PRN BETO Novak      cholecalciferol  2,000 Units Oral Daily Rossi Carlson PA-C      divalproex sodium  1,000 mg Oral HS Rory Bunn MD      divalproex sodium  500 mg Oral Daily Rory Bunn MD      docusate sodium  100 mg Oral BID BETO Novak      gabapentin  300 mg Oral HS Rory Bunn MD      gabapentin  300 mg Oral Daily BETO Novak      haloperidol  10 mg Oral HS Saul Sorensen PA-C      hydrOXYzine HCL  25 mg Oral Q6H PRN Max 4/day BETO Novak      hydrOXYzine HCL  50 mg Oral Q6H PRN Max 4/day BETO Novak      LORazepam  1 mg Intramuscular Q6H PRN Max 3/day BETO Novak      LORazepam  1 mg Oral Q6H PRN Max 3/day BETO Novak      melatonin  3 mg Oral HS Rory Bunn MD      metFORMIN  500 mg Oral Daily With Breakfast BETO Garcia      mirtazapine  7.5 mg Oral HS PRN Rory Bunn MD      nicotine  1 patch Transdermal Daily PRN Fabiana Sousa MD      OLANZapine  5 mg Intramuscular Q3H PRN Max 3/day BETO Novak      OLANZapine  2.5 mg Oral Q4H PRN Max 6/day BETO Novak      OLANZapine  5 mg Oral Q4H PRN Max 3/day BETO Novak      OLANZapine  5 mg Oral Q3H PRN Max 3/day BETO Novak       paliperidone  234 mg IM- Deltoid Q4 weeks BETO Novak      polyethylene glycol  17 g Oral Daily PRN BETO Novak      senna-docusate sodium  1 tablet Oral Daily PRN BETO Novak      tamsulosin  0.4 mg Oral Daily With Dinner Rossi Carlson PA-C      traZODone  100 mg Oral HS PRN BETO Novak      traZODone  50 mg Oral HS BETO Novak       Mental Status Evaluation:  Appearance:  disheveled, wearing cast on R arm   Behavior:  cooperative, guarded, poor eye contact   Speech:  normal rate, normal volume   Mood:  anxious   Affect:  blunted   Thought Process:  concrete   Associations: concrete associations   Thought Content:  no overt delusions   Perceptual Disturbances: no auditory hallucinations, no visual hallucinations, denies when asked, does not appear responding to internal stimuli   Risk Potential: Suicidal ideation - None at present, contracts for safety on the unit, would talk to staff if not feeling safe on the unit  Homicidal ideation - None at present  Potential for aggression - Not at present   Sensorium:  oriented to person, place, and time/date   Memory:  recent memory intact   Consciousness:  alert and awake   Attention/Concentration: attention span and concentration appear shorter than expected for age   Insight:  limited   Judgment: limited   Gait/Station: normal gait/station, normal balance   Motor Activity: no abnormal movements     Progress Toward Goals:   Sudhakar continues to require inpatient psychiatric hospitalization for continued medication management and titration to optimize symptom reduction, improve sleep hygiene, and demonstrate adequate self-care.     Suicide/Homicide Risk Assessment:  Risk of Harm to Self:   Nursing Suicide Risk Assessment Last 24 hours: C-SSRS Risk (Since Last Contact)  Calculated C-SSRS Risk Score (Since Last Contact): No Risk Indicated    Risk of Harm to Others:  Nursing Homicide Risk Assessment: Violence Risk to Others:  Denies within past 6 months    Risks / Benefits of Treatment:  Risks, benefits, and possible side effects of medications explained to patient. Patient has limited understanding of risks and benefits of treatment at this time, but agrees to take medications as prescribed.    Counseling / Coordination of Care:  Total floor/unit time spent today 25 minutes. Greater than 50% of total time was spent with the patient and / or family counseling and / or coordination of care. A description of the counseling / coordination of care:   Patient's progress discussed with staff in treatment team meeting.  Medications, treatment progress and treatment plan reviewed with patient.   Educated on importance of medication and treatment compliance.  Reassurance and supportive therapy provided.   Encouraged participation in milieu and group therapy on the unit.    BETO Pool 02/17/24

## 2024-02-17 NOTE — PLAN OF CARE
Problem: Risk for Self Injury/Neglect  Goal: Verbalize thoughts and feelings  Description: Interventions:  - Assess and re-assess patient's lethality and potential for self-injury  - Engage patient in 1:1 interactions, daily, for a minimum of 15 minutes  - Encourage patient to express feelings, fears, frustrations, hopes  - Establish rapport/trust with patient   Outcome: Progressing  Goal: Refrain from harming self  Description: Interventions:  - Monitor patient closely, per order  - Develop a trusting relationship  - Supervise medication ingestion, monitor effects and side effects   Outcome: Progressing     Problem: Alteration in Orientation  Goal: Interact with staff daily  Description: Interventions:  - Assess and re-assess patient's level of orientation  - Engage patient in 1 on 1 interactions, daily, for a minimum of 15 minutes   - Establish rapport/trust with patient   Outcome: Progressing     Problem: Nutrition/Hydration-ADULT  Goal: Nutrient/Hydration intake appropriate for improving, restoring or maintaining nutritional needs  Description: Monitor and assess patient's nutrition/hydration status for malnutrition. Collaborate with interdisciplinary team and initiate plan and interventions as ordered.  Monitor patient's weight and dietary intake as ordered or per policy. Utilize nutrition screening tool and intervene as necessary. Determine patient's food preferences and provide high-protein, high-caloric foods as appropriate.     INTERVENTIONS:  - Monitor oral intake, urinary output, labs, and treatment plans  - Assess nutrition and hydration status and recommend course of action  - Evaluate amount of meals eaten  - Assist patient with eating if necessary   - Allow adequate time for meals  - Recommend/ encourage appropriate diets, oral nutritional supplements, and vitamin/mineral supplements  - Order, calculate, and assess calorie counts as needed  - Assess need for intravenous fluids  - Provide  nutrition/hydration education as appropriate  - Include patient/family/caregiver in decisions related to nutrition  Outcome: Progressing

## 2024-02-18 PROCEDURE — 99231 SBSQ HOSP IP/OBS SF/LOW 25: CPT | Performed by: PHYSICIAN ASSISTANT

## 2024-02-18 PROCEDURE — 99232 SBSQ HOSP IP/OBS MODERATE 35: CPT | Performed by: PSYCHIATRY & NEUROLOGY

## 2024-02-18 RX ADMIN — TRAZODONE HYDROCHLORIDE 50 MG: 50 TABLET ORAL at 21:23

## 2024-02-18 RX ADMIN — GABAPENTIN 300 MG: 300 CAPSULE ORAL at 11:19

## 2024-02-18 RX ADMIN — DIVALPROEX SODIUM 500 MG: 500 TABLET, EXTENDED RELEASE ORAL at 08:03

## 2024-02-18 RX ADMIN — DIVALPROEX SODIUM 1000 MG: 500 TABLET, EXTENDED RELEASE ORAL at 21:23

## 2024-02-18 RX ADMIN — CHOLECALCIFEROL TAB 25 MCG (1000 UNIT) 2000 UNITS: 25 TAB at 08:03

## 2024-02-18 RX ADMIN — GABAPENTIN 300 MG: 300 CAPSULE ORAL at 21:23

## 2024-02-18 RX ADMIN — TAMSULOSIN HYDROCHLORIDE 0.4 MG: 0.4 CAPSULE ORAL at 15:36

## 2024-02-18 RX ADMIN — DOCUSATE SODIUM 100 MG: 100 CAPSULE, LIQUID FILLED ORAL at 08:03

## 2024-02-18 RX ADMIN — METFORMIN HYDROCHLORIDE 500 MG: 500 TABLET, FILM COATED ORAL at 07:36

## 2024-02-18 RX ADMIN — MELATONIN TAB 3 MG 3 MG: 3 TAB at 21:23

## 2024-02-18 RX ADMIN — HALOPERIDOL 10 MG: 10 TABLET ORAL at 21:23

## 2024-02-18 RX ADMIN — DOCUSATE SODIUM 100 MG: 100 CAPSULE, LIQUID FILLED ORAL at 17:04

## 2024-02-18 NOTE — CONSULTS
Patient seen and examined at the bedside.  Chart reviewed and patient has a history of recent right distal radius fracture sustained 2 to 3 months ago treated nonoperatively for which he has been in a splint for at least 8 to 10 weeks.  With this history, he does have a history of brachial plexus injury with wrist dorsal spanning plate that is broken, unclear when that break happened.  Patient has baseline minimal motor function of the RUE.    Patient states he has no pain of the right upper extremity.  On examination, prior surgical scars are all healed and there is no swelling.  There is no tenderness to the distal radius ulna or proximal forearm  Sensation is intact to the median radial ulnar nerve distribution  Chronic flexion contracture of the fingers  Palpable radial pulse    X-rays reviewed and case discussed with attending.  Patient has been splinted for over 8 weeks and on examination he is nontender at the previous fracture site.  He can now transition to weightbearing as tolerated right upper extremity, no further splinting is recommended.  Recommend outpatient follow-up with Dr. Landers in the future.  Orthopedics signing off.

## 2024-02-18 NOTE — PLAN OF CARE
Problem: PSYCHOSIS  Goal: Will report no hallucinations or delusions  Description: Interventions:  - Administer medication as  ordered  - Every waking shifts and PRN assess for the presence of hallucinations and or delusions  - Assist with reality testing to support increasing orientation  - Assess if patient's hallucinations or delusions are encouraging self-harm or harm to others and intervene as appropriate  Outcome: Progressing     Problem: SLEEP DISTURBANCE  Goal: Will exhibit normal sleeping pattern  Description: Interventions:  -  Assess the patients sleep pattern, noting recent changes  - Administer medication as ordered  - Decrease environmental stimuli, including noise, as appropriate during the night  - Encourage the patient to actively participate in unit groups and or exercise during the day to enhance ability to achieve adequate sleep at night  - Assess the patient, in the morning, encouraging a description of sleep experience  Outcome: Progressing     Problem: Risk for Self Injury/Neglect  Goal: Treatment Goal: Remain safe during length of stay, learn and adopt new coping skills, and be free of self-injurious ideation, impulses and acts at the time of discharge  Outcome: Progressing  Goal: Verbalize thoughts and feelings  Description: Interventions:  - Assess and re-assess patient's lethality and potential for self-injury  - Engage patient in 1:1 interactions, daily, for a minimum of 15 minutes  - Encourage patient to express feelings, fears, frustrations, hopes  - Establish rapport/trust with patient   Outcome: Progressing  Goal: Refrain from harming self  Description: Interventions:  - Monitor patient closely, per order  - Develop a trusting relationship  - Supervise medication ingestion, monitor effects and side effects   Outcome: Progressing  Goal: Recognize maladaptive responses and adopt new coping mechanisms  Outcome: Progressing  Goal: Complete daily ADLs, including personal hygiene  independently, as able  Description: Interventions:  - Observe, teach, and assist patient with ADLS  - Monitor and promote a balance of rest/activity, with adequate nutrition and elimination  Outcome: Progressing     Problem: Alteration in Orientation  Goal: Treatment Goal: Demonstrate a reduction of confusion and improved orientation to person, place, time and/or situation upon discharge, according to optimum baseline/ability  Outcome: Progressing  Goal: Interact with staff daily  Description: Interventions:  - Assess and re-assess patient's level of orientation  - Engage patient in 1 on 1 interactions, daily, for a minimum of 15 minutes   - Establish rapport/trust with patient   Outcome: Progressing  Goal: Express concerns related to confused thinking related to:  Description: Interventions:  - Encourage patient to express feelings, fears, frustrations, hopes  - Assign consistent caregivers   - Elizabeth/re-orient patient as needed  - Allow comfort items, as appropriate  - Provide visual cues, signs, etc.   Outcome: Progressing  Goal: Allow medical examinations, as recommended  Description: Interventions:  - Provide physical/neurological exams and/or referrals, per provider   Outcome: Progressing  Goal: Cooperate with recommended testing/procedures  Description: Interventions:  - Determine need for ancillary testing  - Observe for mental status changes  - Implement falls/precaution protocol   Outcome: Progressing  Goal: Complete daily ADLs, including personal hygiene independently, as able  Description: Interventions:  - Observe, teach, and assist patient with ADLS  Outcome: Progressing

## 2024-02-18 NOTE — PROGRESS NOTES
Progress Note - Behavioral Health   Sudhakar Choe 58 y.o. male MRN: [id number]  Unit/Bed#: [facility] 660-02 Encounter: [id number]    Assessment/Plan   Principal Problem:    Bipolar affective disorder, current episode manic with psychotic symptoms (HCC)  Active Problems:    Benign prostatic hyperplasia    Brachial plexus injury, right, sequela    Medical clearance for psychiatric admission    Bilateral lower extremity edema    Progress Toward Goals: Unchanged.  No significant changes in behaviors or clinical status over the last 24 hours.  Sudhakar will continue working on current treatment goals which include:  1.Continue with group therapy, milieu therapy and occupational therapy  2.Behavioral Health checks every 7 minutes  3.Continue frequent safety checks and vitals per unit protocol  4.Continue with [facility] medical management as indicated - patient seen by Ortho recommended follow-up in 2 weeks if patient was still inpatient.  X-rays were completed and Ortho consult placed   5.The patient will be maintained on the following medications: Depakote ER 1,000mg PO QHS, Depakote ER 500mg PO Daily, Neurontin 300mg PO BID, Haldol 10mg PO QHS, Melatonin 3mg PO QHS, Invega Sustenna 234mg IM q4 weeks, Trazodone 50mg PO QHS  Invega Sustenna last given on 1/31, next due on 2/28  VPA level on 2/4 was therapeutic at 84  6. Disposition Planning: Discharge planning and efforts remain ongoing per primary treatment teams recommendation    Psychiatric Review of Systems:  Behavior over the last 24 hours: Unchanged  Sleep: sleeping okay throughout the night  Appetite: adequate  Medication side effects: none reported  ROS:no complaints, all other systems are negative    Patient was seen today for continuation of care, records reviewed and patient was discussed with the morning case review team.  No behavioral outbursts or acute events noted overnight and no significant changes in behaviors or clinical status over the last 24 hours.      On  assessment today, Sudhakar was found laying in bed.  He is guarded but cooperative on approach.  Does not offer any new concerns, reports feeling less depressed and less anxious.  Does seem slightly more animated today, does smile at this writer upon approach.  Sudhakar reports adequate daytime energy and denies any difficulties with initiating or staying asleep.  Oral appetite and hydration is adequate.   We reviewed once more the specific as-needed medications they can use going forward if they experience any insomnia or destabilization of their mood, they understood and were agreeable. Milieu visibility and group attendance encouraged to promote an active participation in treatment.    Sudhakar denies acute suicidal/self-harm ideation/intent/plan upon direct inquiry at this time. Sudhakar is able to contract for safety while on the unit and would feel comfortable seeking staff support should suicidal symptoms or urges appear or worsen. Sudhakar remains behaviorally appropriate, no agitation or aggression noted on exam or in report. Sudhakar also denies HI/AH/VH, and does not appear overtly manic.  Patient does not verbalize any experiences that can be categorized as paranoid, persecutory, bizarre, or somatic delusions. Sudhakar remains adherent to his current psychotropic medication regimen and denies any side effects from medications, as well as none noted on exam.    Vitals:  Vitals:    02/18/24 0750   BP: 100/63   Pulse: 96   Resp: 18   Temp: 98.6 °F (37 °C)   SpO2: 96%     Laboratory Results:  I have personally reviewed all pertinent laboratory/tests results.  Most Recent Labs:   Lab Results   Component Value Date    WBC 8.07 02/04/2024    RBC 4.48 02/04/2024    HGB 13.9 02/04/2024    HCT 40.8 02/04/2024     02/04/2024    RDW 14.1 02/04/2024    NEUTROABS 3.14 02/04/2024    SODIUM 136 02/04/2024    K 3.8 02/04/2024     02/04/2024    CO2 26 02/04/2024    BUN 10 02/04/2024    CREATININE 0.87 02/04/2024    GLUC 102  02/04/2024    GLUF 109 (H) 01/03/2024    CALCIUM 9.4 02/04/2024    AST 17 02/04/2024    ALT 10 02/04/2024    ALKPHOS 53 02/04/2024    TP 7.3 02/04/2024    ALB 4.2 02/04/2024    TBILI 0.32 02/04/2024    CHOLESTEROL 124 01/23/2024    HDL 28 (L) 01/23/2024    TRIG 116 01/23/2024    LDLCALC 73 01/23/2024    NONHDLC 96 01/23/2024    VALPROICTOT 84 02/04/2024    ZEO3JDABVRPX 1.431 12/30/2023    RPR Non-Reactive 03/26/2021    HGBA1C 6.2 (H) 01/23/2024     01/23/2024     Behavioral Health Medications: all current active meds have been reviewed and continue current psychiatric medications.  Current Facility-Administered Medications   Medication Dose Route Frequency Provider Last Rate    acetaminophen  650 mg Oral Q4H PRN BETO Novak      acetaminophen  650 mg Oral Q4H PRN Reji Looney DO      acetaminophen  975 mg Oral Q6H PRN BETO Novak      cholecalciferol  2,000 Units Oral Daily Rossi Carlson PA-C      divalproex sodium  1,000 mg Oral HS Rory Bunn MD      divalproex sodium  500 mg Oral Daily Rory Bunn MD      docusate sodium  100 mg Oral BID BETO Novak      gabapentin  300 mg Oral HS Rory Bunn MD      gabapentin  300 mg Oral Daily BETO Novak      haloperidol  10 mg Oral HS Saul Sorensen PA-C      hydrOXYzine HCL  25 mg Oral Q6H PRN Max 4/day BETO Novak      hydrOXYzine HCL  50 mg Oral Q6H PRN Max 4/day BETO Novak      LORazepam  1 mg Intramuscular Q6H PRN Max 3/day BETO Novak      LORazepam  1 mg Oral Q6H PRN Max 3/day BETO Novak      melatonin  3 mg Oral HS Rory Bunn MD      metFORMIN  500 mg Oral Daily With Breakfast BETO Garcia      mirtazapine  7.5 mg Oral HS PRN Rory Bunn MD      nicotine  1 patch Transdermal Daily PRN Fabiana Sousa MD      OLANZapine  5 mg Intramuscular Q3H PRN Max 3/day BETO Novak      OLANZapine  2.5 mg Oral Q4H PRN Max 6/day  BETO Novak      OLANZapine  5 mg Oral Q4H PRN Max 3/day Jenn Strickland, BETO      OLANZapine  5 mg Oral Q3H PRN Max 3/day BETO Novak      paliperidone  234 mg IM- Deltoid Q4 weeks BETO Novak      polyethylene glycol  17 g Oral Daily PRN BETO Novak      senna-docusate sodium  1 tablet Oral Daily PRN BETO Novak      tamsulosin  0.4 mg Oral Daily With Dinner Rossi Carlson PA-C      traZODone  100 mg Oral HS PRN Jenn Strickland, BETO      traZODone  50 mg Oral HS BETO Novak       Mental Status Evaluation:  Appearance:  disheveled, wearing splint on R arm   Behavior:  cooperative, guarded, improved eye contact   Speech:  normal rate, normal volume   Mood:  anxious   Affect:  blunted   Thought Process:  concrete   Associations: concrete associations   Thought Content:  no overt delusions   Perceptual Disturbances: no auditory hallucinations, no visual hallucinations, denies when asked, does not appear responding to internal stimuli   Risk Potential: Suicidal ideation - None at present, contracts for safety on the unit, would talk to staff if not feeling safe on the unit  Homicidal ideation - None at present  Potential for aggression - Not at present   Sensorium:  oriented to person, place, and time/date   Memory:  recent memory intact   Consciousness:  alert and awake   Attention/Concentration: attention span and concentration appear shorter than expected for age   Insight:  limited   Judgment: limited   Gait/Station: normal gait/station, normal balance   Motor Activity: no abnormal movements     Progress Toward Goals:   Sudhakar continues to require inpatient psychiatric hospitalization for continued medication management and titration to optimize symptom reduction, improve sleep hygiene, and demonstrate adequate self-care.     Suicide/Homicide Risk Assessment:  Risk of Harm to Self:   Nursing Suicide Risk Assessment Last 24 hours: C-SSRS Risk (Since Last  Contact)  Calculated C-SSRS Risk Score (Since Last Contact): No Risk Indicated    Risk of Harm to Others:  Nursing Homicide Risk Assessment: Violence Risk to Others: Denies within past 6 months    Risks / Benefits of Treatment:  Risks, benefits, and possible side effects of medications explained to patient. Patient has limited understanding of risks and benefits of treatment at this time, but agrees to take medications as prescribed.    Counseling / Coordination of Care:  Total floor/unit time spent today 25 minutes. Greater than 50% of total time was spent with the patient and / or family counseling and / or coordination of care. A description of the counseling / coordination of care:   Patient's progress discussed with staff in treatment team meeting.  Medications, treatment progress and treatment plan reviewed with patient.   Educated on importance of medication and treatment compliance.  Reassurance and supportive therapy provided.   Encouraged participation in milieu and group therapy on the unit.    BETO Pool 02/18/24

## 2024-02-18 NOTE — NURSING NOTE
Patient is calm and cooperative with care. Medication compliant. Denies all psych s/s. Patient is withdrawn to room throughout shift. Patient encouraged to make needs known. Safety checks ongoing.

## 2024-02-18 NOTE — NURSING NOTE
Patient is medication and meal compliant. No complaints of pain or discomfort.  Patient is visible on the unit but keeps mostly to himself. Patient denies depression or anxiety although he spends quite a bit of time pacing up and down the hallways of the unit. Patient denies current SI, AH or VH. Will continue to monitor patient for changes in mood or behavior.

## 2024-02-19 PROCEDURE — 99232 SBSQ HOSP IP/OBS MODERATE 35: CPT | Performed by: PSYCHIATRY & NEUROLOGY

## 2024-02-19 RX ADMIN — HALOPERIDOL 10 MG: 10 TABLET ORAL at 21:38

## 2024-02-19 RX ADMIN — METFORMIN HYDROCHLORIDE 500 MG: 500 TABLET, FILM COATED ORAL at 07:31

## 2024-02-19 RX ADMIN — DOCUSATE SODIUM 100 MG: 100 CAPSULE, LIQUID FILLED ORAL at 17:13

## 2024-02-19 RX ADMIN — CHOLECALCIFEROL TAB 25 MCG (1000 UNIT) 2000 UNITS: 25 TAB at 08:11

## 2024-02-19 RX ADMIN — DOCUSATE SODIUM 100 MG: 100 CAPSULE, LIQUID FILLED ORAL at 08:11

## 2024-02-19 RX ADMIN — TAMSULOSIN HYDROCHLORIDE 0.4 MG: 0.4 CAPSULE ORAL at 15:42

## 2024-02-19 RX ADMIN — TRAZODONE HYDROCHLORIDE 50 MG: 50 TABLET ORAL at 21:38

## 2024-02-19 RX ADMIN — DIVALPROEX SODIUM 1000 MG: 500 TABLET, EXTENDED RELEASE ORAL at 21:37

## 2024-02-19 RX ADMIN — DIVALPROEX SODIUM 500 MG: 500 TABLET, EXTENDED RELEASE ORAL at 08:11

## 2024-02-19 RX ADMIN — GABAPENTIN 300 MG: 300 CAPSULE ORAL at 21:37

## 2024-02-19 RX ADMIN — MELATONIN TAB 3 MG 3 MG: 3 TAB at 21:38

## 2024-02-19 RX ADMIN — GABAPENTIN 300 MG: 300 CAPSULE ORAL at 12:53

## 2024-02-19 NOTE — NURSING NOTE
Patient was awake and visible pacing the milieu. Cooperative with care and pleasant with peers and staff. Denies all psych s/s. Medication compliant. Safety precautions maintained.

## 2024-02-19 NOTE — PLAN OF CARE
Pt did not attend group when prompted or offered.     Problem: Risk for Self Injury/Neglect  Goal: Attend and participate in unit activities, including therapeutic, recreational, and educational groups  Description: Interventions:  - Provide therapeutic and educational activities daily, encourage attendance and participation, and document same in the medical record  - Obtain collateral information, encourage visitation and family involvement in care   Outcome: Not Progressing

## 2024-02-19 NOTE — CASE MANAGEMENT
"CM spoke to the patient who reported he had a \"really good\" weekend. The patient reported he is excited to go to [facility] as the \"next step.\" The patient is aware that we are waiting on an admissions date and that CM will let him know as soon as it is made known to CM.  "

## 2024-02-19 NOTE — NURSING NOTE
Patient is medication and meal compliant. No complaints of pain or discomfort. Patient endorses depression but denies anxiety.  Patient also denies SI, AH or VH. Patient was admitted for delusional thoughts which are currently controlled. Patient is awaiting placement at this time. Will continue to monitor patient for changes in mood or behavior.

## 2024-02-19 NOTE — PLAN OF CARE
Problem: PSYCHOSIS  Goal: Will report no hallucinations or delusions  Description: Interventions:  - Administer medication as  ordered  - Every waking shifts and PRN assess for the presence of hallucinations and or delusions  - Assist with reality testing to support increasing orientation  - Assess if patient's hallucinations or delusions are encouraging self-harm or harm to others and intervene as appropriate  Outcome: Progressing     Problem: BEHAVIOR  Goal: Pt/Family maintain appropriate behavior and adhere to behavioral management agreement, if implemented  Description: INTERVENTIONS:  - Assess the family dynamic   - Encourage verbalization of thoughts and concerns in a socially appropriate manner  - Assess patient/family's coping skills and non-compliant behavior (including use of illegal substances).  - Utilize positive, consistent limit setting strategies supporting safety of patient, staff and others  - Initiate consult with Case Management, Spiritual Care or other ancillary services as appropriate  - If a patient's/visitor's behavior jeopardizes the safety of the patient, staff, or others, refer to organization procedure.   - Notify Security of behavior or suspected illegal substances which indicate the need for search of the patient and/or belongings  - Encourage participation in the decision making process about a behavioral management agreement; implement if patient meets criteria  Outcome: Progressing     Problem: SLEEP DISTURBANCE  Goal: Will exhibit normal sleeping pattern  Description: Interventions:  -  Assess the patients sleep pattern, noting recent changes  - Administer medication as ordered  - Decrease environmental stimuli, including noise, as appropriate during the night  - Encourage the patient to actively participate in unit groups and or exercise during the day to enhance ability to achieve adequate sleep at night  - Assess the patient, in the morning, encouraging a description of sleep  experience  Outcome: Progressing     Problem: INVOLUNTARY ADMIT  Goal: Will cooperate with staff recommendations and doctor's orders and will demonstrate appropriate behavior  Description: INTERVENTIONS:  - Treat underlying conditions and offer medication as ordered  - Educate regarding involuntary admission procedures and rules  - Utilize positive consistent limit setting strategies to support patient and staff safety  Outcome: Progressing     Problem: Risk for Self Injury/Neglect  Goal: Treatment Goal: Remain safe during length of stay, learn and adopt new coping skills, and be free of self-injurious ideation, impulses and acts at the time of discharge  Outcome: Progressing  Goal: Verbalize thoughts and feelings  Description: Interventions:  - Assess and re-assess patient's lethality and potential for self-injury  - Engage patient in 1:1 interactions, daily, for a minimum of 15 minutes  - Encourage patient to express feelings, fears, frustrations, hopes  - Establish rapport/trust with patient   Outcome: Progressing  Goal: Refrain from harming self  Description: Interventions:  - Monitor patient closely, per order  - Develop a trusting relationship  - Supervise medication ingestion, monitor effects and side effects   Outcome: Progressing  Goal: Attend and participate in unit activities, including therapeutic, recreational, and educational groups  Description: Interventions:  - Provide therapeutic and educational activities daily, encourage attendance and participation, and document same in the medical record  - Obtain collateral information, encourage visitation and family involvement in care   Outcome: Progressing  Goal: Recognize maladaptive responses and adopt new coping mechanisms  Outcome: Progressing  Goal: Complete daily ADLs, including personal hygiene independently, as able  Description: Interventions:  - Observe, teach, and assist patient with ADLS  - Monitor and promote a balance of rest/activity, with  adequate nutrition and elimination  Outcome: Progressing     Problem: Alteration in Orientation  Goal: Treatment Goal: Demonstrate a reduction of confusion and improved orientation to person, place, time and/or situation upon discharge, according to optimum baseline/ability  Outcome: Progressing  Goal: Interact with staff daily  Description: Interventions:  - Assess and re-assess patient's level of orientation  - Engage patient in 1 on 1 interactions, daily, for a minimum of 15 minutes   - Establish rapport/trust with patient   Outcome: Progressing  Goal: Express concerns related to confused thinking related to:  Description: Interventions:  - Encourage patient to express feelings, fears, frustrations, hopes  - Assign consistent caregivers   - [place]/re-orient patient as needed  - Allow comfort items, as appropriate  - Provide visual cues, signs, etc.   Outcome: Progressing  Goal: Allow medical examinations, as recommended  Description: Interventions:  - Provide physical/neurological exams and/or referrals, per provider   Outcome: Progressing  Goal: Cooperate with recommended testing/procedures  Description: Interventions:  - Determine need for ancillary testing  - Observe for mental status changes  - Implement falls/precaution protocol   Outcome: Progressing  Goal: Attend and participate in unit activities, including therapeutic, recreational, and educational groups  Description: Interventions:  - Provide therapeutic and educational activities daily, encourage attendance and participation, and document same in the medical record   - Provide appropriate opportunities for reminiscence   - Provide a consistent daily routine   - Encourage family contact/visitation   Outcome: Progressing  Goal: Complete daily ADLs, including personal hygiene independently, as able  Description: Interventions:  - Observe, teach, and assist patient with ADLS  Outcome: Progressing

## 2024-02-19 NOTE — TREATMENT TEAM
02/19/24 0732   Team Meeting   Meeting Type Daily Rounds   Initial Conference Date 02/19/24   Team Members Present   Team Members Present Physician;Nurse;;;Occupational Therapist   Physician Team Member Dr. Dupree   Nursing Team Member Manas Torres   Care Management Team Member Rossi   Patient/Family Present   Patient Present No   Patient's Family Present No     Patient is calm, cooperative, and pleasant. The patient is medication compliant. Ortho recommended arm splint come off. The patient was accepted to [facility], awaiting admission date.

## 2024-02-19 NOTE — PROGRESS NOTES
"Progress Note - Behavioral Health     Sudhakar Choe 58 y.o. male MRN: [id number]   Unit/Bed#: [facility] 660-02 Encounter: [id number]    Behavior over the last 24 hours: unchanged.     Sudhakar is a 58-year-old male diagnosed with bipolar affective disorder, currently manic with psychosis seen today in follow-up.  Per staff, no acute changes reported overnight.  He is seen in room comfortably without any signs of distress.  Appears dysphoric and blunted. He states that he is progressively doing, \"better\" however still complains of some ongoing depressed mood and anxiety.  However no suicidal thoughts, plan or intent.  He denies HI/AVH.  He is currently awaiting placement to [facility].  Tolerating medications without side effects.    Sleep: normal  Appetite: normal  Medication side effects: No   ROS: no complaints, all other systems are negative    Mental Status Evaluation:    Appearance:  age appropriate, casually dressed, dressed appropriately   Behavior:  pleasant, cooperative, calm   Speech:  scant, slow to respond   Mood:  depressed, dysphoric   Affect:  blunted   Thought Process:  goal directed, linear   Associations: intact associations   Thought Content:  negative thoughts, ruminations   Perceptual Disturbances: no auditory hallucinations, no visual hallucinations   Risk Potential: Suicidal ideation - None at present  Homicidal ideation - None at present  Potential for aggression - No   Sensorium:  oriented to person, place, and time/date   Memory:  recent and remote memory grossly intact   Consciousness:  alert and awake   Attention/Concentration: attention span and concentration are age appropriate   Insight:  limited   Judgment: limited   Gait/Station: normal gait/station   Motor Activity: no abnormal movements     Vital signs in last 24 hours:    Temp:  [97.6 °F (36.4 °C)-99.3 °F (37.4 °C)] 97.6 °F (36.4 °C)  HR:  [] 104  Resp:  [18-19] 19  BP: ()/(51-73) 105/73    Laboratory results: I have " personally reviewed all pertinent laboratory/tests results    Results from the past 24 hours: No results found for this or any previous visit (from the past 24 hour(s)).  Most Recent Labs:   Lab Results   Component Value Date    WBC 8.07 02/04/2024    RBC 4.48 02/04/2024    HGB 13.9 02/04/2024    HCT 40.8 02/04/2024     02/04/2024    RDW 14.1 02/04/2024    NEUTROABS 3.14 02/04/2024    SODIUM 136 02/04/2024    K 3.8 02/04/2024     02/04/2024    CO2 26 02/04/2024    BUN 10 02/04/2024    CREATININE 0.87 02/04/2024    GLUC 102 02/04/2024    CALCIUM 9.4 02/04/2024    AST 17 02/04/2024    ALT 10 02/04/2024    ALKPHOS 53 02/04/2024    TP 7.3 02/04/2024    ALB 4.2 02/04/2024    TBILI 0.32 02/04/2024    CHOLESTEROL 124 01/23/2024    HDL 28 (L) 01/23/2024    TRIG 116 01/23/2024    LDLCALC 73 01/23/2024    NONHDLC 96 01/23/2024    VALPROICTOT 84 02/04/2024    [id number] 1.431 12/30/2023    HGBA1C 6.2 (H) 01/23/2024     01/23/2024       Progress Toward Goals: progressing    Assessment/Plan   Principal Problem:    Bipolar affective disorder, current episode manic with psychotic symptoms (HCC)  Active Problems:    Benign prostatic hyperplasia    Brachial plexus injury, right, sequela    Medical clearance for psychiatric admission    Bilateral lower extremity edema      Recommended Treatment:     Planned medication and treatment changes:    All current active medications have been reviewed  Encourage group therapy, milieu therapy and occupational therapy  Behavioral Health checks every 7 minutes    Continue with current medications and therapy  Currently awaiting placement for [facility]    Current Facility-Administered Medications   Medication Dose Route Frequency Provider Last Rate    acetaminophen  650 mg Oral Q4H PRN BETO Novak      acetaminophen  650 mg Oral Q4H PRN Reji Looney DO      acetaminophen  975 mg Oral Q6H PRN BETO Novak      cholecalciferol  2,000 Units Oral Daily Rossi  CLEMENT Carlson      divalproex sodium  1,000 mg Oral HS Rory Bunn MD      divalproex sodium  500 mg Oral Daily Rory Bunn MD      docusate sodium  100 mg Oral BID Jenn Strickland, CRNP      gabapentin  300 mg Oral HS Rory Bunn MD      gabapentin  300 mg Oral Daily Jenn Strickland, CRNP      haloperidol  10 mg Oral HS Saul Sorensen PA-C      hydrOXYzine HCL  25 mg Oral Q6H PRN Max 4/day Jenn Strickland, CRNP      hydrOXYzine HCL  50 mg Oral Q6H PRN Max 4/day Jenn Strickland, CRNP      LORazepam  1 mg Intramuscular Q6H PRN Max 3/day Jenn Strickland, CRNP      LORazepam  1 mg Oral Q6H PRN Max 3/day Jenn Strickland, CRNP      melatonin  3 mg Oral HS Rory Bunn MD      metFORMIN  500 mg Oral Daily With Breakfast Dorene Pelayo, CRNP      mirtazapine  7.5 mg Oral HS PRN Rory Bunn MD      nicotine  1 patch Transdermal Daily PRN Fabiana Sousa MD      OLANZapine  5 mg Intramuscular Q3H PRN Max 3/day Jenn Strikcland, CRNP      OLANZapine  2.5 mg Oral Q4H PRN Max 6/day Jenn Strickland, CRNP      OLANZapine  5 mg Oral Q4H PRN Max 3/day Jenn Strickland, CRNP      OLANZapine  5 mg Oral Q3H PRN Max 3/day eJnn Strickland, CRNP      paliperidone  234 mg IM- Deltoid Q4 weeks Jenn Strickland, CRNP      polyethylene glycol  17 g Oral Daily PRN Jenn Strickland, CRNP      senna-docusate sodium  1 tablet Oral Daily PRN Jenn Strickland, CRNP      tamsulosin  0.4 mg Oral Daily With Dinner Rossi Carlson PA-C      traZODone  100 mg Oral HS PRN Jenn Strickland, CRNP      traZODone  50 mg Oral HS Jenn Strickland, CRNP       Risks / Benefits of Treatment:    Risks, benefits, and possible side effects of medications explained to patient and patient verbalizes understanding and agreement for treatment.    Counseling / Coordination of Care:    Total floor / unit time spent today 20 minutes. Greater than 50% of total time was spent with the patient and / or family counseling and / or coordination of  care. A description of counseling / coordination of care:  Patient's progress discussed with staff in treatment team meeting.  Medications, treatment progress and treatment plan reviewed with patient.    Suzanna Covarrubias PA-C 02/19/24

## 2024-02-20 PROCEDURE — 99232 SBSQ HOSP IP/OBS MODERATE 35: CPT | Performed by: STUDENT IN AN ORGANIZED HEALTH CARE EDUCATION/TRAINING PROGRAM

## 2024-02-20 RX ADMIN — METFORMIN HYDROCHLORIDE 500 MG: 500 TABLET, FILM COATED ORAL at 08:07

## 2024-02-20 RX ADMIN — DIVALPROEX SODIUM 500 MG: 500 TABLET, EXTENDED RELEASE ORAL at 08:08

## 2024-02-20 RX ADMIN — DOCUSATE SODIUM 100 MG: 100 CAPSULE, LIQUID FILLED ORAL at 17:29

## 2024-02-20 RX ADMIN — TAMSULOSIN HYDROCHLORIDE 0.4 MG: 0.4 CAPSULE ORAL at 17:29

## 2024-02-20 RX ADMIN — GABAPENTIN 300 MG: 300 CAPSULE ORAL at 21:06

## 2024-02-20 RX ADMIN — DOCUSATE SODIUM 100 MG: 100 CAPSULE, LIQUID FILLED ORAL at 08:08

## 2024-02-20 RX ADMIN — TRAZODONE HYDROCHLORIDE 50 MG: 50 TABLET ORAL at 21:06

## 2024-02-20 RX ADMIN — CHOLECALCIFEROL TAB 25 MCG (1000 UNIT) 2000 UNITS: 25 TAB at 08:08

## 2024-02-20 RX ADMIN — MELATONIN TAB 3 MG 3 MG: 3 TAB at 21:06

## 2024-02-20 RX ADMIN — HALOPERIDOL 10 MG: 10 TABLET ORAL at 21:06

## 2024-02-20 RX ADMIN — GABAPENTIN 300 MG: 300 CAPSULE ORAL at 12:08

## 2024-02-20 RX ADMIN — DIVALPROEX SODIUM 1000 MG: 500 TABLET, EXTENDED RELEASE ORAL at 21:06

## 2024-02-20 NOTE — PLAN OF CARE
Problem: PSYCHOSIS  Goal: Will report no hallucinations or delusions  Description: Interventions:  - Administer medication as  ordered  - Every waking shifts and PRN assess for the presence of hallucinations and or delusions  - Assist with reality testing to support increasing orientation  - Assess if patient's hallucinations or delusions are encouraging self-harm or harm to others and intervene as appropriate  Outcome: Progressing     Problem: BEHAVIOR  Goal: Pt/Family maintain appropriate behavior and adhere to behavioral management agreement, if implemented  Description: INTERVENTIONS:  - Assess the family dynamic   - Encourage verbalization of thoughts and concerns in a socially appropriate manner  - Assess patient/family's coping skills and non-compliant behavior (including use of illegal substances).  - Utilize positive, consistent limit setting strategies supporting safety of patient, staff and others  - Initiate consult with Case Management, Spiritual Care or other ancillary services as appropriate  - If a patient's/visitor's behavior jeopardizes the safety of the patient, staff, or others, refer to organization procedure.   - Notify Security of behavior or suspected illegal substances which indicate the need for search of the patient and/or belongings  - Encourage participation in the decision making process about a behavioral management agreement; implement if patient meets criteria  Outcome: Progressing     Problem: SLEEP DISTURBANCE  Goal: Will exhibit normal sleeping pattern  Description: Interventions:  -  Assess the patients sleep pattern, noting recent changes  - Administer medication as ordered  - Decrease environmental stimuli, including noise, as appropriate during the night  - Encourage the patient to actively participate in unit groups and or exercise during the day to enhance ability to achieve adequate sleep at night  - Assess the patient, in the morning, encouraging a description of sleep  Private car experience  Outcome: Progressing     Problem: INVOLUNTARY ADMIT  Goal: Will cooperate with staff recommendations and doctor's orders and will demonstrate appropriate behavior  Description: INTERVENTIONS:  - Treat underlying conditions and offer medication as ordered  - Educate regarding involuntary admission procedures and rules  - Utilize positive consistent limit setting strategies to support patient and staff safety  Outcome: Progressing     Problem: Risk for Self Injury/Neglect  Goal: Treatment Goal: Remain safe during length of stay, learn and adopt new coping skills, and be free of self-injurious ideation, impulses and acts at the time of discharge  Outcome: Progressing  Goal: Verbalize thoughts and feelings  Description: Interventions:  - Assess and re-assess patient's lethality and potential for self-injury  - Engage patient in 1:1 interactions, daily, for a minimum of 15 minutes  - Encourage patient to express feelings, fears, frustrations, hopes  - Establish rapport/trust with patient   Outcome: Progressing  Goal: Refrain from harming self  Description: Interventions:  - Monitor patient closely, per order  - Develop a trusting relationship  - Supervise medication ingestion, monitor effects and side effects   Outcome: Progressing  Goal: Attend and participate in unit activities, including therapeutic, recreational, and educational groups  Description: Interventions:  - Provide therapeutic and educational activities daily, encourage attendance and participation, and document same in the medical record  - Obtain collateral information, encourage visitation and family involvement in care   Outcome: Progressing  Goal: Recognize maladaptive responses and adopt new coping mechanisms  Outcome: Progressing  Goal: Complete daily ADLs, including personal hygiene independently, as able  Description: Interventions:  - Observe, teach, and assist patient with ADLS  - Monitor and promote a balance of rest/activity, with  adequate nutrition and elimination  Outcome: Progressing

## 2024-02-20 NOTE — NURSING NOTE
Patient is pleasant and cooperative , with poor concentration. His behaviors and attitude is suspicious,warm and friendly. Patient has bizarre appearance with worried affect. Good medication compliance. Impulsive behaviors not  observed. Patient had to be assisted and guided with ADL's Denies Si,HI. A,V/H.

## 2024-02-20 NOTE — CASE MANAGEMENT
YUE received a VM from the patient's significant other, Rhea, requesting a call back. Per the patient, he would prefer to communicate with her directly and asked CM to withhold from speaking to her at this time. CM agreed.

## 2024-02-20 NOTE — PROGRESS NOTES
Progress Note - Behavioral Health   Sudhakar Choe 58 y.o. male MRN: [id number]  Unit/Bed#: [facility] 660-02 Encounter: [id number]    Patient was seen today for continuation of care, records reviewed and patient was discussed with the morning case review team.    Sudhkaar was seen today for psychiatric follow-up.  On assessment today, Sudhakar was calm and pleasant.  No depression or anxiety currently verbalized, patient states that he continues to do well on the unit.  Sleep and appetite also reported as unchanged.  Patient tolerates medication regimen well with no adverse effects.  Invega Sustenna to be given on 2/28/2024 patient continues to do well.  Discharge disposition ongoing as he continues to await placement at [facility].    Sudhakar denies acute suicidal/self-harm ideation/intent/plan upon direct inquiry at this time.  Sudhakar remains behaviorally appropriate, no agitation or aggression noted on exam or in report.  Sudhakar also denies HI/AH/VH, and does not appear overtly manic.  No overt delusions or paranoia are verbalized. Impulse control is intact.  Sudhakar remains adherent to his current psychotropic medication regimen and denies any side effects from medications, as well as none noted on exam.    Vitals:  Vitals:    02/20/24 0735   BP: 107/71   Pulse: 87   Resp: 16   Temp: (!) 96.6 °F (35.9 °C)   SpO2: 93%       Laboratory Results:  I have personally reviewed all pertinent laboratory/tests results.  Most Recent Labs:   Lab Results   Component Value Date    WBC 8.07 02/04/2024    RBC 4.48 02/04/2024    HGB 13.9 02/04/2024    HCT 40.8 02/04/2024     02/04/2024    RDW 14.1 02/04/2024    NEUTROABS 3.14 02/04/2024    SODIUM 136 02/04/2024    K 3.8 02/04/2024     02/04/2024    CO2 26 02/04/2024    BUN 10 02/04/2024    CREATININE 0.87 02/04/2024    GLUC 102 02/04/2024    GLUF 109 (H) 01/03/2024    CALCIUM 9.4 02/04/2024    AST 17 02/04/2024    ALT 10 02/04/2024    ALKPHOS 53 02/04/2024    TP 7.3 02/04/2024    ALB  4.2 02/04/2024    TBILI 0.32 02/04/2024    CHOLESTEROL 124 01/23/2024    HDL 28 (L) 01/23/2024    TRIG 116 01/23/2024    LDLCALC 73 01/23/2024    NONHDLC 96 01/23/2024    VALPROICTOT 84 02/04/2024    [id number] 1.431 12/30/2023    RPR Non-Reactive 03/26/2021    HGBA1C 6.2 (H) 01/23/2024     01/23/2024       Psychiatric Review of Systems:  Behavior over the last 24 hours:  unchanged.   Sleep: good  Appetite: good  Medication side effects:   ROS: no complaints, denies shortness of breath or chest pain and all other systems are negative for acute changes    Mental Status Evaluation:  Appearance:  disheveled   Behavior:  cooperative, calm   Speech:  normal rate and volume   Mood:  euthymic   Affect:  blunted   Thought Process:  linear   Thought Content:  no overt delusions, ruminating thoughts   Perceptual Disturbances: denies auditory hallucinations when asked, does not appear responding to internal stimuli   Risk Potential: Suicidal ideation - None  Homicidal ideation - None  Potential for aggression - No   Memory:  recent and remote memory grossly intact   Sensorium  person, place, and time/date      Consciousness:  alert and awake   Attention: attention span and concentration are age appropriate   Insight:  limited   Judgment: limited   Gait/Station: normal gait/station   Motor Activity: no abnormal movements   Progress Toward Goals:   Sudhakar is progressing towards goals of inpatient psychiatric treatment by continued medication compliance and is attending therapeutic modalities on the milieu. However, the patient continues to require inpatient psychiatric hospitalization for continued medication management and titration to optimize symptom reduction, improve sleep hygiene, and demonstrate adequate self-care.    Assessment/Plan   Principal Problem:    Bipolar affective disorder, current episode manic with psychotic symptoms (HCC)  Active Problems:    Benign prostatic hyperplasia    Brachial plexus injury,  right, sequela    Medical clearance for psychiatric admission    Bilateral lower extremity edema      Recommended Treatment: Treatment plan and medication changes discussed and per the attending physician the plan is:    1.Continue with group therapy, milieu therapy and occupational therapy  2.Behavioral Health checks every 7 minutes  3.Continue frequent safety checks and vitals per unit protocol  4.Continue with SLIM medical management as indicated  5.Continue with current medication regimen  6.Will review labs in the a.m.  7.Disposition Planning: Discharge planning and efforts remain ongoing    Behavioral Health Medications: all current active meds have been reviewed and continue current psychiatric medications.  Current Facility-Administered Medications   Medication Dose Route Frequency Provider Last Rate    acetaminophen  650 mg Oral Q4H PRN Jenncampbell Strickland, CRNP      acetaminophen  650 mg Oral Q4H PRN Reji Looney DO      acetaminophen  975 mg Oral Q6H PRN Jenn Strickland, BETO      cholecalciferol  2,000 Units Oral Daily Rossi Carlson PA-C      divalproex sodium  1,000 mg Oral HS Rory Bunn MD      divalproex sodium  500 mg Oral Daily Rory Bunn MD      docusate sodium  100 mg Oral BID Jenn Strickland, LYLANP      gabapentin  300 mg Oral HS Rory Bunn MD      gabapentin  300 mg Oral Daily Jenn Strickland, BETO      haloperidol  10 mg Oral HS Saul Sorensen PA-C      hydrOXYzine HCL  25 mg Oral Q6H PRN Max 4/day Jenn Strickland, CRNP      hydrOXYzine HCL  50 mg Oral Q6H PRN Max 4/day Jenn Strickland, BETO      LORazepam  1 mg Intramuscular Q6H PRN Max 3/day Jenn Strickland, BETO      LORazepam  1 mg Oral Q6H PRN Max 3/day Jenn Strickland, BETO      melatonin  3 mg Oral HS Rory Bunn MD      metFORMIN  500 mg Oral Daily With Breakfast BETO Garcia      mirtazapine  7.5 mg Oral HS PRN Rory Bunn MD      nicotine  1 patch Transdermal Daily PRN Fabiana Sousa,  MD      OLANZapine  5 mg Intramuscular Q3H PRN Max 3/day Jenn Strickland, BETO      OLANZapine  2.5 mg Oral Q4H PRN Max 6/day Jenn Strickland, BETO      OLANZapine  5 mg Oral Q4H PRN Max 3/day Jenn Strickland, BETO      OLANZapine  5 mg Oral Q3H PRN Max 3/day Jenn Strickland, BETO      paliperidone  234 mg IM- Deltoid Q4 weeks Jenn Strickland, BETO      polyethylene glycol  17 g Oral Daily PRN Jenn Strickland, BETO      senna-docusate sodium  1 tablet Oral Daily PRN Jenn Strickland, BETO      tamsulosin  0.4 mg Oral Daily With Dinner Rossi Carlson PA-C      traZODone  100 mg Oral HS PRN Jenn Strickland, BETO      traZODone  50 mg Oral HS Jenn Strickland, BETO         Risks / Benefits of Treatment:  Risks, benefits, and possible side effects of medications explained to patient and patient verbalizes understanding and agreement for treatment.    Counseling / Coordination of Care:  Patient's progress reviewed with nursing staff.  Medications, treatment progress and treatment plan reviewed with patient.  Supportive counseling provided to the patient.    Total floor/unit time spent today 25 minutes. Greater than 50% of total time was spent with the patient and / or family counseling and / or coordination of care. A description of the counseling / coordination of care: medication education, treatment plan, supportive therapy.    This note was completed in part utilizing Dragon dictation Software. Grammatical, translation, syntax errors, random word insertions, spelling mistakes, and incomplete sentences may be an occasional consequence of this system secondary to software limitations with voice recognition, ambient noise, and hardware issues. If you have any questions or concerns about the content, text, or information contained within the body of this dictation, please contact the provider for clarification

## 2024-02-20 NOTE — TREATMENT TEAM
02/20/24 0733   Team Meeting   Meeting Type Daily Rounds   Initial Conference Date 02/20/24   Team Members Present   Team Members Present Physician;Nurse;;;Occupational Therapist   Physician Team Member Jenn Rene   Nursing Team Member July Torres   Care Management Team Member Rossi   Social Work Team Member Tammy   OT Team Member Curt   Patient/Family Present   Patient Present No   Patient's Family Present No     Patient is calm, cooperative, and pleasant. The patient is medication compliant. Ortho recommended arm splint come off. The patient was accepted to [facility], awaiting admission date.

## 2024-02-21 RX ADMIN — DIVALPROEX SODIUM 500 MG: 500 TABLET, EXTENDED RELEASE ORAL at 08:32

## 2024-02-21 RX ADMIN — ACETAMINOPHEN 975 MG: 325 TABLET, FILM COATED ORAL at 23:37

## 2024-02-21 RX ADMIN — METFORMIN HYDROCHLORIDE 500 MG: 500 TABLET, FILM COATED ORAL at 08:32

## 2024-02-21 RX ADMIN — DIVALPROEX SODIUM 1000 MG: 500 TABLET, EXTENDED RELEASE ORAL at 21:07

## 2024-02-21 RX ADMIN — DOCUSATE SODIUM 100 MG: 100 CAPSULE, LIQUID FILLED ORAL at 08:32

## 2024-02-21 RX ADMIN — HALOPERIDOL 10 MG: 10 TABLET ORAL at 21:07

## 2024-02-21 RX ADMIN — DOCUSATE SODIUM 100 MG: 100 CAPSULE, LIQUID FILLED ORAL at 17:06

## 2024-02-21 RX ADMIN — GABAPENTIN 300 MG: 300 CAPSULE ORAL at 11:28

## 2024-02-21 RX ADMIN — MELATONIN TAB 3 MG 3 MG: 3 TAB at 21:07

## 2024-02-21 RX ADMIN — TRAZODONE HYDROCHLORIDE 50 MG: 50 TABLET ORAL at 21:07

## 2024-02-21 RX ADMIN — TAMSULOSIN HYDROCHLORIDE 0.4 MG: 0.4 CAPSULE ORAL at 17:06

## 2024-02-21 RX ADMIN — GABAPENTIN 300 MG: 300 CAPSULE ORAL at 21:07

## 2024-02-21 RX ADMIN — CHOLECALCIFEROL TAB 25 MCG (1000 UNIT) 2000 UNITS: 25 TAB at 08:33

## 2024-02-21 NOTE — CASE MANAGEMENT
Per Alex GERONIMO, they would prefer the patient be admitted on a mental health commitment. The patient is currently on a 304, set to  24. Once Alex provides  with a discharge date,  will discuss the transfer with [facility] in order to update the patient's placement on his legal status paperwork.

## 2024-02-21 NOTE — NURSING NOTE
Patient visible on the unit sitting with peers or pacing the halls for the majority of the shift. Patient social with staff to make needs known and have brief conversations, otherwise isolative to self. Patient cooperative with assessment questions, currently denies depression, anxiety, SI/HI/AVH. Patient compliant with medications and meals, appetite good. No further signs of distress noted.

## 2024-02-21 NOTE — NURSING NOTE
Pt visible on unit. Pt appears depressed but denies all psych signs and symptoms. Compliant with scheduled medications. Social with staff and peers. Pt offers no complaints at this time. Will maintain q7 min checks.

## 2024-02-21 NOTE — PROGRESS NOTES
Progress Note - Behavioral Health   Sudhakar Choe 58 y.o. male MRN: [id number]  Unit/Bed#: [facility] 660-02 Encounter: [id number]    Patient was seen today for continuation of care, records reviewed and patient was discussed with the morning case review team.    Sudhakar was seen today for psychiatric follow-up.  On assessment today, Sudhakar was seen lying in bed.  No current distress expressed at this time, patient continues to be euthymic.  No medication changes made at this time, Depakote 500/1000 mg daily continues to be tolerated well with no behavioral disturbances or manic symptoms verbalized by staff.  Invega Sustenna also to be given on 2/28/2024, patient continues to need the use of 2 antipsychotics secondary to multiple failures of monotherapy.  Tentative discharge ongoing, patient will be discharged to [facility] upon availability.    Sudhakar denies acute suicidal/self-harm ideation/intent/plan upon direct inquiry at this time.  Sudhakar remains behaviorally appropriate, no agitation or aggression noted on exam or in report.  Sudhakar also denies HI/AH/VH, and does not appear overtly manic.  No overt delusions or paranoia are verbalized. Impulse control is intact.  Sudhakar remains adherent to his current psychotropic medication regimen and denies any side effects from medications, as well as none noted on exam.    Vitals:  Vitals:    02/21/24 0745   BP: 94/52   Pulse: 93   Resp: 18   Temp: 97.7 °F (36.5 °C)   SpO2: 100%       Laboratory Results:  I have personally reviewed all pertinent laboratory/tests results.  Most Recent Labs:   Lab Results   Component Value Date    WBC 8.07 02/04/2024    RBC 4.48 02/04/2024    HGB 13.9 02/04/2024    HCT 40.8 02/04/2024     02/04/2024    RDW 14.1 02/04/2024    NEUTROABS 3.14 02/04/2024    SODIUM 136 02/04/2024    K 3.8 02/04/2024     02/04/2024    CO2 26 02/04/2024    BUN 10 02/04/2024    CREATININE 0.87 02/04/2024    GLUC 102 02/04/2024    GLUF 109 (H) 01/03/2024     CALCIUM 9.4 02/04/2024    AST 17 02/04/2024    ALT 10 02/04/2024    ALKPHOS 53 02/04/2024    TP 7.3 02/04/2024    ALB 4.2 02/04/2024    TBILI 0.32 02/04/2024    CHOLESTEROL 124 01/23/2024    HDL 28 (L) 01/23/2024    TRIG 116 01/23/2024    LDLCALC 73 01/23/2024    NONHDLC 96 01/23/2024    VALPROICTOT 84 02/04/2024    RCG7KQCLOHAB 1.431 12/30/2023    RPR Non-Reactive 03/26/2021    HGBA1C 6.2 (H) 01/23/2024     01/23/2024       Psychiatric Review of Systems:  Behavior over the last 24 hours:  unchanged.   Sleep: good  Appetite: good  Medication side effects: none  ROS: no complaints, denies shortness of breath or chest pain and all other systems are negative for acute changes    Mental Status Evaluation:  Appearance:  disheveled   Behavior:  cooperative, calm   Speech:  normal rate and volume   Mood:  euthymic   Affect:  blunted   Thought Process:  linear   Thought Content:  no overt delusions   Perceptual Disturbances: denies auditory hallucinations when asked, does not appear responding to internal stimuli   Risk Potential: Suicidal ideation - None  Homicidal ideation - None  Potential for aggression - No   Memory:  recent and remote memory grossly intact   Sensorium  person, place, and time/date      Consciousness:  alert and awake   Attention: attention span and concentration are age appropriate   Insight:  limited   Judgment: limited   Gait/Station: normal gait/station   Motor Activity: no abnormal movements   Progress Toward Goals:   Sudhakar is progressing towards goals of inpatient psychiatric treatment by continued medication compliance and is attending therapeutic modalities on the milieu. However, the patient continues to require inpatient psychiatric hospitalization for continued medication management and titration to optimize symptom reduction, improve sleep hygiene, and demonstrate adequate self-care.    Assessment/Plan   Principal Problem:    Bipolar affective disorder, current episode manic with  psychotic symptoms (HCC)  Active Problems:    Benign prostatic hyperplasia    Brachial plexus injury, right, sequela    Medical clearance for psychiatric admission    Bilateral lower extremity edema      Recommended Treatment: Treatment plan and medication changes discussed and per the attending physician the plan is:    1.Continue with group therapy, milieu therapy and occupational therapy  2.Behavioral Health checks every 7 minutes  3.Continue frequent safety checks and vitals per unit protocol  4.Continue with SLIM medical management as indicated  5.Continue with current medication regimen  6.Will review labs in the a.m.  7.Disposition Planning: Discharge planning and efforts remain ongoing    Behavioral Health Medications: all current active meds have been reviewed and continue current psychiatric medications.  Current Facility-Administered Medications   Medication Dose Route Frequency Provider Last Rate    acetaminophen  650 mg Oral Q4H PRN Jenn Strickland, CRNP      acetaminophen  650 mg Oral Q4H PRN Reji Looney DO      acetaminophen  975 mg Oral Q6H PRN Jenn Strickland, CRROBERT      cholecalciferol  2,000 Units Oral Daily Rossi Carlson PA-C      divalproex sodium  1,000 mg Oral HS Rory Bunn MD      divalproex sodium  500 mg Oral Daily Rory Bunn MD      docusate sodium  100 mg Oral BID Jenn Strickland, BETO      gabapentin  300 mg Oral HS Rory Bunn MD      gabapentin  300 mg Oral Daily BETO Novak      haloperidol  10 mg Oral HS Saul Sorensen PA-C      hydrOXYzine HCL  25 mg Oral Q6H PRN Max 4/day Jenn Strickland, LYLANP      hydrOXYzine HCL  50 mg Oral Q6H PRN Max 4/day Jenn Strickland, BETO      LORazepam  1 mg Intramuscular Q6H PRN Max 3/day Jenn Strickland, BETO      LORazepam  1 mg Oral Q6H PRN Max 3/day Jenn Strickland, LYLANP      melatonin  3 mg Oral HS Rory Bunn MD      metFORMIN  500 mg Oral Daily With Breakfast BETO Garcia      mirtazapine  7.5 mg Oral  HS PRN Rory Bunn MD      nicotine  1 patch Transdermal Daily PRN Fabiana Sousa MD      OLANZapine  5 mg Intramuscular Q3H PRN Max 3/day Jenn Strickland, CRNP      OLANZapine  2.5 mg Oral Q4H PRN Max 6/day Jenn Strickland, CRNP      OLANZapine  5 mg Oral Q4H PRN Max 3/day Jenn Strickland, CRNP      OLANZapine  5 mg Oral Q3H PRN Max 3/day Jenn Strickland, CRNP      paliperidone  234 mg IM- Deltoid Q4 weeks Jenn Strickland, CRNP      polyethylene glycol  17 g Oral Daily PRN Jenn Strickland, CRNP      senna-docusate sodium  1 tablet Oral Daily PRN Jenn Strickland, CRNP      tamsulosin  0.4 mg Oral Daily With Dinner Rossi Carlson PA-C      traZODone  100 mg Oral HS PRN Jenn Strickland, CRNP      traZODone  50 mg Oral HS Jenn Strickland, LYLANP         Risks / Benefits of Treatment:  Risks, benefits, and possible side effects of medications explained to patient and patient verbalizes understanding and agreement for treatment.    Counseling / Coordination of Care:  Patient's progress reviewed with nursing staff.  Medications, treatment progress and treatment plan reviewed with patient.  Supportive counseling provided to the patient.    Total floor/unit time spent today 25 minutes. Greater than 50% of total time was spent with the patient and / or family counseling and / or coordination of care. A description of the counseling / coordination of care: medication education, treatment plan, supportive therapy.    This note was completed in part utilizing Dragon dictation Software. Grammatical, translation, syntax errors, random word insertions, spelling mistakes, and incomplete sentences may be an occasional consequence of this system secondary to software limitations with voice recognition, ambient noise, and hardware issues. If you have any questions or concerns about the content, text, or information contained within the body of this dictation, please contact the provider for clarification

## 2024-02-21 NOTE — TREATMENT TEAM
02/21/24 0746   Team Meeting   Meeting Type Daily Rounds   Initial Conference Date 02/21/24   Next Conference Date 03/22/24   Team Members Present   Team Members Present Physician;Nurse;;;Occupational Therapist   Physician Team Member Jenn Rene   Nursing Team Member July Kenyon   Care Management Team Member Rossi   Social Work Team Member Tammy ROMAN Team Member Curt   Patient/Family Present   Patient Present No   Patient's Family Present No   Pharmacy: [facility]    Patient is calm, cooperative, and pleasant. The patient is medication compliant. The patient was accepted to [facility], awaiting admission date.

## 2024-02-21 NOTE — NURSING NOTE
"Patient intermittently visible. Patient requesting shower repeatedly and requesting meds right on the dot. Patient denies psych s/s however has flat affect. Patient was showered with assistance of tech which tech reported patient had extremely large bowel movement while standing in shower. Patient asked why he didn't use toilet instead and patient replied \"I didn't feel it\". No other issues at this time. VSS. Continual rounding in place  "

## 2024-02-21 NOTE — TREATMENT TEAM
Pt did not attend group.  Pt visible in hallway and waiting for snack (10 minutes before due).  Encouraged pt to attend programming tomorrow. Pt did express that he will attend groups. tomorrow after the reminder.      02/21/24 1330   Activity/Group Checklist   Group Other (Comment)  (communication and boundaries)   Attendance Did not attend

## 2024-02-22 RX ADMIN — METFORMIN HYDROCHLORIDE 500 MG: 500 TABLET, FILM COATED ORAL at 08:05

## 2024-02-22 RX ADMIN — TRAZODONE HYDROCHLORIDE 50 MG: 50 TABLET ORAL at 21:53

## 2024-02-22 RX ADMIN — GABAPENTIN 300 MG: 300 CAPSULE ORAL at 21:52

## 2024-02-22 RX ADMIN — DIVALPROEX SODIUM 500 MG: 500 TABLET, EXTENDED RELEASE ORAL at 08:06

## 2024-02-22 RX ADMIN — GABAPENTIN 300 MG: 300 CAPSULE ORAL at 11:52

## 2024-02-22 RX ADMIN — DOCUSATE SODIUM 100 MG: 100 CAPSULE, LIQUID FILLED ORAL at 17:25

## 2024-02-22 RX ADMIN — DOCUSATE SODIUM 100 MG: 100 CAPSULE, LIQUID FILLED ORAL at 08:07

## 2024-02-22 RX ADMIN — TAMSULOSIN HYDROCHLORIDE 0.4 MG: 0.4 CAPSULE ORAL at 17:25

## 2024-02-22 RX ADMIN — HYDROXYZINE HYDROCHLORIDE 25 MG: 25 TABLET, FILM COATED ORAL at 19:31

## 2024-02-22 RX ADMIN — CHOLECALCIFEROL TAB 25 MCG (1000 UNIT) 2000 UNITS: 25 TAB at 08:05

## 2024-02-22 RX ADMIN — MELATONIN TAB 3 MG 3 MG: 3 TAB at 21:53

## 2024-02-22 RX ADMIN — DIVALPROEX SODIUM 1000 MG: 500 TABLET, EXTENDED RELEASE ORAL at 21:52

## 2024-02-22 RX ADMIN — HALOPERIDOL 10 MG: 10 TABLET ORAL at 21:53

## 2024-02-22 NOTE — NURSING NOTE
Pt is calm, cooperative and visible on unit. Pt appears depressed but denies all. Med/meal compliant. Continues to be isolative to self. No behavioral issues. Will maintain q7 min checks.

## 2024-02-22 NOTE — PROGRESS NOTES
Progress Note - Behavioral Health   Sudhakar Choe 58 y.o. male MRN: [id number]  Unit/Bed#: [facility] 660-02 Encounter: [id number]    Patient was seen today for continuation of care, records reviewed and patient was discussed with the morning case review team.    Sudhakar was seen today for psychiatric follow-up.  On assessment today, Sudhakar was calm and pleasant.  No depression or anxiety currently verbalized, patient continues to be in good behavioral control with no agitation or aggression towards staff or peers.  Encouraged to attend groups as he has been increasingly isolated and withdrawn, patient states that he plans to attend tomorrow.  No medication change to be made at this time, patient continues to need the use of two antipsychotics secondary to multiple failures of monotherapy.  Tentative discharge to [facility] ongoing    Sudhakar denies acute suicidal/self-harm ideation/intent/plan upon direct inquiry at this time.  Sudhakar remains behaviorally appropriate, no agitation or aggression noted on exam or in report.  Sudhakar also denies HI/AH/VH, and does not appear overtly manic.  No overt delusions or paranoia are verbalized. Impulse control is intact.  Sudhakar remains adherent to her current psychotropic medication regimen and denies any side effects from medications, as well as none noted on exam.    Vitals:  Vitals:    02/22/24 0745   BP: 103/67   Pulse: 92   Resp: 16   Temp: (!) 96.9 °F (36.1 °C)   SpO2: 93%       Laboratory Results:  I have personally reviewed all pertinent laboratory/tests results.  Most Recent Labs:   Lab Results   Component Value Date    WBC 8.07 02/04/2024    RBC 4.48 02/04/2024    HGB 13.9 02/04/2024    HCT 40.8 02/04/2024     02/04/2024    RDW 14.1 02/04/2024    NEUTROABS 3.14 02/04/2024    SODIUM 136 02/04/2024    K 3.8 02/04/2024     02/04/2024    CO2 26 02/04/2024    BUN 10 02/04/2024    CREATININE 0.87 02/04/2024    GLUC 102 02/04/2024    GLUF 109 (H) 01/03/2024     CALCIUM 9.4 02/04/2024    AST 17 02/04/2024    ALT 10 02/04/2024    ALKPHOS 53 02/04/2024    TP 7.3 02/04/2024    ALB 4.2 02/04/2024    TBILI 0.32 02/04/2024    CHOLESTEROL 124 01/23/2024    HDL 28 (L) 01/23/2024    TRIG 116 01/23/2024    LDLCALC 73 01/23/2024    NONHDLC 96 01/23/2024    VALPROICTOT 84 02/04/2024    [id number] 1.431 12/30/2023    RPR Non-Reactive 03/26/2021    HGBA1C 6.2 (H) 01/23/2024     01/23/2024       Psychiatric Review of Systems:  Behavior over the last 24 hours:  unchanged.   Sleep: good  Appetite: good  Medication side effects: none  ROS: no complaints, denies shortness of breath or chest pain and all other systems are negative for acute changes    Mental Status Evaluation:  Appearance:  adequate grooming   Behavior:  cooperative, calm   Speech:  normal rate and volume   Mood:  euthymic   Affect:  blunted   Thought Process:  linear   Thought Content:  no overt delusions   Perceptual Disturbances: denies auditory hallucinations when asked, does not appear responding to internal stimuli   Risk Potential: Suicidal ideation - None  Homicidal ideation - None  Potential for aggression - No   Memory:  recent and remote memory grossly intact   Sensorium  person, place, and time/date      Consciousness:  alert and awake   Attention: attention span and concentration are age appropriate   Insight:  limited   Judgment: limited   Gait/Station: normal gait/station   Motor Activity: no abnormal movements   Progress Toward Goals:   Sudhakar is progressing towards goals of inpatient psychiatric treatment by continued medication compliance and is attending therapeutic modalities on the milieu. However, the patient continues to require inpatient psychiatric hospitalization for continued medication management and titration to optimize symptom reduction, improve sleep hygiene, and demonstrate adequate self-care.    Assessment/Plan   Principal Problem:    Bipolar affective disorder, current episode manic  with psychotic symptoms (HCC)  Active Problems:    Benign prostatic hyperplasia    Brachial plexus injury, right, sequela    Medical clearance for psychiatric admission    Bilateral lower extremity edema      Recommended Treatment: Treatment plan and medication changes discussed and per the attending physician the plan is:    1.Continue with group therapy, milieu therapy and occupational therapy  2.Behavioral Health checks every 7 minutes  3.Continue frequent safety checks and vitals per unit protocol  4.Continue with SLIM medical management as indicated  5.Continue with current medication regimen  6.Will review labs in the a.m.  7.Disposition Planning: Discharge planning and efforts remain ongoing    Behavioral Health Medications: all current active meds have been reviewed and continue current psychiatric medications.  Current Facility-Administered Medications   Medication Dose Route Frequency Provider Last Rate    acetaminophen  650 mg Oral Q4H PRN Jenn Strickland, LYLANP      acetaminophen  650 mg Oral Q4H PRN Reji Looney DO      acetaminophen  975 mg Oral Q6H PRN Jenn Strickland, BETO      cholecalciferol  2,000 Units Oral Daily Rossi Carlson PA-C      divalproex sodium  1,000 mg Oral HS Rory Bunn MD      divalproex sodium  500 mg Oral Daily Rory Bunn MD      docusate sodium  100 mg Oral BID Jenn Strickland, BETO      gabapentin  300 mg Oral HS Rory Bunn MD      gabapentin  300 mg Oral Daily BETO Novak      haloperidol  10 mg Oral HS Saul Sorensen PA-C      hydrOXYzine HCL  25 mg Oral Q6H PRN Max 4/day Jenn Strickland, LYLANP      hydrOXYzine HCL  50 mg Oral Q6H PRN Max 4/day BETO Novak      LORazepam  1 mg Intramuscular Q6H PRN Max 3/day Jenn Strickland, BETO      LORazepam  1 mg Oral Q6H PRN Max 3/day Jenn Strickland, BETO      melatonin  3 mg Oral HS Rory Bunn MD      metFORMIN  500 mg Oral Daily With Breakfast BETO Garcia      mirtazapine  7.5 mg  Oral HS PRN Rory Bunn MD      nicotine  1 patch Transdermal Daily PRN Fabiana Sousa MD      OLANZapine  5 mg Intramuscular Q3H PRN Max 3/day Jenn Strickland, CRNP      OLANZapine  2.5 mg Oral Q4H PRN Max 6/day Jenn Strickland, CRNP      OLANZapine  5 mg Oral Q4H PRN Max 3/day Jenn Strickland, CRNP      OLANZapine  5 mg Oral Q3H PRN Max 3/day Jenn Strickland, CRNP      paliperidone  234 mg IM- Deltoid Q4 weeks Jenn Strickland, LYLANP      polyethylene glycol  17 g Oral Daily PRN Jenn Strickland, CRNP      senna-docusate sodium  1 tablet Oral Daily PRN Jenn Strickland, CRNP      tamsulosin  0.4 mg Oral Daily With Dinner Rossi Carlson PA-C      traZODone  100 mg Oral HS PRN Jenn Strickland, LLYANP      traZODone  50 mg Oral HS Jenn Strickland, LYLANP         Risks / Benefits of Treatment:  Risks, benefits, and possible side effects of medications explained to patient and patient verbalizes understanding and agreement for treatment.    Counseling / Coordination of Care:  Patient's progress reviewed with nursing staff.  Medications, treatment progress and treatment plan reviewed with patient.  Supportive counseling provided to the patient.    Total floor/unit time spent today 25 minutes. Greater than 50% of total time was spent with the patient and / or family counseling and / or coordination of care. A description of the counseling / coordination of care: medication education, treatment plan, supportive therapy.    This note was completed in part utilizing Dragon dictation Software. Grammatical, translation, syntax errors, random word insertions, spelling mistakes, and incomplete sentences may be an occasional consequence of this system secondary to software limitations with voice recognition, ambient noise, and hardware issues. If you have any questions or concerns about the content, text, or information contained within the body of this dictation, please contact the provider for clarification

## 2024-02-22 NOTE — TREATMENT TEAM
02/22/24 0724   Team Meeting   Meeting Type Daily Rounds   Initial Conference Date 02/22/24   Team Members Present   Team Members Present Physician;Nurse;;;Occupational Therapist   Physician Team Member Jenn Rene   Nursing Team Member July Torres   Care Management Team Member Rossi   Social Work Team Member Tammy   OT Team Member Curt   Patient/Family Present   Patient Present No   Patient's Family Present No     Patient is calm, cooperative, and pleasant. The patient is medication compliant. The patient was accepted to [facility], awaiting admission date.

## 2024-02-22 NOTE — PLAN OF CARE
Problem: SLEEP DISTURBANCE  Goal: Will exhibit normal sleeping pattern  Description: Interventions:  -  Assess the patients sleep pattern, noting recent changes  - Administer medication as ordered  - Decrease environmental stimuli, including noise, as appropriate during the night  - Encourage the patient to actively participate in unit groups and or exercise during the day to enhance ability to achieve adequate sleep at night  - Assess the patient, in the morning, encouraging a description of sleep experience  Outcome: Progressing     Problem: INVOLUNTARY ADMIT  Goal: Will cooperate with staff recommendations and doctor's orders and will demonstrate appropriate behavior  Description: INTERVENTIONS:  - Treat underlying conditions and offer medication as ordered  - Educate regarding involuntary admission procedures and rules  - Utilize positive consistent limit setting strategies to support patient and staff safety  Outcome: Progressing     Problem: Risk for Self Injury/Neglect  Goal: Verbalize thoughts and feelings  Description: Interventions:  - Assess and re-assess patient's lethality and potential for self-injury  - Engage patient in 1:1 interactions, daily, for a minimum of 15 minutes  - Encourage patient to express feelings, fears, frustrations, hopes  - Establish rapport/trust with patient   Outcome: Progressing  Goal: Refrain from harming self  Description: Interventions:  - Monitor patient closely, per order  - Develop a trusting relationship  - Supervise medication ingestion, monitor effects and side effects   Outcome: Progressing

## 2024-02-22 NOTE — PROGRESS NOTES
02/22/24 1330   Activity/Group Checklist   Group Self Esteem  (self awareness task.)   Attendance Attended   Attendance Duration (min) 46-60   Interactions Other (Comment)  (Pt,sat behind peers and spokeonly when called upon. 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 wti closed eyes intermittently yet did not fall asleep in session.)   Affect/Mood Calm   Goals Achieved Able to engage in interactions;Able to listen to others;Discussed coping strategies

## 2024-02-22 NOTE — TREATMENT TEAM
"   02/21/24 2458   Pain Assessment   Pain Assessment Tool 0-10   Pain Score 9   Pain Location/Orientation Orientation: Right;Location: Buttocks;Location: Leg   Pain Onset/Description Onset: Sudden   Effect of Pain on Daily Activities affecting sleep   Patient's Stated Pain Goal No pain   Hospital Pain Intervention(s) Medication (See MAR)     Pt reporting 9/10 \"sciatic nerve\" pain. PRN indicated.  "

## 2024-02-22 NOTE — TREATMENT TEAM
02/22/24 0037   Pain Assessment   Pain Rating: FLACC (Rest) - Face 0   Pain Rating: FLACC (Rest) - Legs 0   Pain Rating: FLACC (Rest) - Activity 0   Pain Rating: FLACC (Rest) - Cry 0   Pain Rating: FLACC (Rest) - Consolability 0   Score: FLACC (Rest) 0   Pain Assessment Post Intervention   Reason Not Indicated Sleeping / Easy to arouse   Pain Assessment Tool Used: FLACC   Post Intervention Pain Score No Pain   Post Intervention Pain Location/Orientation Orientation: Right;Location: Buttocks;Location: Leg   Response to Interventions effective     Pt resting with even, unlabored respirations. FLACC score 0. PRN Tylenol appears effective.

## 2024-02-23 RX ADMIN — MELATONIN TAB 3 MG 3 MG: 3 TAB at 21:48

## 2024-02-23 RX ADMIN — DOCUSATE SODIUM 100 MG: 100 CAPSULE, LIQUID FILLED ORAL at 17:15

## 2024-02-23 RX ADMIN — DOCUSATE SODIUM 100 MG: 100 CAPSULE, LIQUID FILLED ORAL at 08:31

## 2024-02-23 RX ADMIN — TAMSULOSIN HYDROCHLORIDE 0.4 MG: 0.4 CAPSULE ORAL at 17:14

## 2024-02-23 RX ADMIN — GABAPENTIN 300 MG: 300 CAPSULE ORAL at 11:47

## 2024-02-23 RX ADMIN — CHOLECALCIFEROL TAB 25 MCG (1000 UNIT) 2000 UNITS: 25 TAB at 08:30

## 2024-02-23 RX ADMIN — TRAZODONE HYDROCHLORIDE 50 MG: 50 TABLET ORAL at 21:48

## 2024-02-23 RX ADMIN — METFORMIN HYDROCHLORIDE 500 MG: 500 TABLET, FILM COATED ORAL at 08:31

## 2024-02-23 RX ADMIN — HALOPERIDOL 10 MG: 10 TABLET ORAL at 21:48

## 2024-02-23 RX ADMIN — GABAPENTIN 300 MG: 300 CAPSULE ORAL at 21:47

## 2024-02-23 RX ADMIN — DIVALPROEX SODIUM 500 MG: 500 TABLET, EXTENDED RELEASE ORAL at 08:31

## 2024-02-23 RX ADMIN — DIVALPROEX SODIUM 1000 MG: 500 TABLET, EXTENDED RELEASE ORAL at 21:47

## 2024-02-23 NOTE — PROGRESS NOTES
"Progress Note - Behavioral Health   Sudhakar Choe 58 y.o. male MRN: [id number]  Unit/Bed#: [facility] 660-02 Encounter: [id number]    Patient was seen today for continuation of care, records reviewed and patient was discussed with the morning case review team.    Sudhakar was seen today for psychiatric follow-up.  On assessment today, Sudhakar stated \"Im okay\".  No behavioral disturbances overnight, patient continues to exhibit improving judgment.  Mild anxiety noted overnight, as needed Atarax given to assist with symptoms.  Discussed with patient increase of Neurontin at this time, stating that he would like to hold off a little bit longer.  We will continue with current medications without any changes.  Tentative disposition ongoing as patient continues to await for a bed with [facility].     Sudhakar denies acute suicidal/self-harm ideation/intent/plan upon direct inquiry at this time.  Sudhakar remains behaviorally appropriate, no agitation or aggression noted on exam or in report.  Sudhakar also denies HI/AH/VH, and does not appear overtly manic.  No overt delusions or paranoia are verbalized. Impulse control is intact.  Sudhakar remains adherent to his current psychotropic medication regimen and denies any side effects from medications, as well as none noted on exam.    Vitals:  Vitals:    02/23/24 0714   BP: 109/67   Pulse: 90   Resp: 15   Temp: 98.1 °F (36.7 °C)   SpO2: 99%       Laboratory Results:  I have personally reviewed all pertinent laboratory/tests results.  Most Recent Labs:   Lab Results   Component Value Date    WBC 8.07 02/04/2024    RBC 4.48 02/04/2024    HGB 13.9 02/04/2024    HCT 40.8 02/04/2024     02/04/2024    RDW 14.1 02/04/2024    NEUTROABS 3.14 02/04/2024    SODIUM 136 02/04/2024    K 3.8 02/04/2024     02/04/2024    CO2 26 02/04/2024    BUN 10 02/04/2024    CREATININE 0.87 02/04/2024    GLUC 102 02/04/2024    GLUF 109 (H) 01/03/2024    CALCIUM 9.4 02/04/2024    AST 17 02/04/2024    ALT 10 " 02/04/2024    ALKPHOS 53 02/04/2024    TP 7.3 02/04/2024    ALB 4.2 02/04/2024    TBILI 0.32 02/04/2024    CHOLESTEROL 124 01/23/2024    HDL 28 (L) 01/23/2024    TRIG 116 01/23/2024    LDLCALC 73 01/23/2024    NONHDLC 96 01/23/2024    VALPROICTOT 84 02/04/2024    FJV1XLCTAHGD 1.431 12/30/2023    RPR Non-Reactive 03/26/2021    HGBA1C 6.2 (H) 01/23/2024     01/23/2024       Psychiatric Review of Systems:  Behavior over the last 24 hours:  unchanged.   Sleep: good  Appetite: good  Medication side effects: none  ROS: no complaints, denies shortness of breath or chest pain and all other systems are negative for acute changes    Mental Status Evaluation:  Appearance:  disheveled   Behavior:  cooperative, calm   Speech:  normal rate and volume   Mood:  euthymic   Affect:  blunted   Thought Process:  linear   Thought Content:  no overt delusions   Perceptual Disturbances: denies auditory hallucinations when asked, does not appear responding to internal stimuli   Risk Potential: Suicidal ideation - None  Homicidal ideation - None  Potential for aggression - No   Memory:  recent and remote memory grossly intact   Sensorium  person, place, and time/date      Consciousness:  alert and awake   Attention: attention span and concentration are age appropriate   Insight:  limited   Judgment: limited   Gait/Station: normal gait/station   Motor Activity: no abnormal movements   Progress Toward Goals:   Sudhakar is progressing towards goals of inpatient psychiatric treatment by continued medication compliance and is attending therapeutic modalities on the milieu. However, the patient continues to require inpatient psychiatric hospitalization for continued medication management and titration to optimize symptom reduction, improve sleep hygiene, and demonstrate adequate self-care.    Assessment/Plan   Principal Problem:    Bipolar affective disorder, current episode manic with psychotic symptoms (HCC)  Active Problems:    Benign  prostatic hyperplasia    Brachial plexus injury, right, sequela    Medical clearance for psychiatric admission    Bilateral lower extremity edema      Recommended Treatment: Treatment plan and medication changes discussed and per the attending physician the plan is:    1.Continue with group therapy, milieu therapy and occupational therapy  2.Behavioral Health checks every 7 minutes  3.Continue frequent safety checks and vitals per unit protocol  4.Continue with [facility] medical management as indicated  5.Continue with current medication regimen  6.Will review labs in the a.m.  7.Disposition Planning: Discharge planning and efforts remain ongoing    Behavioral Health Medications: all current active meds have been reviewed and continue current psychiatric medications.  Current Facility-Administered Medications   Medication Dose Route Frequency Provider Last Rate    acetaminophen  650 mg Oral Q4H PRN Jenn Strickland, CRNP      acetaminophen  650 mg Oral Q4H PRN Reji Looney DO      acetaminophen  975 mg Oral Q6H PRN Jenn Strickland, BETO      cholecalciferol  2,000 Units Oral Daily Rossi Carlson PA-C      divalproex sodium  1,000 mg Oral HS Rory Bunn MD      divalproex sodium  500 mg Oral Daily Rory Bunn MD      docusate sodium  100 mg Oral BID Jenn Strickland, LYLANP      gabapentin  300 mg Oral HS Rory Bunn MD      gabapentin  300 mg Oral Daily Jenn Strickland, BETO      haloperidol  10 mg Oral HS Saul Sorensen PA-C      hydrOXYzine HCL  25 mg Oral Q6H PRN Max 4/day Jenn Strickland, LYLANP      hydrOXYzine HCL  50 mg Oral Q6H PRN Max 4/day BETO Novak      LORazepam  1 mg Intramuscular Q6H PRN Max 3/day Jenn Strickland, BETO      LORazepam  1 mg Oral Q6H PRN Max 3/day Jenn Strickland, BETO      melatonin  3 mg Oral HS Rory Bunn MD      metFORMIN  500 mg Oral Daily With Breakfast BETO Garcia      mirtazapine  7.5 mg Oral HS PRN Rory Bunn MD      nicotine  1 patch  Transdermal Daily PRN Fabiana Sousa MD      OLANZapine  5 mg Intramuscular Q3H PRN Max 3/day Jenn Strickland, CRNP      OLANZapine  2.5 mg Oral Q4H PRN Max 6/day Jenn Strickland, CRNP      OLANZapine  5 mg Oral Q4H PRN Max 3/day Jenn Strickland, CRNP      OLANZapine  5 mg Oral Q3H PRN Max 3/day Jenn Strickland, CRNP      paliperidone  234 mg IM- Deltoid Q4 weeks Jenn Strickland, BETO      polyethylene glycol  17 g Oral Daily PRN Jenn Strickland, LYLANP      senna-docusate sodium  1 tablet Oral Daily PRN Jenn Strickland, LYLANP      tamsulosin  0.4 mg Oral Daily With Dinner Rossi Carlson PA-C      traZODone  100 mg Oral HS PRN Jenn Strickland, BETO      traZODone  50 mg Oral HS Jenn Strickland, BETO         Risks / Benefits of Treatment:  Risks, benefits, and possible side effects of medications explained to patient and patient verbalizes understanding and agreement for treatment.    Counseling / Coordination of Care:  Patient's progress reviewed with nursing staff.  Medications, treatment progress and treatment plan reviewed with patient.  Supportive counseling provided to the patient.    Total floor/unit time spent today 25 minutes. Greater than 50% of total time was spent with the patient and / or family counseling and / or coordination of care. A description of the counseling / coordination of care: medication education, treatment plan, supportive therapy.    This note was completed in part utilizing Dragon dictation Software. Grammatical, translation, syntax errors, random word insertions, spelling mistakes, and incomplete sentences may be an occasional consequence of this system secondary to software limitations with voice recognition, ambient noise, and hardware issues. If you have any questions or concerns about the content, text, or information contained within the body of this dictation, please contact the provider for clarification

## 2024-02-23 NOTE — CASE MANAGEMENT
CM is awaiting on an admissions date from [facility]. Once the date it known, the patient's legal status (304) will need to be updated to reflect the placement change.

## 2024-02-23 NOTE — PROGRESS NOTES
02/23/24 0901   Team Meeting   Meeting Type Daily Rounds   Initial Conference Date 02/23/24   Team Members Present   Team Members Present Physician;Nurse;;;Occupational Therapist   Physician Team Member Oni/Zeny/Jenn   Nursing Team Member July   Care Management Team Member Kyle   Social Work Team Member Tammy   OT Team Member Curt   Patient/Family Present   Patient Present No   Patient's Family Present No     Patient is calm and cooperative. Awaiting placement in [facility]. Discharge is pending.

## 2024-02-23 NOTE — PLAN OF CARE
Problem: INVOLUNTARY ADMIT  Goal: Will cooperate with staff recommendations and doctor's orders and will demonstrate appropriate behavior  Description: INTERVENTIONS:  - Treat underlying conditions and offer medication as ordered  - Educate regarding involuntary admission procedures and rules  - Utilize positive consistent limit setting strategies to support patient and staff safety  Outcome: Progressing     Problem: Risk for Self Injury/Neglect  Goal: Verbalize thoughts and feelings  Description: Interventions:  - Assess and re-assess patient's lethality and potential for self-injury  - Engage patient in 1:1 interactions, daily, for a minimum of 15 minutes  - Encourage patient to express feelings, fears, frustrations, hopes  - Establish rapport/trust with patient   Outcome: Progressing  Goal: Refrain from harming self  Description: Interventions:  - Monitor patient closely, per order  - Develop a trusting relationship  - Supervise medication ingestion, monitor effects and side effects   Outcome: Progressing     Problem: Alteration in Orientation  Goal: Interact with staff daily  Description: Interventions:  - Assess and re-assess patient's level of orientation  - Engage patient in 1 on 1 interactions, daily, for a minimum of 15 minutes   - Establish rapport/trust with patient   Outcome: Progressing  Goal: Allow medical examinations, as recommended  Description: Interventions:  - Provide physical/neurological exams and/or referrals, per provider   Outcome: Progressing  Goal: Attend and participate in unit activities, including therapeutic, recreational, and educational groups  Description: Interventions:  - Provide therapeutic and educational activities daily, encourage attendance and participation, and document same in the medical record   - Provide appropriate opportunities for reminiscence   - Provide a consistent daily routine   - Encourage family contact/visitation   Outcome: Progressing

## 2024-02-23 NOTE — TREATMENT TEAM
"   02/22/24 1930   Rangel Anxiety Scale   Anxious Mood 2   Tension 2   Fears 2   Insomnia 0   Intellectual 3   Depressed Mood 2   Somatic Complaints: Muscular 0   Somatic Complaints: Sensory 2   Cardiovascular Symptoms 1   Respiratory Symptoms 0   Gastrointestinal Symptoms 0   Genitourinary Symptoms 0   Autonomic Symptoms 1   Behavior at Interview 2   Rangel Anxiety Score 17     Pt approached nurse's station stating he felt \"weak\" and \"anxious.\" Pt's VSS. Atarax 25 mg administered at 1931.  "

## 2024-02-23 NOTE — NURSING NOTE
Pt visible pacing through milieu earlier this shift. Pt pleasant on approach but appears depressed with flat affect. Pt reported anxiety but denied depression/SI/HI/AVH. Pt ate snack and retired to room after. Pt currently resting with even, unlabored respirations. Pt able to and encouraged to inform staff of any needs.

## 2024-02-24 LAB
ATRIAL RATE: 99 BPM
P AXIS: 24 DEGREES
PR INTERVAL: 192 MS
QRS AXIS: 60 DEGREES
QRSD INTERVAL: 80 MS
QT INTERVAL: 334 MS
QTC INTERVAL: 428 MS
T WAVE AXIS: 38 DEGREES
VENTRICULAR RATE: 99 BPM

## 2024-02-24 PROCEDURE — 93005 ELECTROCARDIOGRAM TRACING: CPT

## 2024-02-24 PROCEDURE — 93010 ELECTROCARDIOGRAM REPORT: CPT | Performed by: INTERNAL MEDICINE

## 2024-02-24 RX ADMIN — TRAZODONE HYDROCHLORIDE 50 MG: 50 TABLET ORAL at 21:36

## 2024-02-24 RX ADMIN — GABAPENTIN 300 MG: 300 CAPSULE ORAL at 12:18

## 2024-02-24 RX ADMIN — DOCUSATE SODIUM 100 MG: 100 CAPSULE, LIQUID FILLED ORAL at 08:45

## 2024-02-24 RX ADMIN — TAMSULOSIN HYDROCHLORIDE 0.4 MG: 0.4 CAPSULE ORAL at 17:07

## 2024-02-24 RX ADMIN — METFORMIN HYDROCHLORIDE 500 MG: 500 TABLET, FILM COATED ORAL at 08:45

## 2024-02-24 RX ADMIN — HALOPERIDOL 10 MG: 10 TABLET ORAL at 21:36

## 2024-02-24 RX ADMIN — DOCUSATE SODIUM 100 MG: 100 CAPSULE, LIQUID FILLED ORAL at 17:07

## 2024-02-24 RX ADMIN — DIVALPROEX SODIUM 500 MG: 500 TABLET, EXTENDED RELEASE ORAL at 08:45

## 2024-02-24 RX ADMIN — GABAPENTIN 300 MG: 300 CAPSULE ORAL at 21:36

## 2024-02-24 RX ADMIN — MELATONIN TAB 3 MG 3 MG: 3 TAB at 21:36

## 2024-02-24 RX ADMIN — DIVALPROEX SODIUM 1000 MG: 500 TABLET, EXTENDED RELEASE ORAL at 21:36

## 2024-02-24 RX ADMIN — CHOLECALCIFEROL TAB 25 MCG (1000 UNIT) 2000 UNITS: 25 TAB at 08:45

## 2024-02-24 NOTE — NURSING NOTE
Pt seen pacing the halls earlier this shift. Pt pleasant on approach and denied anxiety/depression/SI/HI/AVH. Pt compliant with medications. Pt currently resting in bed with even, unlabored respirations. Pt able to and encouraged to inform staff of any needs.

## 2024-02-24 NOTE — PLAN OF CARE
Problem: PSYCHOSIS  Goal: Will report no hallucinations or delusions  Description: Interventions:  - Administer medication as  ordered  - Every waking shifts and PRN assess for the presence of hallucinations and or delusions  - Assist with reality testing to support increasing orientation  - Assess if patient's hallucinations or delusions are encouraging self-harm or harm to others and intervene as appropriate  Outcome: Progressing     Problem: SLEEP DISTURBANCE  Goal: Will exhibit normal sleeping pattern  Description: Interventions:  -  Assess the patients sleep pattern, noting recent changes  - Administer medication as ordered  - Decrease environmental stimuli, including noise, as appropriate during the night  - Encourage the patient to actively participate in unit groups and or exercise during the day to enhance ability to achieve adequate sleep at night  - Assess the patient, in the morning, encouraging a description of sleep experience  Outcome: Progressing     Problem: Risk for Self Injury/Neglect  Goal: Verbalize thoughts and feelings  Description: Interventions:  - Assess and re-assess patient's lethality and potential for self-injury  - Engage patient in 1:1 interactions, daily, for a minimum of 15 minutes  - Encourage patient to express feelings, fears, frustrations, hopes  - Establish rapport/trust with patient   Outcome: Progressing  Goal: Refrain from harming self  Description: Interventions:  - Monitor patient closely, per order  - Develop a trusting relationship  - Supervise medication ingestion, monitor effects and side effects   Outcome: Progressing     Problem: Alteration in Orientation  Goal: Interact with staff daily  Description: Interventions:  - Assess and re-assess patient's level of orientation  - Engage patient in 1 on 1 interactions, daily, for a minimum of 15 minutes   - Establish rapport/trust with patient   Outcome: Progressing  Goal: Allow medical examinations, as  recommended  Description: Interventions:  - Provide physical/neurological exams and/or referrals, per provider   Outcome: Progressing

## 2024-02-24 NOTE — PROGRESS NOTES
"  Progress Note - Behavioral Health   Sudhakar Choe 58 y.o. male MRN: [id number]  Unit/Bed#: [facility] 660-02 Encounter: [id number]       Patient was visited on unit for continuing care; chart reviewed and discussed with the nursing staff. On approach, the patient was calm and superficially cooperative. Remains somatically preoccupied, but denied any pain or specific sxs at this time. Denied any changes in mood, appetite, and energy level. No problem initiating and maintaining sleep. Denied A/VH currently. Denied SI/HI, intent or plan upon direct inquiry at this time.    Patient continues to be intermittently visible in the milieu and interacts with select staff and peers. No reports of aggression or self-injurious behavior on unit. No PRN medications used in the past 24 hours.    Patient accepted all offered medications and reported feeling \"good\". No adverse effects of medications noted or reported. The patient has a history of at least 3 antipsychotic medication trials and at this time requires treatment with 2 antipsychotic agents due to multiple failed trials of monotherapy.  Will continue DE ANDA Invega Sustenna 234 mg IM monthly (next dose due on 2/28/2024).  The patient has to remain in the hospital while awaiting placement as their mental illness does not allow for them to be treated at a lower level of care. Case management is assertively/actively working on securing the necessary level of care (pending EAC).    Current Mental Status Evaluation:  Appearance and attitude: appeared as stated age, dressed in hospital attire, wearing eyeglasses, with good hygiene  Eye contact: good  Motor Function: within normal limits, No PMA/PMR  Gait/station: Not observed  Speech: normal for rate, rhythm, volume, latency, amount  Language: No overt abnormality  Mood/affect:  \"good\"  / Affect was constricted, mood-congruent  Thought Processes: linear, preoccupied with somatic symptoms  Thought content: denied suicidal ideations " or homicidal ideations, no overt delusions elicited, less somatically preoccupied  Associations: concrete associations  Perceptual disturbances: denies Auditory/Visual/Tactile Hallucinations  Orientation: oriented to time, person, place and to the situational context  Cognitive Function: intact  Memory: not cooperative with formal MMSE  Intellect: unable to assess  Fund of knowledge: aware of current events, aware of past history, and vocabulary average  Impulse control: good  Insight/judgment: limited/limited    Pain: denied  Pain scale: 0      Vital signs in last 24 hours:    Temp:  [97.3 °F (36.3 °C)-97.7 °F (36.5 °C)] 97.3 °F (36.3 °C)  HR:  [90-99] 94  Resp:  [16-18] 18  BP: (102-125)/(56-76) 106/59    Laboratory results: I have personally reviewed all pertinent laboratory/tests results    Results from the past 24 hours: No results found for this or any previous visit (from the past 24 hour(s)).    Progress Toward Goals: mood is stabilizing, placement pending    Assessment:  Principal Problem:    Bipolar affective disorder, current episode manic with psychotic symptoms (HCC)  Active Problems:    Benign prostatic hyperplasia    Brachial plexus injury, right, sequela    Medical clearance for psychiatric admission    Bilateral lower extremity edema        Plan:  - f/u [facility] recs regarding the medical problems  - Fall precaution  - Continue DE ANDA Invega Sustenna 234 mg IM monthly (next dose due on 2/28/2024)  - Continue medication titration and treatment plan; adjust medication to optimize treatment response and as clinically indicated.     Scheduled medications:  Current Facility-Administered Medications   Medication Dose Route Frequency Provider Last Rate    acetaminophen  650 mg Oral Q4H PRN BETO Novak      acetaminophen  650 mg Oral Q4H PRN Reji Looney DO      acetaminophen  975 mg Oral Q6H PRN BETO Novak      cholecalciferol  2,000 Units Oral Daily Rossi Carlson PA-C      divalproex  sodium  1,000 mg Oral HS Rory Bunn MD      divalproex sodium  500 mg Oral Daily Rory Bunn MD      docusate sodium  100 mg Oral BID Jenn Strickland, CRNP      gabapentin  300 mg Oral HS Rory Bunn MD      gabapentin  300 mg Oral Daily Jenn Strickland, CRNP      haloperidol  10 mg Oral HS Saul Sorensen PA-C      hydrOXYzine HCL  25 mg Oral Q6H PRN Max 4/day Jenn Strickland, CRNP      hydrOXYzine HCL  50 mg Oral Q6H PRN Max 4/day Jenn Strickland, CRNP      LORazepam  1 mg Intramuscular Q6H PRN Max 3/day Jenn Strickland, CRNP      LORazepam  1 mg Oral Q6H PRN Max 3/day Jenn Strickland, CRNP      melatonin  3 mg Oral HS Rory Bunn MD      metFORMIN  500 mg Oral Daily With Breakfast Dorene Pelayo, BETO      mirtazapine  7.5 mg Oral HS PRN Rory Bunn MD      nicotine  1 patch Transdermal Daily PRN Fabiana Sousa MD      OLANZapine  5 mg Intramuscular Q3H PRN Max 3/day Jenn Strickland, CRNP      OLANZapine  2.5 mg Oral Q4H PRN Max 6/day Jenn Strickland, CRNP      OLANZapine  5 mg Oral Q4H PRN Max 3/day Jenn Strickland, CRNP      OLANZapine  5 mg Oral Q3H PRN Max 3/day Jenn Strickland, CRNP      paliperidone  234 mg IM- Deltoid Q4 weeks Jenn Strickland, CRNP      polyethylene glycol  17 g Oral Daily PRN Jenn Strickland, CRNP      senna-docusate sodium  1 tablet Oral Daily PRN Jenn Strickland, CRNP      tamsulosin  0.4 mg Oral Daily With Dinner Rossi Carlson PA-C      traZODone  100 mg Oral HS PRN Jenn Strickland, CRNP      traZODone  50 mg Oral HS Jenn Strickland, CRNP          PRN:    acetaminophen    acetaminophen    acetaminophen    hydrOXYzine HCL    hydrOXYzine HCL    LORazepam    LORazepam    mirtazapine    nicotine    OLANZapine    OLANZapine    OLANZapine    OLANZapine    polyethylene glycol    senna-docusate sodium    traZODone    - Observation: routine    - VS: as per unit protocol  - Diet: Regular diet  - Psychoeducation (benefits and potential risks) discussed,  importance of compliance with the psychiatric treatment reiterated, and the patient verbalized understanding of the matter  - Encourage group attendance and milieu therapy    - The pt was educated and agreed to verbalize any suicidal thoughts, frustrations or concerns to the nursing staff, immediately.    - Dispo: Pending EAC      Next of Kin  Extended Emergency Contact Information  Primary Emergency Contact: Rhea Sesay  Mobile Phone: [phone]  Relation: Significant Other   needed? No  Secondary Emergency Contact: DONNIE CANELA  Mobile Phone: [phone]  Relation: Son      Counseling / Coordination of Care    Patient's progress reviewed with nursing staff.  Medications, treatment progress and treatment plan reviewed with patient.  Medication education provided to patient.  Reassurance and supportive therapy provided.  Reoriented to reality and reassured.  Encouraged participation in milieu and group therapy on the unit.     Rory Bunn MD  Attending Psychiatrist   [facility]         This note was completed in part utilizing Dragon dictation Software. Grammatical, translation, syntax errors, random word insertions, spelling mistakes, and incomplete sentences may be an occasional consequence of this system secondary to software limitations with voice recognition, ambient noise, and hardware issues. If you have any questions or concerns about the content, text, or information contained within the body of this dictation, please contact the provider for clarification.

## 2024-02-24 NOTE — NURSING NOTE
Patient remained calm, was visible walking the unit today. He denied any s/s, was meals and med compliant. Will continue to monitor.

## 2024-02-25 RX ADMIN — CHOLECALCIFEROL TAB 25 MCG (1000 UNIT) 2000 UNITS: 25 TAB at 08:39

## 2024-02-25 RX ADMIN — GABAPENTIN 300 MG: 300 CAPSULE ORAL at 21:39

## 2024-02-25 RX ADMIN — DOCUSATE SODIUM 100 MG: 100 CAPSULE, LIQUID FILLED ORAL at 17:00

## 2024-02-25 RX ADMIN — DIVALPROEX SODIUM 1000 MG: 500 TABLET, EXTENDED RELEASE ORAL at 21:39

## 2024-02-25 RX ADMIN — DIVALPROEX SODIUM 500 MG: 500 TABLET, EXTENDED RELEASE ORAL at 08:39

## 2024-02-25 RX ADMIN — DOCUSATE SODIUM 100 MG: 100 CAPSULE, LIQUID FILLED ORAL at 08:39

## 2024-02-25 RX ADMIN — METFORMIN HYDROCHLORIDE 500 MG: 500 TABLET, FILM COATED ORAL at 08:39

## 2024-02-25 RX ADMIN — HALOPERIDOL 10 MG: 10 TABLET ORAL at 21:39

## 2024-02-25 RX ADMIN — GABAPENTIN 300 MG: 300 CAPSULE ORAL at 12:07

## 2024-02-25 RX ADMIN — MELATONIN TAB 3 MG 3 MG: 3 TAB at 21:39

## 2024-02-25 RX ADMIN — TRAZODONE HYDROCHLORIDE 50 MG: 50 TABLET ORAL at 21:39

## 2024-02-25 RX ADMIN — TAMSULOSIN HYDROCHLORIDE 0.4 MG: 0.4 CAPSULE ORAL at 17:00

## 2024-02-25 NOTE — PLAN OF CARE
Problem: PSYCHOSIS  Goal: Will report no hallucinations or delusions  Description: Interventions:  - Administer medication as  ordered  - Every waking shifts and PRN assess for the presence of hallucinations and or delusions  - Assist with reality testing to support increasing orientation  - Assess if patient's hallucinations or delusions are encouraging self-harm or harm to others and intervene as appropriate  Outcome: Progressing     Problem: BEHAVIOR  Goal: Pt/Family maintain appropriate behavior and adhere to behavioral management agreement, if implemented  Description: INTERVENTIONS:  - Assess the family dynamic   - Encourage verbalization of thoughts and concerns in a socially appropriate manner  - Assess patient/family's coping skills and non-compliant behavior (including use of illegal substances).  - Utilize positive, consistent limit setting strategies supporting safety of patient, staff and others  - Initiate consult with Case Management, Spiritual Care or other ancillary services as appropriate  - If a patient's/visitor's behavior jeopardizes the safety of the patient, staff, or others, refer to organization procedure.   - Notify Security of behavior or suspected illegal substances which indicate the need for search of the patient and/or belongings  - Encourage participation in the decision making process about a behavioral management agreement; implement if patient meets criteria  Outcome: Progressing     Problem: SLEEP DISTURBANCE  Goal: Will exhibit normal sleeping pattern  Description: Interventions:  -  Assess the patients sleep pattern, noting recent changes  - Administer medication as ordered  - Decrease environmental stimuli, including noise, as appropriate during the night  - Encourage the patient to actively participate in unit groups and or exercise during the day to enhance ability to achieve adequate sleep at night  - Assess the patient, in the morning, encouraging a description of sleep  experience  Outcome: Progressing     Problem: Risk for Self Injury/Neglect  Goal: Treatment Goal: Remain safe during length of stay, learn and adopt new coping skills, and be free of self-injurious ideation, impulses and acts at the time of discharge  Outcome: Progressing  Goal: Verbalize thoughts and feelings  Description: Interventions:  - Assess and re-assess patient's lethality and potential for self-injury  - Engage patient in 1:1 interactions, daily, for a minimum of 15 minutes  - Encourage patient to express feelings, fears, frustrations, hopes  - Establish rapport/trust with patient   Outcome: Progressing  Goal: Refrain from harming self  Description: Interventions:  - Monitor patient closely, per order  - Develop a trusting relationship  - Supervise medication ingestion, monitor effects and side effects   Outcome: Progressing  Goal: Attend and participate in unit activities, including therapeutic, recreational, and educational groups  Description: Interventions:  - Provide therapeutic and educational activities daily, encourage attendance and participation, and document same in the medical record  - Obtain collateral information, encourage visitation and family involvement in care   Outcome: Progressing  Goal: Recognize maladaptive responses and adopt new coping mechanisms  Outcome: Progressing  Goal: Complete daily ADLs, including personal hygiene independently, as able  Description: Interventions:  - Observe, teach, and assist patient with ADLS  - Monitor and promote a balance of rest/activity, with adequate nutrition and elimination  Outcome: Progressing     Problem: Alteration in Orientation  Goal: Treatment Goal: Demonstrate a reduction of confusion and improved orientation to person, place, time and/or situation upon discharge, according to optimum baseline/ability  Outcome: Progressing  Goal: Interact with staff daily  Description: Interventions:  - Assess and re-assess patient's level of  orientation  - Engage patient in 1 on 1 interactions, daily, for a minimum of 15 minutes   - Establish rapport/trust with patient   Outcome: Progressing  Goal: Express concerns related to confused thinking related to:  Description: Interventions:  - Encourage patient to express feelings, fears, frustrations, hopes  - Assign consistent caregivers   - Deming/re-orient patient as needed  - Allow comfort items, as appropriate  - Provide visual cues, signs, etc.   Outcome: Progressing  Goal: Allow medical examinations, as recommended  Description: Interventions:  - Provide physical/neurological exams and/or referrals, per provider   Outcome: Progressing  Goal: Cooperate with recommended testing/procedures  Description: Interventions:  - Determine need for ancillary testing  - Observe for mental status changes  - Implement falls/precaution protocol   Outcome: Progressing  Goal: Attend and participate in unit activities, including therapeutic, recreational, and educational groups  Description: Interventions:  - Provide therapeutic and educational activities daily, encourage attendance and participation, and document same in the medical record   - Provide appropriate opportunities for reminiscence   - Provide a consistent daily routine   - Encourage family contact/visitation   Outcome: Progressing  Goal: Complete daily ADLs, including personal hygiene independently, as able  Description: Interventions:  - Observe, teach, and assist patient with ADLS  Outcome: Progressing

## 2024-02-25 NOTE — NURSING NOTE
Patient is pleasant, calm and cooperative with care. Denies all psych s/s. Patient appears depressed, pacing the hallways with his head down. Medication complaint. Patient denies any needs at this time. Safety checks ongoing.

## 2024-02-25 NOTE — PROGRESS NOTES
"  Progress Note - Behavioral Health   Sudhakar Choe 58 y.o. male MRN: [id number]  Unit/Bed#: [facility] 660-02 Encounter: [id number]       Patient was visited on unit for continuing care; chart reviewed and discussed with the nursing staff. On approach, the patient was calm and superficially cooperative.  Endorsed good mood and denied any changes in his appetite and energy level.  Appeared less internally preoccupied and denied any paranoid ideations; endorsed feeling safe in the hospital.  No problem initiating and maintaining sleep. Denied A/VH currently. Denied SI/HI, intent or plan upon direct inquiry at this time.    Patient continues to be intermittently visible in the milieu and interacts with select staff and peers. No reports of aggression or self-injurious behavior on unit. No PRN medications used in the past 24 hours.    Patient accepted all offered medications and reported feeling good. No adverse effects of medications noted or reported.  The patient has a history of at least 3 antipsychotic medication trials and at this time requires treatment with 2 antipsychotic agents due to multiple failed trials of monotherapy.  Will continue DE ANDA Invega Sustenna 234 mg IM monthly (next dose due on 2/28/2024).  EKG was done yesterday: And SSR.  The patient has to remain in the hospital while awaiting placement as their mental illness does not allow for them to be treated at a lower level of care. Case management is assertively/actively working on securing the necessary level of care) pending EAC).    Current Mental Status Evaluation:  Appearance and attitude: appeared as stated age, casually dressed, with good hygiene  Eye contact: good  Motor Function: within normal limits, intact gait, No PMA/PMR  Gait/station: normal gait/station and normal balance  Speech: normal for rate, rhythm, volume, and latency with decreased amount  Language: No overt abnormality  Mood/affect: \"good\" / Affect was constricted but " reactive, mood congruent  Thought Processes: linear, concrete  Thought content: denied suicidal ideations or homicidal ideations, no overt delusions elicited  Associations: concrete associations  Perceptual disturbances: denies Auditory/Visual/Tactile Hallucinations  Orientation: oriented to time, person, place and to the situational context  Cognitive Function: intact  Memory: not cooperative with formal MMSE  Intellect: average  Fund of knowledge: aware of current events, aware of past history, and vocabulary average  Impulse control: good  Insight/judgment: limited/limited    Pain: denied  Pain scale: 0      Vital signs in last 24 hours:    Temp:  [97.9 °F (36.6 °C)-98.4 °F (36.9 °C)] 97.9 °F (36.6 °C)  HR:  [] 89  Resp:  [16-18] 16  BP: (100-114)/(56-71) 100/56    Laboratory results: I have personally reviewed all pertinent laboratory/tests results    Results from the past 24 hours: No results found for this or any previous visit (from the past 24 hour(s)).    Progress Toward Goals: continues to improve, placement pending    Assessment:  Principal Problem:    Bipolar affective disorder, current episode manic with psychotic symptoms (HCC)  Active Problems:    Benign prostatic hyperplasia    Brachial plexus injury, right, sequela    Medical clearance for psychiatric admission    Bilateral lower extremity edema        Plan:  - f/u [facility] recs regarding the medical problems  - Fall precaution  - Continue DE ANDA Invega Sustenna 234 mg IM monthly (next dose due on 2/28/2024)  - Continue medication titration and treatment plan; adjust medication to optimize treatment response and as clinically indicated.     Scheduled medications:  Current Facility-Administered Medications   Medication Dose Route Frequency Provider Last Rate    acetaminophen  650 mg Oral Q4H PRN BETO Novak      acetaminophen  650 mg Oral Q4H PRN Reji Looney DO      acetaminophen  975 mg Oral Q6H PRN BETO Novak       cholecalciferol  2,000 Units Oral Daily Rossi Carlson PA-C      divalproex sodium  1,000 mg Oral HS Rory Bunn MD      divalproex sodium  500 mg Oral Daily Rory Bunn MD      docusate sodium  100 mg Oral BID Jenn Strickland, CRNP      gabapentin  300 mg Oral HS Rory Bunn MD      gabapentin  300 mg Oral Daily Jenn Strickland, CRNP      haloperidol  10 mg Oral HS Saul Sorensen PA-C      hydrOXYzine HCL  25 mg Oral Q6H PRN Max 4/day Jenn Strickland, CRNP      hydrOXYzine HCL  50 mg Oral Q6H PRN Max 4/day Jenn Strickland, CRNP      LORazepam  1 mg Intramuscular Q6H PRN Max 3/day Jenn Strickland, CRNP      LORazepam  1 mg Oral Q6H PRN Max 3/day Jenn Strickland, CRNP      melatonin  3 mg Oral HS Rory Bunn MD      metFORMIN  500 mg Oral Daily With Breakfast Dorene Pelayo, BETO      mirtazapine  7.5 mg Oral HS PRN Rory Bunn MD      nicotine  1 patch Transdermal Daily PRN Fabiana Sousa MD      OLANZapine  5 mg Intramuscular Q3H PRN Max 3/day Jenn Strickland, CRNP      OLANZapine  2.5 mg Oral Q4H PRN Max 6/day Jenn Strickland, CRNP      OLANZapine  5 mg Oral Q4H PRN Max 3/day Jenn Strickland, CRNP      OLANZapine  5 mg Oral Q3H PRN Max 3/day Jenn Strickland, CRNP      paliperidone  234 mg IM- Deltoid Q4 weeks Jenn Strickland, BETO      polyethylene glycol  17 g Oral Daily PRN Jenn Strickland, CRNP      senna-docusate sodium  1 tablet Oral Daily PRN Jenn Strickland, CRNP      tamsulosin  0.4 mg Oral Daily With Dinner Rossi Carlson PA-C      traZODone  100 mg Oral HS PRN Jenn Strickland, CRNP      traZODone  50 mg Oral HS Jenn Strickland, CRNP          PRN:    acetaminophen    acetaminophen    acetaminophen    hydrOXYzine HCL    hydrOXYzine HCL    LORazepam    LORazepam    mirtazapine    nicotine    OLANZapine    OLANZapine    OLANZapine    OLANZapine    polyethylene glycol    senna-docusate sodium    traZODone    - Observation: routine    - VS: as per unit protocol  - Diet:  Regular diet  - Psychoeducation (benefits and potential risks) discussed, importance of compliance with the psychiatric treatment reiterated, and the patient verbalized understanding of the matter  - Encourage group attendance and milieu therapy    - The pt was educated and agreed to verbalize any suicidal thoughts, frustrations or concerns to the nursing staff, immediately.    - Dispo: TBD       Next of Kin  Extended Emergency Contact Information  Primary Emergency Contact: Rhea Sesay  Mobile Phone: [phone]  Relation: Significant Other   needed? No  Secondary Emergency Contact: DONNIE CANELA  Mobile Phone: [phone]  Relation: Son      Counseling / Coordination of Care    Patient's progress reviewed with nursing staff.  Medications, treatment progress and treatment plan reviewed with patient.  Medication education provided to patient.  Reassurance and supportive therapy provided.  Reoriented to reality and reassured.  Encouraged participation in milieu and group therapy on the unit.     Rory Bunn MD  Attending Psychiatrist   [facility]         This note was completed in part utilizing Dragon dictation Software. Grammatical, translation, syntax errors, random word insertions, spelling mistakes, and incomplete sentences may be an occasional consequence of this system secondary to software limitations with voice recognition, ambient noise, and hardware issues. If you have any questions or concerns about the content, text, or information contained within the body of this dictation, please contact the provider for clarification.

## 2024-02-25 NOTE — NURSING NOTE
Patient was calm, visible in unit and appeared unkept and depressed  He was pleasant on approach and appreciative of care, denied any s/s and reported no needs today. Med and meals compliant. Will continue to monitor.

## 2024-02-26 RX ADMIN — DOCUSATE SODIUM 100 MG: 100 CAPSULE, LIQUID FILLED ORAL at 08:17

## 2024-02-26 RX ADMIN — TAMSULOSIN HYDROCHLORIDE 0.4 MG: 0.4 CAPSULE ORAL at 17:02

## 2024-02-26 RX ADMIN — GABAPENTIN 300 MG: 300 CAPSULE ORAL at 12:15

## 2024-02-26 RX ADMIN — DOCUSATE SODIUM 100 MG: 100 CAPSULE, LIQUID FILLED ORAL at 17:02

## 2024-02-26 RX ADMIN — METFORMIN HYDROCHLORIDE 500 MG: 500 TABLET, FILM COATED ORAL at 08:17

## 2024-02-26 RX ADMIN — CHOLECALCIFEROL TAB 25 MCG (1000 UNIT) 2000 UNITS: 25 TAB at 08:17

## 2024-02-26 RX ADMIN — HALOPERIDOL 10 MG: 10 TABLET ORAL at 21:02

## 2024-02-26 RX ADMIN — GABAPENTIN 300 MG: 300 CAPSULE ORAL at 21:02

## 2024-02-26 RX ADMIN — DIVALPROEX SODIUM 500 MG: 500 TABLET, EXTENDED RELEASE ORAL at 08:17

## 2024-02-26 RX ADMIN — MELATONIN TAB 3 MG 3 MG: 3 TAB at 21:01

## 2024-02-26 RX ADMIN — DIVALPROEX SODIUM 1000 MG: 500 TABLET, EXTENDED RELEASE ORAL at 21:02

## 2024-02-26 RX ADMIN — TRAZODONE HYDROCHLORIDE 50 MG: 50 TABLET ORAL at 21:02

## 2024-02-26 NOTE — TREATMENT TEAM
02/26/24 0725   Team Meeting   Meeting Type Daily Rounds   Initial Conference Date 02/26/24   Team Members Present   Team Members Present Physician;Nurse;;;Occupational Therapist   Physician Team Member Jenn Rene   Nursing Team Member Manas Torres   Care Management Team Member Rossi   Social Work Team Member Tammy ROMAN Team Member Curt   Patient/Family Present   Patient Present No   Patient's Family Present No     Patient is calm, cooperative, and pleasant. The patient is medication compliant. DE ANDA due 2/28/24. The patient was accepted to [facility], awaiting admission date.

## 2024-02-26 NOTE — NURSING NOTE
Patient is calm and cooperative. Denies all psych s/s. Medication compliant. Patient is visible walking the hallways on the unit. Patient denies any needs at this time. Safety checks ongoing.

## 2024-02-26 NOTE — NURSING NOTE
Pt constricted but pleasant and med-compliant with good appetite and steady gait.  VSS.  Did not attend group.  Denied SI.  Pt showered.  No delusions or aggressive behavior noted.  Monitored for safety and support.

## 2024-02-26 NOTE — PROGRESS NOTES
Progress Note - Behavioral Health   Sudhakar Choe 58 y.o. male MRN: [id number]  Unit/Bed#: [facility] 660-02 Encounter: [id number]    Patient was seen today for continuation of care, records reviewed and patient was discussed with the morning case review team.    Sudhakar was seen today for psychiatric follow-up.  On assessment today, Sudhakar was pleasant.  No behavioral issues overnight, patient is currently noted to be walking in his room as well as hallway.  No depression or anxiety currently verbalized.  No manic symptoms or overt delusions at this time.  Patient continues to be in good behavioral control with no as needed medications needed.  Invega Sustenna 234 mg to be given on 2/28/2024, patient continues to need the use of 2 antipsychotics secondary to multiple failures of monotherapy.  Tentative discharge ongoing as he continues to wait for placement at [facility]    Sudhakar denies acute suicidal/self-harm ideation/intent/plan upon direct inquiry at this time.  Sudhakar remains behaviorally appropriate, no agitation or aggression noted on exam or in report.  Sudhakar also denies HI/AH/VH, and does not appear overtly manic.  No overt delusions or paranoia are verbalized. Impulse control is intact.  Sudhakar remains adherent to his current psychotropic medication regimen and denies any side effects from medications, as well as none noted on exam.    Vitals:  Vitals:    02/26/24 0758   BP: 118/58   Pulse: 89   Resp: 18   Temp: 97.8 °F (36.6 °C)   SpO2: 95%       Laboratory Results:  I have personally reviewed all pertinent laboratory/tests results.  Most Recent Labs:   Lab Results   Component Value Date    WBC 8.07 02/04/2024    RBC 4.48 02/04/2024    HGB 13.9 02/04/2024    HCT 40.8 02/04/2024     02/04/2024    RDW 14.1 02/04/2024    NEUTROABS 3.14 02/04/2024    SODIUM 136 02/04/2024    K 3.8 02/04/2024     02/04/2024    CO2 26 02/04/2024    BUN 10 02/04/2024    CREATININE 0.87 02/04/2024    GLUC 102 02/04/2024     GLUF 109 (H) 01/03/2024    CALCIUM 9.4 02/04/2024    AST 17 02/04/2024    ALT 10 02/04/2024    ALKPHOS 53 02/04/2024    TP 7.3 02/04/2024    ALB 4.2 02/04/2024    TBILI 0.32 02/04/2024    CHOLESTEROL 124 01/23/2024    HDL 28 (L) 01/23/2024    TRIG 116 01/23/2024    LDLCALC 73 01/23/2024    NONHDLC 96 01/23/2024    VALPROICTOT 84 02/04/2024    JMA1AIPTQASX 1.431 12/30/2023    RPR Non-Reactive 03/26/2021    HGBA1C 6.2 (H) 01/23/2024     01/23/2024       Psychiatric Review of Systems:  Behavior over the last 24 hours:  unchanged.   Sleep: good  Appetite: good  Medication side effects: none  ROS: no complaints, denies shortness of breath or chest pain and all other systems are negative for acute changes    Mental Status Evaluation:  Appearance:  disheveled, marginal hygiene   Behavior:  cooperative, calm   Speech:  normal rate and volume   Mood:  euthymic   Affect:  constricted, slightly brighter   Thought Process:  linear   Thought Content:  no overt delusions   Perceptual Disturbances: denies auditory hallucinations when asked, does not appear responding to internal stimuli   Risk Potential: Suicidal ideation - None  Homicidal ideation - None  Potential for aggression - No   Memory:  recent and remote memory grossly intact   Sensorium  person, place, and time/date      Consciousness:  alert and awake   Attention: attention span and concentration are age appropriate   Insight:  limited   Judgment: limited   Gait/Station: normal gait/station   Motor Activity: no abnormal movements   Progress Toward Goals:   Sudhakar is progressing towards goals of inpatient psychiatric treatment by continued medication compliance and is attending therapeutic modalities on the milieu. However, the patient continues to require inpatient psychiatric hospitalization for continued medication management and titration to optimize symptom reduction, improve sleep hygiene, and demonstrate adequate self-care.    Assessment/Plan   Principal  Problem:    Bipolar affective disorder, current episode manic with psychotic symptoms (HCC)  Active Problems:    Benign prostatic hyperplasia    Brachial plexus injury, right, sequela    Medical clearance for psychiatric admission    Bilateral lower extremity edema      Recommended Treatment: Treatment plan and medication changes discussed and per the attending physician the plan is:    1.Continue with group therapy, milieu therapy and occupational therapy  2.Behavioral Health checks every 7 minutes  3.Continue frequent safety checks and vitals per unit protocol  4.Continue with [facility] medical management as indicated  5.Continue with current medication regimen  6.Will review labs in the a.m.  7.Disposition Planning: Discharge planning and efforts remain ongoing    Behavioral Health Medications: all current active meds have been reviewed and continue current psychiatric medications.  Current Facility-Administered Medications   Medication Dose Route Frequency Provider Last Rate    acetaminophen  650 mg Oral Q4H PRN LYLA NovakNP      acetaminophen  650 mg Oral Q4H PRN Reji Looney DO      acetaminophen  975 mg Oral Q6H PRN BETO Novak      cholecalciferol  2,000 Units Oral Daily Rossi Carlson PA-C      divalproex sodium  1,000 mg Oral HS Rory Bunn MD      divalproex sodium  500 mg Oral Daily Rory Bunn MD      docusate sodium  100 mg Oral BID Jenn Strickland, BETO      gabapentin  300 mg Oral HS Rory Bunn MD      gabapentin  300 mg Oral Daily BETO Novak      haloperidol  10 mg Oral HS Saul Sorensen PA-C      hydrOXYzine HCL  25 mg Oral Q6H PRN Max 4/day Jenn Strickland, LYLANP      hydrOXYzine HCL  50 mg Oral Q6H PRN Max 4/day BETO Novak      LORazepam  1 mg Intramuscular Q6H PRN Max 3/day Jenn Strickland, LYLANP      LORazepam  1 mg Oral Q6H PRN Max 3/day Jenn Strickland, BETO      melatonin  3 mg Oral HS Rory Bunn MD      metFORMIN  500 mg Oral Daily With  Breakfast Dorene Pelayo, BETO      mirtazapine  7.5 mg Oral HS PRN Rory Bunn MD      nicotine  1 patch Transdermal Daily PRN Fabiana Sousa MD      OLANZapine  5 mg Intramuscular Q3H PRN Max 3/day Jenn Strickland, CRNP      OLANZapine  2.5 mg Oral Q4H PRN Max 6/day Jenn Strickland, CRNP      OLANZapine  5 mg Oral Q4H PRN Max 3/day Jenn Strickland, CRNP      OLANZapine  5 mg Oral Q3H PRN Max 3/day Jenn Strickland, CRNP      paliperidone  234 mg IM- Deltoid Q4 weeks Jenn Strickland, CRNP      polyethylene glycol  17 g Oral Daily PRN Jenn Strickland, CRNP      senna-docusate sodium  1 tablet Oral Daily PRN Jenn Strickland, LYLANP      tamsulosin  0.4 mg Oral Daily With Dinner Rossi Carlson PA-C      traZODone  100 mg Oral HS PRN Jenn Strickland, CRNP      traZODone  50 mg Oral HS Jenn Strickland, BETO         Risks / Benefits of Treatment:  Risks, benefits, and possible side effects of medications explained to patient and patient verbalizes understanding and agreement for treatment.    Counseling / Coordination of Care:  Patient's progress reviewed with nursing staff.  Medications, treatment progress and treatment plan reviewed with patient.  Supportive counseling provided to the patient.    Total floor/unit time spent today 25 minutes. Greater than 50% of total time was spent with the patient and / or family counseling and / or coordination of care. A description of the counseling / coordination of care: medication education, treatment plan, supportive therapy.    This note was completed in part utilizing Dragon dictation Software. Grammatical, translation, syntax errors, random word insertions, spelling mistakes, and incomplete sentences may be an occasional consequence of this system secondary to software limitations with voice recognition, ambient noise, and hardware issues. If you have any questions or concerns about the content, text, or information contained within the body of this  dictation, please contact the provider for clarification

## 2024-02-26 NOTE — CASE MANAGEMENT
CM is awaiting on an admissions date from [facility]. Once the date it known, the patient's legal status (304) will need to be updated to reflect the placement change.

## 2024-02-27 LAB
ATRIAL RATE: 101 BPM
P AXIS: 9 DEGREES
PR INTERVAL: 192 MS
QRS AXIS: 48 DEGREES
QRSD INTERVAL: 76 MS
QT INTERVAL: 334 MS
QTC INTERVAL: 433 MS
T WAVE AXIS: 34 DEGREES
VENTRICULAR RATE: 101 BPM

## 2024-02-27 PROCEDURE — 86480 TB TEST CELL IMMUN MEASURE: CPT

## 2024-02-27 PROCEDURE — 93010 ELECTROCARDIOGRAM REPORT: CPT | Performed by: INTERNAL MEDICINE

## 2024-02-27 PROCEDURE — 93005 ELECTROCARDIOGRAM TRACING: CPT

## 2024-02-27 RX ADMIN — TAMSULOSIN HYDROCHLORIDE 0.4 MG: 0.4 CAPSULE ORAL at 17:14

## 2024-02-27 RX ADMIN — HALOPERIDOL 10 MG: 10 TABLET ORAL at 21:01

## 2024-02-27 RX ADMIN — DIVALPROEX SODIUM 1000 MG: 500 TABLET, EXTENDED RELEASE ORAL at 21:01

## 2024-02-27 RX ADMIN — DOCUSATE SODIUM 100 MG: 100 CAPSULE, LIQUID FILLED ORAL at 08:53

## 2024-02-27 RX ADMIN — MELATONIN TAB 3 MG 3 MG: 3 TAB at 21:01

## 2024-02-27 RX ADMIN — GABAPENTIN 300 MG: 300 CAPSULE ORAL at 21:02

## 2024-02-27 RX ADMIN — DIVALPROEX SODIUM 500 MG: 500 TABLET, EXTENDED RELEASE ORAL at 08:53

## 2024-02-27 RX ADMIN — CHOLECALCIFEROL TAB 25 MCG (1000 UNIT) 2000 UNITS: 25 TAB at 08:53

## 2024-02-27 RX ADMIN — DOCUSATE SODIUM 100 MG: 100 CAPSULE, LIQUID FILLED ORAL at 17:14

## 2024-02-27 RX ADMIN — METFORMIN HYDROCHLORIDE 500 MG: 500 TABLET, FILM COATED ORAL at 08:53

## 2024-02-27 RX ADMIN — TRAZODONE HYDROCHLORIDE 50 MG: 50 TABLET ORAL at 21:01

## 2024-02-27 RX ADMIN — GABAPENTIN 300 MG: 300 CAPSULE ORAL at 12:10

## 2024-02-27 NOTE — CASE MANAGEMENT
[facility] will be out to complete an intake with the patient at 9h30 today. The patient is aware and in agreement.

## 2024-02-27 NOTE — NURSING NOTE
Patient visible on the unit sitting with peers in the dining room or pacing in the halls. Patient scant and soft spoken, affect appears depressed on approach. Patient cooperative with assessment questions, currently denies depression, anxiety, SI/HI/AVH. Patient slightly brighter later in the shift after getting assisted with shaving. Compliant with medications and meals, appetite good. No further signs of distress noted.

## 2024-02-27 NOTE — PROGRESS NOTES
Progress Note - Behavioral Health   Sudhakar Choe 58 y.o. male MRN: [id number]  Unit/Bed#: [facility] 660-02 Encounter: [id number]    Patient was seen today for continuation of care, records reviewed and patient was discussed with the morning case review team.    Sudhakar was seen today for psychiatric follow-up.  On assessment today, Sudhakar was seen in his room.  More isolated at this time, patient is encouraged to be more active on the unit.  Directly questioned about meeting with [facility], patient states that it went well and he looks forward to discharge.  No behavioral issues overnight, no as needed medications given.  Patient is less manic and continues to exhibit improved insight into current mental illness.  Invega Sustenna 234 mg to be administered tomorrow on 2/28/2024, patient continues to need the use of 2 antipsychotics secondary to multiple failures of monotherapy.  Discharge disposition ongoing.    Sudhakar denies acute suicidal/self-harm ideation/intent/plan upon direct inquiry at this time.  Sudhakar remains behaviorally appropriate, no agitation or aggression noted on exam or in report.  Sudhakar also denies HI/AH/VH, and does not appear overtly manic.  No overt delusions or paranoia are verbalized. Impulse control is intact.  Sudhakar remains adherent to his current psychotropic medication regimen and denies any side effects from medications, as well as none noted on exam.    Vitals:  Vitals:    02/27/24 0900   BP: 119/70   Pulse: 95   Resp: 18   Temp: 98.5 °F (36.9 °C)   SpO2: 96%       Laboratory Results:  I have personally reviewed all pertinent laboratory/tests results.  Most Recent Labs:   Lab Results   Component Value Date    WBC 8.07 02/04/2024    RBC 4.48 02/04/2024    HGB 13.9 02/04/2024    HCT 40.8 02/04/2024     02/04/2024    RDW 14.1 02/04/2024    NEUTROABS 3.14 02/04/2024    SODIUM 136 02/04/2024    K 3.8 02/04/2024     02/04/2024    CO2 26 02/04/2024    BUN 10 02/04/2024     CREATININE 0.87 02/04/2024    GLUC 102 02/04/2024    GLUF 109 (H) 01/03/2024    CALCIUM 9.4 02/04/2024    AST 17 02/04/2024    ALT 10 02/04/2024    ALKPHOS 53 02/04/2024    TP 7.3 02/04/2024    ALB 4.2 02/04/2024    TBILI 0.32 02/04/2024    CHOLESTEROL 124 01/23/2024    HDL 28 (L) 01/23/2024    TRIG 116 01/23/2024    LDLCALC 73 01/23/2024    NONHDLC 96 01/23/2024    VALPROICTOT 84 02/04/2024    QOB1ZPGWCEMN 1.431 12/30/2023    RPR Non-Reactive 03/26/2021    HGBA1C 6.2 (H) 01/23/2024     01/23/2024       Psychiatric Review of Systems:  Behavior over the last 24 hours:  unchanged.   Sleep: good  Appetite: good  Medication side effects: none  ROS: no complaints, denies shortness of breath or chest pain and all other systems are negative for acute changes    Mental Status Evaluation:  Appearance:  disheveled   Behavior:  cooperative, calm   Speech:  normal rate and volume   Mood:  improved   Affect:  constricted   Thought Process:  linear   Thought Content:  no overt delusions   Perceptual Disturbances: denies auditory hallucinations when asked, does not appear responding to internal stimuli   Risk Potential: Suicidal ideation - None  Homicidal ideation - None  Potential for aggression - No   Memory:  recent and remote memory grossly intact   Sensorium  person, place, and time/date      Consciousness:  alert and awake   Attention: attention span and concentration are age appropriate   Insight:  limited   Judgment: limited   Gait/Station: normal gait/station   Motor Activity: no abnormal movements   Progress Toward Goals:   Sudhakar is progressing towards goals of inpatient psychiatric treatment by continued medication compliance and is attending therapeutic modalities on the milieu. However, the patient continues to require inpatient psychiatric hospitalization for continued medication management and titration to optimize symptom reduction, improve sleep hygiene, and demonstrate adequate  self-care.    Assessment/Plan   Principal Problem:    Bipolar affective disorder, current episode manic with psychotic symptoms (HCC)  Active Problems:    Benign prostatic hyperplasia    Brachial plexus injury, right, sequela    Medical clearance for psychiatric admission    Bilateral lower extremity edema      Recommended Treatment: Treatment plan and medication changes discussed and per the attending physician the plan is:    1.Continue with group therapy, milieu therapy and occupational therapy  2.Behavioral Health checks every 7 minutes  3.Continue frequent safety checks and vitals per unit protocol  4.Continue with SLIM medical management as indicated  5.Continue with current medication regimen  6.Will review labs in the a.m.  7.Disposition Planning: Discharge planning and efforts remain ongoing    Behavioral Health Medications: all current active meds have been reviewed and continue current psychiatric medications.  Current Facility-Administered Medications   Medication Dose Route Frequency Provider Last Rate    acetaminophen  650 mg Oral Q4H PRN Jenn Strickland, CRNP      acetaminophen  650 mg Oral Q4H PRN Reji Looney DO      acetaminophen  975 mg Oral Q6H PRN Jenn Strickland, BETO      cholecalciferol  2,000 Units Oral Daily Rossi Carlson PA-C      divalproex sodium  1,000 mg Oral HS Rory Bunn MD      divalproex sodium  500 mg Oral Daily Rory Bunn MD      docusate sodium  100 mg Oral BID Jenn Strickland, LYLANP      gabapentin  300 mg Oral HS Rory Bunn MD      gabapentin  300 mg Oral Daily Jenn Strickland, LYLANP      haloperidol  10 mg Oral HS Saul Sorensen PA-C      hydrOXYzine HCL  25 mg Oral Q6H PRN Max 4/day Jenn Strickland, LYLANP      hydrOXYzine HCL  50 mg Oral Q6H PRN Max 4/day Jenn Strickland, BETO      LORazepam  1 mg Intramuscular Q6H PRN Max 3/day Jenn Strickland, LYLANP      LORazepam  1 mg Oral Q6H PRN Max 3/day Jenn Strickland, LYLANP      melatonin  3 mg Oral HS Rory Bunn  MD      metFORMIN  500 mg Oral Daily With Breakfast Dorene Pelayo, BETO      mirtazapine  7.5 mg Oral HS PRN Rory Bunn MD      nicotine  1 patch Transdermal Daily PRN Fabiana Sousa MD      OLANZapine  5 mg Intramuscular Q3H PRN Max 3/day Jenn Strickland, CRNP      OLANZapine  2.5 mg Oral Q4H PRN Max 6/day Jenn Strickland, CRNP      OLANZapine  5 mg Oral Q4H PRN Max 3/day Jenn Strickland, CRNP      OLANZapine  5 mg Oral Q3H PRN Max 3/day Jenn Strickland, CRNP      paliperidone  234 mg IM- Deltoid Q4 weeks Jenn Strickland, CRNP      polyethylene glycol  17 g Oral Daily PRN Jenn Strickland, CRNP      senna-docusate sodium  1 tablet Oral Daily PRN Jenn Strickland, LYLANP      tamsulosin  0.4 mg Oral Daily With Dinner Rossi Carlson PA-C      traZODone  100 mg Oral HS PRN Jenn Strickland, BETO      traZODone  50 mg Oral HS Jenn Strickland, BETO         Risks / Benefits of Treatment:  Risks, benefits, and possible side effects of medications explained to patient and patient verbalizes understanding and agreement for treatment.    Counseling / Coordination of Care:  Patient's progress reviewed with nursing staff.  Medications, treatment progress and treatment plan reviewed with patient.  Supportive counseling provided to the patient.    Total floor/unit time spent today 25 minutes. Greater than 50% of total time was spent with the patient and / or family counseling and / or coordination of care. A description of the counseling / coordination of care: medication education, treatment plan, supportive therapy.    This note was completed in part utilizing Dragon dictation Software. Grammatical, translation, syntax errors, random word insertions, spelling mistakes, and incomplete sentences may be an occasional consequence of this system secondary to software limitations with voice recognition, ambient noise, and hardware issues. If you have any questions or concerns about the content, text, or  information contained within the body of this dictation, please contact the provider for clarification

## 2024-02-27 NOTE — TREATMENT TEAM
02/27/24 0732   Team Meeting   Meeting Type Daily Rounds   Initial Conference Date 02/27/24   Team Members Present   Team Members Present Physician;Nurse;;;Occupational Therapist   Physician Team Member Dr. Bunn, Jenn Mendoza   Nursing Team Member Sandra Torres   Care Management Team Member Rossi   Social Work Team Member Tammy   OT Team Member Curt   Patient/Family Present   Patient Present No   Patient's Family Present No     Patient is calm, cooperative, and pleasant. The patient is medication compliant. DE ANDA due 2/28/24. The patient was accepted to [facility], awaiting admission date.

## 2024-02-27 NOTE — NURSING NOTE
Pt blunted but pleasant and med-compliant with no grandiose or inappropriate sexual behavior noted.  Good appetite and steady gait.  VSS.  Did not attend group.  Monitored for safety and support.

## 2024-02-28 LAB
GAMMA INTERFERON BACKGROUND BLD IA-ACNC: 0.1 IU/ML
M TB IFN-G BLD-IMP: NEGATIVE
M TB IFN-G CD4+ BCKGRND COR BLD-ACNC: 0.03 IU/ML
M TB IFN-G CD4+ BCKGRND COR BLD-ACNC: 0.04 IU/ML
MITOGEN IGNF BCKGRD COR BLD-ACNC: 9.9 IU/ML

## 2024-02-28 RX ADMIN — PALIPERIDONE PALMITATE 234 MG: 234 INJECTION INTRAMUSCULAR at 08:13

## 2024-02-28 RX ADMIN — TAMSULOSIN HYDROCHLORIDE 0.4 MG: 0.4 CAPSULE ORAL at 17:08

## 2024-02-28 RX ADMIN — DIVALPROEX SODIUM 500 MG: 500 TABLET, EXTENDED RELEASE ORAL at 08:12

## 2024-02-28 RX ADMIN — DIVALPROEX SODIUM 1000 MG: 500 TABLET, EXTENDED RELEASE ORAL at 21:34

## 2024-02-28 RX ADMIN — GABAPENTIN 300 MG: 300 CAPSULE ORAL at 11:44

## 2024-02-28 RX ADMIN — CHOLECALCIFEROL TAB 25 MCG (1000 UNIT) 2000 UNITS: 25 TAB at 08:12

## 2024-02-28 RX ADMIN — DOCUSATE SODIUM 100 MG: 100 CAPSULE, LIQUID FILLED ORAL at 17:08

## 2024-02-28 RX ADMIN — TRAZODONE HYDROCHLORIDE 50 MG: 50 TABLET ORAL at 21:34

## 2024-02-28 RX ADMIN — DOCUSATE SODIUM 100 MG: 100 CAPSULE, LIQUID FILLED ORAL at 08:12

## 2024-02-28 RX ADMIN — GABAPENTIN 300 MG: 300 CAPSULE ORAL at 21:34

## 2024-02-28 RX ADMIN — METFORMIN HYDROCHLORIDE 500 MG: 500 TABLET, FILM COATED ORAL at 08:12

## 2024-02-28 RX ADMIN — MELATONIN TAB 3 MG 3 MG: 3 TAB at 21:34

## 2024-02-28 RX ADMIN — HALOPERIDOL 10 MG: 10 TABLET ORAL at 21:34

## 2024-02-28 NOTE — NURSING NOTE
Patient visible on the unit sitting with peers in the dining room intermittently or walking the halls, otherwise withdrawn and isolative to self in room. Patient cooperative with assessment questions, currently denies depression, anxiety, SI/HI/AVH. Patient compliant with medications and meals, appetite good. No further signs of distress noted.

## 2024-02-28 NOTE — PROGRESS NOTES
02/28/24 1051   Team Meeting   Meeting Type Tx Team Meeting   Initial Conference Date 02/28/24   Next Conference Date 03/29/24   Team Members Present   Team Members Present Physician;Nurse;   Physician Team Member Dr. Bunn   Nursing Team Member Kyle   Care Management Team Member Rossi   Patient/Family Present   Patient Present Yes   Patient's Family Present No     The patient's 60 day updated treatment plan was reviewed; patient in agreement and signed. A copy of the treatment plan was put into the patient's discharge folder and the original was put in for scanning.

## 2024-02-28 NOTE — NURSING NOTE
Patient intermittently visible on unit. Patient denies psych s/s. Patient affect blunted. Patient states he is ready for discharge to [facility]. Appetite intact. Patient med compliant. VSS. Continual rounding  in place

## 2024-02-28 NOTE — PROGRESS NOTES
02/28/24 1100 02/28/24 1300   Activity/Group Checklist   Group Community meeting Wellness   Attendance Refused Did not attend

## 2024-02-28 NOTE — PLAN OF CARE
Pt.polite upon approach yet refused both offered groups today.  Problem: Risk for Self Injury/Neglect  Goal: Attend and participate in unit activities, including therapeutic, recreational, and educational groups  Description: Interventions:  - Provide therapeutic and educational activities daily, encourage attendance and participation, and document same in the medical record  - Obtain collateral information, encourage visitation and family involvement in care   Outcome: Not Progressing

## 2024-02-28 NOTE — TREATMENT TEAM
02/28/24 0735   Team Meeting   Meeting Type Daily Rounds   Initial Conference Date 02/28/24   Team Members Present   Team Members Present Physician;Nurse;;Occupational Therapist   Physician Team Member Dr. Bunn, Dr. Becker, Jenn PÉREZ   Nursing Team Member Sandra Torres   Care Management Team Member Rossi ROMAN Team Member Curt   Patient/Family Present   Patient Present No   Patient's Family Present No     Patient is calm, cooperative, and pleasant. The patient is medication compliant. DE ANDA given this morning. The patient was accepted to [facility], awaiting admission date.

## 2024-02-28 NOTE — TREATMENT PLAN
TREATMENT PLAN REVIEW - Behavioral Health Sudhakar Choe 58 y.o. 1965 male MRN: [id number]    [facility] 6B [facility]: [facility] Encounter: [id number]          Admit Date/Time:  12/29/2023  8:09 PM    Treatment Team:   MD Reji Byrnes DO Tamara Gonzalez, CRNP Danielle Dustman, PA-C Kelly Anne Ryan, PA-C Evelyn Renjifo Brianna Koch Nicole Cronin, [facility]  Osvaldo Eastman, AARON Avila RN    Diagnosis: Principal Problem:    Bipolar affective disorder, current episode manic with psychotic symptoms (HCC)  Active Problems:    Benign prostatic hyperplasia    Brachial plexus injury, right, sequela    Medical clearance for psychiatric admission    Bilateral lower extremity edema      Patient Strengths/Assets: cooperative, communication skills, family ties    Patient Barriers/Limitations: difficulty adapting, homeless, lack of social/family support, patient is on an involuntary commitment, poor past treatment response, poor reasoning ability, unresourceful    Short Term Goals: decrease in psychotic symptoms, ability to stay safe on the unit, ability to stay free of restraints, improvement in ability to express basic needs, improvement in insight, improvement in reality testing, mood stabilization, increase in group attendance, acceptance of need for psychiatric treatment    Long Term Goals: stabilization of mood, free of suicidal thoughts, free of homicidal thoughts, resolution of psychotic symptoms, improvement in reality testing, improved insight, able to express basic needs, acceptance of need for psychiatric medications, adequate sleep    Progress Towards Goals: continue psychiatric medications as prescribed, mood is stabilizing, placement pending    Recommended Treatment: medication management, patient medication education, group therapy, milieu therapy, continued  Behavioral Health psychiatric evaluation/assessment process    Treatment Frequency: daily medication monitoring, group and milieu therapy daily, monitoring through interdisciplinary rounds, monitoring through weekly patient care conferences    Expected Discharge Date:  14 days    Discharge Plan: referral for outpatient medication management with a psychiatrist, referral for outpatient psychotherapy, referral to Extended Acute Care unit for a long term psychiatric treatment    Treatment Plan Created/Updated By: Rory Bunn MD

## 2024-02-28 NOTE — NURSING NOTE
Pt blunted but pleasant and med-compliant, Including Invega ER IM as ordered.  Good appetite and steady gait.  VSS.  Pt did not attend group.  No aggressive, grandiose or inappropriate sexual behavior noted.  Monitored for safety and support.

## 2024-02-28 NOTE — PROGRESS NOTES
"Progress Note - Behavioral Health   Sudhakar Choe 58 y.o. male MRN: [id number]  Unit/Bed#: [facility] 660-02 Encounter: [id number]    Patient was seen today for continuation of care, records reviewed and patient was discussed with the morning case review team.    Sudhakar was seen today for psychiatric follow-up.  On assessment today, Sudhakar was blunted but appropriate.  Incontinent of urine overnight, patient directly questioned about incident whereas he stated \"I didn't mean to\". No depression or anxiety currently verbalized. Patient continues to be pleasant with both staff and peers. No medication changes to be made at this time. Invega Sustenna 234 mg IM to be given today. Patient continues to need the use of two antipsychotics secondary to multiple failures of monotherapy. Tentative discharge ongoing.     Sudhakar denies acute suicidal/self-harm ideation/intent/plan upon direct inquiry at this time.  Sudhakar remains behaviorally appropriate, no agitation or aggression noted on exam or in report.  Sudhakar also denies HI/AH/VH, and does not appear overtly manic.  No overt delusions or paranoia are verbalized. Impulse control is intact.  Sudhakar remains adherent to his current psychotropic medication regimen and denies any side effects from medications, as well as none noted on exam.    Vitals:  Vitals:    02/28/24 0756   BP: 113/68   Pulse: 97   Resp: 19   Temp: 97.5 °F (36.4 °C)   SpO2: 96%       Laboratory Results:  I have personally reviewed all pertinent laboratory/tests results.  Most Recent Labs:   Lab Results   Component Value Date    WBC 8.07 02/04/2024    RBC 4.48 02/04/2024    HGB 13.9 02/04/2024    HCT 40.8 02/04/2024     02/04/2024    RDW 14.1 02/04/2024    NEUTROABS 3.14 02/04/2024    SODIUM 136 02/04/2024    K 3.8 02/04/2024     02/04/2024    CO2 26 02/04/2024    BUN 10 02/04/2024    CREATININE 0.87 02/04/2024    GLUC 102 02/04/2024    GLUF 109 (H) 01/03/2024    CALCIUM 9.4 02/04/2024    AST 17 " 02/04/2024    ALT 10 02/04/2024    ALKPHOS 53 02/04/2024    TP 7.3 02/04/2024    ALB 4.2 02/04/2024    TBILI 0.32 02/04/2024    CHOLESTEROL 124 01/23/2024    HDL 28 (L) 01/23/2024    TRIG 116 01/23/2024    LDLCALC 73 01/23/2024    NONHDLC 96 01/23/2024    VALPROICTOT 84 02/04/2024    [id number] 1.431 12/30/2023    RPR Non-Reactive 03/26/2021    HGBA1C 6.2 (H) 01/23/2024     01/23/2024       Psychiatric Review of Systems:  Behavior over the last 24 hours:  unchanged.   Sleep: good  Appetite: good  Medication side effects: none  ROS: no complaints, denies shortness of breath or chest pain and all other systems are negative for acute changes    Mental Status Evaluation:  Appearance:  adequate grooming   Behavior:  cooperative, calm   Speech:  normal rate and volume   Mood:  euthymic   Affect:  blunted   Thought Process:  linear   Thought Content:  no overt delusions   Perceptual Disturbances: denies auditory hallucinations when asked, does not appear responding to internal stimuli   Risk Potential: Suicidal ideation - None  Homicidal ideation - None  Potential for aggression - No   Memory:  recent and remote memory grossly intact   Sensorium  person, place, and time/date      Consciousness:  alert and awake   Attention: attention span and concentration are age appropriate   Insight:  limited   Judgment: limited   Gait/Station: normal gait/station   Motor Activity: no abnormal movements   Progress Toward Goals:   Sudhakar is progressing towards goals of inpatient psychiatric treatment by continued medication compliance and is attending therapeutic modalities on the milieu. However, the patient continues to require inpatient psychiatric hospitalization for continued medication management and titration to optimize symptom reduction, improve sleep hygiene, and demonstrate adequate self-care.    Assessment/Plan   Principal Problem:    Bipolar affective disorder, current episode manic with psychotic symptoms  (HCC)  Active Problems:    Benign prostatic hyperplasia    Brachial plexus injury, right, sequela    Medical clearance for psychiatric admission    Bilateral lower extremity edema      Recommended Treatment: Treatment plan and medication changes discussed and per the attending physician the plan is:    1.Continue with group therapy, milieu therapy and occupational therapy  2.Behavioral Health checks every 7 minutes  3.Continue frequent safety checks and vitals per unit protocol  4.Continue with SLIM medical management as indicated  5.Continue with current medication regimen  6.Will review labs in the a.m.  7.Disposition Planning: Discharge planning and efforts remain ongoing    Behavioral Health Medications: all current active meds have been reviewed and continue current psychiatric medications.  Current Facility-Administered Medications   Medication Dose Route Frequency Provider Last Rate    acetaminophen  650 mg Oral Q4H PRN Jenn Strickland, LYLANP      acetaminophen  650 mg Oral Q4H PRN Reji Looney DO      acetaminophen  975 mg Oral Q6H PRN Jenn Strickland, BETO      cholecalciferol  2,000 Units Oral Daily Rossi Carlson PA-C      divalproex sodium  1,000 mg Oral HS Rory Bunn MD      divalproex sodium  500 mg Oral Daily Rory Bunn MD      docusate sodium  100 mg Oral BID Jenn Strickland, BETO      gabapentin  300 mg Oral HS Rory Bunn MD      gabapentin  300 mg Oral Daily BETO Novak      haloperidol  10 mg Oral HS Saul Sorensen PA-C      hydrOXYzine HCL  25 mg Oral Q6H PRN Max 4/day Jenn Strickland, LYLANP      hydrOXYzine HCL  50 mg Oral Q6H PRN Max 4/day BETO Novak      LORazepam  1 mg Intramuscular Q6H PRN Max 3/day Jenn Strickland, BETO      LORazepam  1 mg Oral Q6H PRN Max 3/day Jenn Strickland, LYLANP      melatonin  3 mg Oral HS Rory Bunn MD      metFORMIN  500 mg Oral Daily With Breakfast BETO Garcia      mirtazapine  7.5 mg Oral HS PRN Rory  MD Oni      nicotine  1 patch Transdermal Daily PRN Fabiana Sousa MD      OLANZapine  5 mg Intramuscular Q3H PRN Max 3/day Jenn Strickland, CRNP      OLANZapine  2.5 mg Oral Q4H PRN Max 6/day Jenn Strickland, CRNP      OLANZapine  5 mg Oral Q4H PRN Max 3/day Jenn Strickland, CRNP      OLANZapine  5 mg Oral Q3H PRN Max 3/day Jenn Strickland, CRNP      paliperidone  234 mg IM- Deltoid Q4 weeks Jenn Strickland, CRNP      polyethylene glycol  17 g Oral Daily PRN Jenn Strickland, CRNP      senna-docusate sodium  1 tablet Oral Daily PRN Jenn Strickland, CRNP      tamsulosin  0.4 mg Oral Daily With Dinner Rossi Carlson PA-C      traZODone  100 mg Oral HS PRN Jenn Strickland, BETO      traZODone  50 mg Oral HS Jenn Strickland, BETO         Risks / Benefits of Treatment:  Risks, benefits, and possible side effects of medications explained to patient and patient verbalizes understanding and agreement for treatment.    Counseling / Coordination of Care:  Patient's progress reviewed with nursing staff.  Medications, treatment progress and treatment plan reviewed with patient.  Supportive counseling provided to the patient.    Total floor/unit time spent today 25 minutes. Greater than 50% of total time was spent with the patient and / or family counseling and / or coordination of care. A description of the counseling / coordination of care: medication education, treatment plan, supportive therapy.    This note was completed in part utilizing Dragon dictation Software. Grammatical, translation, syntax errors, random word insertions, spelling mistakes, and incomplete sentences may be an occasional consequence of this system secondary to software limitations with voice recognition, ambient noise, and hardware issues. If you have any questions or concerns about the content, text, or information contained within the body of this dictation, please contact the provider for clarification

## 2024-02-29 RX ADMIN — HALOPERIDOL 10 MG: 10 TABLET ORAL at 21:25

## 2024-02-29 RX ADMIN — METFORMIN HYDROCHLORIDE 500 MG: 500 TABLET, FILM COATED ORAL at 08:20

## 2024-02-29 RX ADMIN — DOCUSATE SODIUM 100 MG: 100 CAPSULE, LIQUID FILLED ORAL at 17:14

## 2024-02-29 RX ADMIN — MELATONIN TAB 3 MG 3 MG: 3 TAB at 21:25

## 2024-02-29 RX ADMIN — TRAZODONE HYDROCHLORIDE 50 MG: 50 TABLET ORAL at 21:25

## 2024-02-29 RX ADMIN — DIVALPROEX SODIUM 1000 MG: 500 TABLET, EXTENDED RELEASE ORAL at 21:25

## 2024-02-29 RX ADMIN — GABAPENTIN 300 MG: 300 CAPSULE ORAL at 21:25

## 2024-02-29 RX ADMIN — DIVALPROEX SODIUM 500 MG: 500 TABLET, EXTENDED RELEASE ORAL at 08:19

## 2024-02-29 RX ADMIN — GABAPENTIN 300 MG: 300 CAPSULE ORAL at 12:35

## 2024-02-29 RX ADMIN — TAMSULOSIN HYDROCHLORIDE 0.4 MG: 0.4 CAPSULE ORAL at 17:14

## 2024-02-29 RX ADMIN — CHOLECALCIFEROL TAB 25 MCG (1000 UNIT) 2000 UNITS: 25 TAB at 08:19

## 2024-02-29 RX ADMIN — DOCUSATE SODIUM 100 MG: 100 CAPSULE, LIQUID FILLED ORAL at 08:20

## 2024-02-29 NOTE — CASE MANAGEMENT
"CM sent Alex GERONIMO the patient's TB test and EKG per their request. CM is awaiting on a definite admissions date. Alex GERONIMO reported \"early next week.\" Per Alex, they would appreciate if the patient if the patient was sent with 30 day supply of medications.    "

## 2024-02-29 NOTE — TREATMENT TEAM
02/29/24 0738   Team Meeting   Meeting Type Daily Rounds   Initial Conference Date 02/29/24   Team Members Present   Team Members Present Physician;Nurse;;Occupational Therapist   Physician Team Member Dr. Bunn, Dr. Becker, Jenn PÉREZ   Nursing Team Member Sandra Torres   Care Management Team Member Rossi ROMAN Team Member Curt   Patient/Family Present   Patient Present No   Patient's Family Present No     Patient is calm, cooperative, and pleasant. The patient is medication compliant. The patient was accepted to [facility], awaiting admission date.

## 2024-02-29 NOTE — PROGRESS NOTES
No care due was identified.  Health [facility] Embedded Care Due Messages. Reference number: [id number].   2/29/2024 2:57:34 PM CST   Progress Note - Missy Loving 62 y o  male MRN: [id number]    Unit/Bed#: Britta Choi Encounter: [id number]        Subjective:   Patient seen and examined at bedside after reviewing the chart and discussing the case with the caring staff  Patient examined at bedside  Patient reports no acute symptoms  Physical Exam   Vitals: Blood pressure 95/54, pulse 70, temperature 98 5 °F (36 9 °C), temperature source Temporal, resp  rate 16, height 6' 2" (1 88 m), weight 115 kg (252 lb 9 6 oz), SpO2 91 %  ,Body mass index is 32 43 kg/m²  Constitutional: Patient appears well-developed  HEENT: PERR, EOMI, MMM  Cardiovascular: Normal rate and regular rhythm  Pulmonary/Chest: Effort normal and breath sounds normal    Abdomen:  Nondistended  Assessment/Plan:  Missy Loving is a(n) 62 y o  male with MDD      1  Hypertension  Currently not on any antihypertensives  BP is WNL  Continue to monitor  2  Celiac disease  Patient upset regarding his diet  States he does not follow a gluten free diet at home and has no issues with it  Diet changed  3  DJD/OA/migraines  Patient may take Tylenol, Fioricet as needed for migraine  Will add ibuprofen 800 mg q6h prn - states he has taken in the past without reaction  4  BPH  Continue Flomax 0 4 mg daily  5  Constipation  Patient may take MiraLax as needed  6  GERD  Patient may take Mylanta as needed  7  Vitamin-D deficiency  Patient started on vitamin D3 1000 units daily

## 2024-02-29 NOTE — PLAN OF CARE
Problem: PSYCHOSIS  Goal: Will report no hallucinations or delusions  Description: Interventions:  - Administer medication as  ordered  - Every waking shifts and PRN assess for the presence of hallucinations and or delusions  - Assist with reality testing to support increasing orientation  - Assess if patient's hallucinations or delusions are encouraging self-harm or harm to others and intervene as appropriate  Outcome: Progressing     Problem: INVOLUNTARY ADMIT  Goal: Will cooperate with staff recommendations and doctor's orders and will demonstrate appropriate behavior  Description: INTERVENTIONS:  - Treat underlying conditions and offer medication as ordered  - Educate regarding involuntary admission procedures and rules  - Utilize positive consistent limit setting strategies to support patient and staff safety  Outcome: Progressing     Problem: Risk for Self Injury/Neglect  Goal: Treatment Goal: Remain safe during length of stay, learn and adopt new coping skills, and be free of self-injurious ideation, impulses and acts at the time of discharge  Outcome: Progressing  Goal: Verbalize thoughts and feelings  Description: Interventions:  - Assess and re-assess patient's lethality and potential for self-injury  - Engage patient in 1:1 interactions, daily, for a minimum of 15 minutes  - Encourage patient to express feelings, fears, frustrations, hopes  - Establish rapport/trust with patient   Outcome: Progressing  Goal: Refrain from harming self  Description: Interventions:  - Monitor patient closely, per order  - Develop a trusting relationship  - Supervise medication ingestion, monitor effects and side effects   Outcome: Progressing  Goal: Attend and participate in unit activities, including therapeutic, recreational, and educational groups  Description: Interventions:  - Provide therapeutic and educational activities daily, encourage attendance and participation, and document same in the medical record  - Obtain  collateral information, encourage visitation and family involvement in care   Outcome: Progressing  Goal: Recognize maladaptive responses and adopt new coping mechanisms  Outcome: Progressing  Goal: Complete daily ADLs, including personal hygiene independently, as able  Description: Interventions:  - Observe, teach, and assist patient with ADLS  - Monitor and promote a balance of rest/activity, with adequate nutrition and elimination  Outcome: Progressing     Problem: Alteration in Orientation  Goal: Treatment Goal: Demonstrate a reduction of confusion and improved orientation to person, place, time and/or situation upon discharge, according to optimum baseline/ability  Outcome: Progressing  Goal: Interact with staff daily  Description: Interventions:  - Assess and re-assess patient's level of orientation  - Engage patient in 1 on 1 interactions, daily, for a minimum of 15 minutes   - Establish rapport/trust with patient   Outcome: Progressing  Goal: Express concerns related to confused thinking related to:  Description: Interventions:  - Encourage patient to express feelings, fears, frustrations, hopes  - Assign consistent caregivers   - [place]/re-orient patient as needed  - Allow comfort items, as appropriate  - Provide visual cues, signs, etc.   Outcome: Progressing  Goal: Allow medical examinations, as recommended  Description: Interventions:  - Provide physical/neurological exams and/or referrals, per provider   Outcome: Progressing  Goal: Cooperate with recommended testing/procedures  Description: Interventions:  - Determine need for ancillary testing  - Observe for mental status changes  - Implement falls/precaution protocol   Outcome: Progressing  Goal: Complete daily ADLs, including personal hygiene independently, as able  Description: Interventions:  - Observe, teach, and assist patient with ADLS  Outcome: Progressing

## 2024-02-29 NOTE — PROGRESS NOTES
Progress Note - Behavioral Health   Sudhakar Choe 58 y.o. male MRN: [id number]  Unit/Bed#: [facility] 660-02 Encounter: [id number]    Patient was seen today for continuation of care, records reviewed and patient was discussed with the morning case review team.    Sudhakar was seen today for psychiatric follow-up.  On assessment today, Sudhakar was pleasant. Seen by the phone today, patient states that he is trying to get in contact with his girlfriend Rhea. No depression or anxiety currently verbalized, he continues to be optimistic about discharge. No behavioral issues overnight, no prn medication given.  Invega Sustenna 234 mg IM given on 2/28/2024 with no adverse effects.  Patient continues to need the use of two  antipsychotics secondary to multiple failures of monotherapy.  Tentative discharge ongoing.    Sudhakar denies acute suicidal/self-harm ideation/intent/plan upon direct inquiry at this time.  Sudhakar remains behaviorally appropriate, no agitation or aggression noted on exam or in report.  Sudhakar also denies HI/AH/VH, and does not appear overtly manic.  No overt delusions or paranoia are verbalized. Impulse control is intact.  Sudhakar remains adherent to his current psychotropic medication regimen and denies any side effects from medications, as well as none noted on exam.    Vitals:  Vitals:    02/29/24 0748   BP: 103/63   Pulse: 66   Resp: 18   Temp: 98.5 °F (36.9 °C)   SpO2: 92%       Laboratory Results:  I have personally reviewed all pertinent laboratory/tests results.  Most Recent Labs:   Lab Results   Component Value Date    WBC 8.07 02/04/2024    RBC 4.48 02/04/2024    HGB 13.9 02/04/2024    HCT 40.8 02/04/2024     02/04/2024    RDW 14.1 02/04/2024    NEUTROABS 3.14 02/04/2024    SODIUM 136 02/04/2024    K 3.8 02/04/2024     02/04/2024    CO2 26 02/04/2024    BUN 10 02/04/2024    CREATININE 0.87 02/04/2024    GLUC 102 02/04/2024    GLUF 109 (H) 01/03/2024    CALCIUM 9.4 02/04/2024    AST 17  02/04/2024    ALT 10 02/04/2024    ALKPHOS 53 02/04/2024    TP 7.3 02/04/2024    ALB 4.2 02/04/2024    TBILI 0.32 02/04/2024    CHOLESTEROL 124 01/23/2024    HDL 28 (L) 01/23/2024    TRIG 116 01/23/2024    LDLCALC 73 01/23/2024    NONHDLC 96 01/23/2024    VALPROICTOT 84 02/04/2024    QRV1AVHLAPXL 1.431 12/30/2023    RPR Non-Reactive 03/26/2021    HGBA1C 6.2 (H) 01/23/2024     01/23/2024       Psychiatric Review of Systems:  Behavior over the last 24 hours:  unchanged.   Sleep: good  Appetite: good  Medication side effects: none  ROS: no complaints, denies shortness of breath or chest pain and all other systems are negative for acute changes    Mental Status Evaluation:  Appearance:  dressed appropriately, adequate grooming   Behavior:  cooperative, calm   Speech:  normal rate and volume   Mood:  euthymic   Affect:  blunted   Thought Process:  linear   Thought Content:  no overt delusions   Perceptual Disturbances: denies auditory hallucinations when asked, does not appear responding to internal stimuli   Risk Potential: Suicidal ideation - None  Homicidal ideation - None  Potential for aggression - No   Memory:  recent and remote memory grossly intact   Sensorium  person, place, and time/date      Consciousness:  alert and awake   Attention: attention span and concentration are age appropriate   Insight:  limited   Judgment: limited   Gait/Station: normal gait/station   Motor Activity: no abnormal movements   Progress Toward Goals:   Sudhakar is progressing towards goals of inpatient psychiatric treatment by continued medication compliance and is attending therapeutic modalities on the milieu. However, the patient continues to require inpatient psychiatric hospitalization for continued medication management and titration to optimize symptom reduction, improve sleep hygiene, and demonstrate adequate self-care.    Assessment/Plan   Principal Problem:    Bipolar affective disorder, current episode manic with  psychotic symptoms (HCC)  Active Problems:    Benign prostatic hyperplasia    Brachial plexus injury, right, sequela    Medical clearance for psychiatric admission    Bilateral lower extremity edema      Recommended Treatment: Treatment plan and medication changes discussed and per the attending physician the plan is:    1.Continue with group therapy, milieu therapy and occupational therapy  2.Behavioral Health checks every 7 minutes  3.Continue frequent safety checks and vitals per unit protocol  4.Continue with SLIM medical management as indicated  5.Continue with current medication regimen  6.Will review labs in the a.m.  7.Disposition Planning: Discharge planning and efforts remain ongoing    Behavioral Health Medications: all current active meds have been reviewed and continue current psychiatric medications.  Current Facility-Administered Medications   Medication Dose Route Frequency Provider Last Rate    acetaminophen  650 mg Oral Q4H PRN Jenn Strickland, CRNP      acetaminophen  650 mg Oral Q4H PRN Reji Looney DO      acetaminophen  975 mg Oral Q6H PRN Jenn Strickland, CRROBERT      cholecalciferol  2,000 Units Oral Daily Rossi Carlson PA-C      divalproex sodium  1,000 mg Oral HS Rory Bunn MD      divalproex sodium  500 mg Oral Daily Rory Bunn MD      docusate sodium  100 mg Oral BID Jenn Strickland, BETO      gabapentin  300 mg Oral HS Rory Bunn MD      gabapentin  300 mg Oral Daily BETO Novak      haloperidol  10 mg Oral HS Saul Sorensen PA-C      hydrOXYzine HCL  25 mg Oral Q6H PRN Max 4/day Jenn Strickland, LYLANP      hydrOXYzine HCL  50 mg Oral Q6H PRN Max 4/day Jenn Strickland, BETO      LORazepam  1 mg Intramuscular Q6H PRN Max 3/day Jenn Strickland, BETO      LORazepam  1 mg Oral Q6H PRN Max 3/day Jenn Strickland, LYLANP      melatonin  3 mg Oral HS Rory Bunn MD      metFORMIN  500 mg Oral Daily With Breakfast BETO Garcia      mirtazapine  7.5 mg Oral  HS PRN Rory Bunn MD      nicotine  1 patch Transdermal Daily PRN Fabiana Sousa MD      OLANZapine  5 mg Intramuscular Q3H PRN Max 3/day Jenn Strickland, CRNP      OLANZapine  2.5 mg Oral Q4H PRN Max 6/day Jenn Strickland, CRNP      OLANZapine  5 mg Oral Q4H PRN Max 3/day Jenn Strickland, CRNP      OLANZapine  5 mg Oral Q3H PRN Max 3/day Jenn Strickland, CRNP      paliperidone  234 mg IM- Deltoid Q4 weeks Jenn Strickland, CRNP      polyethylene glycol  17 g Oral Daily PRN Jenn Strickland, CRNP      senna-docusate sodium  1 tablet Oral Daily PRN Jenn Strickland, CRNP      tamsulosin  0.4 mg Oral Daily With Dinner Rossi Carlson PA-C      traZODone  100 mg Oral HS PRN Jenn Strickland, CRNP      traZODone  50 mg Oral HS Jenn Strickland, LYLANP         Risks / Benefits of Treatment:  Risks, benefits, and possible side effects of medications explained to patient and patient verbalizes understanding and agreement for treatment.    Counseling / Coordination of Care:  Patient's progress reviewed with nursing staff.  Medications, treatment progress and treatment plan reviewed with patient.  Supportive counseling provided to the patient.    Total floor/unit time spent today 25 minutes. Greater than 50% of total time was spent with the patient and / or family counseling and / or coordination of care. A description of the counseling / coordination of care: medication education, treatment plan, supportive therapy.    This note was completed in part utilizing Dragon dictation Software. Grammatical, translation, syntax errors, random word insertions, spelling mistakes, and incomplete sentences may be an occasional consequence of this system secondary to software limitations with voice recognition, ambient noise, and hardware issues. If you have any questions or concerns about the content, text, or information contained within the body of this dictation, please contact the provider for clarification

## 2024-02-29 NOTE — NURSING NOTE
Pt pleasant and med-compliant with good appetite and steady gait.  VSS.  Did not attend group.  No aggressive, inappropriate sexual or grandiose behavior noted.  Monitored for safety and support.

## 2024-03-01 RX ADMIN — CHOLECALCIFEROL TAB 25 MCG (1000 UNIT) 2000 UNITS: 25 TAB at 08:16

## 2024-03-01 RX ADMIN — TRAZODONE HYDROCHLORIDE 50 MG: 50 TABLET ORAL at 21:13

## 2024-03-01 RX ADMIN — HALOPERIDOL 10 MG: 10 TABLET ORAL at 21:13

## 2024-03-01 RX ADMIN — DOCUSATE SODIUM 100 MG: 100 CAPSULE, LIQUID FILLED ORAL at 17:21

## 2024-03-01 RX ADMIN — DIVALPROEX SODIUM 1000 MG: 500 TABLET, EXTENDED RELEASE ORAL at 21:13

## 2024-03-01 RX ADMIN — DOCUSATE SODIUM 100 MG: 100 CAPSULE, LIQUID FILLED ORAL at 08:17

## 2024-03-01 RX ADMIN — GABAPENTIN 300 MG: 300 CAPSULE ORAL at 11:53

## 2024-03-01 RX ADMIN — METFORMIN HYDROCHLORIDE 500 MG: 500 TABLET, FILM COATED ORAL at 08:17

## 2024-03-01 RX ADMIN — GABAPENTIN 300 MG: 300 CAPSULE ORAL at 21:12

## 2024-03-01 RX ADMIN — MELATONIN TAB 3 MG 3 MG: 3 TAB at 21:13

## 2024-03-01 RX ADMIN — DIVALPROEX SODIUM 500 MG: 500 TABLET, EXTENDED RELEASE ORAL at 08:17

## 2024-03-01 RX ADMIN — TAMSULOSIN HYDROCHLORIDE 0.4 MG: 0.4 CAPSULE ORAL at 17:21

## 2024-03-01 NOTE — NURSING NOTE
Patient visible on unit often requesting water or walking around unit. Patient denies psych s/s. Patient polite and med compliant. No prn's given thus far. Appetite intact. VSS. Continual rounding in place

## 2024-03-01 NOTE — CASE MANAGEMENT
YUE called Pamela at [facility] [phone] to ask about transferring the patient's current 304 commitment to the [facility]. No answer, YUE left a detailed VM regarding transferring the patient's current 304 status to [facility] and requested a call back. YUE also sent an email to [facility].    Per [facility], if the patient's legal status can be updated to reflect he will be on a 304 commitment in [facility], the patient can admit to their facility on Tuesday 3/5/24.

## 2024-03-01 NOTE — NURSING NOTE
Pt and med-compliant with good appetite and steady gait.  VSS.  Did not attend group.  No inappropriate sexual, grandiose or aggressive behavior noted.  Monitored for safety and support.

## 2024-03-01 NOTE — PLAN OF CARE
Problem: Risk for Self Injury/Neglect  Goal: Attend and participate in unit activities, including therapeutic, recreational, and educational groups  Description: Interventions:  - Provide therapeutic and educational activities daily, encourage attendance and participation, and document same in the medical record  - Obtain collateral information, encourage visitation and family involvement in care   Outcome: Progressing     Problem: Risk for Self Injury/Neglect  Goal: Complete daily ADLs, including personal hygiene independently, as able  Description: Interventions:  - Observe, teach, and assist patient with ADLS  - Monitor and promote a balance of rest/activity, with adequate nutrition and elimination  Outcome: Progressing     Problem: INVOLUNTARY ADMIT  Goal: Will cooperate with staff recommendations and doctor's orders and will demonstrate appropriate behavior  Description: INTERVENTIONS:  - Treat underlying conditions and offer medication as ordered  - Educate regarding involuntary admission procedures and rules  - Utilize positive consistent limit setting strategies to support patient and staff safety  Outcome: Progressing

## 2024-03-01 NOTE — CASE MANAGEMENT
YUE sent an email to [facility] requesting an admissions date, as the patient's 304 legal status will need to be updated through [facility]. YUE is awaiting a response.

## 2024-03-01 NOTE — TREATMENT TEAM
03/01/24 0727   Team Meeting   Meeting Type Daily Rounds   Initial Conference Date 03/01/24   Team Members Present   Team Members Present Physician;Nurse;;Occupational Therapist   Physician Team Member Jenn Rene   Nursing Team Member Sandra Torres   Care Management Team Member Rossi ROMAN Team Member Curt   Patient/Family Present   Patient Present No   Patient's Family Present No     Patient is calm, cooperative, and pleasant. The patient is medication compliant. The patient was accepted to [facility], awaiting admission date.

## 2024-03-01 NOTE — PROGRESS NOTES
"Progress Note - Behavioral Health   Sudhakar Choe 58 y.o. male MRN: [id number]  Unit/Bed#: [facility] 660-02 Encounter: [id number]    Patient was seen today for continuation of care, records reviewed and patient was discussed with the morning case review team.    Sudhakar was seen today for psychiatric follow-up.  On assessment today, Sudhakar was pleasant and cooperative. No depression or anxiety currently verbalized, patient states that he is doing \"okay\" today. Tolerating all medications well,  Invega Sustenna given on 2/28/2024. Haldol 10 mg qhs also administered, patient continues to need the use of two anti-psychotics secondary to multiple failures of monotherapy. Tentative discharge ongoing as patient continues to wait for placement at [facility].     Sudhakar denies acute suicidal/self-harm ideation/intent/plan upon direct inquiry at this time.  Sudhakar remains behaviorally appropriate, no agitation or aggression noted on exam or in report.  Sudhakar also denies HI/AH/VH, and does not appear overtly manic.  No overt delusions or paranoia are verbalized. Impulse control is intact.  Sudhakar remains adherent to his current psychotropic medication regimen and denies any side effects from medications, as well as none noted on exam.    Vitals:  Vitals:    03/01/24 0750   BP: 91/54   Pulse: 90   Resp: 16   Temp: 98 °F (36.7 °C)   SpO2: 91%       Laboratory Results:  I have personally reviewed all pertinent laboratory/tests results.  Most Recent Labs:   Lab Results   Component Value Date    WBC 8.07 02/04/2024    RBC 4.48 02/04/2024    HGB 13.9 02/04/2024    HCT 40.8 02/04/2024     02/04/2024    RDW 14.1 02/04/2024    NEUTROABS 3.14 02/04/2024    SODIUM 136 02/04/2024    K 3.8 02/04/2024     02/04/2024    CO2 26 02/04/2024    BUN 10 02/04/2024    CREATININE 0.87 02/04/2024    GLUC 102 02/04/2024    GLUF 109 (H) 01/03/2024    CALCIUM 9.4 02/04/2024    AST 17 02/04/2024    ALT 10 02/04/2024    ALKPHOS 53 02/04/2024    " TP 7.3 02/04/2024    ALB 4.2 02/04/2024    TBILI 0.32 02/04/2024    CHOLESTEROL 124 01/23/2024    HDL 28 (L) 01/23/2024    TRIG 116 01/23/2024    LDLCALC 73 01/23/2024    NONHDLC 96 01/23/2024    VALPROICTOT 84 02/04/2024    WWB9FOLCLCBB 1.431 12/30/2023    RPR Non-Reactive 03/26/2021    HGBA1C 6.2 (H) 01/23/2024     01/23/2024       Psychiatric Review of Systems:  Behavior over the last 24 hours:  unchanged.   Sleep: good  Appetite: good  Medication side effects: none  ROS: no complaints, denies shortness of breath or chest pain and all other systems are negative for acute changes    Mental Status Evaluation:  Appearance:  disheveled   Behavior:  cooperative, calm   Speech:  normal rate and volume   Mood:  euthymic   Affect:  constricted   Thought Process:  linear   Thought Content:  no overt delusions   Perceptual Disturbances: denies auditory hallucinations when asked, does not appear responding to internal stimuli   Risk Potential: Suicidal ideation - None  Homicidal ideation - None  Potential for aggression - No   Memory:  recent and remote memory grossly intact   Sensorium  person, place, and time/date      Consciousness:  alert and awake   Attention: attention span and concentration are age appropriate   Insight:  limited   Judgment: limited   Gait/Station: normal gait/station   Motor Activity: no abnormal movements   Progress Toward Goals:   Sudhakar is progressing towards goals of inpatient psychiatric treatment by continued medication compliance and is attending therapeutic modalities on the milieu. However, the patient continues to require inpatient psychiatric hospitalization for continued medication management and titration to optimize symptom reduction, improve sleep hygiene, and demonstrate adequate self-care.    Assessment/Plan   Principal Problem:    Bipolar affective disorder, current episode manic with psychotic symptoms (HCC)  Active Problems:    Benign prostatic hyperplasia    Brachial  plexus injury, right, sequela    Medical clearance for psychiatric admission    Bilateral lower extremity edema      Recommended Treatment: Treatment plan and medication changes discussed and per the attending physician the plan is:    1.Continue with group therapy, milieu therapy and occupational therapy  2.Behavioral Health checks every 7 minutes  3.Continue frequent safety checks and vitals per unit protocol  4.Continue with SLIM medical management as indicated  5.Continue with current medication regimen  6.Will review labs in the a.m.  7.Disposition Planning: Discharge planning and efforts remain ongoing    Behavioral Health Medications: all current active meds have been reviewed and continue current psychiatric medications.  Current Facility-Administered Medications   Medication Dose Route Frequency Provider Last Rate    acetaminophen  650 mg Oral Q4H PRN Jenn Strickland, CRNP      acetaminophen  650 mg Oral Q4H PRN Reji Looney DO      acetaminophen  975 mg Oral Q6H PRN Jenn Strickland, BETO      cholecalciferol  2,000 Units Oral Daily Rossi Carlson PA-C      divalproex sodium  1,000 mg Oral HS Rory Bunn MD      divalproex sodium  500 mg Oral Daily Rory Bunn MD      docusate sodium  100 mg Oral BID Jenn Strickland, LYLANP      gabapentin  300 mg Oral HS Rory Bunn MD      gabapentin  300 mg Oral Daily Jenn Strickland, BETO      haloperidol  10 mg Oral HS Saul Sorensen PA-C      hydrOXYzine HCL  25 mg Oral Q6H PRN Max 4/day Jenn Strickland, LYLANP      hydrOXYzine HCL  50 mg Oral Q6H PRN Max 4/day Jenn Strickland, BETO      LORazepam  1 mg Intramuscular Q6H PRN Max 3/day Jnen Strickland, BETO      LORazepam  1 mg Oral Q6H PRN Max 3/day Jenn Strickland, BETO      melatonin  3 mg Oral HS Rory Bunn MD      metFORMIN  500 mg Oral Daily With Breakfast BETO Garcia      mirtazapine  7.5 mg Oral HS PRN Rory Bunn MD      nicotine  1 patch Transdermal Daily PRN Fabiana Salgado  Juarez Sousa MD      OLANZapine  5 mg Intramuscular Q3H PRN Max 3/day Jenn Strickland, BETO      OLANZapine  2.5 mg Oral Q4H PRN Max 6/day Jenn Strickland, BETO      OLANZapine  5 mg Oral Q4H PRN Max 3/day Jenn Strickland, CRNP      OLANZapine  5 mg Oral Q3H PRN Max 3/day Jenn Strickland, CRNP      paliperidone  234 mg IM- Deltoid Q4 weeks Jenn Strickland, BETO      polyethylene glycol  17 g Oral Daily PRN Jenn Strickland, BETO      senna-docusate sodium  1 tablet Oral Daily PRN Jenn Strickland, BETO      tamsulosin  0.4 mg Oral Daily With Dinner Rossi Carlson PA-C      traZODone  100 mg Oral HS PRN Jenn Strickland, BETO      traZODone  50 mg Oral HS Jenn Strickland, BETO         Risks / Benefits of Treatment:  Risks, benefits, and possible side effects of medications explained to patient and patient verbalizes understanding and agreement for treatment.    Counseling / Coordination of Care:  Patient's progress reviewed with nursing staff.  Medications, treatment progress and treatment plan reviewed with patient.  Supportive counseling provided to the patient.    Total floor/unit time spent today 25 minutes. Greater than 50% of total time was spent with the patient and / or family counseling and / or coordination of care. A description of the counseling / coordination of care: medication education, treatment plan, supportive therapy.    This note was completed in part utilizing Dragon dictation Software. Grammatical, translation, syntax errors, random word insertions, spelling mistakes, and incomplete sentences may be an occasional consequence of this system secondary to software limitations with voice recognition, ambient noise, and hardware issues. If you have any questions or concerns about the content, text, or information contained within the body of this dictation, please contact the provider for clarification

## 2024-03-01 NOTE — PROGRESS NOTES
03/01/24 1030 03/01/24 1100 03/01/24 1330   Activity/Group Checklist   Group Community meeting  ([facility]) Exercise Music Therapy   Attendance Did not attend Did not attend Did not attend

## 2024-03-02 RX ADMIN — HALOPERIDOL 10 MG: 10 TABLET ORAL at 21:04

## 2024-03-02 RX ADMIN — GABAPENTIN 300 MG: 300 CAPSULE ORAL at 21:04

## 2024-03-02 RX ADMIN — CHOLECALCIFEROL TAB 25 MCG (1000 UNIT) 2000 UNITS: 25 TAB at 09:16

## 2024-03-02 RX ADMIN — DOCUSATE SODIUM 100 MG: 100 CAPSULE, LIQUID FILLED ORAL at 17:01

## 2024-03-02 RX ADMIN — DIVALPROEX SODIUM 500 MG: 500 TABLET, EXTENDED RELEASE ORAL at 09:16

## 2024-03-02 RX ADMIN — MELATONIN TAB 3 MG 3 MG: 3 TAB at 21:04

## 2024-03-02 RX ADMIN — DOCUSATE SODIUM 100 MG: 100 CAPSULE, LIQUID FILLED ORAL at 09:16

## 2024-03-02 RX ADMIN — GABAPENTIN 300 MG: 300 CAPSULE ORAL at 11:53

## 2024-03-02 RX ADMIN — METFORMIN HYDROCHLORIDE 500 MG: 500 TABLET, FILM COATED ORAL at 09:20

## 2024-03-02 RX ADMIN — TAMSULOSIN HYDROCHLORIDE 0.4 MG: 0.4 CAPSULE ORAL at 17:01

## 2024-03-02 RX ADMIN — DIVALPROEX SODIUM 1000 MG: 500 TABLET, EXTENDED RELEASE ORAL at 21:03

## 2024-03-02 RX ADMIN — TRAZODONE HYDROCHLORIDE 50 MG: 50 TABLET ORAL at 21:03

## 2024-03-02 NOTE — PROGRESS NOTES
Progress Note - Behavioral Health     Sudhakar Choe 58 y.o. male MRN: @MRN   Unit/Bed#: [facility] 660-02 Encounter: [id number]    Per nursing report and sign out, patient has been more calm, polite, no SI or sleep issues    Sudhakar Choe reports today that he is doing well.  He denies any suicidal or homicidal ideation and he denies any issues with sleep energy or appetite.  No auditory or visual hallucinations and no paranoia.  Denies any side effects with medications and denies any physical symptoms or complaints.  He had no concerns today      Medication side effects: No   ROS: all other systems are negative    Mental Status Evaluation:    Appearance:  disheveled   Behavior:  pleasant, cooperative, with good eye contact   Speech:  Normal volume, regular rate and rhythm   Mood:  euthymic   Affect:  constricted       Thought Process:  Linear and goal directed   Associations: intact associations   Thought Content:  normal and appropriate   Perceptual Disturbances: no auditory or visual hallcunations   Risk Potential: Suicidal ideation - None  Homicidal ideation - None  Potential for aggression - No   Sensorium:  Grossly oriented   Cognition:  grossly intact   Consciousness:  Alert & Awake   Memory:  recent and remote memory grossly intact   Attention: attention span and concentration are age appropriate           Insight:  limited   Judgment: limited   Muscle Strength  Muscle Tone normal  normal   Gait/Station: normal gait/station with good balance   Motor Activity: no abnormal movements       Vital signs in last 24 hours:    Temp:  [97.2 °F (36.2 °C)-98 °F (36.7 °C)] 97.7 °F (36.5 °C)  HR:  [82-93] 82  Resp:  [16-18] 18  BP: (102-127)/(55-64) 127/64    Laboratory results:  I have personally reviewed all pertinent laboratory/tests results.    Assessment/Plan   Principal Problem:    Bipolar affective disorder, current episode manic with psychotic symptoms (HCC)  Active Problems:    Benign prostatic  hyperplasia    Brachial plexus injury, right, sequela    Medical clearance for psychiatric admission    Bilateral lower extremity edema      Sudhakar Choe is relatively unchanged from yesterday    Recommended Treatment:     Planned medication and treatment changes:    All current active medications have been reviewed.  Encourage group therapy, milieu therapy and occupational therapy  Behavioral Health checks as unit standard unless ordered or noted otherwise    Current Facility-Administered Medications   Medication Dose Route Frequency Provider Last Rate    acetaminophen  650 mg Oral Q4H PRN Jenn Strickland, CRNP      acetaminophen  650 mg Oral Q4H PRN Reji Looney DO      acetaminophen  975 mg Oral Q6H PRN Jenn Strickland, CRNP      cholecalciferol  2,000 Units Oral Daily Rossi Carlson PA-C      divalproex sodium  1,000 mg Oral HS Rory Bunn MD      divalproex sodium  500 mg Oral Daily Rory Bunn MD      docusate sodium  100 mg Oral BID Jenn Strickland, LYLANP      gabapentin  300 mg Oral HS Rory Bunn MD      gabapentin  300 mg Oral Daily Jenn Strickland, BETO      haloperidol  10 mg Oral HS Saul Sorensen PA-C      hydrOXYzine HCL  25 mg Oral Q6H PRN Max 4/day Jenn Strickland, CRNP      hydrOXYzine HCL  50 mg Oral Q6H PRN Max 4/day Jenn Strickland, BETO      LORazepam  1 mg Intramuscular Q6H PRN Max 3/day Jenn Strickland, BETO      LORazepam  1 mg Oral Q6H PRN Max 3/day Jenn Strickland, CRNP      melatonin  3 mg Oral HS Rory Bunn MD      metFORMIN  500 mg Oral Daily With Breakfast BETO Garcia      mirtazapine  7.5 mg Oral HS PRN Rory Bunn MD      nicotine  1 patch Transdermal Daily PRN Fabiana Sousa MD      OLANZapine  5 mg Intramuscular Q3H PRN Max 3/day Jenn Strickland, CRNP      OLANZapine  2.5 mg Oral Q4H PRN Max 6/day Jenn Strickland, LYLANP      OLANZapine  5 mg Oral Q4H PRN Max 3/day Jenn Strickland, LYLANP      OLANZapine  5 mg Oral Q3H PRN Max  3/day BETO Novak      paliperidone  234 mg IM- Deltoid Q4 weeks BETO Novak      polyethylene glycol  17 g Oral Daily PRN BETO Novak      senna-docusate sodium  1 tablet Oral Daily PRN BETO Novak      tamsulosin  0.4 mg Oral Daily With Dinner Rossi Carlson PA-C      traZODone  100 mg Oral HS PRN BETO Novak      traZODone  50 mg Oral HS BETO Novak         1) continue current treatment  2) Continue to support patient and engage them in the programs available as feasible and appropriate. Continue case management support and therapy. Continue discharge planning.      Risks / Benefits of Treatment:    Risks, benefits, and possible side effects of medications explained to patient and patient verbalizes understanding and agreement for treatment.    Counseling / Coordination of Care:    Medication changes reviewed with nursing staff.  Medications, treatment progress and treatment plan reviewed with patient.      Alhaji Mane III, DO  3/2/2024  9:04 AM

## 2024-03-02 NOTE — NURSING NOTE
Patient is cooperative. Patient is compliant with his medications. He denies all symptoms. He is present in milieu, does not engage with others but will reciprocate.

## 2024-03-03 VITALS
HEART RATE: 102 BPM | TEMPERATURE: 99.8 F | SYSTOLIC BLOOD PRESSURE: 102 MMHG | WEIGHT: 237.5 LBS | OXYGEN SATURATION: 96 % | HEIGHT: 74 IN | RESPIRATION RATE: 18 BRPM | BODY MASS INDEX: 30.48 KG/M2 | DIASTOLIC BLOOD PRESSURE: 69 MMHG

## 2024-03-03 RX ADMIN — TAMSULOSIN HYDROCHLORIDE 0.4 MG: 0.4 CAPSULE ORAL at 17:07

## 2024-03-03 RX ADMIN — GABAPENTIN 300 MG: 300 CAPSULE ORAL at 11:29

## 2024-03-03 RX ADMIN — TRAZODONE HYDROCHLORIDE 50 MG: 50 TABLET ORAL at 21:17

## 2024-03-03 RX ADMIN — DIVALPROEX SODIUM 1000 MG: 500 TABLET, EXTENDED RELEASE ORAL at 21:17

## 2024-03-03 RX ADMIN — DIVALPROEX SODIUM 500 MG: 500 TABLET, EXTENDED RELEASE ORAL at 08:43

## 2024-03-03 RX ADMIN — GABAPENTIN 300 MG: 300 CAPSULE ORAL at 21:17

## 2024-03-03 RX ADMIN — MELATONIN TAB 3 MG 3 MG: 3 TAB at 21:17

## 2024-03-03 RX ADMIN — CHOLECALCIFEROL TAB 25 MCG (1000 UNIT) 2000 UNITS: 25 TAB at 08:43

## 2024-03-03 RX ADMIN — DOCUSATE SODIUM 100 MG: 100 CAPSULE, LIQUID FILLED ORAL at 08:44

## 2024-03-03 RX ADMIN — HALOPERIDOL 10 MG: 10 TABLET ORAL at 21:17

## 2024-03-03 RX ADMIN — METFORMIN HYDROCHLORIDE 500 MG: 500 TABLET, FILM COATED ORAL at 08:43

## 2024-03-03 RX ADMIN — DOCUSATE SODIUM 100 MG: 100 CAPSULE, LIQUID FILLED ORAL at 17:07

## 2024-03-03 NOTE — PLAN OF CARE
Problem: PSYCHOSIS  Goal: Will report no hallucinations or delusions  Description: Interventions:  - Administer medication as  ordered  - Every waking shifts and PRN assess for the presence of hallucinations and or delusions  - Assist with reality testing to support increasing orientation  - Assess if patient's hallucinations or delusions are encouraging self-harm or harm to others and intervene as appropriate  Outcome: Progressing     Problem: BEHAVIOR  Goal: Pt/Family maintain appropriate behavior and adhere to behavioral management agreement, if implemented  Description: INTERVENTIONS:  - Assess the family dynamic   - Encourage verbalization of thoughts and concerns in a socially appropriate manner  - Assess patient/family's coping skills and non-compliant behavior (including use of illegal substances).  - Utilize positive, consistent limit setting strategies supporting safety of patient, staff and others  - Initiate consult with Case Management, Spiritual Care or other ancillary services as appropriate  - If a patient's/visitor's behavior jeopardizes the safety of the patient, staff, or others, refer to organization procedure.   - Notify Security of behavior or suspected illegal substances which indicate the need for search of the patient and/or belongings  - Encourage participation in the decision making process about a behavioral management agreement; implement if patient meets criteria  Outcome: Progressing     Problem: SLEEP DISTURBANCE  Goal: Will exhibit normal sleeping pattern  Description: Interventions:  -  Assess the patients sleep pattern, noting recent changes  - Administer medication as ordered  - Decrease environmental stimuli, including noise, as appropriate during the night  - Encourage the patient to actively participate in unit groups and or exercise during the day to enhance ability to achieve adequate sleep at night  - Assess the patient, in the morning, encouraging a description of sleep  experience  Outcome: Progressing     Problem: INVOLUNTARY ADMIT  Goal: Will cooperate with staff recommendations and doctor's orders and will demonstrate appropriate behavior  Description: INTERVENTIONS:  - Treat underlying conditions and offer medication as ordered  - Educate regarding involuntary admission procedures and rules  - Utilize positive consistent limit setting strategies to support patient and staff safety  Outcome: Progressing     Problem: Risk for Self Injury/Neglect  Goal: Treatment Goal: Remain safe during length of stay, learn and adopt new coping skills, and be free of self-injurious ideation, impulses and acts at the time of discharge  Outcome: Progressing  Goal: Verbalize thoughts and feelings  Description: Interventions:  - Assess and re-assess patient's lethality and potential for self-injury  - Engage patient in 1:1 interactions, daily, for a minimum of 15 minutes  - Encourage patient to express feelings, fears, frustrations, hopes  - Establish rapport/trust with patient   Outcome: Progressing  Goal: Refrain from harming self  Description: Interventions:  - Monitor patient closely, per order  - Develop a trusting relationship  - Supervise medication ingestion, monitor effects and side effects   Outcome: Progressing  Goal: Attend and participate in unit activities, including therapeutic, recreational, and educational groups  Description: Interventions:  - Provide therapeutic and educational activities daily, encourage attendance and participation, and document same in the medical record  - Obtain collateral information, encourage visitation and family involvement in care   Outcome: Progressing  Goal: Recognize maladaptive responses and adopt new coping mechanisms  Outcome: Progressing  Goal: Complete daily ADLs, including personal hygiene independently, as able  Description: Interventions:  - Observe, teach, and assist patient with ADLS  - Monitor and promote a balance of rest/activity, with  adequate nutrition and elimination  Outcome: Progressing     Problem: Alteration in Orientation  Goal: Treatment Goal: Demonstrate a reduction of confusion and improved orientation to person, place, time and/or situation upon discharge, according to optimum baseline/ability  Outcome: Progressing  Goal: Interact with staff daily  Description: Interventions:  - Assess and re-assess patient's level of orientation  - Engage patient in 1 on 1 interactions, daily, for a minimum of 15 minutes   - Establish rapport/trust with patient   Outcome: Progressing  Goal: Express concerns related to confused thinking related to:  Description: Interventions:  - Encourage patient to express feelings, fears, frustrations, hopes  - Assign consistent caregivers   - [place]/re-orient patient as needed  - Allow comfort items, as appropriate  - Provide visual cues, signs, etc.   Outcome: Progressing  Goal: Allow medical examinations, as recommended  Description: Interventions:  - Provide physical/neurological exams and/or referrals, per provider   Outcome: Progressing  Goal: Cooperate with recommended testing/procedures  Description: Interventions:  - Determine need for ancillary testing  - Observe for mental status changes  - Implement falls/precaution protocol   Outcome: Progressing  Goal: Attend and participate in unit activities, including therapeutic, recreational, and educational groups  Description: Interventions:  - Provide therapeutic and educational activities daily, encourage attendance and participation, and document same in the medical record   - Provide appropriate opportunities for reminiscence   - Provide a consistent daily routine   - Encourage family contact/visitation   Outcome: Progressing  Goal: Complete daily ADLs, including personal hygiene independently, as able  Description: Interventions:  - Observe, teach, and assist patient with ADLS  Outcome: Progressing

## 2024-03-03 NOTE — PROGRESS NOTES
Progress Note - Behavioral Health   Sudhakar Choe 58 y.o. male MRN: [id number]  Unit/Bed#: [facility] 660-02 Encounter: [id number]    Assessment/Plan   Principal Problem:    Bipolar affective disorder, current episode manic with psychotic symptoms (HCC)  Active Problems:    Benign prostatic hyperplasia    Brachial plexus injury, right, sequela    Medical clearance for psychiatric admission    Bilateral lower extremity edema    Progress Toward Goals: Unchanged.  No significant changes in behaviors or clinical status over the last 24 hours.  Sudhakar will continue working on current treatment goals which include:  1.Continue with group therapy, milieu therapy and occupational therapy  2.Behavioral Health checks every 7 minutes  3.Continue frequent safety checks and vitals per unit protocol  4.Continue with SLIM medical management as indicated  5.The patient will be maintained on the following medications: Depakote ER 1,000mg PO QHS, Depakote ER 500mg PO Daily, Neurontin 300mg PO BID, Haldol 10mg PO QHS, Melatonin 3mg PO QHS, Traozodne 50mg PO QHS  Invega Sustenna 234mg IM q4 weeks last given on 2/28, next due on 3/27  VPA level on 2/4 was 84  6. Disposition Planning: Discharge planning and efforts remain ongoing per primary treatment teams recommendation - Aw[facility] placement with Alex GERONIMO    Psychiatric Review of Systems:  Behavior over the last 24 hours: Unchanged  Sleep: sleeping okay throughout the night  Appetite: adequate  Medication side effects: none reported  ROS:no complaints, all other systems are negative    Patient was seen today for continuation of care, records reviewed and patient was discussed with the morning case review team.  No behavioral outbursts or acute events noted overnight and no significant changes in behaviors or clinical status over the last 24 hours.      On assessment today, Sudhakar was found laying in bed.  He is very pleasant and polite on approach.  He tells me he has definitely seen  improvements in his mood and overall functioning since admission.  He reports feeling more hopeful for the future.  Sudhakar reports adequate daytime energy and denies any difficulties with initiating or staying asleep.  Oral appetite and hydration is adequate.  No muscle tenderness or adverse effects from Invega Sustenna given last week.  We reviewed once more the specific as-needed medications they can use going forward if they experience any insomnia or destabilization of their mood, they understood and were agreeable. Milieu visibility and group attendance encouraged to promote an active participation in treatment.    Sudhakar denies acute suicidal/self-harm ideation/intent/plan upon direct inquiry at this time. Sudhakar is able to contract for safety while on the unit and would feel comfortable seeking staff support should suicidal symptoms or urges appear or worsen. Sudhakar remains behaviorally appropriate, no agitation or aggression noted on exam or in report. Sudhakar also denies HI/AH/VH, and does not appear overtly manic.  Patient does not verbalize any experiences that can be categorized as paranoid, persecutory, bizarre, or somatic delusions. Sudhakar remains adherent to his current psychotropic medication regimen and denies any side effects from medications, as well as none noted on exam.    Vitals:  Vitals:    03/03/24 0751   BP: 137/78   Pulse: 69   Resp: 16   Temp: (!) 97.1 °F (36.2 °C)   SpO2: 96%     Laboratory Results:  I have personally reviewed all pertinent laboratory/tests results.  Most Recent Labs:   Lab Results   Component Value Date    WBC 8.07 02/04/2024    RBC 4.48 02/04/2024    HGB 13.9 02/04/2024    HCT 40.8 02/04/2024     02/04/2024    RDW 14.1 02/04/2024    NEUTROABS 3.14 02/04/2024    SODIUM 136 02/04/2024    K 3.8 02/04/2024     02/04/2024    CO2 26 02/04/2024    BUN 10 02/04/2024    CREATININE 0.87 02/04/2024    GLUC 102 02/04/2024    GLUF 109 (H) 01/03/2024    CALCIUM 9.4 02/04/2024     AST 17 02/04/2024    ALT 10 02/04/2024    ALKPHOS 53 02/04/2024    TP 7.3 02/04/2024    ALB 4.2 02/04/2024    TBILI 0.32 02/04/2024    CHOLESTEROL 124 01/23/2024    HDL 28 (L) 01/23/2024    TRIG 116 01/23/2024    LDLCALC 73 01/23/2024    NONHDLC 96 01/23/2024    VALPROICTOT 84 02/04/2024    [id number] 1.431 12/30/2023    RPR Non-Reactive 03/26/2021    HGBA1C 6.2 (H) 01/23/2024     01/23/2024     Behavioral Health Medications: all current active meds have been reviewed and continue current psychiatric medications.  Current Facility-Administered Medications   Medication Dose Route Frequency Provider Last Rate    acetaminophen  650 mg Oral Q4H PRN BETO Novak      acetaminophen  650 mg Oral Q4H PRN Reji Looney DO      acetaminophen  975 mg Oral Q6H PRN BETO Novak      cholecalciferol  2,000 Units Oral Daily Rossi Carlson PA-C      divalproex sodium  1,000 mg Oral HS Rory Bunn MD      divalproex sodium  500 mg Oral Daily Rory Bunn MD      docusate sodium  100 mg Oral BID BETO Novak      gabapentin  300 mg Oral HS Rory Bunn MD      gabapentin  300 mg Oral Daily BETO Novak      haloperidol  10 mg Oral HS Saul Sorensen PA-C      hydrOXYzine HCL  25 mg Oral Q6H PRN Max 4/day BETO Novak      hydrOXYzine HCL  50 mg Oral Q6H PRN Max 4/day BETO Novak      LORazepam  1 mg Intramuscular Q6H PRN Max 3/day BETO Novak      LORazepam  1 mg Oral Q6H PRN Max 3/day BETO Novak      melatonin  3 mg Oral HS Rory Bunn MD      metFORMIN  500 mg Oral Daily With Breakfast BETO Garcia      mirtazapine  7.5 mg Oral HS PRN Rory Bunn MD      nicotine  1 patch Transdermal Daily PRN Fabiana Sousa MD      OLANZapine  5 mg Intramuscular Q3H PRN Max 3/day BETO Novak      OLANZapine  2.5 mg Oral Q4H PRN Max 6/day BETO Novak      OLANZapine  5 mg Oral Q4H PRN Max 3/day  BETO Novak      OLANZapine  5 mg Oral Q3H PRN Max 3/day BETO Novak      paliperidone  234 mg IM- Deltoid Q4 weeks BETO Novak      polyethylene glycol  17 g Oral Daily PRN BETO Novak      senna-docusate sodium  1 tablet Oral Daily PRN BETO Novak      tamsulosin  0.4 mg Oral Daily With Dinner Rossi Carlson PA-C      traZODone  100 mg Oral HS PRN BETO Novak      traZODone  50 mg Oral HS BETO Novak       Mental Status Evaluation:  Appearance:  age appropriate, casually dressed, dressed appropriately   Behavior:  pleasant, cooperative, calm   Speech:  normal rate, normal volume, normal pitch   Mood:  euthymic   Affect:  constricted   Thought Process:  goal directed   Associations: intact associations   Thought Content:  no overt delusions   Perceptual Disturbances: no auditory hallucinations, no visual hallucinations, denies when asked, does not appear responding to internal stimuli   Risk Potential: Suicidal ideation - None at present, contracts for safety on the unit, would talk to staff if not feeling safe on the unit  Homicidal ideation - None at present  Potential for aggression - Not at present   Sensorium:  oriented to person, place, and time/date   Memory:  recent memory intact   Consciousness:  alert and awake   Attention/Concentration: attention span and concentration appear shorter than expected for age   Insight:  limited   Judgment: limited   Gait/Station: normal gait/station, normal balance   Motor Activity: no abnormal movements     Progress Toward Goals:   Sudhakar continues to require inpatient psychiatric hospitalization for continued medication management and titration to optimize symptom reduction, improve sleep hygiene, and demonstrate adequate self-care.     Suicide/Homicide Risk Assessment:  Risk of Harm to Self:   Nursing Suicide Risk Assessment Last 24 hours: C-SSRS Risk (Since Last Contact)  Calculated C-SSRS Risk Score  (Since Last Contact): No Risk Indicated    Risk of Harm to Others:  Nursing Homicide Risk Assessment: Violence Risk to Others: Denies within past 6 months    Risks / Benefits of Treatment:  Risks, benefits, and possible side effects of medications explained to patient. Patient has limited understanding of risks and benefits of treatment at this time, but agrees to take medications as prescribed.    Counseling / Coordination of Care:  Total floor/unit time spent today 25 minutes. Greater than 50% of total time was spent with the patient and / or family counseling and / or coordination of care. A description of the counseling / coordination of care:   Patient's progress discussed with staff in treatment team meeting.  Medications, treatment progress and treatment plan reviewed with patient.   Educated on importance of medication and treatment compliance.  Reassurance and supportive therapy provided.   Encouraged participation in milieu and group therapy on the unit.    BETO Pool 03/03/24

## 2024-03-03 NOTE — NURSING NOTE
Patient is peripheral in the milieu. He is calm, cooperative and compliant with medications. Denies symptoms.

## 2024-03-04 LAB — SARS-COV-2 RNA RESP QL NAA+PROBE: NEGATIVE

## 2024-03-04 PROCEDURE — 87635 SARS-COV-2 COVID-19 AMP PRB: CPT

## 2024-03-04 RX ORDER — DIVALPROEX SODIUM 500 MG/1
500 TABLET, EXTENDED RELEASE ORAL DAILY
Qty: 30 TABLET | Refills: 1 | Status: SHIPPED | OUTPATIENT
Start: 2024-03-05 | End: 2024-05-04

## 2024-03-04 RX ORDER — GABAPENTIN 300 MG/1
300 CAPSULE ORAL DAILY
Qty: 30 CAPSULE | Refills: 1 | Status: SHIPPED | OUTPATIENT
Start: 2024-03-05 | End: 2024-05-04

## 2024-03-04 RX ORDER — ATROPINE SULFATE 10 MG/ML
1 SOLUTION/ DROPS OPHTHALMIC 3 TIMES DAILY
Qty: 5 ML | Refills: 0 | Status: SHIPPED | OUTPATIENT
Start: 2024-03-04 | End: 2024-03-04

## 2024-03-04 RX ORDER — DOCUSATE SODIUM 100 MG/1
100 CAPSULE, LIQUID FILLED ORAL 2 TIMES DAILY
Qty: 60 CAPSULE | Refills: 1 | Status: SHIPPED | OUTPATIENT
Start: 2024-03-04 | End: 2024-05-03

## 2024-03-04 RX ORDER — GABAPENTIN 300 MG/1
300 CAPSULE ORAL
Qty: 30 CAPSULE | Refills: 1 | Status: SHIPPED | OUTPATIENT
Start: 2024-03-04 | End: 2024-05-03

## 2024-03-04 RX ORDER — LANOLIN ALCOHOL/MO/W.PET/CERES
3 CREAM (GRAM) TOPICAL
Qty: 30 TABLET | Refills: 0 | Status: SHIPPED | OUTPATIENT
Start: 2024-03-04 | End: 2024-05-03

## 2024-03-04 RX ORDER — TAMSULOSIN HYDROCHLORIDE 0.4 MG/1
0.4 CAPSULE ORAL
Qty: 30 CAPSULE | Refills: 1 | Status: SHIPPED | OUTPATIENT
Start: 2024-03-04 | End: 2024-05-03

## 2024-03-04 RX ORDER — DIVALPROEX SODIUM 500 MG/1
1000 TABLET, EXTENDED RELEASE ORAL
Qty: 60 TABLET | Refills: 1 | Status: SHIPPED | OUTPATIENT
Start: 2024-03-04 | End: 2024-05-03

## 2024-03-04 RX ORDER — LANOLIN ALCOHOL/MO/W.PET/CERES
3 CREAM (GRAM) TOPICAL
Qty: 30 TABLET | Refills: 1 | Status: SHIPPED | OUTPATIENT
Start: 2024-03-04 | End: 2024-03-04

## 2024-03-04 RX ORDER — HALOPERIDOL 5 MG/1
5 TABLET ORAL
Status: DISCONTINUED | OUTPATIENT
Start: 2024-03-04 | End: 2024-03-05 | Stop reason: HOSPADM

## 2024-03-04 RX ORDER — TRAZODONE HYDROCHLORIDE 50 MG/1
50 TABLET ORAL
Qty: 30 TABLET | Refills: 1 | Status: SHIPPED | OUTPATIENT
Start: 2024-03-04 | End: 2024-05-03

## 2024-03-04 RX ORDER — ATROPINE SULFATE 10 MG/ML
1 SOLUTION/ DROPS OPHTHALMIC 3 TIMES DAILY
Status: DISCONTINUED | OUTPATIENT
Start: 2024-03-04 | End: 2024-03-05 | Stop reason: HOSPADM

## 2024-03-04 RX ORDER — ATROPINE SULFATE 10 MG/ML
1 SOLUTION/ DROPS OPHTHALMIC 3 TIMES DAILY
Qty: 5 ML | Refills: 0 | Status: SHIPPED | OUTPATIENT
Start: 2024-03-04 | End: 2024-04-06

## 2024-03-04 RX ORDER — HALOPERIDOL 5 MG/1
5 TABLET ORAL
Qty: 30 TABLET | Refills: 1 | Status: SHIPPED | OUTPATIENT
Start: 2024-03-04 | End: 2024-05-03

## 2024-03-04 RX ORDER — MELATONIN
2000 DAILY
Qty: 60 TABLET | Refills: 1 | Status: SHIPPED | OUTPATIENT
Start: 2024-03-05 | End: 2024-05-04

## 2024-03-04 RX ADMIN — DIVALPROEX SODIUM 500 MG: 500 TABLET, EXTENDED RELEASE ORAL at 08:33

## 2024-03-04 RX ADMIN — TAMSULOSIN HYDROCHLORIDE 0.4 MG: 0.4 CAPSULE ORAL at 16:22

## 2024-03-04 RX ADMIN — ATROPINE SULFATE 1 DROP: 10 SOLUTION/ DROPS OPHTHALMIC at 16:22

## 2024-03-04 RX ADMIN — DOCUSATE SODIUM 100 MG: 100 CAPSULE, LIQUID FILLED ORAL at 08:33

## 2024-03-04 RX ADMIN — MELATONIN TAB 3 MG 3 MG: 3 TAB at 21:25

## 2024-03-04 RX ADMIN — HALOPERIDOL 5 MG: 5 TABLET ORAL at 21:25

## 2024-03-04 RX ADMIN — CHOLECALCIFEROL TAB 25 MCG (1000 UNIT) 2000 UNITS: 25 TAB at 08:33

## 2024-03-04 RX ADMIN — GABAPENTIN 300 MG: 300 CAPSULE ORAL at 11:43

## 2024-03-04 RX ADMIN — DIVALPROEX SODIUM 1000 MG: 500 TABLET, EXTENDED RELEASE ORAL at 21:25

## 2024-03-04 RX ADMIN — GABAPENTIN 300 MG: 300 CAPSULE ORAL at 21:25

## 2024-03-04 RX ADMIN — METFORMIN HYDROCHLORIDE 500 MG: 500 TABLET, FILM COATED ORAL at 08:33

## 2024-03-04 RX ADMIN — DOCUSATE SODIUM 100 MG: 100 CAPSULE, LIQUID FILLED ORAL at 17:07

## 2024-03-04 RX ADMIN — ATROPINE SULFATE 1 DROP: 10 SOLUTION/ DROPS OPHTHALMIC at 21:26

## 2024-03-04 RX ADMIN — TRAZODONE HYDROCHLORIDE 50 MG: 50 TABLET ORAL at 21:25

## 2024-03-04 RX ADMIN — ATROPINE SULFATE 1 DROP: 10 SOLUTION/ DROPS OPHTHALMIC at 10:36

## 2024-03-04 NOTE — BH TRANSITION RECORD
Contact Information: If you have any questions, concerns, pended studies, tests and/or procedures, or emergencies regarding your inpatient behavioral health visit. Please contact [facility] [phone] and ask to speak to a , nurse or physician. A contact is available 24 hours/ 7 days a week at this number.     Summary of Procedures Performed During your Stay:  Below is a list of major procedures performed during your hospital stay and a summary of results:  - Cardiac Procedures/Studies: ECG- Sinus Tachycardia .  - Major Imaging Studies: XR Forearm 2 VW Right: Redemonstarted distal radius fracture with interval healing; Again seen is a dorsal spanning plate, which was fractured, first noted on the 1/5/2024 plain films .    Pending Studies (From admission, onward)       Start     Ordered    03/04/24 1255  COVID only  Once        References:    COVID-19, Influenza, and RSV Lab Ordering Algorithm    03/04/24 1254                  Please follow up on the above pending studies with your PCP and/or referring provider.

## 2024-03-04 NOTE — PROGRESS NOTES
Progress Note - Behavioral Health   Sudhakar Choe 58 y.o. male MRN: [id number]  Unit/Bed#: [facility] 660[facility]02 Encounter: [id number]    Patient was seen today for continuation of care, records reviewed and patient was discussed with the morning case review team.    Sudhakar was seen today for psychiatric follow-up.  On assessment today, Sudhakar was pleasant Currently seen in his room, no depression or anxiety currently verbalized.  Invega Sustenna 234 mg given on 2/28/2024, increased  salivation noted post administration.  We will initiate atropine drops 3 times daily to assist with symptoms. Haldol also to be decreased to 5 mg qhs to assist with symptoms.  No other adverse effects reported at this time.  Patient continues to be less irritable with no symptoms of tim or overt delusions.  Tentative discharge ongoing as patient continues to wait for placement and [facility].    Sudhakar denies acute suicidal/self-harm ideation/intent/plan upon direct inquiry at this time.  Sudhakar remains behaviorally appropriate, no agitation or aggression noted on exam or in report.  Sudhakar also denies HI/AH/VH, and does not appear overtly manic.  No overt delusions or paranoia are verbalized. Impulse control is intact.  Sudhakar remains adherent to his current psychotropic medication regimen and denies any side effects from medications, as well as none noted on exam.    Vitals:  Vitals:    03/04/24 0728   BP: 128/71   Pulse: 91   Resp: 16   Temp: 97.8 °F (36.6 °C)   SpO2: 95%       Laboratory Results:  I have personally reviewed all pertinent laboratory/tests results.  Most Recent Labs:   Lab Results   Component Value Date    WBC 8.07 02/04/2024    RBC 4.48 02/04/2024    HGB 13.9 02/04/2024    HCT 40.8 02/04/2024     02/04/2024    RDW 14.1 02/04/2024    NEUTROABS 3.14 02/04/2024    SODIUM 136 02/04/2024    K 3.8 02/04/2024     02/04/2024    CO2 26 02/04/2024    BUN 10 02/04/2024    CREATININE 0.87 02/04/2024    GLUC 102 02/04/2024     GLUF 109 (H) 01/03/2024    CALCIUM 9.4 02/04/2024    AST 17 02/04/2024    ALT 10 02/04/2024    ALKPHOS 53 02/04/2024    TP 7.3 02/04/2024    ALB 4.2 02/04/2024    TBILI 0.32 02/04/2024    CHOLESTEROL 124 01/23/2024    HDL 28 (L) 01/23/2024    TRIG 116 01/23/2024    LDLCALC 73 01/23/2024    NONHDLC 96 01/23/2024    VALPROICTOT 84 02/04/2024    [id number] 1.431 12/30/2023    RPR Non-Reactive 03/26/2021    HGBA1C 6.2 (H) 01/23/2024     01/23/2024     Depakote:   Lab Results   Component Value Date    VALPROICTOT 84 02/04/2024       Psychiatric Review of Systems:  Behavior over the last 24 hours:  unchanged.   Sleep: good  Appetite: good  Medication side effects: Increased Salivation  ROS: no complaints, denies shortness of breath or chest pain and all other systems are negative for acute changes    Mental Status Evaluation:  Appearance:  disheveled   Behavior:  cooperative, calm   Speech:  normal rate and volume   Mood:  euthymic   Affect:  blunted   Thought Process:  linear   Thought Content:  no overt delusions   Perceptual Disturbances: denies auditory hallucinations when asked, does not appear responding to internal stimuli   Risk Potential: Suicidal ideation - None  Homicidal ideation - None  Potential for aggression - No   Memory:  recent and remote memory grossly intact   Sensorium  person, place, and time/date      Consciousness:  alert and awake   Attention: attention span and concentration are age appropriate   Insight:  limited   Judgment: limited   Gait/Station: normal gait/station   Motor Activity: no abnormal movements   Progress Toward Goals:   Sudhakar is progressing towards goals of inpatient psychiatric treatment by continued medication compliance and is attending therapeutic modalities on the milieu. However, the patient continues to require inpatient psychiatric hospitalization for continued medication management and titration to optimize symptom reduction, improve sleep hygiene, and  demonstrate adequate self-care.    Assessment/Plan   Principal Problem:    Bipolar affective disorder, current episode manic with psychotic symptoms (HCC)  Active Problems:    Benign prostatic hyperplasia    Brachial plexus injury, right, sequela    Medical clearance for psychiatric admission    Bilateral lower extremity edema      Recommended Treatment: Treatment plan and medication changes discussed and per the attending physician the plan is:    1.Continue with group therapy, milieu therapy and occupational therapy  2.Behavioral Health checks every 7 minutes  3.Continue frequent safety checks and vitals per unit protocol  4.Continue with SLIM medical management as indicated  5.Continue with current medication regimen  6.Will review labs in the a.m.  7.Disposition Planning: Discharge planning and efforts remain ongoing    Behavioral Health Medications: all current active meds have been reviewed and continue current psychiatric medications.  Current Facility-Administered Medications   Medication Dose Route Frequency Provider Last Rate    acetaminophen  650 mg Oral Q4H PRN Jenn Strickland, CRNP      acetaminophen  650 mg Oral Q4H PRN Reji Looney DO      acetaminophen  975 mg Oral Q6H PRN BETO Novak      cholecalciferol  2,000 Units Oral Daily Rossi Carlson PA-C      divalproex sodium  1,000 mg Oral HS Rory Bunn MD      divalproex sodium  500 mg Oral Daily Rory Bunn MD      docusate sodium  100 mg Oral BID Jenn Strickland, LYLANP      gabapentin  300 mg Oral HS Rory Bunn MD      gabapentin  300 mg Oral Daily LYLA NovakNP      haloperidol  10 mg Oral HS Saul Sorensen PA-C      hydrOXYzine HCL  25 mg Oral Q6H PRN Max 4/day Jenn Strickland, LYLANP      hydrOXYzine HCL  50 mg Oral Q6H PRN Max 4/day BETO Novak      LORazepam  1 mg Intramuscular Q6H PRN Max 3/day Jenn Strickland, BETO      LORazepam  1 mg Oral Q6H PRN Max 3/day Jenn Strickland, BETO      melatonin  3 mg  Oral HS Rory Bunn MD      metFORMIN  500 mg Oral Daily With Breakfast Dorenedaiana JorgeBETO Castro      mirtazapine  7.5 mg Oral HS PRN Rory Bunn MD      nicotine  1 patch Transdermal Daily PRN Fabiana Sousa MD      OLANZapine  5 mg Intramuscular Q3H PRN Max 3/day Jenn Strickland, CRNP      OLANZapine  2.5 mg Oral Q4H PRN Max 6/day Jenn Strickland, CRNP      OLANZapine  5 mg Oral Q4H PRN Max 3/day Jenn Strickland, CRNP      OLANZapine  5 mg Oral Q3H PRN Max 3/day Jenn Strickland, CRNP      paliperidone  234 mg IM- Deltoid Q4 weeks Jenn Strickland, CRNP      polyethylene glycol  17 g Oral Daily PRN Jenn Strickland, CRNP      senna-docusate sodium  1 tablet Oral Daily PRN Jenn Strickland, CRNP      tamsulosin  0.4 mg Oral Daily With Dinner Rossi Carlson PA-C      traZODone  100 mg Oral HS PRN Jenn Strickland, BETO      traZODone  50 mg Oral HS Jenn Strickland, BETO         Risks / Benefits of Treatment:  Risks, benefits, and possible side effects of medications explained to patient and patient verbalizes understanding and agreement for treatment.    Counseling / Coordination of Care:  Patient's progress reviewed with nursing staff.  Medications, treatment progress and treatment plan reviewed with patient.  Supportive counseling provided to the patient.    Total floor/unit time spent today 25 minutes. Greater than 50% of total time was spent with the patient and / or family counseling and / or coordination of care. A description of the counseling / coordination of care: medication education, treatment plan, supportive therapy.    This note was completed in part utilizing Dragon dictation Software. Grammatical, translation, syntax errors, random word insertions, spelling mistakes, and incomplete sentences may be an occasional consequence of this system secondary to software limitations with voice recognition, ambient noise, and hardware issues. If you have any questions or concerns about the  content, text, or information contained within the body of this dictation, please contact the provider for clarification

## 2024-03-04 NOTE — PLAN OF CARE
Problem: PSYCHOSIS  Goal: Will report no hallucinations or delusions  Description: Interventions:  - Administer medication as  ordered  - Every waking shifts and PRN assess for the presence of hallucinations and or delusions  - Assist with reality testing to support increasing orientation  - Assess if patient's hallucinations or delusions are encouraging self-harm or harm to others and intervene as appropriate  Outcome: Progressing     Problem: BEHAVIOR  Goal: Pt/Family maintain appropriate behavior and adhere to behavioral management agreement, if implemented  Description: INTERVENTIONS:  - Assess the family dynamic   - Encourage verbalization of thoughts and concerns in a socially appropriate manner  - Assess patient/family's coping skills and non-compliant behavior (including use of illegal substances).  - Utilize positive, consistent limit setting strategies supporting safety of patient, staff and others  - Initiate consult with Case Management, Spiritual Care or other ancillary services as appropriate  - If a patient's/visitor's behavior jeopardizes the safety of the patient, staff, or others, refer to organization procedure.   - Notify Security of behavior or suspected illegal substances which indicate the need for search of the patient and/or belongings  - Encourage participation in the decision making process about a behavioral management agreement; implement if patient meets criteria  Outcome: Progressing     Problem: SLEEP DISTURBANCE  Goal: Will exhibit normal sleeping pattern  Description: Interventions:  -  Assess the patients sleep pattern, noting recent changes  - Administer medication as ordered  - Decrease environmental stimuli, including noise, as appropriate during the night  - Encourage the patient to actively participate in unit groups and or exercise during the day to enhance ability to achieve adequate sleep at night  - Assess the patient, in the morning, encouraging a description of sleep  experience  Outcome: Progressing     Problem: INVOLUNTARY ADMIT  Goal: Will cooperate with staff recommendations and doctor's orders and will demonstrate appropriate behavior  Description: INTERVENTIONS:  - Treat underlying conditions and offer medication as ordered  - Educate regarding involuntary admission procedures and rules  - Utilize positive consistent limit setting strategies to support patient and staff safety  Outcome: Progressing     Problem: Risk for Self Injury/Neglect  Goal: Treatment Goal: Remain safe during length of stay, learn and adopt new coping skills, and be free of self-injurious ideation, impulses and acts at the time of discharge  Outcome: Progressing  Goal: Verbalize thoughts and feelings  Description: Interventions:  - Assess and re-assess patient's lethality and potential for self-injury  - Engage patient in 1:1 interactions, daily, for a minimum of 15 minutes  - Encourage patient to express feelings, fears, frustrations, hopes  - Establish rapport/trust with patient   Outcome: Progressing  Goal: Refrain from harming self  Description: Interventions:  - Monitor patient closely, per order  - Develop a trusting relationship  - Supervise medication ingestion, monitor effects and side effects   Outcome: Progressing  Goal: Attend and participate in unit activities, including therapeutic, recreational, and educational groups  Description: Interventions:  - Provide therapeutic and educational activities daily, encourage attendance and participation, and document same in the medical record  - Obtain collateral information, encourage visitation and family involvement in care   Outcome: Progressing  Goal: Recognize maladaptive responses and adopt new coping mechanisms  Outcome: Progressing  Goal: Complete daily ADLs, including personal hygiene independently, as able  Description: Interventions:  - Observe, teach, and assist patient with ADLS  - Monitor and promote a balance of rest/activity, with  adequate nutrition and elimination  Outcome: Progressing     Problem: Alteration in Orientation  Goal: Treatment Goal: Demonstrate a reduction of confusion and improved orientation to person, place, time and/or situation upon discharge, according to optimum baseline/ability  Outcome: Progressing  Goal: Interact with staff daily  Description: Interventions:  - Assess and re-assess patient's level of orientation  - Engage patient in 1 on 1 interactions, daily, for a minimum of 15 minutes   - Establish rapport/trust with patient   Outcome: Progressing  Goal: Express concerns related to confused thinking related to:  Description: Interventions:  - Encourage patient to express feelings, fears, frustrations, hopes  - Assign consistent caregivers   - [place]/re-orient patient as needed  - Allow comfort items, as appropriate  - Provide visual cues, signs, etc.   Outcome: Progressing  Goal: Allow medical examinations, as recommended  Description: Interventions:  - Provide physical/neurological exams and/or referrals, per provider   Outcome: Progressing  Goal: Cooperate with recommended testing/procedures  Description: Interventions:  - Determine need for ancillary testing  - Observe for mental status changes  - Implement falls/precaution protocol   Outcome: Progressing  Goal: Attend and participate in unit activities, including therapeutic, recreational, and educational groups  Description: Interventions:  - Provide therapeutic and educational activities daily, encourage attendance and participation, and document same in the medical record   - Provide appropriate opportunities for reminiscence   - Provide a consistent daily routine   - Encourage family contact/visitation   Outcome: Progressing  Goal: Complete daily ADLs, including personal hygiene independently, as able  Description: Interventions:  - Observe, teach, and assist patient with ADLS  Outcome: Progressing

## 2024-03-04 NOTE — CASE MANAGEMENT
"Per [facility], the patient's 304 already has the EAC placement written on it and the patient is legally able to transition to the EAC at any time.    CM securely emailed [facility] the patient's 304 and asked what time is best for discharge for tomorrow 3/5/24. CM received an email from [facility] that they need to do some maintenance on the patient's room and discharge may have to be delayed until \"early next week.\" Alex reported they would keep CM informed on when the patient can admit to their facility.   "

## 2024-03-04 NOTE — NURSING NOTE
Patient remained calm, was visible in unit with positive mood looking forward to be d/c tomorrow to [facility]. He refused groups today, denied any s/s, was noted drinking lots of water during the day. Patient was med and meals compliant. Will continue to monitor.

## 2024-03-04 NOTE — NURSING NOTE
Pt visible within the milieu throughout the evening hours. On approach, pt reported positive mood and fair energy levels. Pt inquire about his pending d/c to [facility] and the tentative date and was reassured on same. Demonstrated good insight on reasons of his current admission. No acute changes in behaviors and no delusional expressions. Medication compliant.

## 2024-03-04 NOTE — TREATMENT TEAM
03/04/24 0749   Team Meeting   Meeting Type Daily Rounds   Initial Conference Date 03/04/24   Team Members Present   Team Members Present Physician;Nurse;;Occupational Therapist   Physician Team Member Jenn Rene   Nursing Team Member Kyle Muniz   Care Management Team Member Rossi   Patient/Family Present   Patient Present No   Patient's Family Present No     The patient remains calm, pleasant, cooperative with care, and medication compliant. Patient accepted to the EAC, awaiting an admissions date.

## 2024-03-04 NOTE — CASE MANAGEMENT
CM received an email from the [facility] that the patient can admit to their facility tomorrow 3/5/24 before lunch. They requested a COVID test for the patient. CM will request secure psych transport for 11h. The patient is aware and in agreement.

## 2024-03-04 NOTE — CASE MANAGEMENT
Negative COVID test sent to [facility]. The EAC requested that we initiate the prior auth for the EAC, UR made aware and in agreement to initiate the auth.

## 2024-03-04 NOTE — PLAN OF CARE
Assessment & Plan     RLQ abdominal pain  CT scan showed no appendicitis. There was presence of right ovarian cyst that appears benign.   Labs discussed with patient. Asked to use a heating pad and also Ibuprofen /Tylenol   - CBC with platelets and differential  - CT Abdomen Pelvis w Contrast; Future    Type 2 diabetes mellitus without complication, without long-term current use of insulin (H)  A1C went up a bit. Patient was asked to continue to work on diet.  - Hemoglobin A1c      FUTURE APPOINTMENTS:       - Follow-up visit in one month or sooner as needed.    Return in about 4 weeks (around 7/27/2021) for Follow up.    Shawn Lewis MD  [facility] BRADY Munoz is a 41 year old who presents for the following health issues     HPI 41 yr old female here for abdominal pain. Right lower quadrant pain. Sharp in nature. Intermittent. No nausea or vomiting.   No fevers or chills. Denies any urinary symptoms. She still has an appendix. She denies any change in bowel movements.     Abdominal pain  Onset/Duration: 3-4 days  Description:   Character: Sharp  Location: right lower pain  Radiation: None  Intensity: moderate, severe  Progression of Symptoms:  Intermittent becoming more frequent  Accompanying Signs & Symptoms:  Fever/Chills: no  Gas/Bloating: no  Nausea: no  Vomitting: no  Diarrhea: no  Constipation: no  Dysuria or Hematuria: no  History:   Trauma: no  Previous similar pain: no  Previous tests done: none  Precipitating factors:   Does the pain change with:     Food: no    Bowel Movement: no    Urination: no   Other factors:  no  Therapies tried and outcome: None  No LMP recorded.          Review of Systems   Constitutional, HEENT, cardiovascular, pulmonary, gi and gu systems are negative, except as otherwise noted.      Objective    /86   Pulse 93   Temp 98.4  F (36.9  C) (Tympanic)   Resp 14   Wt 81.1 kg (178 lb 12.8 oz)   SpO2 97%   BMI 30.69 kg/m   Pt. increasingly isolative not attending any groups even when directly prompted.  Problem: Risk for Self Injury/Neglect  Goal: Attend and participate in unit activities, including therapeutic, recreational, and educational groups  Description: Interventions:  - Provide therapeutic and educational activities daily, encourage attendance and participation, and document same in the medical record  - Obtain collateral information, encourage visitation and family involvement in care   Outcome: Not Progressing        Body mass index is 30.69 kg/m .  Physical Exam   GENERAL: healthy, alert and no distress  NECK: no adenopathy, no asymmetry, masses, or scars and thyroid normal to palpation  RESP: lungs clear to auscultation - no rales, rhonchi or wheezes  CV: regular rate and rhythm, normal S1 S2, no S3 or S4, no murmur, click or rub, no peripheral edema and peripheral pulses strong  ABDOMEN: tenderness RLQ and bowel sounds normal  MS: no gross musculoskeletal defects noted, no edema    Ct Abdomen Pelvis W Contrast    Result Date: 6/29/2021  CT ABDOMEN/PELVIS WITH CONTRAST June 29, 2021 11:55 AM CLINICAL HISTORY: Right lower quadrant abdominal pain, appendicitis suspected (Age >= 14y). RLQ started a few days ago, intermittent and sharp, examination of the abdomen showed tenderness and guarding in right lower quadrant. RLQ abdominal pain. TECHNIQUE: CT scan of the abdomen and pelvis was performed following injection of IV contrast. Multiplanar reformats were obtained. Dose reduction techniques were used. CONTRAST: 88mL Isovue-370. COMPARISON: Multiple previous, the most recent dated June 16, 2020. FINDINGS: LOWER CHEST: Normal. HEPATOBILIARY: Normal. PANCREAS: Normal. SPLEEN: Normal. ADRENAL GLANDS: Normal. KIDNEYS/BLADDER: Subcentimeter low dense lesion left kidney, likely a cyst. No further evaluation recommended. The kidneys have normal size, shape and position. No renal or ureteral calculi. No hydronephrosis. The bladder appears unremarkable. BOWEL: Moderate amount of stool throughout the colon. A normal appendix is identified. The small bowel loops are decompressed without evidence for small bowel obstruction. PELVIC ORGANS: 2 cm benign-appearing right ovarian cyst. Uterus and left ovary are normal. No free fluid in the cul-de-sac. ADDITIONAL FINDINGS: No adenopathy. No aneurysmal dilatation of the abdominal aorta. MUSCULOSKELETAL: Normal.     IMPRESSION: 1.  A normal appendix is identified. 2.  2 cm benign-appearing right  ovarian cyst. BINA TAYLOR MD

## 2024-03-04 NOTE — DISCHARGE INSTR - OTHER ORDERS
You are being discharged to the [facility] EAC: [address], [place] [address]    [facility] Residents:   *Emergencies:   If you are experiencing a mental health emergency, you may call the [facility] Crisis Intervention Office 24 hours a day, 7 days a week at [phone] or call 911.   *Telephone Support Services:   The PEER LINE is a toll-free phone number for [facility] residents who are seeking a listening ear for additional support. Hours of operation are 8:30 am - 4:30 pm, Monday through Friday. A representative can be reached at [phone] ([phone]).   *Provider Appointments (if you have an appointment):   You have an appointment scheduled with the  at the Information and Referral Unit. This will be conducted at the Office of Mental Health located at [address] [place] [address]. The phone number is [phone]. A social history will be obtained as well as a discussion of your personal financial liability. The entire intake appointment will take from one and a half to two hours. You will then be directed to appropriate services.   If you don’t have an appointment:   You will need to call the Information and Referral Unit for an intake appointment. The office is located at the Office of Mental Health located at [facility]. The phone number is [phone]. You will then be provided with an intake appointment at the office. A social history will be obtained as well as a discussion of your personal financial liability. The entire intake appointment will take one and a half to two hours. You will then be directed to appropriate services.     HOW TO GET SUBSTANCE ABUSE HELP:  If you or someone you know has a drug or alcohol problem, there is help:  [facility] Drug & Alcohol Abuse Services: [phone]  [facility] Drug & Alcohol Abuse Services: [phone]  An assessment is the first step.   In addition to  those listed there are other programs available in the area but assessment is best to determine an appropriate level of care.  If you DO NOT have Medical Assistance (MA) or Private Insurance, an assessment can be scheduled at one of these providers:  [facility]  [address] [place] [address]  [phone]   [address] [address]  [phone]   [facility]  [address] [place] [address]  [phone]   [facility]  [address] [place] [address]  [phone]   [facility]  [address] [place] [address]  [phone]   [facility] [address]  [phone]   [facility]  [address], [address], [place] [address]  [phone]     If you HAVE Medical Assistance, an assessment can be scheduled at one of these providers:  [place] [facility]  [address] [place] [address]  [phone]   [facility]  [address] [place] [address]  [phone]   [facility] D&A Intake [facility]  [address], [place] [address]  [phone]  [address] [place] [address], [address], [place] [address] [phone]   [address] [address]  [phone]   [place] [address] [facility] [place] [address]  [phone]   NET ([facility])  [address] [place] [address]  [phone]   [facility]  [address] [place] [address]  [phone]   [facility]  [address] [place] [address] [place] [address]  [phone]   [facility]  [address] [place] [address]  [phone]   [facility]  [address] [place] [address], [address], [place] [address] [phone]     If you HAVE Private Insurance, an assessment can be scheduled at one of these providers.  Please contact these Providers  to determine if they are in your network plan:  [facility] D[facility]A Intake Unit  [address] [facility], 1st [address], [place] [address]  [phone]   [facility] [place] [facility]  [address] [place] [address]  [phone]   [facility]  [address] [place] [address]  [phone]   NET ([facility])  [address] [facility], [place] [address]  [phone]   [facility]  [address] [place] [address]  [phone]   [facility]  [address], [place] [address]  [phone]

## 2024-03-04 NOTE — DISCHARGE INSTR - APPOINTMENTS
Della, or Jacinta, our Behavioral Health Nurse Navigators, will be calling you after your discharge, on the phone number that you provided.  They will be available as an additional support, if needed.   If you wish to speak with one of them, you may contact Della at [phone] or Jacinta at [phone].

## 2024-03-05 VITALS
DIASTOLIC BLOOD PRESSURE: 52 MMHG | RESPIRATION RATE: 18 BRPM | OXYGEN SATURATION: 93 % | HEART RATE: 80 BPM | HEIGHT: 74 IN | WEIGHT: 237.5 LBS | TEMPERATURE: 97.8 F | SYSTOLIC BLOOD PRESSURE: 95 MMHG | BODY MASS INDEX: 30.48 KG/M2

## 2024-03-05 RX ADMIN — METFORMIN HYDROCHLORIDE 500 MG: 500 TABLET, FILM COATED ORAL at 08:39

## 2024-03-05 RX ADMIN — DOCUSATE SODIUM 100 MG: 100 CAPSULE, LIQUID FILLED ORAL at 08:39

## 2024-03-05 RX ADMIN — CHOLECALCIFEROL TAB 25 MCG (1000 UNIT) 2000 UNITS: 25 TAB at 08:39

## 2024-03-05 RX ADMIN — ATROPINE SULFATE 1 DROP: 10 SOLUTION/ DROPS OPHTHALMIC at 08:40

## 2024-03-05 RX ADMIN — DIVALPROEX SODIUM 500 MG: 500 TABLET, EXTENDED RELEASE ORAL at 08:39

## 2024-03-05 NOTE — PLAN OF CARE
Problem: PSYCHOSIS  Goal: Will report no hallucinations or delusions  Description: Interventions:  - Administer medication as  ordered  - Every waking shifts and PRN assess for the presence of hallucinations and or delusions  - Assist with reality testing to support increasing orientation  - Assess if patient's hallucinations or delusions are encouraging self-harm or harm to others and intervene as appropriate  Outcome: Completed     Problem: BEHAVIOR  Goal: Pt/Family maintain appropriate behavior and adhere to behavioral management agreement, if implemented  Description: INTERVENTIONS:  - Assess the family dynamic   - Encourage verbalization of thoughts and concerns in a socially appropriate manner  - Assess patient/family's coping skills and non-compliant behavior (including use of illegal substances).  - Utilize positive, consistent limit setting strategies supporting safety of patient, staff and others  - Initiate consult with Case Management, Spiritual Care or other ancillary services as appropriate  - If a patient's/visitor's behavior jeopardizes the safety of the patient, staff, or others, refer to organization procedure.   - Notify Security of behavior or suspected illegal substances which indicate the need for search of the patient and/or belongings  - Encourage participation in the decision making process about a behavioral management agreement; implement if patient meets criteria  Outcome: Completed     Problem: SLEEP DISTURBANCE  Goal: Will exhibit normal sleeping pattern  Description: Interventions:  -  Assess the patients sleep pattern, noting recent changes  - Administer medication as ordered  - Decrease environmental stimuli, including noise, as appropriate during the night  - Encourage the patient to actively participate in unit groups and or exercise during the day to enhance ability to achieve adequate sleep at night  - Assess the patient, in the morning, encouraging a description of sleep  experience  Outcome: Completed     Problem: INVOLUNTARY ADMIT  Goal: Will cooperate with staff recommendations and doctor's orders and will demonstrate appropriate behavior  Description: INTERVENTIONS:  - Treat underlying conditions and offer medication as ordered  - Educate regarding involuntary admission procedures and rules  - Utilize positive consistent limit setting strategies to support patient and staff safety  Outcome: Completed     Problem: Risk for Self Injury/Neglect  Goal: Treatment Goal: Remain safe during length of stay, learn and adopt new coping skills, and be free of self-injurious ideation, impulses and acts at the time of discharge  Outcome: Completed  Goal: Verbalize thoughts and feelings  Description: Interventions:  - Assess and re-assess patient's lethality and potential for self-injury  - Engage patient in 1:1 interactions, daily, for a minimum of 15 minutes  - Encourage patient to express feelings, fears, frustrations, hopes  - Establish rapport/trust with patient   Outcome: Completed  Goal: Refrain from harming self  Description: Interventions:  - Monitor patient closely, per order  - Develop a trusting relationship  - Supervise medication ingestion, monitor effects and side effects   Outcome: Completed  Goal: Attend and participate in unit activities, including therapeutic, recreational, and educational groups  Description: Interventions:  - Provide therapeutic and educational activities daily, encourage attendance and participation, and document same in the medical record  - Obtain collateral information, encourage visitation and family involvement in care   Outcome: Completed  Goal: Recognize maladaptive responses and adopt new coping mechanisms  Outcome: Completed  Goal: Complete daily ADLs, including personal hygiene independently, as able  Description: Interventions:  - Observe, teach, and assist patient with ADLS  - Monitor and promote a balance of rest/activity, with adequate  nutrition and elimination  Outcome: Completed     Problem: Alteration in Orientation  Goal: Treatment Goal: Demonstrate a reduction of confusion and improved orientation to person, place, time and/or situation upon discharge, according to optimum baseline/ability  Outcome: Completed  Goal: Interact with staff daily  Description: Interventions:  - Assess and re-assess patient's level of orientation  - Engage patient in 1 on 1 interactions, daily, for a minimum of 15 minutes   - Establish rapport/trust with patient   Outcome: Completed  Goal: Express concerns related to confused thinking related to:  Description: Interventions:  - Encourage patient to express feelings, fears, frustrations, hopes  - Assign consistent caregivers   - [place]/re-orient patient as needed  - Allow comfort items, as appropriate  - Provide visual cues, signs, etc.   Outcome: Completed  Goal: Allow medical examinations, as recommended  Description: Interventions:  - Provide physical/neurological exams and/or referrals, per provider   Outcome: Completed  Goal: Cooperate with recommended testing/procedures  Description: Interventions:  - Determine need for ancillary testing  - Observe for mental status changes  - Implement falls/precaution protocol   Outcome: Completed  Goal: Attend and participate in unit activities, including therapeutic, recreational, and educational groups  Description: Interventions:  - Provide therapeutic and educational activities daily, encourage attendance and participation, and document same in the medical record   - Provide appropriate opportunities for reminiscence   - Provide a consistent daily routine   - Encourage family contact/visitation   Outcome: Completed  Goal: Complete daily ADLs, including personal hygiene independently, as able  Description: Interventions:  - Observe, teach, and assist patient with ADLS  Outcome: Completed

## 2024-03-05 NOTE — PLAN OF CARE
Problem: PSYCHOSIS  Goal: Will report no hallucinations or delusions  Description: Interventions:  - Administer medication as  ordered  - Every waking shifts and PRN assess for the presence of hallucinations and or delusions  - Assist with reality testing to support increasing orientation  - Assess if patient's hallucinations or delusions are encouraging self-harm or harm to others and intervene as appropriate  Outcome: Progressing     Problem: SLEEP DISTURBANCE  Goal: Will exhibit normal sleeping pattern  Description: Interventions:  -  Assess the patients sleep pattern, noting recent changes  - Administer medication as ordered  - Decrease environmental stimuli, including noise, as appropriate during the night  - Encourage the patient to actively participate in unit groups and or exercise during the day to enhance ability to achieve adequate sleep at night  - Assess the patient, in the morning, encouraging a description of sleep experience  Outcome: Progressing     Problem: Risk for Self Injury/Neglect  Goal: Treatment Goal: Remain safe during length of stay, learn and adopt new coping skills, and be free of self-injurious ideation, impulses and acts at the time of discharge  Outcome: Progressing  Goal: Verbalize thoughts and feelings  Description: Interventions:  - Assess and re-assess patient's lethality and potential for self-injury  - Engage patient in 1:1 interactions, daily, for a minimum of 15 minutes  - Encourage patient to express feelings, fears, frustrations, hopes  - Establish rapport/trust with patient   Outcome: Progressing  Goal: Refrain from harming self  Description: Interventions:  - Monitor patient closely, per order  - Develop a trusting relationship  - Supervise medication ingestion, monitor effects and side effects   Outcome: Progressing  Goal: Recognize maladaptive responses and adopt new coping mechanisms  Outcome: Progressing  Goal: Complete daily ADLs, including personal hygiene  independently, as able  Description: Interventions:  - Observe, teach, and assist patient with ADLS  - Monitor and promote a balance of rest/activity, with adequate nutrition and elimination  Outcome: Progressing     Problem: Alteration in Orientation  Goal: Treatment Goal: Demonstrate a reduction of confusion and improved orientation to person, place, time and/or situation upon discharge, according to optimum baseline/ability  Outcome: Progressing  Goal: Interact with staff daily  Description: Interventions:  - Assess and re-assess patient's level of orientation  - Engage patient in 1 on 1 interactions, daily, for a minimum of 15 minutes   - Establish rapport/trust with patient   Outcome: Progressing  Goal: Express concerns related to confused thinking related to:  Description: Interventions:  - Encourage patient to express feelings, fears, frustrations, hopes  - Assign consistent caregivers   - [place]/re-orient patient as needed  - Allow comfort items, as appropriate  - Provide visual cues, signs, etc.   Outcome: Progressing  Goal: Allow medical examinations, as recommended  Description: Interventions:  - Provide physical/neurological exams and/or referrals, per provider   Outcome: Progressing  Goal: Cooperate with recommended testing/procedures  Description: Interventions:  - Determine need for ancillary testing  - Observe for mental status changes  - Implement falls/precaution protocol   Outcome: Progressing
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
## 2024-03-05 NOTE — PLAN OF CARE
Problem: DISCHARGE PLANNING - CARE MANAGEMENT  Goal: Discharge to post-acute care or home with appropriate resources  Description: INTERVENTIONS:  - Conduct assessment to determine patient/family and health care team treatment goals, and need for post-acute services based on payer coverage, community resources, and patient preferences, and barriers to discharge  - Address psychosocial, clinical, and financial barriers to discharge as identified in assessment in conjunction with the patient/family and health care team  - Arrange appropriate level of post-acute services according to patient’s   needs and preference and payer coverage in collaboration with the physician and health care team  - Communicate with and update the patient/family, physician, and health care team regarding progress on the discharge plan  - Arrange appropriate transportation to post-acute venues  Outcome: Adequate for Discharge    The patient is cleared for discharge today. The patient will be leaving at 11h via [facility] to [facility]. The patient is in agreement with his discharge plan. [facility] will be coordinating all of the patient's follow up care. Per [facility] request, the patient will be discharged with 30 day supply medications from [facility] pharmacy. The patient signed his IMM. Additional resources were put onto the patient's AVS including the crisis number.

## 2024-03-05 NOTE — TREATMENT TEAM
03/05/24 0807   Team Meeting   Meeting Type Daily Rounds   Initial Conference Date 03/05/24   Team Members Present   Team Members Present Physician;Nurse;   Physician Team Member Jenn Rene   Nursing Team Member Sandra Muniz   Care Management Team Member Rossi   Patient/Family Present   Patient Present No   Patient's Family Present No     The patient will discharge today at 11h via secure psych transport to [facility].

## 2024-04-02 ENCOUNTER — OFFICE VISIT (OUTPATIENT)
Dept: OBGYN CLINIC | Facility: MEDICAL CENTER | Age: 59
End: 2024-04-02
Payer: MEDICARE

## 2024-04-02 ENCOUNTER — APPOINTMENT (OUTPATIENT)
Dept: RADIOLOGY | Facility: MEDICAL CENTER | Age: 59
End: 2024-04-02
Payer: MEDICARE

## 2024-04-02 VITALS
DIASTOLIC BLOOD PRESSURE: 73 MMHG | SYSTOLIC BLOOD PRESSURE: 101 MMHG | BODY MASS INDEX: 31.18 KG/M2 | HEART RATE: 108 BPM | HEIGHT: 74 IN | WEIGHT: 243 LBS

## 2024-04-02 DIAGNOSIS — Z96.9 RETAINED ORTHOPEDIC HARDWARE: Primary | ICD-10-CM

## 2024-04-02 DIAGNOSIS — S52.91XA RIGHT RADIAL FRACTURE: ICD-10-CM

## 2024-04-02 PROCEDURE — 73110 X-RAY EXAM OF WRIST: CPT

## 2024-04-02 PROCEDURE — 99213 OFFICE O/P EST LOW 20 MIN: CPT | Performed by: ORTHOPAEDIC SURGERY

## 2024-04-02 RX ORDER — GABAPENTIN 100 MG/1
CAPSULE ORAL
COMMUNITY
Start: 2024-03-21

## 2024-04-02 RX ORDER — ACETAMINOPHEN 325 MG/1
TABLET ORAL
COMMUNITY
Start: 2024-03-05
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
## 2024-04-18 ENCOUNTER — OFFICE VISIT (OUTPATIENT)
Dept: FAMILY MEDICINE CLINIC | Facility: CLINIC | Age: 59
End: 2024-04-18
Payer: MEDICARE

## 2024-04-18 VITALS
OXYGEN SATURATION: 98 % | WEIGHT: 247.8 LBS | RESPIRATION RATE: 16 BRPM | SYSTOLIC BLOOD PRESSURE: 110 MMHG | BODY MASS INDEX: 31.8 KG/M2 | HEART RATE: 110 BPM | DIASTOLIC BLOOD PRESSURE: 78 MMHG | TEMPERATURE: 96.7 F | HEIGHT: 74 IN

## 2024-04-18 DIAGNOSIS — Z12.12 SCREENING FOR COLORECTAL CANCER: ICD-10-CM

## 2024-04-18 DIAGNOSIS — I10 ESSENTIAL HYPERTENSION: ICD-10-CM

## 2024-04-18 DIAGNOSIS — Z00.00 MEDICARE ANNUAL WELLNESS VISIT, INITIAL: Primary | ICD-10-CM

## 2024-04-18 DIAGNOSIS — E55.9 VITAMIN D DEFICIENCY: ICD-10-CM

## 2024-04-18 DIAGNOSIS — K21.9 GASTROESOPHAGEAL REFLUX DISEASE WITHOUT ESOPHAGITIS: ICD-10-CM

## 2024-04-18 DIAGNOSIS — Z12.11 SCREENING FOR COLORECTAL CANCER: ICD-10-CM

## 2024-04-18 DIAGNOSIS — F33.41 RECURRENT MAJOR DEPRESSIVE DISORDER, IN PARTIAL REMISSION (HCC): Chronic | ICD-10-CM

## 2024-04-18 DIAGNOSIS — N40.0 BENIGN PROSTATIC HYPERPLASIA, UNSPECIFIED WHETHER LOWER URINARY TRACT SYMPTOMS PRESENT: ICD-10-CM

## 2024-04-18 DIAGNOSIS — R73.03 PREDIABETES: ICD-10-CM

## 2024-04-18 DIAGNOSIS — F10.21 ALCOHOL USE DISORDER, SEVERE, IN SUSTAINED REMISSION (HCC): ICD-10-CM

## 2024-04-18 DIAGNOSIS — F25.1 SCHIZOAFFECTIVE DISORDER, DEPRESSIVE TYPE (HCC): ICD-10-CM

## 2024-04-18 DIAGNOSIS — F31.2 BIPOLAR AFFECTIVE DISORDER, CURRENT EPISODE MANIC WITH PSYCHOTIC SYMPTOMS (HCC): ICD-10-CM

## 2024-04-18 PROCEDURE — G0438 PPPS, INITIAL VISIT: HCPCS | Performed by: FAMILY MEDICINE

## 2024-04-18 PROCEDURE — 99214 OFFICE O/P EST MOD 30 MIN: CPT | Performed by: FAMILY MEDICINE
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
## 2024-04-18 NOTE — PATIENT INSTRUCTIONS
Medicare Preventive Visit Patient Instructions  Thank you for completing your Welcome to Medicare Visit or Medicare Annual Wellness Visit today. Your next wellness visit will be due in one year (4/19/2025).  The screening/preventive services that you may require over the next 5-10 years are detailed below. Some tests may not apply to you based off risk factors and/or age. Screening tests ordered at today's visit but not completed yet may show as past due. Also, please note that scanned in results may not display below.  Preventive Screenings:  Service Recommendations Previous Testing/Comments   Colorectal Cancer Screening  Colonoscopy    Fecal Occult Blood Test (FOBT)/Fecal Immunochemical Test (FIT)  Fecal DNA/Cologuard Test  Flexible Sigmoidoscopy Age: 45-75 years old   Colonoscopy: every 10 years (May be performed more frequently if at higher risk)  OR  FOBT/FIT: every 1 year  OR  Cologuard: every 3 years  OR  Sigmoidoscopy: every 5 years  Screening may be recommended earlier than age 45 if at higher risk for colorectal cancer. Also, an individualized decision between you and your healthcare provider will decide whether screening between the ages of 76-85 would be appropriate. Colonoscopy: 08/25/2016  FOBT/FIT: Not on file  Cologuard: Not on file  Sigmoidoscopy: Not on file    Screening Current     Prostate Cancer Screening Individualized decision between patient and health care provider in men between ages of 55-69   Medicare will cover every 12 months beginning on the day after your 50th birthday PSA: 2.1 ng/mL           Hepatitis C Screening Once for adults born between 1945 and 1965  More frequently in patients at high risk for Hepatitis C Hep C Antibody: Not on file        Diabetes Screening 1-2 times per year if you're at risk for diabetes or have pre-diabetes Fasting glucose: 109 mg/dL (1/3/2024)  A1C: 5.8 % (3/7/2024)  Screening Current   Cholesterol Screening Once every 5 years if you don't have a lipid  disorder. May order more often based on risk factors. Lipid panel: 03/07/2024  Screening Current      Other Preventive Screenings Covered by Medicare:  Abdominal Aortic Aneurysm (AAA) Screening: covered once if your at risk. You're considered to be at risk if you have a family history of AAA or a male between the age of 65-75 who smoking at least 100 cigarettes in your lifetime.  Lung Cancer Screening: covers low dose CT scan once per year if you meet all of the following conditions: (1) Age 55-77; (2) No signs or symptoms of lung cancer; (3) Current smoker or have quit smoking within the last 15 years; (4) You have a tobacco smoking history of at least 20 pack years (packs per day x number of years you smoked); (5) You get a written order from a healthcare provider.  Glaucoma Screening: covered annually if you're considered high risk: (1) You have diabetes OR (2) Family history of glaucoma OR (3)  aged 50 and older OR (4)  American aged 65 and older  Osteoporosis Screening: covered every 2 years if you meet one of the following conditions: (1) Have a vertebral abnormality; (2) On glucocorticoid therapy for more than 3 months; (3) Have primary hyperparathyroidism; (4) On osteoporosis medications and need to assess response to drug therapy.  HIV Screening: covered annually if you're between the age of 15-65. Also covered annually if you are younger than 15 and older than 65 with risk factors for HIV infection. For pregnant patients, it is covered up to 3 times per pregnancy.    Immunizations:  Immunization Recommendations   Influenza Vaccine Annual influenza vaccination during flu season is recommended for all persons aged >= 6 months who do not have contraindications   Pneumococcal Vaccine   * Pneumococcal conjugate vaccine = PCV13 (Prevnar 13), PCV15 (Vaxneuvance), PCV20 (Prevnar 20)  * Pneumococcal polysaccharide vaccine = PPSV23 (Pneumovax) Adults 19-65 yo with certain risk factors or if  65+ yo  If never received any pneumonia vaccine: recommend Prevnar 20 (PCV20)  Give PCV20 if previously received 1 dose of PCV13 or PPSV23   Hepatitis B Vaccine 3 dose series if at intermediate or high risk (ex: diabetes, end stage renal disease, liver disease)   Respiratory syncytial virus (RSV) Vaccine - COVERED BY MEDICARE PART D  * RSVPreF3 (Arexvy) CDC recommends that adults 60 years of age and older may receive a single dose of RSV vaccine using shared clinical decision-making (SCDM)   Tetanus (Td) Vaccine - COST NOT COVERED BY MEDICARE PART B Following completion of primary series, a booster dose should be given every 10 years to maintain immunity against tetanus. Td may also be given as tetanus wound prophylaxis.   Tdap Vaccine - COST NOT COVERED BY MEDICARE PART B Recommended at least once for all adults. For pregnant patients, recommended with each pregnancy.   Shingles Vaccine (Shingrix) - COST NOT COVERED BY MEDICARE PART B  2 shot series recommended in those 19 years and older who have or will have weakened immune systems or those 50 years and older     Health Maintenance Due:      Topic Date Due   • Hepatitis C Screening  Never done   • Colorectal Cancer Screening  08/25/2019   • HIV Screening  Completed     Immunizations Due:      Topic Date Due   • Pneumococcal Vaccine: Pediatrics (0 to 5 Years) and At-Risk Patients (6 to 64 Years) (1 of 2 - PCV) Never done   • Hepatitis A Vaccine (1 of 2 - Risk 2-dose series) Never done   • COVID-19 Vaccine (1 - 2023-24 season) Never done     Advance Directives   What are advance directives?  Advance directives are legal documents that state your wishes and plans for medical care. These plans are made ahead of time in case you lose your ability to make decisions for yourself. Advance directives can apply to any medical decision, such as the treatments you want, and if you want to donate organs.   What are the types of advance directives?  There are many types of  advance directives, and each state has rules about how to use them. You may choose a combination of any of the following:  Living will:  This is a written record of the treatment you want. You can also choose which treatments you do not want, which to limit, and which to stop at a certain time. This includes surgery, medicine, IV fluid, and tube feedings.   Durable power of  for healthcare (DPAHC):  This is a written record that states who you want to make healthcare choices for you when you are unable to make them for yourself. This person, called a proxy, is usually a family member or a friend. You may choose more than 1 proxy.  Do not resuscitate (DNR) order:  A DNR order is used in case your heart stops beating or you stop breathing. It is a request not to have certain forms of treatment, such as CPR. A DNR order may be included in other types of advance directives.  Medical directive:  This covers the care that you want if you are in a coma, near death, or unable to make decisions for yourself. You can list the treatments you want for each condition. Treatment may include pain medicine, surgery, blood transfusions, dialysis, IV or tube feedings, and a ventilator (breathing machine).  Values history:  This document has questions about your views, beliefs, and how you feel and think about life. This information can help others choose the care that you would choose.  Why are advance directives important?  An advance directive helps you control your care. Although spoken wishes may be used, it is better to have your wishes written down. Spoken wishes can be misunderstood, or not followed. Treatments may be given even if you do not want them. An advance directive may make it easier for your family to make difficult choices about your care.   Cigarette Smoking and Your Health   Risks to your health if you smoke:  Nicotine and other chemicals found in tobacco damage every cell in your body. Even if you are a  light smoker, you have an increased risk for cancer, heart disease, and lung disease. If you are pregnant or have diabetes, smoking increases your risk for complications.   Benefits to your health if you stop smoking:   You decrease respiratory symptoms such as coughing, wheezing, and shortness of breath.   You reduce your risk for cancers of the lung, mouth, throat, kidney, bladder, pancreas, stomach, and cervix. If you already have cancer, you increase the benefits of chemotherapy. You also reduce your risk for cancer returning or a second cancer from developing.   You reduce your risk for heart disease, blood clots, heart attack, and stroke.   You reduce your risk for lung infections, and diseases such as pneumonia, asthma, chronic bronchitis, and emphysema.  Your circulation improves. More oxygen can be delivered to your body. If you have diabetes, you lower your risk for complications, such as kidney, artery, and eye diseases. You also lower your risk for nerve damage. Nerve damage can lead to amputations, poor vision, and blindness.  You improve your body's ability to heal and to fight infections.  For more information and support to stop smoking:   [employer]  Phone: 6- 457 - 641-7849  Web Address: www.[employer]  Weight Management   Why it is important to manage your weight:  Being overweight increases your risk of health conditions such as heart disease, high blood pressure, type 2 diabetes, and certain types of cancer. It can also increase your risk for osteoarthritis, sleep apnea, and other respiratory problems. Aim for a slow, steady weight loss. Even a small amount of weight loss can lower your risk of health problems.  How to lose weight safely:  A safe and healthy way to lose weight is to eat fewer calories and get regular exercise. You can lose up about 1 pound a week by decreasing the number of calories you eat by 500 calories each day.   Healthy meal plan for weight management:  A healthy  meal plan includes a variety of foods, contains fewer calories, and helps you stay healthy. A healthy meal plan includes the following:  Eat whole-grain foods more often.  A healthy meal plan should contain fiber. Fiber is the part of grains, fruits, and vegetables that is not broken down by your body. Whole-grain foods are healthy and provide extra fiber in your diet. Some examples of whole-grain foods are whole-wheat breads and pastas, oatmeal, brown rice, and bulgur.  Eat a variety of vegetables every day.  Include dark, leafy greens such as spinach, kale, alyssa greens, and mustard greens. Eat yellow and orange vegetables such as carrots, sweet potatoes, and winter squash.   Eat a variety of fruits every day.  Choose fresh or canned fruit (canned in its own juice or light syrup) instead of juice. Fruit juice has very little or no fiber.  Eat low-fat dairy foods.  Drink fat-free (skim) milk or 1% milk. Eat fat-free yogurt and low-fat cottage cheese. Try low-fat cheeses such as mozzarella and other reduced-fat cheeses.  Choose meat and other protein foods that are low in fat.  Choose beans or other legumes such as split peas or lentils. Choose fish, skinless poultry (chicken or turkey), or lean cuts of red meat (beef or pork). Before you cook meat or poultry, cut off any visible fat.   Use less fat and oil.  Try baking foods instead of frying them. Add less fat, such as margarine, sour cream, regular salad dressing and mayonnaise to foods. Eat fewer high-fat foods. Some examples of high-fat foods include french fries, doughnuts, ice cream, and cakes.  Eat fewer sweets.  Limit foods and drinks that are high in sugar. This includes candy, cookies, regular soda, and sweetened drinks.  Exercise:  Exercise at least 30 minutes per day on most days of the week. Some examples of exercise include walking, biking, dancing, and swimming. You can also fit in more physical activity by taking the stairs instead of the elevator  or parking farther away from stores. Ask your healthcare provider about the best exercise plan for you.      © Copyright [employer] 2018 Information is for End User's use only and may not be sold, redistributed or otherwise used for commercial purposes. All illustrations and images included in CareNotes® are the copyrighted property of [employer] or [employer]

## 2024-04-24 ENCOUNTER — PREP FOR PROCEDURE (OUTPATIENT)
Dept: OBGYN CLINIC | Facility: MEDICAL CENTER | Age: 59
End: 2024-04-24

## 2024-04-24 ENCOUNTER — TELEPHONE (OUTPATIENT)
Age: 59
End: 2024-04-24

## 2024-04-24 ENCOUNTER — TELEPHONE (OUTPATIENT)
Dept: OBGYN CLINIC | Facility: HOSPITAL | Age: 59
End: 2024-04-24

## 2024-04-24 DIAGNOSIS — T84.84XA PAINFUL ORTHOPAEDIC HARDWARE (HCC): Primary | ICD-10-CM

## 2024-04-24 RX ORDER — CEFAZOLIN SODIUM 2 G/50ML
2000 SOLUTION INTRAVENOUS ONCE
Status: CANCELLED | OUTPATIENT
Start: 2024-05-02 | End: 2024-04-24

## 2024-04-24 RX ORDER — ACETAMINOPHEN 325 MG/1
975 TABLET ORAL ONCE
Status: CANCELLED | OUTPATIENT
Start: 2024-05-02 | End: 2024-04-24

## 2024-04-24 NOTE — TELEPHONE ENCOUNTER
Anshul      Pt states he would like to move forward and schedule surgery       Please advise       Callback:  [phone]
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
## 2024-04-24 NOTE — TELEPHONE ENCOUNTER
Pt called to update his phone number and also his emergency contact list. Both were updated no further questions.

## 2024-04-29 ENCOUNTER — ANESTHESIA EVENT (OUTPATIENT)
Dept: PERIOP | Facility: HOSPITAL | Age: 59
End: 2024-04-29
Payer: MEDICARE

## 2024-05-01 PROBLEM — T84.84XA PAINFUL ORTHOPAEDIC HARDWARE (HCC): Status: ACTIVE | Noted: 2024-05-01

## 2024-05-01 NOTE — DISCHARGE INSTR - AVS FIRST PAGE
POST-OPERATIVE INSTRUCTIONS  REMOVAL OF ORTHOPEDIC HARDWARE    You have just undergone a hardware removal by Dr. Landers in the operating room.  It is our wish that your postoperative recovery be as quick and comfortable as possible.  Please carefully review the following items that are important in your recovery.    Pain Control:  After any operation, a certain degree of pain is to be expected.  A prescription for narcotic pain medication as well as acetaminophen and/or ibruprofen has been electronically sent to your pharmacy. Do not exceed 3000 mg of Tylenol per day. This medication will relieve most of your pain but may not relieve all of the pain. Some pain is normal post operatively.     When you go home, please keep your operated arm elevated at all times (above the level of your heart.)  If you do this, your swelling will be diminished and your pain will be diminished as well.      Dressing Care:  After surgery, make sure that your dressing is kept dry.  You can take a shower if you cover your arm with a plastic bag, such as a newspaper bag, and use tape or rubber bands. If your dressing gets wet you may take it off, place Band-Aids on the wound and re-cover it with sterile dressings which you can obtain at your local drug store.    Please remove your dressing down to the incision 5 days after your surgery. For example, if your had surgery on a Tuesday, do this on Sunday.  If your surgery was on Friday, do this on Wednesday.   If your dressing gets wet prior to removing it, please take if off and place something clean, or bandaids, on the wound.    Weight Bearing:  You should NOT bear weight >5lbs through your operative extremity. Do not push off using your operative extremity.     It is ok to move your fingers as tolerated to prevent stiffness. You may also use your operative hand to assist with light activities of daily living such as putting on clothes, brushing your teeth and eating as tolerated    Please  work on these finger range of motions exercises between now and you follow up visit:         Follow Up:  If you don't already have a scheduled postoperative appointment, please call our office for a follow-up appointment. It is best to call the day after surgery to make an appointment in 10-14 days.  At your first postoperative visit, you will be seen by either Dr. Landers, his PA; or one of their associates. The sutures will be removed and you may be asked to see a hand therapist to optimize your functional result. Each of the hand therapists that you will be referred to have received special training in the care of the hand and upper extremity.    When to Call:  It is normal to see minor staining on the hand surgery dressing after surgery. If there is significant bleeding, you are advised to call the office during regular office hours to have this checked.     If you feel that you have a surgical emergency postoperatively that requires immediate attention, please call [phone]. In addition, any emergency can also be handled by the emergency room.      If you have questions regarding your surgery postop that you feel requires attention, please call the office.   If this occurs after our regular office hours, there is a message with instructions to talk to the physician on call.

## 2024-05-02 ENCOUNTER — HOSPITAL ENCOUNTER (OUTPATIENT)
Dept: RADIOLOGY | Facility: HOSPITAL | Age: 59
Setting detail: OUTPATIENT SURGERY
Discharge: HOME | End: 2024-05-02
Payer: MEDICARE

## 2024-05-02 ENCOUNTER — ANESTHESIA (OUTPATIENT)
Dept: PERIOP | Facility: HOSPITAL | Age: 59
End: 2024-05-02
Payer: MEDICARE

## 2024-05-02 ENCOUNTER — HOSPITAL ENCOUNTER (OUTPATIENT)
Facility: HOSPITAL | Age: 59
Setting detail: OUTPATIENT SURGERY
Discharge: HOME | End: 2024-05-02
Attending: ORTHOPAEDIC SURGERY | Admitting: ORTHOPAEDIC SURGERY
Payer: MEDICARE

## 2024-05-02 VITALS
BODY MASS INDEX: 30.37 KG/M2 | WEIGHT: 236.55 LBS | SYSTOLIC BLOOD PRESSURE: 116 MMHG | HEART RATE: 98 BPM | TEMPERATURE: 97 F | DIASTOLIC BLOOD PRESSURE: 78 MMHG | RESPIRATION RATE: 18 BRPM | OXYGEN SATURATION: 97 %

## 2024-05-02 DIAGNOSIS — T84.84XA PAINFUL ORTHOPAEDIC HARDWARE (HCC): Primary | ICD-10-CM

## 2024-05-02 DIAGNOSIS — T84.84XA PAINFUL ORTHOPAEDIC HARDWARE (HCC): ICD-10-CM

## 2024-05-02 PROBLEM — F17.200 SMOKING: Status: ACTIVE | Noted: 2024-05-02

## 2024-05-02 PROBLEM — IMO0001 SMOKING: Status: ACTIVE | Noted: 2024-05-02

## 2024-05-02 PROCEDURE — 73100 X-RAY EXAM OF WRIST: CPT

## 2024-05-02 PROCEDURE — 20680 REMOVAL OF IMPLANT DEEP: CPT | Performed by: ORTHOPAEDIC SURGERY

## 2024-05-02 PROCEDURE — 20680 REMOVAL OF IMPLANT DEEP: CPT

## 2024-05-02 RX ORDER — ACETAMINOPHEN 325 MG/1
975 TABLET ORAL ONCE
Status: COMPLETED | OUTPATIENT
Start: 2024-05-02 | End: 2024-05-02

## 2024-05-02 RX ORDER — PROPOFOL 10 MG/ML
INJECTION, EMULSION INTRAVENOUS CONTINUOUS PRN
Status: DISCONTINUED | OUTPATIENT
Start: 2024-05-02 | End: 2024-05-02

## 2024-05-02 RX ORDER — SODIUM CHLORIDE 9 MG/ML
125 INJECTION, SOLUTION INTRAVENOUS CONTINUOUS
Status: DISCONTINUED | OUTPATIENT
Start: 2024-05-02 | End: 2024-05-02 | Stop reason: HOSPADM

## 2024-05-02 RX ORDER — FENTANYL CITRATE 50 UG/ML
INJECTION, SOLUTION INTRAMUSCULAR; INTRAVENOUS AS NEEDED
Status: DISCONTINUED | OUTPATIENT
Start: 2024-05-02 | End: 2024-05-02

## 2024-05-02 RX ORDER — CEFAZOLIN SODIUM 2 G/50ML
2000 SOLUTION INTRAVENOUS ONCE
Status: COMPLETED | OUTPATIENT
Start: 2024-05-02 | End: 2024-05-02

## 2024-05-02 RX ORDER — ONDANSETRON 4 MG/1
4 TABLET, FILM COATED ORAL EVERY 8 HOURS PRN
Qty: 20 TABLET | Refills: 0 | Status: CANCELLED | OUTPATIENT
Start: 2024-05-02

## 2024-05-02 RX ORDER — OXYCODONE HYDROCHLORIDE 5 MG/1
5 TABLET ORAL EVERY 6 HOURS PRN
Qty: 10 TABLET | Refills: 0 | Status: SHIPPED | OUTPATIENT
Start: 2024-05-02 | End: 2024-05-05

## 2024-05-02 RX ORDER — FENTANYL CITRATE/PF 50 MCG/ML
50 SYRINGE (ML) INJECTION
Status: DISCONTINUED | OUTPATIENT
Start: 2024-05-02 | End: 2024-05-02 | Stop reason: HOSPADM

## 2024-05-02 RX ORDER — PROPOFOL 10 MG/ML
INJECTION, EMULSION INTRAVENOUS AS NEEDED
Status: DISCONTINUED | OUTPATIENT
Start: 2024-05-02 | End: 2024-05-02

## 2024-05-02 RX ORDER — ACETAMINOPHEN 500 MG
1000 TABLET ORAL EVERY 8 HOURS SCHEDULED
Qty: 60 TABLET | Refills: 0 | Status: SHIPPED | OUTPATIENT
Start: 2024-05-02

## 2024-05-02 RX ORDER — MAGNESIUM HYDROXIDE 1200 MG/15ML
LIQUID ORAL AS NEEDED
Status: DISCONTINUED | OUTPATIENT
Start: 2024-05-02 | End: 2024-05-02 | Stop reason: HOSPADM

## 2024-05-02 RX ORDER — BUPROPION HYDROCHLORIDE 100 MG/1
200 TABLET ORAL DAILY
COMMUNITY

## 2024-05-02 RX ORDER — ONDANSETRON 2 MG/ML
4 INJECTION INTRAMUSCULAR; INTRAVENOUS ONCE AS NEEDED
Status: DISCONTINUED | OUTPATIENT
Start: 2024-05-02 | End: 2024-05-02 | Stop reason: HOSPADM

## 2024-05-02 RX ORDER — DOCUSATE SODIUM 100 MG/1
100 CAPSULE, LIQUID FILLED ORAL DAILY
Qty: 10 CAPSULE | Refills: 0 | Status: SHIPPED | OUTPATIENT
Start: 2024-05-02

## 2024-05-02 RX ORDER — BUPIVACAINE HYDROCHLORIDE 2.5 MG/ML
INJECTION, SOLUTION EPIDURAL; INFILTRATION; INTRACAUDAL AS NEEDED
Status: DISCONTINUED | OUTPATIENT
Start: 2024-05-02 | End: 2024-05-02 | Stop reason: HOSPADM

## 2024-05-02 RX ORDER — ONDANSETRON 4 MG/1
4 TABLET, ORALLY DISINTEGRATING ORAL EVERY 6 HOURS PRN
Status: CANCELLED | OUTPATIENT
Start: 2024-05-02

## 2024-05-02 RX ORDER — MIDAZOLAM HYDROCHLORIDE 2 MG/2ML
INJECTION, SOLUTION INTRAMUSCULAR; INTRAVENOUS AS NEEDED
Status: DISCONTINUED | OUTPATIENT
Start: 2024-05-02 | End: 2024-05-02

## 2024-05-02 RX ORDER — ACETAMINOPHEN 325 MG/1
650 TABLET ORAL EVERY 6 HOURS PRN
Status: DISCONTINUED | OUTPATIENT
Start: 2024-05-02 | End: 2024-05-02 | Stop reason: HOSPADM

## 2024-05-02 RX ADMIN — FENTANYL CITRATE 75 MCG: 50 INJECTION INTRAMUSCULAR; INTRAVENOUS at 12:51

## 2024-05-02 RX ADMIN — PROPOFOL 30 MG: 10 INJECTION, EMULSION INTRAVENOUS at 12:49

## 2024-05-02 RX ADMIN — MIDAZOLAM 2 MG: 1 INJECTION INTRAMUSCULAR; INTRAVENOUS at 12:45

## 2024-05-02 RX ADMIN — FENTANYL CITRATE 25 MCG: 50 INJECTION INTRAMUSCULAR; INTRAVENOUS at 13:15

## 2024-05-02 RX ADMIN — ACETAMINOPHEN 975 MG: 325 TABLET, FILM COATED ORAL at 11:03

## 2024-05-02 RX ADMIN — SODIUM CHLORIDE 125 ML/HR: 0.9 INJECTION, SOLUTION INTRAVENOUS at 11:15

## 2024-05-02 RX ADMIN — PROPOFOL 100 MCG/KG/MIN: 10 INJECTION, EMULSION INTRAVENOUS at 12:49

## 2024-05-02 RX ADMIN — CEFAZOLIN SODIUM 2000 MG: 2 SOLUTION INTRAVENOUS at 12:39

## 2024-05-02 RX ADMIN — SODIUM CHLORIDE: 0.9 INJECTION, SOLUTION INTRAVENOUS at 13:13

## 2024-05-02 NOTE — ANESTHESIA POSTPROCEDURE EVALUATION
Post-Op Assessment Note    CV Status:  Stable  Pain Score: 0    Pain management: adequate       Mental Status:  Alert and awake   Hydration Status:  Euvolemic   PONV Controlled:  Controlled   Airway Patency:  Patent     Post Op Vitals Reviewed: Yes    No anethesia notable event occurred.                BP      Temp      Pulse     Resp      SpO2

## 2024-05-02 NOTE — PROGRESS NOTES
Discharge instructions reviewed with Daphnie LY from pt's facility, no questions. Discharge instructions and printed prescriptions sent back to facility with pt's therapist.Pt senrt home with sling.

## 2024-05-02 NOTE — OP NOTE
OPERATIVE REPORT  PATIENT NAME: Sudhakar Choe    :  1965  MRN: [id number]  Pt Location: [facility] OR ROOM 03    SURGERY DATE: 2024    Surgeons and Role:     * Johnathan Landers MD - Primary     * Rossi Kiser PA-C - Assisting    Preop Diagnosis:  Painful orthopaedic hardware (HCC) [T84.84XA]    Post-Op Diagnosis Codes:     * Painful orthopaedic hardware (HCC) [T84.84XA]    Procedure(s):  Right - REMOVAL HARDWARE RADIUS (WRIST) - Right    Specimen(s):  * No specimens in log *    Estimated Blood Loss:   2 mL    Drains:  * No LDAs found *    Anesthesia Type:   Conscious Sedation     Operative Indications:  Painful orthopaedic hardware (HCC) [T84.84XA]  Broken bridge plate putting soft tissues at risk dorsally.  He would like to have it removed to prevent future complications.    Operative Findings:  Broken Synthes bridge plate at the level of the radiocarpal joint.  Extensor tendons appear intact.  United fracture stable after plate removal.  No signs of infection    Complications:   None    Procedure and Technique:  The patient was greeted in the preoperative area. The risks, benefits, and alternatives of the procedure were again discussed in detail with the patient. Risks include pain, stiffness, swelling, injury to neurovascular structure, infection, need for reoperation, and anesthetic complications. The patient was amenable to proceed. The operative extremity was marked. Patient was brought to the operating room and placed under anesthesia. A tourniquet was applied to the operative extremity. The operative extremity was prepped and draped in the usual sterile fashion. 2 g ancef were administered for pre-operative antibiotic prophylaxis. A timeout was performed identifying the name and laterality of the procedure. The limb was exsanguinated and the tourniquet was inflated.     0.25% Marcaine without epinephrine was administered as local anesthetic at the area of the planned surgical sites.  We then  utilized his prior surgical scars to make an incision distally and proximally over the ends of the plate which were localized under fluoroscopy.  Tenotomy scissors were used to bluntly dissect through subcutaneous tissues distally.  The extensor tendons were identified, deemed to be intact, and mobilized revealing the plate attached the third metacarpal.  It was well-fixed without signs of infection.  The screws were easily removed and a Steen elevator was used to mobilize the distal half of the plate.  It was done removed using a needle .      May our proximal incision and dissected down to simultaneous tissue tenotomy scissors.  The fascia was identified and incised.  The muscles and tendons were mobilized and the plate was exposed.  Again plate was well-fixed signs of infection.  The screws were removed without difficulty and the plate was mobilized with a freer elevator.  The plate was removed with the needle .  Final fluoroscopic images confirmed removal of all hardware without intraoperative fracture or new injury.  The previously healed fracture was stable without any signs of instability after hardware removal.    The tourniquet was released and hemostasis achieved. The wound was closed in layers in an interrupted fashion. Sterile dressings were applied. The patient was awoken and transported to the recovery room in stable condition.     I was present for the entire procedure., A qualified resident physician was not available., and A physician assistant was required during the procedure for retraction, tissue handling, dissection and suturing.    Patient Disposition:  PACU         SIGNATURE: Johnathan Landers MD  DATE: May 2, 2024  TIME: 1:52 PM

## 2024-05-02 NOTE — ANESTHESIA PREPROCEDURE EVALUATION
Procedure:  REMOVAL HARDWARE RADIUS (WRIST) - Right (Right: Wrist)    Relevant Problems   ANESTHESIA (within normal limits)      CARDIO   (+) Essential hypertension      GI/HEPATIC   (+) Gastroesophageal reflux disease without esophagitis   (+) Hiatal hernia      /RENAL   (+) Benign prostatic hyperplasia      MUSCULOSKELETAL   (+) DJD (degenerative joint disease)   (+) Hiatal hernia   (+) Spondylosis of cervical region without myelopathy or radiculopathy      NEURO/PSYCH   (+) Recurrent major depressive disorder, in partial remission (HCC) with history of psychotic features   (+) Schizoaffective disorder, depressive type (HCC)      PULMONARY   (+) Smoking      Neurology/Sleep   (+) Painful orthopaedic hardware (HCC)        Physical Exam    Airway    Mallampati score: II  TM Distance: >3 FB  Neck ROM: full     Dental   Comment: Poor dentition     Cardiovascular  Rhythm: regular, Rate: normal, Cardiovascular exam normal    Pulmonary  Pulmonary exam normal Breath sounds clear to auscultation    Other Findings        Anesthesia Plan  ASA Score- 2     Anesthesia Type- general with ASA Monitors.         Additional Monitors:     Airway Plan: LMA.           Plan Factors-    Chart reviewed. EKG reviewed.  Existing labs reviewed. Patient summary reviewed.    Patient is not a current smoker.              Induction- intravenous.    Postoperative Plan-     Informed Consent- Anesthetic plan and risks discussed with patient.

## 2024-05-16 ENCOUNTER — OFFICE VISIT (OUTPATIENT)
Dept: OBGYN CLINIC | Facility: MEDICAL CENTER | Age: 59
End: 2024-05-16

## 2024-05-16 VITALS
HEART RATE: 92 BPM | HEIGHT: 74 IN | WEIGHT: 235 LBS | SYSTOLIC BLOOD PRESSURE: 130 MMHG | BODY MASS INDEX: 30.16 KG/M2 | DIASTOLIC BLOOD PRESSURE: 80 MMHG

## 2024-05-16 DIAGNOSIS — Z98.890 STATUS POST HARDWARE REMOVAL: Primary | ICD-10-CM

## 2024-05-16 PROCEDURE — 99024 POSTOP FOLLOW-UP VISIT: CPT | Performed by: ORTHOPAEDIC SURGERY
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
## 2024-05-23 ENCOUNTER — HOSPITAL ENCOUNTER (OUTPATIENT)
Dept: CT IMAGING | Facility: HOSPITAL | Age: 59
Discharge: HOME | End: 2024-05-23
Payer: MEDICARE

## 2024-05-23 DIAGNOSIS — G45.9 MINI STROKE: ICD-10-CM

## 2024-05-23 DIAGNOSIS — S06.9XAS TRAUMATIC BRAIN INJURY, WITH UNKNOWN LOSS OF CONSCIOUSNESS STATUS, SEQUELA (HCC): ICD-10-CM

## 2024-05-23 PROCEDURE — 70450 CT HEAD/BRAIN W/O DYE: CPT

## 2024-06-14 ENCOUNTER — OFFICE VISIT (OUTPATIENT)
Dept: OBGYN CLINIC | Facility: CLINIC | Age: 59
End: 2024-06-14

## 2024-06-14 VITALS
DIASTOLIC BLOOD PRESSURE: 64 MMHG | WEIGHT: 235 LBS | BODY MASS INDEX: 30.16 KG/M2 | SYSTOLIC BLOOD PRESSURE: 110 MMHG | HEIGHT: 74 IN

## 2024-06-14 DIAGNOSIS — Z98.890 STATUS POST HARDWARE REMOVAL: Primary | ICD-10-CM

## 2024-06-14 PROCEDURE — 99024 POSTOP FOLLOW-UP VISIT: CPT | Performed by: ORTHOPAEDIC SURGERY

## 2024-06-14 NOTE — PROGRESS NOTES
HAND & UPPER EXTREMITY OFFICE VISIT   Referred By:  No referring provider defined for this encounter.      Chief Complaint:     Right wrist injury      Surgery:  REMOVAL HARDWARE RADIUS (WRIST) - Right - Right.    History of Present Illness:   Patient presents now 6 weeks status post the above surgery. Since last visit he reports ***.    Past Medical History:  Past Medical History:   Diagnosis Date    Anxiety     Celiac disease     Depression     Head injury      [place] Hit [place]    Hypertension     Obsessive compulsive disorder     Psychosis (HCC)     Severe episode of recurrent major depressive disorder, with psychotic features (HCC) 05/10/2012    Procedure/Onset: 1995    Suicide attempt ([facility])     10/2018     Past Surgical History:   Procedure Laterality Date    FRACTURE SURGERY      [place] REMOVAL IMPLANT DEEP Right 2024    Procedure: REMOVAL HARDWARE RADIUS (WRIST) - Right;  Surgeon: Johnathan Landers MD;  Location: [facility];  Service: Orthopedics    TONSILLECTOMY      WRIST SURGERY Left     resolved - cts surgery     Family History   Problem Relation Age of Onset    Heart attack Father     Prostate cancer Father     Cerebral palsy Brother     OCD Mother     OCD Sister      Social History     Socioeconomic History    Marital status:      Spouse name: Not on file    Number of children: Not on file    Years of education: Not on file    Highest education level: Bachelor's degree (e.g., BA, AB, BS)   Occupational History    Occupation: DISABLED   Tobacco Use    Smoking status: Former     Current packs/day: 0.00     Average packs/day: 1 pack/day for 3.6 years (3.6 ttl pk-yrs)     Types: Cigars, Cigarettes     Start date: 2020     Quit date: 2023     Years since quittin.5    Smokeless tobacco: Former     Types: Chew    Tobacco comments:     Smokes two black and mild per day   Vaping Use    Vaping status: Never Used   Substance and Sexual Activity    Alcohol use: Not  Currently     Comment: SOCIAL    Drug use: No    Sexual activity: Not Currently   Other Topics Concern    Not on file   Social History Narrative    Caffeine - 2 cups coffee per day    Full Time -      Social Determinants of Health     Financial Resource Strain: High Risk (1/2/2024)    Overall Financial Resource Strain (CARDIA)     Difficulty of Paying Living Expenses: Very hard   Food Insecurity: No Food Insecurity (4/18/2024)    Hunger Vital Sign     Worried About Running Out of Food in the Last Year: Never true     Ran Out of Food in the Last Year: Never true   Transportation Needs: No Transportation Needs (4/18/2024)    PRAPARE - Transportation     Lack of Transportation (Medical): No     Lack of Transportation (Non-Medical): No   Physical Activity: Not on file   Stress: Not on file   Social Connections: Unknown (2/19/2022)    Received from [facility]    Social Connection and Isolation Panel [NHANES]     Frequency of Communication with Friends and Family: Not on file     Frequency of Social Gatherings with Friends and Family: Not on file     Attends Restoration Services: Not on file     Active Member of Clubs or Organizations: Not on file     Attends Club or Organization Meetings: Not on file     Marital Status: Living with partner   Intimate Partner Violence: Not At Risk (1/2/2024)    Humiliation, Afraid, Rape, and Kick questionnaire     Fear of Current or Ex-Partner: No     Emotionally Abused: No     Physically Abused: No     Sexually Abused: No   Housing Stability: Low Risk  (4/18/2024)    Housing Stability Vital Sign     Unable to Pay for Housing in the Last Year: No     Number of Times Moved in the Last Year: 1     Homeless in the Last Year: No     Scheduled Meds:  Continuous Infusions:No current facility-administered medications for this visit.    PRN Meds:.  Allergies   Allergen Reactions    Penicillins Diarrhea and Vomiting    Celecoxib Rash       Physical Examination:    Ht  "6' 2\" (1.88 m)   Wt 107 kg (235 lb)   BMI 30.17 kg/m²     Gen: A&Ox3, NAD    Right Upper Extremity:  Dressing clean and dry, removed  Underlying incision healing well without signs of infection ***  ***  Sensation intact to light touch in the axillary median, ulnar, and radial nerve distributions  ***/5 motor ***  2+RP      Left Upper Extremity:  Dressing clean and dry, removed  Underlying incision healing well without signs of infection ***  ***  Sensation intact to light touch in the axillary median, ulnar, and radial nerve distributions  ***/5 motor ***  2+RP      Studies:  Radiographs: I personally reviewed and independently interpreted the available radiographs.  ***: Radiographs of the ***, multiple views, demonstrate ***.      Assessment and Plan:  No diagnosis found.    59 y.o. male presents *** status post REMOVAL HARDWARE RADIUS (WRIST) - Right - Right.  ***  I recommend they follow up No follow-ups on file.  he expressed understanding of the plan and agreed. We encouraged them to contact our office with any questions or concerns.         Johnathan Landers MD  Hand and Upper Extremity Surgery          *This note was dictated using Dragon voice recognition software. Please excuse any word substitutions or errors.*  "

## 2024-06-14 NOTE — PROGRESS NOTES
HAND & UPPER EXTREMITY OFFICE VISIT   Referred By:  No referring provider defined for this encounter.      Chief Complaint:     Right wrist injury     Surgery:  Right radius hardware removal, 2024    History of Present Illness:   Patient presents now 6 weeks status post the above surgery. Since last visit he reports doing well. He denies any pain in the wrist or other issues.     Past Medical History:  Past Medical History:   Diagnosis Date    Anxiety     Celiac disease     Depression     Head injury      [place]    Hypertension     Obsessive compulsive disorder     Psychosis (HCC)     Severe episode of recurrent major depressive disorder, with psychotic features ([facility]) 05/10/2012    Procedure/Onset: 1995    Suicide attempt ([facility])     10/2018     Past Surgical History:   Procedure Laterality Date    FRACTURE SURGERY      [place] REMOVAL IMPLANT DEEP Right 2024    Procedure: REMOVAL HARDWARE RADIUS (WRIST) - Right;  Surgeon: Johnathan Landers MD;  Location: [facility] OR;  Service: Orthopedics    TONSILLECTOMY      WRIST SURGERY Left     resolved - cts surgery     Family History   Problem Relation Age of Onset    Heart attack Father     Prostate cancer Father     Cerebral palsy Brother     OCD Mother     OCD Sister      Social History     Socioeconomic History    Marital status:      Spouse name: Not on file    Number of children: Not on file    Years of education: Not on file    Highest education level: Bachelor's degree (e.g., BA, AB, BS)   Occupational History    Occupation: DISABLED   Tobacco Use    Smoking status: Former     Current packs/day: 0.00     Average packs/day: 1 pack/day for 3.6 years (3.6 ttl pk-yrs)     Types: Cigars, Cigarettes     Start date: 2020     Quit date: 2023     Years since quittin.5    Smokeless tobacco: Former     Types: Chew    Tobacco comments:     Smokes two black and mild per day   Vaping Use    Vaping status: Never Used    Substance and Sexual Activity    Alcohol use: Not Currently     Comment: SOCIAL    Drug use: No    Sexual activity: Not Currently   Other Topics Concern    Not on file   Social History Narrative    Caffeine - 2 cups coffee per day    Full Time -      Social Determinants of Health     Financial Resource Strain: High Risk (1/2/2024)    Overall Financial Resource Strain (CARDIA)     Difficulty of Paying Living Expenses: Very hard   Food Insecurity: No Food Insecurity (4/18/2024)    Hunger Vital Sign     Worried About Running Out of Food in the Last Year: Never true     Ran Out of Food in the Last Year: Never true   Transportation Needs: No Transportation Needs (4/18/2024)    PRAPARE - Transportation     Lack of Transportation (Medical): No     Lack of Transportation (Non-Medical): No   Physical Activity: Not on file   Stress: Not on file   Social Connections: Unknown (2/19/2022)    Received from [facility]    Social Connection and Isolation Panel [NHANES]     Frequency of Communication with Friends and Family: Not on file     Frequency of Social Gatherings with Friends and Family: Not on file     Attends Church Services: Not on file     Active Member of Clubs or Organizations: Not on file     Attends Club or Organization Meetings: Not on file     Marital Status: Living with partner   Intimate Partner Violence: Not At Risk (1/2/2024)    Humiliation, Afraid, Rape, and Kick questionnaire     Fear of Current or Ex-Partner: No     Emotionally Abused: No     Physically Abused: No     Sexually Abused: No   Housing Stability: Low Risk  (4/18/2024)    Housing Stability Vital Sign     Unable to Pay for Housing in the Last Year: No     Number of Times Moved in the Last Year: 1     Homeless in the Last Year: No     Scheduled Meds:  Continuous Infusions:No current facility-administered medications for this visit.    PRN Meds:.  Allergies   Allergen Reactions    Penicillins Diarrhea and Vomiting  "   Celecoxib Rash       Physical Examination:    /64   Ht 6' 2\" (1.88 m)   Wt 107 kg (235 lb)   BMI 30.17 kg/m²     Gen: A&Ox3, NAD    Right Upper Extremity:  Incision sites well-healed without evidence of infection  Non-tender over incision sites  Due to his prior brachial plexus injury he has no active extension of his fingers wrist or thumb.  He holds his fingers in a clenched position with the thumb and side of his fist.  He has a drop wrist as well.   He does have 4/5 motor strength with finger flexors and wrist flexors  Sensation intact to light touch in the axillary median, ulnar, and radial nerve distributions  2+RP        Studies:  None performed today       Assessment and Plan:  1. Status post hardware removal              59 y.o. male presents 6 weeks status post Right wrist broken bridge plate removal, 5/2/2024. He has continued to do well since his procedure without any issues. His incision sites are now well-healed. He may continue to use the hand/wrist for all activities as tolerated. We reviewed that there is still a risk of re-fracture if he were to fall on the wrist. He expressed understanding. He should return to the office as needed if his symptoms worsen, or fail to improve.       he expressed understanding of the plan and agreed. We encouraged them to contact our office with any questions or concerns.         Johnathan Landers MD  Hand and Upper Extremity Surgery          *This note was dictated using Dragon voice recognition software. Please excuse any word substitutions or errors.*    "

## 2024-08-06 ENCOUNTER — OFFICE VISIT (OUTPATIENT)
Dept: FAMILY MEDICINE CLINIC | Facility: CLINIC | Age: 59
End: 2024-08-06
Payer: MEDICARE

## 2024-08-06 VITALS
HEART RATE: 90 BPM | BODY MASS INDEX: 31.57 KG/M2 | HEIGHT: 74 IN | TEMPERATURE: 97.8 F | DIASTOLIC BLOOD PRESSURE: 80 MMHG | SYSTOLIC BLOOD PRESSURE: 106 MMHG | WEIGHT: 246 LBS | OXYGEN SATURATION: 98 %

## 2024-08-06 DIAGNOSIS — Z11.59 NEED FOR HEPATITIS C SCREENING TEST: ICD-10-CM

## 2024-08-06 DIAGNOSIS — I95.9 HYPOTENSION, UNSPECIFIED HYPOTENSION TYPE: ICD-10-CM

## 2024-08-06 DIAGNOSIS — T84.84XA PAINFUL ORTHOPAEDIC HARDWARE (HCC): ICD-10-CM

## 2024-08-06 DIAGNOSIS — I95.2 HYPOTENSION DUE TO DRUGS: Primary | ICD-10-CM

## 2024-08-06 PROBLEM — Z00.8 MEDICAL CLEARANCE FOR PSYCHIATRIC ADMISSION: Status: RESOLVED | Noted: 2023-12-30 | Resolved: 2024-08-06

## 2024-08-06 PROBLEM — I10 ESSENTIAL HYPERTENSION: Status: RESOLVED | Noted: 2020-02-04 | Resolved: 2024-08-06

## 2024-08-06 PROCEDURE — G2211 COMPLEX E/M VISIT ADD ON: HCPCS | Performed by: FAMILY MEDICINE

## 2024-08-06 PROCEDURE — 99213 OFFICE O/P EST LOW 20 MIN: CPT | Performed by: FAMILY MEDICINE

## 2024-08-06 RX ORDER — FLUPHENAZINE HYDROCHLORIDE 1 MG/1
1 TABLET ORAL DAILY
COMMUNITY
Start: 2024-07-13

## 2024-08-06 RX ORDER — GABAPENTIN 400 MG/1
400 CAPSULE ORAL
COMMUNITY

## 2024-08-06 RX ORDER — ACETAMINOPHEN 500 MG
1000 TABLET ORAL EVERY 8 HOURS PRN
Qty: 60 TABLET | Refills: 0 | Status: SHIPPED | OUTPATIENT
Start: 2024-08-06

## 2024-08-06 RX ORDER — ESCITALOPRAM OXALATE 20 MG/1
20 TABLET ORAL DAILY
COMMUNITY
Start: 2024-08-04

## 2024-08-06 RX ORDER — ESCITALOPRAM OXALATE 10 MG/1
10 TABLET ORAL DAILY
COMMUNITY
Start: 2024-07-07 | End: 2024-08-06

## 2024-08-06 RX ORDER — PROPRANOLOL HYDROCHLORIDE 10 MG/1
10 TABLET ORAL 2 TIMES DAILY
COMMUNITY
Start: 2024-07-30

## 2024-08-06 NOTE — PROGRESS NOTES
Ambulatory Visit  Name: Sudhakar Choe      : 1965      MRN: [id number]  Encounter Provider: Rosana Underwood DO  Encounter Date: 2024   Encounter department: [facility]    Assessment & Plan   1. Hypotension due to drugs  Assessment & Plan:  Patient with recent decrease in baseline blood pressure likely secondary to psychiatric medication changes.  Would recommend consideration for discontinuation of propranolol and consideration of other agent for symptoms of akathisia.  Will defer to psychiatry to fully assess benefits and risks regarding this.    Recommend compression stockings as needed should patient develop symptoms of orthostatic hypotension.  Recommend repeat blood counts for further evaluation.    Recommendation for follow-up as scheduled in September with PCP  2. Hypotension, unspecified hypotension type  Assessment & Plan:  Patient with recent decrease in baseline blood pressure likely secondary to psychiatric medication changes.  Would recommend consideration for discontinuation of propranolol and consideration of other agent for symptoms of akathisia.  Will defer to psychiatry to fully assess benefits and risks regarding this.    Recommend compression stockings as needed should patient develop symptoms of orthostatic hypotension.  Recommend repeat blood counts for further evaluation.    Recommendation for follow-up as scheduled in September with PCP  Orders:  -     Compression Stocking  -     CBC and Platelet; Future  3. Need for hepatitis C screening test  -     Hepatitis C Antibody; Future  4. Painful orthopaedic hardware (HCC)  -     acetaminophen (TYLENOL) 500 mg tablet; Take 2 tablets (1,000 mg total) by mouth every 8 (eight) hours as needed for mild pain       History of Present Illness     Sudhakar is a 59-year-old male with past medical history of hypertension, hiatal hernia, celiac disease, schizoaffective/OCD/bipolar disorder.  He resides in a group home and  presents today with .    Notes from group home indicate blood pressures in the 80s to 90s systolic over the 60s to 70s.  Notes that this has been persistent over the past month.  Medication changes are reviewed and patient is no longer taking Wellbutrin which has a known side effect of potentially increasing blood pressure.  There are multiple medications in his regimen that may be decreasing blood pressure specifically propranolol and tamsulosin.  Patient denies any symptoms of lightheadedness, dizziness, fainting with feeds low blood pressures.  Notes that he feels well.        Review of Systems   Respiratory:  Negative for shortness of breath.    Cardiovascular:  Negative for chest pain and palpitations.   Neurological:  Negative for dizziness, syncope, weakness, light-headedness and headaches.     Medical History Reviewed by provider this encounter:       Past Medical History   Past Medical History:   Diagnosis Date   • Anxiety 1995   • Celiac disease    • Depression 1995   • Head injury     [place]   • Hypertension    • Obsessive compulsive disorder    • Psychosis (HCC)    • Severe episode of recurrent major depressive disorder, with psychotic features (HCC) 05/10/2012    Procedure/Onset: 01/01/1995   • Suicide attempt (HCC)     10/2018     Past Surgical History:   Procedure Laterality Date   • FRACTURE SURGERY     • [place] REMOVAL IMPLANT DEEP Right 5/2/2024    Procedure: REMOVAL HARDWARE RADIUS (WRIST) - Right;  Surgeon: Johnathan Landers MD;  Location: [facility];  Service: Orthopedics   • TONSILLECTOMY     • WRIST SURGERY Left     resolved 1997- cts surgery     Family History   Problem Relation Age of Onset   • Heart attack Father    • Prostate cancer Father    • Cerebral palsy Brother    • OCD Mother    • OCD Sister      Current Outpatient Medications on File Prior to Visit   Medication Sig Dispense Refill   • cholecalciferol (VITAMIN D3) 1,000 units tablet Take 2 tablets (2,000 Units  total) by mouth daily 60 tablet 1   • divalproex sodium (DEPAKOTE ER) 500 mg 24 hr tablet Take 1 tablet (500 mg total) by mouth daily 30 tablet 1   • divalproex sodium (DEPAKOTE ER) 500 mg 24 hr tablet Take 2 tablets (1,000 mg total) by mouth daily at bedtime 60 tablet 1   • docusate sodium (COLACE) 100 mg capsule Take 1 capsule (100 mg total) by mouth 2 (two) times a day 60 capsule 1   • docusate sodium (COLACE) 100 mg capsule Take 1 capsule (100 mg total) by mouth in the morning 10 capsule 0   • escitalopram (LEXAPRO) 20 mg tablet Take 20 mg by mouth daily     • fluPHENAZine (PROLIXIN) 1 mg tablet Take 1 mg by mouth daily     • gabapentin (NEURONTIN) 400 mg capsule Take 400 mg by mouth daily at bedtime     • Melatonin 5 MG TABS Take by mouth     • metFORMIN (GLUCOPHAGE) 500 mg tablet Take 1 tablet (500 mg total) by mouth daily with breakfast 30 tablet 1   • paliperidone palmitate ER (INVEGA) 234 mg/1.5 mL IM injection 1.5 mL (234 mg total) by IM- Deltoid route every 4 weeks     • propranolol (INDERAL) 10 mg tablet Take 10 mg by mouth 2 (two) times a day     • tamsulosin (FLOMAX) 0.4 mg Take 1 capsule (0.4 mg total) by mouth daily with dinner 30 capsule 1   • traZODone (DESYREL) 50 mg tablet Take 1 tablet (50 mg total) by mouth daily at bedtime 30 tablet 1     No current facility-administered medications on file prior to visit.     Allergies   Allergen Reactions   • Penicillins Diarrhea and Vomiting   • Celecoxib Rash      Current Outpatient Medications on File Prior to Visit   Medication Sig Dispense Refill   • cholecalciferol (VITAMIN D3) 1,000 units tablet Take 2 tablets (2,000 Units total) by mouth daily 60 tablet 1   • divalproex sodium (DEPAKOTE ER) 500 mg 24 hr tablet Take 1 tablet (500 mg total) by mouth daily 30 tablet 1   • divalproex sodium (DEPAKOTE ER) 500 mg 24 hr tablet Take 2 tablets (1,000 mg total) by mouth daily at bedtime 60 tablet 1   • docusate sodium (COLACE) 100 mg capsule Take 1 capsule  "(100 mg total) by mouth 2 (two) times a day 60 capsule 1   • docusate sodium (COLACE) 100 mg capsule Take 1 capsule (100 mg total) by mouth in the morning 10 capsule 0   • escitalopram (LEXAPRO) 20 mg tablet Take 20 mg by mouth daily     • fluPHENAZine (PROLIXIN) 1 mg tablet Take 1 mg by mouth daily     • gabapentin (NEURONTIN) 400 mg capsule Take 400 mg by mouth daily at bedtime     • Melatonin 5 MG TABS Take by mouth     • metFORMIN (GLUCOPHAGE) 500 mg tablet Take 1 tablet (500 mg total) by mouth daily with breakfast 30 tablet 1   • paliperidone palmitate ER (INVEGA) 234 mg/1.5 mL IM injection 1.5 mL (234 mg total) by IM- Deltoid route every 4 weeks     • propranolol (INDERAL) 10 mg tablet Take 10 mg by mouth 2 (two) times a day     • tamsulosin (FLOMAX) 0.4 mg Take 1 capsule (0.4 mg total) by mouth daily with dinner 30 capsule 1   • traZODone (DESYREL) 50 mg tablet Take 1 tablet (50 mg total) by mouth daily at bedtime 30 tablet 1     No current facility-administered medications on file prior to visit.      Social History     Tobacco Use   • Smoking status: Former     Current packs/day: 0.00     Average packs/day: 1 pack/day for 3.6 years (3.6 ttl pk-yrs)     Types: Cigars, Cigarettes     Start date: 2020     Quit date: 2023     Years since quittin.6   • Smokeless tobacco: Former     Types: Chew   • Tobacco comments:     Smokes two black and mild per day   Vaping Use   • Vaping status: Never Used   Substance and Sexual Activity   • Alcohol use: Not Currently     Comment: SOCIAL   • Drug use: No   • Sexual activity: Not Currently     Objective     /80   Pulse 90   Temp 97.8 °F (36.6 °C)   Ht 6' 2\" (1.88 m)   Wt 112 kg (246 lb)   SpO2 98%   BMI 31.58 kg/m²     Physical Exam  Vitals reviewed.   Constitutional:       General: He is not in acute distress.     Appearance: Normal appearance.   HENT:      Head: Normocephalic and atraumatic.      Right Ear: External ear normal.      Left Ear: " External ear normal.      Mouth/Throat:      Mouth: Mucous membranes are moist.      Pharynx: Oropharynx is clear.   Eyes:      General: No scleral icterus.        Right eye: No discharge.         Left eye: No discharge.      Conjunctiva/sclera: Conjunctivae normal.   Cardiovascular:      Rate and Rhythm: Normal rate and regular rhythm.      Heart sounds: No murmur heard.     No friction rub. No gallop.   Pulmonary:      Effort: Pulmonary effort is normal.      Breath sounds: No wheezing, rhonchi or rales.   Abdominal:      General: Bowel sounds are normal.      Palpations: Abdomen is soft.      Tenderness: There is no abdominal tenderness. There is no guarding or rebound.   Musculoskeletal:      Right lower leg: No edema.      Left lower leg: No edema.   Skin:     General: Skin is warm and dry.   Neurological:      General: No focal deficit present.      Mental Status: He is alert and oriented to person, place, and time.   Psychiatric:         Mood and Affect: Mood normal.         Behavior: Behavior normal.       Administrative Statements

## 2024-08-08 PROBLEM — I95.2 HYPOTENSION DUE TO DRUGS: Status: ACTIVE | Noted: 2024-08-06

## 2024-08-08 NOTE — ASSESSMENT & PLAN NOTE
Patient with recent decrease in baseline blood pressure likely secondary to psychiatric medication changes.  Would recommend consideration for discontinuation of propranolol and consideration of other agent for symptoms of akathisia.  Will defer to psychiatry to fully assess benefits and risks regarding this.    Recommend compression stockings as needed should patient develop symptoms of orthostatic hypotension.  Recommend repeat blood counts for further evaluation.    Recommendation for follow-up as scheduled in September with PCP  
The patient is a 63y Male complaining of

## 2024-09-25 ENCOUNTER — OFFICE VISIT (OUTPATIENT)
Dept: FAMILY MEDICINE CLINIC | Facility: CLINIC | Age: 59
End: 2024-09-25
Payer: MEDICARE

## 2024-09-25 VITALS
SYSTOLIC BLOOD PRESSURE: 112 MMHG | OXYGEN SATURATION: 97 % | WEIGHT: 244.2 LBS | BODY MASS INDEX: 31.34 KG/M2 | HEART RATE: 108 BPM | HEIGHT: 74 IN | TEMPERATURE: 96.8 F | DIASTOLIC BLOOD PRESSURE: 76 MMHG | RESPIRATION RATE: 16 BRPM

## 2024-09-25 DIAGNOSIS — F31.2 BIPOLAR AFFECTIVE DISORDER, CURRENT EPISODE MANIC WITH PSYCHOTIC SYMPTOMS (HCC): ICD-10-CM

## 2024-09-25 DIAGNOSIS — R73.03 PREDIABETES: ICD-10-CM

## 2024-09-25 DIAGNOSIS — E55.9 VITAMIN D DEFICIENCY: ICD-10-CM

## 2024-09-25 DIAGNOSIS — F33.41 RECURRENT MAJOR DEPRESSIVE DISORDER, IN PARTIAL REMISSION (HCC): Chronic | ICD-10-CM

## 2024-09-25 DIAGNOSIS — I10 ESSENTIAL HYPERTENSION: Primary | ICD-10-CM

## 2024-09-25 DIAGNOSIS — Z00.8 MEDICAL CLEARANCE FOR PSYCHIATRIC ADMISSION: ICD-10-CM

## 2024-09-25 DIAGNOSIS — N40.0 BENIGN PROSTATIC HYPERPLASIA, UNSPECIFIED WHETHER LOWER URINARY TRACT SYMPTOMS PRESENT: ICD-10-CM

## 2024-09-25 DIAGNOSIS — K21.9 GASTROESOPHAGEAL REFLUX DISEASE WITHOUT ESOPHAGITIS: ICD-10-CM

## 2024-09-25 DIAGNOSIS — I82.501 LEG DVT (DEEP VENOUS THROMBOEMBOLISM), CHRONIC, RIGHT (HCC): ICD-10-CM

## 2024-09-25 DIAGNOSIS — Z12.11 SCREENING FOR COLON CANCER: ICD-10-CM

## 2024-09-25 PROCEDURE — 99214 OFFICE O/P EST MOD 30 MIN: CPT | Performed by: FAMILY MEDICINE

## 2024-09-25 PROCEDURE — G2211 COMPLEX E/M VISIT ADD ON: HCPCS | Performed by: FAMILY MEDICINE

## 2024-09-25 RX ORDER — DIVALPROEX SODIUM 250 MG/1
250 TABLET, FILM COATED, EXTENDED RELEASE ORAL
COMMUNITY
Start: 2024-09-15

## 2024-09-25 RX ORDER — VENLAFAXINE HYDROCHLORIDE 75 MG/1
75 CAPSULE, EXTENDED RELEASE ORAL DAILY
COMMUNITY
Start: 2024-09-15

## 2024-09-25 RX ORDER — DIVALPROEX SODIUM 500 MG/1
1000 TABLET, DELAYED RELEASE ORAL
COMMUNITY
Start: 2024-08-08 | End: 2024-09-25

## 2024-09-25 RX ORDER — VENLAFAXINE HYDROCHLORIDE 37.5 MG/1
37.5 CAPSULE, EXTENDED RELEASE ORAL DAILY
COMMUNITY
Start: 2024-09-05

## 2024-09-25 RX ORDER — GABAPENTIN 100 MG/1
200 CAPSULE ORAL
COMMUNITY
Start: 2024-09-03

## 2024-09-25 NOTE — PROGRESS NOTES
Assessment/Plan:   Patient to continue present treatment.  Instructed to follow a low-fat, low-salt and a low sugar/carbohydrate diet more carefully and get regular aerobic exercise walking up to 150 minutes/week as tolerated.  Weight loss encouraged.  Follow-up with specialists as scheduled.  Instructed to schedule screening colonoscopy.  Return to the office in 6 months.   Diagnoses and all orders for this visit:    Essential hypertension    Prediabetes    Gastroesophageal reflux disease without esophagitis    Benign prostatic hyperplasia, unspecified whether lower urinary tract symptoms present    Leg DVT (deep venous thromboembolism), chronic, right (HCC)    Vitamin D deficiency    Bipolar affective disorder, current episode manic with psychotic symptoms (HCC)    Recurrent major depressive disorder, in partial remission (HCC) with history of psychotic features    Medical clearance for psychiatric admission  -     metFORMIN (GLUCOPHAGE) 500 mg tablet; Take 1 tablet (500 mg total) by mouth 2 (two) times a day with meals    Screening for colon cancer    Other orders  -     venlafaxine (EFFEXOR-XR) 37.5 mg 24 hr capsule; Take 37.5 mg by mouth daily  -     venlafaxine (EFFEXOR-XR) 75 mg 24 hr capsule; Take 75 mg by mouth daily  -     Discontinue: divalproex sodium (DEPAKOTE) 500 mg DR tablet; Take 1,000 mg by mouth daily at bedtime  -     divalproex sodium (DEPAKOTE ER) 250 mg 24 hr tablet; Take 250 mg by mouth daily at bedtime  -     gabapentin (NEURONTIN) 100 mg capsule; Take 200 mg by mouth daily at bedtime          Subjective:     Patient ID: Sudhakar Choe is a 59 y.o. male.    Patient is here for follow-up appointment for chronic conditions and reviewed recent labs.  Patient currently living at [facility].  Patient feeling fairly well overall.  No regular exercise program.  No recent hypotension.  Blood pressure improved since discontinuing propranolol recently.   Patient did not schedule follow-up colonoscopy as previously recommended and encouraged to do so.    Hypertension  This is a chronic problem. The problem is controlled. Associated symptoms include anxiety. Pertinent negatives include no blurred vision, chest pain, headaches, orthopnea, palpitations, peripheral edema, PND or shortness of breath. Risk factors for coronary artery disease include male gender and obesity. Past treatments include nothing. The current treatment provides significant improvement. There are no compliance problems.  There is no history of CAD/MI or CVA.       Review of Systems   Eyes:  Negative for blurred vision.   Respiratory:  Negative for shortness of breath.    Cardiovascular:  Negative for chest pain, palpitations, orthopnea and PND.   Neurological:  Negative for headaches.         Objective:     Physical Exam  Constitutional:       General: He is not in acute distress.     Appearance: Normal appearance.   HENT:      Head: Normocephalic.      Mouth/Throat:      Mouth: Mucous membranes are moist.   Eyes:      General: No scleral icterus.     Conjunctiva/sclera: Conjunctivae normal.   Neck:      Vascular: No carotid bruit.   Cardiovascular:      Rate and Rhythm: Normal rate and regular rhythm.   Pulmonary:      Effort: Pulmonary effort is normal.      Breath sounds: Normal breath sounds.   Abdominal:      Palpations: Abdomen is soft.      Tenderness: There is no abdominal tenderness.   Musculoskeletal:      Cervical back: Neck supple.      Right lower leg: No edema.      Left lower leg: No edema.   Lymphadenopathy:      Cervical: No cervical adenopathy.   Skin:     General: Skin is warm and dry.   Neurological:      General: No focal deficit present.      Mental Status: He is alert and oriented to person, place, and time.   Psychiatric:         Mood and Affect: Mood normal.         Behavior: Behavior normal.

## 2024-10-21 ENCOUNTER — TELEPHONE (OUTPATIENT)
Age: 59
End: 2024-10-21

## 2024-10-21 NOTE — TELEPHONE ENCOUNTER
Patient called in stating they are looking to reestablish with their past talk therapist and they would also need a medication management doctor. Patient states they are in a facility at the moment but would like to get out of the facility and would need an outside therapist and a provider to do so. They have seen Olga Lidia SANZ In the past and would like to continue care with her. They would like her help with finding a provider as well.    Writer confirmed the patients preferred telephone number on their chart.    Writer stated a message would be sent to the intake department and Olga Lidia to make them aware of the request, informing the patient there may be protocol to follow.

## 2024-10-21 NOTE — TELEPHONE ENCOUNTER
Patient's girlfriend call to see if he could be scheduled with Olga Lidia Dickerson. He has been hospitalized for the last year at [facility]. In order for him to be discharged he needs services set up. Rhea would like to know if there was anyway for you to help. Writer informed her she would pass on the message with no guarantees.

## 2024-10-22 NOTE — TELEPHONE ENCOUNTER
Contacted patient in regards to requests. Writer spoke with patient and shared Jen is open to seeing pt again for therapy. Writer shared at this time there is no openings with Jen and pt is added to the wait lists to see  Jen for therapy and med [facility] wait list. Pt understood.

## 2024-10-23 ENCOUNTER — TELEPHONE (OUTPATIENT)
Age: 59
End: 2024-10-23

## 2024-10-23 NOTE — TELEPHONE ENCOUNTER
Writer contacted Pt as requested.  Writer advised Pt that he is still on the WL for services with Olga Lidia Dickerson. Writer informed that at this moment there are no appt's available. Pt verbalized understanding.

## 2024-10-23 NOTE — TELEPHONE ENCOUNTER
Sudhakar called to check on his Wait List status. He would like to get in to the [facility] practice again with Olga Lidia Dickerson for TT. He is asking if someone could return his call for an update.

## 2024-10-28 ENCOUNTER — TELEPHONE (OUTPATIENT)
Age: 59
End: 2024-10-28

## 2024-10-28 NOTE — TELEPHONE ENCOUNTER
Patient called and is requesting a call back from provider as he has some questions he would like answered before scheduling

## 2024-10-29 NOTE — TELEPHONE ENCOUNTER
Patient called again in regards to wanting to speak with therapist. Writer informed patient the message would be sent.

## 2024-11-01 ENCOUNTER — TELEPHONE (OUTPATIENT)
Age: 59
End: 2024-11-01

## 2024-11-01 NOTE — TELEPHONE ENCOUNTER
Pt on wait list for services with Olga Lidia Dickerson. Writer called Pt to schedule an appt, no answer. Lvm for Pt to call back the intake dept at [phone], opt 3.

## 2024-11-07 ENCOUNTER — TELEPHONE (OUTPATIENT)
Dept: BEHAVIORAL/MENTAL HEALTH CLINIC | Facility: CLINIC | Age: 59
End: 2024-11-07

## 2024-11-18 ENCOUNTER — TELEPHONE (OUTPATIENT)
Age: 59
End: 2024-11-18

## 2024-11-18 NOTE — TELEPHONE ENCOUNTER
Patient called in regard to NP appt virtual 11/19/24 at 12am. Patient is having difficulty getting into [employer]. Writer offered [employer] help number. Patient stated he called that already and they were not able to help.    Patient is requesting a text link if possible at time of appt.

## 2024-11-18 NOTE — TELEPHONE ENCOUNTER
Patient called seeking guidance logging into [employer] to complete NP forms before appt tomorrow 11/19. Writer explained where to find forms in [employer] and when sharing HW number to log in, call got disconnected.     Upon call back, share [phone] number for HW for pt to log in.

## 2024-11-18 NOTE — TELEPHONE ENCOUNTER
Patient returned call, writer shared [employer] help desk number as pt shared does not recall email or password set up with account.

## 2024-11-19 ENCOUNTER — TELEPHONE (OUTPATIENT)
Dept: PSYCHIATRY | Facility: CLINIC | Age: 59
End: 2024-11-19

## 2024-11-19 NOTE — TELEPHONE ENCOUNTER
Writer called patient and informed him that Medicaid is still active,and unfortunately his appt with Lauren  will need to be cancelled. Patient was rescheduled for Monday at [facility].

## 2024-11-19 NOTE — TELEPHONE ENCOUNTER
Called patient and made him aware that per medicaid guidelines his initial appt must be in person. Patient understood and agreed to a in person appointment.

## 2024-11-21 ENCOUNTER — OFFICE VISIT (OUTPATIENT)
Dept: BEHAVIORAL/MENTAL HEALTH CLINIC | Facility: CLINIC | Age: 59
End: 2024-11-21
Payer: MEDICARE

## 2024-11-21 DIAGNOSIS — F25.1 SCHIZOAFFECTIVE DISORDER, DEPRESSIVE TYPE (HCC): Primary | ICD-10-CM

## 2024-11-21 PROCEDURE — 90791 PSYCH DIAGNOSTIC EVALUATION: CPT | Performed by: SOCIAL WORKER

## 2024-11-21 NOTE — BH CRISIS PLAN
Client Name: Sudhakar Choe       Client YOB: 1965    [facility]      Creation Date: 11/21/24    Created By: Olga Lidia Dickerson       Step 1: Warning Signs:   Warning Signs   Sweating   Revert back to OCD ways   Shaking/trembling            Step 2: Internal Coping Strategies:   Internal Coping Strategies   Listen to music   Take a walk   Talk to someone on the phone            Step 3: People and social settings that provide distraction:   Name Contact Information   Rhea Lives with    Places   Take a walk in the woods           Step 4: People whom I can ask for help during a crisis:      Name Contact Information    Rhea Lives with      Step 5: Professionals or agencies I can contact during a crisis:      Clinican/Agency Name Phone Emergency Contact    [phone] Jen Dickerson      [facility] Emergency Department Emergency Department Phone Emergency Department Address    [place] [phone] [place]        Crisis Phone Numbers:   Suicide Prevention Lifeline: Call or Text  134 Crisis Text Line: Text HOME to 995-898   Please note: Some [facility] do not have a separate number for Child/Adolescent specific crisis. If your county is not listed under Child/Adolescent, please call the adult number for your county      Adult Crisis Numbers: Child/Adolescent Crisis Numbers   [facility]: [phone] [facility]: [phone]   [facility]: [phone] [facility]: [phone]   [facility]: [phone] [place]: [phone]   [facility]: [phone] [place]: [phone]   [facility]: [phone]   [facility]: [phone]   [facility]: [phone]   [facility]: [phone] (daytime) [phone] (after hours, weekends, holidays)      Step 6: Making the environment safer (plan for lethal means safety):   Patient did not identify any lethal methods: Yes     Optional: What is most  important to me and worth living for?   My family     [place] Safety Plan. Martine Martinez and Dante Madsen. Used with permission of the authors.

## 2024-11-21 NOTE — BH TREATMENT PLAN
Outpatient Behavioral Health Psychotherapy Treatment Plan    Sudhakar Fernandezsolo  1965     Date of Initial Psychotherapy Assessment: 11/21/24   Date of Current Treatment Plan: 11/21/24  Treatment Plan Target Date: 5/21/25  Treatment Plan Expiration Date: 5/21/25    Diagnosis:   No diagnosis found.    Area(s) of Need:   Re-enter society  Plan B    Long Term Goal 1 (in the client's own words): Be able to adapt to being home    Stage of Change: Preparation    Target Date for completion: TBD     Anticipated therapeutic modalities: CBT/Supportive therapy     People identified to complete this goal: Sudhakar Li      Objective 1: (identify the means of measuring success in meeting the objective):           Taking one day at a time           Samll steps every day      Objective 2: (identify the means of measuring success in meeting the objective):           Using my support system      Long Term Goal 2 (in the client's own words): Develop a plan in case I cannot be with Rhea    Stage of Change: Preparation    Target Date for completion: TBD     Anticipated therapeutic modalities: CBT/Supportive therapy     People identified to complete this goal: Jen De La Fuente Rachel      Objective 1: (identify the means of measuring success in meeting the objective):           Work with [facility] to develop the plan B      Objective 2: (identify the means of measuring success in meeting the objective):           Continue with treatment with Jen        I am currently under the care of a [facility] psychiatric provider: yes    My [facility] psychiatric provider is: Dr. Laguerre    I am currently taking psychiatric medications: Yes, as prescribed    I feel that I will be ready for discharge from mental health care when I reach the following (measurable goal/objective): When all treatment goals are completed    For children and adults who have a legal guardian:   Has there been any change to custody orders and/or  guardianship status? NA. If yes, attach updated documentation.    I have created my Crisis Plan and have been offered a copy of this plan    Behavioral Health Treatment Plan [facility]: Diagnosis and Treatment Plan explained to Sudhakar Choe acknowledges an understanding of their diagnosis. Sudhakar Choe agrees to this treatment plan.    I have been offered a copy of this Treatment Plan. yes
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
## 2024-11-22 ENCOUNTER — TELEPHONE (OUTPATIENT)
Age: 59
End: 2024-11-22

## 2024-11-22 NOTE — TELEPHONE ENCOUNTER
Sudhakar called to check on a earlier NP appt on 11/25/24 with Solomon Mcintosh. Writer informed that there is not appt earlier and would check for another NP appt. Writer offered Virtual NP on 12/19/24 (9, 11 & 1) or In person NP on 1/13/24 at 10:30 am. Sudhakar decided to keep his appt on 11/25/24 at 3 pm.

## 2024-11-24 NOTE — BH TREATMENT PLAN
" TREATMENT PLAN  ***      [facility]    Name and Date of Birth:  Sudhakar Choe 59 y.o. 1965  Date of Treatment Plan: November 24, 2024  Diagnosis/Diagnoses:  No diagnosis found.    Strengths/Personal Resources for Self-Care: {COSME TP strengths:88346}    Area/Areas of need: {AMB PATIENT AREAS OF NEED:37106}    Long Term Goal: {krm amb op long term goals:03798}  Target Date: 6 months - May 24, 2025  Person/Persons responsible for completion of goal: Sudhakar and Solomon Mcintosh MD     Short Term Objective (s) - How will we reach this goal?:   Take medications as prescribed  Attend psychiatry appointments regularly  Get blood work drawn  Continue psychotherapy regularly  Practice coping skills  Try sleep hygiene techniques  Avoid alcohol   Avoid drugs   Take walks regularly  Try breathing exercises  Try relaxation techniques  Target Date: 6 months - May 24, 2025  Person/Persons Responsible for Completion of Goal: Sudhakar     Progress Towards Goals: Continuing treatment    Treatment Modality: {kr op treatment modality:13033::\"medication management every 1-3 months as needed\"}  Review due 180 days from date of this plan: May 23, 2025   Expected length of service: Ongoing treatment    My physician and I have developed this plan together, and I agree to work on the goals and objectives. I understand the treatment goals that were developed for my treatment.    The treatment plan was created between Solomon Mcintosh MD and Sudhakar Choe on 11/24/24 at 6:16 PM but not signed at the time of the visit due to COVID social distancing. The plan was reviewed, and verbal consent was given.   "

## 2024-11-24 NOTE — PSYCH
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 Final    Chloride 01/03/2024 101  96 - 108 mmol/L Final    CO2 01/03/2024 24  21 - 32 mmol/L Final    ANION GAP 01/03/2024 12  mmol/L Final    BUN 01/03/2024 8  5 - 25 mg/dL Final    Creatinine 01/03/2024 0.78  0.60 - 1.30 mg/dL Final    Standardized to IDMS reference method    Glucose 01/03/2024 109  65 - 140 mg/dL Final    If the patient is fasting, the ADA then defines impaired fasting glucose as > 100 mg/dL and diabetes as > or equal to 123 mg/dL.    Glucose, Fasting 01/03/2024 109 (H)  65 - 99 mg/dL Final    Calcium 01/03/2024 8.5  8.4 - 10.2 mg/dL Final    AST 01/03/2024 23  13 - 39 U/L Final    ALT 01/03/2024 18  7 - 52 U/L Final    Specimen collection should occur prior to Sulfasalazine administration due to the potential for falsely depressed results.     Alkaline Phosphatase 01/03/2024 63  34 - 104 U/L Final    Total Protein 01/03/2024 6.8  6.4 - 8.4 g/dL Final    Albumin 01/03/2024 3.8  3.5 - 5.0 g/dL Final    Total Bilirubin 01/03/2024 0.33  0.20 - 1.00 mg/dL Final    Use of this assay is not recommended for patients undergoing treatment with eltrombopag due to the potential for falsely elevated results.  N-acetyl-p-benzoquinone imine (metabolite of Acetaminophen) will generate erroneously low results in samples for patients that have taken an overdose of Acetaminophen.    eGFR 01/03/2024 99   ml/min/1.73sq m Final    Valproic Acid, Total 01/07/2024 76  50 - 100 ug/mL Final    SARS-CoV-2 01/13/2024 Positive (A)  Negative Final    INFLUENZA A PCR 01/13/2024 Negative  Negative Final    INFLUENZA B PCR 01/13/2024 Negative  Negative Final    RSV PCR 01/13/2024 Negative  Negative Final    Cholesterol 01/23/2024 124  See Comment mg/dL Final    Cholesterol:         Pediatric <18 Years        Desirable          <170 mg/dL      Borderline High    170-199 mg/dL      High               >=200 mg/dL        Adult >=18 Years            Desirable         <200 mg/dL      Borderline High   200-239 mg/dL      High              >239 mg/dL      Triglycerides 01/23/2024 116  See Comment mg/dL Final    Triglyceride:     0-9Y            <75mg/dL     10Y-17Y         <90 mg/dL       >=18Y     Normal          <150 mg/dL     Borderline High 150-199 mg/dL     High            200-499 mg/dL        Very High       >499 mg/dL    Specimen collection should occur prior to Metamizole administration due to the potential for falsely depressed results.    HDL, Direct 01/23/2024 28 (L)  >=40 mg/dL Final    LDL Calculated 01/23/2024 73  0 - 100 mg/dL Final    LDL Cholesterol:     Optimal           <100 mg/dl     Near Optimal      100-129 mg/dl     Above Optimal       Borderline High 130-159 mg/dl       High            160-189 mg/dl       Very High       >189 mg/dl         This screening LDL is a calculated result.   It does not have the accuracy of the Direct Measured LDL in the monitoring of patients with hyperlipidemia and/or statin therapy.   Direct Measure LDL ([id number]) must be ordered separately in these patients.    Non-HDL-Chol (CHOL-HDL) 01/23/2024 96  mg/dl Final    Hemoglobin A1C 01/23/2024 6.2 (H)  Normal 4.0-5.6%; PreDiabetic 5.7-6.4%; Diabetic >=6.5%; Glycemic control for adults with diabetes <7.0% % Final    EAG 01/23/2024 131  mg/dl Final    Valproic Acid, Total 01/25/2024 79  50 - 100 ug/mL Final    Sodium 01/25/2024 136  135 -  147 mmol/L Final    Potassium 01/25/2024 3.8  3.5 - 5.3 mmol/L Final    Chloride 01/25/2024 95 (L)  96 - 108 mmol/L Final    CO2 01/25/2024 32  21 - 32 mmol/L Final    ANION GAP 01/25/2024 9  mmol/L Final    BUN 01/25/2024 12  5 - 25 mg/dL Final    Creatinine 01/25/2024 1.00  0.60 - 1.30 mg/dL Final    Standardized to IDMS reference method    Glucose 01/25/2024 90  65 - 140 mg/dL Final    If the patient is fasting, the ADA then defines impaired fasting glucose as > 100 mg/dL and diabetes as > or equal to 123 mg/dL.    Calcium 01/25/2024 9.7  8.4 - 10.2 mg/dL Final    AST 01/25/2024 18  13 - 39 U/L Final    ALT 01/25/2024 10  7 - 52 U/L Final    Specimen collection should occur prior to Sulfasalazine administration due to the potential for falsely depressed results.     Alkaline Phosphatase 01/25/2024 56  34 - 104 U/L Final    Total Protein 01/25/2024 7.1  6.4 - 8.4 g/dL Final    Albumin 01/25/2024 4.0  3.5 - 5.0 g/dL Final    Total Bilirubin 01/25/2024 0.33  0.20 - 1.00 mg/dL Final    Use of this assay is not recommended for patients undergoing treatment with eltrombopag due to the potential for falsely elevated results.  N-acetyl-p-benzoquinone imine (metabolite of Acetaminophen) will generate erroneously low results in samples for patients that have taken an overdose of Acetaminophen.    eGFR 01/25/2024 82  ml/min/1.73sq m Final    WBC 02/04/2024 8.07  4.31 - 10.16 Thousand/uL Final    RBC 02/04/2024 4.48  3.88 - 5.62 Million/uL Final    Hemoglobin 02/04/2024 13.9  12.0 - 17.0 g/dL Final    Hematocrit 02/04/2024 40.8  36.5 - 49.3 % Final    MCV 02/04/2024 91  82 - 98 fL Final    MCH 02/04/2024 31.0  26.8 - 34.3 pg Final    MCHC 02/04/2024 34.1  31.4 - 37.4 g/dL Final    RDW 02/04/2024 14.1  11.6 - 15.1 % Final    MPV 02/04/2024 11.0  8.9 - 12.7 fL Final    Platelets 02/04/2024 198  149 - 390 Thousands/uL Final    nRBC 02/04/2024 0  /100 WBCs Final    Segmented % 02/04/2024 39 (L)  43 - 75 % Final    Immature Grans  % 02/04/2024 0  0 - 2 % Final    Lymphocytes % 02/04/2024 40  14 - 44 % Final    Monocytes % 02/04/2024 16 (H)  4 - 12 % Final    Eosinophils Relative 02/04/2024 4  0 - 6 % Final    Basophils Relative 02/04/2024 1  0 - 1 % Final    Absolute Neutrophils 02/04/2024 3.14  1.85 - 7.62 Thousands/µL Final    Absolute Immature Grans 02/04/2024 0.01  0.00 - 0.20 Thousand/uL Final    Absolute Lymphocytes 02/04/2024 3.20  0.60 - 4.47 Thousands/µL Final    Absolute Monocytes 02/04/2024 1.31 (H)  0.17 - 1.22 Thousand/µL Final    Eosinophils Absolute 02/04/2024 0.35  0.00 - 0.61 Thousand/µL Final    Basophils Absolute 02/04/2024 0.06  0.00 - 0.10 Thousands/µL Final    Sodium 02/04/2024 136  135 - 147 mmol/L Final    Potassium 02/04/2024 3.8  3.5 - 5.3 mmol/L Final    Chloride 02/04/2024 100  96 - 108 mmol/L Final    CO2 02/04/2024 26  21 - 32 mmol/L Final    ANION GAP 02/04/2024 10  mmol/L Final    BUN 02/04/2024 10  5 - 25 mg/dL Final    Creatinine 02/04/2024 0.87  0.60 - 1.30 mg/dL Final    Standardized to IDMS reference method    Glucose 02/04/2024 102  65 - 140 mg/dL Final    If the patient is fasting, the ADA then defines impaired fasting glucose as > 100 mg/dL and diabetes as > or equal to 123 mg/dL.    Calcium 02/04/2024 9.4  8.4 - 10.2 mg/dL Final    AST 02/04/2024 17  13 - 39 U/L Final    ALT 02/04/2024 10  7 - 52 U/L Final    Specimen collection should occur prior to Sulfasalazine administration due to the potential for falsely depressed results.     Alkaline Phosphatase 02/04/2024 53  34 - 104 U/L Final    Total Protein 02/04/2024 7.3  6.4 - 8.4 g/dL Final    Albumin 02/04/2024 4.2  3.5 - 5.0 g/dL Final    Total Bilirubin 02/04/2024 0.32  0.20 - 1.00 mg/dL Final    Use of this assay is not recommended for patients undergoing treatment with eltrombopag due to the potential for falsely elevated results.  N-acetyl-p-benzoquinone imine (metabolite of Acetaminophen) will generate erroneously low results in samples for  patients that have taken an overdose of Acetaminophen.    eGFR 02/04/2024 95  ml/min/1.73sq m Final    Valproic Acid, Total 02/04/2024 84  50 - 100 ug/mL Final    Ventricular Rate 02/24/2024 99  BPM Final    Atrial Rate 02/24/2024 99  BPM Final    NC Interval 02/24/2024 192  ms Final    QRSD Interval 02/24/2024 80  ms Final    QT Interval 02/24/2024 334  ms Final    QTC Interval 02/24/2024 428  ms Final    P Axis 02/24/2024 24  degrees Final    QRS Axis 02/24/2024 60  degrees Final    T Wave Axis 02/24/2024 38  degrees Final    QFT Nil 02/27/2024 0.10  0 - 8.0 IU/ml Final    QFT TB1-NIL 02/27/2024 0.04  IU/ml Final    QFT TB2-NIL 02/27/2024 0.03  IU/ml Final    QFT Mitogen-NIL 02/27/2024 9.90  IU/ml Final    QFT Final Interpretation 02/27/2024 Negative  Negative Final    No Interferon-gamma response to M. tuberculosis antigens detected.  Infection with M. tuberculosis is unlikely.  A single negative result does not exclude infection with M. tuberculosis. In patients at high risk for M. tuberculosis infection, a second test should be considered in accordance with the 2017 ATS/IDSA/CDC Clinical Practice Guidelines for Diagnosis of Tuberculosis in Adults and Children. False negative results can be a result of incorrect blood sample collection or handling of the specimen affecting lymphocyte function.    Ventricular Rate 02/27/2024 101  BPM Final    Atrial Rate 02/27/2024 101  BPM Final    NC Interval 02/27/2024 192  ms Final    QRSD Interval 02/27/2024 76  ms Final    QT Interval 02/27/2024 334  ms Final    QTC Interval 02/27/2024 433  ms Final    P Axis 02/27/2024 9  degrees Final    QRS Axis 02/27/2024 48  degrees Final    T Wave Axis 02/27/2024 34  degrees Final    SARS-CoV-2 03/04/2024 Negative  Negative Final       -----------------------------------    Medications Risks/Benefits:      Risks, Benefits And Possible Side Effects Of Medications:    Risks, benefits, and possible side effects of medications explained  to Marlin and she verbalizes understanding and agreement for treatment. including:   PARQ was completed for Depakote/valproate including GI distress, tremor, weight gain, risk of hepatotoxicity, blood dyscrasias/bone marrow effects, SIADH, pancreatitis, worsened depression/suicidality, and need for blood testing and monitoring. For females, we also discussed andrenergic effects, PCOS, and teratogenicity if of childbearing age as well. Current liver function is normal based on available labs.  PARQ was completed for gabapentin including sedation, dizziness, tremor, GI upset, potential for weight gain, and rare suicidal thinking.  PARQ was completed for the class of SNRIs including GI distress, headache, insomnia or sedation, sexual side effects, bleeding risk, weight gain, dose-related blood pressure changes, palpitations; and rare suicidal thoughts in patients under 25 years old, serotonin syndrome, liver dysfunction, and induction of tim. Potential for discontinuation syndrome (flu-like symptoms, insomnia, nausea, sensory disturbances, and headache) was also discussed.   PARQ was completed for second generation antipsychotic medication including sedation, GI distress, dizziness, risk of metabolic syndrome, EPS (akathisia, TD, etc), rare NMS, orthostatic hypotension, cardiovascular risks such as QT prolongation, increased prolactin, and others.  Psycho-education regarding antipsychotic medication indications, benefits, risks, potential side effects including risk for EPS/ TD/ NMS and metabolic side effects, and alternative options provided to the patient, and the importance of the compliance with psychiatric treatment reiterated. Need for regular medical follow up and blood work monitoring were encouraged as well as healthy diet, adequate hydration and exercise.   The patient was receptive to the information and verbalized understanding and agreed to the proposed regimen.          Treatment Plan:  The Treatment  Plan was not completed due to: Intensive intake requiring the entire session to gather all relevant information. If done today, the next plan will be due May 24, 2025.     The following interventions are recommended: return in 2 weeks for follow up, continue psychotherapy, and contracts for safety at present - agrees to call Crisis Intervention Service or go to ED if feeling unsafe. Although patient's acute lethality risk is LOW, long-term/chronic lethality risk is mildly elevated given the suicide risk factors noted above. However, at the current moment, Sudhakar is future-oriented, forward-thinking, and demonstrates ability to act in a self-preserving manner as evidenced by volitionally seeking psychiatric evaluation and treatment today. To mitigate future risk, patient should adhere to treatment recommendations, avoid alcohol/illicit substance use, utilize community-based resources and familiar support, and prioritize mental health treatment.          Controlled Medication Discussion:   Not applicable - controlled prescriptions are not prescribed by this practice  PDMP Review         Value Time User    PDMP Reviewed  Yes 5/2/2024 11:34 AM Rossi Kiser PA-C            Suicide/Homicide Risk Assessment:    The following interventions are recommended: contracts for safety at present - agrees to go to ED if feeling unsafe. Although patient's acute lethality risk is low, long-term/chronic lethality risk is mildly elevated in the presence of mood instability, history of psychosis, depression, medication side effects, history of medication noncompliance and treatment noncompliance. At the current moment, Sudhakar is future-oriented, forward-thinking, and demonstrates ability to act in a self-preserving manner as evidenced by volitionally presenting to the clinic today, seeking treatment. At this juncture, inpatient hospitalization is not currently warranted. To mitigate future risk, patient should adhere to the  recommendations of this writer, avoid alcohol/illicit substance use, utilize community-based resources and familiar support and prioritize mental health treatment. Based on today's assessment and clinical criteria, Sudhakar Choe contracts for safety and is not an imminent risk of harm to self or others. Outpatient level of care is deemed appropriate at this present time. Sudhakar understands that if they are no longer able to contract for safety, they need to call/contact the outpatient office including this writer, call/contact crisis and/orattend to the nearest Emergency Department for immediate evaluation.    Presently, patient denies suicidal/homicidal ideation in addition to thoughts of self-injury; contracts for safety, see below for risk assessment.  At conclusion of evaluation, patient is amenable to the recommendations of this writer including: starting/continuing/adjusting psychotropic medications as prescribed.  Also, patient is amenable to calling/contacting the outpatient office including this writer if any acute adverse effects of their medication regimen arise in addition to any comments or concerns pertaining to their psychiatric management.  Patient is amenable to calling/contacting crisis and/or attending to the nearest emergency department if their clinical condition deteriorates to assure their safety and stability, stating that they are able to appropriately confide in their supports regarding their psychiatric state.    -----------------------------------    Medical Decision Making / Counseling / Coordination of Care:  Recent stressors were discussed with the patient. The diagnosis and treatment plan were reviewed with the patient. Risks, benefits, and alternativies to treatment were discussed, including a myriad of potential adverse medication side effects, to which Sudhakar voiced understanding and consented fully to treatment. The importance of medication and treatment compliance was reviewed  with the patient. Individual psychotherapy provided: Supportive psychotherapy.     Note: This chart was completed utilizing speech software.  Grammatical errors, random word insertions, pronoun errors, and incomplete sentences are an occasional consequence of the system due to software limitation, ambient noise, and hardware issues.  Any formal questions or concerns about the context, text, or information contained within the body of this dictation should be directly addressed to the physician for clarification.    Solomon Mcintosh MD    This note was not shared with the patient due to reasonable likelihood of causing patient harm

## 2024-11-24 NOTE — BH CRISIS PLAN
***Client Name: Sudhakar Choe       Client YOB: 1965    [employer] Safety Plan      Creation Date: 11/21/24    Created By: Olga Lidia Dickerson       Step 1: Warning Signs:   Warning Signs   Sweating   Revert back to OCD ways   Shaking/trembling            Step 2: Internal Coping Strategies:   Internal Coping Strategies   Listen to music   Take a walk   Talk to someone on the phone            Step 3: People and social settings that provide distraction:   Name Contact Information   Rhea Bazan with    Places   Take a walk in the woods           Step 4: People whom I can ask for help during a crisis:      Name Contact Information    Rhea Bazan with      Step 5: Professionals or agencies I can contact during a crisis:      Clinican/Agency Name Phone Emergency Contact    [phone] Jen Dickerson      [facility] Emergency Department Emergency Department Phone Emergency Department Address    [place] [phone] [place]        Crisis Phone Numbers:   Suicide Prevention Lifeline: Call or Text  375 Crisis Text Line: Text HOME to 947-811   Please note: Some [facility] do not have a separate number for Child/Adolescent specific crisis. If your county is not listed under Child/Adolescent, please call the adult number for your county      Adult Crisis Numbers: Child/Adolescent Crisis Numbers   [facility]: [phone] [facility]: [phone]   [facility]: [phone] [facility]: [phone]   [facility]: [phone] [place], [place]: [phone]   [facility]: [phone] [place]: [phone]   [place]: [phone]   [facility]: [phone]   [facility]: [phone]   [facility]: [phone] (daytime) [phone] (after hours, weekends, holidays)      Step 6: Making the environment safer (plan for lethal means safety):   Patient did not identify any lethal methods: Yes     Optional: What is  most important to me and worth living for?   My family     [employer] Safety Plan. Martine Martinez and Dante Madsen. Used with permission of the authors.

## 2024-11-25 ENCOUNTER — OFFICE VISIT (OUTPATIENT)
Dept: PSYCHIATRY | Facility: CLINIC | Age: 59
End: 2024-11-25
Payer: MEDICARE

## 2024-11-25 DIAGNOSIS — Z13.29 SCREENING FOR ENDOCRINE, NUTRITIONAL, METABOLIC AND IMMUNITY DISORDER: ICD-10-CM

## 2024-11-25 DIAGNOSIS — E55.9 VITAMIN D DEFICIENCY: ICD-10-CM

## 2024-11-25 DIAGNOSIS — Z51.81 THERAPEUTIC DRUG MONITORING: ICD-10-CM

## 2024-11-25 DIAGNOSIS — Z13.21 SCREENING FOR ENDOCRINE, NUTRITIONAL, METABOLIC AND IMMUNITY DISORDER: ICD-10-CM

## 2024-11-25 DIAGNOSIS — F29 PSYCHOSIS, UNSPECIFIED PSYCHOSIS TYPE (HCC): ICD-10-CM

## 2024-11-25 DIAGNOSIS — G47.00 INSOMNIA, UNSPECIFIED TYPE: ICD-10-CM

## 2024-11-25 DIAGNOSIS — K59.09 OTHER CONSTIPATION: ICD-10-CM

## 2024-11-25 DIAGNOSIS — Z13.0 SCREENING FOR ENDOCRINE, NUTRITIONAL, METABOLIC AND IMMUNITY DISORDER: ICD-10-CM

## 2024-11-25 DIAGNOSIS — K11.7 DROOLING: ICD-10-CM

## 2024-11-25 DIAGNOSIS — Z13.228 SCREENING FOR ENDOCRINE, NUTRITIONAL, METABOLIC AND IMMUNITY DISORDER: ICD-10-CM

## 2024-11-25 DIAGNOSIS — F39 UNSPECIFIED MOOD (AFFECTIVE) DISORDER (HCC): Primary | Chronic | ICD-10-CM

## 2024-11-25 PROCEDURE — 90792 PSYCH DIAG EVAL W/MED SRVCS: CPT | Performed by: PSYCHIATRY & NEUROLOGY

## 2024-11-25 RX ORDER — DOCUSATE SODIUM 100 MG/1
100 CAPSULE, LIQUID FILLED ORAL 2 TIMES DAILY
Qty: 60 CAPSULE | Refills: 1 | Status: SHIPPED | OUTPATIENT
Start: 2024-11-25 | End: 2025-01-24

## 2024-11-25 RX ORDER — GABAPENTIN 100 MG/1
200 CAPSULE ORAL
Qty: 60 CAPSULE | Refills: 2 | Status: SHIPPED | OUTPATIENT
Start: 2024-11-25

## 2024-11-25 RX ORDER — VENLAFAXINE HYDROCHLORIDE 75 MG/1
CAPSULE, EXTENDED RELEASE ORAL
Qty: 30 CAPSULE | Refills: 2 | Status: SHIPPED | OUTPATIENT
Start: 2024-11-25

## 2024-11-25 RX ORDER — DIVALPROEX SODIUM 500 MG/1
TABLET, FILM COATED, EXTENDED RELEASE ORAL
Qty: 60 TABLET | Refills: 2 | Status: SHIPPED | OUTPATIENT
Start: 2024-11-25

## 2024-11-25 RX ORDER — ATROPINE SULFATE 10 MG/ML
SOLUTION/ DROPS OPHTHALMIC
Qty: 4.5 ML | Refills: 2 | Status: SHIPPED | OUTPATIENT
Start: 2024-11-25

## 2024-11-25 RX ORDER — VENLAFAXINE HYDROCHLORIDE 37.5 MG/1
CAPSULE, EXTENDED RELEASE ORAL
Qty: 30 CAPSULE | Refills: 2 | Status: SHIPPED | OUTPATIENT
Start: 2024-11-25

## 2024-11-25 RX ORDER — FLUPHENAZINE HYDROCHLORIDE 1 MG/1
1 TABLET ORAL
Qty: 30 TABLET | Refills: 2 | Status: SHIPPED | OUTPATIENT
Start: 2024-11-25 | End: 2024-11-26

## 2024-11-25 RX ORDER — DIVALPROEX SODIUM 250 MG/1
TABLET, FILM COATED, EXTENDED RELEASE ORAL
Qty: 30 TABLET | Refills: 2 | Status: SHIPPED | OUTPATIENT
Start: 2024-11-25

## 2024-11-25 NOTE — BH CRISIS PLAN
***Client Name: Sudhakar Choe       Client YOB: 1965    [facility] Safety Plan      Creation Date: 11/25/24 Update Date: 11/25/24   Created By: Solomon Mcintosh MD Last Updated By: Solomon Mcintosh MD      Step 1: Warning Signs:   Warning Signs   Sweating   Revert back to OCD ways   Shaking/trembling            Step 2: Internal Coping Strategies:   Internal Coping Strategies   Listen to music   Take a walk   Talk to someone on the phone            Step 3: People and social settings that provide distraction:   Name Contact Information   Rhea Bazan with    Places   Take a walk in the woods           Step 4: People whom I can ask for help during a crisis:      Name Contact Information    Rhea Lives with      Step 5: Professionals or agencies I can contact during a crisis:      Clinican/Agency Name Phone Emergency Contact    [phone] Jen Dickerson      [facility] Emergency Department Emergency Department Phone Emergency Department Address    [place] [phone] [place]        Crisis Phone Numbers:   Suicide Prevention Lifeline: Call or Text  440 Crisis Text Line: Text HOME to 804-717   Please note: Some [facility] do not have a separate number for Child/Adolescent specific crisis. If your county is not listed under Child/Adolescent, please call the adult number for your county      Adult Crisis Numbers: Child/Adolescent Crisis Numbers   [facility]: [phone] [facility]: [phone]   [facility]: [phone] [facility]: [phone]   [facility]: [phone] [place], [place]: [phone]   [facility]: [phone] [place]: [phone]   [place] [facility]: [phone]   [facility]: [phone]   [facility]: [phone]   [facility]: [phone] (daytime) [phone] (after hours, weekends, holidays)      Step 6: Making the environment safer (plan for lethal means safety):   Patient did  not identify any lethal methods: Yes     Optional: What is most important to me and worth living for?   My family     [facility] Safety Plan. Martine Martinez and Dante Madsen. Used with permission of the authors.

## 2024-11-26 DIAGNOSIS — F29 PSYCHOSIS, UNSPECIFIED PSYCHOSIS TYPE (HCC): ICD-10-CM

## 2024-11-26 DIAGNOSIS — F39 UNSPECIFIED MOOD (AFFECTIVE) DISORDER (HCC): Chronic | ICD-10-CM

## 2024-11-26 RX ORDER — FLUPHENAZINE HYDROCHLORIDE 1 MG/1
1 TABLET ORAL
Qty: 90 TABLET | Refills: 1 | Status: SHIPPED | OUTPATIENT
Start: 2024-11-26

## 2024-11-26 NOTE — PATIENT INSTRUCTIONS
"Complete your valproic acid level and routine laboratory test prior to follow-up visit  Continue with her medications regularly as prescribed  If worsening of tremor go to the ED or reach out to Dr. Mcintosh to discuss  Please call the office nursing staff for medication issues including refills, problems getting medications, bothersome side effects, etc., at [phone].     Please return for a follow up appointment as discussed and arrive approximately 15 minutes prior to your appointment time. If you are running late or are unable to attend your appointment, please call our [place] office at [phone] (fax: [phone]), or if you were seen in the [facility] office, please call [phone] (fax [phone]).      Recommendations regarding insomnia:  - Keep a consistent sleep schedule. Get up at the same time every day, even on weekends or during vacations.  - Set a bedtime that is early enough for you to get at least 7-8 hours of sleep.  - Don’t go to bed unless you are sleepy.  - If you don’t fall asleep after 20 minutes, get out of bed and take a brief \"break\". Go to a quiet activity without a lot of light exposure. It is especially important to not get on electronics. During this \"break,\" you might consider sitting in a chair and reading a boring book or listening to soft music, until your eyelids start to feel heavy.  Then, would go back to sleep and try again.    - Establish a relaxing bedtime routine.  - Make your bedroom quiet and relaxing. Keep the room at a comfortable, cool temperature.  - Limit exposure to bright light in the evenings.  - Turn off electronic devices at least 30 minutes before bedtime.  - Avoid watching stressful or suspenseful TV programs right before bedtime.  - Don’t eat a large meal before bedtime. If you are hungry at night, eat a light, healthy snack.  - Exercise regularly and maintain a healthy diet.  Participate in regular physical activities like exercise, " "although avoid approximately 3-4 hours prior to bed.  Morning exercise is ideal.  - Avoid consuming caffeine in the afternoon or evening.  - Avoid consuming alcohol before bedtime.  - Reduce your fluid intake before bedtime.  - Avoid daytime napping.  - Consider reading \"No More Sleepless nights\" by Jet Locke, Ph.D.  - Consider use of online resources including:  - http://[employer] ([employer])/cbt-online-insomnia-treatment.html  - http://www.[employer]ink  - CBT-I  Lele on your Smart Phone.  - Go! To Sleep by the [facility].    Look up \"grounding techniques\" and/or \"anchoring demonstration\" online and try a few to see what may work for you. Practice these skills before you need them, when you are not feeling too anxious or triggered. You can also search for free guided meditation videos online to help improve your head space when you are feeling very anxious or triggered.    Follow these tips to establish healthy sleep habits:          Healthy Diet   The American Heart Association and the American College of Cardiology have long recommended a healthy diet for not only patients who are at risk for atherosclerotic cardiovascular disease (ASCVD) but also the general public. In keeping with this evidence-based recommendation, the \"2018 Guideline on the Management of Blood Cholesterol\" stresses that a healthy diet should include adequate intake of these essentials:   Vegetables, fruits, and whole grains   Legumes and nuts   Low-fat dairy products   Low-fat poultry (without the skin)   Fish and seafood   Nontropical vegetable oils     The recent guidelines do provide room for cultural food preferences in a healthy diet, but in general, all patients should limit their intake of saturated and avoid all trans fats, sweets, sugar-sweetened beverages, and red meats.  Please maintain adequate hydration of at least 2 Liters of water per day, and improve nutrition by decreasing portion sizes, avoiding fried foods, fast foods, " inappropriate snacking and overly processed and packaged items with 'added sugars' (whether in drinks or foods), and observing nutritional facts information on any items that are packaged to reduce intake of saturated fats and AVOID trans fats as mentioned above. Again, consume whole foods such as vegetables, higher lean protein intake, and using healthier lifestyle plans such as the Mediterranean diet with healthier fats such as those from seeds, nuts, and using organic extra virgin olive oil or avocado oil in lieu of processed vegetable oils or margarine.    Physical Activity   Recommended DAILY exercise for at least 150 minutes of moderate exercise weekly!  In addition to a healthy diet, all patients should include regular physical activity in their weekly routines, at moderate to vigorous intensity. Any activity is better than nothing, so if your patients can't meet the recommendation of vigorous activity, moderate-intensity activity can still help them reduce their risk of ASCVD.     Below are the American Heart Association's recommendations for physical activity per week (preferably spread throughout the week):     For Overall Cardiovascular Health and Lowering Cholesterol   At least 150 minutes of moderate-intensity physical activity (for example, 30 minutes, 5 days a week), or   At least 75 minutes of vigorous-intensity physical activity (for example, 25 minutes, 3 days a week); or   A combination of moderate- and vigorous-intensity aerobic activity, and   At least 2 days of moderate- to high-intensity muscle-strengthening activities (such as resistance weight training) for additional health benefits    If you have thoughts of harming yourself or are otherwise in psychological crisis, do not hesitate to contact your County Crisis hotline, or 911 or go to the nearest emergency room.  Adult Crisis Numbers  Suicide Prevention Hotline - Dial 9-8-8  [facility]: [phone] or [phone]  [facility]:  [phone]  [place]: [phone]  [facility]: [phone]  [place]: [phone] or [phone]  [facility]: [phone]  [facility]: [phone] or [facility]: [phone]  [facility]: [phone] (daytime).       [phone] (after hours, weekends, holidays)     Child/Adolescent Crisis Numbers   [facility]: [phone]   [facility]: [phone]   [place]: [phone]   [facility]: [phone]  Please note: Some [facility] do not have a separate number for Child/Adolescent specific crisis. If your county is not listed under Child/Adolescent, please call the adult number for your county     National Talk to Text Line   [employer] - 150-441    National Suicide Prevention Hotline: 1-569.491.2361 or call 529.

## 2024-11-27 NOTE — TELEPHONE ENCOUNTER
Patient requested refill of :  Requested Prescriptions     Pending Prescriptions Disp Refills    fluPHENAZine (PROLIXIN) 1 mg tablet [Pharmacy Med Name: FLUPHENAZINE 1 MG TABLET] 90 tablet 1     Sig: TAKE ONE TABLET BY MOUTH DAILY AT BEDTIME         Refill request approved and sent to pharmacy on file per patient request.     Solomon Mcintosh MD

## 2024-12-04 ENCOUNTER — APPOINTMENT (OUTPATIENT)
Dept: LAB | Facility: IMAGING CENTER | Age: 59
End: 2024-12-04
Payer: MEDICARE

## 2024-12-04 DIAGNOSIS — Z51.81 THERAPEUTIC DRUG MONITORING: ICD-10-CM

## 2024-12-04 DIAGNOSIS — Z13.0 SCREENING FOR ENDOCRINE, NUTRITIONAL, METABOLIC AND IMMUNITY DISORDER: ICD-10-CM

## 2024-12-04 DIAGNOSIS — Z13.228 SCREENING FOR ENDOCRINE, NUTRITIONAL, METABOLIC AND IMMUNITY DISORDER: ICD-10-CM

## 2024-12-04 DIAGNOSIS — Z13.21 SCREENING FOR ENDOCRINE, NUTRITIONAL, METABOLIC AND IMMUNITY DISORDER: ICD-10-CM

## 2024-12-04 DIAGNOSIS — I95.9 HYPOTENSION, UNSPECIFIED HYPOTENSION TYPE: ICD-10-CM

## 2024-12-04 DIAGNOSIS — Z11.59 NEED FOR HEPATITIS C SCREENING TEST: ICD-10-CM

## 2024-12-04 DIAGNOSIS — E55.9 VITAMIN D DEFICIENCY: ICD-10-CM

## 2024-12-04 DIAGNOSIS — Z13.29 SCREENING FOR ENDOCRINE, NUTRITIONAL, METABOLIC AND IMMUNITY DISORDER: ICD-10-CM

## 2024-12-04 LAB
25(OH)D3 SERPL-MCNC: 54.4 NG/ML (ref 30–100)
ALBUMIN SERPL BCG-MCNC: 4.2 G/DL (ref 3.5–5)
ALP SERPL-CCNC: 60 U/L (ref 34–104)
ALT SERPL W P-5'-P-CCNC: 20 U/L (ref 7–52)
ANION GAP SERPL CALCULATED.3IONS-SCNC: 8 MMOL/L (ref 4–13)
AST SERPL W P-5'-P-CCNC: 20 U/L (ref 13–39)
BASOPHILS # BLD AUTO: 0.03 THOUSANDS/ÂΜL (ref 0–0.1)
BASOPHILS NFR BLD AUTO: 0 % (ref 0–1)
BILIRUB SERPL-MCNC: 0.46 MG/DL (ref 0.2–1)
BUN SERPL-MCNC: 7 MG/DL (ref 5–25)
CALCIUM SERPL-MCNC: 9.5 MG/DL (ref 8.4–10.2)
CHLORIDE SERPL-SCNC: 101 MMOL/L (ref 96–108)
CHOLEST SERPL-MCNC: 163 MG/DL (ref ?–200)
CO2 SERPL-SCNC: 32 MMOL/L (ref 21–32)
CREAT SERPL-MCNC: 0.82 MG/DL (ref 0.6–1.3)
EOSINOPHIL # BLD AUTO: 0.15 THOUSAND/ÂΜL (ref 0–0.61)
EOSINOPHIL NFR BLD AUTO: 2 % (ref 0–6)
ERYTHROCYTE [DISTWIDTH] IN BLOOD BY AUTOMATED COUNT: 13.3 % (ref 11.6–15.1)
EST. AVERAGE GLUCOSE BLD GHB EST-MCNC: 111 MG/DL
GFR SERPL CREATININE-BSD FRML MDRD: 96 ML/MIN/1.73SQ M
GLUCOSE P FAST SERPL-MCNC: 97 MG/DL (ref 65–99)
HBA1C MFR BLD: 5.5 %
HCT VFR BLD AUTO: 45.7 % (ref 36.5–49.3)
HDLC SERPL-MCNC: 36 MG/DL
HGB BLD-MCNC: 14.9 G/DL (ref 12–17)
IMM GRANULOCYTES # BLD AUTO: 0.03 THOUSAND/UL (ref 0–0.2)
IMM GRANULOCYTES NFR BLD AUTO: 0 % (ref 0–2)
LDLC SERPL CALC-MCNC: 107 MG/DL (ref 0–100)
LYMPHOCYTES # BLD AUTO: 2.82 THOUSANDS/ÂΜL (ref 0.6–4.47)
LYMPHOCYTES NFR BLD AUTO: 41 % (ref 14–44)
MCH RBC QN AUTO: 32.2 PG (ref 26.8–34.3)
MCHC RBC AUTO-ENTMCNC: 32.6 G/DL (ref 31.4–37.4)
MCV RBC AUTO: 99 FL (ref 82–98)
MONOCYTES # BLD AUTO: 0.81 THOUSAND/ÂΜL (ref 0.17–1.22)
MONOCYTES NFR BLD AUTO: 12 % (ref 4–12)
NEUTROPHILS # BLD AUTO: 3.05 THOUSANDS/ÂΜL (ref 1.85–7.62)
NEUTS SEG NFR BLD AUTO: 45 % (ref 43–75)
NONHDLC SERPL-MCNC: 127 MG/DL
NRBC BLD AUTO-RTO: 0 /100 WBCS
PLATELET # BLD AUTO: 232 THOUSANDS/UL (ref 149–390)
PMV BLD AUTO: 11.3 FL (ref 8.9–12.7)
POTASSIUM SERPL-SCNC: 4.7 MMOL/L (ref 3.5–5.3)
PROT SERPL-MCNC: 7.6 G/DL (ref 6.4–8.4)
RBC # BLD AUTO: 4.63 MILLION/UL (ref 3.88–5.62)
SODIUM SERPL-SCNC: 141 MMOL/L (ref 135–147)
TRIGL SERPL-MCNC: 98 MG/DL (ref ?–150)
TSH SERPL DL<=0.05 MIU/L-ACNC: 2.4 UIU/ML (ref 0.45–4.5)
VALPROATE SERPL-MCNC: 76 UG/ML (ref 50–100)
WBC # BLD AUTO: 6.89 THOUSAND/UL (ref 4.31–10.16)

## 2024-12-04 PROCEDURE — 80053 COMPREHEN METABOLIC PANEL: CPT

## 2024-12-04 PROCEDURE — 86803 HEPATITIS C AB TEST: CPT

## 2024-12-04 PROCEDURE — 83036 HEMOGLOBIN GLYCOSYLATED A1C: CPT

## 2024-12-04 PROCEDURE — 80061 LIPID PANEL: CPT

## 2024-12-04 PROCEDURE — 85025 COMPLETE CBC W/AUTO DIFF WBC: CPT

## 2024-12-04 PROCEDURE — 36415 COLL VENOUS BLD VENIPUNCTURE: CPT

## 2024-12-04 PROCEDURE — 82306 VITAMIN D 25 HYDROXY: CPT

## 2024-12-04 PROCEDURE — 84443 ASSAY THYROID STIM HORMONE: CPT

## 2024-12-04 PROCEDURE — 80164 ASSAY DIPROPYLACETIC ACD TOT: CPT

## 2024-12-05 LAB — HCV AB SER QL: NORMAL

## 2024-12-08 ENCOUNTER — RESULTS FOLLOW-UP (OUTPATIENT)
Dept: FAMILY MEDICINE CLINIC | Facility: CLINIC | Age: 59
End: 2024-12-08

## 2024-12-09 ENCOUNTER — OFFICE VISIT (OUTPATIENT)
Dept: PSYCHIATRY | Facility: CLINIC | Age: 59
End: 2024-12-09
Payer: MEDICARE

## 2024-12-09 DIAGNOSIS — K11.7 DROOLING: ICD-10-CM

## 2024-12-09 DIAGNOSIS — G25.9 MOVEMENT DISORDER: ICD-10-CM

## 2024-12-09 DIAGNOSIS — G47.00 INSOMNIA, UNSPECIFIED TYPE: ICD-10-CM

## 2024-12-09 DIAGNOSIS — F29 PSYCHOSIS, UNSPECIFIED PSYCHOSIS TYPE (HCC): ICD-10-CM

## 2024-12-09 DIAGNOSIS — F39 UNSPECIFIED MOOD (AFFECTIVE) DISORDER (HCC): Primary | ICD-10-CM

## 2024-12-09 DIAGNOSIS — K59.09 OTHER CONSTIPATION: ICD-10-CM

## 2024-12-09 PROCEDURE — 99214 OFFICE O/P EST MOD 30 MIN: CPT | Performed by: PSYCHIATRY & NEUROLOGY

## 2024-12-09 PROCEDURE — 90833 PSYTX W PT W E/M 30 MIN: CPT | Performed by: PSYCHIATRY & NEUROLOGY

## 2024-12-09 RX ORDER — VENLAFAXINE HYDROCHLORIDE 75 MG/1
CAPSULE, EXTENDED RELEASE ORAL
Qty: 30 CAPSULE | Refills: 2 | Status: SHIPPED | OUTPATIENT
Start: 2024-12-09

## 2024-12-09 RX ORDER — BENZTROPINE MESYLATE 0.5 MG/1
0.5 TABLET ORAL
Qty: 90 TABLET | Refills: 0 | Status: SHIPPED | OUTPATIENT
Start: 2024-12-09

## 2024-12-09 RX ORDER — ATROPINE SULFATE 10 MG/ML
SOLUTION/ DROPS OPHTHALMIC
Qty: 4.5 ML | Refills: 2 | Status: SHIPPED | OUTPATIENT
Start: 2024-12-09

## 2024-12-09 RX ORDER — DIVALPROEX SODIUM 500 MG/1
TABLET, FILM COATED, EXTENDED RELEASE ORAL
Qty: 60 TABLET | Refills: 2 | Status: SHIPPED | OUTPATIENT
Start: 2024-12-09

## 2024-12-09 RX ORDER — FLUPHENAZINE HYDROCHLORIDE 1 MG/1
1 TABLET ORAL
Qty: 90 TABLET | Refills: 1 | Status: SHIPPED | OUTPATIENT
Start: 2024-12-09

## 2024-12-09 RX ORDER — DOCUSATE SODIUM 100 MG/1
100 CAPSULE, LIQUID FILLED ORAL 2 TIMES DAILY
Qty: 60 CAPSULE | Refills: 1 | Status: SHIPPED | OUTPATIENT
Start: 2024-12-09 | End: 2025-02-07

## 2024-12-09 RX ORDER — VENLAFAXINE HYDROCHLORIDE 37.5 MG/1
CAPSULE, EXTENDED RELEASE ORAL
Qty: 30 CAPSULE | Refills: 2 | Status: SHIPPED | OUTPATIENT
Start: 2024-12-09

## 2024-12-09 RX ORDER — DIVALPROEX SODIUM 250 MG/1
TABLET, FILM COATED, EXTENDED RELEASE ORAL
Qty: 30 TABLET | Refills: 2 | Status: SHIPPED | OUTPATIENT
Start: 2024-12-09

## 2024-12-09 RX ORDER — GABAPENTIN 100 MG/1
200 CAPSULE ORAL
Qty: 60 CAPSULE | Refills: 2 | Status: SHIPPED | OUTPATIENT
Start: 2024-12-09

## 2024-12-09 NOTE — PSYCH
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 history of sleep apnea on CPAP but denies current symptoms or CPAP use (last used years ago)    -------------------------------------------------------    This note was not shared with the patient due to reasonable likelihood of causing patient harm     Note: This chart was completed utilizing speech software.  Grammatical errors, random word insertions, pronoun errors, and incomplete sentences are an occasional consequence of the system due to software limitation, ambient noise, and hardware issues.  Any formal questions or concerns about the context, text, or information contained within the body of this dictation should be directly addressed to the physician for clarification.    Solomon Mcintosh MD

## 2024-12-18 DIAGNOSIS — N40.0 BPH (BENIGN PROSTATIC HYPERPLASIA): ICD-10-CM

## 2024-12-18 DIAGNOSIS — Z00.8 MEDICAL CLEARANCE FOR PSYCHIATRIC ADMISSION: ICD-10-CM

## 2024-12-18 NOTE — TELEPHONE ENCOUNTER
Reason for call:   [x] Refill   [] Prior Auth  [] Other:     Office:   [x] PCP/Provider -  Osvaldo Soler DO   [] Specialty/Provider -     Medication: metFORMIN     Dose/Frequency: 1 tab BID    Quantity: 60 tabs    Pharmacy: [facility] #[id number] - [address]      Does the patient have enough for 3 days?   [x] Yes   [] No - Send as HP to POD

## 2024-12-19 DIAGNOSIS — Z00.8 MEDICAL CLEARANCE FOR PSYCHIATRIC ADMISSION: ICD-10-CM

## 2024-12-19 NOTE — TELEPHONE ENCOUNTER
metFORMIN (GLUCOPHAGE) 500 mg tablet         Sig: Take 1 tablet (500 mg total) by mouth 2 (two) times a day with meals    Disp: 60 tablet    Refills: 0    Start: 12/18/2024 - 2/16/2025    Class: Normal    For: Medical clearance for psychiatric admission    Last ordered: 2 months ago (9/25/2024) by Osvaldo Soler DO    Endocrinology:  Diabetes - Biguanides [id number]/18/2024 10:32 AM   Protocol Details HBA1C within 180 days    eGFR is 60 or above and within 360 days    Valid encounter within last 6 months      To be filled at: [facility] #[id number] - [place], [place] - [address]

## 2024-12-24 ENCOUNTER — TELEPHONE (OUTPATIENT)
Dept: FAMILY MEDICINE CLINIC | Facility: CLINIC | Age: 59
End: 2024-12-24

## 2024-12-24 NOTE — TELEPHONE ENCOUNTER
[place] fax from [facility] placed on providers desk  can take 10-14 business day to be completed once completed chart will be updated

## 2024-12-29 ENCOUNTER — TELEPHONE (OUTPATIENT)
Dept: OTHER | Facility: OTHER | Age: 59
End: 2024-12-29

## 2024-12-30 NOTE — TELEPHONE ENCOUNTER
Patient called in to reschedule their medication management appointment.    Patient is now scheduled on 1/13/25 @9am

## 2025-01-02 NOTE — TELEPHONE ENCOUNTER
Pt called asking for a status of this form.    Pt advised that this form can take 10-14 business days for completion.    Pt understood.     Pt requesting a c/b when form is completed -   [phone]   Ok to [facility] if no answer.

## 2025-01-09 NOTE — PSYCH
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 evaluation and treatment today  To mitigate future risk, patient should adhere to treatment recommendations, avoid alcohol/illicit substance use, utilize community-based resources and familiar support, and prioritize mental health treatment  The diagnosis and treatment plan were reviewed with the patient   Risks, benefits, and alternatives to treatment were discussed  The importance of medication and treatment compliance was reviewed with the patient  Individual supportive psychotherapy was provided for 22 minutes and included processing of stressors such as Coping strategies, mindfulness techniques, meditation, lifestyle modifications including adequate physical activity, nutrition, and hydration, importance of psychotherapy, importance of compliance with treatment plan      Kiel Nagel DO

## 2025-01-11 ENCOUNTER — APPOINTMENT (OUTPATIENT)
Dept: RADIOLOGY | Age: 60
End: 2025-01-11
Payer: MEDICARE

## 2025-01-11 ENCOUNTER — OFFICE VISIT (OUTPATIENT)
Dept: OBGYN CLINIC | Facility: CLINIC | Age: 60
End: 2025-01-11
Payer: MEDICARE

## 2025-01-11 VITALS — BODY MASS INDEX: 31.32 KG/M2 | HEIGHT: 74 IN | WEIGHT: 244 LBS

## 2025-01-11 DIAGNOSIS — M79.671 PAIN IN RIGHT FOOT: Primary | ICD-10-CM

## 2025-01-11 DIAGNOSIS — M79.671 PAIN IN RIGHT FOOT: ICD-10-CM

## 2025-01-11 DIAGNOSIS — S92.324A CLOSED NONDISPLACED FRACTURE OF SECOND METATARSAL BONE OF RIGHT FOOT, INITIAL ENCOUNTER: ICD-10-CM

## 2025-01-11 PROCEDURE — 99213 OFFICE O/P EST LOW 20 MIN: CPT | Performed by: PHYSICIAN ASSISTANT

## 2025-01-11 PROCEDURE — 73630 X-RAY EXAM OF FOOT: CPT
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
## 2025-01-11 NOTE — PATIENT INSTRUCTIONS
Patient Education     Walking Boot   General   A walking boot is sometimes used after a foot or ankle injury. This allows you to walk and supports your foot and ankle while you heal. The boot will go up to the middle of your lower leg or just below your knee. It has a hard frame and may have padding or air pockets to help it fit snugly. There are straps to hold the front part of the boot in place and keep the boot secure. The sole of the boot is slightly curved to get a rocker effect when walking.  Some boots keep your foot in one position only. Another type of boot has a moveable part at the ankle joint. It can be set to let your ankle move a little or not at all.     You may need a walking boot if you have:  A broken bone in your foot or ankle  A stress fracture  Plantar fasciitis  Tendonitis  Ankle sprain  Pain in your heel or the ball of your foot  An open sore on the foot  Had an ankle or foot surgery  What care is needed at home?   Ask your doctor what you need to do when you go home. Make sure you ask questions if you do not understand what the doctor says.  Prop your leg on pillows when you are not walking to help with swelling.  Ask your doctor:  When you may take your boot off and for how long  What exercises are OK for you to do  If you need to sleep in your boot  If you have an open area, learn how to care for it.  Use a sock or liner inside the boot. Change this each day.  When do I need to call the doctor?   You have any areas of redness or irritation under your boot  Pain or swelling gets worse  Toes are cold and pale  You have numbness or tingling in your foot or toes  Helpful tips   Use shoe protectors or plastic bags to keep the boot from getting wet in wet or snowy weather.  Learn how to clean your boot.  Teach Back: Helping You Understand   The Teach Back Method helps you understand the information we are giving you. After you talk with the staff, tell them in your own words what you learned.  This helps to make sure the staff has described each thing clearly. It also helps to explain things that may have been confusing. Before going home, make sure you can do these:  I can tell you about my condition.  I can tell you what may help ease my pain.  I can tell you what I will do if I have more pain or swelling.  Last Reviewed Date   2020-07-07  Consumer Information Use and Disclaimer   This generalized information is a limited summary of diagnosis, treatment, and/or medication information. It is not meant to be comprehensive and should be used as a tool to help the user understand and/or assess potential diagnostic and treatment options. It does NOT include all information about conditions, treatments, medications, side effects, or risks that may apply to a specific patient. It is not intended to be medical advice or a substitute for the medical advice, diagnosis, or treatment of a health care provider based on the health care provider's examination and assessment of a patient’s specific and unique circumstances. Patients must speak with a health care provider for complete information about their health, medical questions, and treatment options, including any risks or benefits regarding use of medications. This information does not endorse any treatments or medications as safe, effective, or approved for treating a specific patient. [employer] and its affiliates disclaim any warranty or liability relating to this information or the use thereof. The use of this information is governed by the Terms of Use, available at https://www.[employer].com/en/know/clinical-effectiveness-terms   Copyright   Copyright © 2024 [employer] and its affiliates and/or licensors. All rights reserved.

## 2025-01-13 ENCOUNTER — OFFICE VISIT (OUTPATIENT)
Dept: PSYCHIATRY | Facility: CLINIC | Age: 60
End: 2025-01-13
Payer: MEDICARE

## 2025-01-13 VITALS — SYSTOLIC BLOOD PRESSURE: 112 MMHG | DIASTOLIC BLOOD PRESSURE: 77 MMHG

## 2025-01-13 DIAGNOSIS — F39 UNSPECIFIED MOOD (AFFECTIVE) DISORDER (HCC): Primary | ICD-10-CM

## 2025-01-13 DIAGNOSIS — K11.7 DROOLING: ICD-10-CM

## 2025-01-13 DIAGNOSIS — G25.9 MOVEMENT DISORDER: ICD-10-CM

## 2025-01-13 DIAGNOSIS — F29 PSYCHOSIS, UNSPECIFIED PSYCHOSIS TYPE (HCC): ICD-10-CM

## 2025-01-13 DIAGNOSIS — G47.00 INSOMNIA, UNSPECIFIED TYPE: ICD-10-CM

## 2025-01-13 DIAGNOSIS — F39 UNSPECIFIED MOOD (AFFECTIVE) DISORDER (HCC): ICD-10-CM

## 2025-01-13 PROCEDURE — 99214 OFFICE O/P EST MOD 30 MIN: CPT | Performed by: PSYCHIATRY & NEUROLOGY

## 2025-01-13 PROCEDURE — 90833 PSYTX W PT W E/M 30 MIN: CPT | Performed by: PSYCHIATRY & NEUROLOGY

## 2025-01-13 RX ORDER — GABAPENTIN 100 MG/1
200 CAPSULE ORAL
Qty: 60 CAPSULE | Refills: 2 | Status: SHIPPED | OUTPATIENT
Start: 2025-01-13

## 2025-01-13 RX ORDER — FLUPHENAZINE HYDROCHLORIDE 1 MG/1
1 TABLET ORAL
Qty: 30 TABLET | Refills: 2 | Status: SHIPPED | OUTPATIENT
Start: 2025-01-13

## 2025-01-13 RX ORDER — VENLAFAXINE HYDROCHLORIDE 75 MG/1
CAPSULE, EXTENDED RELEASE ORAL
Qty: 30 CAPSULE | Refills: 2 | Status: SHIPPED | OUTPATIENT
Start: 2025-01-13

## 2025-01-13 RX ORDER — ATROPINE SULFATE 10 MG/ML
SOLUTION/ DROPS OPHTHALMIC
Qty: 4.5 ML | Refills: 2 | Status: SHIPPED | OUTPATIENT
Start: 2025-01-13

## 2025-01-13 RX ORDER — DIVALPROEX SODIUM 250 MG/1
TABLET, FILM COATED, EXTENDED RELEASE ORAL
Qty: 30 TABLET | Refills: 2 | Status: SHIPPED | OUTPATIENT
Start: 2025-01-13 | End: 2025-01-15

## 2025-01-13 RX ORDER — BENZTROPINE MESYLATE 0.5 MG/1
TABLET ORAL
Qty: 45 TABLET | Refills: 2 | Status: SHIPPED | OUTPATIENT
Start: 2025-01-13

## 2025-01-13 RX ORDER — VENLAFAXINE HYDROCHLORIDE 37.5 MG/1
CAPSULE, EXTENDED RELEASE ORAL
Qty: 30 CAPSULE | Refills: 2 | Status: SHIPPED | OUTPATIENT
Start: 2025-01-13

## 2025-01-13 RX ORDER — DIVALPROEX SODIUM 500 MG/1
TABLET, FILM COATED, EXTENDED RELEASE ORAL
Qty: 60 TABLET | Refills: 2 | Status: SHIPPED | OUTPATIENT
Start: 2025-01-13 | End: 2025-01-15

## 2025-01-13 NOTE — PSYCH
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 sleep apnea on CPAP but denies current symptoms or CPAP use (last used years ago)    -------------------------------------------------------  This note was not shared with the patient due to reasonable likelihood of causing patient harm    Note: This chart was completed utilizing speech software.  Grammatical errors, random word insertions, pronoun errors, and incomplete sentences are an occasional consequence of the system due to software limitation, ambient noise, and hardware issues.  Any formal questions or concerns about the context, text, or information contained within the body of this dictation should be directly addressed to the physician for clarification.    Solomon Mcintosh MD

## 2025-01-15 RX ORDER — DIVALPROEX SODIUM 500 MG/1
TABLET, FILM COATED, EXTENDED RELEASE ORAL
Qty: 180 TABLET | Refills: 2 | Status: SHIPPED | OUTPATIENT
Start: 2025-01-15

## 2025-01-15 RX ORDER — DIVALPROEX SODIUM 250 MG/1
TABLET, FILM COATED, EXTENDED RELEASE ORAL
Qty: 90 TABLET | Refills: 2 | Status: SHIPPED | OUTPATIENT
Start: 2025-01-15

## 2025-01-15 NOTE — TELEPHONE ENCOUNTER
Patient requested refill of :  Requested Prescriptions     Pending Prescriptions Disp Refills    divalproex sodium (DEPAKOTE ER) 250 mg 24 hr tablet [Pharmacy Med Name: DIVALPROEX SOD  MG TAB] 90 tablet 2     Sig: TAKE ONE TABLET BY MOUTH DAILY IN ADDITION TO THE 2 OF THE 500MG TABS FOR TOTAL 1250 MG DAILY    divalproex sodium (DEPAKOTE ER) 500 mg 24 hr tablet [Pharmacy Med Name: DIVALPROEX SOD  MG TAB] 180 tablet 2     Sig: TAKE 2 TABLETS BY MOUTH DAILY ALONG WITH ONE 250MG TABLET FOR TOTAL OF 1250MG DAILY AT BEDTIME         Refill request approved and sent to pharmacy on file per patient request.     Solomon Mcintosh MD

## 2025-01-23 ENCOUNTER — SOCIAL WORK (OUTPATIENT)
Dept: BEHAVIORAL/MENTAL HEALTH CLINIC | Facility: CLINIC | Age: 60
End: 2025-01-23
Payer: MEDICARE

## 2025-01-23 DIAGNOSIS — F25.1 SCHIZOAFFECTIVE DISORDER, DEPRESSIVE TYPE (HCC): Primary | ICD-10-CM

## 2025-01-23 DIAGNOSIS — F42.2 MIXED OBSESSIONAL THOUGHTS AND ACTS: ICD-10-CM

## 2025-01-23 PROCEDURE — 90834 PSYTX W PT 45 MINUTES: CPT | Performed by: SOCIAL WORKER

## 2025-01-23 NOTE — PSYCH
"Behavioral Health Psychotherapy Progress Note    Psychotherapy Provided: Individual Psychotherapy     1. Schizoaffective disorder, depressive type (HCC)        2. Mixed obsessional thoughts and acts            Goals addressed in session: Goal 1     DATA: Sudhakar stated that he has been doing well overall. He stated that he has been taking his medication consistently and feels that his moods have been stable. He stated that he drive himself to this appointment. Discussing the freedom of being able to drive again and the sense of independence that it gives. Also discussing his focus on th home and on his relationships with Rhea. He stated that he feels that it has been going well and that this is attributed to his stabilization. Discussing his path forward and what he wants his focus to be. Discussing that he would like to volunteer at the [employer]. Discussing his progress and giving positive feedback and supportive therapy  During this session, this clinician used the following therapeutic modalities: Cognitive Behavioral Therapy and Supportive Psychotherapy    Substance Abuse was addressed during this session. If the client is diagnosed with a co-occurring substance use disorder, please indicate any changes in the frequency or amount of use: Sober. Stage of change for addressing substance use diagnoses: Maintenance    ASSESSMENT:  Sudhakar Choe presents with a Euthymic/ normal mood.     his affect is Normal range and intensity, which is congruent, with his mood and the content of the session. The client has made progress on their goals.    Continue to manage his moods appropriately Sudhakar Choe presents with a none risk of suicide, none risk of self-harm, and none risk of harm to others.    For any risk assessment that surpasses a \"low\" rating, a safety plan must be developed.    A safety plan was indicated: no  If yes, describe in detail N/A    PLAN: Between sessions, Sudhakar Choe will continue to " manage his moods appropriately. At the next session, the therapist will use Cognitive Behavioral Therapy and Supportive Psychotherapy to address treatment goals.    Behavioral Health Treatment Plan and Discharge Planning: Sudhakar Choe is aware of and agrees to continue to work on their treatment plan. They have identified and are working toward their discharge goals. yes    Depression Follow-up Plan Completed: No    Visit start and stop times:    01/23/25  Start Time: 1302  Stop Time: 1348  Total Visit Time: 46 minutes

## 2025-02-06 DIAGNOSIS — G25.9 MOVEMENT DISORDER: ICD-10-CM

## 2025-02-06 RX ORDER — BENZTROPINE MESYLATE 0.5 MG/1
TABLET ORAL
Qty: 150 TABLET | Refills: 2 | Status: SHIPPED | OUTPATIENT
Start: 2025-02-06 | End: 2025-02-10 | Stop reason: SDUPTHER

## 2025-02-07 NOTE — TELEPHONE ENCOUNTER
Patient requested refill of :  Requested Prescriptions     Pending Prescriptions Disp Refills    benztropine (COGENTIN) 0.5 mg tablet [Pharmacy Med Name: BENZTROPINE MES 0.5 MG TAB] 150 tablet 2     Sig: TAKE ONE ( 0.5 MG ) AND ONE HALF ( 0.25 MG ) TABLET BY MOUTH AT NIGHT         Refill request approved and sent to pharmacy on file per patient request.     Solomon Mcintosh MD
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
## 2025-02-10 ENCOUNTER — OFFICE VISIT (OUTPATIENT)
Dept: PSYCHIATRY | Facility: CLINIC | Age: 60
End: 2025-02-10
Payer: MEDICARE

## 2025-02-10 VITALS — BODY MASS INDEX: 29.4 KG/M2 | WEIGHT: 229 LBS

## 2025-02-10 DIAGNOSIS — K11.7 DROOLING: ICD-10-CM

## 2025-02-10 DIAGNOSIS — G25.9 MOVEMENT DISORDER: ICD-10-CM

## 2025-02-10 DIAGNOSIS — F29 PSYCHOSIS, UNSPECIFIED PSYCHOSIS TYPE (HCC): ICD-10-CM

## 2025-02-10 DIAGNOSIS — K59.09 OTHER CONSTIPATION: ICD-10-CM

## 2025-02-10 DIAGNOSIS — G47.00 INSOMNIA, UNSPECIFIED TYPE: ICD-10-CM

## 2025-02-10 DIAGNOSIS — F39 UNSPECIFIED MOOD (AFFECTIVE) DISORDER (HCC): Primary | ICD-10-CM

## 2025-02-10 PROCEDURE — 99214 OFFICE O/P EST MOD 30 MIN: CPT | Performed by: PSYCHIATRY & NEUROLOGY

## 2025-02-10 PROCEDURE — 90833 PSYTX W PT W E/M 30 MIN: CPT | Performed by: PSYCHIATRY & NEUROLOGY

## 2025-02-10 RX ORDER — ATROPINE SULFATE 10 MG/ML
SOLUTION/ DROPS OPHTHALMIC
Qty: 4.5 ML | Refills: 2 | Status: SHIPPED | OUTPATIENT
Start: 2025-02-10

## 2025-02-10 RX ORDER — VENLAFAXINE HYDROCHLORIDE 37.5 MG/1
CAPSULE, EXTENDED RELEASE ORAL
Qty: 30 CAPSULE | Refills: 2 | Status: SHIPPED | OUTPATIENT
Start: 2025-02-10

## 2025-02-10 RX ORDER — DIVALPROEX SODIUM 250 MG/1
TABLET, FILM COATED, EXTENDED RELEASE ORAL
Qty: 90 TABLET | Refills: 2 | Status: SHIPPED | OUTPATIENT
Start: 2025-02-10

## 2025-02-10 RX ORDER — FLUPHENAZINE HYDROCHLORIDE 1 MG/1
1 TABLET ORAL
Qty: 30 TABLET | Refills: 2 | Status: SHIPPED | OUTPATIENT
Start: 2025-02-10

## 2025-02-10 RX ORDER — DOCUSATE SODIUM 100 MG/1
100 CAPSULE, LIQUID FILLED ORAL 2 TIMES DAILY
Qty: 60 CAPSULE | Refills: 1 | Status: SHIPPED | OUTPATIENT
Start: 2025-02-10 | End: 2025-04-11

## 2025-02-10 RX ORDER — BENZTROPINE MESYLATE 0.5 MG/1
TABLET ORAL
Qty: 150 TABLET | Refills: 2 | Status: SHIPPED | OUTPATIENT
Start: 2025-02-10

## 2025-02-10 RX ORDER — VENLAFAXINE HYDROCHLORIDE 75 MG/1
CAPSULE, EXTENDED RELEASE ORAL
Qty: 30 CAPSULE | Refills: 2 | Status: SHIPPED | OUTPATIENT
Start: 2025-02-10

## 2025-02-10 RX ORDER — DIVALPROEX SODIUM 500 MG/1
TABLET, FILM COATED, EXTENDED RELEASE ORAL
Qty: 180 TABLET | Refills: 2 | Status: SHIPPED | OUTPATIENT
Start: 2025-02-10

## 2025-02-10 RX ORDER — GABAPENTIN 100 MG/1
200 CAPSULE ORAL
Qty: 60 CAPSULE | Refills: 2 | Status: SHIPPED | OUTPATIENT
Start: 2025-02-10

## 2025-02-10 NOTE — PATIENT INSTRUCTIONS
"    Please call the office nursing staff for medication issues including refills, problems getting medications, bothersome side effects, etc., at [phone].     Please return for a follow up appointment as discussed and arrive approximately 15 minutes prior to your appointment time. If you are running late or are unable to attend your appointment, please call our [place] office at [phone] (fax: [phone]), or if you were seen in the [facility] office, please call [phone] (fax [phone]).    National Suicide Prevention Hotline: 1-671.988.7619 or call 498.    To improve sleep:  - Keep a consistent sleep schedule. Get up at the same time every day, even on weekends or during vacations.  - Set a bedtime that is early enough for you to get at least 7-8 hours of sleep.  - Don’t go to bed unless you are sleepy.  - If you don’t fall asleep after 20 minutes, get out of bed and take a brief \"break\". Go to a quiet activity without a lot of light exposure. It is especially important to not get on electronics. During this \"break,\" you might consider sitting in a chair and reading a boring book or listening to soft music, until your eyelids start to feel heavy.  Then, would go back to sleep and try again.    - Establish a relaxing bedtime routine.  - Make your bedroom quiet and relaxing. Keep the room at a comfortable, cool temperature.  - Limit exposure to bright light in the evenings.  - Turn off electronic devices at least 30 minutes before bedtime.  - Avoid watching stressful or suspenseful TV programs right before bedtime.  - Don’t eat a large meal before bedtime. If you are hungry at night, eat a light, healthy snack.  - Exercise regularly and maintain a healthy diet.  Participate in regular physical activities like exercise, although avoid approximately 3-4 hours prior to bed.  Morning exercise is ideal.  - Avoid consuming caffeine in the afternoon or evening.  - Avoid consuming alcohol " "before bedtime.  - Reduce your fluid intake before bedtime.  - Avoid daytime napping.  - Consider reading \"No More Sleepless nights\" by Jet Locke, Ph.D.  - Consider use of online resources including:  - http://[employer]/cbt-online-insomnia-treatment.html  - http://www.[employer]  - CBT-I  Lele on your Smart Phone.  - Go! To Sleep by the [facility].    Look up \"grounding techniques\" and/or \"anchoring demonstration\" online and try a few to see what may work for you. Practice these skills before you need them, when you are not feeling too anxious or triggered. You can also search for free guided meditation videos online to help improve your head space when you are feeling very anxious or triggered.      Healthy Diet   The American Heart Association and the American College of Cardiology have long recommended a healthy diet for not only patients who are at risk for atherosclerotic cardiovascular disease (ASCVD) but also the general public. In keeping with this evidence-based recommendation, the \"2018 Guideline on the Management of Blood Cholesterol\" stresses that a healthy diet should include adequate intake of these essentials:   Vegetables, fruits, and whole grains   Legumes and nuts   Low-fat dairy products   Low-fat poultry (without the skin)   Fish and seafood   Nontropical vegetable oils     The recent guidelines do provide room for cultural food preferences in a healthy diet, but in general, all patients should limit their intake of saturated and avoid all trans fats, sweets, sugar-sweetened beverages, and red meats.  Please maintain adequate hydration of at least 2 Liters of water per day, and improve nutrition by decreasing portion sizes, avoiding fried foods, fast foods, inappropriate snacking and overly processed and packaged items with 'added sugars' (whether in drinks or foods), and observing nutritional facts information on any items that are packaged to reduce intake of saturated fats and " AVOID trans fats as mentioned above. Again, consume whole foods such as vegetables, higher lean protein intake, and using healthier lifestyle plans such as the Mediterranean diet with healthier fats such as those from seeds, nuts, and using organic extra virgin olive oil or avocado oil in lieu of processed vegetable oils or margarine.    Physical Activity   Recommended DAILY exercise for at least 150 minutes of moderate exercise weekly!  In addition to a healthy diet, all patients should include regular physical activity in their weekly routines, at moderate to vigorous intensity. Any activity is better than nothing, so if your patients can't meet the recommendation of vigorous activity, moderate-intensity activity can still help them reduce their risk of ASCVD.     Below are the American Heart Association's recommendations for physical activity per week (preferably spread throughout the week):     For Overall Cardiovascular Health and Lowering Cholesterol   At least 150 minutes of moderate-intensity physical activity (for example, 30 minutes, 5 days a week), or   At least 75 minutes of vigorous-intensity physical activity (for example, 25 minutes, 3 days a week); or   A combination of moderate- and vigorous-intensity aerobic activity, and   At least 2 days of moderate- to high-intensity muscle-strengthening activities (such as resistance weight training) for additional health benefits    If you have thoughts of harming yourself or are otherwise in psychological crisis, do not hesitate to contact your County Crisis hotline, or 911 or go to the nearest emergency room.  Adult Crisis Numbers  Suicide Prevention Hotline - Dial 9-8-8  [facility]: [phone] or [phone]  [facility]: [phone]  [facility]: [phone]  [facility]: [phone]  [place]: [phone] or [phone]  [facility]: [phone]  [facility]: [phone] or [facility] Crisis:  [phone]  [facility]: [phone] (daytime).       [phone] (after hours, weekends, holidays)     Child/Adolescent Crisis Numbers   [facility]: [phone]   [facility]: [phone]   [place]: [phone]   [place]: [phone]  Please note: Some [facility] do not have a separate number for Child/Adolescent specific crisis. If your county is not listed under Child/Adolescent, please call the adult number for your county     National Talk to Text Line   [place] - 390-322    National Suicide Prevention Hotline: 1-545.240.7735 or call 016.

## 2025-02-26 ENCOUNTER — NURSE TRIAGE (OUTPATIENT)
Dept: OTHER | Facility: OTHER | Age: 60
End: 2025-02-26

## 2025-02-26 ENCOUNTER — TELEPHONE (OUTPATIENT)
Dept: OTHER | Facility: OTHER | Age: 60
End: 2025-02-26

## 2025-02-27 NOTE — TELEPHONE ENCOUNTER
"Reason for Disposition  • Fever > 100.4 F (38.0 C)    Answer Assessment - Initial Assessment Questions  1. SYMPTOM: \"What's the main symptom you're concerned about?\" (e.g., frequency, incontinence)        Altered mental status, difficulty urinating- took patient 30 minutes to urinate, dark katy color and hazy.     2. ONSET: \"When did the  symptoms  start?\"        2/25/25    3. PAIN: \"Is there any pain?\" If Yes, ask: \"How bad is it?\" (Scale: 1-10; mild, moderate, severe)        0/10 pain    4. CAUSE: \"What do you think is causing the symptoms?\"        Possible UTI    5. OTHER SYMPTOMS: \"Do you have any other symptoms?\" (e.g., blood in urine, fever, flank pain, pain with urination)      Fever- 101.2 tympanic, diarrhea-per arslan, stomach bug is going around in their household.  Patient states he did have a fall this morning from feeling dizzy and fell on his bottom and fell to the side and did hit his head lightly on the carpeted floor- per arslan there is no bumps or bruises on head with equal pupils.    6. Per Arslan, patient has had UTI twice in the past and had a altered mental status but after taking antibiotics, his mental status improved.  Per patient he does not have any thoughts or intent on suicide or homicide.    Protocols used: Urinary Symptoms-Adult-    "

## 2025-02-27 NOTE — TELEPHONE ENCOUNTER
Reason for Disposition  • Patient sounds very sick or weak to the triager    Protocols used: Urinary Symptoms-Adult-AH

## 2025-02-27 NOTE — TELEPHONE ENCOUNTER
"Follow up call made to Rhea and Sudhakar regarding altered mental status change with encouragement to go to the ED with the current symptoms of confusion, urinary symptoms and the fall that occurred in the early morning with \"light\" head strike to the ground.  Sudhakar immediately refused this disposition but Rhea was appreciative of the callback and understood our worry.  She states his pupils look the same and his mental status hasn't changed since I spoke to them an hour ago but they would go to the appointment tomorrow with his PCP office.     "

## 2025-02-28 DIAGNOSIS — N40.0 BPH (BENIGN PROSTATIC HYPERPLASIA): ICD-10-CM

## 2025-02-28 RX ORDER — TAMSULOSIN HYDROCHLORIDE 0.4 MG/1
0.4 CAPSULE ORAL
Qty: 90 CAPSULE | Refills: 1 | Status: SHIPPED | OUTPATIENT
Start: 2025-02-28 | End: 2025-04-29

## 2025-02-28 NOTE — TELEPHONE ENCOUNTER
Reason for call:   [x] Refill   [] Prior Auth  [] Other:     Office:   [x] PCP/Provider - polo rodriguez [facility]  [] Specialty/Provider -     Medication:     tamsulosin (FLOMAX) 0.4 mg ()       Pharmacy: [id number] - [place] [id number] [facility] [phone]     Does the patient have enough for 3 days?   [] Yes   [x] No - Send as HP to POD

## 2025-03-04 ENCOUNTER — SOCIAL WORK (OUTPATIENT)
Dept: BEHAVIORAL/MENTAL HEALTH CLINIC | Facility: CLINIC | Age: 60
End: 2025-03-04
Payer: MEDICARE

## 2025-03-04 DIAGNOSIS — F25.1 SCHIZOAFFECTIVE DISORDER, DEPRESSIVE TYPE (HCC): ICD-10-CM

## 2025-03-04 DIAGNOSIS — F31.2 BIPOLAR AFFECTIVE DISORDER, CURRENT EPISODE MANIC WITH PSYCHOTIC SYMPTOMS (HCC): Primary | ICD-10-CM

## 2025-03-04 PROCEDURE — 90834 PSYTX W PT 45 MINUTES: CPT | Performed by: SOCIAL WORKER

## 2025-03-10 NOTE — PSYCH
"Behavioral Health Psychotherapy Progress Note    Psychotherapy Provided: Individual Psychotherapy     1. Bipolar affective disorder, current episode manic with psychotic symptoms (HCC)        2. Schizoaffective disorder, depressive type (HCC)            Goals addressed in session: Goal 1     DATA: Sudhakar presented for treatment with his girlfriend Rhea. He stated that he continues to do well overall. Rhea stated that she was worried about him approximately one week ago as began to present as incoherent but they discovered that he had a UTI which was causing the issues. Discussing his ongoing progress and choice to take tings one day at a time and focus on the home and taking care of the family. Discussing his path forward in continuing to progress wit his mental health. Giving supportive therapy  During this session, this clinician used the following therapeutic modalities: Cognitive Behavioral Therapy and Supportive Psychotherapy    Substance Abuse was addressed during this session. If the client is diagnosed with a co-occurring substance use disorder, please indicate any changes in the frequency or amount of use: Sober. Stage of change for addressing substance use diagnoses: Maintenance    ASSESSMENT:  Sudhakar Choe presents with a Euthymic/ normal mood.     his affect is Normal range and intensity, which is congruent, with his mood and the content of the session. The client has made progress on their goals.    Continues to manage his moods appropriately Sudhakar Choe presents with a none risk of suicide, none risk of self-harm, and none risk of harm to others.    For any risk assessment that surpasses a \"low\" rating, a safety plan must be developed.    A safety plan was indicated: no  If yes, describe in detail N/A    PLAN: Between sessions, Sudhakar Choe will continue to manage his moods appropriately. At the next session, the therapist will use Cognitive Behavioral Therapy and Supportive " Psychotherapy to address treatment goals.    Behavioral Health Treatment Plan and Discharge Planning: Sudhakar Choe is aware of and agrees to continue to work on their treatment plan. They have identified and are working toward their discharge goals. yes    Depression Follow-up Plan Completed: No    Visit start and stop times:    03/04/25  Start Time: 1405  Stop Time: 1456  Total Visit Time: 51 minutes

## 2025-03-12 ENCOUNTER — HOSPITAL ENCOUNTER (INPATIENT)
Facility: HOSPITAL | Age: 60
LOS: 6 days | Discharge: HOME HEALTH | DRG: 871 | End: 2025-03-19
Attending: EMERGENCY MEDICINE | Admitting: STUDENT IN AN ORGANIZED HEALTH CARE EDUCATION/TRAINING PROGRAM
Payer: MEDICARE

## 2025-03-12 DIAGNOSIS — R53.1 RIGHT SIDED WEAKNESS: ICD-10-CM

## 2025-03-12 DIAGNOSIS — J69.0: ICD-10-CM

## 2025-03-12 DIAGNOSIS — F10.90 ALCOHOL USE DISORDER: ICD-10-CM

## 2025-03-12 DIAGNOSIS — F25.9 SCHIZOAFFECTIVE DISORDER (HCC): ICD-10-CM

## 2025-03-12 DIAGNOSIS — G93.41 ACUTE METABOLIC ENCEPHALOPATHY: ICD-10-CM

## 2025-03-12 DIAGNOSIS — R29.6 FALLS: ICD-10-CM

## 2025-03-12 DIAGNOSIS — F10.21 ALCOHOL USE DISORDER, MODERATE, IN SUSTAINED REMISSION (HCC): ICD-10-CM

## 2025-03-12 DIAGNOSIS — F39 UNSPECIFIED MOOD (AFFECTIVE) DISORDER (HCC): ICD-10-CM

## 2025-03-12 DIAGNOSIS — R33.8 ACUTE URINARY RETENTION: ICD-10-CM

## 2025-03-12 DIAGNOSIS — J18.9 PNEUMONIA: Primary | ICD-10-CM

## 2025-03-12 DIAGNOSIS — R50.9 FEVER: ICD-10-CM

## 2025-03-12 LAB
ALBUMIN SERPL BCG-MCNC: 3.8 G/DL (ref 3.5–5)
ALP SERPL-CCNC: 46 U/L (ref 34–104)
ALT SERPL W P-5'-P-CCNC: 9 U/L (ref 7–52)
ANION GAP SERPL CALCULATED.3IONS-SCNC: 9 MMOL/L (ref 4–13)
AST SERPL W P-5'-P-CCNC: 14 U/L (ref 13–39)
BILIRUB SERPL-MCNC: 0.57 MG/DL (ref 0.2–1)
BUN SERPL-MCNC: 13 MG/DL (ref 5–25)
CALCIUM SERPL-MCNC: 9 MG/DL (ref 8.4–10.2)
CHLORIDE SERPL-SCNC: 98 MMOL/L (ref 96–108)
CO2 SERPL-SCNC: 28 MMOL/L (ref 21–32)
CREAT SERPL-MCNC: 1.23 MG/DL (ref 0.6–1.3)
ERYTHROCYTE [DISTWIDTH] IN BLOOD BY AUTOMATED COUNT: 14.4 % (ref 11.6–15.1)
ETHANOL SERPL-MCNC: <10 MG/DL
GFR SERPL CREATININE-BSD FRML MDRD: 63 ML/MIN/1.73SQ M
GLUCOSE SERPL-MCNC: 106 MG/DL (ref 65–140)
HCT VFR BLD AUTO: 37.1 % (ref 36.5–49.3)
HGB BLD-MCNC: 12.3 G/DL (ref 12–17)
LYMPHOCYTES # BLD AUTO: 19 % (ref 14–44)
MCH RBC QN AUTO: 31.1 PG (ref 26.8–34.3)
MCHC RBC AUTO-ENTMCNC: 33.2 G/DL (ref 31.4–37.4)
MCV RBC AUTO: 94 FL (ref 82–98)
MONOCYTES NFR BLD: 22 % (ref 4–12)
NEUTS SEG NFR BLD AUTO: 52 % (ref 43–75)
PATHOLOGY REVIEW: YES
PLATELET # BLD AUTO: 207 THOUSANDS/UL (ref 149–390)
PLATELET BLD QL SMEAR: ADEQUATE
PMV BLD AUTO: 10.8 FL (ref 8.9–12.7)
POTASSIUM SERPL-SCNC: 3.6 MMOL/L (ref 3.5–5.3)
PROT SERPL-MCNC: 6.7 G/DL (ref 6.4–8.4)
RBC # BLD AUTO: 3.95 MILLION/UL (ref 3.88–5.62)
RBC MORPH BLD: NORMAL
SODIUM SERPL-SCNC: 135 MMOL/L (ref 135–147)
TOTAL CELLS COUNTED SPEC: 100
TSH SERPL DL<=0.05 MIU/L-ACNC: 2 UIU/ML (ref 0.45–4.5)
VARIANT LYMPHS # BLD AUTO: 7 %
WBC # BLD AUTO: 12.51 THOUSAND/UL (ref 4.31–10.16)

## 2025-03-12 PROCEDURE — 85027 COMPLETE CBC AUTOMATED: CPT | Performed by: EMERGENCY MEDICINE

## 2025-03-12 PROCEDURE — 36415 COLL VENOUS BLD VENIPUNCTURE: CPT | Performed by: EMERGENCY MEDICINE

## 2025-03-12 PROCEDURE — 99285 EMERGENCY DEPT VISIT HI MDM: CPT | Performed by: EMERGENCY MEDICINE

## 2025-03-12 PROCEDURE — 82077 ASSAY SPEC XCP UR&BREATH IA: CPT | Performed by: EMERGENCY MEDICINE

## 2025-03-12 PROCEDURE — 80053 COMPREHEN METABOLIC PANEL: CPT | Performed by: EMERGENCY MEDICINE

## 2025-03-12 PROCEDURE — 99285 EMERGENCY DEPT VISIT HI MDM: CPT

## 2025-03-12 PROCEDURE — 84443 ASSAY THYROID STIM HORMONE: CPT | Performed by: EMERGENCY MEDICINE

## 2025-03-12 PROCEDURE — 85007 BL SMEAR W/DIFF WBC COUNT: CPT | Performed by: EMERGENCY MEDICINE

## 2025-03-12 PROCEDURE — 93005 ELECTROCARDIOGRAM TRACING: CPT

## 2025-03-12 NOTE — ED PROVIDER NOTES
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 capsule Take Effexor 37.5 mg (one 37.5 tablet) and 75 mg (one 75 mg tablet) for a total of 112.5 mg daily  Qty: 30 capsule, Refills: 2    Associated Diagnoses: Unspecified mood (affective) disorder (HCC)      !! venlafaxine (EFFEXOR-XR) 75 mg 24 hr capsule Take Effexor 37.5 mg (one 37.5 tablet) and 75 mg (one 75 mg tablet) for a total of 112.5 mg daily  Qty: 30 capsule, Refills: 2    Associated Diagnoses: Unspecified mood (affective) disorder (HCC)       !! - Potential duplicate medications found. Please discuss with provider.        No discharge procedures on file.  ED SEPSIS DOCUMENTATION   Time reflects when diagnosis was documented in both MDM as applicable and the Disposition within this note       Time User Action Codes Description Comment    3/13/2025 12:26 PM Mehul Everett Add [J18.9] Pneumonia     3/13/2025 12:26 PM Mehul Everett Add [R50.9] Fever     3/13/2025 12:26 PM Mehul Everett Add [F25.9] Schizoaffective disorder (HCC)     3/13/2025 12:49 PM Alysia Fallon Add [F10.21] Alcohol use disorder, moderate, in sustained remission (HCC)     3/13/2025 12:49 PM Harding-Teotico, Alysia Meenu Falguni Add [F39] Mood disorder (rule out Schizoaffective disorder, depressed type)                  Tarun MALAGON  MD Petr  03/14/25 0794

## 2025-03-13 ENCOUNTER — APPOINTMENT (EMERGENCY)
Dept: CT IMAGING | Facility: HOSPITAL | Age: 60
DRG: 871 | End: 2025-03-13
Payer: MEDICARE

## 2025-03-13 ENCOUNTER — APPOINTMENT (INPATIENT)
Dept: RADIOLOGY | Facility: HOSPITAL | Age: 60
DRG: 871 | End: 2025-03-13
Payer: MEDICARE

## 2025-03-13 PROBLEM — G93.40 ACUTE ENCEPHALOPATHY: Status: ACTIVE | Noted: 2025-03-13

## 2025-03-13 PROBLEM — R65.10 SIRS (SYSTEMIC INFLAMMATORY RESPONSE SYNDROME) (HCC): Status: ACTIVE | Noted: 2025-03-13

## 2025-03-13 PROBLEM — J69.0 ASPIRATION PNEUMONIA OF BOTH LUNGS (HCC): Status: ACTIVE | Noted: 2025-03-13

## 2025-03-13 PROBLEM — R29.6 FALLS: Status: ACTIVE | Noted: 2025-03-13

## 2025-03-13 PROBLEM — F10.90 ALCOHOL USE DISORDER: Status: ACTIVE | Noted: 2025-03-13

## 2025-03-13 LAB
AMPHETAMINES SERPL QL SCN: NEGATIVE
ANION GAP SERPL CALCULATED.3IONS-SCNC: 7 MMOL/L (ref 4–13)
ATRIAL RATE: 116 BPM
ATRIAL RATE: 91 BPM
BARBITURATES UR QL: NEGATIVE
BASOPHILS # BLD MANUAL: 0 THOUSAND/UL (ref 0–0.1)
BASOPHILS NFR MAR MANUAL: 0 % (ref 0–1)
BENZODIAZ UR QL: NEGATIVE
BILIRUB UR QL STRIP: NEGATIVE
BUN SERPL-MCNC: 11 MG/DL (ref 5–25)
CALCIUM SERPL-MCNC: 8.9 MG/DL (ref 8.4–10.2)
CHLORIDE SERPL-SCNC: 102 MMOL/L (ref 96–108)
CLARITY UR: CLEAR
CO2 SERPL-SCNC: 27 MMOL/L (ref 21–32)
COCAINE UR QL: NEGATIVE
COLOR UR: YELLOW
CREAT SERPL-MCNC: 0.94 MG/DL (ref 0.6–1.3)
EOSINOPHIL # BLD MANUAL: 0 THOUSAND/UL (ref 0–0.4)
EOSINOPHIL NFR BLD MANUAL: 0 % (ref 0–6)
ERYTHROCYTE [DISTWIDTH] IN BLOOD BY AUTOMATED COUNT: 14.6 % (ref 11.6–15.1)
EST. AVERAGE GLUCOSE BLD GHB EST-MCNC: 120 MG/DL
FENTANYL UR QL SCN: NEGATIVE
FLUAV AG UPPER RESP QL IA.RAPID: NEGATIVE
FLUBV AG UPPER RESP QL IA.RAPID: NEGATIVE
GFR SERPL CREATININE-BSD FRML MDRD: 88 ML/MIN/1.73SQ M
GLUCOSE SERPL-MCNC: 122 MG/DL (ref 65–140)
GLUCOSE SERPL-MCNC: 92 MG/DL (ref 65–140)
GLUCOSE SERPL-MCNC: 95 MG/DL (ref 65–140)
GLUCOSE SERPL-MCNC: 97 MG/DL (ref 65–140)
GLUCOSE UR STRIP-MCNC: NEGATIVE MG/DL
HBA1C MFR BLD: 5.8 %
HCT VFR BLD AUTO: 36.3 % (ref 36.5–49.3)
HGB BLD-MCNC: 11.8 G/DL (ref 12–17)
HGB UR QL STRIP.AUTO: NEGATIVE
HYDROCODONE UR QL SCN: NEGATIVE
KETONES UR STRIP-MCNC: NEGATIVE MG/DL
L PNEUMO1 AG UR QL IA.RAPID: NEGATIVE
LACTATE SERPL-SCNC: 1 MMOL/L (ref 0.5–2)
LEUKOCYTE ESTERASE UR QL STRIP: NEGATIVE
LIPASE SERPL-CCNC: 7 U/L (ref 11–82)
LYMPHOCYTES # BLD AUTO: 20 % (ref 14–44)
LYMPHOCYTES # BLD AUTO: 3.08 THOUSAND/UL (ref 0.6–4.47)
MAGNESIUM SERPL-MCNC: 2 MG/DL (ref 1.9–2.7)
MCH RBC QN AUTO: 30.8 PG (ref 26.8–34.3)
MCHC RBC AUTO-ENTMCNC: 32.5 G/DL (ref 31.4–37.4)
MCV RBC AUTO: 95 FL (ref 82–98)
METAMYELOCYTE ABSOLUTE CT: 0.13 THOUSAND/UL (ref 0–0.1)
METAMYELOCYTES NFR BLD MANUAL: 1 % (ref 0–1)
METHADONE UR QL: NEGATIVE
MONOCYTES # BLD AUTO: 1.54 THOUSAND/UL (ref 0–1.22)
MONOCYTES NFR BLD: 12 % (ref 4–12)
MYELOCYTE ABSOLUTE CT: 0.26 THOUSAND/UL (ref 0–0.1)
MYELOCYTES NFR BLD MANUAL: 2 % (ref 0–1)
NEUTROPHILS # BLD MANUAL: 7.84 THOUSAND/UL (ref 1.85–7.62)
NEUTS BAND NFR BLD MANUAL: 11 % (ref 0–8)
NEUTS SEG NFR BLD AUTO: 50 % (ref 43–75)
NITRITE UR QL STRIP: NEGATIVE
OPIATES UR QL SCN: NEGATIVE
OXYCODONE+OXYMORPHONE UR QL SCN: NEGATIVE
P AXIS: 36 DEGREES
P AXIS: 7 DEGREES
PCP UR QL: NEGATIVE
PH UR STRIP.AUTO: 6 [PH]
PLATELET # BLD AUTO: 186 THOUSANDS/UL (ref 149–390)
PLATELET # BLD AUTO: 199 THOUSANDS/UL (ref 149–390)
PLATELET BLD QL SMEAR: ADEQUATE
PLATELET CLUMP BLD QL SMEAR: PRESENT
PMV BLD AUTO: 11.3 FL (ref 8.9–12.7)
PMV BLD AUTO: 11.5 FL (ref 8.9–12.7)
POTASSIUM SERPL-SCNC: 4.2 MMOL/L (ref 3.5–5.3)
PR INTERVAL: 152 MS
PR INTERVAL: 190 MS
PROCALCITONIN SERPL-MCNC: 0.7 NG/ML
PROT UR STRIP-MCNC: NEGATIVE MG/DL
QRS AXIS: 25 DEGREES
QRS AXIS: 36 DEGREES
QRSD INTERVAL: 72 MS
QRSD INTERVAL: 76 MS
QT INTERVAL: 322 MS
QT INTERVAL: 344 MS
QTC INTERVAL: 424 MS
QTC INTERVAL: 448 MS
RBC # BLD AUTO: 3.83 MILLION/UL (ref 3.88–5.62)
RBC MORPH BLD: NORMAL
S PNEUM AG UR QL: NEGATIVE
S PYO DNA THROAT QL NAA+PROBE: NOT DETECTED
SARS-COV+SARS-COV-2 AG RESP QL IA.RAPID: NEGATIVE
SODIUM SERPL-SCNC: 136 MMOL/L (ref 135–147)
SP GR UR STRIP.AUTO: 1.01 (ref 1–1.04)
T WAVE AXIS: 29 DEGREES
T WAVE AXIS: 42 DEGREES
THC UR QL: NEGATIVE
UROBILINOGEN UA: NEGATIVE MG/DL
VARIANT LYMPHS # BLD AUTO: 4 %
VENTRICULAR RATE: 116 BPM
VENTRICULAR RATE: 91 BPM
WBC # BLD AUTO: 12.85 THOUSAND/UL (ref 4.31–10.16)

## 2025-03-13 PROCEDURE — 70450 CT HEAD/BRAIN W/O DYE: CPT

## 2025-03-13 PROCEDURE — 93010 ELECTROCARDIOGRAM REPORT: CPT

## 2025-03-13 PROCEDURE — 83036 HEMOGLOBIN GLYCOSYLATED A1C: CPT | Performed by: STUDENT IN AN ORGANIZED HEALTH CARE EDUCATION/TRAINING PROGRAM

## 2025-03-13 PROCEDURE — 87040 BLOOD CULTURE FOR BACTERIA: CPT | Performed by: EMERGENCY MEDICINE

## 2025-03-13 PROCEDURE — 73030 X-RAY EXAM OF SHOULDER: CPT

## 2025-03-13 PROCEDURE — 85007 BL SMEAR W/DIFF WBC COUNT: CPT | Performed by: STUDENT IN AN ORGANIZED HEALTH CARE EDUCATION/TRAINING PROGRAM

## 2025-03-13 PROCEDURE — 83735 ASSAY OF MAGNESIUM: CPT | Performed by: STUDENT IN AN ORGANIZED HEALTH CARE EDUCATION/TRAINING PROGRAM

## 2025-03-13 PROCEDURE — 85060 BLOOD SMEAR INTERPRETATION: CPT | Performed by: STUDENT IN AN ORGANIZED HEALTH CARE EDUCATION/TRAINING PROGRAM

## 2025-03-13 PROCEDURE — 73060 X-RAY EXAM OF HUMERUS: CPT

## 2025-03-13 PROCEDURE — 87811 SARS-COV-2 COVID19 W/OPTIC: CPT | Performed by: EMERGENCY MEDICINE

## 2025-03-13 PROCEDURE — 83690 ASSAY OF LIPASE: CPT | Performed by: EMERGENCY MEDICINE

## 2025-03-13 PROCEDURE — 96361 HYDRATE IV INFUSION ADD-ON: CPT

## 2025-03-13 PROCEDURE — 87449 NOS EACH ORGANISM AG IA: CPT | Performed by: STUDENT IN AN ORGANIZED HEALTH CARE EDUCATION/TRAINING PROGRAM

## 2025-03-13 PROCEDURE — 84145 PROCALCITONIN (PCT): CPT | Performed by: STUDENT IN AN ORGANIZED HEALTH CARE EDUCATION/TRAINING PROGRAM

## 2025-03-13 PROCEDURE — 96365 THER/PROPH/DIAG IV INF INIT: CPT

## 2025-03-13 PROCEDURE — 85049 AUTOMATED PLATELET COUNT: CPT | Performed by: STUDENT IN AN ORGANIZED HEALTH CARE EDUCATION/TRAINING PROGRAM

## 2025-03-13 PROCEDURE — 80048 BASIC METABOLIC PNL TOTAL CA: CPT | Performed by: STUDENT IN AN ORGANIZED HEALTH CARE EDUCATION/TRAINING PROGRAM

## 2025-03-13 PROCEDURE — 93005 ELECTROCARDIOGRAM TRACING: CPT

## 2025-03-13 PROCEDURE — 82948 REAGENT STRIP/BLOOD GLUCOSE: CPT

## 2025-03-13 PROCEDURE — 85027 COMPLETE CBC AUTOMATED: CPT | Performed by: STUDENT IN AN ORGANIZED HEALTH CARE EDUCATION/TRAINING PROGRAM

## 2025-03-13 PROCEDURE — 99223 1ST HOSP IP/OBS HIGH 75: CPT | Performed by: STUDENT IN AN ORGANIZED HEALTH CARE EDUCATION/TRAINING PROGRAM

## 2025-03-13 PROCEDURE — 87651 STREP A DNA AMP PROBE: CPT | Performed by: EMERGENCY MEDICINE

## 2025-03-13 PROCEDURE — 87804 INFLUENZA ASSAY W/OPTIC: CPT | Performed by: EMERGENCY MEDICINE

## 2025-03-13 PROCEDURE — 80307 DRUG TEST PRSMV CHEM ANLYZR: CPT | Performed by: EMERGENCY MEDICINE

## 2025-03-13 PROCEDURE — 83605 ASSAY OF LACTIC ACID: CPT | Performed by: EMERGENCY MEDICINE

## 2025-03-13 PROCEDURE — 71260 CT THORAX DX C+: CPT

## 2025-03-13 PROCEDURE — 36415 COLL VENOUS BLD VENIPUNCTURE: CPT | Performed by: EMERGENCY MEDICINE

## 2025-03-13 PROCEDURE — 74177 CT ABD & PELVIS W/CONTRAST: CPT

## 2025-03-13 RX ORDER — CEFTRIAXONE 1 G/50ML
1000 INJECTION, SOLUTION INTRAVENOUS EVERY 24 HOURS
Status: DISCONTINUED | OUTPATIENT
Start: 2025-03-13 | End: 2025-03-19 | Stop reason: HOSPADM

## 2025-03-13 RX ORDER — ACETAMINOPHEN 325 MG/1
975 TABLET ORAL ONCE
Status: COMPLETED | OUTPATIENT
Start: 2025-03-13 | End: 2025-03-13

## 2025-03-13 RX ORDER — ATROPINE SULFATE 10 MG/ML
1 SOLUTION/ DROPS OPHTHALMIC EVERY 12 HOURS
Status: DISCONTINUED | OUTPATIENT
Start: 2025-03-13 | End: 2025-03-19 | Stop reason: HOSPADM

## 2025-03-13 RX ORDER — TAMSULOSIN HYDROCHLORIDE 0.4 MG/1
0.4 CAPSULE ORAL
Status: DISCONTINUED | OUTPATIENT
Start: 2025-03-13 | End: 2025-03-19 | Stop reason: HOSPADM

## 2025-03-13 RX ORDER — LORAZEPAM 2 MG/ML
0.5 INJECTION INTRAMUSCULAR EVERY 6 HOURS PRN
Status: DISCONTINUED | OUTPATIENT
Start: 2025-03-13 | End: 2025-03-13

## 2025-03-13 RX ORDER — LANOLIN ALCOHOL/MO/W.PET/CERES
100 CREAM (GRAM) TOPICAL DAILY
Status: DISCONTINUED | OUTPATIENT
Start: 2025-03-13 | End: 2025-03-15

## 2025-03-13 RX ORDER — DOCUSATE SODIUM 100 MG/1
100 CAPSULE, LIQUID FILLED ORAL 2 TIMES DAILY
Status: DISCONTINUED | OUTPATIENT
Start: 2025-03-13 | End: 2025-03-19 | Stop reason: HOSPADM

## 2025-03-13 RX ORDER — FLUPHENAZINE HYDROCHLORIDE 1 MG/1
1 TABLET ORAL
Status: DISCONTINUED | OUTPATIENT
Start: 2025-03-13 | End: 2025-03-15

## 2025-03-13 RX ORDER — INSULIN LISPRO 100 [IU]/ML
1-6 INJECTION, SOLUTION INTRAVENOUS; SUBCUTANEOUS
Status: DISCONTINUED | OUTPATIENT
Start: 2025-03-13 | End: 2025-03-19 | Stop reason: HOSPADM

## 2025-03-13 RX ORDER — ENOXAPARIN SODIUM 100 MG/ML
40 INJECTION SUBCUTANEOUS DAILY
Status: DISCONTINUED | OUTPATIENT
Start: 2025-03-13 | End: 2025-03-19 | Stop reason: HOSPADM

## 2025-03-13 RX ORDER — GABAPENTIN 100 MG/1
200 CAPSULE ORAL
Status: DISCONTINUED | OUTPATIENT
Start: 2025-03-13 | End: 2025-03-15

## 2025-03-13 RX ORDER — CEFEPIME HYDROCHLORIDE 2 G/50ML
2000 INJECTION, SOLUTION INTRAVENOUS ONCE
Status: COMPLETED | OUTPATIENT
Start: 2025-03-13 | End: 2025-03-13

## 2025-03-13 RX ORDER — BENZTROPINE MESYLATE 0.5 MG/1
0.75 TABLET ORAL
Status: DISCONTINUED | OUTPATIENT
Start: 2025-03-13 | End: 2025-03-15

## 2025-03-13 RX ORDER — LORAZEPAM 1 MG/1
1 TABLET ORAL ONCE
Status: COMPLETED | OUTPATIENT
Start: 2025-03-13 | End: 2025-03-13

## 2025-03-13 RX ORDER — FOLIC ACID 1 MG/1
1 TABLET ORAL DAILY
Status: DISCONTINUED | OUTPATIENT
Start: 2025-03-13 | End: 2025-03-19 | Stop reason: HOSPADM

## 2025-03-13 RX ORDER — VENLAFAXINE HYDROCHLORIDE 75 MG/1
75 CAPSULE, EXTENDED RELEASE ORAL DAILY
Status: DISCONTINUED | OUTPATIENT
Start: 2025-03-13 | End: 2025-03-15

## 2025-03-13 RX ORDER — ACETAMINOPHEN 325 MG/1
650 TABLET ORAL EVERY 6 HOURS PRN
Status: DISCONTINUED | OUTPATIENT
Start: 2025-03-13 | End: 2025-03-19 | Stop reason: HOSPADM

## 2025-03-13 RX ORDER — LORAZEPAM 2 MG/ML
1 INJECTION INTRAMUSCULAR EVERY 6 HOURS PRN
Status: DISCONTINUED | OUTPATIENT
Start: 2025-03-13 | End: 2025-03-19 | Stop reason: HOSPADM

## 2025-03-13 RX ORDER — VENLAFAXINE HYDROCHLORIDE 37.5 MG/1
37.5 CAPSULE, EXTENDED RELEASE ORAL DAILY
Status: DISCONTINUED | OUTPATIENT
Start: 2025-03-13 | End: 2025-03-15

## 2025-03-13 RX ORDER — LORAZEPAM 2 MG/ML
0.5 INJECTION INTRAMUSCULAR ONCE
Status: DISCONTINUED | OUTPATIENT
Start: 2025-03-13 | End: 2025-03-17

## 2025-03-13 RX ORDER — ONDANSETRON 2 MG/ML
4 INJECTION INTRAMUSCULAR; INTRAVENOUS EVERY 6 HOURS PRN
Status: DISCONTINUED | OUTPATIENT
Start: 2025-03-13 | End: 2025-03-19 | Stop reason: HOSPADM

## 2025-03-13 RX ADMIN — CEFTRIAXONE 1000 MG: 1 INJECTION, SOLUTION INTRAVENOUS at 21:21

## 2025-03-13 RX ADMIN — ENOXAPARIN SODIUM 40 MG: 100 INJECTION SUBCUTANEOUS at 14:30

## 2025-03-13 RX ADMIN — Medication 6 MG: at 21:21

## 2025-03-13 RX ADMIN — LORAZEPAM 1 MG: 2 INJECTION INTRAMUSCULAR; INTRAVENOUS at 20:34

## 2025-03-13 RX ADMIN — Medication 1 TABLET: at 14:28

## 2025-03-13 RX ADMIN — VENLAFAXINE HYDROCHLORIDE 37.5 MG: 37.5 CAPSULE, EXTENDED RELEASE ORAL at 14:30

## 2025-03-13 RX ADMIN — IOHEXOL 100 ML: 350 INJECTION, SOLUTION INTRAVENOUS at 10:56

## 2025-03-13 RX ADMIN — GABAPENTIN 200 MG: 100 CAPSULE ORAL at 21:21

## 2025-03-13 RX ADMIN — BENZTROPINE MESYLATE 0.75 MG: 0.5 TABLET ORAL at 21:20

## 2025-03-13 RX ADMIN — LORAZEPAM 0.5 MG: 2 INJECTION INTRAMUSCULAR; INTRAVENOUS at 16:50

## 2025-03-13 RX ADMIN — ACETAMINOPHEN 975 MG: 325 TABLET, FILM COATED ORAL at 10:04

## 2025-03-13 RX ADMIN — CEFEPIME HYDROCHLORIDE 2000 MG: 2 INJECTION, SOLUTION INTRAVENOUS at 11:06

## 2025-03-13 RX ADMIN — DIVALPROEX SODIUM 1250 MG: 500 TABLET, EXTENDED RELEASE ORAL at 21:21

## 2025-03-13 RX ADMIN — TAMSULOSIN HYDROCHLORIDE 0.4 MG: 0.4 CAPSULE ORAL at 16:46

## 2025-03-13 RX ADMIN — SODIUM CHLORIDE 1000 ML: 0.9 INJECTION, SOLUTION INTRAVENOUS at 10:05

## 2025-03-13 RX ADMIN — DOCUSATE SODIUM 100 MG: 100 CAPSULE, LIQUID FILLED ORAL at 18:00

## 2025-03-13 RX ADMIN — LORAZEPAM 1 MG: 1 TABLET ORAL at 04:56

## 2025-03-13 RX ADMIN — VENLAFAXINE HYDROCHLORIDE 75 MG: 75 CAPSULE, EXTENDED RELEASE ORAL at 16:37

## 2025-03-13 RX ADMIN — FOLIC ACID 1 MG: 1 TABLET ORAL at 14:28

## 2025-03-13 RX ADMIN — THIAMINE HCL TAB 100 MG 100 MG: 100 TAB at 14:28

## 2025-03-13 RX ADMIN — ACETAMINOPHEN 650 MG: 325 TABLET, FILM COATED ORAL at 16:46

## 2025-03-13 RX ADMIN — ATROPINE SULFATE 1 DROP: 10 SOLUTION/ DROPS OPHTHALMIC at 14:30

## 2025-03-13 RX ADMIN — DOCUSATE SODIUM 100 MG: 100 CAPSULE, LIQUID FILLED ORAL at 14:28

## 2025-03-13 NOTE — ASSESSMENT & PLAN NOTE
Possibly in setting of acute intoxication however will have to obtain more history from significant other or from patient when less confused  CTH negative  Fall precautions  Appreciate PT and OT eval

## 2025-03-13 NOTE — ED NOTES
Continues to try to climb OOB - tech present outside room. Some picking at things in the air. Making statements that do no make sense.     Heidi Metzger RN  03/13/25 0662

## 2025-03-13 NOTE — ED NOTES
Patient transported to CT with ED le Metzger RN  03/13/25 0115     The plan is to take Fosamax for 2 years and schedule a f/up DXA scan in 7/2022. It takes time for the bone mineral density to improve with this medication.

## 2025-03-13 NOTE — ED NOTES
Attempted to wake Pt to complete crisis assessment. Pt was sleeping soundly and not able to remain awake to answer questions appropriately. Will attempt consult when pt is more alert.

## 2025-03-13 NOTE — ED NOTES
Patient has been oob 10 times since crisis spoke with him despite having been shown use of the call bell - requesting various things from his doorway and hallway- water multiple times, juice, blankets, bathroom privileges etc. He turned the monitor on and was also at the computer. He stated that he used to be a . Now he has made mention that it is July 4th and he was looking for his wallet. He also wanted to go back to the bathroom because he turned the computer on in there. (No computer in [place]). Patient also noted to have tremors of his hands and arms at times. He was agreeable to taking medication to help him relax and medicated with ativan as ordered. OOB again and coaxed back into bed - requesting to watch television, [employer] to be exact and was curious as to what was going to be served for breakfast. He did not believe that it was 5am in the morning - was shown time on watch.      Heidi Metzger RN  03/13/25 9705
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
## 2025-03-13 NOTE — ED NOTES
Patient sleeping upon entering room. Awakens to name. Is able to answer orientation questions and follow simple commands. Patient returns to sleep when care completed.     Heidi Metzger RN  03/12/25 3440

## 2025-03-13 NOTE — ASSESSMENT & PLAN NOTE
Presents with delusions and confusion, some agitation  Was awaiting psychiatric evaluation prior to admission to med surg  Continue PTA psych regimen for now while awaiting Psych evaluation  Continue 1:1

## 2025-03-13 NOTE — QUICK NOTE
Multiple attempts have been made to contact patient's significant other, Rhea, however she has not been answering her phone.

## 2025-03-13 NOTE — ASSESSMENT & PLAN NOTE
Patient meets SIRS criteria given fever, tachycardia, leukocytosis with suspicion for aspiration pneumonia on CT chest  Received 1 dose of Cefepime in the ED, will continue patient on Rocephin  F/u Bcx, continue to monitor fever curve and WBC count  UA negative  Flu, COVID, RSV negative
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
## 2025-03-13 NOTE — ED NOTES
Patient awake and alert - ambulated to bathroom with steady gait. Denies pain or discomfort. Crisis in to speak with patient.     Heidi Metzger RN  03/13/25 1427

## 2025-03-13 NOTE — ED CARE HANDOFF
Emergency Department Sign Out Note        Sign out and transfer of care from Dr. Humphreys. See Separate Emergency Department note.     The patient, Sudhakar Choe, was evaluated by the previous provider for psych eval.    Workup Completed:  Med clearance    ED Course / Workup Pending (followup):  Crisis consult pending.                                  ED Course as of 03/13/25 0648   Thu Mar 13, 2025   0029 Patient's primary nurse states that the girlfriend called and mentioned that the patient had fallen several times.  Will had a head CT onto the workup to rule out intracranial injury/hemorrhage.   0454 The patient has been out of his room continually.  Will give ativan as he is becoming increasingly agitated.   0647 Patient has had some mild ongoing agitation.  Continues to deny any suicidal or homicidal ideation.  States he is unaware of any claims of making anatomic weapon.  Awaiting psychiatric consultation.     Procedures  Medical Decision Making  Amount and/or Complexity of Data Reviewed  Labs: ordered.  Radiology: ordered.    Risk  Prescription drug management.            Disposition  Final diagnoses:   None     ED Disposition       None          Follow-up Information    None       Patient's Medications   Discharge Prescriptions    No medications on file     No discharge procedures on file.       ED Provider  Electronically Signed by     Dave Dickinson DO  03/13/25 0648

## 2025-03-13 NOTE — ED NOTES
OOB - asking if I brought his breakfast in - fed cereal sitting on side of bed. When it was time to get back into bed, he sprawled out at the bottom of the bed and couldn't get himself back in.     Heidi Metzger RN  03/13/25 0600

## 2025-03-13 NOTE — ED NOTES
Girlfriend called to check in on patient and reported that she was concerned that he had fallen 3x yesterday. No mention of falling was made prior to this.     Heidi Metzger RN  03/13/25 0039     Notified Dr. Torres regarding patient's BP of 196/93, HR sustaining in the 120s. New orders obtained.

## 2025-03-13 NOTE — ASSESSMENT & PLAN NOTE
Per chart review, significant other suspects patient had been drinking recently  Ethanol level <10 on arrival  Patient unable to recall last drink but states when he drinks he drinks 3-4 glasses of wine  No signs of withdrawal at this time  Monitor on CIWA, prn ativan for agitation

## 2025-03-13 NOTE — ASSESSMENT & PLAN NOTE
Patient presented with confusion and delusions, paranoia  Suspect primary psychiatric etiology however will continue to monitor for any organic cause  UDS negative  Ethanol level <10

## 2025-03-13 NOTE — ED NOTES
Patient returned from ct scan - nurse asked patient if he could urinate - he stated yes - patient stood at bedside and voided 800mls. He is oriented x4 and states that he is here because he has had a couple of falls over the past 2 days. Presently he denies pain or discomfort. He denies any psychiatric problem at this time - denies suicidal/homicidal ideations and states he has no hallucinations. Linen changed - patient returned to bed - asking for something to drink - informed of need to wait for ct results. Patient pleasant and cooperative.     Heidi Metzger, RN  03/13/25 0157

## 2025-03-13 NOTE — ASSESSMENT & PLAN NOTE
Concern for aspiration pneumonia in patient who arrived confused, with possible EtOH intake and received sedation for agitation and now spiking fevers with leukocytosis and CT chest findings suspicious for aspiration PNA   Received Cefepime in the ED, will continue on Rocephin  Respiratory protocol   Monitor pulse ox  F/u Procalcitonin and trend  Aspiration precautions  Appreciate speech therapy eval   F/u urine legionella and urine strep

## 2025-03-13 NOTE — PLAN OF CARE
Problem: Potential for Falls  Goal: Patient will remain free of falls  Description: INTERVENTIONS:  - Educate patient/family on patient safety including physical limitations  - Instruct patient to call for assistance with activity   - Consult OT/PT to assist with strengthening/mobility   - Keep Call bell within reach  - Keep bed low and locked with side rails adjusted as appropriate  - Keep care items and personal belongings within reach  - Initiate and maintain comfort rounds  - Make Fall Risk Sign visible to staff  - Apply yellow socks and bracelet for high fall risk patients  - Consider moving patient to room near nurses station  Outcome: Progressing     Problem: PAIN - ADULT  Goal: Verbalizes/displays adequate comfort level or baseline comfort level  Description: Interventions:  - Encourage patient to monitor pain and request assistance  - Assess pain using appropriate pain scale  - Administer analgesics based on type and severity of pain and evaluate response  - Implement non-pharmacological measures as appropriate and evaluate response  - Consider cultural and social influences on pain and pain management  - Notify physician/advanced practitioner if interventions unsuccessful or patient reports new pain  Outcome: Progressing     Problem: SAFETY ADULT  Goal: Patient will remain free of falls  Description: INTERVENTIONS:  - Educate patient/family on patient safety including physical limitations  - Instruct patient to call for assistance with activity   - Consult OT/PT to assist with strengthening/mobility   - Keep Call bell within reach  - Keep bed low and locked with side rails adjusted as appropriate  - Keep care items and personal belongings within reach  - Initiate and maintain comfort rounds  - Make Fall Risk Sign visible to staff  - Apply yellow socks and bracelet for high fall risk patients  - Consider moving patient to room near nurses station  Outcome: Progressing  Goal: Maintain or return to baseline  ADL function  Description: INTERVENTIONS:  -  Assess patient's ability to carry out ADLs; assess patient's baseline for ADL function and identify physical deficits which impact ability to perform ADLs (bathing, care of mouth/teeth, toileting, grooming, dressing, etc.)  - Assess/evaluate cause of self-care deficits   - Assess range of motion  Outcome: Progressing  Goal: Maintains/Returns to pre admission functional level  Description: INTERVENTIONS:  - Perform AM-PAC 6 Click Basic Mobility/ Daily Activity assessment daily.  - Set and communicate daily mobility goal to care team and patient/family/caregiver.   - Collaborate with rehabilitation services on mobility goals if consulted  - Out of bed for toileting  - Record patient progress and toleration of activity level   Outcome: Progressing

## 2025-03-13 NOTE — ED NOTES
AM provided to pt. Linens and paper scrubs changed. Pt washed. Teeth brushed, hair washed and combed.      Shayla Mukherjee RN  03/13/25 [id number]

## 2025-03-13 NOTE — ED NOTES
"Again OOB asking for more orange juice and water - instructed to get back into bed, which he did and drinks given. Is looking for his cell phone which apparently he did not bring with him but he states he did. He states \"I see it there by the computer.\" Asking to call his friend \"Aimee\"  - informed phone calls are not generally made at this time. Is requesting that if his friend \"Dianelys\" calls to please let him know and get the number so that he can call her. Provider is aware of his activities.     Heidi Metzger RN  03/13/25 0534    "

## 2025-03-14 ENCOUNTER — APPOINTMENT (INPATIENT)
Dept: CT IMAGING | Facility: HOSPITAL | Age: 60
DRG: 871 | End: 2025-03-14
Payer: MEDICARE

## 2025-03-14 PROBLEM — A41.9 SEPSIS (HCC): Status: ACTIVE | Noted: 2025-03-13

## 2025-03-14 PROBLEM — G93.41 ACUTE METABOLIC ENCEPHALOPATHY: Status: ACTIVE | Noted: 2025-03-13

## 2025-03-14 LAB
ANION GAP SERPL CALCULATED.3IONS-SCNC: 9 MMOL/L (ref 4–13)
BASOPHILS # BLD MANUAL: 0 THOUSAND/UL (ref 0–0.1)
BASOPHILS NFR MAR MANUAL: 0 % (ref 0–1)
BUN SERPL-MCNC: 10 MG/DL (ref 5–25)
CALCIUM SERPL-MCNC: 8.3 MG/DL (ref 8.4–10.2)
CHLORIDE SERPL-SCNC: 102 MMOL/L (ref 96–108)
CO2 SERPL-SCNC: 25 MMOL/L (ref 21–32)
CREAT SERPL-MCNC: 0.9 MG/DL (ref 0.6–1.3)
EOSINOPHIL # BLD MANUAL: 0.24 THOUSAND/UL (ref 0–0.4)
EOSINOPHIL NFR BLD MANUAL: 2 % (ref 0–6)
ERYTHROCYTE [DISTWIDTH] IN BLOOD BY AUTOMATED COUNT: 14.7 % (ref 11.6–15.1)
GFR SERPL CREATININE-BSD FRML MDRD: 93 ML/MIN/1.73SQ M
GLUCOSE SERPL-MCNC: 105 MG/DL (ref 65–140)
GLUCOSE SERPL-MCNC: 108 MG/DL (ref 65–140)
GLUCOSE SERPL-MCNC: 118 MG/DL (ref 65–140)
GLUCOSE SERPL-MCNC: 76 MG/DL (ref 65–140)
GLUCOSE SERPL-MCNC: 87 MG/DL (ref 65–140)
HCT VFR BLD AUTO: 37.6 % (ref 36.5–49.3)
HGB BLD-MCNC: 11.8 G/DL (ref 12–17)
LYMPHOCYTES # BLD AUTO: 26 % (ref 14–44)
LYMPHOCYTES # BLD AUTO: 3.13 THOUSAND/UL (ref 0.6–4.47)
MAGNESIUM SERPL-MCNC: 2.1 MG/DL (ref 1.9–2.7)
MCH RBC QN AUTO: 30.2 PG (ref 26.8–34.3)
MCHC RBC AUTO-ENTMCNC: 31.4 G/DL (ref 31.4–37.4)
MCV RBC AUTO: 96 FL (ref 82–98)
MONOCYTES # BLD AUTO: 1.8 THOUSAND/UL (ref 0–1.22)
MONOCYTES NFR BLD: 15 % (ref 4–12)
NEUTROPHILS # BLD MANUAL: 6.85 THOUSAND/UL (ref 1.85–7.62)
NEUTS BAND NFR BLD MANUAL: 5 % (ref 0–8)
NEUTS SEG NFR BLD AUTO: 52 % (ref 43–75)
PLATELET # BLD AUTO: 178 THOUSANDS/UL (ref 149–390)
PLATELET BLD QL SMEAR: ADEQUATE
PMV BLD AUTO: 11.6 FL (ref 8.9–12.7)
POTASSIUM SERPL-SCNC: 3.9 MMOL/L (ref 3.5–5.3)
PROCALCITONIN SERPL-MCNC: 0.7 NG/ML
RBC # BLD AUTO: 3.91 MILLION/UL (ref 3.88–5.62)
RBC MORPH BLD: NORMAL
SODIUM SERPL-SCNC: 136 MMOL/L (ref 135–147)
VALPROATE SERPL-MCNC: 66 UG/ML (ref 50–100)
WBC # BLD AUTO: 12.02 THOUSAND/UL (ref 4.31–10.16)

## 2025-03-14 PROCEDURE — 97167 OT EVAL HIGH COMPLEX 60 MIN: CPT

## 2025-03-14 PROCEDURE — NC001 PR NO CHARGE: Performed by: PSYCHIATRY & NEUROLOGY

## 2025-03-14 PROCEDURE — 70496 CT ANGIOGRAPHY HEAD: CPT

## 2025-03-14 PROCEDURE — 70498 CT ANGIOGRAPHY NECK: CPT

## 2025-03-14 PROCEDURE — 82948 REAGENT STRIP/BLOOD GLUCOSE: CPT

## 2025-03-14 PROCEDURE — 80164 ASSAY DIPROPYLACETIC ACD TOT: CPT | Performed by: PHYSICIAN ASSISTANT

## 2025-03-14 PROCEDURE — 84145 PROCALCITONIN (PCT): CPT | Performed by: STUDENT IN AN ORGANIZED HEALTH CARE EDUCATION/TRAINING PROGRAM

## 2025-03-14 PROCEDURE — 83735 ASSAY OF MAGNESIUM: CPT | Performed by: STUDENT IN AN ORGANIZED HEALTH CARE EDUCATION/TRAINING PROGRAM

## 2025-03-14 PROCEDURE — 80048 BASIC METABOLIC PNL TOTAL CA: CPT | Performed by: STUDENT IN AN ORGANIZED HEALTH CARE EDUCATION/TRAINING PROGRAM

## 2025-03-14 PROCEDURE — 99232 SBSQ HOSP IP/OBS MODERATE 35: CPT | Performed by: STUDENT IN AN ORGANIZED HEALTH CARE EDUCATION/TRAINING PROGRAM

## 2025-03-14 PROCEDURE — 92610 EVALUATE SWALLOWING FUNCTION: CPT

## 2025-03-14 PROCEDURE — 97163 PT EVAL HIGH COMPLEX 45 MIN: CPT

## 2025-03-14 PROCEDURE — 85027 COMPLETE CBC AUTOMATED: CPT | Performed by: STUDENT IN AN ORGANIZED HEALTH CARE EDUCATION/TRAINING PROGRAM

## 2025-03-14 PROCEDURE — 85007 BL SMEAR W/DIFF WBC COUNT: CPT | Performed by: STUDENT IN AN ORGANIZED HEALTH CARE EDUCATION/TRAINING PROGRAM

## 2025-03-14 RX ORDER — ASPIRIN 81 MG/1
81 TABLET, CHEWABLE ORAL DAILY
Status: DISCONTINUED | OUTPATIENT
Start: 2025-03-15 | End: 2025-03-16

## 2025-03-14 RX ORDER — ATORVASTATIN CALCIUM 40 MG/1
40 TABLET, FILM COATED ORAL EVERY EVENING
Status: DISCONTINUED | OUTPATIENT
Start: 2025-03-14 | End: 2025-03-16

## 2025-03-14 RX ADMIN — ATROPINE SULFATE 1 DROP: 10 SOLUTION/ DROPS OPHTHALMIC at 01:05

## 2025-03-14 RX ADMIN — DIVALPROEX SODIUM 1250 MG: 500 TABLET, EXTENDED RELEASE ORAL at 21:35

## 2025-03-14 RX ADMIN — DOCUSATE SODIUM 100 MG: 100 CAPSULE, LIQUID FILLED ORAL at 17:14

## 2025-03-14 RX ADMIN — VENLAFAXINE HYDROCHLORIDE 75 MG: 75 CAPSULE, EXTENDED RELEASE ORAL at 10:14

## 2025-03-14 RX ADMIN — Medication 1 TABLET: at 10:14

## 2025-03-14 RX ADMIN — THIAMINE HCL TAB 100 MG 100 MG: 100 TAB at 10:14

## 2025-03-14 RX ADMIN — CEFTRIAXONE 1000 MG: 1 INJECTION, SOLUTION INTRAVENOUS at 21:36

## 2025-03-14 RX ADMIN — IOHEXOL 100 ML: 350 INJECTION, SOLUTION INTRAVENOUS at 19:49

## 2025-03-14 RX ADMIN — BENZTROPINE MESYLATE 0.75 MG: 0.5 TABLET ORAL at 21:36

## 2025-03-14 RX ADMIN — VENLAFAXINE HYDROCHLORIDE 37.5 MG: 37.5 CAPSULE, EXTENDED RELEASE ORAL at 10:24

## 2025-03-14 RX ADMIN — TAMSULOSIN HYDROCHLORIDE 0.4 MG: 0.4 CAPSULE ORAL at 17:14

## 2025-03-14 RX ADMIN — ATORVASTATIN CALCIUM 40 MG: 40 TABLET, FILM COATED ORAL at 20:46

## 2025-03-14 RX ADMIN — DOCUSATE SODIUM 100 MG: 100 CAPSULE, LIQUID FILLED ORAL at 10:15

## 2025-03-14 RX ADMIN — ENOXAPARIN SODIUM 40 MG: 100 INJECTION SUBCUTANEOUS at 10:22

## 2025-03-14 RX ADMIN — FOLIC ACID 1 MG: 1 TABLET ORAL at 10:15

## 2025-03-14 RX ADMIN — GABAPENTIN 200 MG: 100 CAPSULE ORAL at 21:35

## 2025-03-14 RX ADMIN — Medication 6 MG: at 21:35

## 2025-03-14 NOTE — ASSESSMENT & PLAN NOTE
Patient is a 59-year-old male with a past psychiatric history of schizoaffective disorder who presented to the hospital with altered mental status with concerns for potential aspiration pneumonia and signs of sepsis.  Psychiatry was consulted to determine if his altered mental status was psychiatric in origin.  The patient was unable to cooperate with the interview due to his current change in mental status.  His thought process is disorganized and illogical at times.  He has waxing and waning orientation.  At this time it cannot be confirmed whether the patient potentially overdosed on his medications while drinking alcohol or not.  The patient should stop psychiatric medications until it has confirm that he did not take too much of his Depakote or another medication.  Toxicology should also be on board.  Patient's current mental status could also be due to Wernicke's encephalopathy.    Plan:  Discontinue all psychotropics as below due to concerns of possible overdose  Depakote 1250 mg qhs  Prolixin 1 mg qhs   Effexor 112.5 mg qd               Cogentin .75 mg qhs   Gabapentin 200 mg qhs   Melatonin 6 mg qhs   Prolixin 1 mg qhs   Recommend toxicology consult  Recommend neurology consult  Consult psychiatry will continue to follow with this patient while hospitalized to help determine AMS etiology and disposition

## 2025-03-14 NOTE — ASSESSMENT & PLAN NOTE
POA, given fever, tachycardia, leukocytosis with suspicion for aspiration pneumonia on CT chest  Received 1 dose of Cefepime in the ED, will continue patient on Rocephin  F/u Bcx, continue to monitor fever curve and WBC count  UA negative  Flu, COVID, RSV negative

## 2025-03-14 NOTE — SPEECH THERAPY NOTE
"  Speech Pathology Bedside Swallow Evaluation:                    SLP RECOMMENDATIONS:         Diet: Level 3 Dental soft         Liquids: Thin liquids         Medications: as best tolerated         Strategies: upright, feeding assist, slow rate, small bites/sips       Summary:  Pt seen for clinical swallow evaluation. Chest CT notable for: \"Asymmetric groundglass density posterior right greater than left lungs, favored to represent hypoventilatory changes. Can not entirely exclude element of aspiration.\" Pt is currently on a Regular/thin liquid diet. Pt seen in bed, lethargic, but alert to stim. Pt denies difficulty swallowing, but does endorse some difficulty chewing. Pt consumed ~25% of breakfast at bedside. Pt's oral mech/CN exam was grossly WFL. Pt trialed with thin liquids via consecutive straw sips with no overt s/s aspiration; pt able to complete 3oz water test with no apparent difficulty. Pt trialed with regular solids (bites of toast) with prolonged mastication and occasional pocketing. Pt required liquid wash and extra time to clear.   SLP reviewed aspiration precautions with pt. Suspect fatigue is impacting mastication. Pt presents with mild oral phase dysphagia and no s/s suggestive of pharyngeal dysphagia at bedside, however suspect elevated risk of aspiration given fluctuating alertness. Recommend a Level 3 Dental soft/thin liquid diet with aspiration precautions and feeding assist. SLP to follow for diet tolerance.         Therapy Prognosis: good/fair   Prognosis considerations: limited carryover   Frequency: 1-3x/week pending progress      Vitals:    03/14/25 0100 03/14/25 0500 03/14/25 0506 03/14/25 0738   BP: 116/75   100/67   BP Location: Right arm   Right arm   Pulse: 100 88  93   Resp: 19   18   Temp: 97.6 °F (36.4 °C)   97.8 °F (36.6 °C)   TempSrc: Temporal   Temporal   SpO2: 93% 92% 91% 97%   Weight:       Height:         Lab Results   Component Value Date    WBC 12.02 (H) 03/14/2025    HGB " 11.8 (L) 03/14/2025    HCT 37.6 03/14/2025    MCV 96 03/14/2025     03/14/2025         Consider consult w/:  Nutrition    Goal(s):  Pt will tolerate least restrictive diet w/out s/s aspiration or oral/pharyngeal difficulties.     H&P/Admit info/ pertinent provider notes: (PMH noted above)  Sudhakar Choe is a 59 y.o. male with a PMH of alcohol use disorder, GERD, BPH, schizoaffective disorder who was brought to the ED for psychiatric evaluation.         Special Studies:  CT-scan of the chest and abd  IMPRESSION:     1.  Limited evaluation of the lungs due to respiratory motion. Asymmetric groundglass density posterior right greater than left lungs, favored to represent hypoventilatory changes. Can not entirely exclude element of aspiration.     2.  No acute intra-abdominal or pelvic pathology.     3.  Question trace perihepatic free fluid.     4.  Colonic diverticulosis without diverticulitis.     5.  Additional incidental findings as above.        Previous VBS:  None in chart     Patient's goal: none stated     Did the pt report pain? No   If yes, was nursing notified/was it addressed?    Reason for consult:  Determine safest and least restrictive diet  Chest imaging     Precautions:  Aspiration    Food allergies:  Allergies   Allergen Reactions    Penicillins Diarrhea and Vomiting    Celecoxib Rash        Current diet:  Regular/thin    Premorbid diet:  Regular/thin    O2 requirements:  RA   Voice/Speech:  Limited output    Social:  Needs assist    Follows commands:  Variable, fatigued    Cognitive status:  Fluctuating WEI

## 2025-03-14 NOTE — ASSESSMENT & PLAN NOTE
Patient presented with confusion and delusions, paranoia   Unclear if this was secondary to sepsis in setting of PNA or from intoxication of unknown substance as significant other relays that symptoms started following a possible party at home where the patient had told her he drank and used weed  UDS negative  Ethanol level <10

## 2025-03-14 NOTE — QUICK NOTE
Psychiatry Quick Note     Attempted to evaluate patient for consult today. Patient is drowsy, arousable, falls asleep throughout interview, unable to provide clear history. Last received IV ativan 3/12 at 2034 for agitation. Patient has otherwise been in behavioral control per nursing. Attempted to reach out to patient's girlfriend/caregiver Rhea Sesay ([phone]), no answer. Reviewed patient with his primary psychiatrist Dr. Mcintosh.     Briefly, this is a 58 yo M past psychiatric history of schizoaffective disorder, psychosis, multiple inpatient hospitalizations including EAC admission, [facility], insomnia, OCD symptoms, dependent personality disorder, history of suicide attempts, alcohol use disorder, who presents with AMS, agitation, frequent falls, bizarre behavior per girlfriend. Per chart, patient reportedly to made statements of atomic bombs to girlfriend who endorsed suspicion behaviors were secondary to alcohol use. Met SIRs criteria in ED, suspected to have aspiration PNA, on Ceftriaxone. UDS negative. Ethanol level WNL. Follows with Dr Mcintosh outpatient, last seen 2/10/2025 with no new medication changes. Has been compliant with psychotropics. Also on Invega DE ANDA, next dose pending 3/3/25, however patient states he has not received, unable to clarify with caregiver.     Plan:   Continue psychotropics as below  - Depakote 1250 mg qhs  - Prolixin 1 mg qhs   - Effexor 112.5 mg qd    - Cogentin 0.75 mg qhs   - Gabapentin 200 mg qhs   - Melatonin 6 mg qhs   - Ativan 1 mg q6h prn agitation/anxiety per primary   - Depakote level ordered, pending   - Continue 1:1 observation for patient and staff safety   - Will reevaluate tomorrow AM  - Please reach out to on-call Psychiatry team via [facility] or if after hours/Fri/Sat/Sunday contact on-call psychiatric service via [employer] ([phone]) with any questions or concerns.    Dw attending Dr. Holter Elizabeth Kuriakose, DO   Psychiatry, PGY-II

## 2025-03-14 NOTE — ASSESSMENT & PLAN NOTE
Concern for aspiration pneumonia in patient who arrived confused, with possible EtOH intake and received sedation for agitation and spiking fevers with leukocytosis and CT chest findings suspicious for aspiration PNA   Received Cefepime in the ED, will continue on Rocephin  Respiratory protocol   Monitor pulse ox  Elevated procalcitonin, trend  Aspiration precautions  Appreciate speech therapy eval   urine legionella and urine strep negative

## 2025-03-14 NOTE — ED NOTES
Recipient    Name: TAHIRA CANELA  Recipient ID: [id number]  YOB: 1965  Gender: Male    Eligibility Summary    Service Program: [facility] Funding Only - Non-Medic  Other or Additional Payor MEDICARE PART B   Other or Additional Payor MEDICARE PART A

## 2025-03-14 NOTE — PROGRESS NOTES
Progress Note - Hospitalist   Name: Sudhakar Choe 59 y.o. male I MRN: [id number]  Unit/Bed#: 7T [facility] 703-01 I Date of Admission: 3/12/2025   Date of Service: 3/14/2025 I Hospital Day: 1    Assessment & Plan  Sepsis (HCC)  POA, given fever, tachycardia, leukocytosis with suspicion for aspiration pneumonia on CT chest  Received 1 dose of Cefepime in the ED, will continue patient on Rocephin  F/u Bcx, continue to monitor fever curve and WBC count  UA negative  Flu, COVID, RSV negative  Mood disorder (rule out Schizoaffective disorder, depressed type)  Presents with delusions and confusion, some agitation  Was awaiting psychiatric evaluation prior to admission to med surg  Continue PTA psych regimen for now while awaiting Psych evaluation  Continue 1:1  Alcohol use disorder  Per significant other, 1.5 bottles of wine in the fridge were emptied day prior to admission  Ethanol level <10 on arrival  Patient unable to recall last drink but states when he drinks he drinks 3-4 glasses of wine  No signs of withdrawal at this time  Monitor on CIWA, prn ativan for agitation  Falls  Possibly in setting of acute intoxication as significant other states he was perfectly fine Tuesday night when he was last in normal state of mind/health  CTH negative  Fall precautions  Appreciate PT and OT eval  Aspiration pneumonia of both lungs (HCC)  Concern for aspiration pneumonia in patient who arrived confused, with possible EtOH intake and received sedation for agitation and spiking fevers with leukocytosis and CT chest findings suspicious for aspiration PNA   Received Cefepime in the ED, will continue on Rocephin  Respiratory protocol   Monitor pulse ox  Elevated procalcitonin, trend  Aspiration precautions  Appreciate speech therapy eval   urine legionella and urine strep negative  Acute metabolic encephalopathy  Patient presented with confusion and delusions, paranoia   Unclear if this was secondary to sepsis in setting of PNA or from  "intoxication of unknown substance as significant other relays that symptoms started following a possible party at home where the patient had told her he drank and used weed  UDS negative  Ethanol level <10    VTE Pharmacologic Prophylaxis:   Moderate Risk (Score 3-4) - Pharmacological DVT Prophylaxis Ordered: enoxaparin (Lovenox).    Mobility:   Basic Mobility Inpatient Raw Score: 6  JH-HLM Goal: 2: Bed activities/Dependent transfer  JH-HLM Achieved: 2: Bed activities/Dependent transfer  JH-HLM Goal achieved. Continue to encourage appropriate mobility.    Patient Centered Rounds: I performed bedside rounds with nursing staff today.   Discussions with Specialists or Other Care Team Provider: case management, awaiting Psych eval    Education and Discussions with Family / Patient: Attempted to update  (significant other) via phone. Unable to contact.    Current Length of Stay: 1 day(s)  Current Patient Status: Inpatient   Certification Statement: The patient will continue to require additional inpatient hospital stay due to pneumonia, encephalopathy  Discharge Plan: Anticipate discharge in 48-72 hrs to TBD    Code Status: Level 1 - Full Code    Subjective   Patient seen and examined at bedside. States that he is having a headache. When asked if he knows why he was brought in he states it was because he was falling a lot. He claims that the ambulance brought him here. When asked if he remembers any of the events that occurred with his girlfriend prior to him being brought into the ED, he states \"she did this. This is Rhea's doing.\" He was muttering so unable to completely understand his statements. When it was pointed out that he seemed to be very sleepy, he stated \"No, I'm awake. I'm awake.\" Denies any other complaints at this time. Denies right arm pain.      Objective :  Temp:  [96.8 °F (36 °C)-100.4 °F (38 °C)] 97.8 °F (36.6 °C)  HR:  [] 93  BP: (100-137)/() 100/67  Resp:  [18-20] " "18  SpO2:  [91 %-97 %] 97 %  O2 Device: None (Room air)  Nasal Cannula O2 Flow Rate (L/min):  [2 L/min] 2 L/min    Body mass index is 28.07 kg/m².     Input and Output Summary (last 24 hours):     Intake/Output Summary (Last 24 hours) at 3/14/2025 1037  Last data filed at 3/13/2025 1827  Gross per 24 hour   Intake 1290 ml   Output 3050 ml   Net -1760 ml       Physical Exam  Vitals and nursing note reviewed.   Constitutional:       General: He is not in acute distress.     Appearance: He is well-developed.      Comments: Lethargic but arouses with verbal stimuli   HENT:      Head: Normocephalic and atraumatic.   Eyes:      Conjunctiva/sclera: Conjunctivae normal.   Cardiovascular:      Rate and Rhythm: Normal rate and regular rhythm.      Heart sounds: No murmur heard.  Pulmonary:      Effort: Pulmonary effort is normal. No respiratory distress.      Breath sounds: Normal breath sounds.   Abdominal:      Palpations: Abdomen is soft.      Tenderness: There is no abdominal tenderness.   Musculoskeletal:         General: No swelling.      Cervical back: Neck supple.   Skin:     General: Skin is warm and dry.      Capillary Refill: Capillary refill takes less than 2 seconds.   Neurological:      Mental Status: He is alert.      Comments: Patient is oriented to self and place  He still appears confused and unable to recall all details leading to his admission  Unable to do full neuro assessment of motor and sensory as patient not fully awake   Psychiatric:      Comments: Patient not entirely able to state events surrounding his admission  Kept repeating \"this is Rhea's doing. She did this.\" States he was brought in because he was falling a lot.   Was not clear on whether he drank or did any drugs night prior to admission.        Lines/Drains:      Lab Results: I have reviewed the following results:   Results from last 7 days   Lab Units 03/14/25  0447   WBC Thousand/uL 12.02*   HEMOGLOBIN g/dL 11.8*   HEMATOCRIT % 37.6 "   PLATELETS Thousands/uL 178   BANDS PCT % 5   LYMPHO PCT % 26   MONO PCT % 15*   EOS PCT % 2     Results from last 7 days   Lab Units 03/14/25  0447 03/13/25  1445 03/12/25  2050   SODIUM mmol/L 136   < > 135   POTASSIUM mmol/L 3.9   < > 3.6   CHLORIDE mmol/L 102   < > 98   CO2 mmol/L 25   < > 28   BUN mg/dL 10   < > 13   CREATININE mg/dL 0.90   < > 1.23   ANION GAP mmol/L 9   < > 9   CALCIUM mg/dL 8.3*   < > 9.0   ALBUMIN g/dL  --   --  3.8   TOTAL BILIRUBIN mg/dL  --   --  0.57   ALK PHOS U/L  --   --  46   ALT U/L  --   --  9   AST U/L  --   --  14   GLUCOSE RANDOM mg/dL 87   < > 106    < > = values in this interval not displayed.         Results from last 7 days   Lab Units 03/14/25  0545 03/13/25  2047 03/13/25  1554 03/13/25  1440   POC GLUCOSE mg/dl 76 92 95 122     Results from last 7 days   Lab Units 03/13/25  1445   HEMOGLOBIN A1C % 5.8*     Results from last 7 days   Lab Units 03/14/25  0447 03/13/25  1042 03/13/25  1000   LACTIC ACID mmol/L  --  1.0  --    PROCALCITONIN ng/ml 0.70*  --  0.70*       Recent Cultures (last 7 days):   Results from last 7 days   Lab Units 03/13/25  1540 03/13/25  1042 03/13/25  1000   BLOOD CULTURE   --  Received in Microbiology Lab. Culture in Progress. Received in Microbiology Lab. Culture in Progress.   LEGIONELLA URINARY ANTIGEN  Negative  --   --        Imaging Results Review: No pertinent imaging studies reviewed.  Other Study Results Review: No additional pertinent studies reviewed.    Last 24 Hours Medication List:     Current Facility-Administered Medications:     acetaminophen (TYLENOL) tablet 650 mg, Q6H PRN    atropine (ISOPTO ATROPINE) 1 % ophthalmic solution 1 drop, Q12H    benztropine (COGENTIN) tablet 0.75 mg, HS    cefTRIAXone (ROCEPHIN) IVPB (premix in dextrose) 1,000 mg 50 mL, Q24H, Last Rate: 1,000 mg (03/13/25 2121)    divalproex sodium (DEPAKOTE ER) 24 hr tablet 1,250 mg, HS    docusate sodium (COLACE) capsule 100 mg, BID    enoxaparin (LOVENOX)  subcutaneous injection 40 mg, Daily    fluPHENAZine (PROLIXIN) tablet 1 mg, HS    folic acid (FOLVITE) tablet 1 mg, Daily    gabapentin (NEURONTIN) capsule 200 mg, HS    insulin lispro (HumALOG/ADMELOG) 100 units/mL subcutaneous injection 1-6 Units, TID AC **AND** Fingerstick Glucose (POCT), TID AC    insulin lispro (HumALOG/ADMELOG) 100 units/mL subcutaneous injection 1-6 Units, HS    LORazepam (ATIVAN) injection 0.5 mg, Once    LORazepam (ATIVAN) injection 1 mg, Q6H PRN    melatonin tablet 6 mg, HS    multivitamin-minerals (CENTRUM) tablet 1 tablet, Daily    ondansetron (ZOFRAN) injection 4 mg, Q6H PRN    tamsulosin (FLOMAX) capsule 0.4 mg, Daily With Dinner    thiamine tablet 100 mg, Daily    venlafaxine (EFFEXOR-XR) 24 hr capsule 37.5 mg, Daily    venlafaxine (EFFEXOR-XR) 24 hr capsule 75 mg, Daily    Administrative Statements   Today, Patient Was Seen By: Alysia Arriaza MD    **Please Note: This note may have been constructed using a voice recognition system.**
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
## 2025-03-14 NOTE — CASE MANAGEMENT
Case Management Assessment & Discharge Planning Note    Patient name Sudhakar Choe  Location 7T [facility] 703/7T [facility] 703-01 MRN [id number]  : 1965 Date 3/14/2025       Current Admission Date: 3/12/2025  Current Admission Diagnosis:Sepsis (formerly [facility])   Patient Active Problem List    Diagnosis Date Noted Date Diagnosed    Sepsis (formerly [facility]) 2025     Alcohol use disorder 2025     Falls 2025     Aspiration pneumonia of both lungs (formerly [facility]) 2025     Acute metabolic encephalopathy 2025     Pain in right foot 2025     Closed nondisplaced fracture of second metatarsal bone of right foot, initial encounter 2025     Movement disorder (rule out drug indiced movement disorder) 2024     Mood disorder (rule out Schizoaffective disorder, depressed type) 2024     Constipation 2024     Drooling 2024     Psychosis (formerly [facility]) 2024     Leg DVT (deep venous thromboembolism), chronic, right (formerly [facility]) 2024     Hypotension due to drugs 2024     Smoking 2024     Painful orthopaedic hardware (formerly [facility]) 2024     Prediabetes 2024     Bilateral lower extremity edema 2023     Vitamin D deficiency 05/15/2023     COVID-19 2022     Schizoaffective disorder, depressive type (formerly [facility]) 2022     Left foot pain 10/04/2021     Abscess 2021     Bipolar affective disorder, current episode manic with psychotic symptoms (formerly [facility]) 2021     Mixed obsessional thoughts and acts 2021     Alcohol use disorder, moderate, in sustained remission (formerly [facility]) 2021     DJD (degenerative joint disease) 2021     Penis pain 2021     Spondylosis of cervical region without myelopathy or radiculopathy 2020     Closed fracture of nasal bone 10/04/2020     Abnormal CT scan, cervical spine 10/03/2020     Fall 10/03/2020     Alcohol intoxication (HCC) 10/03/2020     Ventral hernia 2020     History of Helicobacter pylori infection 2020      Conflicted attitude towards dietary regime 01/21/2020     Benign prostatic hyperplasia 11/06/2019     History of obsessive compulsive disorder 09/27/2019     Insomnia 01/16/2019     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1     OBJECTIVE:    Risk of Unplanned Readmission Score: 14.21        Current admission status: Inpatient  Referral Reason: Other (Discharge planning)    Preferred Pharmacy:   [id number] [address] [address]  Phone: [phone] Fax: [phone]    Primary Care Provider: Osvaldo Soler DO    Primary Insurance: MEDICARE  Secondary Insurance:     ASSESSMENT:  Active Health Care Proxies    There are no active Health Care Proxies on file.       Readmission Root Cause  30 Day Readmission: No    Patient Information  Admitted from:: Home  Mental Status: Alert  During Assessment patient was accompanied by: Not accompanied during assessment  Assessment information provided by:: Patient  Primary Caregiver: Self  Support Systems: Spouse/significant other  County of Residence: [place]  What city do you live in?: [place]  Home entry access options. Select all that apply.: Stairs  Number of steps to enter home.: 4  Do the steps have railings?: Yes  Type of Current  Residence: 2 story home  Upon entering residence, is there a bedroom on the main floor (no further steps)?: No  A bedroom is located on the following floor levels of residence (select all that apply):: 2nd Floor  Upon entering residence, is there a bathroom on the main floor (no further steps)?: Yes  Number of steps to 2nd floor from main floor: One Flight  Living Arrangements: Lives w/ Spouse/significant other  Is patient a ?: No    Activities of Daily Living Prior to Admission  Functional Status: Independent  Completes ADLs independently?: Yes  Ambulates independently?: Yes  Does patient use assisted devices?: No  Does patient currently own DME?: No  Does patient have a history of Outpatient Therapy (PT/OT)?: No  Does the patient have a history of Short-Term Rehab?: No  Does patient have a history of HHC?: No  Does patient currently have HHC?: No    Patient Information Continued  Income Source: SSI/SSD  Does patient have prescription coverage?: Yes  Can the patient afford their medications and any related supplies (such as glucometers or test strips)?: N/A  Does patient receive dialysis treatments?: No  Does patient have a history of substance abuse?:  (patient declines any substance use)  Does patient have a history of Mental Health Diagnosis?: Yes (Bipolar disorder)  Is patient receiving treatment for mental health?: Yes  Has patient received inpatient treatment related to mental health in the last 2 years?: Yes ([facility] 12/29/23-3/5/24 [facility] 5/11-7/13/23)    Means of Transportation  Means of Transport to [facility]:: Drives Self    DISCHARGE DETAILS:    Discharge planning discussed with:: Patient and girlfriend Rhea  Freedom of Choice: Yes     CM contacted family/caregiver?: Yes  Were Treatment Team discharge recommendations reviewed with patient/caregiver?: Yes  Did patient/caregiver verbalize understanding of patient care needs?: Yes  Were patient/caregiver advised of the risks associated with not  following Treatment Team discharge recommendations?: Yes    Contacts  Patient Contacts: Rhea Sesay (Significant Other)  [phone] (Mobile)  Relationship to Patient:: Family  Contact Method: Phone  Phone Number: Rhea Sesay (Significant Other)  [phone] (Mobile)  Reason/Outcome: Emergency Contact, Discharge Planning    Would you like to participate in our [facility] service program?  : No - Declined       Additional Comments: Met with patient at bedside.  Patient OOB and in chair On 1:1.  Able to answer most CM questions.  Call to NARCISA mcdaniels and confirmed additional information.  Patient was Independent PTA and drives. Rhea concerned abot AMS with no improvement asking to talk to MD.  Message to DONOVAN Pratt and she will update SO.  CM department following for discharge planning.  Waiting for psych consult for recs

## 2025-03-14 NOTE — ASSESSMENT & PLAN NOTE
Per significant other, 1.5 bottles of wine in the fridge were emptied day prior to admission  Ethanol level <10 on arrival  Patient unable to recall last drink but states when he drinks he drinks 3-4 glasses of wine  No signs of withdrawal at this time  Monitor on CIWA, prn ativan for agitation

## 2025-03-14 NOTE — ASSESSMENT & PLAN NOTE
Possibly in setting of acute intoxication as significant other states he was perfectly fine Tuesday night when he was last in normal state of mind/health  CTH negative  Fall precautions  Appreciate PT and OT eval

## 2025-03-14 NOTE — TREATMENT PLAN
Will place patient on stroke pathway given right-sided weakness. Uncertain if this is new as patient's mental status has not been at baseline and neuro exam limited however patient is demonstrating weakness of right side, propensity to lean to right side and seems to have a facial droop on the right.     Repeat CTH ordered to assess for evolving stroke from yesterday's CT scan  CTA H&N ordered  MRI brain ordered  Place on stroke pathway  Continue telemetry  Start on ASA and atorvastatin for now and dc if stroke negative  F/u lipid panel and HbA1c  Discussed with Dr. Gorman  Patient already evaluated by speech, PT and OT

## 2025-03-14 NOTE — PLAN OF CARE
Problem: Potential for Falls  Goal: Patient will remain free of falls  Description: INTERVENTIONS:  - Educate patient/family on patient safety including physical limitations  - Instruct patient to call for assistance with activity   - Consult OT/PT to assist with strengthening/mobility   - Keep Call bell within reach  - Keep bed low and locked with side rails adjusted as appropriate  - Keep care items and personal belongings within reach  - Initiate and maintain comfort rounds  - Make Fall Risk Sign visible to staff  - Apply yellow socks and bracelet for high fall risk patients  - Consider moving patient to room near nurses station  Outcome: Progressing     Problem: PAIN - ADULT  Goal: Verbalizes/displays adequate comfort level or baseline comfort level  Description: Interventions:  - Encourage patient to monitor pain and request assistance  - Assess pain using appropriate pain scale  - Administer analgesics based on type and severity of pain and evaluate response  - Implement non-pharmacological measures as appropriate and evaluate response  - Consider cultural and social influences on pain and pain management  - Notify physician/advanced practitioner if interventions unsuccessful or patient reports new pain  Outcome: Progressing     Problem: SAFETY ADULT  Goal: Patient will remain free of falls  Description: INTERVENTIONS:  - Educate patient/family on patient safety including physical limitations  - Instruct patient to call for assistance with activity   - Consult OT/PT to assist with strengthening/mobility   - Keep Call bell within reach  - Keep bed low and locked with side rails adjusted as appropriate  - Keep care items and personal belongings within reach  - Initiate and maintain comfort rounds  - Make Fall Risk Sign visible to staff  - Apply yellow socks and bracelet for high fall risk patients  - Consider moving patient to room near nurses station  Outcome: Progressing  Goal: Maintain or return to baseline  ADL function  Description: INTERVENTIONS:  -  Assess patient's ability to carry out ADLs; assess patient's baseline for ADL function and identify physical deficits which impact ability to perform ADLs (bathing, care of mouth/teeth, toileting, grooming, dressing, etc.)  - Assess/evaluate cause of self-care deficits   - Assess range of motion  Outcome: Progressing  Goal: Maintains/Returns to pre admission functional level  Description: INTERVENTIONS:  - Perform AM-PAC 6 Click Basic Mobility/ Daily Activity assessment daily.  - Set and communicate daily mobility goal to care team and patient/family/caregiver.   - Collaborate with rehabilitation services on mobility goals if consulted  - Out of bed for toileting  - Record patient progress and toleration of activity level   Outcome: Progressing     Problem: DISCHARGE PLANNING  Goal: Discharge to home or other facility with appropriate resources  Description: INTERVENTIONS:  - Identify barriers to discharge w/patient and caregiver  - Arrange for needed discharge resources and transportation as appropriate  - Identify discharge learning needs (meds, wound care, etc.)  - Arrange for interpretive services to assist at discharge as needed  - Refer to Case Management Department for coordinating discharge planning if the patient needs post-hospital services based on physician/advanced practitioner order or complex needs related to functional status, cognitive ability, or social support system  Outcome: Progressing     Problem: Knowledge Deficit  Goal: Patient/family/caregiver demonstrates understanding of disease process, treatment plan, medications, and discharge instructions  Description: Complete learning assessment and assess knowledge base.  Interventions:  - Provide teaching at level of understanding  - Provide teaching via preferred learning methods  Outcome: Progressing     Problem: INFECTION - ADULT  Goal: Absence or prevention of progression during  hospitalization  Description: INTERVENTIONS:  - Assess and monitor for signs and symptoms of infection  - Monitor lab/diagnostic results    - Administer medications as ordered  - Instruct and encourage patient and family to use good hand hygiene technique  - Identify and instruct in appropriate isolation precautions for identified infection/condition  Outcome: Progressing     Problem: Prexisting or High Potential for Compromised Skin Integrity  Goal: Skin integrity is maintained or improved  Description: INTERVENTIONS:  - Identify patients at risk for skin breakdown  - Assess and monitor skin integrity  - Assess and monitor nutrition and hydration status  - Monitor labs   - Assess for incontinence   - Turn and reposition patient  - Assist with mobility/ambulation  - Relieve pressure over bony prominences  - Avoid friction and shearing  - Provide appropriate hygiene as needed including keeping skin clean and dry  - Evaluate need for skin moisturizer/barrier cream  - Collaborate with interdisciplinary team   - Patient/family teaching  - Consider wound care consult   Outcome: Progressing

## 2025-03-15 ENCOUNTER — APPOINTMENT (INPATIENT)
Dept: MRI IMAGING | Facility: HOSPITAL | Age: 60
DRG: 871 | End: 2025-03-15
Payer: MEDICARE

## 2025-03-15 PROBLEM — R53.1 RIGHT SIDED WEAKNESS: Status: ACTIVE | Noted: 2025-03-15

## 2025-03-15 PROBLEM — R33.8 ACUTE URINARY RETENTION: Status: ACTIVE | Noted: 2025-03-15

## 2025-03-15 LAB
AMMONIA PLAS-SCNC: 12 UMOL/L (ref 18–72)
ANION GAP SERPL CALCULATED.3IONS-SCNC: 9 MMOL/L (ref 4–13)
BASOPHILS # BLD AUTO: 0.02 THOUSANDS/ÂΜL (ref 0–0.1)
BASOPHILS NFR BLD AUTO: 0 % (ref 0–1)
BUN SERPL-MCNC: 8 MG/DL (ref 5–25)
CALCIUM SERPL-MCNC: 8.5 MG/DL (ref 8.4–10.2)
CHLORIDE SERPL-SCNC: 100 MMOL/L (ref 96–108)
CHOLEST SERPL-MCNC: 113 MG/DL (ref ?–200)
CO2 SERPL-SCNC: 27 MMOL/L (ref 21–32)
CREAT SERPL-MCNC: 0.71 MG/DL (ref 0.6–1.3)
EOSINOPHIL # BLD AUTO: 0.16 THOUSAND/ÂΜL (ref 0–0.61)
EOSINOPHIL NFR BLD AUTO: 2 % (ref 0–6)
ERYTHROCYTE [DISTWIDTH] IN BLOOD BY AUTOMATED COUNT: 14.4 % (ref 11.6–15.1)
GFR SERPL CREATININE-BSD FRML MDRD: 102 ML/MIN/1.73SQ M
GLUCOSE SERPL-MCNC: 104 MG/DL (ref 65–140)
GLUCOSE SERPL-MCNC: 107 MG/DL (ref 65–140)
GLUCOSE SERPL-MCNC: 116 MG/DL (ref 65–140)
GLUCOSE SERPL-MCNC: 93 MG/DL (ref 65–140)
GLUCOSE SERPL-MCNC: 96 MG/DL (ref 65–140)
HCT VFR BLD AUTO: 38.2 % (ref 36.5–49.3)
HDLC SERPL-MCNC: 26 MG/DL
HGB BLD-MCNC: 12.4 G/DL (ref 12–17)
IMM GRANULOCYTES # BLD AUTO: 0.04 THOUSAND/UL (ref 0–0.2)
IMM GRANULOCYTES NFR BLD AUTO: 1 % (ref 0–2)
LDLC SERPL CALC-MCNC: 68 MG/DL (ref 0–100)
LYMPHOCYTES # BLD AUTO: 2.15 THOUSANDS/ÂΜL (ref 0.6–4.47)
LYMPHOCYTES NFR BLD AUTO: 25 % (ref 14–44)
MAGNESIUM SERPL-MCNC: 2.1 MG/DL (ref 1.9–2.7)
MCH RBC QN AUTO: 30.8 PG (ref 26.8–34.3)
MCHC RBC AUTO-ENTMCNC: 32.5 G/DL (ref 31.4–37.4)
MCV RBC AUTO: 95 FL (ref 82–98)
MONOCYTES # BLD AUTO: 1.57 THOUSAND/ÂΜL (ref 0.17–1.22)
MONOCYTES NFR BLD AUTO: 19 % (ref 4–12)
NEUTROPHILS # BLD AUTO: 4.55 THOUSANDS/ÂΜL (ref 1.85–7.62)
NEUTS SEG NFR BLD AUTO: 53 % (ref 43–75)
NRBC BLD AUTO-RTO: 0 /100 WBCS
PLATELET # BLD AUTO: 179 THOUSANDS/UL (ref 149–390)
PMV BLD AUTO: 11.8 FL (ref 8.9–12.7)
POTASSIUM SERPL-SCNC: 3.9 MMOL/L (ref 3.5–5.3)
PROCALCITONIN SERPL-MCNC: 0.4 NG/ML
RBC # BLD AUTO: 4.02 MILLION/UL (ref 3.88–5.62)
SODIUM SERPL-SCNC: 136 MMOL/L (ref 135–147)
TRIGL SERPL-MCNC: 95 MG/DL (ref ?–150)
VALPROATE SERPL-MCNC: 74 UG/ML (ref 50–100)
WBC # BLD AUTO: 8.49 THOUSAND/UL (ref 4.31–10.16)

## 2025-03-15 PROCEDURE — 99223 1ST HOSP IP/OBS HIGH 75: CPT | Performed by: PSYCHIATRY & NEUROLOGY

## 2025-03-15 PROCEDURE — 83036 HEMOGLOBIN GLYCOSYLATED A1C: CPT | Performed by: STUDENT IN AN ORGANIZED HEALTH CARE EDUCATION/TRAINING PROGRAM

## 2025-03-15 PROCEDURE — 99222 1ST HOSP IP/OBS MODERATE 55: CPT | Performed by: EMERGENCY MEDICINE

## 2025-03-15 PROCEDURE — 83735 ASSAY OF MAGNESIUM: CPT | Performed by: STUDENT IN AN ORGANIZED HEALTH CARE EDUCATION/TRAINING PROGRAM

## 2025-03-15 PROCEDURE — 85025 COMPLETE CBC W/AUTO DIFF WBC: CPT | Performed by: STUDENT IN AN ORGANIZED HEALTH CARE EDUCATION/TRAINING PROGRAM

## 2025-03-15 PROCEDURE — 82140 ASSAY OF AMMONIA: CPT | Performed by: EMERGENCY MEDICINE

## 2025-03-15 PROCEDURE — 99233 SBSQ HOSP IP/OBS HIGH 50: CPT | Performed by: STUDENT IN AN ORGANIZED HEALTH CARE EDUCATION/TRAINING PROGRAM

## 2025-03-15 PROCEDURE — 80164 ASSAY DIPROPYLACETIC ACD TOT: CPT

## 2025-03-15 PROCEDURE — 82948 REAGENT STRIP/BLOOD GLUCOSE: CPT

## 2025-03-15 PROCEDURE — 84145 PROCALCITONIN (PCT): CPT | Performed by: STUDENT IN AN ORGANIZED HEALTH CARE EDUCATION/TRAINING PROGRAM

## 2025-03-15 PROCEDURE — 70551 MRI BRAIN STEM W/O DYE: CPT

## 2025-03-15 PROCEDURE — 80061 LIPID PANEL: CPT | Performed by: STUDENT IN AN ORGANIZED HEALTH CARE EDUCATION/TRAINING PROGRAM

## 2025-03-15 PROCEDURE — 80048 BASIC METABOLIC PNL TOTAL CA: CPT | Performed by: STUDENT IN AN ORGANIZED HEALTH CARE EDUCATION/TRAINING PROGRAM

## 2025-03-15 RX ORDER — LANOLIN ALCOHOL/MO/W.PET/CERES
500 CREAM (GRAM) TOPICAL 3 TIMES DAILY
Status: DISCONTINUED | OUTPATIENT
Start: 2025-03-15 | End: 2025-03-15

## 2025-03-15 RX ADMIN — THIAMINE HCL TAB 100 MG 100 MG: 100 TAB at 08:34

## 2025-03-15 RX ADMIN — THIAMINE HYDROCHLORIDE 500 MG: 100 INJECTION, SOLUTION INTRAMUSCULAR; INTRAVENOUS at 14:02

## 2025-03-15 RX ADMIN — CEFTRIAXONE 1000 MG: 1 INJECTION, SOLUTION INTRAVENOUS at 22:11

## 2025-03-15 RX ADMIN — Medication 1 TABLET: at 08:34

## 2025-03-15 RX ADMIN — VENLAFAXINE HYDROCHLORIDE 75 MG: 75 CAPSULE, EXTENDED RELEASE ORAL at 08:53

## 2025-03-15 RX ADMIN — THIAMINE HYDROCHLORIDE 500 MG: 100 INJECTION, SOLUTION INTRAMUSCULAR; INTRAVENOUS at 21:18

## 2025-03-15 RX ADMIN — TAMSULOSIN HYDROCHLORIDE 0.4 MG: 0.4 CAPSULE ORAL at 17:01

## 2025-03-15 RX ADMIN — ATROPINE SULFATE 1 DROP: 10 SOLUTION/ DROPS OPHTHALMIC at 16:59

## 2025-03-15 RX ADMIN — DOCUSATE SODIUM 100 MG: 100 CAPSULE, LIQUID FILLED ORAL at 17:01

## 2025-03-15 RX ADMIN — Medication 6 MG: at 21:26

## 2025-03-15 RX ADMIN — FOLIC ACID 1 MG: 1 TABLET ORAL at 08:34

## 2025-03-15 RX ADMIN — VENLAFAXINE HYDROCHLORIDE 37.5 MG: 37.5 CAPSULE, EXTENDED RELEASE ORAL at 08:34

## 2025-03-15 RX ADMIN — DOCUSATE SODIUM 100 MG: 100 CAPSULE, LIQUID FILLED ORAL at 08:33

## 2025-03-15 RX ADMIN — ASPIRIN 81 MG CHEWABLE TABLET 81 MG: 81 TABLET CHEWABLE at 08:34

## 2025-03-15 RX ADMIN — ATORVASTATIN CALCIUM 40 MG: 40 TABLET, FILM COATED ORAL at 17:01

## 2025-03-15 NOTE — ASSESSMENT & PLAN NOTE
Concern for aspiration pneumonia in patient who arrived confused, with possible EtOH intake and received sedation for agitation and spiking fevers with leukocytosis and CT chest findings suspicious for aspiration PNA   Received Cefepime in the ED, will continue on Rocephin (Abx day 3)  Respiratory protocol   Monitor pulse ox  Elevated procalcitonin, trend  Aspiration precautions  Appreciate speech therapy eval   urine legionella and urine strep negative

## 2025-03-15 NOTE — ASSESSMENT & PLAN NOTE
He has been monitored with [facility], but has not developed any withdrawal while here in the hospital.   Nothing specific required from a tox standpoint at this point.

## 2025-03-15 NOTE — ASSESSMENT & PLAN NOTE
Presents with delusions and confusion, some agitation  Was awaiting psychiatric evaluation prior to admission to med surg  Discussed with Psych, plan to hold psych regimen due to possible OD  Continue 1:1

## 2025-03-15 NOTE — ASSESSMENT & PLAN NOTE
Unclear to us if Sudhakar has an element of encephalopathy (due to aspiration PNA), or if his altered behavior is due to psychiatric comorbidities. His exam is showing tangential speech, low monotonous voice, and poor insight (he thinks he is here because of a car accident). However he also appears to have reduced attention span, and poor awareness. In conclusion, we favor that he is in a psychotic episode over being encephalopathic. However it is difficult to ascertain at this point.  MRI brain personally reviewed: no concerning findings  We recommend EEG; if there is no slowing in background, then his presentation is solely psychogenic. However if there is slowing, then it is not specific finding and it can be due to the PNA or some other reason. In that case we should go by his exam, if mental exam is improving and he is returning to baseline, then nothing further. If exam remains altered, then LP can be considered.

## 2025-03-15 NOTE — ASSESSMENT & PLAN NOTE
Inman inserted on 3/15 after patient failed urinary retention protocol  Possibly in setting of acute change in mental status  CT C/A/P on admission with no structural abnormalities  Given improved mentation, will perform voiding trial

## 2025-03-15 NOTE — CONSULTS
Consultation - Medical Toxicology   Name: Sudhakar Choe 59 y.o. male I MRN: [id number]  Unit/Bed#: 7T [facility] 703-01 I Date of Admission: 3/12/2025   Date of Service: 3/15/2025 I Hospital Day: 2   Inpatient consult to Toxicology  Consult performed by: Tank Mera DO  Consult ordered by: Alysia Fallon MD        Physician Requesting Evaluation: Alysia Montes De Oca *   Reason for Evaluation / Principal Problem: encephalopathy    Assessment & Plan  Acute metabolic encephalopathy  The cause of his encephalopathy is not clear. He is not manifesting any evidence of a toxidrome or acute drug effect and he's been here 2 days now, at which point acute medication of drug effect would typically be resolving.   Wernicke's encephalopathy can always be considered in patient's with alcohol use. Therefore, high dose thiamine 500mg IV every 8 hours can be considered.   He is on valproic acid chronically. Therefore, valproate induced hyperammonemic encephalopathy is also considered. I have ordered an ammonia level and will follow up on that. If significantly elevated, will start carnitine.   Otherwise, nothing more specific is required from a med tox standpoint. Continue supportive care.   Alcohol use disorder  He has been monitored with [facility], but has not developed any withdrawal while here in the hospital.   Nothing specific required from a tox standpoint at this point.   Mood disorder (rule out Schizoaffective disorder, depressed type)    Sepsis (HCC)    Falls    Aspiration pneumonia of both lungs (HCC)    Right sided weakness    I have discussed the above management plan in detail with the primary service.         For further questions, please contact the medical  on call via SecureChat between 8am and 9pm. If between 9pm and 8am, please reach out to the [facility] at [phone].     History of Present Illness   Sudhakar Choe is a 59 y.o. year old male who presents with  altered mental status and aspiration. The patient seemed to be in his usual state of health until . He went to a restaurant with family. His daughter then went to work the night shift Tuesday and received a bizarre message from him around 12:30AM alluding to a party. When she arrived home Wednesday AM, the house was in disarray and he was making bizarre statements, was confused, was unable to ambulate. He was noted to appear cyanotic and had a low pulse ox per his daughter, who is a nurse. He was brought to the hospital and is admitted for the treatment of aspiration and encephalopathy. He has remained encephalopathic. He is mostly awake and alert, but falls asleep easily and is confused to the month and who the president is. He is not able to give any meaningful or reliable history. He does have a h/o ETOH use. The frequency and amount is not completely clear.     Review of Systems   Unable to perform ROS: Mental status change       Historical Information   Medical History Review: I have reviewed the patient's PMH, PSH, Social History, Family History, Meds, and Allergies   Social History     Tobacco Use    Smoking status: Former     Current packs/day: 0.00     Average packs/day: 1 pack/day for 3.6 years (3.6 ttl pk-yrs)     Types: Cigars, Cigarettes     Start date: 2020     Quit date: 2023     Years since quittin.2    Smokeless tobacco: Former     Types: Chew    Tobacco comments:     Smokes two black and mild per day   Vaping Use    Vaping status: Never Used   Substance and Sexual Activity    Alcohol use: Not Currently     Comment: SOCIAL    Drug use: No    Sexual activity: Not Currently     Family History   Problem Relation Age of Onset    Heart attack Father     Prostate cancer Father     Cerebral palsy Brother     OCD Mother     OCD Sister        Meds/Allergies   Prior to Admission medications    Medication Sig Start Date End Date Taking? Authorizing Provider   acetaminophen (TYLENOL)  500 mg tablet Take 2 tablets (1,000 mg total) by mouth every 8 (eight) hours as needed for mild pain 8/6/24   Rosana Underwood DO   atropine (ISOPTO ATROPINE) 1 % ophthalmic solution 1 drop under tongue at 8 AM and 8 PM 2/10/25   Solomon Mcintosh MD   benztropine (COGENTIN) 0.5 mg tablet TAKE ONE ( 0.5 MG ) AND ONE HALF ( 0.25 MG ) TABLET BY MOUTH AT NIGHT 2/10/25   Solomon Mcintosh MD   cholecalciferol (VITAMIN D3) 1,000 units tablet Take 2 tablets (2,000 Units total) by mouth daily 3/5/24 8/6/24  BETO Novak   divalproex sodium (DEPAKOTE ER) 250 mg 24 hr tablet TAKE ONE TABLET BY MOUTH DAILY IN ADDITION TO THE 2 OF THE 500MG TABS FOR TOTAL 1250 MG DAILY 2/10/25   Solomon Mcintosh MD   divalproex sodium (DEPAKOTE ER) 500 mg 24 hr tablet TAKE 2 TABLETS BY MOUTH DAILY ALONG WITH ONE 250MG TABLET FOR TOTAL OF 1250MG DAILY AT BEDTIME 2/10/25   Solomon Mcintosh MD   docusate sodium (COLACE) 100 mg capsule Take 1 capsule (100 mg total) by mouth 2 (two) times a day 2/10/25 4/11/25  Solomon Mcintosh MD   fluPHENAZine (PROLIXIN) 1 mg tablet Take 1 tablet (1 mg total) by mouth daily at bedtime 2/10/25   Solomon Mcintosh MD   gabapentin (NEURONTIN) 100 mg capsule Take 2 capsules (200 mg total) by mouth daily at bedtime 2/10/25   Solomon Mcintosh MD   Melatonin 5 MG TABS Take 1 tablet (5 mg total) by mouth daily at bedtime 2/10/25   Solomon Mcintosh MD   metFORMIN (GLUCOPHAGE) 500 mg tablet take 1 tablet by mouth twice a day with meals 12/20/24   Osvaldo Soler DO   paliperidone palmitate ER (INVEGA) 234 mg/1.5 mL IM injection 1.5 mL (234 mg total) by Intramuscular (deltoid only) route every 30 (thirty) days The following dose is to be administered 4 weeks after the 12/30/2024 injection.  Administer on 01/27/2025. Do not start before March 3, 2025. 3/3/25   Solomon Mcintosh MD   tamsulosin (FLOMAX) 0.4 mg Take 1 capsule (0.4 mg total) by mouth daily with dinner 2/28/25 4/29/25  Osvaldo Soler DO   venlafaxine  (EFFEXOR-XR) 37.5 mg 24 hr capsule Take Effexor 37.5 mg (one 37.5 tablet) and 75 mg (one 75 mg tablet) for a total of 112.5 mg daily 2/10/25   Solomon Mcintosh MD   venlafaxine (EFFEXOR-XR) 75 mg 24 hr capsule Take Effexor 37.5 mg (one 37.5 tablet) and 75 mg (one 75 mg tablet) for a total of 112.5 mg daily 2/10/25   Solomon Mcintosh MD     Current Facility-Administered Medications:     acetaminophen (TYLENOL) tablet 650 mg, 650 mg, Oral, Q6H PRN, Alysia Fallon MD, 650 mg at 03/13/25 1646    aspirin chewable tablet 81 mg, 81 mg, Oral, Daily, Alysia Fallon MD, 81 mg at 03/15/25 0834    atorvastatin (LIPITOR) tablet 40 mg, 40 mg, Oral, QPM, Alysia Fallon MD, 40 mg at 03/14/25 2046    atropine (ISOPTO ATROPINE) 1 % ophthalmic solution 1 drop, 1 drop, Sublingual, Q12H, Alysia Fallon MD, 1 drop at 03/14/25 0105    cefTRIAXone (ROCEPHIN) IVPB (premix in dextrose) 1,000 mg 50 mL, 1,000 mg, Intravenous, Q24H, Alysia Fallon MD, Last Rate: 100 mL/hr at 03/14/25 2136, 1,000 mg at 03/14/25 2136    docusate sodium (COLACE) capsule 100 mg, 100 mg, Oral, BID, Alysia Fallon MD, 100 mg at 03/15/25 0833    enoxaparin (LOVENOX) subcutaneous injection 40 mg, 40 mg, Subcutaneous, Daily, Alysia Fallon MD, 40 mg at 03/14/25 1022    folic acid (FOLVITE) tablet 1 mg, 1 mg, Oral, Daily, Alysia Fallon, MD, 1 mg at 03/15/25 0834    insulin lispro (HumALOG/ADMELOG) 100 units/mL subcutaneous injection 1-6 Units, 1-6 Units, Subcutaneous, TID AC **AND** Fingerstick Glucose (POCT), , , TID AC, Alysia Fallon MD    insulin lispro (HumALOG/ADMELOG) 100 units/mL subcutaneous injection 1-6 Units, 1-6 Units, Subcutaneous, HS, Alysia Fallon MD    LORazepam (ATIVAN) injection 0.5 mg, 0.5 mg, Intravenous, Once, Alysia Corrigan  Falguni Fallon MD    LORazepam (ATIVAN) injection 1 mg, 1 mg, Intravenous, Q6H PRN, Alysia Fallon MD, 1 mg at 03/13/25 2034    melatonin tablet 6 mg, 6 mg, Oral, HS, Alysia Fallon MD, 6 mg at 03/14/25 2135    multivitamin-minerals (CENTRUM) tablet 1 tablet, 1 tablet, Oral, Daily, Alysia Fallon MD, 1 tablet at 03/15/25 0834    ondansetron (ZOFRAN) injection 4 mg, 4 mg, Intravenous, Q6H PRN, Alysia Fallon MD    tamsulosin (FLOMAX) capsule 0.4 mg, 0.4 mg, Oral, Daily With Dinner, Alysia Fallon MD, 0.4 mg at 03/14/25 1714    thiamine tablet 100 mg, 100 mg, Oral, Daily, Alysia Fallon MD, 100 mg at 03/15/25 0834   Allergies   Allergen Reactions    Penicillins Diarrhea and Vomiting    Celecoxib Rash       Objective :  Temp:  [97.3 °F (36.3 °C)-98.8 °F (37.1 °C)] 97.3 °F (36.3 °C)  HR:  [88-99] 95  BP: ()/(75-91) 120/83  Resp:  [18-22] 20  SpO2:  [85 %-95 %] 91 %  O2 Device: None (Room air)  Nasal Cannula O2 Flow Rate (L/min):  [2 L/min] 2 L/min      Intake/Output Summary (Last 24 hours) at 3/15/2025 1205  Last data filed at 3/15/2025 0800  Gross per 24 hour   Intake 600 ml   Output --   Net 600 ml       Physical Exam  Constitutional:       General: He is not in acute distress.  Eyes:      Extraocular Movements: Extraocular movements intact.      Pupils: Pupils are equal, round, and reactive to light.   Cardiovascular:      Rate and Rhythm: Normal rate and regular rhythm.   Pulmonary:      Effort: Pulmonary effort is normal.      Breath sounds: Rhonchi (B/L) present.   Abdominal:      General: There is no distension.      Palpations: Abdomen is soft.   Musculoskeletal:      Right lower leg: No edema.      Left lower leg: No edema.   Skin:     Coloration: Skin is not jaundiced.   Neurological:      Mental Status: He is alert.      Comments: Oriented to person and  year.   RUE weakness and limb ataxia with R hand contracture suggestive of old insult. Otherwise, no tremor noted.           Lab Results: I have reviewed the following results:  Results from last 7 days   Lab Units 03/15/25  0450 03/14/25  0447 03/13/25  1445   WBC Thousand/uL 8.49 12.02* 12.85*   HEMOGLOBIN g/dL 12.4 11.8* 11.8*   HEMATOCRIT % 38.2 37.6 36.3*   PLATELETS Thousands/uL 179 178 199  186   SEGS PCT % 53  --   --    LYMPHO PCT % 25 26 20   MONO PCT % 19* 15* 12   EOS PCT % 2 2 0      Results from last 7 days   Lab Units 03/15/25  0450 03/13/25  1445 03/12/25 2050   POTASSIUM mmol/L 3.9   < > 3.6   CHLORIDE mmol/L 100   < > 98   CO2 mmol/L 27   < > 28   BUN mg/dL 8   < > 13   CREATININE mg/dL 0.71   < > 1.23   CALCIUM mg/dL 8.5   < > 9.0   ALBUMIN g/dL  --   --  3.8   ALK PHOS U/L  --   --  46   ALT U/L  --   --  9   AST U/L  --   --  14   MAGNESIUM mg/dL 2.1   < >  --     < > = values in this interval not displayed.          Results from last 7 days   Lab Units 03/13/25  1042   LACTIC ACID mmol/L 1.0              Results from last 7 days   Lab Units 03/12/25  2050   ETHANOL LVL mg/dL <10     Imaging Results Review: I reviewed radiology reports from this admission including: CT head.  Other Study Results Review: No additional pertinent studies reviewed.    Administrative Statements   I have spent a total time of 40 minutes in caring for this patient on the day of the visit/encounter including Impressions, Documenting in the medical record, Reviewing/placing orders in the medical record (including tests, medications, and/or procedures), Obtaining or reviewing history  , and Communicating with other healthcare professionals .

## 2025-03-15 NOTE — PLAN OF CARE
Problem: Potential for Falls  Goal: Patient will remain free of falls  Description: INTERVENTIONS:  - Educate patient/family on patient safety including physical limitations  - Instruct patient to call for assistance with activity   - Consult OT/PT to assist with strengthening/mobility   - Keep Call bell within reach  - Keep bed low and locked with side rails adjusted as appropriate  - Keep care items and personal belongings within reach  - Initiate and maintain comfort rounds  - Make Fall Risk Sign visible to staff  - Apply yellow socks and bracelet for high fall risk patients  - Consider moving patient to room near nurses station  Outcome: Progressing     Problem: PAIN - ADULT  Goal: Verbalizes/displays adequate comfort level or baseline comfort level  Description: Interventions:  - Encourage patient to monitor pain and request assistance  - Assess pain using appropriate pain scale  - Administer analgesics based on type and severity of pain and evaluate response  - Implement non-pharmacological measures as appropriate and evaluate response  - Consider cultural and social influences on pain and pain management  - Notify physician/advanced practitioner if interventions unsuccessful or patient reports new pain  Outcome: Progressing     Problem: SAFETY ADULT  Goal: Patient will remain free of falls  Description: INTERVENTIONS:  - Educate patient/family on patient safety including physical limitations  - Instruct patient to call for assistance with activity   - Consult OT/PT to assist with strengthening/mobility   - Keep Call bell within reach  - Keep bed low and locked with side rails adjusted as appropriate  - Keep care items and personal belongings within reach  - Initiate and maintain comfort rounds  - Make Fall Risk Sign visible to staff  - Apply yellow socks and bracelet for high fall risk patients  - Consider moving patient to room near nurses station  Outcome: Progressing  Goal: Maintain or return to baseline  ADL function  Description: INTERVENTIONS:  -  Assess patient's ability to carry out ADLs; assess patient's baseline for ADL function and identify physical deficits which impact ability to perform ADLs (bathing, care of mouth/teeth, toileting, grooming, dressing, etc.)  - Assess/evaluate cause of self-care deficits   - Assess range of motion  Outcome: Progressing  Goal: Maintains/Returns to pre admission functional level  Description: INTERVENTIONS:  - Perform AM-PAC 6 Click Basic Mobility/ Daily Activity assessment daily.  - Set and communicate daily mobility goal to care team and patient/family/caregiver.   - Collaborate with rehabilitation services on mobility goals if consulted  - Out of bed for toileting  - Record patient progress and toleration of activity level   Outcome: Progressing     Problem: DISCHARGE PLANNING  Goal: Discharge to home or other facility with appropriate resources  Description: INTERVENTIONS:  - Identify barriers to discharge w/patient and caregiver  - Arrange for needed discharge resources and transportation as appropriate  - Identify discharge learning needs (meds, wound care, etc.)  - Arrange for interpretive services to assist at discharge as needed  - Refer to Case Management Department for coordinating discharge planning if the patient needs post-hospital services based on physician/advanced practitioner order or complex needs related to functional status, cognitive ability, or social support system  Outcome: Progressing     Problem: INFECTION - ADULT  Goal: Absence or prevention of progression during hospitalization  Description: INTERVENTIONS:  - Assess and monitor for signs and symptoms of infection  - Monitor lab/diagnostic results    - Administer medications as ordered  - Instruct and encourage patient and family to use good hand hygiene technique  - Identify and instruct in appropriate isolation precautions for identified infection/condition  Outcome: Progressing     Problem:  Prexisting or High Potential for Compromised Skin Integrity  Goal: Skin integrity is maintained or improved  Description: INTERVENTIONS:  - Identify patients at risk for skin breakdown  - Assess and monitor skin integrity  - Assess and monitor nutrition and hydration status  - Monitor labs   - Assess for incontinence   - Turn and reposition patient  - Assist with mobility/ambulation  - Relieve pressure over bony prominences  - Avoid friction and shearing  - Provide appropriate hygiene as needed including keeping skin clean and dry  - Evaluate need for skin moisturizer/barrier cream  - Collaborate with interdisciplinary team   - Patient/family teaching  - Consider wound care consult   Outcome: Progressing     Problem: Nutrition/Hydration-ADULT  Goal: Nutrient/Hydration intake appropriate for improving, restoring or maintaining nutritional needs  Description: Monitor and assess patient's nutrition/hydration status for malnutrition. Collaborate with interdisciplinary team and initiate plan and interventions as ordered.  Monitor patient's weight and dietary intake as ordered or per policy. Utilize nutrition screening tool and intervene as necessary. Determine patient's food preferences and provide high-protein, high-caloric foods as appropriate.     INTERVENTIONS:  - Monitor oral intake, urinary output, labs, and treatment plans  - Assess nutrition and hydration status and recommend course of action  - Evaluate amount of meals eaten  - Assist patient with eating if necessary   - Allow adequate time for meals  - Recommend/ encourage appropriate diets, oral nutritional supplements, and vitamin/mineral supplements  - Order, calculate, and assess calorie counts as needed  - Recommend, monitor, and adjust tube feedings and TPN/PPN based on assessed needs  - Assess need for intravenous fluids  - Provide specific nutrition/hydration education as appropriate  - Include patient/family/caregiver in decisions related to  nutrition  Outcome: Progressing     Problem: NEUROSENSORY - ADULT  Goal: Achieves stable or improved neurological status  Description: INTERVENTIONS  - Monitor and report changes in neurological status  - Monitor vital signs such as temperature, blood pressure, glucose, and any other labs ordered   - Initiate measures to prevent increased intracranial pressure  - Monitor for seizure activity and implement precautions if appropriate      Outcome: Progressing  Goal: Remains free of injury related to seizures activity  Description: INTERVENTIONS  - Maintain airway, patient safety  and administer oxygen as ordered  - Monitor patient for seizure activity, document and report duration and description of seizure to physician/advanced practitioner  - If seizure occurs,  ensure patient safety during seizure  - Reorient patient post seizure  - Seizure pads on all 4 side rails  - Instruct patient/family to notify RN of any seizure activity including if an aura is experienced  - Instruct patient/family to call for assistance with activity based on nursing assessment  - Administer anti-seizure medications if ordered    Outcome: Progressing  Goal: Achieves maximal functionality and self care  Description: INTERVENTIONS  - Monitor swallowing and airway patency with patient fatigue and changes in neurological status  - Encourage and assist patient to increase activity and self care.   - Encourage visually impaired, hearing impaired and aphasic patients to use assistive/communication devices  Outcome: Progressing

## 2025-03-15 NOTE — ASSESSMENT & PLAN NOTE
Chronic, reported to be after a previous suicide attempt, may be cervical or related to brachial injury.

## 2025-03-15 NOTE — PLAN OF CARE
Problem: Potential for Falls  Goal: Patient will remain free of falls  Description: INTERVENTIONS:  - Educate patient/family on patient safety including physical limitations  - Instruct patient to call for assistance with activity   - Consult OT/PT to assist with strengthening/mobility   - Keep Call bell within reach  - Keep bed low and locked with side rails adjusted as appropriate  - Keep care items and personal belongings within reach  - Initiate and maintain comfort rounds  - Make Fall Risk Sign visible to staff  - Apply yellow socks and bracelet for high fall risk patients  - Consider moving patient to room near nurses station  Outcome: Progressing     Problem: PAIN - ADULT  Goal: Verbalizes/displays adequate comfort level or baseline comfort level  Description: Interventions:  - Encourage patient to monitor pain and request assistance  - Assess pain using appropriate pain scale  - Administer analgesics based on type and severity of pain and evaluate response  - Implement non-pharmacological measures as appropriate and evaluate response  - Consider cultural and social influences on pain and pain management  - Notify physician/advanced practitioner if interventions unsuccessful or patient reports new pain  Outcome: Progressing     Problem: SAFETY ADULT  Goal: Patient will remain free of falls  Description: INTERVENTIONS:  - Educate patient/family on patient safety including physical limitations  - Instruct patient to call for assistance with activity   - Consult OT/PT to assist with strengthening/mobility   - Keep Call bell within reach  - Keep bed low and locked with side rails adjusted as appropriate  - Keep care items and personal belongings within reach  - Initiate and maintain comfort rounds  - Make Fall Risk Sign visible to staff  - Apply yellow socks and bracelet for high fall risk patients  - Consider moving patient to room near nurses station  Outcome: Progressing  Goal: Maintain or return to baseline  ADL function  Description: INTERVENTIONS:  -  Assess patient's ability to carry out ADLs; assess patient's baseline for ADL function and identify physical deficits which impact ability to perform ADLs (bathing, care of mouth/teeth, toileting, grooming, dressing, etc.)  - Assess/evaluate cause of self-care deficits   - Assess range of motion  Outcome: Progressing  Goal: Maintains/Returns to pre admission functional level  Description: INTERVENTIONS:  - Perform AM-PAC 6 Click Basic Mobility/ Daily Activity assessment daily.  - Set and communicate daily mobility goal to care team and patient/family/caregiver.   - Collaborate with rehabilitation services on mobility goals if consulted  - Out of bed for toileting  - Record patient progress and toleration of activity level   Outcome: Progressing     Problem: DISCHARGE PLANNING  Goal: Discharge to home or other facility with appropriate resources  Description: INTERVENTIONS:  - Identify barriers to discharge w/patient and caregiver  - Arrange for needed discharge resources and transportation as appropriate  - Identify discharge learning needs (meds, wound care, etc.)  - Arrange for interpretive services to assist at discharge as needed  - Refer to Case Management Department for coordinating discharge planning if the patient needs post-hospital services based on physician/advanced practitioner order or complex needs related to functional status, cognitive ability, or social support system  Outcome: Progressing     Problem: Knowledge Deficit  Goal: Patient/family/caregiver demonstrates understanding of disease process, treatment plan, medications, and discharge instructions  Description: Complete learning assessment and assess knowledge base.  Interventions:  - Provide teaching at level of understanding  - Provide teaching via preferred learning methods  Outcome: Progressing     Problem: INFECTION - ADULT  Goal: Absence or prevention of progression during  hospitalization  Description: INTERVENTIONS:  - Assess and monitor for signs and symptoms of infection  - Monitor lab/diagnostic results    - Administer medications as ordered  - Instruct and encourage patient and family to use good hand hygiene technique  - Identify and instruct in appropriate isolation precautions for identified infection/condition  Outcome: Progressing     Problem: Prexisting or High Potential for Compromised Skin Integrity  Goal: Skin integrity is maintained or improved  Description: INTERVENTIONS:  - Identify patients at risk for skin breakdown  - Assess and monitor skin integrity  - Assess and monitor nutrition and hydration status  - Monitor labs   - Assess for incontinence   - Turn and reposition patient  - Assist with mobility/ambulation  - Relieve pressure over bony prominences  - Avoid friction and shearing  - Provide appropriate hygiene as needed including keeping skin clean and dry  - Evaluate need for skin moisturizer/barrier cream  - Collaborate with interdisciplinary team   - Patient/family teaching  - Consider wound care consult   Outcome: Progressing     Problem: NEUROSENSORY - ADULT  Goal: Achieves stable or improved neurological status  Description: INTERVENTIONS  - Monitor and report changes in neurological status  - Monitor vital signs such as temperature, blood pressure, glucose, and any other labs ordered   - Initiate measures to prevent increased intracranial pressure  - Monitor for seizure activity and implement precautions if appropriate      Reactivated  Goal: Remains free of injury related to seizures activity  Description: INTERVENTIONS  - Maintain airway, patient safety  and administer oxygen as ordered  - Monitor patient for seizure activity, document and report duration and description of seizure to physician/advanced practitioner  - If seizure occurs,  ensure patient safety during seizure  - Reorient patient post seizure  - Seizure pads on all 4 side rails  -  Instruct patient/family to notify RN of any seizure activity including if an aura is experienced  - Instruct patient/family to call for assistance with activity based on nursing assessment  - Administer anti-seizure medications if ordered    Reactivated  Goal: Achieves maximal functionality and self care  Description: INTERVENTIONS  - Monitor swallowing and airway patency with patient fatigue and changes in neurological status  - Encourage and assist patient to increase activity and self care.   - Encourage visually impaired, hearing impaired and aphasic patients to use assistive/communication devices  Reactivated

## 2025-03-15 NOTE — PLAN OF CARE
Problem: Potential for Falls  Goal: Patient will remain free of falls  Description: INTERVENTIONS:  - Educate patient/family on patient safety including physical limitations  - Instruct patient to call for assistance with activity   - Consult OT/PT to assist with strengthening/mobility   - Keep Call bell within reach  - Keep bed low and locked with side rails adjusted as appropriate  - Keep care items and personal belongings within reach  - Initiate and maintain comfort rounds  - Make Fall Risk Sign visible to staff  - Apply yellow socks and bracelet for high fall risk patients  - Consider moving patient to room near nurses station  3/15/2025 1512 by Damaris Murcia  Outcome: Progressing  3/15/2025 1512 by Damaris Murcia  Outcome: Progressing     Problem: PAIN - ADULT  Goal: Verbalizes/displays adequate comfort level or baseline comfort level  Description: Interventions:  - Encourage patient to monitor pain and request assistance  - Assess pain using appropriate pain scale  - Administer analgesics based on type and severity of pain and evaluate response  - Implement non-pharmacological measures as appropriate and evaluate response  - Consider cultural and social influences on pain and pain management  - Notify physician/advanced practitioner if interventions unsuccessful or patient reports new pain  3/15/2025 1512 by Damaris Murcia  Outcome: Progressing  3/15/2025 1512 by Damaris Murcia  Outcome: Progressing     Problem: SAFETY ADULT  Goal: Patient will remain free of falls  Description: INTERVENTIONS:  - Educate patient/family on patient safety including physical limitations  - Instruct patient to call for assistance with activity   - Consult OT/PT to assist with strengthening/mobility   - Keep Call bell within reach  - Keep bed low and locked with side rails adjusted as appropriate  - Keep care items and personal belongings within reach  - Initiate and maintain comfort rounds  - Make Fall Risk Sign visible to  staff  - Apply yellow socks and bracelet for high fall risk patients  - Consider moving patient to room near nurses station  3/15/2025 1512 by Damaris Murcia  Outcome: Progressing  3/15/2025 1512 by Damaris Murcia  Outcome: Progressing  Goal: Maintain or return to baseline ADL function  Description: INTERVENTIONS:  -  Assess patient's ability to carry out ADLs; assess patient's baseline for ADL function and identify physical deficits which impact ability to perform ADLs (bathing, care of mouth/teeth, toileting, grooming, dressing, etc.)  - Assess/evaluate cause of self-care deficits   - Assess range of motion  3/15/2025 1512 by Damaris Murcia  Outcome: Progressing  3/15/2025 1512 by Damaris Murcia  Outcome: Progressing  Goal: Maintains/Returns to pre admission functional level  Description: INTERVENTIONS:  - Perform AM-PAC 6 Click Basic Mobility/ Daily Activity assessment daily.  - Set and communicate daily mobility goal to care team and patient/family/caregiver.   - Collaborate with rehabilitation services on mobility goals if consulted  - Out of bed for toileting  - Record patient progress and toleration of activity level   3/15/2025 1512 by Damaris Murcia  Outcome: Progressing  3/15/2025 1512 by Damaris Murcia  Outcome: Progressing     Problem: Knowledge Deficit  Goal: Patient/family/caregiver demonstrates understanding of disease process, treatment plan, medications, and discharge instructions  Description: Complete learning assessment and assess knowledge base.  Interventions:  - Provide teaching at level of understanding  - Provide teaching via preferred learning methods  3/15/2025 1512 by Damaris Murcia  Outcome: Progressing  3/15/2025 1512 by Damaris Murcia  Outcome: Progressing     Problem: INFECTION - ADULT  Goal: Absence or prevention of progression during hospitalization  Description: INTERVENTIONS:  - Assess and monitor for signs and symptoms of infection  - Monitor lab/diagnostic results    -  Administer medications as ordered  - Instruct and encourage patient and family to use good hand hygiene technique  - Identify and instruct in appropriate isolation precautions for identified infection/condition  3/15/2025 1512 by Damaris Murcia  Outcome: Progressing  3/15/2025 1512 by Damaris Murcia  Outcome: Progressing     Problem: Prexisting or High Potential for Compromised Skin Integrity  Goal: Skin integrity is maintained or improved  Description: INTERVENTIONS:  - Identify patients at risk for skin breakdown  - Assess and monitor skin integrity  - Assess and monitor nutrition and hydration status  - Monitor labs   - Assess for incontinence   - Turn and reposition patient  - Assist with mobility/ambulation  - Relieve pressure over bony prominences  - Avoid friction and shearing  - Provide appropriate hygiene as needed including keeping skin clean and dry  - Evaluate need for skin moisturizer/barrier cream  - Collaborate with interdisciplinary team   - Patient/family teaching  - Consider wound care consult   3/15/2025 1512 by Damaris Murcia  Outcome: Progressing  3/15/2025 1512 by Damaris Murcia  Outcome: Progressing     Problem: Nutrition/Hydration-ADULT  Goal: Nutrient/Hydration intake appropriate for improving, restoring or maintaining nutritional needs  Description: Monitor and assess patient's nutrition/hydration status for malnutrition. Collaborate with interdisciplinary team and initiate plan and interventions as ordered.  Monitor patient's weight and dietary intake as ordered or per policy. Utilize nutrition screening tool and intervene as necessary. Determine patient's food preferences and provide high-protein, high-caloric foods as appropriate.     INTERVENTIONS:  - Monitor oral intake, urinary output, labs, and treatment plans  - Assess nutrition and hydration status and recommend course of action  - Evaluate amount of meals eaten  - Assist patient with eating if necessary   - Allow adequate time for  meals  - Recommend/ encourage appropriate diets, oral nutritional supplements, and vitamin/mineral supplements  - Order, calculate, and assess calorie counts as needed  - Recommend, monitor, and adjust tube feedings and TPN/PPN based on assessed needs  - Assess need for intravenous fluids  - Provide specific nutrition/hydration education as appropriate  - Include patient/family/caregiver in decisions related to nutrition  3/15/2025 1512 by Damaris Murcia  Outcome: Progressing  3/15/2025 1512 by Damaris Murcia  Outcome: Progressing     Problem: NEUROSENSORY - ADULT  Goal: Achieves stable or improved neurological status  Description: INTERVENTIONS  - Monitor and report changes in neurological status  - Monitor vital signs such as temperature, blood pressure, glucose, and any other labs ordered   - Initiate measures to prevent increased intracranial pressure  - Monitor for seizure activity and implement precautions if appropriate      3/15/2025 1512 by Damaris Murcia  Outcome: Progressing  3/15/2025 1512 by Damaris Murcia  Outcome: Progressing  Goal: Remains free of injury related to seizures activity  Description: INTERVENTIONS  - Maintain airway, patient safety  and administer oxygen as ordered  - Monitor patient for seizure activity, document and report duration and description of seizure to physician/advanced practitioner  - If seizure occurs,  ensure patient safety during seizure  - Reorient patient post seizure  - Seizure pads on all 4 side rails  - Instruct patient/family to notify RN of any seizure activity including if an aura is experienced  - Instruct patient/family to call for assistance with activity based on nursing assessment  - Administer anti-seizure medications if ordered    3/15/2025 1512 by Damaris Murcia  Outcome: Progressing  3/15/2025 1512 by Damaris Murcia  Outcome: Progressing  Goal: Achieves maximal functionality and self care  Description: INTERVENTIONS  - Monitor swallowing and airway  patency with patient fatigue and changes in neurological status  - Encourage and assist patient to increase activity and self care.   - Encourage visually impaired, hearing impaired and aphasic patients to use assistive/communication devices  3/15/2025 1512 by Damaris Murcia  Outcome: Progressing  3/15/2025 1512 by Damaris Murcia  Outcome: Progressing

## 2025-03-15 NOTE — ASSESSMENT & PLAN NOTE
Patient presented with confusion and delusions, paranoia   Unclear etiology, apart from pneumonia, patient with no other metabolic derangements or identifiable organic etiology  UDS negative, ethanol <10  Discussed with Psych, unclear whether Psych condition is contributing given acute onset  Suspect substance use given circumstances around which mental status change occurred despite negative UDS, ethanol, appreciate Toxicology evaluation

## 2025-03-15 NOTE — ASSESSMENT & PLAN NOTE
Patient with right-sided weakness, unclear onset  Limited evaluation given mental status  Discussed with Neurology on call (Dr. Gorman)  Stroke pathway initiated  CTH, CTA H&N wnl  Awaiting MRI brain

## 2025-03-15 NOTE — CONSULTS
Consultation - Neurology   Name: Sudhakar Choe 59 y.o. male I MRN: [id number]  Unit/Bed#: 7T [facility] 703-01 I Date of Admission: 3/12/2025   Date of Service: 3/15/2025 I Hospital Day: 2   Inpatient consult to Neurology  Consult performed by: Kenji Lay MD  Consult ordered by: Alysia Fallon MD        Physician Requesting Evaluation: Alysia Montes De Oca *   Reason for Evaluation / Principal Problem: AMS    Assessment & Plan  Acute metabolic encephalopathy  Unclear to us if Sudhakar has an element of encephalopathy (due to aspiration PNA), or if his altered behavior is due to psychiatric comorbidities. His exam is showing tangential speech, low monotonous voice, and poor insight (he thinks he is here because of a car accident). However he also appears to have reduced attention span, and poor awareness. In conclusion, we favor that he is in a psychotic episode over being encephalopathic. However it is difficult to ascertain at this point.  MRI brain personally reviewed: no concerning findings  We recommend EEG; if there is no slowing in background, then his presentation is solely psychogenic. However if there is slowing, then it is not specific finding and it can be due to the PNA or some other reason. In that case we should go by his exam, if mental exam is improving and he is returning to baseline, then nothing further. If exam remains altered, then LP can be considered.   Right sided weakness  Chronic, reported to be after a previous suicide attempt, may be cervical or related to brachial injury.  I have discussed the above management plan in detail with the primary service.     Recommendations for outpatient neurological follow up have yet to be determined.    History of Present Illness   Sudhakar Choe is a 59 y.o. male who presents with confusion. Patient texted his GF confused in Wednesday am. She arrived in the morning and found the house to be in a state of disarray. Patient was  making insensible statements like he would make atomic bombs and kill people. He appeared intoxicated and she found some alcohol missing. She later found him less responsive, and trying to crawl. He had bluish lips and had food in his mouth. Since his presentation here, he continues to have bizarre behavior, not fully oriented.  Of note, patient has history of schizoaffective disorder, and multiple prior psychiatric admissions.    Review of Systems   negative  Medical History Review: I have reviewed the patient's PMH, PSH, Social History, Family History, Meds, and Allergies     Objective :  Temp:  [32 °F (0 °C)-97.9 °F (36.6 °C)] 97.4 °F (36.3 °C)  HR:  [] 100  BP: ()/(75-91) 119/76  Resp:  [18-22] 18  SpO2:  [85 %-95 %] 95 %  O2 Device: Nasal cannula  Nasal Cannula O2 Flow Rate (L/min):  [2 L/min] 2 L/min    Physical Exam  NAD  Skin: no seen lesions  HEENT: ATNC  Chest: breathing comfortably on room air  GI: no apparent distension.      Neurological Exam  Alert and oriented to self.   Reduced attention span. Speech is insensible at times.   Language is intact to comprehension and expression. No neglect. EOMI. Pupils are symmetric. Face is symmetric. Tongue is midline. Able to move all extremities against gravity, but right arm has apparent weakness and is maintained in internal rotation position with flexed wrist. No dysmetria noted.        Lab Results: I have reviewed the following results:CBC:   Results from last 7 days   Lab Units 03/15/25  0450 03/14/25 0447 03/13/25  1445   WBC Thousand/uL 8.49 12.02* 12.85*   RBC Million/uL 4.02 3.91 3.83*   HEMOGLOBIN g/dL 12.4 11.8* 11.8*   HEMATOCRIT % 38.2 37.6 36.3*   MCV fL 95 96 95   PLATELETS Thousands/uL 179 178 199  186   , BMP/CMP:   Results from last 7 days   Lab Units 03/15/25  0450 03/14/25 0447 03/13/25  1445 03/12/25  2050   SODIUM mmol/L 136 136 136 135   POTASSIUM mmol/L 3.9 3.9 4.2 3.6   CHLORIDE mmol/L 100 102 102 98   CO2 mmol/L 27 25 27 28    BUN mg/dL 8 10 11 13   CREATININE mg/dL 0.71 0.90 0.94 1.23   CALCIUM mg/dL 8.5 8.3* 8.9 9.0   AST U/L  --   --   --  14   ALT U/L  --   --   --  9   ALK PHOS U/L  --   --   --  46   EGFR ml/min/1.73sq m 102 93 88 63   , Vitamin B12:   , HgBA1C:   Results from last 7 days   Lab Units 03/13/25  1445   HEMOGLOBIN A1C % 5.8*   , TSH:   Results from last 7 days   Lab Units 03/12/25  2050   TSH 3RD GENERATON uIU/mL 2.002   , Coagulation:   , Lipid Profile:   Results from last 7 days   Lab Units 03/15/25  0450   HDL mg/dL 26*   LDL CALC mg/dL 68   TRIGLYCERIDES mg/dL 95   , Ammonia:   Results from last 7 days   Lab Units 03/15/25  1349   AMMONIA umol/L 12*     Recent Labs     03/14/25  0447 03/15/25  0450   WBC 12.02* 8.49   HGB 11.8* 12.4   HCT 37.6 38.2    179   BANDSPCT 5  --    SODIUM 136 136   K 3.9 3.9    100   CO2 25 27   BUN 10 8   CREATININE 0.90 0.71   GLUC 87 93   MG 2.1 2.1     Imaging Results Review: I personally reviewed the following image studies in PACS and associated radiology reports: MRI brain. My interpretation of the radiology images/reports is: normal.  Other Study Results Review: No additional pertinent studies reviewed.    VTE Prophylaxis: VTE covered by:  enoxaparin, Subcutaneous, 40 mg at 03/14/25 1022       Administrative Statements   VIRTUAL CARE DOCUMENTATION:     1. This service was provided via Telemedicine using [employer]     2. Parties in the room with patient during teleconsult Patient only    3. Confidentiality My office door was closed     4. Participants No one else was in the room    5. Patient acknowledged consent and understanding of privacy and security of the  Telemedicine consult. I informed the patient that I have reviewed their record in Epic and presented the opportunity for them to ask any questions regarding the visit today.  The patient agreed to participate.    6. I have spent a total time of 45 min minutes in caring for this patient on the  day of the visit/encounter including Counseling / Coordination of care, not including the time spent for establishing the audio/video connection.

## 2025-03-15 NOTE — PROGRESS NOTES
Progress Note - Hospitalist   Name: Sudhakar Choe 59 y.o. male I MRN: [id number]  Unit/Bed#: 7T [facility] 703-01 I Date of Admission: 3/12/2025   Date of Service: 3/15/2025 I Hospital Day: 2    Assessment & Plan  Sepsis (HCC)  POA, given fever, tachycardia, leukocytosis with suspicion for aspiration pneumonia on CT chest  Received 1 dose of Cefepime in the ED, will continue patient on Rocephin  F/u Bcx, continue to monitor fever curve and WBC count  UA negative  Flu, COVID, RSV negative  Mood disorder (rule out Schizoaffective disorder, depressed type)  Presents with delusions and confusion, some agitation  Was awaiting psychiatric evaluation prior to admission to med surg  Discussed with Psych, plan to hold psych regimen due to possible OD  Continue 1:1  Alcohol use disorder  Per significant other, 1.5 bottles of wine in the fridge were emptied day prior to admission  Ethanol level <10 on arrival  Patient unable to recall last drink but states when he drinks he drinks 3-4 glasses of wine  No signs of withdrawal at this time  Monitor on CIWA, prn ativan for agitation  Falls  Possibly in setting of acute intoxication as significant other states he was perfectly fine Tuesday night when he was last in normal state of mind/health  CTH negative  Fall precautions  Appreciate PT and OT eval  Aspiration pneumonia of both lungs (HCC)  Concern for aspiration pneumonia in patient who arrived confused, with possible EtOH intake and received sedation for agitation and spiking fevers with leukocytosis and CT chest findings suspicious for aspiration PNA   Received Cefepime in the ED, will continue on Rocephin (Abx day 3)  Respiratory protocol   Monitor pulse ox  Elevated procalcitonin, trend  Aspiration precautions  Appreciate speech therapy eval   urine legionella and urine strep negative  Acute metabolic encephalopathy  Patient presented with confusion and delusions, paranoia   Unclear etiology, apart from pneumonia, patient with  no other metabolic derangements or identifiable organic etiology  UDS negative, ethanol <10  Discussed with Psych, unclear whether Psych condition is contributing given acute onset  Suspect substance use given circumstances around which mental status change occurred despite negative UDS, ethanol, appreciate Toxicology evaluation  Right sided weakness  Patient with right-sided weakness, unclear onset  Limited evaluation given mental status  Discussed with Neurology on call (Dr. Gorman)  Stroke pathway initiated  CTH, CTA H&N wnl  Awaiting MRI brain    VTE Pharmacologic Prophylaxis:   Moderate Risk (Score 3-4) - Pharmacological DVT Prophylaxis Ordered: enoxaparin (Lovenox).    Mobility:   Basic Mobility Inpatient Raw Score: 8  -HLM Goal: 3: Sit at edge of bed  JH-HLM Achieved: 1: Laying in bed  JH-HLM Goal NOT achieved. Continue with multidisciplinary rounding and encourage appropriate mobility to improve upon -HLM goals.    Patient Centered Rounds: I performed bedside rounds with nursing staff today.   Discussions with Specialists or Other Care Team Provider: Psych, Neuro    Education and Discussions with Family / Patient:  have been in contact with patient's significant other.     Current Length of Stay: 2 day(s)  Current Patient Status: Inpatient   Certification Statement: The patient will continue to require additional inpatient hospital stay due to ongoing management of AMS, pneumonia  Discharge Plan: Anticipate discharge in 48-72 hrs to D pending clinical course and further evaluation    Code Status: Level 1 - Full Code    Subjective   Patient seen and examined at bedside. He is A&Ox3 and knew the month, year, place, self however still confused to situation and unable to provide history of why he believes he is here. Thoughts are fragmented and he continues to mutter incomprehensibly. He is able to respond yes or no when asked symptoms however other thoughts continue to not make sense. Today he mentioned  a person named Rosemarie and states Rosemarie brought him in because I, the provider, instructed Rosemarie that the patient had to come in.     Objective :  Temp:  [96.4 °F (35.8 °C)-98.8 °F (37.1 °C)] 97.3 °F (36.3 °C)  HR:  [] 95  BP: ()/(75-91) 120/83  Resp:  [18-22] 20  SpO2:  [85 %-95 %] 91 %  O2 Device: None (Room air)  Nasal Cannula O2 Flow Rate (L/min):  [2 L/min] 2 L/min    Body mass index is 28.07 kg/m².     Input and Output Summary (last 24 hours):     Intake/Output Summary (Last 24 hours) at 3/15/2025 1123  Last data filed at 3/14/2025 1710  Gross per 24 hour   Intake 360 ml   Output --   Net 360 ml       Physical Exam  Vitals and nursing note reviewed.   Constitutional:       General: He is not in acute distress.     Appearance: He is well-developed.   HENT:      Head: Normocephalic and atraumatic.   Eyes:      Conjunctiva/sclera: Conjunctivae normal.   Cardiovascular:      Rate and Rhythm: Normal rate and regular rhythm.      Heart sounds: No murmur heard.  Pulmonary:      Effort: Pulmonary effort is normal. No respiratory distress.      Breath sounds: Normal breath sounds.   Abdominal:      Palpations: Abdomen is soft.      Tenderness: There is no abdominal tenderness.   Musculoskeletal:         General: No swelling.      Cervical back: Neck supple.   Skin:     General: Skin is warm and dry.      Capillary Refill: Capillary refill takes less than 2 seconds.   Neurological:      Mental Status: He is alert. He is disoriented.      Comments: +right-sided weakness of upper and lower extremities  Difficult to conduct full neuro exam given patient's mental status           Lines/Drains:      Lab Results: I have reviewed the following results:   Results from last 7 days   Lab Units 03/15/25  0450 03/14/25  0447   WBC Thousand/uL 8.49 12.02*   HEMOGLOBIN g/dL 12.4 11.8*   HEMATOCRIT % 38.2 37.6   PLATELETS Thousands/uL 179 178   BANDS PCT %  --  5   SEGS PCT % 53  --    LYMPHO PCT % 25 26   MONO PCT % 19* 15*    EOS PCT % 2 2     Results from last 7 days   Lab Units 03/15/25  0450 03/13/25  1445 03/12/25  2050   SODIUM mmol/L 136   < > 135   POTASSIUM mmol/L 3.9   < > 3.6   CHLORIDE mmol/L 100   < > 98   CO2 mmol/L 27   < > 28   BUN mg/dL 8   < > 13   CREATININE mg/dL 0.71   < > 1.23   ANION GAP mmol/L 9   < > 9   CALCIUM mg/dL 8.5   < > 9.0   ALBUMIN g/dL  --   --  3.8   TOTAL BILIRUBIN mg/dL  --   --  0.57   ALK PHOS U/L  --   --  46   ALT U/L  --   --  9   AST U/L  --   --  14   GLUCOSE RANDOM mg/dL 93   < > 106    < > = values in this interval not displayed.         Results from last 7 days   Lab Units 03/15/25  0549 03/14/25  2022 03/14/25  1552 03/14/25  1112 03/14/25  0545 03/13/25  2047 03/13/25  1554 03/13/25  1440   POC GLUCOSE mg/dl 104 118 105 108 76 92 95 122     Results from last 7 days   Lab Units 03/13/25  1445   HEMOGLOBIN A1C % 5.8*     Results from last 7 days   Lab Units 03/15/25  0450 03/14/25  0447 03/13/25  1042 03/13/25  1000   LACTIC ACID mmol/L  --   --  1.0  --    PROCALCITONIN ng/ml 0.40* 0.70*  --  0.70*       Recent Cultures (last 7 days):   Results from last 7 days   Lab Units 03/13/25  1540 03/13/25  1042 03/13/25  1000   BLOOD CULTURE   --  No Growth at 24 hrs. No Growth at 24 hrs.   LEGIONELLA URINARY ANTIGEN  Negative  --   --        Imaging Results Review: I reviewed radiology reports from this admission including: CTH, CTA H&N, XR humerus right, XR shoulder right.  Other Study Results Review: No additional pertinent studies reviewed.    Last 24 Hours Medication List:     Current Facility-Administered Medications:     acetaminophen (TYLENOL) tablet 650 mg, Q6H PRN    aspirin chewable tablet 81 mg, Daily    atorvastatin (LIPITOR) tablet 40 mg, QPM    atropine (ISOPTO ATROPINE) 1 % ophthalmic solution 1 drop, Q12H    benztropine (COGENTIN) tablet 0.75 mg, HS    cefTRIAXone (ROCEPHIN) IVPB (premix in dextrose) 1,000 mg 50 mL, Q24H, Last Rate: 1,000 mg (03/14/25 2136)    divalproex sodium  (DEPAKOTE ER) 24 hr tablet 1,250 mg, HS    docusate sodium (COLACE) capsule 100 mg, BID    enoxaparin (LOVENOX) subcutaneous injection 40 mg, Daily    fluPHENAZine (PROLIXIN) tablet 1 mg, HS    folic acid (FOLVITE) tablet 1 mg, Daily    gabapentin (NEURONTIN) capsule 200 mg, HS    insulin lispro (HumALOG/ADMELOG) 100 units/mL subcutaneous injection 1-6 Units, TID AC **AND** Fingerstick Glucose (POCT), TID AC    insulin lispro (HumALOG/ADMELOG) 100 units/mL subcutaneous injection 1-6 Units, HS    LORazepam (ATIVAN) injection 0.5 mg, Once    LORazepam (ATIVAN) injection 1 mg, Q6H PRN    melatonin tablet 6 mg, HS    multivitamin-minerals (CENTRUM) tablet 1 tablet, Daily    ondansetron (ZOFRAN) injection 4 mg, Q6H PRN    tamsulosin (FLOMAX) capsule 0.4 mg, Daily With Dinner    thiamine tablet 100 mg, Daily    venlafaxine (EFFEXOR-XR) 24 hr capsule 37.5 mg, Daily    venlafaxine (EFFEXOR-XR) 24 hr capsule 75 mg, Daily    Administrative Statements   Today, Patient Was Seen By: Alysia Arriaza MD    **Please Note: This note may have been constructed using a voice recognition system.**

## 2025-03-16 PROBLEM — J96.01 ACUTE HYPOXIC RESPIRATORY FAILURE (HCC): Status: ACTIVE | Noted: 2025-03-16

## 2025-03-16 LAB
ANION GAP SERPL CALCULATED.3IONS-SCNC: 8 MMOL/L (ref 4–13)
BASOPHILS # BLD AUTO: 0.01 THOUSANDS/ÂΜL (ref 0–0.1)
BASOPHILS NFR BLD AUTO: 0 % (ref 0–1)
BUN SERPL-MCNC: 10 MG/DL (ref 5–25)
CALCIUM SERPL-MCNC: 8.7 MG/DL (ref 8.4–10.2)
CHLORIDE SERPL-SCNC: 101 MMOL/L (ref 96–108)
CO2 SERPL-SCNC: 27 MMOL/L (ref 21–32)
CREAT SERPL-MCNC: 0.72 MG/DL (ref 0.6–1.3)
EOSINOPHIL # BLD AUTO: 0.2 THOUSAND/ÂΜL (ref 0–0.61)
EOSINOPHIL NFR BLD AUTO: 3 % (ref 0–6)
ERYTHROCYTE [DISTWIDTH] IN BLOOD BY AUTOMATED COUNT: 14.2 % (ref 11.6–15.1)
EST. AVERAGE GLUCOSE BLD GHB EST-MCNC: 120 MG/DL
GFR SERPL CREATININE-BSD FRML MDRD: 102 ML/MIN/1.73SQ M
GLUCOSE SERPL-MCNC: 103 MG/DL (ref 65–140)
GLUCOSE SERPL-MCNC: 117 MG/DL (ref 65–140)
GLUCOSE SERPL-MCNC: 82 MG/DL (ref 65–140)
GLUCOSE SERPL-MCNC: 95 MG/DL (ref 65–140)
GLUCOSE SERPL-MCNC: 95 MG/DL (ref 65–140)
HBA1C MFR BLD: 5.8 %
HCT VFR BLD AUTO: 38 % (ref 36.5–49.3)
HGB BLD-MCNC: 12.3 G/DL (ref 12–17)
IMM GRANULOCYTES # BLD AUTO: 0.05 THOUSAND/UL (ref 0–0.2)
IMM GRANULOCYTES NFR BLD AUTO: 1 % (ref 0–2)
LYMPHOCYTES # BLD AUTO: 2.07 THOUSANDS/ÂΜL (ref 0.6–4.47)
LYMPHOCYTES NFR BLD AUTO: 28 % (ref 14–44)
MAGNESIUM SERPL-MCNC: 2 MG/DL (ref 1.9–2.7)
MCH RBC QN AUTO: 30.7 PG (ref 26.8–34.3)
MCHC RBC AUTO-ENTMCNC: 32.4 G/DL (ref 31.4–37.4)
MCV RBC AUTO: 95 FL (ref 82–98)
MONOCYTES # BLD AUTO: 1.51 THOUSAND/ÂΜL (ref 0.17–1.22)
MONOCYTES NFR BLD AUTO: 20 % (ref 4–12)
NEUTROPHILS # BLD AUTO: 3.68 THOUSANDS/ÂΜL (ref 1.85–7.62)
NEUTS SEG NFR BLD AUTO: 48 % (ref 43–75)
NRBC BLD AUTO-RTO: 0 /100 WBCS
PLATELET # BLD AUTO: 176 THOUSANDS/UL (ref 149–390)
PMV BLD AUTO: 11.4 FL (ref 8.9–12.7)
POTASSIUM SERPL-SCNC: 4.1 MMOL/L (ref 3.5–5.3)
PROCALCITONIN SERPL-MCNC: 0.25 NG/ML
RBC # BLD AUTO: 4.01 MILLION/UL (ref 3.88–5.62)
SODIUM SERPL-SCNC: 136 MMOL/L (ref 135–147)
WBC # BLD AUTO: 7.52 THOUSAND/UL (ref 4.31–10.16)

## 2025-03-16 PROCEDURE — 83735 ASSAY OF MAGNESIUM: CPT | Performed by: STUDENT IN AN ORGANIZED HEALTH CARE EDUCATION/TRAINING PROGRAM

## 2025-03-16 PROCEDURE — 84145 PROCALCITONIN (PCT): CPT | Performed by: STUDENT IN AN ORGANIZED HEALTH CARE EDUCATION/TRAINING PROGRAM

## 2025-03-16 PROCEDURE — 82948 REAGENT STRIP/BLOOD GLUCOSE: CPT

## 2025-03-16 PROCEDURE — 99232 SBSQ HOSP IP/OBS MODERATE 35: CPT | Performed by: STUDENT IN AN ORGANIZED HEALTH CARE EDUCATION/TRAINING PROGRAM

## 2025-03-16 PROCEDURE — 85025 COMPLETE CBC W/AUTO DIFF WBC: CPT | Performed by: STUDENT IN AN ORGANIZED HEALTH CARE EDUCATION/TRAINING PROGRAM

## 2025-03-16 PROCEDURE — NC001 PR NO CHARGE: Performed by: PSYCHIATRY & NEUROLOGY

## 2025-03-16 PROCEDURE — 80048 BASIC METABOLIC PNL TOTAL CA: CPT | Performed by: STUDENT IN AN ORGANIZED HEALTH CARE EDUCATION/TRAINING PROGRAM

## 2025-03-16 RX ORDER — GABAPENTIN 100 MG/1
200 CAPSULE ORAL
Status: DISCONTINUED | OUTPATIENT
Start: 2025-03-16 | End: 2025-03-19 | Stop reason: HOSPADM

## 2025-03-16 RX ORDER — BENZTROPINE MESYLATE 0.5 MG/1
0.5 TABLET ORAL
Status: DISCONTINUED | OUTPATIENT
Start: 2025-03-16 | End: 2025-03-19 | Stop reason: HOSPADM

## 2025-03-16 RX ADMIN — BENZTROPINE MESYLATE 0.5 MG: 0.5 TABLET ORAL at 21:30

## 2025-03-16 RX ADMIN — DOCUSATE SODIUM 100 MG: 100 CAPSULE, LIQUID FILLED ORAL at 17:00

## 2025-03-16 RX ADMIN — THIAMINE HYDROCHLORIDE 500 MG: 100 INJECTION, SOLUTION INTRAMUSCULAR; INTRAVENOUS at 08:20

## 2025-03-16 RX ADMIN — DIVALPROEX SODIUM 1250 MG: 500 TABLET, EXTENDED RELEASE ORAL at 21:29

## 2025-03-16 RX ADMIN — ACETAMINOPHEN 650 MG: 325 TABLET, FILM COATED ORAL at 07:47

## 2025-03-16 RX ADMIN — DOCUSATE SODIUM 100 MG: 100 CAPSULE, LIQUID FILLED ORAL at 08:00

## 2025-03-16 RX ADMIN — THIAMINE HYDROCHLORIDE 500 MG: 100 INJECTION, SOLUTION INTRAMUSCULAR; INTRAVENOUS at 16:16

## 2025-03-16 RX ADMIN — ENOXAPARIN SODIUM 40 MG: 100 INJECTION SUBCUTANEOUS at 08:00

## 2025-03-16 RX ADMIN — CEFTRIAXONE 1000 MG: 1 INJECTION, SOLUTION INTRAVENOUS at 22:09

## 2025-03-16 RX ADMIN — VENLAFAXINE HYDROCHLORIDE 112.5 MG: 75 CAPSULE, EXTENDED RELEASE ORAL at 14:22

## 2025-03-16 RX ADMIN — TAMSULOSIN HYDROCHLORIDE 0.4 MG: 0.4 CAPSULE ORAL at 16:16

## 2025-03-16 RX ADMIN — Medication 1 TABLET: at 08:00

## 2025-03-16 RX ADMIN — GABAPENTIN 200 MG: 100 CAPSULE ORAL at 21:31

## 2025-03-16 RX ADMIN — THIAMINE HYDROCHLORIDE 500 MG: 100 INJECTION, SOLUTION INTRAMUSCULAR; INTRAVENOUS at 21:22

## 2025-03-16 RX ADMIN — Medication 6 MG: at 21:30

## 2025-03-16 RX ADMIN — ASPIRIN 81 MG CHEWABLE TABLET 81 MG: 81 TABLET CHEWABLE at 08:00

## 2025-03-16 RX ADMIN — FOLIC ACID 1 MG: 1 TABLET ORAL at 08:00

## 2025-03-16 NOTE — ASSESSMENT & PLAN NOTE
Concern for aspiration pneumonia in patient who arrived confused, spiking fevers with leukocytosis and CT chest findings suspicious for such  Received Cefepime in the ED, will continue on Rocephin (Abx day 4)  Respiratory protocol   Monitor pulse ox  Elevated procalcitonin on admission, now normalized  Aspiration precautions  Appreciate speech therapy eval   urine legionella and urine strep negative  Covid/flu negative

## 2025-03-16 NOTE — ASSESSMENT & PLAN NOTE
Per significant other, 1.5 bottles of wine in the fridge were emptied day prior to admission  Ethanol level <10 on arrival  Patient unable to recall last drink, denies drinking prior to admission  No signs of withdrawal at this time  CIWA discontinued

## 2025-03-16 NOTE — ASSESSMENT & PLAN NOTE
In setting of acute change in mental status as significant other states he was perfectly fine Tuesday night when he was last in normal state of mind/health  CTH negative  Stroke work-up negative  Fall precautions  Appreciate PT and OT eval

## 2025-03-16 NOTE — ASSESSMENT & PLAN NOTE
Patient is a 59-year-old male with a past psychiatric history of schizoaffective disorder who presented to the hospital with altered mental status with concerns for potential aspiration pneumonia and signs of sepsis.  Psychiatry was consulted to determine if his altered mental status was psychiatric in origin.  The patient was unable to cooperate with the interview due to his current change in mental status.  His thought process is disorganized and illogical at times.  He has waxing and waning orientation.  At this time it cannot be confirmed whether the patient potentially overdosed on his medications while drinking alcohol or not.  The patient should stop psychiatric medications until it has confirm that he did not take too much of his Depakote or another medication.  Toxicology should also be on board.  Patient's current mental status could also be due to Wernicke's encephalopathy.    03/16/2025 update: Patient has drastically improved.  He no longer is confused and does appear more oriented.  He is denying all psychiatric symptoms.  He continues to deny any alcohol use but the story surrounding the alcohol use is odd.  The etiology of his altered mental status still remains unclear.  There is potential there was some poor interaction between medications and/or alcohol after the patient got his Invega Sustenna injection earlier this week.  He has been notified to follow-up with his outpatient psychiatrist to further explore this event.  Some psychiatric medications will also be held and others will be continued as listed below.    Plan:  Restart psychotropics as listed below  Depakote 1250 mg qhs  Effexor 112.5 mg qd               Cogentin .5 mg qhs   Gabapentin 200 mg qhs   Melatonin 6 mg qhs   Hold Prolixin 1 mg until patient has met with outpatient psychiatrist  Appreciate toxicology consult recommendations  Appreciate neurology consult recommendations  Consult psychiatry will continue to follow with this  patient while hospitalized to help determine AMS etiology and disposition
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
## 2025-03-16 NOTE — ASSESSMENT & PLAN NOTE
POA, given fever, tachycardia, leukocytosis with suspicion for aspiration pneumonia on CT chest  Received 1 dose of Cefepime in the ED, will continue patient on Rocephin  Bcx negative, afebrile, leukocytosis normalized  UA negative  Flu, COVID, RSV negative  RESOLVED

## 2025-03-16 NOTE — PLAN OF CARE
Problem: Potential for Falls  Goal: Patient will remain free of falls  Description: INTERVENTIONS:  - Educate patient/family on patient safety including physical limitations  - Instruct patient to call for assistance with activity   - Consult OT/PT to assist with strengthening/mobility   - Keep Call bell within reach  - Keep bed low and locked with side rails adjusted as appropriate  - Keep care items and personal belongings within reach  - Initiate and maintain comfort rounds  - Make Fall Risk Sign visible to staff  - Apply yellow socks and bracelet for high fall risk patients  - Consider moving patient to room near nurses station  Outcome: Progressing     Problem: PAIN - ADULT  Goal: Verbalizes/displays adequate comfort level or baseline comfort level  Description: Interventions:  - Encourage patient to monitor pain and request assistance  - Assess pain using appropriate pain scale  - Administer analgesics based on type and severity of pain and evaluate response  - Implement non-pharmacological measures as appropriate and evaluate response  - Consider cultural and social influences on pain and pain management  - Notify physician/advanced practitioner if interventions unsuccessful or patient reports new pain  Outcome: Progressing     Problem: SAFETY ADULT  Goal: Patient will remain free of falls  Description: INTERVENTIONS:  - Educate patient/family on patient safety including physical limitations  - Instruct patient to call for assistance with activity   - Consult OT/PT to assist with strengthening/mobility   - Keep Call bell within reach  - Keep bed low and locked with side rails adjusted as appropriate  - Keep care items and personal belongings within reach  - Initiate and maintain comfort rounds  - Make Fall Risk Sign visible to staff  - Apply yellow socks and bracelet for high fall risk patients  - Consider moving patient to room near nurses station  Outcome: Progressing  Goal: Maintain or return to baseline  ADL function  Description: INTERVENTIONS:  -  Assess patient's ability to carry out ADLs; assess patient's baseline for ADL function and identify physical deficits which impact ability to perform ADLs (bathing, care of mouth/teeth, toileting, grooming, dressing, etc.)  - Assess/evaluate cause of self-care deficits   - Assess range of motion  Outcome: Progressing  Goal: Maintains/Returns to pre admission functional level  Description: INTERVENTIONS:  - Perform AM-PAC 6 Click Basic Mobility/ Daily Activity assessment daily.  - Set and communicate daily mobility goal to care team and patient/family/caregiver.   - Collaborate with rehabilitation services on mobility goals if consulted  - Out of bed for toileting  - Record patient progress and toleration of activity level   Outcome: Progressing     Problem: DISCHARGE PLANNING  Goal: Discharge to home or other facility with appropriate resources  Description: INTERVENTIONS:  - Identify barriers to discharge w/patient and caregiver  - Arrange for needed discharge resources and transportation as appropriate  - Identify discharge learning needs (meds, wound care, etc.)  - Arrange for interpretive services to assist at discharge as needed  - Refer to Case Management Department for coordinating discharge planning if the patient needs post-hospital services based on physician/advanced practitioner order or complex needs related to functional status, cognitive ability, or social support system  Outcome: Progressing     Problem: Knowledge Deficit  Goal: Patient/family/caregiver demonstrates understanding of disease process, treatment plan, medications, and discharge instructions  Description: Complete learning assessment and assess knowledge base.  Interventions:  - Provide teaching at level of understanding  - Provide teaching via preferred learning methods  Outcome: Progressing     Problem: INFECTION - ADULT  Goal: Absence or prevention of progression during  hospitalization  Description: INTERVENTIONS:  - Assess and monitor for signs and symptoms of infection  - Monitor lab/diagnostic results    - Administer medications as ordered  - Instruct and encourage patient and family to use good hand hygiene technique  - Identify and instruct in appropriate isolation precautions for identified infection/condition  Outcome: Progressing     Problem: NEUROSENSORY - ADULT  Goal: Achieves stable or improved neurological status  Description: INTERVENTIONS  - Monitor and report changes in neurological status  - Monitor vital signs such as temperature, blood pressure, glucose, and any other labs ordered   - Initiate measures to prevent increased intracranial pressure  - Monitor for seizure activity and implement precautions if appropriate      Outcome: Progressing  Goal: Remains free of injury related to seizures activity  Description: INTERVENTIONS  - Maintain airway, patient safety  and administer oxygen as ordered  - Monitor patient for seizure activity, document and report duration and description of seizure to physician/advanced practitioner  - If seizure occurs,  ensure patient safety during seizure  - Reorient patient post seizure  - Seizure pads on all 4 side rails  - Instruct patient/family to notify RN of any seizure activity including if an aura is experienced  - Instruct patient/family to call for assistance with activity based on nursing assessment  - Administer anti-seizure medications if ordered    Outcome: Progressing  Goal: Achieves maximal functionality and self care  Description: INTERVENTIONS  - Monitor swallowing and airway patency with patient fatigue and changes in neurological status  - Encourage and assist patient to increase activity and self care.   - Encourage visually impaired, hearing impaired and aphasic patients to use assistive/communication devices  Outcome: Progressing     Problem: Nutrition/Hydration-ADULT  Goal: Nutrient/Hydration intake appropriate  for improving, restoring or maintaining nutritional needs  Description: Monitor and assess patient's nutrition/hydration status for malnutrition. Collaborate with interdisciplinary team and initiate plan and interventions as ordered.  Monitor patient's weight and dietary intake as ordered or per policy. Utilize nutrition screening tool and intervene as necessary. Determine patient's food preferences and provide high-protein, high-caloric foods as appropriate.     INTERVENTIONS:  - Monitor oral intake, urinary output, labs, and treatment plans  - Assess nutrition and hydration status and recommend course of action  - Evaluate amount of meals eaten  - Assist patient with eating if necessary   - Allow adequate time for meals  - Recommend/ encourage appropriate diets, oral nutritional supplements, and vitamin/mineral supplements  - Order, calculate, and assess calorie counts as needed  - Recommend, monitor, and adjust tube feedings and TPN/PPN based on assessed needs  - Assess need for intravenous fluids  - Provide specific nutrition/hydration education as appropriate  - Include patient/family/caregiver in decisions related to nutrition  Outcome: Progressing
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
## 2025-03-16 NOTE — ASSESSMENT & PLAN NOTE
Patient presented with confusion and delusions, paranoia   Unclear etiology, apart from pneumonia, patient with no other metabolic derangements or identifiable organic etiology  UDS negative, ethanol <10  Stroke work-up negative  Per Toxicology, mentation and behavior not consistent with any toxidrome  Discussed with Psych, unclear whether Psych condition is contributing given acute onset  Per Psych, given possible OD, psych regimen discontinued on 3/16  Discussed with Neuro, EEG pending  Patient's mentation improving following d/c of Psych meds

## 2025-03-16 NOTE — ASSESSMENT & PLAN NOTE
Noted to desaturate to 85% while asleep with improvement to >90% on 2L  Suspect possible SHAWN, may need formal sleep study on discharge  Will possibly need overnight pulse ox study to see if he will qualify for O2 on discharge

## 2025-03-16 NOTE — PLAN OF CARE
Problem: SAFETY ADULT  Goal: Patient will remain free of falls  Description: INTERVENTIONS:  - Educate patient/family on patient safety including physical limitations  - Instruct patient to call for assistance with activity   - Consult OT/PT to assist with strengthening/mobility   - Keep Call bell within reach  - Keep bed low and locked with side rails adjusted as appropriate  - Keep care items and personal belongings within reach  - Initiate and maintain comfort rounds  - Make Fall Risk Sign visible to staff  - Apply yellow socks and bracelet for high fall risk patients  - Consider moving patient to room near nurses station  Outcome: Progressing     Problem: Prexisting or High Potential for Compromised Skin Integrity  Goal: Skin integrity is maintained or improved  Description: INTERVENTIONS:  - Identify patients at risk for skin breakdown  - Assess and monitor skin integrity  - Assess and monitor nutrition and hydration status  - Monitor labs   - Assess for incontinence   - Turn and reposition patient  - Assist with mobility/ambulation  - Relieve pressure over bony prominences  - Avoid friction and shearing  - Provide appropriate hygiene as needed including keeping skin clean and dry  - Evaluate need for skin moisturizer/barrier cream  - Collaborate with interdisciplinary team   - Patient/family teaching  - Consider wound care consult   Outcome: Progressing     Problem: NEUROSENSORY - ADULT  Goal: Achieves stable or improved neurological status  Description: INTERVENTIONS  - Monitor and report changes in neurological status  - Monitor vital signs such as temperature, blood pressure, glucose, and any other labs ordered   - Initiate measures to prevent increased intracranial pressure  - Monitor for seizure activity and implement precautions if appropriate      Outcome: Progressing
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
## 2025-03-17 ENCOUNTER — APPOINTMENT (INPATIENT)
Dept: NEUROLOGY | Facility: HOSPITAL | Age: 60
DRG: 871 | End: 2025-03-17
Attending: STUDENT IN AN ORGANIZED HEALTH CARE EDUCATION/TRAINING PROGRAM
Payer: MEDICARE

## 2025-03-17 LAB
ANION GAP SERPL CALCULATED.3IONS-SCNC: 11 MMOL/L (ref 4–13)
BUN SERPL-MCNC: 10 MG/DL (ref 5–25)
CALCIUM SERPL-MCNC: 8.8 MG/DL (ref 8.4–10.2)
CHLORIDE SERPL-SCNC: 100 MMOL/L (ref 96–108)
CO2 SERPL-SCNC: 28 MMOL/L (ref 21–32)
CREAT SERPL-MCNC: 0.76 MG/DL (ref 0.6–1.3)
GFR SERPL CREATININE-BSD FRML MDRD: 99 ML/MIN/1.73SQ M
GLUCOSE SERPL-MCNC: 111 MG/DL (ref 65–140)
GLUCOSE SERPL-MCNC: 143 MG/DL (ref 65–140)
GLUCOSE SERPL-MCNC: 71 MG/DL (ref 65–140)
GLUCOSE SERPL-MCNC: 77 MG/DL (ref 65–140)
GLUCOSE SERPL-MCNC: 81 MG/DL (ref 65–140)
MAGNESIUM SERPL-MCNC: 2.1 MG/DL (ref 1.9–2.7)
POTASSIUM SERPL-SCNC: 4 MMOL/L (ref 3.5–5.3)
SODIUM SERPL-SCNC: 139 MMOL/L (ref 135–147)

## 2025-03-17 PROCEDURE — 97116 GAIT TRAINING THERAPY: CPT

## 2025-03-17 PROCEDURE — 95816 EEG AWAKE AND DROWSY: CPT

## 2025-03-17 PROCEDURE — 83735 ASSAY OF MAGNESIUM: CPT | Performed by: STUDENT IN AN ORGANIZED HEALTH CARE EDUCATION/TRAINING PROGRAM

## 2025-03-17 PROCEDURE — 80048 BASIC METABOLIC PNL TOTAL CA: CPT | Performed by: STUDENT IN AN ORGANIZED HEALTH CARE EDUCATION/TRAINING PROGRAM

## 2025-03-17 PROCEDURE — 82948 REAGENT STRIP/BLOOD GLUCOSE: CPT

## 2025-03-17 PROCEDURE — 4A00X4Z MEASUREMENT OF CENTRAL NERVOUS ELECTRICAL ACTIVITY, EXTERNAL APPROACH: ICD-10-PCS | Performed by: INTERNAL MEDICINE

## 2025-03-17 PROCEDURE — 95816 EEG AWAKE AND DROWSY: CPT | Performed by: STUDENT IN AN ORGANIZED HEALTH CARE EDUCATION/TRAINING PROGRAM

## 2025-03-17 PROCEDURE — 99232 SBSQ HOSP IP/OBS MODERATE 35: CPT | Performed by: PSYCHIATRY & NEUROLOGY

## 2025-03-17 PROCEDURE — 97535 SELF CARE MNGMENT TRAINING: CPT

## 2025-03-17 PROCEDURE — 94762 N-INVAS EAR/PLS OXIMTRY CONT: CPT

## 2025-03-17 PROCEDURE — 97530 THERAPEUTIC ACTIVITIES: CPT

## 2025-03-17 PROCEDURE — 99232 SBSQ HOSP IP/OBS MODERATE 35: CPT | Performed by: INTERNAL MEDICINE

## 2025-03-17 PROCEDURE — 92526 ORAL FUNCTION THERAPY: CPT

## 2025-03-17 RX ADMIN — BENZTROPINE MESYLATE 0.5 MG: 0.5 TABLET ORAL at 21:36

## 2025-03-17 RX ADMIN — FOLIC ACID 1 MG: 1 TABLET ORAL at 09:24

## 2025-03-17 RX ADMIN — ATROPINE SULFATE 1 DROP: 10 SOLUTION/ DROPS OPHTHALMIC at 13:08

## 2025-03-17 RX ADMIN — THIAMINE HYDROCHLORIDE 500 MG: 100 INJECTION, SOLUTION INTRAMUSCULAR; INTRAVENOUS at 09:28

## 2025-03-17 RX ADMIN — DIVALPROEX SODIUM 1250 MG: 500 TABLET, EXTENDED RELEASE ORAL at 21:36

## 2025-03-17 RX ADMIN — GABAPENTIN 200 MG: 100 CAPSULE ORAL at 21:36

## 2025-03-17 RX ADMIN — Medication 1 TABLET: at 09:24

## 2025-03-17 RX ADMIN — THIAMINE HYDROCHLORIDE 500 MG: 100 INJECTION, SOLUTION INTRAMUSCULAR; INTRAVENOUS at 17:01

## 2025-03-17 RX ADMIN — THIAMINE HYDROCHLORIDE 500 MG: 100 INJECTION, SOLUTION INTRAMUSCULAR; INTRAVENOUS at 21:37

## 2025-03-17 RX ADMIN — CEFTRIAXONE 1000 MG: 1 INJECTION, SOLUTION INTRAVENOUS at 22:16

## 2025-03-17 RX ADMIN — ENOXAPARIN SODIUM 40 MG: 100 INJECTION SUBCUTANEOUS at 09:25

## 2025-03-17 RX ADMIN — DOCUSATE SODIUM 100 MG: 100 CAPSULE, LIQUID FILLED ORAL at 09:24

## 2025-03-17 RX ADMIN — TAMSULOSIN HYDROCHLORIDE 0.4 MG: 0.4 CAPSULE ORAL at 17:01

## 2025-03-17 RX ADMIN — VENLAFAXINE HYDROCHLORIDE 112.5 MG: 75 CAPSULE, EXTENDED RELEASE ORAL at 09:24

## 2025-03-17 RX ADMIN — DOCUSATE SODIUM 100 MG: 100 CAPSULE, LIQUID FILLED ORAL at 17:01

## 2025-03-17 RX ADMIN — Medication 6 MG: at 21:36

## 2025-03-17 NOTE — NURSING NOTE
Pt's Girlfriend called 2 times and spoke with this RN to get an update on pt's health. This Rn provided an update and conveyed the message to the provider that family wants to talk to him.

## 2025-03-17 NOTE — TREATMENT PLAN
Treatment plan    Plan of care discussed with significant other on telephone.  All questions answered at this time.  Discussed possibility of discharge tomorrow.    Ryan Veronica DO FACP

## 2025-03-17 NOTE — PLAN OF CARE
Problem: PHYSICAL THERAPY ADULT  Goal: Performs mobility at highest level of function for planned discharge setting.  See evaluation for individualized goals.  Description: Treatment/Interventions: ADL retraining, Functional transfer training, LE strengthening/ROM, Elevations, Therapeutic exercise, Endurance training, Patient/family training, Equipment eval/education, Bed mobility, Gait training, Spoke to nursing, Spoke to case management, OT  Equipment Recommended: Walker       See flowsheet documentation for full assessment, interventions and recommendations.  3/17/2025 1215 by Yury Tobias PT  Outcome: Progressing  Note: Prognosis: Good  Problem List: Decreased strength, Decreased range of motion, Decreased endurance, Impaired balance, Decreased coordination, Decreased mobility, Decreased cognition, Impaired judgement, Decreased safety awareness, Impaired sensation, Obesity  Assessment: Pt is demonstrating improvement since initial evaluation on friday. he is now able to ambulate with no AD household distances with supervision. Pt does appear somewhat unsteady although did not require assistance to recover from any LOB this AM. Alertness is improved this AM as well. Does report pain in the bilateral shoulders although reports this is improved after being OOB for some time. It appears that at this time pt will likely not require inpt rehab, could continue to benefit from therapy while admitted and benefit from f/u with either home vs OP PT at D/C. WIll provide SPC for pt at this time.  Barriers to Discharge: Inaccessible home environment, Decreased caregiver support     Rehab Resource Intensity Level, PT: III (Minimum Resource Intensity)    See flowsheet documentation for full assessment.

## 2025-03-17 NOTE — PLAN OF CARE
Problem: Potential for Falls  Goal: Patient will remain free of falls  Description: INTERVENTIONS:  - Educate patient/family on patient safety including physical limitations  - Instruct patient to call for assistance with activity   - Consult OT/PT to assist with strengthening/mobility   - Keep Call bell within reach  - Keep bed low and locked with side rails adjusted as appropriate  - Keep care items and personal belongings within reach  - Initiate and maintain comfort rounds  - Make Fall Risk Sign visible to staff  - Apply yellow socks and bracelet for high fall risk patients  - Consider moving patient to room near nurses station  Outcome: Progressing     Problem: PAIN - ADULT  Goal: Verbalizes/displays adequate comfort level or baseline comfort level  Description: Interventions:  - Encourage patient to monitor pain and request assistance  - Assess pain using appropriate pain scale  - Administer analgesics based on type and severity of pain and evaluate response  - Implement non-pharmacological measures as appropriate and evaluate response  - Consider cultural and social influences on pain and pain management  - Notify physician/advanced practitioner if interventions unsuccessful or patient reports new pain  Outcome: Progressing     Problem: SAFETY ADULT  Goal: Patient will remain free of falls  Description: INTERVENTIONS:  - Educate patient/family on patient safety including physical limitations  - Instruct patient to call for assistance with activity   - Consult OT/PT to assist with strengthening/mobility   - Keep Call bell within reach  - Keep bed low and locked with side rails adjusted as appropriate  - Keep care items and personal belongings within reach  - Initiate and maintain comfort rounds  - Make Fall Risk Sign visible to staff  - Apply yellow socks and bracelet for high fall risk patients  - Consider moving patient to room near nurses station  Outcome: Progressing  Goal: Maintain or return to baseline  ADL function  Description: INTERVENTIONS:  -  Assess patient's ability to carry out ADLs; assess patient's baseline for ADL function and identify physical deficits which impact ability to perform ADLs (bathing, care of mouth/teeth, toileting, grooming, dressing, etc.)  - Assess/evaluate cause of self-care deficits   - Assess range of motion  Outcome: Progressing  Goal: Maintains/Returns to pre admission functional level  Description: INTERVENTIONS:  - Perform AM-PAC 6 Click Basic Mobility/ Daily Activity assessment daily.  - Set and communicate daily mobility goal to care team and patient/family/caregiver.   - Collaborate with rehabilitation services on mobility goals if consulted  - Out of bed for toileting  - Record patient progress and toleration of activity level   Outcome: Progressing     Problem: DISCHARGE PLANNING  Goal: Discharge to home or other facility with appropriate resources  Description: INTERVENTIONS:  - Identify barriers to discharge w/patient and caregiver  - Arrange for needed discharge resources and transportation as appropriate  - Identify discharge learning needs (meds, wound care, etc.)  - Arrange for interpretive services to assist at discharge as needed  - Refer to Case Management Department for coordinating discharge planning if the patient needs post-hospital services based on physician/advanced practitioner order or complex needs related to functional status, cognitive ability, or social support system  Outcome: Progressing     Problem: DISCHARGE PLANNING  Goal: Discharge to home or other facility with appropriate resources  Description: INTERVENTIONS:  - Identify barriers to discharge w/patient and caregiver  - Arrange for needed discharge resources and transportation as appropriate  - Identify discharge learning needs (meds, wound care, etc.)  - Arrange for interpretive services to assist at discharge as needed  - Refer to Case Management Department for coordinating discharge planning  if the patient needs post-hospital services based on physician/advanced practitioner order or complex needs related to functional status, cognitive ability, or social support system  Outcome: Progressing     Problem: Knowledge Deficit  Goal: Patient/family/caregiver demonstrates understanding of disease process, treatment plan, medications, and discharge instructions  Description: Complete learning assessment and assess knowledge base.  Interventions:  - Provide teaching at level of understanding  - Provide teaching via preferred learning methods  Outcome: Progressing     Problem: INFECTION - ADULT  Goal: Absence or prevention of progression during hospitalization  Description: INTERVENTIONS:  - Assess and monitor for signs and symptoms of infection  - Monitor lab/diagnostic results    - Administer medications as ordered  - Instruct and encourage patient and family to use good hand hygiene technique  - Identify and instruct in appropriate isolation precautions for identified infection/condition  Outcome: Progressing     Problem: INFECTION - ADULT  Goal: Absence or prevention of progression during hospitalization  Description: INTERVENTIONS:  - Assess and monitor for signs and symptoms of infection  - Monitor lab/diagnostic results    - Administer medications as ordered  - Instruct and encourage patient and family to use good hand hygiene technique  - Identify and instruct in appropriate isolation precautions for identified infection/condition  Outcome: Progressing     Problem: Prexisting or High Potential for Compromised Skin Integrity  Goal: Skin integrity is maintained or improved  Description: INTERVENTIONS:  - Identify patients at risk for skin breakdown  - Assess and monitor skin integrity  - Assess and monitor nutrition and hydration status  - Monitor labs   - Assess for incontinence   - Turn and reposition patient  - Assist with mobility/ambulation  - Relieve pressure over bony prominences  - Avoid friction  and shearing  - Provide appropriate hygiene as needed including keeping skin clean and dry  - Evaluate need for skin moisturizer/barrier cream  - Collaborate with interdisciplinary team   - Patient/family teaching  - Consider wound care consult   Outcome: Progressing     Problem: Nutrition/Hydration-ADULT  Goal: Nutrient/Hydration intake appropriate for improving, restoring or maintaining nutritional needs  Description: Monitor and assess patient's nutrition/hydration status for malnutrition. Collaborate with interdisciplinary team and initiate plan and interventions as ordered.  Monitor patient's weight and dietary intake as ordered or per policy. Utilize nutrition screening tool and intervene as necessary. Determine patient's food preferences and provide high-protein, high-caloric foods as appropriate.     INTERVENTIONS:  - Monitor oral intake, urinary output, labs, and treatment plans  - Assess nutrition and hydration status and recommend course of action  - Evaluate amount of meals eaten  - Assist patient with eating if necessary   - Allow adequate time for meals  - Recommend/ encourage appropriate diets, oral nutritional supplements, and vitamin/mineral supplements  - Order, calculate, and assess calorie counts as needed  - Recommend, monitor, and adjust tube feedings and TPN/PPN based on assessed needs  - Assess need for intravenous fluids  - Provide specific nutrition/hydration education as appropriate  - Include patient/family/caregiver in decisions related to nutrition  Outcome: Progressing     Problem: Nutrition/Hydration-ADULT  Goal: Nutrient/Hydration intake appropriate for improving, restoring or maintaining nutritional needs  Description: Monitor and assess patient's nutrition/hydration status for malnutrition. Collaborate with interdisciplinary team and initiate plan and interventions as ordered.  Monitor patient's weight and dietary intake as ordered or per policy. Utilize nutrition screening tool  and intervene as necessary. Determine patient's food preferences and provide high-protein, high-caloric foods as appropriate.     INTERVENTIONS:  - Monitor oral intake, urinary output, labs, and treatment plans  - Assess nutrition and hydration status and recommend course of action  - Evaluate amount of meals eaten  - Assist patient with eating if necessary   - Allow adequate time for meals  - Recommend/ encourage appropriate diets, oral nutritional supplements, and vitamin/mineral supplements  - Order, calculate, and assess calorie counts as needed  - Recommend, monitor, and adjust tube feedings and TPN/PPN based on assessed needs  - Assess need for intravenous fluids  - Provide specific nutrition/hydration education as appropriate  - Include patient/family/caregiver in decisions related to nutrition  Outcome: Progressing     Problem: NEUROSENSORY - ADULT  Goal: Achieves stable or improved neurological status  Description: INTERVENTIONS  - Monitor and report changes in neurological status  - Monitor vital signs such as temperature, blood pressure, glucose, and any other labs ordered   - Initiate measures to prevent increased intracranial pressure  - Monitor for seizure activity and implement precautions if appropriate      Outcome: Progressing  Goal: Remains free of injury related to seizures activity  Description: INTERVENTIONS  - Maintain airway, patient safety  and administer oxygen as ordered  - Monitor patient for seizure activity, document and report duration and description of seizure to physician/advanced practitioner  - If seizure occurs,  ensure patient safety during seizure  - Reorient patient post seizure  - Seizure pads on all 4 side rails  - Instruct patient/family to notify RN of any seizure activity including if an aura is experienced  - Instruct patient/family to call for assistance with activity based on nursing assessment  - Administer anti-seizure medications if ordered    Outcome:  Progressing  Goal: Achieves maximal functionality and self care  Description: INTERVENTIONS  - Monitor swallowing and airway patency with patient fatigue and changes in neurological status  - Encourage and assist patient to increase activity and self care.   - Encourage visually impaired, hearing impaired and aphasic patients to use assistive/communication devices  Outcome: Progressing

## 2025-03-17 NOTE — CASE MANAGEMENT
Case Management Discharge Planning Note    Patient name Sudhakar Choe  Location 7T [facility] 703/7T [facility] 703-01 MRN [id number]  : 1965 Date 3/17/2025       Current Admission Date: 3/12/2025  Current Admission Diagnosis:Acute metabolic encephalopathy   Patient Active Problem List    Diagnosis Date Noted Date Diagnosed    Acute hypoxic respiratory failure ([facility]) 2025     Right sided weakness 03/15/2025     Acute urinary retention 03/15/2025     Sepsis ([facility]) 2025     Alcohol use disorder 2025     Falls 2025     Aspiration pneumonia of both lungs ([facility]) 2025     Acute metabolic encephalopathy 2025     Pain in right foot 2025     Closed nondisplaced fracture of second metatarsal bone of right foot, initial encounter 2025     Movement disorder (rule out drug indiced movement disorder) 2024     Mood disorder ([facility]) 2024     Constipation 2024     Drooling 2024     Psychosis ([facility]) 2024     Leg DVT (deep venous thromboembolism), chronic, right ([facility]) 2024     Hypotension due to drugs 2024     Smoking 2024     Painful orthopaedic hardware ([facility]) 2024     Prediabetes 2024     Bilateral lower extremity edema 2023     Vitamin D deficiency 05/15/2023     COVID-19 2022     Schizoaffective disorder, depressive type ([facility]) 2022     Left foot pain 10/04/2021     Abscess 2021     Bipolar affective disorder, current episode manic with psychotic symptoms ([facility]) 2021     Mixed obsessional thoughts and acts 2021     Alcohol use disorder, moderate, in sustained remission ([facility]) 2021     DJD (degenerative joint disease) 2021     Penis pain 2021     Spondylosis of cervical region without myelopathy or radiculopathy 2020     Closed fracture of nasal bone 10/04/2020     Abnormal CT scan, cervical spine 10/03/2020     Fall 10/03/2020     Alcohol intoxication ([facility]) 10/03/2020      Ventral hernia 01/28/2020     History of Helicobacter pylori infection 01/23/2020     Conflicted attitude towards dietary regime 01/21/2020     Benign prostatic hyperplasia 11/06/2019     History of obsessive compulsive disorder 09/27/2019     Insomnia 01/16/2019     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 01/08/2019     Closed nondisplaced fracture of body of left calcaneus 12/18/2018     Brachial plexus injury, right, sequela 10/31/2018     Recurrent major depressive disorder, in partial remission (HCC) with history of psychotic features 10/31/2018     Multiple fractures of left foot 10/25/2018     Suicide attempt (HCC) 10/23/2018     Gastroesophageal reflux disease without esophagitis 07/16/2018     Hiatal hernia 07/16/2018     Celiac disease 07/16/2018     Weight loss 08/18/2016     Erectile dysfunction 06/16/2016       LOS (days): 4  Geometric Mean LOS (GMLOS) (days): 4.9  Days to GMLOS:0.8     OBJECTIVE:  Risk of Unplanned Readmission Score: 11.33         Current admission status: Inpatient   Preferred Pharmacy:   [id number] [address] [address]  Phone: [phone] Fax: [phone]    Primary Care Provider: Osvaldo Soler DO    Primary Insurance: MEDICARE  Secondary Insurance:     DISCHARGE DETAILS:    Discharge planning discussed with:: Patient and girlfriend Rhea  Freedom of Choice: Yes     CM contacted family/caregiver?: Yes  Were Treatment Team discharge recommendations reviewed with patient/caregiver?: Yes  Did patient/caregiver verbalize understanding of patient care needs?: Yes  Were patient/caregiver advised of the risks associated with not following Treatment Team discharge recommendations?: Yes    Contacts  Patient Contacts: Rhea Sesay (Significant Other)  Relationship to Patient:: Family  Contact Method: Phone  Phone Number:  [phone]  Reason/Outcome: Emergency Contact, Discharge Planning    Requested Home Health Care         Is the patient interested in HHC at discharge?: No    DME Referral Provided  Referral made for DME?: No    Other Referral/Resources/Interventions Provided:  Interventions: Outpatient PT    Would you like to participate in our [facility] service program?  : No - Declined    Treatment Team Recommendation: Home, Outpatient Rehab  Discharge Destination Plan:: Home, Outpatient Rehab      IMM Given (Date):: 03/17/25 (Copy provided to patient and media)  IMM Given to:: Patient      Met with patient at bedside and discussed therapy eval and recs HHC vs OP therapy.  Patient is not homebound.  Provided with OP therapy info.  Call to SO arslan and discussed discharge planning.  Arslan requesting discharge tomorrow since she can get off work and she works 11-7 and wants to be home patient;s first night at home.  Message to SLIM updated same.  EEG completed by tech today .
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
## 2025-03-17 NOTE — ASSESSMENT & PLAN NOTE
Sepsis present on admission secondary to aspiration pneumonia  Continue ceftriaxone currently antibiotic day 5    Results from last 7 days   Lab Units 03/16/25  0541 03/15/25  0450 03/14/25  0447 03/13/25  1000   PROCALCITONIN ng/ml 0.25 0.40* 0.70* 0.70*
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
## 2025-03-17 NOTE — ASSESSMENT & PLAN NOTE
Unspecified mood disorder being seen by psychiatry  Restarted psychotropics: Depakote venlafaxine benztropine gabapentin  Holding perphenazine

## 2025-03-17 NOTE — ASSESSMENT & PLAN NOTE
Desaturation down to 85% requiring supplemental oxygen  Will need outpatient PSG  Has been weaned off oxygen during the day
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
## 2025-03-17 NOTE — PLAN OF CARE
Problem: OCCUPATIONAL THERAPY ADULT  Goal: Performs self-care activities at highest level of function for planned discharge setting.  See evaluation for individualized goals.  Description: Treatment Interventions: ADL retraining, Endurance training, UE strengthening/ROM, Functional transfer training, Continued evaluation, Energy conservation, Activityengagement          See flowsheet documentation for full assessment, interventions and recommendations.   Outcome: Progressing  Note: Limitation: Decreased high-level ADLs, Decreased ADL status, Decreased endurance, Decreased Safe judgement during ADL, Decreased cognition, Decreased UE strength  Prognosis: Good  Assessment: Pt is a 59 y.o. male seen for OT evaluation s/p admit to [facility] on 3/12/2025 w/ Acute metabolic encephalopathy.  See medical history above for extensive list of comorbidities affecting pt's functional performance at time of assessment. Personal factors affecting Pt at time of IE include:behavioral pattern, difficulty performing IADLS , and health management . Upon evaluation: Pt requires min-modA for ADL transfers, mobility. Pt requires modA for ADLs in standing. Pt's primary barrier(s) at this time: decreased engagement, cognition, safety. The following deficits impact occupational performance: weakness, decreased strength, decreased balance, decreased tolerance, impaired problem solving, and decreased safety awareness. Pt to benefit from continued skilled OT services while in the hospital to address deficits as defined above and maximize level of functional independence w ADL's and functional mobility. Occupational performance areas to address include: bathing/shower, toilet hygiene, dressing, health maintenance, functional mobility, and clothing management. From OT standpoint, recommendation at time of d/c would be moderate resource intensity.       Rehab Resource Intensity Level, OT: II (Moderate Resource Intensity)

## 2025-03-17 NOTE — ASSESSMENT & PLAN NOTE
Patient is a 59-year-old male with a past psychiatric history of schizoaffective disorder who presented to the hospital with altered mental status with concerns for potential aspiration pneumonia and signs of sepsis.  Psychiatry was consulted to determine if his altered mental status was psychiatric in origin. On initial evaluation, the patient was unable to cooperate with the interview. His thought process was disorganized and illogical at times and with waxing and waning orientation. VPA level on admission was noted to be therapeutic. Psychiatric medications were initially held 3/16 due to question of possible overdose while intoxicated. Psychotropics have since been restarted besides Prolixin which remains on hold. Patient did have a stroke alert called 3/14 for possible R sided weakness, imaging and workup negative, evaluated by Neurology, pending EEG. Patient was evaluated by Toxicology due to AMS, per Tox not manifesting any toxidrome or acute drug effect, started on thiamine and folate for possible Wernicke's encephalopathy. Over the weekend, patient's mentation did improve and patient is orientedx3, conversational, appears at baseline. Denying acute psychiatric complaints. Etiology of AMS remains unclear, possibly secondary to pneumonia and/or interaction of alcohol use with multiple psychotropics.     Plan:  Continue psychotropics as listed below  Depakote 1250 mg qhs    Effexor 112.5 mg qd               Cogentin .5 mg qhs   Gabapentin 200 mg qhs   Melatonin 6 mg qhs   Continue to hold Prolixin 1 mg until patient has met with outpatient psychiatrist  Patient does not meet criteria for inpatient psychiatric admission at this time. Should follow up as an outpatient. Discussed with patient who expressed understanding.    Further plan of care per primary team   Will sign off at this time. Please reach out to on-call Psychiatry team via [employer] or if after hours/Fri/Sat/Sunday contact on-call psychiatric  service via [employer] ([phone]) with any questions or concerns.

## 2025-03-17 NOTE — PLAN OF CARE
Problem: PHYSICAL THERAPY ADULT  Goal: Performs mobility at highest level of function for planned discharge setting.  See evaluation for individualized goals.  Description: Treatment/Interventions: ADL retraining, Functional transfer training, LE strengthening/ROM, Elevations, Therapeutic exercise, Endurance training, Patient/family training, Bed mobility, Gait training, Equipment eval/education, Spoke to nursing, Spoke to case management, OT  Equipment Recommended: Walker       See flowsheet documentation for full assessment, interventions and recommendations.  Outcome: Progressing  Note: Prognosis: Good  Problem List: Decreased strength, Decreased range of motion, Decreased endurance, Impaired balance, Decreased coordination, Decreased mobility, Decreased cognition, Impaired judgement, Decreased safety awareness, Impaired sensation, Obesity     Barriers to Discharge: Inaccessible home environment, Decreased caregiver support     Rehab Resource Intensity Level, PT: II (Moderate Resource Intensity)    See flowsheet documentation for full assessment.
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
## 2025-03-17 NOTE — ASSESSMENT & PLAN NOTE
History of mood disorder right-sided weakness from accident and diabetes presents to the hospital with confusion delusion paranoia  Seen by toxicology: Symptoms inconsistent with toxidrome  Seen by neurology.  MRI of the brain without acute infarct.  EEG has been ordered for completeness.  Etiology likely psychiatric/polypharmacy

## 2025-03-17 NOTE — PLAN OF CARE
Problem: Potential for Falls  Goal: Patient will remain free of falls  Description: INTERVENTIONS:  - Educate patient/family on patient safety including physical limitations  - Instruct patient to call for assistance with activity   - Consult OT/PT to assist with strengthening/mobility   - Keep Call bell within reach  - Keep bed low and locked with side rails adjusted as appropriate  - Keep care items and personal belongings within reach  - Initiate and maintain comfort rounds  - Make Fall Risk Sign visible to staff  - Apply yellow socks and bracelet for high fall risk patients  - Consider moving patient to room near nurses station  Outcome: Progressing     Problem: PAIN - ADULT  Goal: Verbalizes/displays adequate comfort level or baseline comfort level  Description: Interventions:  - Encourage patient to monitor pain and request assistance  - Assess pain using appropriate pain scale  - Administer analgesics based on type and severity of pain and evaluate response  - Implement non-pharmacological measures as appropriate and evaluate response  - Consider cultural and social influences on pain and pain management  - Notify physician/advanced practitioner if interventions unsuccessful or patient reports new pain  Outcome: Progressing     Problem: SAFETY ADULT  Goal: Patient will remain free of falls  Description: INTERVENTIONS:  - Educate patient/family on patient safety including physical limitations  - Instruct patient to call for assistance with activity   - Consult OT/PT to assist with strengthening/mobility   - Keep Call bell within reach  - Keep bed low and locked with side rails adjusted as appropriate  - Keep care items and personal belongings within reach  - Initiate and maintain comfort rounds  - Make Fall Risk Sign visible to staff  - Apply yellow socks and bracelet for high fall risk patients  - Consider moving patient to room near nurses station  Outcome: Progressing  Goal: Maintain or return to baseline  ADL function  Description: INTERVENTIONS:  -  Assess patient's ability to carry out ADLs; assess patient's baseline for ADL function and identify physical deficits which impact ability to perform ADLs (bathing, care of mouth/teeth, toileting, grooming, dressing, etc.)  - Assess/evaluate cause of self-care deficits   - Assess range of motion  Outcome: Progressing  Goal: Maintains/Returns to pre admission functional level  Description: INTERVENTIONS:  - Perform AM-PAC 6 Click Basic Mobility/ Daily Activity assessment daily.  - Set and communicate daily mobility goal to care team and patient/family/caregiver.   - Collaborate with rehabilitation services on mobility goals if consulted  - Out of bed for toileting  - Record patient progress and toleration of activity level   Outcome: Progressing     Problem: DISCHARGE PLANNING  Goal: Discharge to home or other facility with appropriate resources  Description: INTERVENTIONS:  - Identify barriers to discharge w/patient and caregiver  - Arrange for needed discharge resources and transportation as appropriate  - Identify discharge learning needs (meds, wound care, etc.)  - Arrange for interpretive services to assist at discharge as needed  - Refer to Case Management Department for coordinating discharge planning if the patient needs post-hospital services based on physician/advanced practitioner order or complex needs related to functional status, cognitive ability, or social support system  Outcome: Progressing     Problem: Knowledge Deficit  Goal: Patient/family/caregiver demonstrates understanding of disease process, treatment plan, medications, and discharge instructions  Description: Complete learning assessment and assess knowledge base.  Interventions:  - Provide teaching at level of understanding  - Provide teaching via preferred learning methods  Outcome: Progressing     Problem: INFECTION - ADULT  Goal: Absence or prevention of progression during  hospitalization  Description: INTERVENTIONS:  - Assess and monitor for signs and symptoms of infection  - Monitor lab/diagnostic results    - Administer medications as ordered  - Instruct and encourage patient and family to use good hand hygiene technique  - Identify and instruct in appropriate isolation precautions for identified infection/condition  Outcome: Progressing     Problem: NEUROSENSORY - ADULT  Goal: Achieves stable or improved neurological status  Description: INTERVENTIONS  - Monitor and report changes in neurological status  - Monitor vital signs such as temperature, blood pressure, glucose, and any other labs ordered   - Initiate measures to prevent increased intracranial pressure  - Monitor for seizure activity and implement precautions if appropriate      Outcome: Progressing  Goal: Remains free of injury related to seizures activity  Description: INTERVENTIONS  - Maintain airway, patient safety  and administer oxygen as ordered  - Monitor patient for seizure activity, document and report duration and description of seizure to physician/advanced practitioner  - If seizure occurs,  ensure patient safety during seizure  - Reorient patient post seizure  - Seizure pads on all 4 side rails  - Instruct patient/family to notify RN of any seizure activity including if an aura is experienced  - Instruct patient/family to call for assistance with activity based on nursing assessment  - Administer anti-seizure medications if ordered    Outcome: Progressing  Goal: Achieves maximal functionality and self care  Description: INTERVENTIONS  - Monitor swallowing and airway patency with patient fatigue and changes in neurological status  - Encourage and assist patient to increase activity and self care.   - Encourage visually impaired, hearing impaired and aphasic patients to use assistive/communication devices  Outcome: Progressing     Problem: Nutrition/Hydration-ADULT  Goal: Nutrient/Hydration intake appropriate  for improving, restoring or maintaining nutritional needs  Description: Monitor and assess patient's nutrition/hydration status for malnutrition. Collaborate with interdisciplinary team and initiate plan and interventions as ordered.  Monitor patient's weight and dietary intake as ordered or per policy. Utilize nutrition screening tool and intervene as necessary. Determine patient's food preferences and provide high-protein, high-caloric foods as appropriate.     INTERVENTIONS:  - Monitor oral intake, urinary output, labs, and treatment plans  - Assess nutrition and hydration status and recommend course of action  - Evaluate amount of meals eaten  - Assist patient with eating if necessary   - Allow adequate time for meals  - Recommend/ encourage appropriate diets, oral nutritional supplements, and vitamin/mineral supplements  - Order, calculate, and assess calorie counts as needed  - Recommend, monitor, and adjust tube feedings and TPN/PPN based on assessed needs  - Assess need for intravenous fluids  - Provide specific nutrition/hydration education as appropriate  - Include patient/family/caregiver in decisions related to nutrition  Outcome: Progressing     Problem: Prexisting or High Potential for Compromised Skin Integrity  Goal: Skin integrity is maintained or improved  Description: INTERVENTIONS:  - Identify patients at risk for skin breakdown  - Assess and monitor skin integrity  - Assess and monitor nutrition and hydration status  - Monitor labs   - Assess for incontinence   - Turn and reposition patient  - Assist with mobility/ambulation  - Relieve pressure over bony prominences  - Avoid friction and shearing  - Provide appropriate hygiene as needed including keeping skin clean and dry  - Evaluate need for skin moisturizer/barrier cream  - Collaborate with interdisciplinary team   - Patient/family teaching  - Consider wound care consult   Outcome: Progressing
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
## 2025-03-17 NOTE — PROGRESS NOTES
Progress Note - Behavioral Health   Name: Sudhakar Choe 59 y.o. male I MRN: [id number]  Unit/Bed#: 7T [facility] 703-01 I Date of Admission: 3/12/2025   Date of Service: 3/17/2025 I Hospital Day: 4    Assessment & Plan  Sepsis (HCC)  Per primary   Mood disorder (rule out Schizoaffective disorder, depressed type)  Patient is a 59-year-old male with a past psychiatric history of schizoaffective disorder who presented to the hospital with altered mental status with concerns for potential aspiration pneumonia and signs of sepsis.  Psychiatry was consulted to determine if his altered mental status was psychiatric in origin. On initial evaluation, the patient was unable to cooperate with the interview. His thought process was disorganized and illogical at times and with waxing and waning orientation. VPA level on admission was noted to be therapeutic. Psychiatric medications were initially held 3/16 due to question of possible overdose while intoxicated. Psychotropics have since been restarted besides Prolixin which remains on hold. Patient did have a stroke alert called 3/14 for possible R sided weakness, imaging and workup negative, evaluated by Neurology, pending EEG. Patient was evaluated by Toxicology due to AMS, per Tox not manifesting any toxidrome or acute drug effect, started on thiamine and folate for possible Wernicke's encephalopathy. Over the weekend, patient's mentation did improve and patient is orientedx3, conversational, appears at baseline. Denying acute psychiatric complaints. Etiology of AMS remains unclear, possibly secondary to pneumonia and/or interaction of alcohol use with multiple psychotropics.     Plan:  Continue psychotropics as listed below  Depakote 1250 mg qhs    Effexor 112.5 mg qd               Cogentin .5 mg qhs   Gabapentin 200 mg qhs   Melatonin 6 mg qhs   Continue to hold Prolixin 1 mg until patient has met with outpatient psychiatrist  Patient does not meet criteria for inpatient  psychiatric admission at this time. Should follow up as an outpatient. Discussed with patient who expressed understanding.    Further plan of care per primary team   Will sign off at this time. Please reach out to on-call Psychiatry team via "[employer],"t or if after hours/Fri/Sat/Sunday contact on-call psychiatric service via dooub ([phone]) with any questions or concerns.     Alcohol use disorder  Educated on cessation   Per primary  Falls  Per primary   Aspiration pneumonia of both lungs (HCC)  Per primary   Acute metabolic encephalopathy  Per primary   Right sided weakness  Per Neurology/Primary   Acute urinary retention  Per primary   Acute hypoxic respiratory failure (HCC)  Per primary     Current medications:  Current Facility-Administered Medications   Medication Dose Route Frequency Provider Last Rate    acetaminophen  650 mg Oral Q6H PRN Alysia Fallon MD      atropine  1 drop Sublingual Q12H Alysia Fallon MD      benztropine  0.5 mg Oral HS Adama Em DO      cefTRIAXone  1,000 mg Intravenous Q24H Alysia Fallon MD 1,000 mg (03/16/25 2209)    divalproex sodium  1,250 mg Oral HS Adama Em DO      docusate sodium  100 mg Oral BID Alysia Fallon MD      enoxaparin  40 mg Subcutaneous Daily Alysia Fallon MD      folic acid  1 mg Oral Daily Alysia Fallon MD      gabapentin  200 mg Oral HS Adama Em DO      insulin lispro  1-6 Units Subcutaneous TID AC Alysia Fallon MD      insulin lispro  1-6 Units Subcutaneous HS Alysia Fallon MD      LORazepam  1 mg Intravenous Q6H PRN Alysia Fallon MD      melatonin  6 mg Oral HS Alysia Fallon MD      multivitamin-minerals  1 tablet Oral Daily Alysia Fallon MD      ondansetron  4 mg Intravenous  "Q6H PRN Alysia Fallon MD      tamsulosin  0.4 mg Oral Daily With Dinner Alysia Fallon MD      thiamine  500 mg Intravenous TID Alysia Fallon  mg (03/16/25 2122)    venlafaxine  112.5 mg Oral Daily Adama Em DO          Risks/Benefits of Treatment:     Risks, benefits, and possible side effects of medications explained to patient and patient verbalizes understanding and agreement for treatment.    Progress Toward Goals: improving    Subjective       Patient seen and examined for continuity of care. Plan of care discussed with nursing. Per nursing, patient has been more alert, conversational, cooperative. No episodes of agitation or acute events noted. No psychotropic prns required.     This AM, patient is calm, cooperative, orientedx3. States his mood is \"even\". Is unable to accurately recall why he presented to ED. When asked about possible alcohol use, patient denies. States that he has not drank for several years. Patient states he has been compliant with psychotropic medications which are administered to him daily by his girlfriend who is a nurse. When asked about potential overdose, patient adamantly denies.     Patient currently denies any significant depression or anxiety. Reports normal appetite, sleep, and energy. Denies current or recent suicidal ideation, plan, or intent. Denies thoughts of self harm. Denies homicidal ideation. Denies auditory or visual hallucinations. Denies any acute psychiatric concerns at this time.     Sleep: normal  Appetite: normal  ROS: review of systems as noted above in HPI/Subjective report, all other systems are negative    Objective :  Temp:  [97 °F (36.1 °C)-98 °F (36.7 °C)] 98 °F (36.7 °C)  HR:  [80-91] 80  BP: (101-109)/(61-86) 109/82  Resp:  [16-18] 18  SpO2:  [93 %-97 %] 93 %  O2 Device: None (Room air)  Nasal Cannula O2 Flow Rate (L/min):  [2 L/min] 2 L/min    Temp:  [97 °F (36.1 °C)-98 " "°F (36.7 °C)] 98 °F (36.7 °C)  HR:  [80-91] 80  BP: (101-109)/(61-86) 109/82  Resp:  [16-18] 18  SpO2:  [93 %-97 %] 93 %  O2 Device: None (Room air)  Nasal Cannula O2 Flow Rate (L/min):  [2 L/min] 2 L/min    Mental Status Evaluation:    Appearance:  age appropriate, adequate grooming,  male, sitting up in chair   Behavior:  pleasant, cooperative, calm   Speech:  normal rate, normal volume   Mood:  \"even\"   Affect:  normal range and intensity, appropriate   Thought Process:  organized, goal directed, logical, linear   Thought Content:  no overt delusions   Perceptual Disturbances: no auditory hallucinations, no visual hallucinations, does not appear responding to internal stimuli   Risk Potential: Suicidal Ideation -  None at present, contracts for safety on the unit, would talk to staff if not feeling safe on the unit  Homicidal Ideation -  None at present  Potential for Aggression - Not at present   Sensorium:  oriented to person, place, and time/date   Memory:  recent and remote memory grossly intact   Consciousness:  alert and awake   Attention/Concentration: attention span and concentration are age appropriate   Insight:  improved and fair   Judgment: improved and fair   Gait/Station: Sitting in chair   Motor Activity: no abnormal movements           Lab Results: I have reviewed the following results:  Labs in last 72 hours:   Recent Labs     03/15/25  0450 03/15/25  1349 03/16/25  0541 03/17/25  0438   WBC 8.49  --  7.52  --    RBC 4.02  --  4.01  --    HGB 12.4  --  12.3  --    HCT 38.2  --  38.0  --      --  176  --    RDW 14.4  --  14.2  --    NEUTROABS 4.55  --  3.68  --    SODIUM 136  --  136 139   K 3.9  --  4.1 4.0     --  101 100   CO2 27  --  27 28   BUN 8  --  10 10   CREATININE 0.71  --  0.72 0.76   GLUC 93  --  95 77   CALCIUM 8.5  --  8.7 8.8   CHOLESTEROL 113  --   --   --    HDL 26*  --   --   --    TRIG 95  --   --   --    LDLCALC 68  --   --   --    VALPROICTOT  --  74  -- "   --    AMMONIA  --  12*  --   --        Administrative Statements     Counseling / Coordination of Care:   Patient's progress discussed with staff in treatment team meeting.  Medication changes reviewed with staff in treatment team meeting..      Heidi Manzano DO 03/17/25  Psychiatry, PGY-II

## 2025-03-18 LAB
ALBUMIN SERPL BCG-MCNC: 3.3 G/DL (ref 3.5–5)
ALP SERPL-CCNC: 44 U/L (ref 34–104)
ALT SERPL W P-5'-P-CCNC: 26 U/L (ref 7–52)
ANION GAP SERPL CALCULATED.3IONS-SCNC: 9 MMOL/L (ref 4–13)
AST SERPL W P-5'-P-CCNC: 30 U/L (ref 13–39)
BACTERIA BLD CULT: NORMAL
BACTERIA BLD CULT: NORMAL
BILIRUB SERPL-MCNC: 0.3 MG/DL (ref 0.2–1)
BUN SERPL-MCNC: 13 MG/DL (ref 5–25)
CALCIUM ALBUM COR SERPL-MCNC: 9.3 MG/DL (ref 8.3–10.1)
CALCIUM SERPL-MCNC: 8.7 MG/DL (ref 8.4–10.2)
CHLORIDE SERPL-SCNC: 103 MMOL/L (ref 96–108)
CO2 SERPL-SCNC: 27 MMOL/L (ref 21–32)
CREAT SERPL-MCNC: 0.78 MG/DL (ref 0.6–1.3)
ERYTHROCYTE [DISTWIDTH] IN BLOOD BY AUTOMATED COUNT: 14.1 % (ref 11.6–15.1)
GFR SERPL CREATININE-BSD FRML MDRD: 98 ML/MIN/1.73SQ M
GLUCOSE SERPL-MCNC: 110 MG/DL (ref 65–140)
GLUCOSE SERPL-MCNC: 126 MG/DL (ref 65–140)
GLUCOSE SERPL-MCNC: 155 MG/DL (ref 65–140)
GLUCOSE SERPL-MCNC: 196 MG/DL (ref 65–140)
GLUCOSE SERPL-MCNC: 76 MG/DL (ref 65–140)
GLUCOSE SERPL-MCNC: 78 MG/DL (ref 65–140)
GLUCOSE SERPL-MCNC: 82 MG/DL (ref 65–140)
HCT VFR BLD AUTO: 37.9 % (ref 36.5–49.3)
HGB BLD-MCNC: 12.1 G/DL (ref 12–17)
MCH RBC QN AUTO: 30.6 PG (ref 26.8–34.3)
MCHC RBC AUTO-ENTMCNC: 31.9 G/DL (ref 31.4–37.4)
MCV RBC AUTO: 96 FL (ref 82–98)
PLATELET # BLD AUTO: 222 THOUSANDS/UL (ref 149–390)
PMV BLD AUTO: 11 FL (ref 8.9–12.7)
POTASSIUM SERPL-SCNC: 4.3 MMOL/L (ref 3.5–5.3)
PROCALCITONIN SERPL-MCNC: 0.13 NG/ML
PROT SERPL-MCNC: 6.4 G/DL (ref 6.4–8.4)
RBC # BLD AUTO: 3.95 MILLION/UL (ref 3.88–5.62)
SODIUM SERPL-SCNC: 139 MMOL/L (ref 135–147)
WBC # BLD AUTO: 6.52 THOUSAND/UL (ref 4.31–10.16)

## 2025-03-18 PROCEDURE — 82948 REAGENT STRIP/BLOOD GLUCOSE: CPT

## 2025-03-18 PROCEDURE — 84145 PROCALCITONIN (PCT): CPT | Performed by: INTERNAL MEDICINE

## 2025-03-18 PROCEDURE — 99232 SBSQ HOSP IP/OBS MODERATE 35: CPT | Performed by: INTERNAL MEDICINE

## 2025-03-18 PROCEDURE — 85027 COMPLETE CBC AUTOMATED: CPT | Performed by: INTERNAL MEDICINE

## 2025-03-18 PROCEDURE — 92526 ORAL FUNCTION THERAPY: CPT | Performed by: SPEECH-LANGUAGE PATHOLOGIST

## 2025-03-18 PROCEDURE — 80053 COMPREHEN METABOLIC PANEL: CPT | Performed by: INTERNAL MEDICINE

## 2025-03-18 RX ADMIN — Medication 1 TABLET: at 08:28

## 2025-03-18 RX ADMIN — INSULIN LISPRO 1 UNITS: 100 INJECTION, SOLUTION INTRAVENOUS; SUBCUTANEOUS at 11:22

## 2025-03-18 RX ADMIN — TAMSULOSIN HYDROCHLORIDE 0.4 MG: 0.4 CAPSULE ORAL at 16:01

## 2025-03-18 RX ADMIN — ATROPINE SULFATE 1 DROP: 10 SOLUTION/ DROPS OPHTHALMIC at 00:16

## 2025-03-18 RX ADMIN — DIVALPROEX SODIUM 1250 MG: 500 TABLET, EXTENDED RELEASE ORAL at 21:55

## 2025-03-18 RX ADMIN — THIAMINE HYDROCHLORIDE 500 MG: 100 INJECTION, SOLUTION INTRAMUSCULAR; INTRAVENOUS at 09:05

## 2025-03-18 RX ADMIN — CEFTRIAXONE 1000 MG: 1 INJECTION, SOLUTION INTRAVENOUS at 22:03

## 2025-03-18 RX ADMIN — DOCUSATE SODIUM 100 MG: 100 CAPSULE, LIQUID FILLED ORAL at 08:28

## 2025-03-18 RX ADMIN — ENOXAPARIN SODIUM 40 MG: 100 INJECTION SUBCUTANEOUS at 08:28

## 2025-03-18 RX ADMIN — VENLAFAXINE HYDROCHLORIDE 112.5 MG: 75 CAPSULE, EXTENDED RELEASE ORAL at 08:28

## 2025-03-18 RX ADMIN — FOLIC ACID 1 MG: 1 TABLET ORAL at 08:28

## 2025-03-18 RX ADMIN — INSULIN LISPRO 2 UNITS: 100 INJECTION, SOLUTION INTRAVENOUS; SUBCUTANEOUS at 21:56

## 2025-03-18 RX ADMIN — BENZTROPINE MESYLATE 0.5 MG: 0.5 TABLET ORAL at 21:56

## 2025-03-18 RX ADMIN — DOCUSATE SODIUM 100 MG: 100 CAPSULE, LIQUID FILLED ORAL at 17:31

## 2025-03-18 RX ADMIN — GABAPENTIN 200 MG: 100 CAPSULE ORAL at 21:55

## 2025-03-18 RX ADMIN — Medication 6 MG: at 21:55

## 2025-03-18 NOTE — ASSESSMENT & PLAN NOTE
History of mood disorder right-sided weakness from accident and diabetes presents to the hospital with confusion delusion paranoia  Seen by toxicology: Symptoms inconsistent with toxidrome  Seen by neurology.  MRI of the brain without acute infarct.  EEG negative for epileptiform activity  Etiology likely psychiatric/polypharmacy.  Mentation appears to be back at baseline

## 2025-03-18 NOTE — ASSESSMENT & PLAN NOTE
Sepsis present on admission secondary to aspiration pneumonia  Continue ceftriaxone currently antibiotic day 6    Results from last 7 days   Lab Units 03/18/25  0441 03/16/25  0541 03/15/25  0450 03/14/25  0447   PROCALCITONIN ng/ml 0.13 0.25 0.40* 0.70*

## 2025-03-18 NOTE — ASSESSMENT & PLAN NOTE
Acute urinary retention unfortunately did not do well with voiding trial  Urinary catheter replaced.  Continue tamsulosin

## 2025-03-18 NOTE — CASE MANAGEMENT
Case Management Discharge Planning Note    Patient name Sudhakar Choe  Location 7T [facility] 703/7T [facility] 703-01 MRN [id number]  : 1965 Date 3/18/2025       Current Admission Date: 3/12/2025  Current Admission Diagnosis:Acute metabolic encephalopathy   Patient Active Problem List    Diagnosis Date Noted Date Diagnosed    Acute hypoxic respiratory failure ([facility]) 2025     Right sided weakness 03/15/2025     Acute urinary retention 03/15/2025     Sepsis ([facility]) 2025     Alcohol use disorder 2025     Falls 2025     Aspiration pneumonia of both lungs ([facility]) 2025     Acute metabolic encephalopathy 2025     Pain in right foot 2025     Closed nondisplaced fracture of second metatarsal bone of right foot, initial encounter 2025     Movement disorder (rule out drug indiced movement disorder) 2024     Mood disorder ([facility]) 2024     Constipation 2024     Drooling 2024     Psychosis ([facility]) 2024     Leg DVT (deep venous thromboembolism), chronic, right ([facility]) 2024     Hypotension due to drugs 2024     Smoking 2024     Painful orthopaedic hardware ([facility]) 2024     Prediabetes 2024     Bilateral lower extremity edema 2023     Vitamin D deficiency 05/15/2023     COVID-19 2022     Schizoaffective disorder, depressive type ([facility]) 2022     Left foot pain 10/04/2021     Abscess 2021     Bipolar affective disorder, current episode manic with psychotic symptoms ([facility]) 2021     Mixed obsessional thoughts and acts 2021     Alcohol use disorder, moderate, in sustained remission ([facility]) 2021     DJD (degenerative joint disease) 2021     Penis pain 2021     Spondylosis of cervical region without myelopathy or radiculopathy 2020     Closed fracture of nasal bone 10/04/2020     Abnormal CT scan, cervical spine 10/03/2020     Fall 10/03/2020     Alcohol intoxication ([facility]) 10/03/2020      Ventral hernia 01/28/2020     History of Helicobacter pylori infection 01/23/2020     Conflicted attitude towards dietary regime 01/21/2020     Benign prostatic hyperplasia 11/06/2019     History of obsessive compulsive disorder 09/27/2019     Insomnia 01/16/2019     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 01/08/2019     Closed nondisplaced fracture of body of left calcaneus 12/18/2018     Brachial plexus injury, right, sequela 10/31/2018     Recurrent major depressive disorder, in partial remission (HCC) with history of psychotic features 10/31/2018     Multiple fractures of left foot 10/25/2018     Suicide attempt (HCC) 10/23/2018     Gastroesophageal reflux disease without esophagitis 07/16/2018     Hiatal hernia 07/16/2018     Celiac disease 07/16/2018     Weight loss 08/18/2016     Erectile dysfunction 06/16/2016       LOS (days): 5  Geometric Mean LOS (GMLOS) (days): 4.9  Days to GMLOS:-0.1     OBJECTIVE:  Risk of Unplanned Readmission Score: 11.5         Current admission status: Inpatient   Preferred Pharmacy:   [id number] [address] [address]  Phone: [phone] Fax: [phone]    Primary Care Provider: Osvaldo Soler DO    Primary Insurance: MEDICARE  Secondary Insurance:     DISCHARGE DETAILS:    Discharge planning discussed with:: significatn kal Wilkerson  Freedom of Choice: Yes  Comments - Freedom of Choice: Would like Pt to go to STR due to new barnes and weakness     Contacts  Patient Contacts: Rhea Sesay (Significant Other)  [phone] (Mobile)    Other Referral/Resources/Interventions Provided:  Interventions: SNF, Short Term Rehab  Referral Comments: Referral sent in Aidin for STR    CM received call from Pts significant other (SO) Rhea requesting that Pt going to SNF due to new barnes that was placed today and concern for Pt weakness as SO  works [employer]. YUE explained that referrals could be placed but there is no gurantee that Pt would be accepted for SNF due to how he did with therapy/having a skilled need. SO states that Pt is not able to go to [facility] as she works there. Interested in [facility] and per SO, Pt in agreement    Provider made aware. CM sent referrals in Aidin for STR which included [facility] and other facilities    CM will continue to follow for DC planning needs    Update: Due to SNF responses this afternoon, CM placed HHC referrals in Aidin as well in the event that there is no accepting SNF.

## 2025-03-18 NOTE — PLAN OF CARE
Patient needed to be straight cathed for the second time. Bladder scan this morning was 999cc, straight cath collected 2400cc. Patient felt the urge to pee but was unable to.

## 2025-03-18 NOTE — SPEECH THERAPY NOTE
Speech Language/Pathology    Speech/Language Pathology Progress Note    Patient Name: Sudhakar Choe  Today's Date: 3/18/2025    Subjective:  Patient with diet changed from chopped to regular over the weekend- he reports he prefers this and has no difficulties with po    Objective:  Patient seen upright in bed with breakfast tray. He consumed sausage, Canadian toast and scrambled eggs with thin liquids. He was also given meds whole x3 at one time with thin liquids. Mastication was min prolonged given limited dentition but eventually adequate with the materials administered today. Bolus formation and transfer were functional with no significant oral residue noted.  No overt s/s reduced oral control. Swallowing initiation appeared prompt.  Laryngeal rise was palpated and judged to be within functional limits.  No coughing, throat clearing, change in vocal quality or respiratory status noted today.       Assessment:  Patients oropharyngeal swallow appears GWFL- min impulsivity noted ( likely given hemiparetic R side with some difficulty with self-feeding)    Plan/Recommendations:  Continue reg/thin   Meds as desired/tolerated    No further SLP services indicated at this time    Micaela Conway M.S., CCC-SLP  Speech Language Pathologist   Available via Secure Chat  [place] #[id number]  [place] #[id number]

## 2025-03-18 NOTE — PLAN OF CARE
Problem: Potential for Falls  Goal: Patient will remain free of falls  Description: INTERVENTIONS:  - Educate patient/family on patient safety including physical limitations  - Instruct patient to call for assistance with activity   - Consult OT/PT to assist with strengthening/mobility   - Keep Call bell within reach  - Keep bed low and locked with side rails adjusted as appropriate  - Keep care items and personal belongings within reach  - Initiate and maintain comfort rounds  - Make Fall Risk Sign visible to staff  - Apply yellow socks and bracelet for high fall risk patients  - Consider moving patient to room near nurses station  Outcome: Progressing     Problem: NEUROSENSORY - ADULT  Goal: Achieves stable or improved neurological status  Description: INTERVENTIONS  - Monitor and report changes in neurological status  - Monitor vital signs such as temperature, blood pressure, glucose, and any other labs ordered   - Initiate measures to prevent increased intracranial pressure  - Monitor for seizure activity and implement precautions if appropriate      Outcome: Progressing     Problem: INFECTION - ADULT  Goal: Absence or prevention of progression during hospitalization  Description: INTERVENTIONS:  - Assess and monitor for signs and symptoms of infection  - Monitor lab/diagnostic results    - Administer medications as ordered  - Instruct and encourage patient and family to use good hand hygiene technique  - Identify and instruct in appropriate isolation precautions for identified infection/condition  Outcome: Progressing
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
## 2025-03-19 VITALS
WEIGHT: 230.6 LBS | HEIGHT: 76 IN | SYSTOLIC BLOOD PRESSURE: 94 MMHG | OXYGEN SATURATION: 97 % | HEART RATE: 90 BPM | DIASTOLIC BLOOD PRESSURE: 75 MMHG | TEMPERATURE: 96.5 F | RESPIRATION RATE: 18 BRPM | BODY MASS INDEX: 28.08 KG/M2

## 2025-03-19 LAB
GLUCOSE SERPL-MCNC: 102 MG/DL (ref 65–140)
GLUCOSE SERPL-MCNC: 117 MG/DL (ref 65–140)
GLUCOSE SERPL-MCNC: 94 MG/DL (ref 65–140)

## 2025-03-19 PROCEDURE — 82948 REAGENT STRIP/BLOOD GLUCOSE: CPT

## 2025-03-19 PROCEDURE — 99239 HOSP IP/OBS DSCHRG MGMT >30: CPT | Performed by: INTERNAL MEDICINE

## 2025-03-19 RX ORDER — CEFDINIR 300 MG/1
300 CAPSULE ORAL EVERY 12 HOURS SCHEDULED
Qty: 6 CAPSULE | Refills: 0 | Status: SHIPPED | OUTPATIENT
Start: 2025-03-19 | End: 2025-03-22

## 2025-03-19 RX ADMIN — FOLIC ACID 1 MG: 1 TABLET ORAL at 08:34

## 2025-03-19 RX ADMIN — DOCUSATE SODIUM 100 MG: 100 CAPSULE, LIQUID FILLED ORAL at 08:34

## 2025-03-19 RX ADMIN — Medication 1 TABLET: at 08:34

## 2025-03-19 RX ADMIN — ATROPINE SULFATE 1 DROP: 10 SOLUTION/ DROPS OPHTHALMIC at 13:33

## 2025-03-19 RX ADMIN — TAMSULOSIN HYDROCHLORIDE 0.4 MG: 0.4 CAPSULE ORAL at 15:54

## 2025-03-19 RX ADMIN — ENOXAPARIN SODIUM 40 MG: 100 INJECTION SUBCUTANEOUS at 08:34

## 2025-03-19 RX ADMIN — VENLAFAXINE HYDROCHLORIDE 112.5 MG: 75 CAPSULE, EXTENDED RELEASE ORAL at 08:34

## 2025-03-19 RX ADMIN — ATROPINE SULFATE 1 DROP: 10 SOLUTION/ DROPS OPHTHALMIC at 00:47
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
## 2025-03-19 NOTE — ASSESSMENT & PLAN NOTE
Desaturation down to 85% requiring supplemental oxygen  Would benefit from outpatient PSG  Has been weaned off oxygen during the day

## 2025-03-19 NOTE — CASE MANAGEMENT
Case Management Assessment & Discharge Planning Note    Patient name Sudhakar Choe  Location 7T [facility] 703/7T [facility] 703-01 MRN [id number]  : 1965 Date 3/19/2025       Current Admission Date: 3/12/2025  Current Admission Diagnosis:Acute metabolic encephalopathy   Patient Active Problem List    Diagnosis Date Noted Date Diagnosed    Acute hypoxic respiratory failure ([facility]) 2025     Right sided weakness 03/15/2025     Acute urinary retention 03/15/2025     Sepsis ([facility]) 2025     Alcohol use disorder 2025     Falls 2025     Aspiration pneumonia of both lungs ([facility]) 2025     Acute metabolic encephalopathy 2025     Pain in right foot 2025     Closed nondisplaced fracture of second metatarsal bone of right foot, initial encounter 2025     Movement disorder (rule out drug indiced movement disorder) 2024     Mood disorder ([facility]) 2024     Constipation 2024     Drooling 2024     Psychosis ([facility]) 2024     Leg DVT (deep venous thromboembolism), chronic, right ([facility]) 2024     Hypotension due to drugs 2024     Smoking 2024     Painful orthopaedic hardware ([facility]) 2024     Prediabetes 2024     Bilateral lower extremity edema 2023     Vitamin D deficiency 05/15/2023     COVID-19 2022     Schizoaffective disorder, depressive type ([facility]) 2022     Left foot pain 10/04/2021     Abscess 2021     Bipolar affective disorder, current episode manic with psychotic symptoms ([facility]) 2021     Mixed obsessional thoughts and acts 2021     Alcohol use disorder, moderate, in sustained remission ([facility]) 2021     DJD (degenerative joint disease) 2021     Penis pain 2021     Spondylosis of cervical region without myelopathy or radiculopathy 2020     Closed fracture of nasal bone 10/04/2020     Abnormal CT scan, cervical spine 10/03/2020     Fall 10/03/2020     Alcohol intoxication ([facility])  10/03/2020     Ventral hernia 01/28/2020     History of Helicobacter pylori infection 01/23/2020     Conflicted attitude towards dietary regime 01/21/2020     Benign prostatic hyperplasia 11/06/2019     History of obsessive compulsive disorder 09/27/2019     Insomnia 01/16/2019     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 01/08/2019     Closed nondisplaced fracture of body of left calcaneus 12/18/2018     Brachial plexus injury, right, sequela 10/31/2018     Recurrent major depressive disorder, in partial remission (HCC) with history of psychotic features 10/31/2018     Multiple fractures of left foot 10/25/2018     Suicide attempt (HCC) 10/23/2018     Gastroesophageal reflux disease without esophagitis 07/16/2018     Hiatal hernia 07/16/2018     Celiac disease 07/16/2018     Weight loss 08/18/2016     Erectile dysfunction 06/16/2016       LOS (days): 6  Geometric Mean LOS (GMLOS) (days): 4.9  Days to GMLOS:-1.2     OBJECTIVE:    Risk of Unplanned Readmission Score: 11.68         Current admission status: Inpatient  Referral Reason: Other (Discharge planning)    [facility]:   [id number] [address] [address]  Phone: [phone] Fax: [phone]    Primary Care Provider: Osvaldo Soler DO    Primary Insurance: MEDICARE  Secondary Insurance:     ASSESSMENT:  Active Health Care Proxies    There are no active Health Care Proxies on file.       Current Home Health Care  Home Health Agency Name:: [facility]    DISCHARGE DETAILS:    Discharge planning discussed with:: Patient and SO Rhea  Freedom of Choice: Yes     CM contacted family/caregiver?: Yes  Were Treatment Team discharge recommendations reviewed with patient/caregiver?: Yes  Did patient/caregiver verbalize understanding of patient care needs?: Yes  Were patient/caregiver advised of the risks  associated with not following Treatment Team discharge recommendations?: Yes    Contacts  Patient Contacts: Rhea Sesay (Significant Other)  [phone] (Mobile)and [phone] Mobile  Relationship to Patient:: Friend  Contact Method: Phone  Phone Number: Rhea Sesay (Significant Other) [phone] (Mobile)and [phone] Mobile  Reason/Outcome: Emergency Contact, Discharge Planning    Requested Home Health Care         Is the patient interested in [facility] at discharge?: Yes  Home Health Discipline requested:: Nursing, Physical Therapy  [facility] Health Agency [facility]:: [facility]  Home Health Follow-Up Provider:: PCP  Home Health Services Needed:: Evaluate Functional Status and Safety, Gait/ADL Training, Urinary Incontinence Catheter Management, Strengthening/Theraputic Exercises to Improve Function  Homebound Criteria Met:: Requires the Assistance of Another Person for Safe Ambulation or to Leave the Home  Supporting Clincal Findings:: Fatigues Easliy in Short Distances, Limited Endurance    DME Referral Provided  Referral made for DME?: No    Other Referral/Resources/Interventions Provided:  Interventions: [facility]    Would you like to participate in our [facility] service program?  : No - Declined    Treatment Team Recommendation: Home with Home Health Care  Discharge Destination Plan:: Home with Home Health Care  Transport at Discharge : Family       Additional Comments: Return call from NARCISA Wilkerson.  Discussed discharge planning and no accepting STR.  Explained therapy recs are Level III Agreeable to [facility] for SN/PT.  Aware patient will discharge with [place] cath and will need OP Urologist appointment for f/u.  Reviewed HHC choice list and choice is [facility].  Agency reserved via A[facility].  Met with patient again and updated on discharge.  Erik will pick patient up today.

## 2025-03-19 NOTE — ASSESSMENT & PLAN NOTE
Unspecified mood disorder being seen by psychiatry  Restarted psychotropics: Depakote venlafaxine benztropine gabapentin  Holding perphenazine until outpatient psychiatry follow-up

## 2025-03-19 NOTE — PROGRESS NOTES
Patient:    MRN:  [id number]    Aidin Request ID:  [id number]    Level of care reserved:    Partner Reserved:    Clinical needs requested:    Geography searched:  [id number]    Start of Service:    Request sent:  3:32pm EDT on 3/18/2025 by Abhijit Hernández    Partner reserved:  by Ligia Freeman    Choice list shared:  11:34am EDT on 3/19/2025 by Ligia Freeman

## 2025-03-19 NOTE — ASSESSMENT & PLAN NOTE
History of mood disorder right-sided weakness from accident and diabetes presents to the hospital with confusion delusion paranoia  Seen by toxicology: Symptoms inconsistent with toxidrome  Seen by neurology.  MRI of the brain without acute infarct.  EEG negative for epileptiform activity  Etiology likely psychiatric/polypharmacy +/- infectious (pneumonia).  Mentation appears to be back at baseline

## 2025-03-19 NOTE — ASSESSMENT & PLAN NOTE
Sepsis present on admission secondary to aspiration pneumonia  Continue ceftriaxone currently antibiotic day 7.  Will discharge with cefdinir for 3 more days complete a 10-day course    Results from last 7 days   Lab Units 03/18/25  0441 03/16/25  0541 03/15/25  0450 03/14/25  0447   PROCALCITONIN ng/ml 0.13 0.25 0.40* 0.70*

## 2025-03-19 NOTE — PLAN OF CARE
Problem: Potential for Falls  Goal: Patient will remain free of falls  Description: INTERVENTIONS:  - Educate patient/family on patient safety including physical limitations  - Instruct patient to call for assistance with activity   - Consult OT/PT to assist with strengthening/mobility   - Keep Call bell within reach  - Keep bed low and locked with side rails adjusted as appropriate  - Keep care items and personal belongings within reach  - Initiate and maintain comfort rounds  - Make Fall Risk Sign visible to staff  - Apply yellow socks and bracelet for high fall risk patients  - Consider moving patient to room near nurses station  Outcome: Progressing     Problem: NEUROSENSORY - ADULT  Goal: Achieves stable or improved neurological status  Description: INTERVENTIONS  - Monitor vital signs such as temperature, blood pressure, glucose, and any other labs ordered   - Monitor for seizure activity and implement precautions if appropriate      Outcome: Progressing     Problem: NEUROSENSORY - ADULT  Goal: Remains free of injury related to seizures activity  Description: INTERVENTIONS  - If seizure occurs,  ensure patient safety during seizure  - Reorient patient post seizure  - Seizure pads on all 4 side rails  - Instruct patient/family to notify RN of any seizure activity including if an aura is experienced  - Instruct patient/family to call for assistance with activity based on nursing assessment  - Administer anti-seizure medications if ordered    Outcome: Progressing     Problem: NEUROSENSORY - ADULT  Goal: Achieves maximal functionality and self care  Description: INTERVENTIONS  - Monitor swallowing and airway patency with patient fatigue and changes in neurological status  - Encourage and assist patient to increase activity and self care.   - Encourage visually impaired, hearing impaired and aphasic patients to use assistive/communication devices  Outcome: Progressing     Problem: DISCHARGE PLANNING  Goal:  Discharge to home or other facility with appropriate resources  Description: INTERVENTIONS:  - Identify barriers to discharge w/patient and caregiver  - Arrange for needed discharge resources and transportation as appropriate  - Identify discharge learning needs (meds, wound care, etc.)  - Refer to Case Management Department for coordinating discharge planning if the patient needs post-hospital services based on physician/advanced practitioner order or complex needs related to functional status, cognitive ability, or social support system  Outcome: Progressing

## 2025-03-19 NOTE — PLAN OF CARE
Problem: Potential for Falls  Goal: Patient will remain free of falls  Description: INTERVENTIONS:  - Educate patient/family on patient safety including physical limitations  - Instruct patient to call for assistance with activity   - Consult OT/PT to assist with strengthening/mobility   - Keep Call bell within reach  - Keep bed low and locked with side rails adjusted as appropriate  - Keep care items and personal belongings within reach  - Initiate and maintain comfort rounds  - Make Fall Risk Sign visible to staff  - Apply yellow socks and bracelet for high fall risk patients  - Consider moving patient to room near nurses station  Outcome: Progressing     Problem: NEUROSENSORY - ADULT  Goal: Achieves stable or improved neurological status  Description: INTERVENTIONS  - Monitor vital signs such as temperature, blood pressure, glucose, and any other labs ordered   - Monitor for seizure activity and implement precautions if appropriate      Outcome: Progressing     Problem: INFECTION - ADULT  Goal: Absence or prevention of progression during hospitalization  Description: INTERVENTIONS:  - Assess and monitor for signs and symptoms of infection  - Monitor lab/diagnostic results  - Administer medications as ordered  - Instruct and encourage patient and family to use good hand hygiene technique    Outcome: Progressing

## 2025-03-19 NOTE — ASSESSMENT & PLAN NOTE
Acute urinary retention unfortunately did not do well with voiding trial  Urinary catheter replaced.  Continue tamsulosin  Ambulatory referral placed for urology

## 2025-03-19 NOTE — CASE MANAGEMENT
Case Management Discharge Planning Note    Patient name Sudhakar Choe  Location 7T [facility] 703/7T [facility] 703-01 MRN [id number]  : 1965 Date 3/19/2025       Current Admission Date: 3/12/2025  Current Admission Diagnosis:Acute metabolic encephalopathy   Patient Active Problem List    Diagnosis Date Noted Date Diagnosed    Acute hypoxic respiratory failure ([facility]) 2025     Right sided weakness 03/15/2025     Acute urinary retention 03/15/2025     Sepsis ([facility]) 2025     Alcohol use disorder 2025     Falls 2025     Aspiration pneumonia of both lungs ([facility]) 2025     Acute metabolic encephalopathy 2025     Pain in right foot 2025     Closed nondisplaced fracture of second metatarsal bone of right foot, initial encounter 2025     Movement disorder (rule out drug indiced movement disorder) 2024     Mood disorder ([facility]) 2024     Constipation 2024     Drooling 2024     Psychosis ([facility]) 2024     Leg DVT (deep venous thromboembolism), chronic, right ([facility]) 2024     Hypotension due to drugs 2024     Smoking 2024     Painful orthopaedic hardware ([facility]) 2024     Prediabetes 2024     Bilateral lower extremity edema 2023     Vitamin D deficiency 05/15/2023     COVID-19 2022     Schizoaffective disorder, depressive type ([facility]) 2022     Left foot pain 10/04/2021     Abscess 2021     Bipolar affective disorder, current episode manic with psychotic symptoms ([facility]) 2021     Mixed obsessional thoughts and acts 2021     Alcohol use disorder, moderate, in sustained remission ([facility]) 2021     DJD (degenerative joint disease) 2021     Penis pain 2021     Spondylosis of cervical region without myelopathy or radiculopathy 2020     Closed fracture of nasal bone 10/04/2020     Abnormal CT scan, cervical spine 10/03/2020     Fall 10/03/2020     Alcohol intoxication ([facility]) 10/03/2020      Ventral hernia 01/28/2020     History of Helicobacter pylori infection 01/23/2020     Conflicted attitude towards dietary regime 01/21/2020     Benign prostatic hyperplasia 11/06/2019     History of obsessive compulsive disorder 09/27/2019     Insomnia 01/16/2019     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 01/08/2019     Closed nondisplaced fracture of body of left calcaneus 12/18/2018     Brachial plexus injury, right, sequela 10/31/2018     Recurrent major depressive disorder, in partial remission (HCC) with history of psychotic features 10/31/2018     Multiple fractures of left foot 10/25/2018     Suicide attempt (HCC) 10/23/2018     Gastroesophageal reflux disease without esophagitis 07/16/2018     Hiatal hernia 07/16/2018     Celiac disease 07/16/2018     Weight loss 08/18/2016     Erectile dysfunction 06/16/2016       LOS (days): 6  Geometric Mean LOS (GMLOS) (days): 4.9  Days to GMLOS:-1.2     OBJECTIVE:  Risk of Unplanned Readmission Score: 11.68         Current admission status: Inpatient   Preferred [facility]acy:   [id number] [address] [address]  Phone: [phone] Fax: [phone]    Primary Care Provider: Osvaldo Soler DO    Primary Insurance: MEDICARE  Secondary Insurance:     DISCHARGE DETAILS:    Discharge planning discussed with:: Patient  Freedom of Choice: Yes        Were Treatment Team discharge recommendations reviewed with patient/caregiver?: Yes  Did patient/caregiver verbalize understanding of patient care needs?: Yes  Were patient/caregiver advised of the risks associated with not following Treatment Team discharge recommendations?: Yes    Contacts  Patient Contacts: Rhea Sesay (Significant Other)  [phone] (Mobile)and [phone] Mobile  Relationship to Patient:: Friend  Contact Method: Phone  Phone Number: Rhea Sesay (Significant  Other) [phone] (Mobile)and [phone] Mobile  Reason/Outcome: Emergency Contact, Discharge Planning       Additional Comments: Message to Mikel Marrufo and explained [place] and requesting facility.  Per [facility] facility can not accept.  Patient not accept at any other [facility] facilities due to distance he can ambulate and recs from therapy are a Level III.  Many accepting [facility] agencies.  Met with patient at bedside and explained same patient would rather go home with HHC.  Call to NARCISA Wilkerson on both listed numbers left VM in am and late afternoon for return call to discuss discharge.  MD called Rhea and left message to discuss discharge too.  CM following for discharge needs.  Offer HHC choice and will need to reserve an agency

## 2025-03-19 NOTE — NURSING NOTE
Patient given discharge instructions and verbalized understanding. Belongings given to patient. Patient in no distress and has no concerns at this time. Patient wheeled downstairs and discharged with family member.

## 2025-03-20 ENCOUNTER — HOME CARE VISIT (OUTPATIENT)
Dept: HOME HEALTH SERVICES | Facility: HOME HEALTHCARE | Age: 60
End: 2025-03-20

## 2025-03-20 ENCOUNTER — TELEPHONE (OUTPATIENT)
Dept: FAMILY MEDICINE CLINIC | Facility: CLINIC | Age: 60
End: 2025-03-20

## 2025-03-20 ENCOUNTER — TRANSITIONAL CARE MANAGEMENT (OUTPATIENT)
Dept: FAMILY MEDICINE CLINIC | Facility: CLINIC | Age: 60
End: 2025-03-20

## 2025-03-20 NOTE — TELEPHONE ENCOUNTER
TCM questions completed.  Patient will reach back out to schedule appt once his spouse is home and he can check availability.

## 2025-03-21 ENCOUNTER — TELEPHONE (OUTPATIENT)
Age: 60
End: 2025-03-21

## 2025-03-21 NOTE — TELEPHONE ENCOUNTER
New Patient      Insurance   Current Insurance? Yes     Insurance E-verified?  Yes     History   Reason for appointment/active symptoms?  Acute urinary retention      Has the patient had any previous Urologist(s)? [facility]      Was the patient seen in the ED? 3/12/25     Labs/Imaging(Including Out Of Network)?      Records Requested? No   Records Visible in EPIC? Yes      Personal history of cancer? No       Appointment   Office location preference:    ?   Patient had catheter placed in ER. Patient failed a voiding trial in hospital. So this would be his second. Please review and call patient to schedule TOV within proper time frame.     CB:   [phone]

## 2025-03-24 ENCOUNTER — TELEPHONE (OUTPATIENT)
Age: 60
End: 2025-03-24

## 2025-03-24 NOTE — TELEPHONE ENCOUNTER
Patient gave verbal permission to speak with his girlfriend. Her name was not on the consent forms. Just the phone number. Writer sent a new communication consent to the patient.         Patient is calling regarding cancelling an appointment.    Date/Time: 3/24/2025 at 2:30 pm    Reason: pt thinks its too earlier after being discharged from the hospital .     Patient was rescheduled: YES [x] NO []  If yes, when was Patient reschedule for: 4/14/2025 at 1:30 pm    Patient requesting call back to reschedule: YES [] NO [x]

## 2025-04-01 ENCOUNTER — SOCIAL WORK (OUTPATIENT)
Dept: BEHAVIORAL/MENTAL HEALTH CLINIC | Facility: CLINIC | Age: 60
End: 2025-04-01
Payer: MEDICARE

## 2025-04-01 DIAGNOSIS — F42.2 MIXED OBSESSIONAL THOUGHTS AND ACTS: ICD-10-CM

## 2025-04-01 DIAGNOSIS — F25.1 SCHIZOAFFECTIVE DISORDER, DEPRESSIVE TYPE (HCC): Primary | ICD-10-CM

## 2025-04-01 PROCEDURE — 90834 PSYTX W PT 45 MINUTES: CPT | Performed by: SOCIAL WORKER

## 2025-04-02 DIAGNOSIS — N40.0 BPH (BENIGN PROSTATIC HYPERPLASIA): ICD-10-CM

## 2025-04-02 NOTE — PSYCH
"Behavioral Health Psychotherapy Progress Note    Psychotherapy Provided: Individual Psychotherapy     1. Schizoaffective disorder, depressive type (HCC)        2. Mixed obsessional thoughts and acts            Goals addressed in session: Goal 1     DATA: Sudhakar presented for treatment with his girlfriend, Rhea. They shared that he had been hospitalized for double pneumonia. She stated that she had initially thought that he had been drinking as her home was in a state of disarray when she had returned home from work and Sudhakar had stated that he had a party. She stated that he then went to bed but was then unresponsive so she had taken him to the hospital where he was diagnosed. She stated that they were very happy with the care that he received. Discussing that he has continued to make progress and presented today as positive. Discussing his path forward in spending more time outside of the home. He also continues to work on coping with the fact hat his sons are not in his life. Discussing the grieving process and utilizing supports in hi life. Giving supportive therapy  During this session, this clinician used the following therapeutic modalities: Cognitive Behavioral Therapy and Supportive Psychotherapy    Substance Abuse was not addressed during this session. If the client is diagnosed with a co-occurring substance use disorder, please indicate any changes in the frequency or amount of use: N/A. Stage of change for addressing substance use diagnoses: Maintenance    ASSESSMENT:  Sudhakar Choe presents with a Euthymic/ normal mood.     his affect is Normal range and intensity, which is congruent, with his mood and the content of the session. The client has made progress on their goals.    Continues to manage his moods appropriately Sudhakar Choe presents with a none risk of suicide, none risk of self-harm, and none risk of harm to others.    For any risk assessment that surpasses a \"low\" rating, a safety " plan must be developed.    A safety plan was indicated: no  If yes, describe in detail N/a    PLAN: Between sessions, Sudhakar Choe will continue to manage his moods appropriately. At the next session, the therapist will use Cognitive Behavioral Therapy and Supportive Psychotherapy to address treatment goals.    Behavioral Health Treatment Plan and Discharge Planning: Sudhakar Choe is aware of and agrees to continue to work on their treatment plan. They have identified and are working toward their discharge goals. yes    Depression Follow-up Plan Completed: No    Visit start and stop times:    04/01/25  Start Time: 1402  Stop Time: 1445  Total Visit Time: 43 minutes

## 2025-04-03 RX ORDER — TAMSULOSIN HYDROCHLORIDE 0.4 MG/1
CAPSULE ORAL
Qty: 90 CAPSULE | Refills: 1 | Status: SHIPPED | OUTPATIENT
Start: 2025-04-03

## 2025-04-11 NOTE — PLAN OF CARE
"Subjective:   History was provided by the mom  Karolyn Martinez is a 5 y.o. female who is here for this well-child visit.    Current Issues:    Current concerns include: Speech articulation problems in the past; normal audiology 6/23.   Getting speech therapy at school in preK4 and improving, per mom.   Does patient snore? NO     Review of Nutrition:  Current diet: +fruits/veggies, meats, dairy  Balanced diet? Yes; rec MVI with vit D    Social Screening:  Parental coping and self-care: doing well  Opportunities for peer interaction? Yes  Concerns regarding behavior with peers? No  School performance: doing well; no concerns, getting ST  Secondhand smoke exposure? no      4/16/2024    10:43 AM   Survey of Wellbeing of Young Children Milestones   2-Month Developmental Score Incomplete    4-Month Developmental Score Incomplete    6-Month Developmental Score Incomplete    9-Month Developmental Score Incomplete    12-Month Developmental Score Incomplete    15-Month Developmental Score Incomplete    18-Month Developmental Score Incomplete    24-Month Developmental Score Incomplete    30-Month Developmental Score Incomplete    36-Month Developmental Score Incomplete    Compares things - using words like "bigger" or "shorter" Very Much    Answers questions like "What do you do when you are cold?" or "...when you are sleepy?" Very Much    Tells you a story from a book or tv Very Much    Draws simple shapes - like a [place] or a square Very Much    Says words like "feet" for more than one foot and "men" for more than one man Somewhat    Uses words like "yesterday" and "tomorrow" correctly Somewhat    Stays dry all night Very Much    Follows simple rules when playing a board game or card game Very Much    Prints his or her name Very Much    Draws pictures you recognize Very Much    48-Month Developmental Score 18    60-Month Developmental Score Incomplete        Proxy-reported         4/11/2025     2:52 PM   Survey of Wellbeing of " Problem: Alteration in Thoughts and Perception  Goal: Treatment Goal: Gain control of psychotic behaviors/thinking, reduce/eliminate presenting symptoms and demonstrate improved reality functioning upon discharge  Outcome: Progressing  Goal: Refrain from acting on delusional thinking/internal stimuli  Description: Interventions:  - Monitor patient closely, per order   - Utilize least restrictive measures   - Set reasonable limits, give positive feedback for acceptable   - Administer medications as ordered and monitor of potential side effects  Outcome: Progressing  Goal: Agree to be compliant with medication regime, as prescribed and report medication side effects  Description: Interventions:  - Offer appropriate PRN medication and supervise ingestion; conduct AIMS, as needed   Outcome: Progressing     Problem: Risk for Self Injury/Neglect  Goal: Treatment Goal: Remain safe during length of stay, learn and adopt new coping skills, and be free of self-injurious ideation, impulses and acts at the time of discharge  Outcome: Progressing  Goal: Refrain from harming self  Description: Interventions:  - Monitor patient closely, per order  - Develop a trusting relationship  - Supervise medication ingestion, monitor effects and side effects   Outcome: Progressing  Goal: Verbalize thoughts and feelings  Description: Interventions:  - Assess and re-assess patient's level of risk   - Engage patient in 1:1 interactions, daily, for a minimum of 15 minutes   - Encourage patient to express feelings, fears, frustrations, hopes   Interventions:  - Assess and re-assess patient's lethality and potential for self-injury  - Engage patient in 1:1 interactions, daily, for a minimum of 15 minutes  - Encourage patient to express feelings, fears, frustrations, hopes  - Establish rapport/trust with patient   Outcome: Progressing     Problem: Depression  Goal: Verbalize thoughts and feelings  Description: Interventions:  - Assess and "Young Children Milestones   2-Month Developmental Score Incomplete    4-Month Developmental Score Incomplete    6-Month Developmental Score Incomplete    9-Month Developmental Score Incomplete    12-Month Developmental Score Incomplete    15-Month Developmental Score Incomplete    18-Month Developmental Score Incomplete    24-Month Developmental Score Incomplete    30-Month Developmental Score Incomplete    36-Month Developmental Score Incomplete    48-Month Developmental Score Incomplete    Tells you a story from a book or tv Very Much    Draws simple shapes - like a [place] or a square Very Much    Says words like "feet" for more than one foot and "men" for more than one man Very Much    Uses words like "yesterday" and "tomorrow" correctly Very Much    Stays dry all night Very Much    Follows simple rules when playing a board game or card game Very Much    Prints his or her name Very Much    Draws pictures you recognize Very Much    Stays in the lines when coloring Very Much    Names the days of the week in the correct order Somewhat    60-Month Developmental Score 19        Proxy-reported       Screening Questions:  Patient has a dental home: yes  Risk factors for anemia: no      Risk factors for tuberculosis: no  Risk factors for hearing loss: no  Risk factors for dyslipidemia: no    Growth parameters: Noted and are appropriate for age.  Past Medical History:   Diagnosis Date    Eczema     Jaundice     Otitis media      History reviewed. No pertinent surgical history.  Family History   Problem Relation Name Age of Onset    No Known Problems Maternal Grandmother          Copied from mother's family history at birth    No Known Problems Maternal Grandfather          Copied from mother's family history at birth    Anemia Mother Yvette Doshi         Copied from mother's history at birth    No Known Problems Father chakerian     Arthritis Paternal Grandmother       Social History     Socioeconomic History    " re-assess patient's level of risk   - Engage patient in 1:1 interactions, daily, for a minimum of 15 minutes   - Encourage patient to express feelings, fears, frustrations, hopes   Interventions:  - Assess and re-assess patient's lethality and potential for self-injury  - Engage patient in 1:1 interactions, daily, for a minimum of 15 minutes  - Encourage patient to express feelings, fears, frustrations, hopes  - Establish rapport/trust with patient   Outcome: Progressing Marital status: Single   Tobacco Use    Smoking status: Never     Passive exposure: Never    Smokeless tobacco: Never   Social History Narrative    Lives at home with mom. No smokers, no pets. In prek 04/11/2025     Problem List[1]    Reviewed Past Medical History, Social History, and Family History-- updated   Review of Systems- see patient questionnaire answers below     Objective:   APPEARANCE: Well nourished, well developed, in no acute distress. well appearing, very active; speech articulation issues  SKIN: Normal skin turgor, no obvious lesions.  HEAD: Normocephalic, atraumatic.  EYES: conjunctivae clear, no discharge. +Red reflexes bilat  EARS: TMs pearly. Light reflex normal. No retraction or perforation.   NOSE: Mucosa pink. Airway clear.  MOUTH & THROAT: No tonsillar enlargement. No pharyngeal erythema or exudate. No stridor.  CHEST: Lungs clear to auscultation.  No wheezes or rales.  No distress.  CARDIOVASCULAR: Regular rate and rhythm.  No murmur.  Pulses equal  GI: Abdomen not distended. Soft. No tenderness or masses. No hepatosplenomegaly  GENITALIA/Esau Stage: Esua 1  MSK: no scoliosis, nl gait, normal ROM of joints  Neuro: nonfocal exam  Lymph: no cervical, axillary, or inguinal lymph node enlargement        Assessment:     1. Encounter for well child check without abnormal findings    2. Encounter for screening for global developmental delays (milestones)    3. Impaired speech articulation         Plan:     1. Vision: acceptable on [place] vision screener  Hearing: passed  Hb/ Lead nl 4/22      Anticipatory guidance discussed.  Diet, oral hygiene, safety, seatbelt/booster seat, school performance, read to/with child, limit TV.  Gave handout on well-child issues at this age.    Age appropriate physical activity and nutritional counseling were completed during today's visit.    Immunizations today: per orders.  I counseled parent on vaccine components.  Recommend flu shot yearly and Covid vaccines for  age.    Flu shot is recommended yearly to prevent severe/ deadly flu.    Continue speech therapy through the school system.       [1]   Patient Active Problem List  Diagnosis    Impaired speech articulation
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
## 2025-04-14 ENCOUNTER — OFFICE VISIT (OUTPATIENT)
Dept: PSYCHIATRY | Facility: CLINIC | Age: 60
End: 2025-04-14
Payer: MEDICARE

## 2025-04-14 DIAGNOSIS — K59.09 OTHER CONSTIPATION: ICD-10-CM

## 2025-04-14 DIAGNOSIS — G47.00 INSOMNIA, UNSPECIFIED TYPE: ICD-10-CM

## 2025-04-14 DIAGNOSIS — K11.7 DROOLING: ICD-10-CM

## 2025-04-14 DIAGNOSIS — F10.21 ALCOHOL USE DISORDER, MODERATE, IN SUSTAINED REMISSION (HCC): ICD-10-CM

## 2025-04-14 DIAGNOSIS — G25.9 MOVEMENT DISORDER: ICD-10-CM

## 2025-04-14 DIAGNOSIS — F39 UNSPECIFIED MOOD (AFFECTIVE) DISORDER (HCC): Primary | ICD-10-CM

## 2025-04-14 DIAGNOSIS — F29 PSYCHOSIS, UNSPECIFIED PSYCHOSIS TYPE (HCC): ICD-10-CM

## 2025-04-14 PROCEDURE — 99214 OFFICE O/P EST MOD 30 MIN: CPT | Performed by: PSYCHIATRY & NEUROLOGY

## 2025-04-14 RX ORDER — DIVALPROEX SODIUM 500 MG/1
TABLET, FILM COATED, EXTENDED RELEASE ORAL
Qty: 180 TABLET | Refills: 2 | Status: SHIPPED | OUTPATIENT
Start: 2025-04-14

## 2025-04-14 RX ORDER — GABAPENTIN 100 MG/1
200 CAPSULE ORAL
Qty: 60 CAPSULE | Refills: 2 | Status: SHIPPED | OUTPATIENT
Start: 2025-04-14

## 2025-04-14 RX ORDER — ATROPINE SULFATE 10 MG/ML
SOLUTION/ DROPS OPHTHALMIC
Qty: 4.5 ML | Refills: 2 | Status: SHIPPED | OUTPATIENT
Start: 2025-04-14

## 2025-04-14 RX ORDER — DIVALPROEX SODIUM 250 MG/1
TABLET, FILM COATED, EXTENDED RELEASE ORAL
Qty: 90 TABLET | Refills: 2 | Status: SHIPPED | OUTPATIENT
Start: 2025-04-14

## 2025-04-14 RX ORDER — VENLAFAXINE HYDROCHLORIDE 37.5 MG/1
CAPSULE, EXTENDED RELEASE ORAL
Qty: 30 CAPSULE | Refills: 2 | Status: SHIPPED | OUTPATIENT
Start: 2025-04-14

## 2025-04-14 RX ORDER — VENLAFAXINE HYDROCHLORIDE 75 MG/1
CAPSULE, EXTENDED RELEASE ORAL
Qty: 30 CAPSULE | Refills: 2 | Status: SHIPPED | OUTPATIENT
Start: 2025-04-14

## 2025-04-14 RX ORDER — BENZTROPINE MESYLATE 0.5 MG/1
TABLET ORAL
Qty: 150 TABLET | Refills: 2 | Status: SHIPPED | OUTPATIENT
Start: 2025-04-14

## 2025-04-15 ENCOUNTER — SOCIAL WORK (OUTPATIENT)
Dept: BEHAVIORAL/MENTAL HEALTH CLINIC | Facility: CLINIC | Age: 60
End: 2025-04-15
Payer: MEDICARE

## 2025-04-15 DIAGNOSIS — F31.2 BIPOLAR AFFECTIVE DISORDER, CURRENT EPISODE MANIC WITH PSYCHOTIC SYMPTOMS (HCC): Primary | ICD-10-CM

## 2025-04-15 DIAGNOSIS — F42.2 MIXED OBSESSIONAL THOUGHTS AND ACTS: ICD-10-CM

## 2025-04-15 PROCEDURE — 90834 PSYTX W PT 45 MINUTES: CPT | Performed by: SOCIAL WORKER

## 2025-04-15 NOTE — PSYCH
"Behavioral Health Psychotherapy Progress Note    Psychotherapy Provided: Individual Psychotherapy     1. Bipolar affective disorder, current episode manic with psychotic symptoms (HCC)        2. Mixed obsessional thoughts and acts            Goals addressed in session: Goal 1     DATA: Sudhakar presented for treatment with his girlfriend, Rhea. They both presented as upbeat and happy. He stated that he has been doing well and has maintained control of his mood and symptoms. They stated that he had his follow up with psychiatry and that the medication stopped in the hospital will remain so for the time being. Discussing that he ha been continuing to assist in the household and has been also very helpful with her children and building bonds. Continuing to discuss his path forward in being able to manage his past trauma and relationships. Discussing their relationship and mutual goals for the future. Giving supportive therapy  During this session, this clinician used the following therapeutic modalities: Cognitive Behavioral Therapy and Supportive Psychotherapy    Substance Abuse was not addressed during this session. If the client is diagnosed with a co-occurring substance use disorder, please indicate any changes in the frequency or amount of use: Sober. Stage of change for addressing substance use diagnoses: Maintenance    ASSESSMENT:  Sudhakar Choe presents with a Euthymic/ normal mood.     his affect is Normal range and intensity, which is congruent, with his mood and the content of the session. The client has made progress on their goals.    Continues to manage his moods and symptoms Sudhakar Choe presents with a none risk of suicide, none risk of self-harm, and none risk of harm to others.    For any risk assessment that surpasses a \"low\" rating, a safety plan must be developed.    A safety plan was indicated: no  If yes, describe in detail N/A    PLAN: Between sessions, Sudhakar Choe will continue " to manage his moods and symptoms. At the next session, the therapist will use Cognitive Behavioral Therapy and Supportive Psychotherapy to address treatment goals.    Behavioral Health Treatment Plan and Discharge Planning: Sudhakar Choe is aware of and agrees to continue to work on their treatment plan. They have identified and are working toward their discharge goals. yes    Depression Follow-up Plan Completed: No    Visit start and stop times:    04/15/25  Start Time: 1402  Stop Time: 1449  Total Visit Time: 47 minutes

## 2025-04-16 PROBLEM — J69.0 ASPIRATION PNEUMONIA OF BOTH LUNGS (HCC): Status: RESOLVED | Noted: 2025-03-13 | Resolved: 2025-04-16

## 2025-04-16 PROBLEM — A41.9 SEPSIS (HCC): Status: RESOLVED | Noted: 2025-03-13 | Resolved: 2025-04-16

## 2025-05-06 ENCOUNTER — TELEPHONE (OUTPATIENT)
Age: 60
End: 2025-05-06

## 2025-05-06 NOTE — TELEPHONE ENCOUNTER
Patient is calling regarding cancelling an appointment.    Date/Time: 5/6/2025 at 3 pm    Reason: pt is sick    Patient was rescheduled: YES [] NO [x]  If yes, when was Patient reschedule for: n/a    Patient requesting call back to reschedule: YES [] NO [x]      Pt stated he will call back to reschedule

## 2025-05-06 NOTE — TELEPHONE ENCOUNTER
Patient's girlfriend calling to confirm appointment for today was cancelled.    Writer unable to locate girlfriend on [facility] but got verbal permission from patient.     Advised appointment for 5/6/25 @ 3pm has been cancelled.

## 2025-05-16 DIAGNOSIS — F29 PSYCHOSIS, UNSPECIFIED PSYCHOSIS TYPE (HCC): ICD-10-CM

## 2025-05-16 DIAGNOSIS — F39 UNSPECIFIED MOOD (AFFECTIVE) DISORDER (HCC): ICD-10-CM

## 2025-05-20 DIAGNOSIS — F39 UNSPECIFIED MOOD (AFFECTIVE) DISORDER (HCC): ICD-10-CM

## 2025-05-20 RX ORDER — VENLAFAXINE HYDROCHLORIDE 37.5 MG/1
CAPSULE, EXTENDED RELEASE ORAL
Qty: 90 CAPSULE | Refills: 2 | Status: SHIPPED | OUTPATIENT
Start: 2025-05-20

## 2025-05-20 RX ORDER — PALIPERIDONE PALMITATE 234 MG/1.5ML
234 INJECTION INTRAMUSCULAR
Qty: 1.5 ML | Refills: 3 | Status: SHIPPED | OUTPATIENT
Start: 2025-05-20

## 2025-05-20 RX ORDER — VENLAFAXINE HYDROCHLORIDE 75 MG/1
CAPSULE, EXTENDED RELEASE ORAL
Qty: 90 CAPSULE | Refills: 2 | Status: SHIPPED | OUTPATIENT
Start: 2025-05-20

## 2025-05-20 NOTE — TELEPHONE ENCOUNTER
Patient requested refill of :  Requested Prescriptions     Pending Prescriptions Disp Refills    venlafaxine (EFFEXOR-XR) 75 mg 24 hr capsule [Pharmacy Med Name: VENLAFAXINE HCL ER 75 MG CAP] 90 capsule 2     Sig: TAKE EFFEXOR 37.5 MG AND 75 MG FOR A TOTAL .5 MG DAILY    venlafaxine (EFFEXOR-XR) 37.5 mg 24 hr capsule [Pharmacy Med Name: VENLAFAXINE HCL ER 37.5 MG CAP] 90 capsule 2     Sig: TAKE EFFEXOR 37.5 MG AND 75 MG FOR A TOTAL .5 MG DAILY         Refill request approved and sent to pharmacy on file per patient request.     Solomon Mcintosh MD

## 2025-05-20 NOTE — TELEPHONE ENCOUNTER
Patient requested refill of :  Requested Prescriptions     Pending Prescriptions Disp Refills    paliperidone palmitate ER (Invega Sustenna) 234 mg/1.5 mL IM injection [Pharmacy Med Name: INVEGA SUSTENNA 234 MG/1.5 ML] 1.5 mL 3     Sig: INJECT 1.5 MILLILITERS INTRAMUSCULARLY ( DELTOID ONLY )EVERY 30 DAYS. THIS ONE TO BE ADMINISTERED 4 WEEKS AFTER 12/30/ DOSE         Refill request approved and sent to pharmacy on file per patient request.     Solomon Mcintosh MD

## 2025-05-27 ENCOUNTER — TELEPHONE (OUTPATIENT)
Age: 60
End: 2025-05-27

## 2025-05-27 NOTE — TELEPHONE ENCOUNTER
Called Rhea [phone] - advised she contact the hospital where Sudhakar currently is and speak with provider about having him transferred. She said he is currently in the ED and they are looking for a bed available for him. I explained Dr. Mcintosh doesn't have a say in where he is admitted. She will contact the ED provider.

## 2025-05-27 NOTE — TELEPHONE ENCOUNTER
Patient girlfrienjacqui Wilkerson is calling to advise patient is in the [facility] and she was wanting the providers over here to advocate to possible get the patient to [place].    There is no communication consent on file for patient girlfriend she is just requesting to sent this message to the provider.

## 2025-05-27 NOTE — TELEPHONE ENCOUNTER
Patient's girlfriend called and reported that pt. Is inpatient due to hallucinations. She reported that he is at [facility], but wanted patient to go to [facility]. She wanted to know if provider could have him transferred.

## 2025-06-23 ENCOUNTER — TELEPHONE (OUTPATIENT)
Age: 60
End: 2025-06-23

## 2025-06-23 ENCOUNTER — TELEPHONE (OUTPATIENT)
Dept: PSYCHIATRY | Facility: CLINIC | Age: 60
End: 2025-06-23

## 2025-06-23 DIAGNOSIS — F39 UNSPECIFIED MOOD (AFFECTIVE) DISORDER (HCC): ICD-10-CM

## 2025-06-23 DIAGNOSIS — F29 PSYCHOSIS, UNSPECIFIED PSYCHOSIS TYPE (HCC): ICD-10-CM

## 2025-06-23 NOTE — TELEPHONE ENCOUNTER
Provider will not be available on 06/27 - tried calling patient twice with no answer or response - appointment was cancel and needs to be reschedule.

## 2025-06-24 RX ORDER — PALIPERIDONE PALMITATE 234 MG/1.5ML
234 INJECTION INTRAMUSCULAR
Qty: 1.5 ML | Refills: 3 | Status: SHIPPED | OUTPATIENT
Start: 2025-06-24

## 2025-06-24 NOTE — TELEPHONE ENCOUNTER
Called Derek and left a VM:  requested prescription was sent to pharmacy; office number given to call if further assistance is needed.

## 2025-06-24 NOTE — TELEPHONE ENCOUNTER
Refills sent.  If patient needs further refills, he can reach out to our office to request.    Patient requested refill of :  Requested Prescriptions     Signed Prescriptions Disp Refills    paliperidone palmitate ER (Invega Sustenna) 234 mg/1.5 mL IM injection 1.5 mL 3     Si.5 mL (234 mg total) by Intramuscular (deltoid only) route every 30 (thirty) days     Authorizing Provider: SOLOMON CMINTOSH         Refill request approved and sent to pharmacy on file per patient request.     Solomon Mcintosh MD

## 2025-07-06 DIAGNOSIS — Z00.8 MEDICAL CLEARANCE FOR PSYCHIATRIC ADMISSION: ICD-10-CM

## 2025-07-28 ENCOUNTER — TELEPHONE (OUTPATIENT)
Age: 60
End: 2025-07-28

## 2025-07-29 ENCOUNTER — HOSPITAL ENCOUNTER (EMERGENCY)
Facility: HOSPITAL | Age: 60
Discharge: HOME | DRG: 885 | End: 2025-07-30
Attending: EMERGENCY MEDICINE | Admitting: EMERGENCY MEDICINE
Payer: MEDICARE

## 2025-07-30 ENCOUNTER — HOSPITAL ENCOUNTER (INPATIENT)
Facility: HOSPITAL | Age: 60
LOS: 12 days | Discharge: HOME | DRG: 885 | End: 2025-08-11
Attending: PSYCHIATRY & NEUROLOGY | Admitting: PSYCHIATRY & NEUROLOGY
Payer: MEDICARE

## 2025-07-31 PROBLEM — F17.290 CIGAR SMOKER: Status: ACTIVE | Noted: 2024-05-02

## 2025-07-31 PROBLEM — R29.898 DECREASED GRIP STRENGTH OF RIGHT HAND: Status: ACTIVE | Noted: 2019-12-19

## 2025-07-31 PROBLEM — Z91.199 NONCOMPLIANCE: Status: ACTIVE | Noted: 2025-07-31

## 2025-08-11 ENCOUNTER — OFFICE VISIT (OUTPATIENT)
Dept: PSYCHIATRY | Facility: CLINIC | Age: 60
End: 2025-08-11
Payer: MEDICARE

## 2025-08-11 DIAGNOSIS — F25.1 SCHIZOAFFECTIVE DISORDER, DEPRESSIVE TYPE (HCC): Primary | ICD-10-CM

## 2025-08-11 DIAGNOSIS — F29 PSYCHOSIS, UNSPECIFIED PSYCHOSIS TYPE (HCC): ICD-10-CM

## 2025-08-11 DIAGNOSIS — F10.21 ALCOHOL USE DISORDER, MODERATE, IN SUSTAINED REMISSION (HCC): ICD-10-CM

## 2025-08-11 DIAGNOSIS — K11.7 DROOLING: ICD-10-CM

## 2025-08-11 DIAGNOSIS — G47.00 INSOMNIA, UNSPECIFIED TYPE: ICD-10-CM

## 2025-08-11 DIAGNOSIS — K59.09 OTHER CONSTIPATION: ICD-10-CM

## 2025-08-11 PROCEDURE — 90833 PSYTX W PT W E/M 30 MIN: CPT | Performed by: PSYCHIATRY & NEUROLOGY

## 2025-08-11 PROCEDURE — 99214 OFFICE O/P EST MOD 30 MIN: CPT | Performed by: PSYCHIATRY & NEUROLOGY

## 2025-08-11 RX ORDER — DIVALPROEX SODIUM 250 MG/1
250 TABLET, FILM COATED, EXTENDED RELEASE ORAL DAILY
Qty: 30 TABLET | Refills: 2 | Status: SHIPPED | OUTPATIENT
Start: 2025-08-11 | End: 2025-11-09

## 2025-08-11 RX ORDER — DIVALPROEX SODIUM 500 MG/1
1000 TABLET, FILM COATED, EXTENDED RELEASE ORAL
Qty: 60 TABLET | Refills: 2 | Status: SHIPPED | OUTPATIENT
Start: 2025-08-11 | End: 2025-11-09

## 2025-08-11 RX ORDER — VENLAFAXINE HYDROCHLORIDE 37.5 MG/1
112.5 CAPSULE, EXTENDED RELEASE ORAL DAILY
Qty: 90 CAPSULE | Refills: 2 | Status: SHIPPED | OUTPATIENT
Start: 2025-08-11 | End: 2025-11-09

## 2025-08-19 ENCOUNTER — TELEPHONE (OUTPATIENT)
Dept: PSYCHIATRY | Facility: CLINIC | Age: 60
End: 2025-08-19

## 2025-08-19 ENCOUNTER — DOCUMENTATION (OUTPATIENT)
Dept: BEHAVIORAL/MENTAL HEALTH CLINIC | Facility: CLINIC | Age: 60
End: 2025-08-19

## (undated) DEVICE — CAST PADDING 4 IN SYNTHETIC NON-STRL

## (undated) DEVICE — 10FR FRAZIER SUCTION HANDLE: Brand: CARDINAL HEALTH

## (undated) DEVICE — STERILE BETHLEHEM PLASTIC HAND: Brand: CARDINAL HEALTH

## (undated) DEVICE — SUT VICRYL 3-0 SH 27 IN J416H

## (undated) DEVICE — GLOVE INDICATOR PI UNDERGLOVE SZ 6.5 BLUE

## (undated) DEVICE — GLOVE PI ULTRA TOUCH SZ.8.0

## (undated) DEVICE — OCCLUSIVE GAUZE STRIP,3% BISMUTH TRIBROMOPHENATE IN PETROLATUM BLEND: Brand: XEROFORM

## (undated) DEVICE — GLOVE PI ULTRA TOUCH SZ.6.5

## (undated) DEVICE — DRAPE C-ARM X-RAY

## (undated) DEVICE — PLASTER ROLL SPLINT 3 X 260 IN XFAST SET

## (undated) DEVICE — TUBING SUCTION 5MM X 12 FT

## (undated) DEVICE — SUT NYLON 4-0 P-3 18 IN 699G

## (undated) DEVICE — SUT MONOCRYL 4-0 PS-2 27 IN Y426H

## (undated) DEVICE — CUFF TOURNIQUET 18 X 4 IN QUICK CONNECT DISP 1 BLADDER

## (undated) DEVICE — ADHESIVE SKIN HIGH VISCOSITY EXOFIN 1ML

## (undated) DEVICE — GLOVE INDICATOR PI UNDERGLOVE SZ 8 BLUE

## (undated) DEVICE — INTENDED FOR TISSUE SEPARATION, AND OTHER PROCEDURES THAT REQUIRE A SHARP SURGICAL BLADE TO PUNCTURE OR CUT.: Brand: BARD-PARKER ® CARBON RIB-BACK BLADES

## (undated) DEVICE — SCD SEQUENTIAL COMPRESSION COMFORT SLEEVE MEDIUM KNEE LENGTH: Brand: KENDALL SCD